# Patient Record
Sex: MALE | Race: WHITE | NOT HISPANIC OR LATINO | Employment: OTHER | URBAN - METROPOLITAN AREA
[De-identification: names, ages, dates, MRNs, and addresses within clinical notes are randomized per-mention and may not be internally consistent; named-entity substitution may affect disease eponyms.]

---

## 2017-06-30 ENCOUNTER — RECORDS - HEALTHEAST (OUTPATIENT)
Dept: LAB | Facility: CLINIC | Age: 67
End: 2017-06-30

## 2017-06-30 LAB
CHOLEST SERPL-MCNC: 173 MG/DL
FASTING STATUS PATIENT QL REPORTED: NORMAL
HDLC SERPL-MCNC: 42 MG/DL
LDLC SERPL CALC-MCNC: 116 MG/DL
TRIGL SERPL-MCNC: 76 MG/DL

## 2017-07-18 ENCOUNTER — RECORDS - HEALTHEAST (OUTPATIENT)
Dept: LAB | Facility: CLINIC | Age: 67
End: 2017-07-18

## 2017-07-18 LAB — HIV 1+2 AB+HIV1 P24 AG SERPL QL IA: NEGATIVE

## 2017-07-19 LAB
HCV AB SERPL QL IA: NEGATIVE
SYPHILIS RPR SCREEN - HISTORICAL: NORMAL

## 2018-08-28 ENCOUNTER — RECORDS - HEALTHEAST (OUTPATIENT)
Dept: ADMINISTRATIVE | Facility: OTHER | Age: 68
End: 2018-08-28

## 2018-10-12 ENCOUNTER — HOSPITAL ENCOUNTER (OUTPATIENT)
Dept: ULTRASOUND IMAGING | Facility: CLINIC | Age: 68
Discharge: HOME | End: 2018-10-12
Attending: PHYSICIAN ASSISTANT

## 2018-10-12 DIAGNOSIS — M79.604 LEG PAIN, RIGHT: ICD-10-CM

## 2018-10-12 DIAGNOSIS — S89.91XA: ICD-10-CM

## 2021-05-28 ENCOUNTER — RECORDS - HEALTHEAST (OUTPATIENT)
Dept: ADMINISTRATIVE | Facility: CLINIC | Age: 71
End: 2021-05-28

## 2021-05-30 ENCOUNTER — RECORDS - HEALTHEAST (OUTPATIENT)
Dept: ADMINISTRATIVE | Facility: CLINIC | Age: 71
End: 2021-05-30

## 2021-06-01 ENCOUNTER — RECORDS - HEALTHEAST (OUTPATIENT)
Dept: ADMINISTRATIVE | Facility: CLINIC | Age: 71
End: 2021-06-01

## 2021-06-16 PROBLEM — R55 SYNCOPE: Status: ACTIVE | Noted: 2017-07-07

## 2021-06-16 PROBLEM — R07.9 CHEST PAIN: Status: ACTIVE | Noted: 2017-07-07

## 2022-02-17 ENCOUNTER — APPOINTMENT (OUTPATIENT)
Dept: CT IMAGING | Facility: CLINIC | Age: 72
DRG: 003 | End: 2022-02-17
Payer: MEDICARE

## 2022-02-17 ENCOUNTER — APPOINTMENT (OUTPATIENT)
Dept: GENERAL RADIOLOGY | Facility: CLINIC | Age: 72
DRG: 003 | End: 2022-02-17
Attending: STUDENT IN AN ORGANIZED HEALTH CARE EDUCATION/TRAINING PROGRAM
Payer: MEDICARE

## 2022-02-17 ENCOUNTER — HOSPITAL ENCOUNTER (EMERGENCY)
Facility: CLINIC | Age: 72
Discharge: ANOTHER HOSPITAL | End: 2022-02-17
Attending: EMERGENCY MEDICINE | Admitting: EMERGENCY MEDICINE
Payer: MEDICARE

## 2022-02-17 ENCOUNTER — ANESTHESIA (OUTPATIENT)
Dept: EMERGENCY MEDICINE | Facility: CLINIC | Age: 72
End: 2022-02-17
Payer: MEDICARE

## 2022-02-17 ENCOUNTER — APPOINTMENT (OUTPATIENT)
Dept: GENERAL RADIOLOGY | Facility: CLINIC | Age: 72
DRG: 003 | End: 2022-02-17
Payer: MEDICARE

## 2022-02-17 ENCOUNTER — HOSPITAL ENCOUNTER (INPATIENT)
Facility: CLINIC | Age: 72
LOS: 22 days | Discharge: HOME HEALTH | DRG: 003 | End: 2022-03-11
Admitting: INTERNAL MEDICINE
Payer: MEDICARE

## 2022-02-17 ENCOUNTER — ANESTHESIA EVENT (OUTPATIENT)
Dept: EMERGENCY MEDICINE | Facility: CLINIC | Age: 72
End: 2022-02-17
Payer: MEDICARE

## 2022-02-17 ENCOUNTER — TELEPHONE (OUTPATIENT)
Dept: NURSING | Facility: CLINIC | Age: 72
End: 2022-02-17

## 2022-02-17 VITALS
WEIGHT: 220 LBS | OXYGEN SATURATION: 96 % | SYSTOLIC BLOOD PRESSURE: 112 MMHG | BODY MASS INDEX: 29.84 KG/M2 | DIASTOLIC BLOOD PRESSURE: 55 MMHG | HEART RATE: 73 BPM | TEMPERATURE: 97.6 F

## 2022-02-17 DIAGNOSIS — G47.00 INSOMNIA, UNSPECIFIED TYPE: ICD-10-CM

## 2022-02-17 DIAGNOSIS — K13.70 ORAL LESION: ICD-10-CM

## 2022-02-17 DIAGNOSIS — I46.9 CARDIAC ARREST (H): ICD-10-CM

## 2022-02-17 DIAGNOSIS — K59.00 CONSTIPATION, UNSPECIFIED CONSTIPATION TYPE: ICD-10-CM

## 2022-02-17 DIAGNOSIS — E78.5 HYPERLIPIDEMIA, UNSPECIFIED HYPERLIPIDEMIA TYPE: ICD-10-CM

## 2022-02-17 DIAGNOSIS — I48.20 CHRONIC ATRIAL FIBRILLATION (H): ICD-10-CM

## 2022-02-17 DIAGNOSIS — I21.3 ST ELEVATION MYOCARDIAL INFARCTION (STEMI), UNSPECIFIED ARTERY (H): ICD-10-CM

## 2022-02-17 DIAGNOSIS — N17.0 ACUTE KIDNEY FAILURE WITH TUBULAR NECROSIS (H): Primary | ICD-10-CM

## 2022-02-17 DIAGNOSIS — I49.01 VENTRICULAR FIBRILLATION (H): ICD-10-CM

## 2022-02-17 LAB
ABO/RH(D): NORMAL
ACT BLD: 207 SECONDS (ref 74–150)
ACT BLD: 250 SECONDS (ref 74–150)
ACT BLD: 275 SECONDS (ref 74–150)
ALBUMIN SERPL-MCNC: 2.7 G/DL (ref 3.4–5)
ALBUMIN SERPL-MCNC: 2.9 G/DL (ref 3.4–5)
ALP SERPL-CCNC: 31 U/L (ref 40–150)
ALP SERPL-CCNC: 37 U/L (ref 40–150)
ALT SERPL W P-5'-P-CCNC: 331 U/L (ref 0–70)
ALT SERPL W P-5'-P-CCNC: 344 U/L (ref 0–70)
ANION GAP SERPL CALCULATED.3IONS-SCNC: 13 MMOL/L (ref 3–14)
ANION GAP SERPL CALCULATED.3IONS-SCNC: 7 MMOL/L (ref 3–14)
ANTIBODY SCREEN: NEGATIVE
APTT PPP: 174 SECONDS (ref 22–38)
APTT PPP: 24 SECONDS (ref 22–38)
APTT PPP: >240 SECONDS (ref 22–38)
AST SERPL W P-5'-P-CCNC: 626 U/L (ref 0–45)
AST SERPL W P-5'-P-CCNC: 704 U/L (ref 0–45)
ATRIAL RATE - MUSE: 72 BPM
BASE EXCESS BLDA CALC-SCNC: -1.9 MMOL/L (ref -9–1.8)
BASE EXCESS BLDA CALC-SCNC: -11.4 MMOL/L (ref -9.6–2)
BASE EXCESS BLDA CALC-SCNC: -12.5 MMOL/L (ref -9.6–2)
BASE EXCESS BLDA CALC-SCNC: -2.3 MMOL/L (ref -9–1.8)
BASE EXCESS BLDA CALC-SCNC: -3.2 MMOL/L (ref -9–1.8)
BASE EXCESS BLDA CALC-SCNC: -3.4 MMOL/L (ref -9–1.8)
BASE EXCESS BLDA CALC-SCNC: -5.9 MMOL/L (ref -9.6–2)
BASOPHILS # BLD AUTO: 0 10E3/UL (ref 0–0.2)
BASOPHILS NFR BLD AUTO: 0 %
BILIRUB SERPL-MCNC: 0.6 MG/DL (ref 0.2–1.3)
BILIRUB SERPL-MCNC: 0.6 MG/DL (ref 0.2–1.3)
BUN SERPL-MCNC: 19 MG/DL (ref 7–30)
BUN SERPL-MCNC: 20 MG/DL (ref 7–30)
CA-I BLD-MCNC: 4.4 MG/DL (ref 4.4–5.2)
CA-I BLD-MCNC: 4.7 MG/DL (ref 4.4–5.2)
CA-I BLD-MCNC: 4.8 MG/DL (ref 4.4–5.2)
CA-I BLD-MCNC: 5.3 MG/DL (ref 4.4–5.2)
CA-I BLD-MCNC: 5.3 MG/DL (ref 4.4–5.2)
CALCIUM SERPL-MCNC: 8 MG/DL (ref 8.5–10.1)
CALCIUM SERPL-MCNC: 8.3 MG/DL (ref 8.5–10.1)
CALCIUM SERPL-MCNC: 8.3 MG/DL (ref 8.5–10.1)
CF REDUC 30M P MA P HEP LENFR BLD TEG: 0.5 % (ref 0–8)
CF REDUC 60M P MA P HEPASE LENFR BLD TEG: 3.4 % (ref 0–15)
CFT P HPASE BLD TEG: 2.2 MINUTE (ref 1–3)
CHLORIDE BLD-SCNC: 109 MMOL/L (ref 94–109)
CHLORIDE BLD-SCNC: 111 MMOL/L (ref 94–109)
CI (COAGULATION INDEX)(Z): -3.4 (ref -3–3)
CLOT ANGLE P HPASE BLD TEG: 57.4 DEGREES (ref 53–72)
CLOT INIT P HPASE BLD TEG: 9.6 MINUTE (ref 5–10)
CLOT STRENGTH P HPASE BLD TEG: 7.1 KD/SC (ref 4.5–11)
CO2 SERPL-SCNC: 18 MMOL/L (ref 20–32)
CO2 SERPL-SCNC: 22 MMOL/L (ref 20–32)
CORTIS SERPL-MCNC: 21.2 UG/DL (ref 4–22)
CREAT SERPL-MCNC: 1.41 MG/DL (ref 0.66–1.25)
CREAT SERPL-MCNC: 1.66 MG/DL (ref 0.66–1.25)
CRP SERPL-MCNC: 3.3 MG/L (ref 0–8)
D DIMER PPP FEU-MCNC: 13.3 UG/ML FEU (ref 0–0.5)
DIASTOLIC BLOOD PRESSURE - MUSE: 58 MMHG
EOSINOPHIL # BLD AUTO: 0 10E3/UL (ref 0–0.7)
EOSINOPHIL # BLD AUTO: 0 10E3/UL (ref 0–0.7)
EOSINOPHIL # BLD AUTO: 0.1 10E3/UL (ref 0–0.7)
EOSINOPHIL NFR BLD AUTO: 0 %
EOSINOPHIL NFR BLD AUTO: 0 %
EOSINOPHIL NFR BLD AUTO: 1 %
ERYTHROCYTE [DISTWIDTH] IN BLOOD BY AUTOMATED COUNT: 12.4 % (ref 10–15)
ERYTHROCYTE [DISTWIDTH] IN BLOOD BY AUTOMATED COUNT: 12.5 % (ref 10–15)
ERYTHROCYTE [DISTWIDTH] IN BLOOD BY AUTOMATED COUNT: 12.7 % (ref 10–15)
ERYTHROCYTE [SEDIMENTATION RATE] IN BLOOD BY WESTERGREN METHOD: 6 MM/HR (ref 0–20)
GFR SERPL CREATININE-BSD FRML MDRD: 43 ML/MIN/1.73M2
GFR SERPL CREATININE-BSD FRML MDRD: 53 ML/MIN/1.73M2
GLUCOSE BLD-MCNC: 128 MG/DL (ref 70–99)
GLUCOSE BLD-MCNC: 135 MG/DL (ref 70–99)
GLUCOSE BLD-MCNC: 177 MG/DL (ref 70–99)
GLUCOSE BLD-MCNC: 181 MG/DL (ref 70–99)
GLUCOSE BLD-MCNC: 218 MG/DL (ref 70–99)
GLUCOSE BLD-MCNC: 259 MG/DL (ref 70–99)
GLUCOSE BLD-MCNC: 268 MG/DL (ref 70–99)
GLUCOSE BLDC GLUCOMTR-MCNC: 122 MG/DL (ref 70–99)
GLUCOSE BLDC GLUCOMTR-MCNC: 128 MG/DL (ref 70–99)
GLUCOSE BLDC GLUCOMTR-MCNC: 131 MG/DL (ref 70–99)
GLUCOSE BLDC GLUCOMTR-MCNC: 154 MG/DL (ref 70–99)
GLUCOSE BLDC GLUCOMTR-MCNC: 183 MG/DL (ref 70–99)
HCO3 BLD-SCNC: 18 MMOL/L (ref 21–28)
HCO3 BLD-SCNC: 23 MMOL/L (ref 21–28)
HCO3 BLD-SCNC: 25 MMOL/L (ref 21–28)
HCO3 BLD-SCNC: 26 MMOL/L (ref 21–28)
HCO3 BLDA-SCNC: 18 MMOL/L (ref 21–28)
HCO3 BLDA-SCNC: 20 MMOL/L (ref 21–28)
HCO3 BLDA-SCNC: 22 MMOL/L (ref 21–28)
HCT VFR BLD AUTO: 34.7 % (ref 40–53)
HCT VFR BLD AUTO: 35.8 % (ref 40–53)
HCT VFR BLD AUTO: 43.2 % (ref 40–53)
HGB BLD-MCNC: 11.3 G/DL (ref 13.3–17.7)
HGB BLD-MCNC: 11.8 G/DL (ref 13.3–17.7)
HGB BLD-MCNC: 11.9 G/DL (ref 13.3–17.7)
HGB BLD-MCNC: 13.7 G/DL (ref 13.3–17.7)
HGB BLD-MCNC: 13.9 G/DL (ref 13.3–17.7)
HGB BLD-MCNC: 14.4 G/DL (ref 13.3–17.7)
HGB FREE PLAS-MCNC: 70 MG/DL
HOLD SPECIMEN: NORMAL
IMM GRANULOCYTES # BLD: 0 10E3/UL
IMM GRANULOCYTES # BLD: 0.1 10E3/UL
IMM GRANULOCYTES # BLD: 0.1 10E3/UL
IMM GRANULOCYTES NFR BLD: 0 %
IMM GRANULOCYTES NFR BLD: 1 %
IMM GRANULOCYTES NFR BLD: 1 %
INR PPP: 1.04 (ref 0.85–1.15)
INR PPP: 1.38 (ref 0.85–1.15)
INR PPP: 1.62 (ref 0.85–1.15)
INTERPRETATION ECG - MUSE: NORMAL
LACTATE BLD-SCNC: 4.9 MMOL/L
LACTATE BLD-SCNC: 7.6 MMOL/L
LACTATE BLD-SCNC: 8.1 MMOL/L
LACTATE SERPL-SCNC: 2.1 MMOL/L (ref 0.7–2)
LACTATE SERPL-SCNC: 5.1 MMOL/L (ref 0.7–2)
LDH SERPL L TO P-CCNC: 806 U/L (ref 85–227)
LYMPHOCYTES # BLD AUTO: 0.6 10E3/UL (ref 0.8–5.3)
LYMPHOCYTES # BLD AUTO: 0.6 10E3/UL (ref 0.8–5.3)
LYMPHOCYTES # BLD AUTO: 3.1 10E3/UL (ref 0.8–5.3)
LYMPHOCYTES NFR BLD AUTO: 27 %
LYMPHOCYTES NFR BLD AUTO: 4 %
LYMPHOCYTES NFR BLD AUTO: 5 %
MCF P HPASE BLD TEG: 58.6 MM (ref 50–70)
MCH RBC QN AUTO: 31.6 PG (ref 26.5–33)
MCH RBC QN AUTO: 31.9 PG (ref 26.5–33)
MCH RBC QN AUTO: 32.1 PG (ref 26.5–33)
MCHC RBC AUTO-ENTMCNC: 32.6 G/DL (ref 31.5–36.5)
MCHC RBC AUTO-ENTMCNC: 33.2 G/DL (ref 31.5–36.5)
MCHC RBC AUTO-ENTMCNC: 33.3 G/DL (ref 31.5–36.5)
MCV RBC AUTO: 95 FL (ref 78–100)
MCV RBC AUTO: 96 FL (ref 78–100)
MCV RBC AUTO: 99 FL (ref 78–100)
MONOCYTES # BLD AUTO: 0.5 10E3/UL (ref 0–1.3)
MONOCYTES # BLD AUTO: 0.6 10E3/UL (ref 0–1.3)
MONOCYTES # BLD AUTO: 0.9 10E3/UL (ref 0–1.3)
MONOCYTES NFR BLD AUTO: 4 %
MONOCYTES NFR BLD AUTO: 5 %
MONOCYTES NFR BLD AUTO: 6 %
NEUTROPHILS # BLD AUTO: 11.2 10E3/UL (ref 1.6–8.3)
NEUTROPHILS # BLD AUTO: 12.4 10E3/UL (ref 1.6–8.3)
NEUTROPHILS # BLD AUTO: 7.7 10E3/UL (ref 1.6–8.3)
NEUTROPHILS NFR BLD AUTO: 67 %
NEUTROPHILS NFR BLD AUTO: 89 %
NEUTROPHILS NFR BLD AUTO: 90 %
NRBC # BLD AUTO: 0 10E3/UL
NRBC BLD AUTO-RTO: 0 /100
O2/TOTAL GAS SETTING VFR VENT: 100 %
O2/TOTAL GAS SETTING VFR VENT: 60 %
OXYHGB MFR BLD: 90 % (ref 92–100)
OXYHGB MFR BLD: 91 % (ref 92–100)
OXYHGB MFR BLD: 93 % (ref 92–100)
OXYHGB MFR BLDA: 54 % (ref 92–100)
OXYHGB MFR BLDA: 98 % (ref 92–100)
OXYHGB MFR BLDA: 98 % (ref 92–100)
P AXIS - MUSE: NORMAL DEGREES
PCO2 BLD: 22 MM HG (ref 35–45)
PCO2 BLD: 46 MM HG (ref 35–45)
PCO2 BLD: 49 MM HG (ref 35–45)
PCO2 BLD: 55 MM HG (ref 35–45)
PCO2 BLDA: 51 MM HG (ref 35–45)
PCO2 BLDA: 59 MM HG (ref 35–45)
PCO2 BLDA: 70 MM HG (ref 35–45)
PH BLD: 7.27 [PH] (ref 7.35–7.45)
PH BLD: 7.3 [PH] (ref 7.35–7.45)
PH BLD: 7.31 [PH] (ref 7.35–7.45)
PH BLD: 7.52 [PH] (ref 7.35–7.45)
PH BLDA: 7.06 [PH] (ref 7.35–7.45)
PH BLDA: 7.09 [PH] (ref 7.35–7.45)
PH BLDA: 7.24 [PH] (ref 7.35–7.45)
PLATELET # BLD AUTO: 151 10E3/UL (ref 150–450)
PLATELET # BLD AUTO: 172 10E3/UL (ref 150–450)
PLATELET # BLD AUTO: 203 10E3/UL (ref 150–450)
PO2 BLD: 256 MM HG (ref 80–105)
PO2 BLD: 66 MM HG (ref 80–105)
PO2 BLD: 68 MM HG (ref 80–105)
PO2 BLD: 80 MM HG (ref 80–105)
PO2 BLDA: 192 MM HG (ref 80–105)
PO2 BLDA: 351 MM HG (ref 80–105)
PO2 BLDA: 41 MM HG (ref 80–105)
POTASSIUM BLD-SCNC: 3.6 MMOL/L (ref 3.4–5.3)
POTASSIUM BLD-SCNC: 3.7 MMOL/L (ref 3.5–5)
POTASSIUM BLD-SCNC: 4.1 MMOL/L (ref 3.5–5)
POTASSIUM BLD-SCNC: 4.2 MMOL/L (ref 3.5–5)
POTASSIUM BLD-SCNC: 4.3 MMOL/L (ref 3.4–5.3)
PR INTERVAL - MUSE: NORMAL MS
PROCALCITONIN SERPL-MCNC: 0.2 NG/ML
PROT SERPL-MCNC: 5.3 G/DL (ref 6.8–8.8)
PROT SERPL-MCNC: 5.5 G/DL (ref 6.8–8.8)
QRS DURATION - MUSE: 100 MS
QT - MUSE: 358 MS
QTC - MUSE: 399 MS
R AXIS - MUSE: 88 DEGREES
RBC # BLD AUTO: 3.52 10E6/UL (ref 4.4–5.9)
RBC # BLD AUTO: 3.73 10E6/UL (ref 4.4–5.9)
RBC # BLD AUTO: 4.56 10E6/UL (ref 4.4–5.9)
SARS-COV-2 RNA RESP QL NAA+PROBE: POSITIVE
SODIUM BLD-SCNC: 140 MMOL/L (ref 133–144)
SODIUM BLD-SCNC: 141 MMOL/L (ref 133–144)
SODIUM BLD-SCNC: 142 MMOL/L (ref 133–144)
SODIUM SERPL-SCNC: 140 MMOL/L (ref 133–144)
SODIUM SERPL-SCNC: 140 MMOL/L (ref 133–144)
SPECIMEN EXPIRATION DATE: NORMAL
SYSTOLIC BLOOD PRESSURE - MUSE: 102 MMHG
T AXIS - MUSE: 44 DEGREES
TROPONIN I SERPL HS-MCNC: ABNORMAL NG/L
TROPONIN I SERPL HS-MCNC: ABNORMAL NG/L
TROPONIN I SERPL-MCNC: 0.02 NG/ML (ref 0–0.29)
UFH PPP CHRO-ACNC: 0.82 IU/ML
UFH PPP CHRO-ACNC: >1.1 IU/ML
VENTRICULAR RATE- MUSE: 75 BPM
WBC # BLD AUTO: 11.6 10E3/UL (ref 4–11)
WBC # BLD AUTO: 12.4 10E3/UL (ref 4–11)
WBC # BLD AUTO: 14 10E3/UL (ref 4–11)

## 2022-02-17 PROCEDURE — 027035Z DILATION OF CORONARY ARTERY, ONE ARTERY WITH TWO DRUG-ELUTING INTRALUMINAL DEVICES, PERCUTANEOUS APPROACH: ICD-10-PCS | Performed by: INTERNAL MEDICINE

## 2022-02-17 PROCEDURE — C9803 HOPD COVID-19 SPEC COLLECT: HCPCS

## 2022-02-17 PROCEDURE — 83051 HEMOGLOBIN PLASMA: CPT | Performed by: INTERNAL MEDICINE

## 2022-02-17 PROCEDURE — 87635 SARS-COV-2 COVID-19 AMP PRB: CPT | Performed by: EMERGENCY MEDICINE

## 2022-02-17 PROCEDURE — 33947 ECMO/ECLS INITIATION ARTERY: CPT

## 2022-02-17 PROCEDURE — 71250 CT THORAX DX C-: CPT | Mod: 26 | Performed by: RADIOLOGY

## 2022-02-17 PROCEDURE — 272N000002 HC OR SUPPLY OTHER OPNP: Performed by: INTERNAL MEDICINE

## 2022-02-17 PROCEDURE — 84484 ASSAY OF TROPONIN QUANT: CPT | Performed by: EMERGENCY MEDICINE

## 2022-02-17 PROCEDURE — 93454 CORONARY ARTERY ANGIO S&I: CPT | Performed by: INTERNAL MEDICINE

## 2022-02-17 PROCEDURE — 85025 COMPLETE CBC W/AUTO DIFF WBC: CPT | Performed by: INTERNAL MEDICINE

## 2022-02-17 PROCEDURE — 82810 BLOOD GASES O2 SAT ONLY: CPT

## 2022-02-17 PROCEDURE — 33952 ECMO/ECLS INSJ PRPH CANNULA: CPT | Performed by: INTERNAL MEDICINE

## 2022-02-17 PROCEDURE — 82805 BLOOD GASES W/O2 SATURATION: CPT | Performed by: INTERNAL MEDICINE

## 2022-02-17 PROCEDURE — 82330 ASSAY OF CALCIUM: CPT | Performed by: INTERNAL MEDICINE

## 2022-02-17 PROCEDURE — 272N000057 HC CATH BALLOON IABP

## 2022-02-17 PROCEDURE — 250N000011 HC RX IP 250 OP 636: Performed by: EMERGENCY MEDICINE

## 2022-02-17 PROCEDURE — C9113 INJ PANTOPRAZOLE SODIUM, VIA: HCPCS | Performed by: INTERNAL MEDICINE

## 2022-02-17 PROCEDURE — 5A1522G EXTRACORPOREAL OXYGENATION, MEMBRANE, PERIPHERAL VENO-ARTERIAL: ICD-10-PCS | Performed by: INTERNAL MEDICINE

## 2022-02-17 PROCEDURE — 84484 ASSAY OF TROPONIN QUANT: CPT | Performed by: INTERNAL MEDICINE

## 2022-02-17 PROCEDURE — 99291 CRITICAL CARE FIRST HOUR: CPT | Mod: 25 | Performed by: INTERNAL MEDICINE

## 2022-02-17 PROCEDURE — 272N000555 HC SENSOR NIRS OXIMETER, ADULT

## 2022-02-17 PROCEDURE — 86140 C-REACTIVE PROTEIN: CPT | Performed by: INTERNAL MEDICINE

## 2022-02-17 PROCEDURE — 86316 IMMUNOASSAY TUMOR OTHER: CPT | Performed by: INTERNAL MEDICINE

## 2022-02-17 PROCEDURE — 82310 ASSAY OF CALCIUM: CPT | Performed by: INTERNAL MEDICINE

## 2022-02-17 PROCEDURE — 82533 TOTAL CORTISOL: CPT | Performed by: INTERNAL MEDICINE

## 2022-02-17 PROCEDURE — 85520 HEPARIN ASSAY: CPT | Performed by: INTERNAL MEDICINE

## 2022-02-17 PROCEDURE — 272N000237 HC CARDIOHELP CIRCUIT

## 2022-02-17 PROCEDURE — 410N000003 HC PER-PERFUSION 1ST 30 MIN: Performed by: INTERNAL MEDICINE

## 2022-02-17 PROCEDURE — 370N000003 HC ANESTHESIA WARD SERVICE

## 2022-02-17 PROCEDURE — 999N000077 HC STATISTIC INSERT IABP

## 2022-02-17 PROCEDURE — 85610 PROTHROMBIN TIME: CPT | Performed by: INTERNAL MEDICINE

## 2022-02-17 PROCEDURE — 80354 DRUG SCREENING FENTANYL: CPT | Performed by: INTERNAL MEDICINE

## 2022-02-17 PROCEDURE — 93005 ELECTROCARDIOGRAM TRACING: CPT | Mod: 59 | Performed by: EMERGENCY MEDICINE

## 2022-02-17 PROCEDURE — C1874 STENT, COATED/COV W/DEL SYS: HCPCS | Performed by: INTERNAL MEDICINE

## 2022-02-17 PROCEDURE — 99153 MOD SED SAME PHYS/QHP EA: CPT | Performed by: INTERNAL MEDICINE

## 2022-02-17 PROCEDURE — 999N000015 HC STATISTIC ARTERIAL MONITORING DAILY

## 2022-02-17 PROCEDURE — C1751 CATH, INF, PER/CENT/MIDLINE: HCPCS | Performed by: INTERNAL MEDICINE

## 2022-02-17 PROCEDURE — 258N000003 HC RX IP 258 OP 636

## 2022-02-17 PROCEDURE — 85347 COAGULATION TIME ACTIVATED: CPT

## 2022-02-17 PROCEDURE — 71250 CT THORAX DX C-: CPT

## 2022-02-17 PROCEDURE — 999N000185 HC STATISTIC TRANSPORT TIME EA 15 MIN

## 2022-02-17 PROCEDURE — G1004 CDSM NDSC: HCPCS | Performed by: RADIOLOGY

## 2022-02-17 PROCEDURE — 272N000001 HC OR GENERAL SUPPLY STERILE: Performed by: INTERNAL MEDICINE

## 2022-02-17 PROCEDURE — 250N000011 HC RX IP 250 OP 636: Performed by: INTERNAL MEDICINE

## 2022-02-17 PROCEDURE — 83615 LACTATE (LD) (LDH) ENZYME: CPT | Performed by: INTERNAL MEDICINE

## 2022-02-17 PROCEDURE — 999N000065 XR CHEST PORT 1 VIEW

## 2022-02-17 PROCEDURE — 94002 VENT MGMT INPAT INIT DAY: CPT

## 2022-02-17 PROCEDURE — 92950 HEART/LUNG RESUSCITATION CPR: CPT

## 2022-02-17 PROCEDURE — 85396 CLOTTING ASSAY WHOLE BLOOD: CPT | Performed by: INTERNAL MEDICINE

## 2022-02-17 PROCEDURE — 85730 THROMBOPLASTIN TIME PARTIAL: CPT | Performed by: INTERNAL MEDICINE

## 2022-02-17 PROCEDURE — 85730 THROMBOPLASTIN TIME PARTIAL: CPT | Performed by: EMERGENCY MEDICINE

## 2022-02-17 PROCEDURE — 80307 DRUG TEST PRSMV CHEM ANLYZR: CPT | Performed by: INTERNAL MEDICINE

## 2022-02-17 PROCEDURE — 74018 RADEX ABDOMEN 1 VIEW: CPT | Mod: 26 | Performed by: STUDENT IN AN ORGANIZED HEALTH CARE EDUCATION/TRAINING PROGRAM

## 2022-02-17 PROCEDURE — 93308 TTE F-UP OR LMTD: CPT

## 2022-02-17 PROCEDURE — 99152 MOD SED SAME PHYS/QHP 5/>YRS: CPT | Performed by: INTERNAL MEDICINE

## 2022-02-17 PROCEDURE — 999N000026 HC STATISTIC CARDIOPULM RESUSCITATION

## 2022-02-17 PROCEDURE — 96375 TX/PRO/DX INJ NEW DRUG ADDON: CPT

## 2022-02-17 PROCEDURE — 36415 COLL VENOUS BLD VENIPUNCTURE: CPT | Performed by: EMERGENCY MEDICINE

## 2022-02-17 PROCEDURE — 70450 CT HEAD/BRAIN W/O DYE: CPT | Mod: 26 | Performed by: RADIOLOGY

## 2022-02-17 PROCEDURE — G1004 CDSM NDSC: HCPCS | Mod: GC | Performed by: RADIOLOGY

## 2022-02-17 PROCEDURE — C1887 CATHETER, GUIDING: HCPCS | Performed by: INTERNAL MEDICINE

## 2022-02-17 PROCEDURE — B2111ZZ FLUOROSCOPY OF MULTIPLE CORONARY ARTERIES USING LOW OSMOLAR CONTRAST: ICD-10-PCS | Performed by: INTERNAL MEDICINE

## 2022-02-17 PROCEDURE — 84132 ASSAY OF SERUM POTASSIUM: CPT

## 2022-02-17 PROCEDURE — 999N000157 HC STATISTIC RCP TIME EA 10 MIN

## 2022-02-17 PROCEDURE — 85610 PROTHROMBIN TIME: CPT | Performed by: EMERGENCY MEDICINE

## 2022-02-17 PROCEDURE — 5A02210 ASSISTANCE WITH CARDIAC OUTPUT USING BALLOON PUMP, CONTINUOUS: ICD-10-PCS | Performed by: INTERNAL MEDICINE

## 2022-02-17 PROCEDURE — 83735 ASSAY OF MAGNESIUM: CPT | Performed by: INTERNAL MEDICINE

## 2022-02-17 PROCEDURE — 250N000013 HC RX MED GY IP 250 OP 250 PS 637: Performed by: INTERNAL MEDICINE

## 2022-02-17 PROCEDURE — 99285 EMERGENCY DEPT VISIT HI MDM: CPT | Mod: 25

## 2022-02-17 PROCEDURE — 258N000003 HC RX IP 258 OP 636: Performed by: EMERGENCY MEDICINE

## 2022-02-17 PROCEDURE — 85652 RBC SED RATE AUTOMATED: CPT | Performed by: INTERNAL MEDICINE

## 2022-02-17 PROCEDURE — 86923 COMPATIBILITY TEST ELECTRIC: CPT | Performed by: INTERNAL MEDICINE

## 2022-02-17 PROCEDURE — C9606 PERC D-E COR REVASC W AMI S: HCPCS | Performed by: INTERNAL MEDICINE

## 2022-02-17 PROCEDURE — 999N000075 HC STATISTIC IABP MONITORING

## 2022-02-17 PROCEDURE — 83605 ASSAY OF LACTIC ACID: CPT | Performed by: INTERNAL MEDICINE

## 2022-02-17 PROCEDURE — 84155 ASSAY OF PROTEIN SERUM: CPT | Performed by: INTERNAL MEDICINE

## 2022-02-17 PROCEDURE — 82803 BLOOD GASES ANY COMBINATION: CPT | Performed by: INTERNAL MEDICINE

## 2022-02-17 PROCEDURE — 258N000003 HC RX IP 258 OP 636: Performed by: INTERNAL MEDICINE

## 2022-02-17 PROCEDURE — C1725 CATH, TRANSLUMIN NON-LASER: HCPCS | Performed by: INTERNAL MEDICINE

## 2022-02-17 PROCEDURE — 999N000065 XR ABDOMEN PORT 1 VIEWS

## 2022-02-17 PROCEDURE — 82947 ASSAY GLUCOSE BLOOD QUANT: CPT | Performed by: INTERNAL MEDICINE

## 2022-02-17 PROCEDURE — 85004 AUTOMATED DIFF WBC COUNT: CPT | Performed by: EMERGENCY MEDICINE

## 2022-02-17 PROCEDURE — 250N000011 HC RX IP 250 OP 636: Performed by: STUDENT IN AN ORGANIZED HEALTH CARE EDUCATION/TRAINING PROGRAM

## 2022-02-17 PROCEDURE — 85379 FIBRIN DEGRADATION QUANT: CPT | Performed by: INTERNAL MEDICINE

## 2022-02-17 PROCEDURE — 84132 ASSAY OF SERUM POTASSIUM: CPT | Performed by: INTERNAL MEDICINE

## 2022-02-17 PROCEDURE — C1769 GUIDE WIRE: HCPCS | Performed by: INTERNAL MEDICINE

## 2022-02-17 PROCEDURE — 3E043XZ INTRODUCTION OF VASOPRESSOR INTO CENTRAL VEIN, PERCUTANEOUS APPROACH: ICD-10-PCS | Performed by: INTERNAL MEDICINE

## 2022-02-17 PROCEDURE — 33967 INSERT I-AORT PERCUT DEVICE: CPT | Performed by: INTERNAL MEDICINE

## 2022-02-17 PROCEDURE — 74176 CT ABD & PELVIS W/O CONTRAST: CPT | Mod: 26 | Performed by: RADIOLOGY

## 2022-02-17 PROCEDURE — 85300 ANTITHROMBIN III ACTIVITY: CPT | Performed by: INTERNAL MEDICINE

## 2022-02-17 PROCEDURE — 36556 INSERT NON-TUNNEL CV CATH: CPT | Performed by: INTERNAL MEDICINE

## 2022-02-17 PROCEDURE — 82330 ASSAY OF CALCIUM: CPT

## 2022-02-17 PROCEDURE — P9041 ALBUMIN (HUMAN),5%, 50ML: HCPCS | Performed by: STUDENT IN AN ORGANIZED HEALTH CARE EDUCATION/TRAINING PROGRAM

## 2022-02-17 PROCEDURE — 250N000009 HC RX 250: Performed by: INTERNAL MEDICINE

## 2022-02-17 PROCEDURE — 96365 THER/PROPH/DIAG IV INF INIT: CPT | Mod: 59

## 2022-02-17 PROCEDURE — 93005 ELECTROCARDIOGRAM TRACING: CPT

## 2022-02-17 PROCEDURE — 71045 X-RAY EXAM CHEST 1 VIEW: CPT | Mod: 26 | Performed by: RADIOLOGY

## 2022-02-17 PROCEDURE — 80347 BENZODIAZEPINES 13 OR MORE: CPT | Performed by: INTERNAL MEDICINE

## 2022-02-17 PROCEDURE — 93010 ELECTROCARDIOGRAM REPORT: CPT | Mod: 59 | Performed by: INTERNAL MEDICINE

## 2022-02-17 PROCEDURE — C1894 INTRO/SHEATH, NON-LASER: HCPCS | Performed by: INTERNAL MEDICINE

## 2022-02-17 PROCEDURE — 200N000002 HC R&B ICU UMMC

## 2022-02-17 PROCEDURE — 6A4Z0ZZ HYPOTHERMIA, SINGLE: ICD-10-PCS | Performed by: INTERNAL MEDICINE

## 2022-02-17 PROCEDURE — 93005 ELECTROCARDIOGRAM TRACING: CPT | Performed by: EMERGENCY MEDICINE

## 2022-02-17 PROCEDURE — 84145 PROCALCITONIN (PCT): CPT | Performed by: INTERNAL MEDICINE

## 2022-02-17 PROCEDURE — 86850 RBC ANTIBODY SCREEN: CPT | Performed by: INTERNAL MEDICINE

## 2022-02-17 PROCEDURE — 70450 CT HEAD/BRAIN W/O DYE: CPT

## 2022-02-17 PROCEDURE — 250N000011 HC RX IP 250 OP 636

## 2022-02-17 DEVICE — STENT CORONARY DES SYNERGY XD MR US 4.00X8MM H7493941808400: Type: IMPLANTABLE DEVICE | Status: FUNCTIONAL

## 2022-02-17 DEVICE — STENT CORONARY DES SYNERGY XD MR US 3.50X24MM H7493941824350: Type: IMPLANTABLE DEVICE | Status: FUNCTIONAL

## 2022-02-17 RX ORDER — MIDAZOLAM HCL IN 0.9 % NACL/PF 1 MG/ML
1-8 PLASTIC BAG, INJECTION (ML) INTRAVENOUS CONTINUOUS
Status: DISCONTINUED | OUTPATIENT
Start: 2022-02-17 | End: 2022-02-23

## 2022-02-17 RX ORDER — NOREPINEPHRINE BITARTRATE 0.06 MG/ML
.01-.6 INJECTION, SOLUTION INTRAVENOUS CONTINUOUS PRN
Status: DISCONTINUED | OUTPATIENT
Start: 2022-02-17 | End: 2022-02-21

## 2022-02-17 RX ORDER — ALBUMIN, HUMAN INJ 5% 5 %
12.5 SOLUTION INTRAVENOUS EVERY 5 MIN PRN
Status: CANCELLED | OUTPATIENT
Start: 2022-02-17

## 2022-02-17 RX ORDER — HEPARIN SODIUM 200 [USP'U]/100ML
3 INJECTION, SOLUTION INTRAVENOUS CONTINUOUS
Status: DISCONTINUED | OUTPATIENT
Start: 2022-02-17 | End: 2022-02-23

## 2022-02-17 RX ORDER — LIDOCAINE 40 MG/G
CREAM TOPICAL
Status: DISCONTINUED | OUTPATIENT
Start: 2022-02-17 | End: 2022-03-11 | Stop reason: HOSPADM

## 2022-02-17 RX ORDER — NITROGLYCERIN 5 MG/ML
VIAL (ML) INTRAVENOUS
Status: DISCONTINUED | OUTPATIENT
Start: 2022-02-17 | End: 2022-02-17 | Stop reason: HOSPADM

## 2022-02-17 RX ORDER — HEPARIN SODIUM 10000 [USP'U]/100ML
10-50 INJECTION, SOLUTION INTRAVENOUS CONTINUOUS
Status: DISCONTINUED | OUTPATIENT
Start: 2022-02-17 | End: 2022-02-21

## 2022-02-17 RX ORDER — ARGATROBAN 1 MG/ML
150 INJECTION, SOLUTION INTRAVENOUS
Status: CANCELLED | OUTPATIENT
Start: 2022-02-17

## 2022-02-17 RX ORDER — ASPIRIN 81 MG/1
324 TABLET, CHEWABLE ORAL ONCE
Status: DISCONTINUED | OUTPATIENT
Start: 2022-02-17 | End: 2022-02-17 | Stop reason: HOSPADM

## 2022-02-17 RX ORDER — MIDAZOLAM HCL IN 0.9 % NACL/PF 1 MG/ML
1-8 PLASTIC BAG, INJECTION (ML) INTRAVENOUS CONTINUOUS
Status: CANCELLED | OUTPATIENT
Start: 2022-02-17

## 2022-02-17 RX ORDER — POTASSIUM CHLORIDE 29.8 MG/ML
20 INJECTION INTRAVENOUS
Status: DISCONTINUED | OUTPATIENT
Start: 2022-02-17 | End: 2022-03-11 | Stop reason: HOSPADM

## 2022-02-17 RX ORDER — HEPARIN SODIUM 10000 [USP'U]/100ML
10-50 INJECTION, SOLUTION INTRAVENOUS CONTINUOUS
Status: CANCELLED | OUTPATIENT
Start: 2022-02-17

## 2022-02-17 RX ORDER — NALOXONE HYDROCHLORIDE 0.4 MG/ML
0.2 INJECTION, SOLUTION INTRAMUSCULAR; INTRAVENOUS; SUBCUTANEOUS
Status: DISCONTINUED | OUTPATIENT
Start: 2022-02-17 | End: 2022-03-11 | Stop reason: HOSPADM

## 2022-02-17 RX ORDER — HEPARIN SODIUM (PORCINE) LOCK FLUSH IV SOLN 100 UNIT/ML 100 UNIT/ML
5-10 SOLUTION INTRAVENOUS EVERY 30 MIN PRN
Status: CANCELLED | OUTPATIENT
Start: 2022-02-17

## 2022-02-17 RX ORDER — MIDAZOLAM HCL IN 0.9 % NACL/PF 1 MG/ML
1-8 PLASTIC BAG, INJECTION (ML) INTRAVENOUS CONTINUOUS
Status: DISCONTINUED | OUTPATIENT
Start: 2022-02-17 | End: 2022-02-17

## 2022-02-17 RX ORDER — MAGNESIUM SULFATE HEPTAHYDRATE 40 MG/ML
2 INJECTION, SOLUTION INTRAVENOUS DAILY PRN
Status: DISCONTINUED | OUTPATIENT
Start: 2022-02-17 | End: 2022-03-11 | Stop reason: HOSPADM

## 2022-02-17 RX ORDER — HEPARIN SODIUM 1000 [USP'U]/ML
INJECTION, SOLUTION INTRAVENOUS; SUBCUTANEOUS
Status: DISCONTINUED | OUTPATIENT
Start: 2022-02-17 | End: 2022-02-17 | Stop reason: HOSPADM

## 2022-02-17 RX ORDER — MORPHINE SULFATE 4 MG/ML
4 INJECTION, SOLUTION INTRAMUSCULAR; INTRAVENOUS
Status: DISCONTINUED | OUTPATIENT
Start: 2022-02-17 | End: 2022-02-17 | Stop reason: HOSPADM

## 2022-02-17 RX ORDER — ASPIRIN 325 MG
TABLET ORAL
Status: DISCONTINUED | OUTPATIENT
Start: 2022-02-17 | End: 2022-02-17 | Stop reason: HOSPADM

## 2022-02-17 RX ORDER — NALOXONE HYDROCHLORIDE 0.4 MG/ML
0.4 INJECTION, SOLUTION INTRAMUSCULAR; INTRAVENOUS; SUBCUTANEOUS
Status: DISCONTINUED | OUTPATIENT
Start: 2022-02-17 | End: 2022-03-11 | Stop reason: HOSPADM

## 2022-02-17 RX ORDER — LIDOCAINE HYDROCHLORIDE ANHYDROUS AND DEXTROSE MONOHYDRATE .8; 5 G/100ML; G/100ML
1-4 INJECTION, SOLUTION INTRAVENOUS CONTINUOUS
Status: DISCONTINUED | OUTPATIENT
Start: 2022-02-17 | End: 2022-02-17 | Stop reason: HOSPADM

## 2022-02-17 RX ORDER — FENTANYL CITRATE 50 UG/ML
INJECTION, SOLUTION INTRAMUSCULAR; INTRAVENOUS
Status: DISCONTINUED | OUTPATIENT
Start: 2022-02-17 | End: 2022-02-17 | Stop reason: HOSPADM

## 2022-02-17 RX ORDER — PHENYLEPHRINE HCL IN 0.9% NACL 50MG/250ML
.5-6 PLASTIC BAG, INJECTION (ML) INTRAVENOUS CONTINUOUS
Status: DISCONTINUED | OUTPATIENT
Start: 2022-02-17 | End: 2022-02-21

## 2022-02-17 RX ORDER — MAGNESIUM SULFATE HEPTAHYDRATE 40 MG/ML
4 INJECTION, SOLUTION INTRAVENOUS EVERY 4 HOURS PRN
Status: DISCONTINUED | OUTPATIENT
Start: 2022-02-17 | End: 2022-03-11 | Stop reason: HOSPADM

## 2022-02-17 RX ORDER — NITROGLYCERIN 0.4 MG/1
0.4 TABLET SUBLINGUAL EVERY 5 MIN PRN
Status: DISCONTINUED | OUTPATIENT
Start: 2022-02-17 | End: 2022-02-17 | Stop reason: HOSPADM

## 2022-02-17 RX ORDER — ALBUMIN, HUMAN INJ 5% 5 %
25 SOLUTION INTRAVENOUS ONCE
Status: COMPLETED | OUTPATIENT
Start: 2022-02-18 | End: 2022-02-17

## 2022-02-17 RX ORDER — ASPIRIN 81 MG/1
81 TABLET, CHEWABLE ORAL DAILY
Status: DISCONTINUED | OUTPATIENT
Start: 2022-02-18 | End: 2022-03-11 | Stop reason: HOSPADM

## 2022-02-17 RX ORDER — ALBUMIN, HUMAN INJ 5% 5 %
12.5 SOLUTION INTRAVENOUS EVERY 5 MIN PRN
Status: DISCONTINUED | OUTPATIENT
Start: 2022-02-17 | End: 2022-02-17 | Stop reason: HOSPADM

## 2022-02-17 RX ORDER — HEPARIN SODIUM 10000 [USP'U]/100ML
100-1000 INJECTION, SOLUTION INTRAVENOUS CONTINUOUS PRN
Status: CANCELLED | OUTPATIENT
Start: 2022-02-17

## 2022-02-17 RX ORDER — HEPARIN SODIUM (PORCINE) LOCK FLUSH IV SOLN 100 UNIT/ML 100 UNIT/ML
5-10 SOLUTION INTRAVENOUS EVERY 30 MIN PRN
Status: DISCONTINUED | OUTPATIENT
Start: 2022-02-17 | End: 2022-02-17

## 2022-02-17 RX ORDER — ALBUMIN, HUMAN INJ 5% 5 %
SOLUTION INTRAVENOUS
Status: COMPLETED
Start: 2022-02-17 | End: 2022-02-17

## 2022-02-17 RX ORDER — HEPARIN SODIUM 10000 [USP'U]/100ML
100-1000 INJECTION, SOLUTION INTRAVENOUS CONTINUOUS PRN
Status: DISCONTINUED | OUTPATIENT
Start: 2022-02-17 | End: 2022-02-17 | Stop reason: HOSPADM

## 2022-02-17 RX ORDER — PROPOFOL 10 MG/ML
INJECTION, EMULSION INTRAVENOUS
Status: COMPLETED
Start: 2022-02-17 | End: 2022-02-17

## 2022-02-17 RX ORDER — LIDOCAINE HYDROCHLORIDE ANHYDROUS AND DEXTROSE MONOHYDRATE .8; 5 G/100ML; G/100ML
1-4 INJECTION, SOLUTION INTRAVENOUS CONTINUOUS
Status: CANCELLED | OUTPATIENT
Start: 2022-02-17

## 2022-02-17 RX ORDER — NOREPINEPHRINE BITARTRATE 0.06 MG/ML
.01-.6 INJECTION, SOLUTION INTRAVENOUS CONTINUOUS PRN
Status: CANCELLED | OUTPATIENT
Start: 2022-02-17

## 2022-02-17 RX ORDER — PIPERACILLIN SODIUM, TAZOBACTAM SODIUM 3; .375 G/15ML; G/15ML
3.38 INJECTION, POWDER, LYOPHILIZED, FOR SOLUTION INTRAVENOUS EVERY 6 HOURS
Status: DISCONTINUED | OUTPATIENT
Start: 2022-02-17 | End: 2022-02-21

## 2022-02-17 RX ORDER — ARGATROBAN 1 MG/ML
350 INJECTION, SOLUTION INTRAVENOUS
Status: DISCONTINUED | OUTPATIENT
Start: 2022-02-17 | End: 2022-02-17 | Stop reason: HOSPADM

## 2022-02-17 RX ORDER — PROPOFOL 10 MG/ML
5-75 INJECTION, EMULSION INTRAVENOUS CONTINUOUS
Status: DISCONTINUED | OUTPATIENT
Start: 2022-02-17 | End: 2022-02-24

## 2022-02-17 RX ORDER — ALBUMIN (HUMAN) 12.5 G/50ML
SOLUTION INTRAVENOUS
Status: COMPLETED
Start: 2022-02-17 | End: 2022-02-17

## 2022-02-17 RX ORDER — PHENYLEPHRINE HCL IN 0.9% NACL 50MG/250ML
.5-6 PLASTIC BAG, INJECTION (ML) INTRAVENOUS CONTINUOUS
Status: CANCELLED | OUTPATIENT
Start: 2022-02-17

## 2022-02-17 RX ORDER — ARGATROBAN 1 MG/ML
350 INJECTION, SOLUTION INTRAVENOUS
Status: CANCELLED | OUTPATIENT
Start: 2022-02-17

## 2022-02-17 RX ORDER — MIDAZOLAM HCL IN 0.9 % NACL/PF 1 MG/ML
PLASTIC BAG, INJECTION (ML) INTRAVENOUS CONTINUOUS PRN
Status: COMPLETED | OUTPATIENT
Start: 2022-02-17 | End: 2022-02-17

## 2022-02-17 RX ORDER — ARGATROBAN 1 MG/ML
150 INJECTION, SOLUTION INTRAVENOUS
Status: DISCONTINUED | OUTPATIENT
Start: 2022-02-17 | End: 2022-02-17 | Stop reason: HOSPADM

## 2022-02-17 RX ADMIN — VANCOMYCIN HYDROCHLORIDE 2000 MG: 1 INJECTION, POWDER, LYOPHILIZED, FOR SOLUTION INTRAVENOUS at 21:46

## 2022-02-17 RX ADMIN — ALBUMIN HUMAN 25 G: 0.05 INJECTION, SOLUTION INTRAVENOUS at 23:44

## 2022-02-17 RX ADMIN — PROPOFOL 45 MCG/KG/MIN: 10 INJECTION, EMULSION INTRAVENOUS at 19:29

## 2022-02-17 RX ADMIN — PIPERACILLIN AND TAZOBACTAM 3.38 G: 3; .375 INJECTION, POWDER, LYOPHILIZED, FOR SOLUTION INTRAVENOUS at 20:36

## 2022-02-17 RX ADMIN — HYDROCORTISONE SODIUM SUCCINATE 50 MG: 100 INJECTION, POWDER, FOR SOLUTION INTRAMUSCULAR; INTRAVENOUS at 21:48

## 2022-02-17 RX ADMIN — HEPARIN SODIUM 7000 UNITS: 1000 INJECTION INTRAVENOUS; SUBCUTANEOUS at 14:26

## 2022-02-17 RX ADMIN — FENTANYL CITRATE 100 MCG/HR: 50 INJECTION INTRAVENOUS at 20:11

## 2022-02-17 RX ADMIN — SODIUM CHLORIDE 1000 ML: 9 INJECTION, SOLUTION INTRAVENOUS at 14:25

## 2022-02-17 RX ADMIN — PANTOPRAZOLE SODIUM 40 MG: 40 INJECTION, POWDER, FOR SOLUTION INTRAVENOUS at 21:48

## 2022-02-17 RX ADMIN — HEPARIN SODIUM 3 ML/HR: 200 INJECTION, SOLUTION INTRAVENOUS at 20:12

## 2022-02-17 RX ADMIN — HEPARIN SODIUM 500 UNITS/HR: 1000 INJECTION, SOLUTION INTRAVENOUS; SUBCUTANEOUS at 21:05

## 2022-02-17 RX ADMIN — MIDAZOLAM HYDROCHLORIDE 8 MG/HR: 1 INJECTION, SOLUTION INTRAVENOUS at 21:49

## 2022-02-17 RX ADMIN — MIDAZOLAM HYDROCHLORIDE 4 MG: 1 INJECTION, SOLUTION INTRAMUSCULAR; INTRAVENOUS at 14:53

## 2022-02-17 RX ADMIN — EPINEPHRINE 0.03 MCG/KG/MIN: 1 INJECTION INTRAMUSCULAR; INTRAVENOUS; SUBCUTANEOUS at 14:15

## 2022-02-17 RX ADMIN — PROPOFOL 45 MCG/KG/MIN: 10 INJECTION, EMULSION INTRAVENOUS at 21:18

## 2022-02-17 RX ADMIN — SODIUM CHLORIDE 1000 ML: 9 INJECTION, SOLUTION INTRAVENOUS at 14:00

## 2022-02-17 RX ADMIN — TICAGRELOR 180 MG: 90 TABLET ORAL at 23:52

## 2022-02-17 ASSESSMENT — ENCOUNTER SYMPTOMS
SHORTNESS OF BREATH: 1
TREMORS: 1
NUMBNESS: 1

## 2022-02-17 ASSESSMENT — ACTIVITIES OF DAILY LIVING (ADL)
ADLS_ACUITY_SCORE: 12

## 2022-02-17 NOTE — LETTER
[facility] [address] [address]  [phone]    FACSIMILE TRANSMITTAL SHEET    TO: [facility]   COMPANY: [facility]   FAX NUMBER:   PHONE NUMBER:      FROM: ALANIS Frias RNCC at [facility]   PHONE: [phone]  DATE: 03/07/22  NUMBER OF PAGES:     _____URGENT ___X__REVIEW ONLY _____PLEASE COMMENT_X___PLEASE REPLY    NOTES/COMMENTS:   Home care Referral for PT/OT/RN services, will possibly discharge early next week   Thanks!                                      IF YOU DID NOT RECEIVE THE CORRECT NUMBER OF PAGES OR THE FAX DID NOT COME THROUGH CLEARLY, PLEASE CALL THE SENDER     CONFIDENTIALITY STATEMENT: Confidential information that may accompany this transmission contains protected health information under state and federal law and is legally privileged. This information is intended only for the use of the individual or entity named above and may be used only for carrying out treatment, payment or other healthcare operations. The recipient or person responsible for delivering this information is prohibited by law from disclosing this information without proper authorization to any other party, unless required to do so by law or regulation. If you are not the intended recipient, you are hereby notified that any review, dissemination, distribution, or copying of this message is strictly prohibited. If you have received this communication in error, please destroy the materials and contact us immediately by calling the number listed above. No response indicates that the information was received by the appropriate authorized party

## 2022-02-17 NOTE — LETTER
[facility] [address] [place] [id number]  [phone]    FACSIMILE TRANSMITTAL SHEET    TO: [email or web address]   COMPANY: Home Care   FAX NUMBER:   PHONE NUMBER: [phone]     FROM: YOBANY FriasCC [facility]   PHONE: [phone]  DATE: 03/07/22  NUMBER OF PAGES:     _____URGENT ___X__REVIEW ONLY _____PLEASE COMMENT___X_PLEASE REPLY    NOTES/COMMENTS:   Referrals for Home Care for PT/OT/RN services   Thanks!  Myranda                                      IF YOU DID NOT RECEIVE THE CORRECT NUMBER OF PAGES OR THE FAX DID NOT COME THROUGH CLEARLY, PLEASE CALL THE SENDER     CONFIDENTIALITY STATEMENT: Confidential information that may accompany this transmission contains protected health information under state and federal law and is legally privileged. This information is intended only for the use of the individual or entity named above and may be used only for carrying out treatment, payment or other healthcare operations. The recipient or person responsible for delivering this information is prohibited by law from disclosing this information without proper authorization to any other party, unless required to do so by law or regulation. If you are not the intended recipient, you are hereby notified that any review, dissemination, distribution, or copying of this message is strictly prohibited. If you have received this communication in error, please destroy the materials and contact us immediately by calling the number listed above. No response indicates that the information was received by the appropriate authorized party

## 2022-02-17 NOTE — Clinical Note
The first balloon was inserted into the left anterior descending and middle left anterior descending.Max pressure = 14 juan. Total duration = 8 seconds.     Max pressure = 14 juan. Total duration = 8 seconds.    Balloon reinflated a second time: Max pressure = 14 juan. Total duration = 8 seconds.  Balloon reinflated a third time: Max pressure = 16 juan. Total duration = 7 seconds.

## 2022-02-17 NOTE — PROGRESS NOTES
Responded to Code Blue call. CPR in progress on arrival. ETT placed by anesthesia with out incident. 8.0 ETT secured at 24 @ teeth verified by ETCO2 and breath sounds.     Mariya Ureña, RT, RT  2/17/2022 3:18 PM

## 2022-02-17 NOTE — ED PROVIDER NOTES
EMERGENCY DEPARTMENT ENCOUNTER      NAME: Chano Johnson  AGE: 71 year old male  YOB: 1950  MRN: [id number]  EVALUATION DATE & TIME: 2/17/2022  1:33 PM    PCP: Laith Limon    ED PROVIDER: Josephine Gann    Chief Complaint   Patient presents with     Chest Pain     FINAL IMPRESSION:  1. ST elevation myocardial infarction (STEMI), unspecified artery (H)    2. Ventricular fibrillation (H)    3. Cardiac arrest (H)        ED COURSE & MEDICAL DECISION MAKING:    Pertinent Labs & Imaging studies reviewed. (See chart for details)  71 year old male presents to the Emergency Department for evaluation of sudden onset chest pain symptoms.  Starting approximately 30 minutes prior.  Arrives to the emergency department in acute distress due to pain noted to be diaphoretic.  ECG performed in triage noted to have ST elevations consistent with an anterior lateral myocardial infarction.  Initiated a code ST elevation MI protocol.  Patient was then brought back to room 14.  In transport to room from triage and prior to the initiation of any stabilizing treatment or STEMI standard medications the patient went into cardiac arrest.  Was noted to be ventricular fibrillation on the monitor.  Initiated CPR.  Patient received multiple electrical shocks secondary to refractory ventricular fibrillation.  Subsequently received multiple dosages of epinephrine per ACLS protocol calcium, 300 mg amiodarone bolus, intravenous fluids.  Initiated Fransisco device to maintain CPR.  Patient was noted to be perfusing with CPR moving his extremities and agonal respirations however as soon as the CPR was stopped was noted to become unresponsive and noted to be in ventricular fibrillation on the monitor.  Attempts made at dual sequential cardioversion.  Continued CPR.  Anesthesia secured airway.  Began on ventilation.  Airway confirmed with end-tidal CO2 and bilateral auscultation and misting in the tube.  At one point the rhythm  deteriorated to pea and asystole.  Ultrasound probe on the chest at this point demonstrating faint fibrillation.  Continued with CPR.  Continued with multiple rounds of ACLS.  Started on EPI drip. Ultimately we did have return of spontaneous circulation with a now palpable pulse in both carotid and femoral region.  Significant ectopy noted on the monitor.  Initial plan of care was transfer to [facility] but given the patient's cardiac arrest Fransisco calls were made to the ECMO team and the [facility] and decision has been made to transfer the patient to go directly to the catheterization lab at [facility]. EMS who arrived per the typical STEMI protocol while patient in cardiac arrest report that they do not have the equipment/training to transfer the patient and needed a critical care ambulance team which is en route to the hospital we are currently monitoring patient closely while awaiting.  Was initiated on an epinephrine drip and we are titrating.    2:56 PM  EMS present in packaging the patient.  He has demonstrated some stability since return of spontaneous circulation with continued pulse and blood pressure.  Receiving small dose of sedation for transport.       1:32 PM I met with the patient to gather history and to perform my initial exam. I discussed the plan for care while in the Emergency Department. PPE (gloves, glasses, surgical mask) was worn during patient encounters. I informed wife, that patient will have to transfer as his EKG indicates STEMI.   1:38 PM Patient coded and code blue was called. Chest compressions started.   1:39 PM First shock given. CPR started after.   1:40 PM Second shock administered. CPR restarted.   1:41 PM Third shock administered.  1:41 PM Fourth shock administered. FRANSISCO placed.  1:42 PM 1 mg of Epi given.  1:43 PM Fifth shock administered. FRANSISCO restarted.  1:44 PM Respiratory was able to intubate patient.  1:46 PM 1 mg of Epi given.  1:47  mg Amidarone  given.  1:48 PM Sixth shock administered. DOMINGO restarted.  1:49  mg Amidarone given.   1:50 PM 2 mg Versed given.  1:51 PM Pulse checked, no pulse, CPR restarted.  1:53 PM Seventh shock administered. DOMINGO restarted.  1:54 PM 1 mg Epi given.   1:57 PM Eighth shock administered.   1:58 PM 1 mg Epi given.  2:00 PM Pulse checked, no pulse, CPR restarted.  2:00 PM Dr. Washington [facility]   2:01 PM Pulse regained.  2:02 PM Pulse lost, DOMINGO started.   2:03 PM 1 mg of Epi given.   2:04 PM Pulse checked, no pulse, DOMINGO restarted.   2:05 PM Pulse regained  2:08 PM Dr. Washington talked with Dr. De La Cruz at [facility] and they want patient on a rig and over to the  of  as quickly as possible for catheterization.  2:09 PM Patient placed on a ventilator.   2:12 PM Dr. Washington updated patient's wife, who signed consent for transfer.   2:12 PM 20 mc of Epi given.  2:14 PM 20 mc of Epi given.  2:17 PM Epi drip started.   2:20 PM I updated the wife.   2:24 PM I re-evaluated the patient.  2:36 PM I re-evaluated the patient   2:49 PM Transport is here for patient, I gave report and patient was given Versed before transfer.       At the conclusion of the encounter I discussed the results of all of the tests and the disposition. The questions were answered. The patient or family acknowledged understanding and was agreeable with the care plan.     MEDICATIONS GIVEN IN THE EMERGENCY:  Medications   aspirin (ASA) chewable tablet 324 mg (324 mg Oral Not Given 2/17/22 1426)   aspirin (ASA) chewable tablet 324 mg (324 mg Oral Not Given 2/17/22 1425)   ticagrelor (BRILINTA) tablet 180 mg (180 mg Oral Not Given 2/17/22 1426)   heparin (porcine) injection 1000 units/mL (rounds in 500 unit increments) (7,000 Units Intravenous Not Given 2/17/22 1426)   nitroGLYcerin (NITROSTAT) sublingual tablet 0.4 mg (has no administration in time range)   morphine (PF) injection 4 mg (has no administration in time range)   EPINEPHrine  "(ADRENALINE) 5 mg in  mL infusion (PERIPHERAL) (0.01 mcg/kg/min × 99.8 kg Intravenous Rate/Dose Change 2/17/22 1421)   0.9% sodium chloride BOLUS (1,000 mLs Intravenous New Bag 2/17/22 1425)   0.9% sodium chloride BOLUS (1,000 mLs Intravenous New Bag 2/17/22 1400)       NEW PRESCRIPTIONS STARTED AT TODAY'S ER VISIT  New Prescriptions    No medications on file          =================================================================    HPI    Patient information was obtained from: Patient and Wife    Use of : N/A        Chano Johnson is a 71 year old male with a pertinent history of A-fib and GERD who presents to this ED by private vehicle with wife for evaluation of chest pain.     Patient had sudden onset of mid chest pain with right arm numbness that started at 1300 (~30 minutes ago). He states the pain is currently improving, but it feels like \"heart burn bad\". He has associated shortness of breath and shakiness. Patient is a non-smoker. Patient denies additional medical concerns or complaints at this time.  The wife reports that the patient has a history of atrial fibrillation and status post watchman device.  She reports he is otherwise healthy and has never had a heart attack.  No other information available at this time.        REVIEW OF SYSTEMS   Review of Systems   Respiratory: Positive for shortness of breath.    Cardiovascular: Positive for chest pain.   Neurological: Positive for tremors and numbness (right arm).   All other systems reviewed and are negative.      PAST MEDICAL HISTORY:  No past medical history on file.    PAST SURGICAL HISTORY:  Past Surgical History:   Procedure Laterality Date     ATRIAL ABLATION SURGERY             CURRENT MEDICATIONS:    ascorbic acid, vitamin C, (VITAMIN C) 500 MG tablet  dabigatran etexilate (PRADAXA) 150 mg capsu  multivitamin with minerals (THERA-M) 9 mg iron-400 mcg Tab tablet  omeprazole (PRILOSEC) 20 MG capsule  sildenafil (VIAGRA) 100 MG " tablet        ALLERGIES:  Allergies   Allergen Reactions     Chlorpheniramine-Phenylpropan [A.R.M.] Other (See Comments)     Watery eyes, sneezing       FAMILY HISTORY:  No family history on file.    SOCIAL HISTORY:   Social History     Socioeconomic History     Marital status:      Spouse name: Not on file     Number of children: Not on file     Years of education: Not on file     Highest education level: Not on file   Occupational History     Not on file   Tobacco Use     Smoking status: Never Smoker     Smokeless tobacco: Not on file   Substance and Sexual Activity     Alcohol use: Yes     Alcohol/week: 2.0 standard drinks     Drug use: No     Sexual activity: Not on file   Other Topics Concern     Not on file   Social History Narrative     Not on file     Social Determinants of Health     Financial Resource Strain: Not on file   Food Insecurity: Not on file   Transportation Needs: Not on file   Physical Activity: Not on file   Stress: Not on file   Social Connections: Not on file   Intimate Partner Violence: Not on file   Housing Stability: Not on file       VITALS:  /55   Pulse 80   Temp 97.6  F (36.4  C)   Resp (!) 0   Wt 99.8 kg (220 lb)   SpO2 95%   BMI 29.84 kg/m      PHYSICAL EXAM    PHYSICAL EXAM    Constitutional: Critically ill-appearing adult male diaphoretic in acute distress due to pain.  HENT: Normocephalic, Atraumatic, Bilateral external ears normal, Oropharynx normal, mucous membranes moist, Nose normal. Neck-  Normal range of motion, No tenderness, Supple, No stridor.   Eyes: PERRL, EOMI, Conjunctiva normal, No discharge.   Respiratory: Tachypneic, not in respiratory distress, breath sounds otherwise normal to auscultation.  Cardiovascular: Normal heart rate, Regular rhythm, No murmurs Chest wall nontender.    GI:  Soft, No tenderness, No masses, No flank tenderness. No rebound or guarding.  : No cva tenderness    Musculoskeletal: 2+ DP pulses. No edema. No cyanosis. Good  range of motion in all major joints. No tenderness to palpation. No tenderness of the CTLS spine.   Integument: Diaphoretic  Neurologic: Alert & oriented x 3, Normal motor function, Normal sensory function, No focal deficits noted.   Psychiatric: Affect normal, Judgment normal, Mood normal. Cooperative.     LAB:  All pertinent labs reviewed and interpreted.  Results for orders placed or performed during the hospital encounter of 02/17/22   Result Value Ref Range    INR 1.04 0.85 - 1.15   Partial thromboplastin time   Result Value Ref Range    aPTT 24 22 - 38 Seconds   Result Value Ref Range    Troponin I 0.02 0.00 - 0.29 ng/mL   CBC with platelets and differential   Result Value Ref Range    WBC Count 11.6 (H) 4.0 - 11.0 10e3/uL    RBC Count 4.56 4.40 - 5.90 10e6/uL    Hemoglobin 14.4 13.3 - 17.7 g/dL    Hematocrit 43.2 40.0 - 53.0 %    MCV 95 78 - 100 fL    MCH 31.6 26.5 - 33.0 pg    MCHC 33.3 31.5 - 36.5 g/dL    RDW 12.5 10.0 - 15.0 %    Platelet Count 203 150 - 450 10e3/uL    % Neutrophils 67 %    % Lymphocytes 27 %    % Monocytes 5 %    % Eosinophils 1 %    % Basophils 0 %    % Immature Granulocytes 0 %    NRBCs per 100 WBC 0 <1 /100    Absolute Neutrophils 7.7 1.6 - 8.3 10e3/uL    Absolute Lymphocytes 3.1 0.8 - 5.3 10e3/uL    Absolute Monocytes 0.6 0.0 - 1.3 10e3/uL    Absolute Eosinophils 0.1 0.0 - 0.7 10e3/uL    Absolute Basophils 0.0 0.0 - 0.2 10e3/uL    Absolute Immature Granulocytes 0.0 <=0.4 10e3/uL    Absolute NRBCs 0.0 10e3/uL       RADIOLOGY:  Reviewed all pertinent imaging. Please see official radiology report.  XR Chest Port 1 View    (Results Pending)       EKG:    Performed at: 1332  Sinus tachycardia  Heart rate 110  ST elevations in anterior distribution consistent with an acute ST elevation MI  In comparison to ECG from July 2017 acute ST elevation MI is present  Premature ventricular complexes  Clinical impression: Acute ST elevation MI    Repeat ECG with return of spontaneous  circulation  Undetermined rhythm  Ventricular ectopy  Rest elevation MI    I have independently reviewed and interpreted the EKG(s) documented above.    PROCEDURES:     Critical Care     Performed by: Dr. Montejo  Total critical care time: 75 minutes  Critical care was necessary to treat or prevent imminent or life-threatening deterioration of the following conditions:  Acute ST elevation MI deteriorating to ventricular fibrillation deteriorating to refractory ventricular fibrillation with cardiac arrest  Critical care was time spent personally by me on the following activities: development of treatment plan with patient or surrogate, discussions with consultants, examination of patient, evaluation of patient's response to treatment, obtaining history from patient or surrogate, ordering and performing treatments and interventions, ordering and review of laboratory studies, ordering and review of radiographic studies, re-evaluation of patient's condition and monitoring for potential decompensation.  Critical care time was exclusive of separately billable procedures and treating other patients.    I, Josephine Sanchez , am serving as a scribe to document services personally performed by Devin Montejo MD based on my observation and the provider's statements to me. I, Devin Pollard MD, attest that Josephine Sanchez is acting in a scribe capacity, has observed my performance of the services and has documented them in accordance with my direction.    Devin Montejo MD  Emergency Medicine  [facility] EMERGENCY ROOM  [address] [id number] [phone]       Devin Montejo MD  02/17/22 1341

## 2022-02-17 NOTE — Clinical Note
Marisa Hall from [facility] is requesting a copy of the final pathology report to be faxed to [phone] The first balloon was inserted into the left anterior descending and middle left anterior descending.Max pressure = 12 juan. Total duration = 8 seconds.     Max pressure = 12 juan. Total duration = 8 seconds.   2.5 x12 emerge.  Balloon reinflated a second time: Max pressure = 12 juan. Total duration = 8 seconds.

## 2022-02-17 NOTE — Clinical Note
IABP inserted in the right femoral artery. IABP inserted with 50 cc balloon volume Lot number is: [id number]

## 2022-02-17 NOTE — Clinical Note
Stent deployed in the proximal left anterior descending. Max pressure = 14 juan. Total duration = 8 seconds. Balloon reinflated a second time: Max pressure = 16 juan. Total duration = 8 seconds. Balloon reinflated a third time:

## 2022-02-17 NOTE — TELEPHONE ENCOUNTER
Patient classified as COVID treatment eligible by Epic high risk algorithm:  No    Coronavirus (COVID-19) Notification    Reason for call  Notify of POSITIVE COVID-19 lab result, assess symptoms,  review [facility] recommendations    Lab Result   Lab test for 2019-nCoV rRt-PCR or SARS-COV-2 PCR  Oropharyngeal AND/OR nasopharyngeal swabs were POSITIVE for 2019-nCoV RNA [OR] SARS-COV-2 RNA (COVID-19) RNA     We have been unable to reach patient by phone at this time to notify of their Positive COVID-19 result.    Left voicemail message requesting a call back to [phone] [facility] for results.        A Positive COVID-19 letter will be sent via [employer] or the mail. (Exception, no letters sent to Presurgerical/Preprocedure Patients)    Kayla Olmos

## 2022-02-17 NOTE — ANESTHESIA PROCEDURE NOTES
Airway       Patient location during procedure: ED       Procedure Start/Stop Times: 2/17/2022 1:45 PM  Staff -        CRNA: Ana Cordova APRN CRNA       Performed By: CRNA  Consent for Airway        Urgency: emergent       Consent: No emergent situation.  Indications and Patient Condition       Indications for airway management: cardiovascular arrest       Mallampati: Not Assessed       Mask difficulty assessment: 1 - vent by mask    Final Airway Details       Final airway type: endotracheal airway       Successful airway: ETT - single  Endotracheal Airway Details        ETT size (mm): 8.0       Cuffed: yes       Successful intubation technique: video laryngoscopy       VL Blade Size: MAC 4       Grade View of Cords: 1       Adjucts: stylet       Position: Center       Measured from: gums/teeth       Secured at (cm): 24       Bite block used: Oral Airway    Post intubation assessment        ETT secured, Vent settings by primary/ICU team, Primary/ICU team to review CXR, Sedation to be ordered by primary/ICU team and No apparent complications       Placement verified by: capnometry, equal breath sounds and chest rise        Number of attempts at approach: 1       Number of other approaches attempted: 0       Secured with: commercial tube ham       Ease of procedure: easy       Dentition: Intact    Additional Comments       Code Blue response, CPR in progress

## 2022-02-17 NOTE — ED NOTES
Called spouse Meeta to let her know ETD approx 10 minutes, may be to  [facility] ED in approx 30 min. She is already at [facility] waiting, stated that she was appreciative of the call.

## 2022-02-17 NOTE — PROGRESS NOTES
SPIRITUAL HEALTH SERVICES Progress Note  [facility] ED    Initial visit with pt's spouse, Meeta, to provide emotional/spiritual support. Meeta recollected the events of the day and shared about their family, which includes two sons and six grandchildren. 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    Supported recitation of prayer (Li Browne).  She notes that they are Church and they occasionally attend [employer] in [place]. Meeta contacted one of her sons who plans to meet her at the [facility]. Visit concluded per her departure for [facility].    Aurelio Perry M.Div.  Staff

## 2022-02-17 NOTE — ED NOTES
Patient into triage ECG performed and STEMI called.  Patient back to room, code blue in room 14, CPR started, V fib rhythm, MD into room, code continued on paper.

## 2022-02-17 NOTE — Clinical Note
Stent deployed in the middle left anterior descending. Max pressure = 12 juan. Total duration = 9 seconds.

## 2022-02-17 NOTE — Clinical Note
The first balloon was inserted into the left anterior descending and proximal left anterior descending.Max pressure = 16 juan. Total duration = 9 seconds.     Max pressure = 18 juan. Total duration = 9 seconds.    Balloon reinflated a second time: Max pressure = 18 juan. Total duration = 9 seconds.

## 2022-02-17 NOTE — LETTER
Attention:    Discharge orders attached for ISRAEL Johnson. He is discharging from this hospital today, 3/11.    Angy Beckett, RNCC, BSN    [facility]    [address]  [address] [place] [address]    [email or web address]    Office: [phone] Pager: [phone]  To contact the weekend RNCC, page [phone].

## 2022-02-17 NOTE — ED TRIAGE NOTES
Patient is here with constant mid chest pain with right arm numbnmess that started 1300. He does have a fib with a watchman device. He did have a cortisone shot yesterday left.  He was complaining shortness of breath as well.

## 2022-02-18 ENCOUNTER — APPOINTMENT (OUTPATIENT)
Dept: CARDIOLOGY | Facility: CLINIC | Age: 72
DRG: 003 | End: 2022-02-18
Payer: MEDICARE

## 2022-02-18 ENCOUNTER — APPOINTMENT (OUTPATIENT)
Dept: NEUROLOGY | Facility: CLINIC | Age: 72
DRG: 003 | End: 2022-02-18
Payer: MEDICARE

## 2022-02-18 ENCOUNTER — APPOINTMENT (OUTPATIENT)
Dept: ULTRASOUND IMAGING | Facility: CLINIC | Age: 72
DRG: 003 | End: 2022-02-18
Payer: MEDICARE

## 2022-02-18 ENCOUNTER — APPOINTMENT (OUTPATIENT)
Dept: GENERAL RADIOLOGY | Facility: CLINIC | Age: 72
DRG: 003 | End: 2022-02-18
Payer: MEDICARE

## 2022-02-18 LAB
ACT BLD: 135 SECONDS (ref 74–150)
ACT BLD: 143 SECONDS (ref 74–150)
ACT BLD: 143 SECONDS (ref 74–150)
ACT BLD: 147 SECONDS (ref 74–150)
ACT BLD: 160 SECONDS (ref 74–150)
ACT BLD: 165 SECONDS (ref 74–150)
ACT BLD: 169 SECONDS (ref 74–150)
ACT BLD: 173 SECONDS (ref 74–150)
ACT BLD: 177 SECONDS (ref 74–150)
ACT BLD: 186 SECONDS (ref 74–150)
ACT BLD: 203 SECONDS (ref 74–150)
ACT BLD: 233 SECONDS (ref 74–150)
ALBUMIN SERPL-MCNC: 2.8 G/DL (ref 3.4–5)
ALBUMIN SERPL-MCNC: 2.9 G/DL (ref 3.4–5)
ALBUMIN SERPL-MCNC: 2.9 G/DL (ref 3.4–5)
ALBUMIN SERPL-MCNC: 3.1 G/DL (ref 3.4–5)
ALP SERPL-CCNC: 14 U/L (ref 40–150)
ALP SERPL-CCNC: 15 U/L (ref 40–150)
ALP SERPL-CCNC: 15 U/L (ref 40–150)
ALP SERPL-CCNC: 20 U/L (ref 40–150)
ALT SERPL W P-5'-P-CCNC: 128 U/L (ref 0–70)
ALT SERPL W P-5'-P-CCNC: 138 U/L (ref 0–70)
ALT SERPL W P-5'-P-CCNC: 153 U/L (ref 0–70)
ALT SERPL W P-5'-P-CCNC: 240 U/L (ref 0–70)
ANION GAP SERPL CALCULATED.3IONS-SCNC: 4 MMOL/L (ref 3–14)
ANION GAP SERPL CALCULATED.3IONS-SCNC: 7 MMOL/L (ref 3–14)
ANION GAP SERPL CALCULATED.3IONS-SCNC: 8 MMOL/L (ref 3–14)
ANION GAP SERPL CALCULATED.3IONS-SCNC: 8 MMOL/L (ref 3–14)
APTT PPP: 153 SECONDS (ref 22–38)
APTT PPP: 196 SECONDS (ref 22–38)
APTT PPP: 47 SECONDS (ref 22–38)
APTT PPP: 98 SECONDS (ref 22–38)
AST SERPL W P-5'-P-CCNC: 224 U/L (ref 0–45)
AST SERPL W P-5'-P-CCNC: 250 U/L (ref 0–45)
AST SERPL W P-5'-P-CCNC: 290 U/L (ref 0–45)
AST SERPL W P-5'-P-CCNC: 510 U/L (ref 0–45)
AT III ACT/NOR PPP CHRO: 58 % (ref 85–135)
AT III ACT/NOR PPP CHRO: 72 % (ref 85–135)
BASE EXCESS BLDA CALC-SCNC: -0.6 MMOL/L (ref -9–1.8)
BASE EXCESS BLDA CALC-SCNC: -1 MMOL/L (ref -9–1.8)
BASE EXCESS BLDA CALC-SCNC: -1.3 MMOL/L (ref -9–1.8)
BASE EXCESS BLDA CALC-SCNC: -1.4 MMOL/L (ref -9–1.8)
BASE EXCESS BLDA CALC-SCNC: -1.4 MMOL/L (ref -9–1.8)
BASE EXCESS BLDA CALC-SCNC: -1.5 MMOL/L (ref -9–1.8)
BASE EXCESS BLDA CALC-SCNC: -1.6 MMOL/L (ref -9–1.8)
BASE EXCESS BLDA CALC-SCNC: -1.7 MMOL/L (ref -9–1.8)
BASE EXCESS BLDA CALC-SCNC: -1.7 MMOL/L (ref -9–1.8)
BASE EXCESS BLDA CALC-SCNC: -2.1 MMOL/L (ref -9–1.8)
BASE EXCESS BLDA CALC-SCNC: -2.1 MMOL/L (ref -9–1.8)
BASE EXCESS BLDA CALC-SCNC: -2.9 MMOL/L (ref -9–1.8)
BASE EXCESS BLDV CALC-SCNC: -0.5 MMOL/L (ref -7.7–1.9)
BASE EXCESS BLDV CALC-SCNC: -1.1 MMOL/L (ref -7.7–1.9)
BILIRUB SERPL-MCNC: 0.6 MG/DL (ref 0.2–1.3)
BILIRUB SERPL-MCNC: 0.7 MG/DL (ref 0.2–1.3)
BILIRUB SERPL-MCNC: 0.9 MG/DL (ref 0.2–1.3)
BILIRUB SERPL-MCNC: 0.9 MG/DL (ref 0.2–1.3)
BLD PROD TYP BPU: NORMAL
BLD PROD TYP BPU: NORMAL
BLOOD COMPONENT TYPE: NORMAL
BLOOD COMPONENT TYPE: NORMAL
BUN SERPL-MCNC: 23 MG/DL (ref 7–30)
BUN SERPL-MCNC: 24 MG/DL (ref 7–30)
BUN SERPL-MCNC: 27 MG/DL (ref 7–30)
BUN SERPL-MCNC: 31 MG/DL (ref 7–30)
CA-I BLD-MCNC: 4.2 MG/DL (ref 4.4–5.2)
CA-I BLD-MCNC: 4.5 MG/DL (ref 4.4–5.2)
CA-I BLD-MCNC: 4.5 MG/DL (ref 4.4–5.2)
CA-I BLD-MCNC: 4.6 MG/DL (ref 4.4–5.2)
CALCIUM SERPL-MCNC: 7.2 MG/DL (ref 8.5–10.1)
CALCIUM SERPL-MCNC: 7.8 MG/DL (ref 8.5–10.1)
CALCIUM SERPL-MCNC: 8 MG/DL (ref 8.5–10.1)
CALCIUM SERPL-MCNC: 8.1 MG/DL (ref 8.5–10.1)
CF REDUC 30M P MA P HEP LENFR BLD TEG: 0.5 % (ref 0–8)
CF REDUC 60M P MA P HEPASE LENFR BLD TEG: 3.3 % (ref 0–15)
CFT P HPASE BLD TEG: 1.9 MINUTE (ref 1–3)
CHLORIDE BLD-SCNC: 110 MMOL/L (ref 94–109)
CHLORIDE BLD-SCNC: 111 MMOL/L (ref 94–109)
CHLORIDE BLD-SCNC: 112 MMOL/L (ref 94–109)
CHLORIDE BLD-SCNC: 112 MMOL/L (ref 94–109)
CI (COAGULATION INDEX)(Z): -1.5 (ref -3–3)
CLOT ANGLE P HPASE BLD TEG: 63.8 DEGREES (ref 53–72)
CLOT INIT P HPASE BLD TEG: 7.2 MINUTE (ref 5–10)
CLOT STRENGTH P HPASE BLD TEG: 6.4 KD/SC (ref 4.5–11)
CO2 SERPL-SCNC: 21 MMOL/L (ref 20–32)
CO2 SERPL-SCNC: 22 MMOL/L (ref 20–32)
CO2 SERPL-SCNC: 23 MMOL/L (ref 20–32)
CO2 SERPL-SCNC: 24 MMOL/L (ref 20–32)
CODING SYSTEM: NORMAL
CODING SYSTEM: NORMAL
CREAT SERPL-MCNC: 1.86 MG/DL (ref 0.66–1.25)
CREAT SERPL-MCNC: 2.03 MG/DL (ref 0.66–1.25)
CREAT SERPL-MCNC: 2.16 MG/DL (ref 0.66–1.25)
CREAT SERPL-MCNC: 2.34 MG/DL (ref 0.66–1.25)
CROSSMATCH: NORMAL
CROSSMATCH: NORMAL
CRP SERPL-MCNC: 17 MG/L (ref 0–8)
D DIMER PPP FEU-MCNC: 1.4 UG/ML FEU (ref 0–0.5)
D DIMER PPP FEU-MCNC: 4.07 UG/ML FEU (ref 0–0.5)
ERYTHROCYTE [DISTWIDTH] IN BLOOD BY AUTOMATED COUNT: 12.8 % (ref 10–15)
ERYTHROCYTE [DISTWIDTH] IN BLOOD BY AUTOMATED COUNT: 12.9 % (ref 10–15)
ERYTHROCYTE [DISTWIDTH] IN BLOOD BY AUTOMATED COUNT: 15.3 % (ref 10–15)
ERYTHROCYTE [DISTWIDTH] IN BLOOD BY AUTOMATED COUNT: 15.9 % (ref 10–15)
ERYTHROCYTE [DISTWIDTH] IN BLOOD BY AUTOMATED COUNT: 16.3 % (ref 10–15)
ERYTHROCYTE [SEDIMENTATION RATE] IN BLOOD BY WESTERGREN METHOD: 15 MM/HR (ref 0–20)
FIBRINOGEN PPP-MCNC: 166 MG/DL (ref 170–490)
FIBRINOGEN PPP-MCNC: 169 MG/DL (ref 170–490)
GFR SERPL CREATININE-BSD FRML MDRD: 29 ML/MIN/1.73M2
GFR SERPL CREATININE-BSD FRML MDRD: 32 ML/MIN/1.73M2
GFR SERPL CREATININE-BSD FRML MDRD: 34 ML/MIN/1.73M2
GFR SERPL CREATININE-BSD FRML MDRD: 38 ML/MIN/1.73M2
GLUCOSE BLD-MCNC: 133 MG/DL (ref 70–99)
GLUCOSE BLD-MCNC: 134 MG/DL (ref 70–99)
GLUCOSE BLD-MCNC: 149 MG/DL (ref 70–99)
GLUCOSE BLD-MCNC: 150 MG/DL (ref 70–99)
GLUCOSE BLD-MCNC: 151 MG/DL (ref 70–99)
GLUCOSE BLD-MCNC: 153 MG/DL (ref 70–99)
GLUCOSE BLD-MCNC: 155 MG/DL (ref 70–99)
GLUCOSE BLDC GLUCOMTR-MCNC: 126 MG/DL (ref 70–99)
GLUCOSE BLDC GLUCOMTR-MCNC: 132 MG/DL (ref 70–99)
GLUCOSE BLDC GLUCOMTR-MCNC: 134 MG/DL (ref 70–99)
GLUCOSE BLDC GLUCOMTR-MCNC: 135 MG/DL (ref 70–99)
GLUCOSE BLDC GLUCOMTR-MCNC: 141 MG/DL (ref 70–99)
GLUCOSE BLDC GLUCOMTR-MCNC: 142 MG/DL (ref 70–99)
GLUCOSE BLDC GLUCOMTR-MCNC: 142 MG/DL (ref 70–99)
GLUCOSE BLDC GLUCOMTR-MCNC: 143 MG/DL (ref 70–99)
GLUCOSE BLDC GLUCOMTR-MCNC: 147 MG/DL (ref 70–99)
GLUCOSE BLDC GLUCOMTR-MCNC: 148 MG/DL (ref 70–99)
GLUCOSE BLDC GLUCOMTR-MCNC: 155 MG/DL (ref 70–99)
HCO3 BLD-SCNC: 22 MMOL/L (ref 21–28)
HCO3 BLD-SCNC: 23 MMOL/L (ref 21–28)
HCO3 BLD-SCNC: 24 MMOL/L (ref 21–28)
HCO3 BLD-SCNC: 25 MMOL/L (ref 21–28)
HCO3 BLDA-SCNC: 23 MMOL/L (ref 21–28)
HCO3 BLDA-SCNC: 25 MMOL/L (ref 21–28)
HCO3 BLDV-SCNC: 25 MMOL/L (ref 21–28)
HCO3 BLDV-SCNC: 26 MMOL/L (ref 21–28)
HCT VFR BLD AUTO: 18.4 % (ref 40–53)
HCT VFR BLD AUTO: 21.4 % (ref 40–53)
HCT VFR BLD AUTO: 21.6 % (ref 40–53)
HCT VFR BLD AUTO: 24.5 % (ref 40–53)
HCT VFR BLD AUTO: 26.9 % (ref 40–53)
HGB BLD-MCNC: 6 G/DL (ref 13.3–17.7)
HGB BLD-MCNC: 7.1 G/DL (ref 13.3–17.7)
HGB BLD-MCNC: 7.4 G/DL (ref 13.3–17.7)
HGB BLD-MCNC: 8 G/DL (ref 13.3–17.7)
HGB BLD-MCNC: 8.7 G/DL (ref 13.3–17.7)
HGB FREE PLAS-MCNC: 90 MG/DL
INR PPP: 1.33 (ref 0.85–1.15)
INR PPP: 1.39 (ref 0.85–1.15)
INR PPP: 1.42 (ref 0.85–1.15)
INR PPP: 1.48 (ref 0.85–1.15)
INTERPRETATION TEGPIA: NORMAL
ISSUE DATE AND TIME: NORMAL
ISSUE DATE AND TIME: NORMAL
LACTATE SERPL-SCNC: 1.2 MMOL/L (ref 0.7–2)
LACTATE SERPL-SCNC: 1.8 MMOL/L (ref 0.7–2)
LACTATE SERPL-SCNC: 2.2 MMOL/L (ref 0.7–2)
LACTATE SERPL-SCNC: 2.8 MMOL/L (ref 0.7–2)
LDH SERPL L TO P-CCNC: 767 U/L (ref 85–227)
LVEF ECHO: NORMAL
MAGNESIUM SERPL-MCNC: 1.9 MG/DL (ref 1.8–2.6)
MAGNESIUM SERPL-MCNC: 2 MG/DL (ref 1.8–2.6)
MAGNESIUM SERPL-MCNC: 2.1 MG/DL (ref 1.8–2.6)
MCF P HPASE BLD TEG: 56.2 MM (ref 50–70)
MCH RBC QN AUTO: 30.3 PG (ref 26.5–33)
MCH RBC QN AUTO: 30.5 PG (ref 26.5–33)
MCH RBC QN AUTO: 30.5 PG (ref 26.5–33)
MCH RBC QN AUTO: 31.9 PG (ref 26.5–33)
MCH RBC QN AUTO: 32.1 PG (ref 26.5–33)
MCHC RBC AUTO-ENTMCNC: 32.3 G/DL (ref 31.5–36.5)
MCHC RBC AUTO-ENTMCNC: 32.6 G/DL (ref 31.5–36.5)
MCHC RBC AUTO-ENTMCNC: 32.7 G/DL (ref 31.5–36.5)
MCHC RBC AUTO-ENTMCNC: 33.2 G/DL (ref 31.5–36.5)
MCHC RBC AUTO-ENTMCNC: 34.3 G/DL (ref 31.5–36.5)
MCV RBC AUTO: 89 FL (ref 78–100)
MCV RBC AUTO: 92 FL (ref 78–100)
MCV RBC AUTO: 93 FL (ref 78–100)
MCV RBC AUTO: 98 FL (ref 78–100)
MCV RBC AUTO: 99 FL (ref 78–100)
O2/TOTAL GAS SETTING VFR VENT: 60 %
O2/TOTAL GAS SETTING VFR VENT: 70 %
OXYHGB MFR BLD: 97 % (ref 92–100)
OXYHGB MFR BLD: 98 % (ref 92–100)
OXYHGB MFR BLD: 99 % (ref 92–100)
OXYHGB MFR BLDA: 98 % (ref 75–100)
OXYHGB MFR BLDA: 98 % (ref 75–100)
OXYHGB MFR BLDV: 64 %
OXYHGB MFR BLDV: 84 %
PA AA BLD-ACNC: 99 %
PA ADP BLD-ACNC: 95 %
PCO2 BLD: 29 MM HG (ref 35–45)
PCO2 BLD: 31 MM HG (ref 35–45)
PCO2 BLD: 32 MM HG (ref 35–45)
PCO2 BLD: 37 MM HG (ref 35–45)
PCO2 BLD: 39 MM HG (ref 35–45)
PCO2 BLD: 39 MM HG (ref 35–45)
PCO2 BLD: 41 MM HG (ref 35–45)
PCO2 BLD: 42 MM HG (ref 35–45)
PCO2 BLD: 42 MM HG (ref 35–45)
PCO2 BLD: 49 MM HG (ref 35–45)
PCO2 BLD: 50 MM HG (ref 35–45)
PCO2 BLD: 53 MM HG (ref 35–45)
PCO2 BLDA: 35 MM HG (ref 35–45)
PCO2 BLDA: 50 MM HG (ref 35–45)
PCO2 BLDV: 41 MM HG (ref 40–50)
PCO2 BLDV: 57 MM HG (ref 40–50)
PH BLD: 7.27 [PH] (ref 7.35–7.45)
PH BLD: 7.3 [PH] (ref 7.35–7.45)
PH BLD: 7.31 [PH] (ref 7.35–7.45)
PH BLD: 7.36 [PH] (ref 7.35–7.45)
PH BLD: 7.37 [PH] (ref 7.35–7.45)
PH BLD: 7.38 [PH] (ref 7.35–7.45)
PH BLD: 7.39 [PH] (ref 7.35–7.45)
PH BLD: 7.46 [PH] (ref 7.35–7.45)
PH BLD: 7.46 [PH] (ref 7.35–7.45)
PH BLD: 7.48 [PH] (ref 7.35–7.45)
PH BLDA: 7.3 [PH] (ref 7.35–7.45)
PH BLDA: 7.43 [PH] (ref 7.35–7.45)
PH BLDV: 7.27 [PH] (ref 7.32–7.43)
PH BLDV: 7.39 [PH] (ref 7.32–7.43)
PHOSPHATE SERPL-MCNC: 3.4 MG/DL (ref 2.5–4.5)
PLATELET # BLD AUTO: 112 10E3/UL (ref 150–450)
PLATELET # BLD AUTO: 155 10E3/UL (ref 150–450)
PLATELET # BLD AUTO: 74 10E3/UL (ref 150–450)
PLATELET # BLD AUTO: 81 10E3/UL (ref 150–450)
PLATELET # BLD AUTO: 94 10E3/UL (ref 150–450)
PO2 BLD: 103 MM HG (ref 80–105)
PO2 BLD: 119 MM HG (ref 80–105)
PO2 BLD: 131 MM HG (ref 80–105)
PO2 BLD: 152 MM HG (ref 80–105)
PO2 BLD: 162 MM HG (ref 80–105)
PO2 BLD: 164 MM HG (ref 80–105)
PO2 BLD: 166 MM HG (ref 80–105)
PO2 BLD: 166 MM HG (ref 80–105)
PO2 BLD: 190 MM HG (ref 80–105)
PO2 BLD: 193 MM HG (ref 80–105)
PO2 BLD: 204 MM HG (ref 80–105)
PO2 BLD: 224 MM HG (ref 80–105)
PO2 BLDA: 145 MM HG (ref 80–105)
PO2 BLDA: 279 MM HG (ref 80–105)
PO2 BLDV: 32 MM HG (ref 25–47)
PO2 BLDV: 37 MM HG (ref 25–47)
POTASSIUM BLD-SCNC: 4.7 MMOL/L (ref 3.4–5.3)
POTASSIUM BLD-SCNC: 4.8 MMOL/L (ref 3.4–5.3)
POTASSIUM BLD-SCNC: 5.3 MMOL/L (ref 3.4–5.3)
POTASSIUM BLD-SCNC: 5.4 MMOL/L (ref 3.4–5.3)
PROT SERPL-MCNC: 4.5 G/DL (ref 6.8–8.8)
PROT SERPL-MCNC: 4.5 G/DL (ref 6.8–8.8)
PROT SERPL-MCNC: 4.6 G/DL (ref 6.8–8.8)
PROT SERPL-MCNC: 4.9 G/DL (ref 6.8–8.8)
RBC # BLD AUTO: 1.88 10E6/UL (ref 4.4–5.9)
RBC # BLD AUTO: 2.33 10E6/UL (ref 4.4–5.9)
RBC # BLD AUTO: 2.43 10E6/UL (ref 4.4–5.9)
RBC # BLD AUTO: 2.64 10E6/UL (ref 4.4–5.9)
RBC # BLD AUTO: 2.71 10E6/UL (ref 4.4–5.9)
SODIUM SERPL-SCNC: 140 MMOL/L (ref 133–144)
SODIUM SERPL-SCNC: 142 MMOL/L (ref 133–144)
TROPONIN I SERPL HS-MCNC: ABNORMAL NG/L
UFH PPP CHRO-ACNC: 0.21 IU/ML
UFH PPP CHRO-ACNC: 0.42 IU/ML
UFH PPP CHRO-ACNC: 0.52 IU/ML
UFH PPP CHRO-ACNC: <0.1 IU/ML
UNIT ABO/RH: NORMAL
UNIT ABO/RH: NORMAL
UNIT NUMBER: NORMAL
UNIT NUMBER: NORMAL
UNIT STATUS: NORMAL
UNIT STATUS: NORMAL
UNIT TYPE ISBT: 5100
UNIT TYPE ISBT: 5100
WBC # BLD AUTO: 12.5 10E3/UL (ref 4–11)
WBC # BLD AUTO: 7.3 10E3/UL (ref 4–11)
WBC # BLD AUTO: 7.4 10E3/UL (ref 4–11)
WBC # BLD AUTO: 9.5 10E3/UL (ref 4–11)
WBC # BLD AUTO: 9.8 10E3/UL (ref 4–11)

## 2022-02-18 PROCEDURE — 95714 VEEG EA 12-26 HR UNMNTR: CPT

## 2022-02-18 PROCEDURE — 85384 FIBRINOGEN ACTIVITY: CPT | Performed by: INTERNAL MEDICINE

## 2022-02-18 PROCEDURE — 82947 ASSAY GLUCOSE BLOOD QUANT: CPT | Performed by: INTERNAL MEDICINE

## 2022-02-18 PROCEDURE — 82803 BLOOD GASES ANY COMBINATION: CPT | Performed by: INTERNAL MEDICINE

## 2022-02-18 PROCEDURE — 85379 FIBRIN DEGRADATION QUANT: CPT | Performed by: INTERNAL MEDICINE

## 2022-02-18 PROCEDURE — 82330 ASSAY OF CALCIUM: CPT | Performed by: INTERNAL MEDICINE

## 2022-02-18 PROCEDURE — 82803 BLOOD GASES ANY COMBINATION: CPT

## 2022-02-18 PROCEDURE — 250N000013 HC RX MED GY IP 250 OP 250 PS 637: Performed by: INTERNAL MEDICINE

## 2022-02-18 PROCEDURE — 84100 ASSAY OF PHOSPHORUS: CPT | Performed by: INTERNAL MEDICINE

## 2022-02-18 PROCEDURE — 71045 X-RAY EXAM CHEST 1 VIEW: CPT | Mod: 26 | Performed by: RADIOLOGY

## 2022-02-18 PROCEDURE — 85520 HEPARIN ASSAY: CPT | Performed by: INTERNAL MEDICINE

## 2022-02-18 PROCEDURE — 85027 COMPLETE CBC AUTOMATED: CPT | Performed by: INTERNAL MEDICINE

## 2022-02-18 PROCEDURE — 93880 EXTRACRANIAL BILAT STUDY: CPT

## 2022-02-18 PROCEDURE — 93308 TTE F-UP OR LMTD: CPT

## 2022-02-18 PROCEDURE — 999N000155 HC STATISTIC RAPCV CVP MONITORING

## 2022-02-18 PROCEDURE — 85730 THROMBOPLASTIN TIME PARTIAL: CPT | Performed by: INTERNAL MEDICINE

## 2022-02-18 PROCEDURE — 250N000011 HC RX IP 250 OP 636

## 2022-02-18 PROCEDURE — 84484 ASSAY OF TROPONIN QUANT: CPT | Performed by: INTERNAL MEDICINE

## 2022-02-18 PROCEDURE — 93308 TTE F-UP OR LMTD: CPT | Mod: 26 | Performed by: INTERNAL MEDICINE

## 2022-02-18 PROCEDURE — 83051 HEMOGLOBIN PLASMA: CPT | Performed by: INTERNAL MEDICINE

## 2022-02-18 PROCEDURE — 93325 DOPPLER ECHO COLOR FLOW MAPG: CPT

## 2022-02-18 PROCEDURE — 84155 ASSAY OF PROTEIN SERUM: CPT | Performed by: INTERNAL MEDICINE

## 2022-02-18 PROCEDURE — 86140 C-REACTIVE PROTEIN: CPT | Performed by: INTERNAL MEDICINE

## 2022-02-18 PROCEDURE — 83615 LACTATE (LD) (LDH) ENZYME: CPT | Performed by: INTERNAL MEDICINE

## 2022-02-18 PROCEDURE — 258N000003 HC RX IP 258 OP 636

## 2022-02-18 PROCEDURE — 85652 RBC SED RATE AUTOMATED: CPT | Performed by: INTERNAL MEDICINE

## 2022-02-18 PROCEDURE — 85027 COMPLETE CBC AUTOMATED: CPT | Performed by: PHYSICIAN ASSISTANT

## 2022-02-18 PROCEDURE — 33949 ECMO/ECLS DAILY MGMT ARTERY: CPT

## 2022-02-18 PROCEDURE — 200N000002 HC R&B ICU UMMC

## 2022-02-18 PROCEDURE — 99291 CRITICAL CARE FIRST HOUR: CPT | Mod: 25 | Performed by: INTERNAL MEDICINE

## 2022-02-18 PROCEDURE — 86316 IMMUNOASSAY TUMOR OTHER: CPT | Performed by: INTERNAL MEDICINE

## 2022-02-18 PROCEDURE — G0463 HOSPITAL OUTPT CLINIC VISIT: HCPCS

## 2022-02-18 PROCEDURE — 258N000003 HC RX IP 258 OP 636: Performed by: INTERNAL MEDICINE

## 2022-02-18 PROCEDURE — 250N000011 HC RX IP 250 OP 636: Performed by: STUDENT IN AN ORGANIZED HEALTH CARE EDUCATION/TRAINING PROGRAM

## 2022-02-18 PROCEDURE — 93010 ELECTROCARDIOGRAM REPORT: CPT | Mod: 59 | Performed by: INTERNAL MEDICINE

## 2022-02-18 PROCEDURE — 93005 ELECTROCARDIOGRAM TRACING: CPT

## 2022-02-18 PROCEDURE — 85396 CLOTTING ASSAY WHOLE BLOOD: CPT | Performed by: INTERNAL MEDICINE

## 2022-02-18 PROCEDURE — 93925 LOWER EXTREMITY STUDY: CPT | Mod: 26 | Performed by: RADIOLOGY

## 2022-02-18 PROCEDURE — 93925 LOWER EXTREMITY STUDY: CPT

## 2022-02-18 PROCEDURE — 95720 EEG PHY/QHP EA INCR W/VEEG: CPT | Performed by: PSYCHIATRY & NEUROLOGY

## 2022-02-18 PROCEDURE — 83735 ASSAY OF MAGNESIUM: CPT | Performed by: INTERNAL MEDICINE

## 2022-02-18 PROCEDURE — 85610 PROTHROMBIN TIME: CPT | Performed by: INTERNAL MEDICINE

## 2022-02-18 PROCEDURE — 82805 BLOOD GASES W/O2 SATURATION: CPT | Performed by: INTERNAL MEDICINE

## 2022-02-18 PROCEDURE — 71045 X-RAY EXAM CHEST 1 VIEW: CPT

## 2022-02-18 PROCEDURE — 999N000157 HC STATISTIC RCP TIME EA 10 MIN

## 2022-02-18 PROCEDURE — 99221 1ST HOSP IP/OBS SF/LOW 40: CPT | Mod: GC | Performed by: INTERNAL MEDICINE

## 2022-02-18 PROCEDURE — 99223 1ST HOSP IP/OBS HIGH 75: CPT | Mod: 25 | Performed by: PSYCHIATRY & NEUROLOGY

## 2022-02-18 PROCEDURE — C9113 INJ PANTOPRAZOLE SODIUM, VIA: HCPCS | Performed by: INTERNAL MEDICINE

## 2022-02-18 PROCEDURE — 85347 COAGULATION TIME ACTIVATED: CPT

## 2022-02-18 PROCEDURE — 250N000009 HC RX 250: Performed by: INTERNAL MEDICINE

## 2022-02-18 PROCEDURE — 999N000015 HC STATISTIC ARTERIAL MONITORING DAILY

## 2022-02-18 PROCEDURE — P9041 ALBUMIN (HUMAN),5%, 50ML: HCPCS | Performed by: STUDENT IN AN ORGANIZED HEALTH CARE EDUCATION/TRAINING PROGRAM

## 2022-02-18 PROCEDURE — 85300 ANTITHROMBIN III ACTIVITY: CPT | Performed by: INTERNAL MEDICINE

## 2022-02-18 PROCEDURE — 80053 COMPREHEN METABOLIC PANEL: CPT | Performed by: INTERNAL MEDICINE

## 2022-02-18 PROCEDURE — 83605 ASSAY OF LACTIC ACID: CPT | Performed by: INTERNAL MEDICINE

## 2022-02-18 PROCEDURE — 999N000075 HC STATISTIC IABP MONITORING

## 2022-02-18 PROCEDURE — 93880 EXTRACRANIAL BILAT STUDY: CPT | Mod: 26 | Performed by: RADIOLOGY

## 2022-02-18 PROCEDURE — 94003 VENT MGMT INPAT SUBQ DAY: CPT

## 2022-02-18 PROCEDURE — P9041 ALBUMIN (HUMAN),5%, 50ML: HCPCS

## 2022-02-18 PROCEDURE — P9016 RBC LEUKOCYTES REDUCED: HCPCS | Performed by: INTERNAL MEDICINE

## 2022-02-18 PROCEDURE — 85730 THROMBOPLASTIN TIME PARTIAL: CPT

## 2022-02-18 PROCEDURE — 250N000011 HC RX IP 250 OP 636: Performed by: INTERNAL MEDICINE

## 2022-02-18 PROCEDURE — 85384 FIBRINOGEN ACTIVITY: CPT

## 2022-02-18 PROCEDURE — 93325 DOPPLER ECHO COLOR FLOW MAPG: CPT | Mod: 26 | Performed by: INTERNAL MEDICINE

## 2022-02-18 PROCEDURE — 84145 PROCALCITONIN (PCT): CPT | Performed by: INTERNAL MEDICINE

## 2022-02-18 RX ORDER — ALBUMIN, HUMAN INJ 5% 5 %
25 SOLUTION INTRAVENOUS ONCE
Status: COMPLETED | OUTPATIENT
Start: 2022-02-18 | End: 2022-02-18

## 2022-02-18 RX ORDER — ALBUMIN, HUMAN INJ 5% 5 %
12.5 SOLUTION INTRAVENOUS ONCE
Status: DISCONTINUED | OUTPATIENT
Start: 2022-02-18 | End: 2022-02-18

## 2022-02-18 RX ORDER — ALBUMIN, HUMAN INJ 5% 5 %
12.5 SOLUTION INTRAVENOUS ONCE
Status: COMPLETED | OUTPATIENT
Start: 2022-02-18 | End: 2022-02-18

## 2022-02-18 RX ORDER — ALBUMIN, HUMAN INJ 5% 5 %
SOLUTION INTRAVENOUS
Status: COMPLETED
Start: 2022-02-18 | End: 2022-02-18

## 2022-02-18 RX ADMIN — PROPOFOL 45 MCG/KG/MIN: 10 INJECTION, EMULSION INTRAVENOUS at 00:51

## 2022-02-18 RX ADMIN — MAGNESIUM SULFATE IN WATER 2 G: 40 INJECTION, SOLUTION INTRAVENOUS at 16:45

## 2022-02-18 RX ADMIN — TICAGRELOR 90 MG: 90 TABLET ORAL at 19:44

## 2022-02-18 RX ADMIN — HEPARIN SODIUM 1600 UNITS/HR: 1000 INJECTION, SOLUTION INTRAVENOUS; SUBCUTANEOUS at 21:13

## 2022-02-18 RX ADMIN — PIPERACILLIN AND TAZOBACTAM 3.38 G: 3; .375 INJECTION, POWDER, LYOPHILIZED, FOR SOLUTION INTRAVENOUS at 01:32

## 2022-02-18 RX ADMIN — PIPERACILLIN AND TAZOBACTAM 3.38 G: 3; .375 INJECTION, POWDER, LYOPHILIZED, FOR SOLUTION INTRAVENOUS at 18:21

## 2022-02-18 RX ADMIN — Medication: at 02:01

## 2022-02-18 RX ADMIN — VASOPRESSIN 1.5 UNITS/HR: 20 INJECTION PARENTERAL at 15:17

## 2022-02-18 RX ADMIN — PANTOPRAZOLE SODIUM 40 MG: 40 INJECTION, POWDER, FOR SOLUTION INTRAVENOUS at 08:19

## 2022-02-18 RX ADMIN — PIPERACILLIN AND TAZOBACTAM 3.38 G: 3; .375 INJECTION, POWDER, LYOPHILIZED, FOR SOLUTION INTRAVENOUS at 06:15

## 2022-02-18 RX ADMIN — PROPOFOL 45 MCG/KG/MIN: 10 INJECTION, EMULSION INTRAVENOUS at 08:28

## 2022-02-18 RX ADMIN — PROPOFOL 20 MCG/KG/MIN: 10 INJECTION, EMULSION INTRAVENOUS at 20:05

## 2022-02-18 RX ADMIN — PIPERACILLIN AND TAZOBACTAM 3.38 G: 3; .375 INJECTION, POWDER, LYOPHILIZED, FOR SOLUTION INTRAVENOUS at 12:04

## 2022-02-18 RX ADMIN — PROPOFOL 25 MCG/KG/MIN: 10 INJECTION, EMULSION INTRAVENOUS at 12:52

## 2022-02-18 RX ADMIN — MIDAZOLAM HYDROCHLORIDE 8 MG/HR: 1 INJECTION, SOLUTION INTRAVENOUS at 10:35

## 2022-02-18 RX ADMIN — Medication: at 18:22

## 2022-02-18 RX ADMIN — TICAGRELOR 90 MG: 90 TABLET ORAL at 06:15

## 2022-02-18 RX ADMIN — MIDAZOLAM HYDROCHLORIDE 8 MG/HR: 1 INJECTION, SOLUTION INTRAVENOUS at 23:16

## 2022-02-18 RX ADMIN — Medication: at 10:31

## 2022-02-18 RX ADMIN — ASPIRIN 81 MG CHEWABLE TABLET 81 MG: 81 TABLET CHEWABLE at 08:19

## 2022-02-18 RX ADMIN — CALCIUM CHLORIDE 1 G: 100 INJECTION, SOLUTION INTRAVENOUS at 14:56

## 2022-02-18 RX ADMIN — FENTANYL CITRATE 150 MCG/HR: 50 INJECTION INTRAVENOUS at 07:42

## 2022-02-18 RX ADMIN — HYDROCORTISONE SODIUM SUCCINATE 50 MG: 100 INJECTION, POWDER, FOR SOLUTION INTRAMUSCULAR; INTRAVENOUS at 10:30

## 2022-02-18 RX ADMIN — VASOPRESSIN 1 UNITS/HR: 20 INJECTION PARENTERAL at 05:06

## 2022-02-18 RX ADMIN — ALBUMIN HUMAN 12.5 G: 0.05 INJECTION, SOLUTION INTRAVENOUS at 02:18

## 2022-02-18 RX ADMIN — Medication 390 UNITS: at 13:45

## 2022-02-18 RX ADMIN — ALBUMIN HUMAN 12.5 G: 0.05 INJECTION, SOLUTION INTRAVENOUS at 10:35

## 2022-02-18 RX ADMIN — VANCOMYCIN HYDROCHLORIDE 1750 MG: 1 INJECTION, POWDER, LYOPHILIZED, FOR SOLUTION INTRAVENOUS at 21:00

## 2022-02-18 RX ADMIN — HYDROCORTISONE SODIUM SUCCINATE 50 MG: 100 INJECTION, POWDER, FOR SOLUTION INTRAMUSCULAR; INTRAVENOUS at 18:21

## 2022-02-18 RX ADMIN — ALBUMIN HUMAN 25 G: 0.05 INJECTION, SOLUTION INTRAVENOUS at 06:22

## 2022-02-18 RX ADMIN — HYDROCORTISONE SODIUM SUCCINATE 50 MG: 100 INJECTION, POWDER, FOR SOLUTION INTRAMUSCULAR; INTRAVENOUS at 02:01

## 2022-02-18 RX ADMIN — ALBUMIN HUMAN 12.5 G: 0.05 INJECTION, SOLUTION INTRAVENOUS at 05:28

## 2022-02-18 RX ADMIN — FENTANYL CITRATE 150 MCG/HR: 50 INJECTION INTRAVENOUS at 21:13

## 2022-02-18 ASSESSMENT — ACTIVITIES OF DAILY LIVING (ADL)
ADLS_ACUITY_SCORE: 20
ADLS_ACUITY_SCORE: 24
ADLS_ACUITY_SCORE: 20
ADLS_ACUITY_SCORE: 22
ADLS_ACUITY_SCORE: 20
ADLS_ACUITY_SCORE: 20
ADLS_ACUITY_SCORE: 24
ADLS_ACUITY_SCORE: 20
ADLS_ACUITY_SCORE: 22
ADLS_ACUITY_SCORE: 20
ADLS_ACUITY_SCORE: 22
ADLS_ACUITY_SCORE: 22
ADLS_ACUITY_SCORE: 24
ADLS_ACUITY_SCORE: 22
ADLS_ACUITY_SCORE: 24

## 2022-02-18 NOTE — CONSULTS
Procedure note:    Urology called regarding difficult hopper catheter placement. 3x nursing unable to pass catheter past urethral meatus. No urologic history. Currently in CVICU on ECMO.    Procedure: Difficult catheter placement    Patient prepped and draped in sterile fashion. On exam, hypospadias with the urethral meatus visualized 1cm proximal to the tip of the penis. 16 Fr hopper catheter placed w/o resistance into bladder with return of initially some light pink urine then with clear yellow urine. Balloon inflated with 13cc of sterile water.    Plan:  -catheter cares and plan per primary team, ok to remove at their discretion   -irrigate prn with 60cc hopper tip syringe if hopper is not draining well, can use up to 120cc of saline  -page urology if questions/concerns  -no f/u w/ urology needed unless patient complains of difficulties voiding or issues of infertility  -in the future, no need for urology to place catheter, please recognize that true urethral meatus is 1cm proximal along the ventral shaft to the tip of the penis  -please perform TOV after removing catheter, ok with PVRs <250  -urology will s/o, please page/call with questions    Dontae Alonzo  PGY-2  [phone]

## 2022-02-18 NOTE — PHARMACY-VANCOMYCIN DOSING SERVICE
"Pharmacy Vancomycin Initial Note  Date of Service 2022  Patient's  1950  71 year old, male    Indication: Aspiration Pneumonia    Current estimated CrCl = Estimated Creatinine Clearance: 57.1 mL/min (A) (based on SCr of 1.41 mg/dL (H)).    Creatinine for last 3 days  2022:  6:01 PM Creatinine 1.41 mg/dL    Recent Vancomycin Level(s) for last 3 days  No results found for requested labs within last 72 hours.      Vancomycin IV Administrations (past 72 hours)      No vancomycin orders with administrations in past 72 hours.                Nephrotoxins and other renal medications (From now, onward)    Start     Dose/Rate Route Frequency Ordered Stop    22  vancomycin (VANCOCIN) 2,000 mg in sodium chloride 0.9 % 250 mL intermittent infusion         2,000 mg (central catheter)  over 90 Minutes Intravenous ONCE 22 183  piperacillin-tazobactam (ZOSYN) 3.375 g vial to attach to  mL bag        Note to Pharmacy: For [facility], [facility] and [facility]: For Zosyn-naive patients, use the \"Zosyn initial dose + extended infusion\" order panel.    3.375 g  over 30 Minutes Intravenous EVERY 6 HOURS 22 1817 22 1829    22 1700  vasopressin 0.2 units/mL in NS (PITRESSIN) standard conc infusion         0.5-4 Units/hr  2.5-20 mL/hr  Intravenous CONTINUOUS 22 1650      22 1650  norepinephrine (LEVOPHED) 16 mg in  mL infusion MAX CONC CENTRAL LINE         0.01-0.6 mcg/kg/min × 99.8 kg  0.9-56.1 mL/hr  Intravenous CONTINUOUS PRN 22 1650            Contrast Orders - past 72 hours (72h ago, onward)            None          InsightRX Prediction of Planned Initial Vancomycin Regimen  N/A -- will enter in insights if continues > 72 hours        Plan:  1. Start vancomycin  2000mg IV x1, then 1750 mg IV q24h.   2. Vancomycin monitoring method: AUC  3. Vancomycin therapeutic monitoring goal: 400-600 mg*h/L  4. Pharmacy will check vancomycin levels as " appropriate in 1-3 Days.    5. Serum creatinine levels will be ordered daily for the first week of therapy and at least twice weekly for subsequent weeks.      Tiera Mendez [facility]

## 2022-02-18 NOTE — PROGRESS NOTES
Met patient in HCL, already cannulated, intubated and on IABP. Transported to [facility] CT and to CVICU. Settled in room and sending labs currently. Will add cannula size and cannulation note when available.      Jenna Ellis RRT/ECMO specialist

## 2022-02-18 NOTE — PLAN OF CARE
Major Shift Events:  Bleeding from suture on arterial ECMO cannula, redressed with quick clot and ioban. No bleeding noted since. 2 units RBCs given for hgb of 6 with recheck of 8. VEEG started.     Labs/Protocols: Elevated K, Calcium and Magnesium replaced per protocol  Neuro: Sedated, Induced hypothermia at 35 degrees with Thermoguard, pupils unequal but reactive (MD aware).  Cardiac: IABP 1:1 100%; ECMO 70%, Sweep 2, flow 3.7-4; Sinus Main 40s, ST elevation and peaked T waves (MD aware)  Respiratory: Coarse crackles in JOVANY. All other lobes clear but diminished. CMV at FiO2 60%, , RR 14, PEEP 7.  GI/: OG secured to ETT and confirmed by X-Ray. No BM. Urine Output of roughly 30mL/hr, pink/yellow. Bleeding from urethra during turns.   Skin: Bruising and injury on tongue. Purple skin to coccyx, mepilex in place, WOC following.  LDAs: 2 L PIVs, 1 R PIV, Art line in radial artery, R femoral CVC.  GTTs: Fentanyl, propofol, norepi, vaso, versed, heparin via circuit.  Diet: NPO except meds.   Activity: Bedrest.  Family/Social Support: Wife at bedside.   Teaching: Family educated about plan of care and approximate length of stay.     Plan: Rewarm at 2330 and continue to monitor to adjust plan of care accordingly.   For vital signs and complete assessments, please see documentation flowsheets.

## 2022-02-18 NOTE — PROGRESS NOTES
"CLINICAL NUTRITION SERVICES - ASSESSMENT NOTE     Nutrition Prescription    RECOMMENDATIONS FOR MDs/PROVIDERS TO ORDER:  Initiate nutrition support in the next 24-48 hours.    Malnutrition Status:    Unable to determine     Recommendations already ordered by Registered Dietitian (RD):  None at this time    Future/Additional Recommendations:  --If EN becomes POC, recommend:   --Vital High Protein @ goal of 75ml/hr (1800ml/day) will provide: 1800 kcals, 157 g PRO, 1504 ml free H20, 199 g CHO, and 0 g fiber daily.  + 3 pkts Prosource will provide: 1920 kcals (22 kcal/kg) and 190 g PRO (2.2 g/kg)  --Start TF @ 15 ml/hr and advance by 10 ml q 6-8 hrs until goal rate.  --Do not start or advance TF rate unless K+ >3.0, Mg++ > 1.5,  and Phos > 1.9.  --Minimum 30 ml q 4 hrs water flushes for tube patency.  --If gastric enteral access: HOB > 30 degrees  --MVI/minerals supplement if not already ordered (Certavite)     REASON FOR ASSESSMENT  Chano Johnson is a/an 71 year old male assessed by the dietitian for NPO/Clear Liquids    NUTRITION HISTORY  Unable to obtain -- pt intubated/sedated. Attempted to visit pt/family multiple times, but they were busy with other provides.    Chart review:  Admitted on 2/17/2022 s/p VT/VF arrest x8 shocks who was found to have LAD occlusion requiring 2x stents. Family reports that patient is otherwise healthy.    CURRENT NUTRITION ORDERS  Diet: NPO  Intake/Tolerance: NPO since admit    LABS  Labs reviewed  K: 5.4(H)  BUN: 23(WNL), Cr: 1.86(H)    MEDICATIONS  Medications reviewed  Protonix, Norepinephrine, Vasopressin  Propofol @ 18 mL/hr providing 475 kcals/day    ANTHROPOMETRICS  Height: 182.9 cm (6' 0\"[historical, chart review[)  Most Recent Weight: 100 kg (220 lb 7.4 oz) -- admit wt  IBW: 81 kg  BMI: 29.90 kg/m^2 Overweight BMI 25-29.9  Weight History:   Wt Readings from Last 10 Encounters:   02/18/22 100 kg (220 lb 7.4 oz)   02/17/22 99.8 kg (220 lb)   Care Everywhere:  07/22/21: 98.4 kg " (217 lb)  04/29/21: 98.5 kg (217 lb 4 oz)  12/15/20: 97.1 kg (214 lb)    Dosing Weight: 86 kg (adjusted BW using IBW of 81 kg and admit wt of 100 kg)    ASSESSED NUTRITION NEEDS  Estimated Energy Needs: 6436-5414 kcals/day (18 - 22 kcals/kg)  Justification: Vented and ECMO  Estimated Protein Needs: 172-215 grams protein/day (2 - 2.5 grams of pro/kg)  Justification: Hypercatabolism with acute illness and ECMO  Estimated Fluid Needs: 1 mL/kcal  Justification: Maintenance and Per provider pending fluid status    PHYSICAL FINDINGS  See malnutrition section below.    MALNUTRITION  % Intake: Unable to assess -- most likely eating well PTA given otherwise healthy  % Weight Loss: None noted  Subcutaneous Fat Loss: Unable to assess -- most likely no fat loss given healthy PTA  Muscle Loss: Unable to assess -- most likely no muscle loss given healthy PTA  Fluid Accumulation/Edema: None noted  Malnutrition Diagnosis: Unable to determine due to inability to complete NFPE    NUTRITION DIAGNOSIS  Inadequate energy intake (calories/protein) related to inability to take oral PO due to medical status and vented as evidenced by NPO and currently meeting 0% of energy and protein needs.      INTERVENTIONS  Implementation  Nutrition Education: Not appropriate at this time due to patient condition   Enteral Nutrition - recommendations above     Goals  Diet adv v nutrition support within 2-3 days.     Monitoring/Evaluation  Progress toward goals will be monitored and evaluated per protocol.    Rachel Hickman  Dietetic Intern    Sonja Morales, RD, MS, LD  SICU: [id number]
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
## 2022-02-18 NOTE — CONSULTS
[facility]  Palliative Care Consultation Note    Patient: Chano Johnson  Date of Admission:  2/17/2022    Requesting Clinician / Team: CVICU  Reason for consult: Goals of care  Decisional support  Patient and family support    Recommendations:    Recommend continued critical care. His current FULL CODE status is correct. Husam's Health Care Directive states that he would want CPR and other life-sustaining treatment and technology in situations where doctors feel it might help him, and not in cases where it wouldn't. His son and wife are able to tell me that Husam would not want prolonged dependence on life-sustaining technology such as a ventilator. They state he is very independent and that he likely wouldn't want a life of significant prolonged debility.    We discussed the uncertainty of prognosis with his family this early in his course, and explained that it will likely be several days before we have a sense of what may happen going forward. I explained that we would update them if we had new information or a change in his clinical condition.    Wife Meeta had the patient's Health Care Directive with her today- we made a copy of this and requested it be scanned into Epic.    Our team will continue to follow and provide support for family.    Family requested  services and Sacrament of the Sick; I have entered a Spiritual Health consult for this.    These recommendations have been discussed with palliative care attending.      Thank you for the opportunity to participate in the care of this patient and family. Our team: will continue to follow.     During regular M-F work hours -- if you are not sure who specifically to contact -- please contact us by sending a text page to our team consult pager at [phone].    After regular work hours and on weekends/holidays, you can call our answering service at [phone]. Also, who's on call for us is available in  "[employer].     Larry Morrison DO  Palliative Medicine Fellow    Assessments:  Chano Johnson is a 71 year old male with past medical history significant for atrial fibrillation, well-controlled after several ablations with insertion of a \"Watchman\" device, who presented to [facility] yesterday with chest pain and suffered a cardiac arrest in the ED. He was resucitated, transferred to our cath lab with stent x2. He had significant hemodynamic instability and was started on ECMO. He is currently, intubated, sedated, and under therapeutic hypothermia; it is too soon in his course for accurate prognostication.    Today, the patient was seen for:  STEMI s/p stent x 2  Cardiogenic shock requiring ECMO  Goals of Care  Family Support    Prognosis, Goals, & Planning:      Functional Status just prior to hospitalization: 0 (Fully active, able to carry on all activities without restriction)      Prognosis, Goals, and/or Advance Care Planning were addressed today: Yes   Husam was unable to participate. PCSW and I had a long conversation with his wife Meeta and son Hiren, explaining that prognosis is uncertain but should become more clear over the next few days.      Patient's decision making preferences: unable to assess          Patient has decision-making capacity today for complex decisions: No            I have concerns about the patient/family's health literacy today: No           Patient has a completed Health Care Directive: Reportedly yes, but not available to us currently.    Legally designated health care agent: Wife Meeta      Code status: Full Code    Coping, Meaning, & Spirituality:   Mood, coping, and/or meaning in the context of serious illness were addressed today: Yes   palliative care  and I discussed events last 24 hours and acknowledged the family's shock and uncertainty at this time.  Despite the difficult situation they appear to be coping well.  We offered emotional " support.    Social:     Living situation: Lives in own home with wife    [place] family / caregivers: Wife Angelia, sons Jona and Monster.  Six grandchildren ranging in age from 9 years old to 25 years old.  Patient and his wife are very involved in their grandchildren's lives.    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    History of Present Illness:  History gathered today from: family/loved ones, medical chart    Chano Johnson is a 71-year-old man with history of atrial fibrillation who presented to [facility] emergency department on 2/17/2022 with chest pain that started 30 minutes prior to his arrival there.  He was noted to be diaphoretic and EKG performed in triage showed ST elevations in anterior lateral pattern.  He was at cardiac arrest as he was being transported back to her room for treatment, and was noted to be in ventricular fibrillation; he initially lost pulses at 1338, had ROSC at 1405 after multiple shocks including dual sequential defibrillation and doses of ACLS medications.  He was transferred to [facility] for cardiac catheterization, and while in the Cath Lab was noted to be hemodynamically unstable and was started on ECMO.  He has been sedated, intubated, chemically paralyzed, and under therapeutic hypothermia since that time.    Husam is unable to participate in evaluation today; I spoke at length with his wife and son who confirmed the history noted above.  His wife tells me that he had been feeling well prior to the onset of his chest pain.    Key Palliative Symptom Data:  Pain location: Unable to assess  We are not helping to manage these symptoms currently in this patient.    Patient is on opioids: bowels not assessed today.    ROS:  Unable to complete ROS due to clinical condition.     Past Medical History:  No past medical history on file.     Past Surgical History:  Past Surgical History:   Procedure Laterality Date     ATRIAL ABLATION  SURGERY           Family History:  No family history on file.      Allergies:  Allergies   Allergen Reactions     Chlorpheniramine-Phenylpropan [A.R.M.] Other (See Comments)     Watery eyes, sneezing        Medications:  I have reviewed this patient's medication profile and medications from this hospitalization.   Noted meds are:  Current Facility-Administered Medications   Medication     artificial tears ophthalmic ointment     aspirin (ASA) chewable tablet 81 mg     calcium chloride in  mL intermittent infusion 1 g     cisatracurium (NIMBEX) 200 mg in D5W 100 mL infusion     EPINEPHrine (ADRENALIN) 5 mg in sodium chloride 0.9 % 250 mL infusion CENTRAL     fentaNYL (SUBLIMAZE) 50 mcg/mL bolus from infusion pump 50 mcg     fentaNYL (SUBLIMAZE) infusion     heparin 2 unit/mL in 0.9% NaCl (for REPERFUSION CATHETER)     heparin ANTICOAGULANT bolus dose from infusion pump 388-776 Units     heparin infusion 25,000 units in D5W 250 mL ANTICOAGULANT     hydrocortisone sodium succinate PF (solu-CORTEF) injection 50 mg     lidocaine (LMX4) cream     lidocaine 1 % 0.1-1 mL     magnesium sulfate 2 g in water intermittent infusion     magnesium sulfate 4 g in 100 mL sterile water (premade)     midazolam (VERSED) drip - ADULT 100 mg/100 mL in NS (pre-mix)     midazolam (VERSED) injection 1-3 mg     naloxone (NARCAN) injection 0.2 mg    Or     naloxone (NARCAN) injection 0.4 mg    Or     naloxone (NARCAN) injection 0.2 mg    Or     naloxone (NARCAN) injection 0.4 mg     nitroPRUsside (NIPRIDE) 50 mg, sodium thiosulfate 500 mg in D5W 125 mL IV infusion (conc: 0.4 mg/mL)     norepinephrine (LEVOPHED) 16 mg in  mL infusion MAX CONC CENTRAL LINE     pantoprazole (PROTONIX) IV push injection 40 mg     phenylephrine (MONIQUE-SYNEPHRINE) 50 mg in NaCl 0.9 % 250 mL infusion     piperacillin-tazobactam (ZOSYN) 3.375 g vial to attach to  mL bag     potassium chloride 20 mEq in 50 mL intermittent infusion     propofol  (DIPRIVAN) infusion     sodium chloride (PF) 0.9% PF flush 3 mL     sodium chloride (PF) 0.9% PF flush 3 mL     sodium phosphate 10 mmol in D5W 250 mL intermittent infusion     sodium phosphate 15 mmol in D5W intermittent infusion     sodium phosphate 20 mmol in D5W 250 mL intermittent infusion     sodium phosphate 25 mmol in D5W 250 mL intermittent infusion     ticagrelor (BRILINTA) tablet 90 mg     vancomycin (VANCOCIN) 1,750 mg in sodium chloride 0.9 % 250 mL intermittent infusion     vasopressin 0.2 units/mL in NS (PITRESSIN) standard conc infusion       Physical Exam:  Vital Signs: Temp: (!) 95  F (35  C) Temp src: Esophageal   Pulse: (!) 45   Resp: 14 SpO2: 100 % O2 Device: Mechanical Ventilator    Weight: 220 lbs 7.36 oz  Constitutional: Sedated, intubated, unresponsive  Head: Normocephalic, atraumatic.  ENT: ETT/NJ in place  Cardiovascular: Regular bradycardia with rate in 40s, no S3/S4/murmur  Respiratory: Mechanical breath sounds throughout. No evidence of distress.  Gastrointestinal: Abdomen soft, nondistended, nontender. Bowel sounds hypoactive.  : Tapia in place, bag empty at the time of my exam  Musculoskeletal: Normal bulk/tone  Skin: warm, dry, pale  Neurologic: Sedated, intubated, chemically paralyzed.  Unresponsive to loud voice or light touch.  Psychiatric: unable to assess due to neuro status    Data reviewed:  Reviewed recent labs and pertinent imaging  CBC, metabolic panel, troponin reviewed.  Patient has an SAHIL with current EGFR of 34; would recommend continuing the fentanyl drip and being cautious with transition to any other opioids.  No other findings that would change palliative care recommendations.  Overall picture is of critical illness post STEMI and cardiac arrest with SAHIL, modest elevation of liver enzymes, very elevated high-sensitivity troponin.

## 2022-02-18 NOTE — CONSULTS
[facility]  Neurocritical Care Consultation    Patient Name:  Chano Johnson  MRN:  [id number]    :  1950  Date of Service:  2022  Primary care provider:  Laith Limon      Neurocritical care service was asked to see Chano Johnson by Dr. Johnson to evaluate for post-cardiac arrest.     History of Present Illness   Chano Johnson is a 71 year old male with a PMHx Afib s/p AF PVI ablation and Watchman (10/21/2021), Aflutter s/p ablation, HFpEF, hx tachycardia-mediated cardiomyopathy, HTN, HLD, ARSENIO, gout, GERD, EtOH use, and COVID infection (2021) who presents as transfer from [facility] for possible cardiac catheterization +/- ECMO after a Vfib arrest    Pt initially presented to the [facility] ED on 2022 after acute onset of CP, SOB, diaphoresis, and RUE numbness ~13:00, ~30min prior to arrival in the ED. In triage, EKG noted to have ST elevation suggestive of an anterior lateral STEMI. Pt was immediately transferred back to the ED, but en route went into Vfib arrest. CPR was initiated immediately with pt ultimately receiving 8 shocks, 5 rounds of 1mg Epi, 150mg Amio x2,  once, Brilinta 180 once, NaBicarb 100mEq, and IVF. During this time, DOMINGO was briefly initiated, but stopped when pt began moving w/ agonal breathing. However, as soon as DOMINGO was stopped, pt again went unresponsive and so CPR was reinitiated and intubated. Rhythm deteriorated to PEA/asystole, though US noted faint fibrillations. Ultimately achieved ROSC after 27 min and pt was placed on an Epinephrine gtt. Significant ectopy remained on telemetry. Pt was then transferred to [facility] for possible cardiac catheterization vs ECMO. Of note, pt noted to be COVID+ this admission    On arrival to [facility], pt remained on Epinephrine gtt and intubated. Proceeded to ECMO cannulation with IABP ~16:00 and was thereafter admitted to Cardiovascular ICU. 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 CTH also obtained an appears w/o evidence for hemorrhage or notable loss of grey-white differentiation    Meds receiving that are CNS acting per MAR:  - Midazolam 8mg/hr  - Fentanyl 500mcg since 15:40  - Midazolam 12mg since 14:53    ROS    Unable to perform due to altered mental status.    PMH  No past medical history on file.  Past Surgical History:   Procedure Laterality Date     ATRIAL ABLATION SURGERY         Medications     Current Facility-Administered Medications:      artificial tears ophthalmic ointment, , Both Eyes, Q8H, Chaudhry, Mohsan, MD     [START ON 2/18/2022] aspirin (ASA) chewable tablet 81 mg, 81 mg, Per Feeding Tube, Daily, Chaudhry, Mohsan, MD     calcium chloride in  mL intermittent infusion 1 g, 1 g, Intravenous, Q6H PRN, Chaudhry, Mohsan, MD     cisatracurium (NIMBEX) 200 mg in D5W 100 mL infusion, 1-10 mcg/kg/min, Intravenous, Continuous, Chaudhry, Mohsan, MD     EPINEPHrine (ADRENALIN) 5 mg in sodium chloride 0.9 % 250 mL infusion CENTRAL, 0.03-0.3 mcg/kg/min, Intravenous, Continuous, Chaudhry, Mohsan, MD     fentaNYL (SUBLIMAZE) 50 mcg/mL bolus from infusion pump 50 mcg, 50 mcg, Intravenous, Q30 Min PRN, Chaudhry, Mohsan, MD     fentaNYL (SUBLIMAZE) 50 mcg/mL bolus from infusion pump 50 mcg, 50 mcg, Intravenous, Q30 Min PRN, Chaudhry, Mohsan, MD     fentaNYL (SUBLIMAZE) infusion,  mcg/hr, Intravenous, Continuous, Chaudhry, Mohsan, MD     fentaNYL (SUBLIMAZE) infusion,  mcg/hr, Intravenous, Continuous, Chaudhry, Mohsan, MD, Held at 02/17/22 1842     heparin 2 unit/mL in 0.9% NaCl (for REPERFUSION CATHETER), 3 mL/hr, Intravenous, Continuous, Chaudhry, Mohsan, MD     heparin ANTICOAGULANT bolus dose from infusion pump 388-776 Units, 5-10 Units/kg (Ideal), Other, Q30 Min PRN, Chaudhry, Mohsan, MD     heparin infusion 25,000 units in D5W 250 mL ANTICOAGULANT, 10-50 Units/kg/hr (Ideal), Other, Continuous, Chaudhry, Mohsan,  MD     hydrocortisone sodium succinate PF (solu-CORTEF) injection 50 mg, 50 mg, Intravenous, Q8H, Chaudhry, Mohsan, MD     lidocaine (LMX4) cream, , Topical, Q1H PRN, Chaudhry, Mohsan, MD     lidocaine 1 % 0.1-1 mL, 0.1-1 mL, Other, Q1H PRN, Chaudhry, Mohsan, MD     magnesium sulfate 2 g in water intermittent infusion, 2 g, Intravenous, Daily PRN, Chaudhry, Mohsan, MD     magnesium sulfate 4 g in 100 mL sterile water (premade), 4 g, Intravenous, Q4H PRN, Chaudhry, Mohsan, MD     midazolam (VERSED) drip - ADULT 100 mg/100 mL in NS (pre-mix), 1-8 mg/hr, Intravenous, Continuous, Chaudhry, Mohsan, MD, Last Rate: 8 mL/hr at 02/17/22 1838, 8 mg/hr at 02/17/22 1838     midazolam (VERSED) drip - ADULT 100 mg/100 mL in NS (pre-mix), 1-8 mg/hr, Intravenous, Continuous, Chaudhry, Mohsan, MD     midazolam (VERSED) injection 1-3 mg, 1-3 mg, Intravenous, Q1H PRN, Chaudhry, Mohsan, MD     midazolam (VERSED) injection 1-3 mg, 1-3 mg, Intravenous, Q1H PRN, Chaudhry, Mohsan, MD     naloxone (NARCAN) injection 0.2 mg, 0.2 mg, Intravenous, Q2 Min PRN **OR** naloxone (NARCAN) injection 0.4 mg, 0.4 mg, Intravenous, Q2 Min PRN **OR** naloxone (NARCAN) injection 0.2 mg, 0.2 mg, Intramuscular, Q2 Min PRN **OR** naloxone (NARCAN) injection 0.4 mg, 0.4 mg, Intramuscular, Q2 Min PRN, Invasive, Cardiologist, MD     nitroPRUsside (NIPRIDE) 50 mg, sodium thiosulfate 500 mg in D5W 125 mL IV infusion (conc: 0.4 mg/mL), 0.25-5 mcg/kg/min, Intravenous, Continuous, Chaudhry, Mohsan, MD, Held at 02/17/22 1840     norepinephrine (LEVOPHED) 16 mg in  mL infusion MAX CONC CENTRAL LINE, 0.01-0.6 mcg/kg/min, Intravenous, Continuous PRN, Chaudhry, Mohsan, MD, Last Rate: 4.7 mL/hr at 02/17/22 1715, 0.05 mcg/kg/min at 02/17/22 1715     pantoprazole (PROTONIX) IV push injection 40 mg, 40 mg, Intravenous, Daily, Chaudhry, Mohsan, MD     phenylephrine (MONIQUE-SYNEPHRINE) 50 mg in NaCl 0.9 % 250 mL infusion, 0.5-6 mcg/kg/min, Intravenous, Continuous, Bhupendra  Mohsan, MD, Held at 02/17/22 1840     piperacillin-tazobactam (ZOSYN) 3.375 g vial to attach to  mL bag, 3.375 g, Intravenous, Q6H, Chaudhry, Mohsan, MD     potassium chloride 20 mEq in 50 mL intermittent infusion, 20 mEq, Intravenous, Q1H PRN, Chaudhry, Mohsan, MD     propofol (DIPRIVAN) 1000 MG/100ML infusion, , , ,      propofol (DIPRIVAN) infusion, 5-75 mcg/kg/min (Dosing Weight), Intravenous, Continuous, Chaudhry, Mohsan, MD     sodium bicarbonate 8.4 % injection, , , ,      sodium chloride (PF) 0.9% PF flush 3 mL, 3 mL, Intracatheter, Q8H PRN, Chaudhry, Mohsan, MD     sodium chloride (PF) 0.9% PF flush 3 mL, 3 mL, Intracatheter, q1 min prn, Chaudhry, Mohsan, MD     sodium phosphate 10 mmol in D5W 250 mL intermittent infusion, 10 mmol, Intravenous, Daily PRN, Chaudhry, Mohsan, MD     sodium phosphate 15 mmol in D5W intermittent infusion, 15 mmol, Intravenous, Daily PRN, Chaudhry, Mohsan, MD     sodium phosphate 20 mmol in D5W 250 mL intermittent infusion, 20 mmol, Intravenous, Q6H PRN, Chaudhry, Mohsan, MD     sodium phosphate 25 mmol in D5W 250 mL intermittent infusion, 25 mmol, Intravenous, Q8H PRN, Chaudhry, Mohsan, MD     [START ON 2/18/2022] ticagrelor (BRILINTA) tablet 90 mg, 90 mg, Oral, BID, Chaudhry, Mohsan, MD     vasopressin 0.2 units/mL in NS (PITRESSIN) standard conc infusion, 0.5-4 Units/hr, Intravenous, Continuous, Chaudhry, Mohsan, MD, Held at 02/17/22 1841    Allergies  Allergies   Allergen Reactions     Chlorpheniramine-Phenylpropan [A.R.M.] Other (See Comments)     Watery eyes, sneezing       Social History  Unable to perform due to altered mental status  Per chart review,   Social History     Tobacco Use     Smoking status: Never Smoker     Smokeless tobacco: Not on file   Substance Use Topics     Alcohol use: Yes     Alcohol/week: 2.0 standard drinks       Family History    Unable to perform due to altered mental status.     Physical Exam   Vitals: Pulse 67   Resp 20   Ht 1.829 m  (6')   SpO2 100%   BMI 29.84 kg/m    General: Sedated, no acute distress  HEENT: Normocephalic, atraumatic. Sclera anicteric.  Resp: Intubated, on mechanical ventilation. Bilaterally clear to auscultation. No wheezes or crackles.   CVS: tele  Abdomen: Soft, non-distended, non-tender. Bowel sounds normoactive.  Skin: Cool, dry. No jaundice.      Neuro: performed while the patient was on sedating medications  Mental status: Does not open eyes to verbal or noxious stimuli. Does not follow commands.   Cranial nerves: pupils reactive and equal  Motor/sensory: Does not withdraw or grimace to noxious stimuli in all four extremities.  Reflexes: Hyporeflexic.   Coordination: Unable to assess due to mental status.  Gait: Unable to assess due to mental status.    Investigations   Head CT without contrast     Impression & Recommendations   Chano Johnson is a 71 year old male with a PMHx Afib s/p AF PVI ablation and Watchman (10/21/2021), Aflutter s/p ablation, HFpEF, hx tachycardia-mediated cardiomyopathy, HTN, HLD, ARSENIO, gout, GERD, EtOH use, and COVID infection (12/28/2021) who presents as transfer from [facility] for possible cardiac catheterization +/- ECMO after a Vfib arrest. The NCC service was consulted to evaluate for prognostication. His neurologic examination is limited as the patient remains cooled at this time and is receiving sedation. Head imaging included CTH and demonstrated no hemorrhage or loss of grey-white differentiation. At this time, it is too early to prognosticate.    - vEEG  - Avoid hypotonic solutions as they worsen cerebral edema  - Avoid nitroprusside/nitroglycerin as able - can increase ICP's   - Advise slow correction of any high sodiums if needed  - When safe to do so, limitation of CNS acting medications will permit a more accurate neurological assessment  - The neurocritical care service will continue to follow neurologic examination once rewarmed and weaned of sedation    Thank you for  allowing the neurocritical care service to participate in the care of Chano Johnson.  Please do not hesitate to call with questions/concerns.      Patient was seen and discussed with the NCC attending, Dr. Guadarrama.    Richard Sarah DNP   Neuro Crit

## 2022-02-18 NOTE — PROGRESS NOTES
Cardiac ICU Progress Note         Assessment and Plan:   71 year old male who was admitted on 2/17/2022 s/p VT/VF arrest x8 shocks who was found to have LAD occlusion requiring 2x stents. He presented to local ED with chest pain that started at 1300 (within 1 hr of onset), he had a VT VF arrest in the ED after he was found to have an anterior STEMI on ECG.  Chest compressions and ACLS was started immediately.  Patient underwent 8 shocks for VT VF with ROSC afterwards.  He received 4 doses of epinephrine, 450 mg of amiodarone.  He was transferred to the [facility] for percutaneous intervention for his STEMI. Unfortunately his transfer was delayed because he was a very low dose of Epi (0.01) and the local hospital only had a BLS ambulance available. He arrived at [facility] and was found to have occlusion of the LAD. During the angiogram he was found to have significant hypotension and ectopy, which prompted placement on VA ECMO. PCI with 2x LETY was done with ECMO support.    Neurology: #possible neurological injury post cardiac arrest  Intubated, sedated, paralyzed. Cooled to 34 degrees.  --continue versed, fentanyl, propofol and cis as needed to maintain paralysis - RASS goal -4 to -5   - CTH w/o acute intracranial pathology   Cardiovascular / Hemodynamics: Refractory VF arrest requiring 8 shocks.  x2 LETY stents to LAD.  Peripheral V-A ECMO inserted for refractory cardiogenic shock. LA   TTE: pending, no prior hx of heart failure  EKG: FLORY V2-V4 consistent with anterior STEMI  --wean pressors/inotropes as able  --echo tomorrow with ECMO turndown  --continue ASA 81mg and ticagrelor 90mg BID  --hold temp at 34  --ACT goal 180-200  --hold lipitor for now given likely hepatic injury during arrest  --hold ACE/ARB for now given likely reduced renal fxn after arrest  --holding beta blocker given shock     - CT CAP: No acute abnormalities noted     Pulmonary: #AHRF    ETT in appropriate position.  Now  weaning vent requirements.  CXR: Lines in stable position.  --wean vent as able  --daily CXR  --Q2h ABGs for now  --consider scheduled duonebs if signs of lung dz, currently PRN     GI and Nutrition: No known medical hx.   --monitor BID LFTs  --NPO while cooled - nutrition consult pending feeding tube placement once he is warmed   --bowel regimen - on hold for now due to hypothermia  --GI Prophylaxis: PPI    Renal, Fluid and Electrolytes: Monitoring UOP, will start CRRT through circuit  --maintain K>3 and Mg>2    Infectious Disease: No signs of infection. Leukocytosis c/w arrest. Blood cultures collected.   --vancomycin/zosyn x5 days for ECMO  --daily blood cultures  --monitor for signs of infection given cooling, lines, and leukocytosis   Hematology and Oncology: #Acute blood loss anemia    Receiving heparin for ECMO and ASA/ticagrelor for LETY.   --cryo PRN fibrinogen < 200; FFP for INR >2  --Transfuse for Hgb<10  --heparin gtt for ECMO with ACT goal 180-200  --US LE w/ arterial duplex per ECMO protocol   --DVT PPX: Heparin as above   Endocrinology:   No known medical history. BG elevated.  --insulin gtt  --stress dose steroids due to persistent hypotension - hydrocort 50mg IV Q8h  --f/u HgbA1c    Lines: L femoral arterial and venous ECMO cannulae February 17, 2022  R radial arterial line February 17, 2022  ETT February 17, 2022  Tapia catheter February 17, 2022  OG tube February 17, 2022  Restraint: needed  Current lines are required for patient management       Family update by me today: Yes     Code Status: Full code at this time. Reviewed patients medical directives. He indicated he would like to be full code unless there was medical futility in additional aggressive interventions.    The Pt was discussed and evaluated with Dr. Henriquez, attending physician, who agrees with the assessment and plan above.    Mohsan Chaudhry MD  Cardiology Fellow         Subjective:   Care team notes last 24 hours reviewed. No  acute events overnight. [facility]         Objective:   Physical Exam   Temp: (!) 95  F (35  C) Temp src: Esophageal   Pulse: (!) 47   Resp: 14 SpO2: 98 % O2 Device: Mechanical Ventilator    Vitals:    02/18/22 0400   Weight: 100 kg (220 lb 7.4 oz)     Vital Signs with Ranges  Temp:  [93.2  F (34  C)-96.8  F (36  C)] 95  F (35  C)  Pulse:  [0-113] 47  Resp:  [0-102] 14  BP: ()/(49-62) 112/55  MAP:  [58 mmHg-88 mmHg] 75 mmHg  Arterial Line BP: ()/(35-53) 116/41  FiO2 (%):  [60 %-100 %] 60 %  SpO2:  [82 %-100 %] 98 %  I/O last 3 completed shifts:  In: 2211.62 [I.V.:1281.62; NG/GT:130]  Out: 830 [Urine:830]     , Pulse (!) 47, temperature (!) 95  F (35  C), temperature source Esophageal, resp. rate 14, height 1.829 m (6'), weight 100 kg (220 lb 7.4 oz), SpO2 98 %.  220 lbs 7.36 oz  GENERAL: intubated and sedated  HEENT: ET tube in place  CV: S1/S2 heard without murmur   RESPIRATORY: coarse breath sounds, no wheezes or crackles noted  GI: Soft and non distended with normoactive bowel sounds present in all quadrants. No tenderness, rebound, guarding. No palpable organomegaly.   EXTREMITIES: No peripheral edema. pedal pulses intact with doppler.  NEUROLOGIC: not oriented to place or time, does not follow commands  MUSCULOSKELETAL: No joint swelling or tenderness.   SKIN: No jaundice. No acute rashes or lesions.     Lines:   Peripheral IV 02/17/22 Left;Right (Active)   Site Assessment [facility] 02/18/22 1200   Line Status Saline locked 02/18/22 1200   Dressing Intervention New dressing  02/18/22 1200   Phlebitis Scale 0-->no symptoms 02/18/22 1200   Infiltration Scale 0 02/18/22 1200   Number of days: 1       Peripheral IV 02/17/22 Left Hand (Active)   Site Assessment [facility] 02/18/22 1200   Line Status Saline locked 02/18/22 1200   Dressing Intervention New dressing  02/18/22 1200   Phlebitis Scale 0-->no symptoms 02/18/22 1200   Infiltration Scale 0 02/18/22 1200   Infiltration Site Treatment Method  None 02/17/22 1441    Number of days: 1       Peripheral IV 02/17/22 Anterior;Right Hand (Active)   Site Assessment [facility] 02/18/22 1200   Line Status Infusing;Checked every 1-2 hour 02/18/22 1200   Dressing Intervention New dressing  02/18/22 1200   Phlebitis Scale 0-->no symptoms 02/18/22 1200   Infiltration Scale 0 02/18/22 1200   Number of days: 1       Arterial Line 02/17/22 Radial (Active)   Site Assessment [facility] 02/18/22 1200   Line Status Pulsatile blood flow 02/18/22 1200   Arterine Line Cap Change Due 02/22/22 02/18/22 1200   Art Line Waveform Appropriate 02/18/22 1200   Art Line Interventions Leveled;Flushed per protocol 02/18/22 1200   Color/Movement/Sensation Capillary refill less than 3 sec 02/18/22 1200   Line Necessity Yes, meets criteria 02/18/22 1200   Dressing Type Transparent 02/18/22 1200   Dressing Status Clean, dry, intact 02/18/22 1200   Dressing Change Due 02/20/22 02/18/22 1200   Number of days: 1       Arterial Sheath  02/17/22 17 Fr Femoral (Active)   Specific Qualities Sutured 02/18/22 1200   Site Assessment Bleeding 02/18/22 1200   Dressing Type Other (Comment) 02/18/22 1200   Dressing Intervention Dressing changed/new dressing 02/18/22 0930   Arterial Sheath Comment ECMO 02/18/22 1200   Number of days: 1       Arterial Sheath  02/17/22 6 Fr Femoral (Active)   Specific Qualities Sutured 02/18/22 1200   Site Assessment Bleeding 02/18/22 1200   Dressing Type Other (Comment) 02/18/22 0930   Dressing Intervention Dressing changed/new dressing 02/18/22 0930   Arterial Sheath Comment Reperfusion 02/18/22 1200   Number of days: 1       Arterial Sheath  02/17/22 9 Fr Femoral (Active)   Specific Qualities Sutured 02/18/22 1200   Site Assessment WDL 02/18/22 1200   Dressing Type Sutured;Transparent 02/18/22 1200   Arterial Sheath Comment IABP 02/18/22 1200   Number of days: 1       Venous Sheath 02/17/22 Other (comment) Left (Active)   Specific Qualities Sutured 02/18/22 1200   Site Assessment WDL 02/18/22 1200    Dressing Type Other (Comment) 02/18/22 1200   Dressing Intervention Dressing changed/new dressing 02/18/22 0930   Venous Sheath Comment ECMO 02/18/22 1200   Number of days: 1       Intravascular Temperature Management Device - ICY Catheter Cath Lab Icy Right (Active)   Site Assessment [facility] 02/18/22 1200   Dressing Type Tegaderm 02/18/22 1200   Hypothermia In/Out Lumens To Cooling System 02/18/22 1200   Status:  Proximal Lumen Infusing 02/18/22 1200   Status:  Medial Lumen Infusing 02/18/22 1200   Status:  Distal Lumen Locked 02/18/22 1200   Reason for Central Line Induced Hypothermia 02/18/22 1200   Tubing Change No 02/18/22 1200   Cap Change No 02/18/22 1200   Number of days: 1        Medications     cisatracurium Stopped (02/17/22 1931)     EPINEPHrine Stopped (02/17/22 1929)     fentaNYL 150 mcg/hr (02/18/22 1300)     heparin (PRESSURE BAG) 2 unit/mL in 0.9% NaCl 6 Units/hr (02/17/22 2100)     HEParin 1,200 Units/hr (02/18/22 1300)     midazolam 8 mg/hr (02/18/22 1300)     nitroPRUsside Stopped (02/17/22 1840)     norepinephrine 0.06 mcg/kg/min (02/18/22 1309)     phenylephrine Stopped (02/17/22 1840)     propofol (DIPRIVAN) infusion 25 mcg/kg/min (02/18/22 1300)     vasopressin 2 Units/hr (02/18/22 1300)       artificial tears   Both Eyes Q8H     aspirin  81 mg Per Feeding Tube Daily     hydrocortisone sodium succinate PF  50 mg Intravenous Q8H     pantoprazole (PROTONIX) IV  40 mg Intravenous Daily     piperacillin-tazobactam  3.375 g Intravenous Q6H     ticagrelor  90 mg Oral BID     vancomycin  1,750 mg (central catheter) Intravenous Q24H       Data   Recent Labs   Lab 02/18/22  1213 02/18/22  1022 02/18/22  1021 02/18/22  1019 02/18/22  0823 02/18/22  0359 02/18/22  0350 02/17/22  2201 02/17/22  2152   WBC  --   --   --  7.4 9.8  --  12.5*   < >  --    HGB  --   --   --  8.0* 6.0*  --  8.7*   < >  --    MCV  --   --   --  93 98  --  99   < >  --    PLT  --   --   --  94* 112*  --  155   < >  --    INR  --    --  1.48*  --   --   --  1.39*  --  1.38*   NA  --   --   --  140  --   --  140  --  140   POTASSIUM  --   --   --  5.3  --   --  5.4*  --  4.3   CHLORIDE  --   --   --  110*  --   --  112*  --  111*   CO2  --   --   --  23  --   --  24  --  22   BUN  --   --   --  24  --   --  23  --  20   CR  --   --   --  2.03*  --   --  1.86*  --  1.66*   ANIONGAP  --   --   --  7  --   --  4  --  7   PEDRITO  --   --   --  7.2*  --   --  7.8*  --  8.0*   * 141* 153* 151*  --    < > 149*   < > 135*   ALBUMIN  --   --   --  3.1*  --   --  2.9*  --  2.7*   PROTTOTAL  --   --   --  4.6*  --   --  4.9*  --  5.3*   BILITOTAL  --   --   --  0.7  --   --  0.6  --  0.6   ALKPHOS  --   --   --  14*  --   --  20*  --  31*   ALT  --   --   --  153*  --   --  240*  --  331*   AST  --   --   --  290*  --   --  510*  --  704*    < > = values in this interval not displayed.       Recent Results (from the past 24 hour(s))   Cardiac Catheterization    Narrative      VF cardiac arrest with ongoing cardiogenic shock    Two vessel coronary artery disease with occlusion of the midLAD and   severe stenosis of the large ramus    Successful PCI of the mid LAD with a LETY    Successful cannulation for peripheral VA ECMO with a 17 Fr arterial and   25 Fr venous cannula    Successful insertion of a distal perfusion cannula    Successful insertion of a 50cc IABP    Successful insertion of a thermogard cooling catheter    Successful insertion of a R radial arterial line      CT Chest Abdomen Pelvis w/o Contrast    Narrative    Chest abdomen pelvis CT without contrast    INDICATION: Chest or back pain, aortic dissection suspected. Post  ventricular tachycardia/ventricular fibrillation arrest.    Contrast: None    COMPARISON: None available    FINDINGS:    CHEST: Evaluation of the lungs shows small pleural effusions  bilaterally with areas of bronchiectasis in the lower lobes and  atelectasis/bronchial wall thickening along the airways. No  dominant  pulmonary nodule. Tracheal debris versus polyp in the lower left  thoracic trachea.  Degenerative disc disease in the cervicothoracic spine. ACDF hardware  at C6-7. Endotracheal tube tip approximately 2.6 cm above the judy.  Enteric tube progresses into the distal stomach. No enlarged axillary,  mediastinal or hilar lymph nodes.  The main pulmonary artery measures 2.6 cm in caliber, normal.  Estimated caliber of the thoracic aorta shows aneurysmal dilation of  the midascending portion of 4.7 cm. There is calcification at the  aortic arch. Calcification of coronary arteries. (LAD coronary artery  stent. Watchman device for left atrial appendage occlusion. No  pericardial effusion. There is mediastinal soft tissue density  superficial to the anterior pericardial stripe in the retrosternal and  left paramedian spaces. This could be contusion secondary to CPR.  Bones in the chest show no definitive sternal fracture, rib fracture  or thoracic vertebral fracture.    ABDOMEN/PELVIS: Lumbar degenerative disc and facet disease. Bilateral  sacroiliac degenerative joint disease.  Abdomen appears grossly normal. Intra-aortic balloon pump superior  radiodense marker is in the proximal descending thoracic aorta.  Circumaortic left renal vein anatomical variant. Right adrenal gland  contours are normal. Left adrenal gland contours are normal.  Gallbladder appears grossly normal. Vague area of low density in the  left hepatic lobe (series 10 image 253), incompletely evaluated  without contrast. This may represent small cyst or benign biliary  hamartoma. Spleen accessory splenule appeared grossly normal. Probable  contrast in collecting systems from reason cardiac catheterization  procedure.  Left-sided femoral catheter terminates at the common iliac artery on  the left. Left sided femoral venous catheter appears as a likely ECMO  device and its tip is near the SVC/right atrial junction. Right  femoral device likely is  an intra-aortic balloon pump apparatus.  Possible inferior radiopaque marker in the infrarenal abdominal aorta.  Right femoral venous catheter terminates in the intrahepatic IVC.  Prostate is enlarged. This exerts mass effect on the base of urinary  bladder. Urinary bladder itself appears grossly normal. Right inguinal  hernia with soft tissue and fluid in vessels at the spermatic cord.  Abdominal and pelvic musculature appears grossly symmetric.  Noncontrast appearance of the pancreas appears grossly unremarkable.  Normal abdominal aorta caliber.      Impression    IMPRESSION: Noncontrast examination precludes ideal evaluation for  aortic dissection. There is aneurysmal dilation of the mid ascending  thoracic aorta up to approximately 4.7 cm. LAD coronary artery stent.  Other coronary artery atherosclerosis. Trace pleural effusions and  associated bibasilar atelectasis. Mild bronchiectasis in the lower  lungs. Tracheal debris versus polyp posteriorly in the lower left  thoracic trachea. ECMO. Intra-aortic balloon pump. Left atrial  appendage occlusion device. Retrosternal layering soft tissues not  immediately adjacent to the vasculature, this may represent blood  products from recent CPR performance. Coronary artery atherosclerosis.  No definitive bone fractures. Degenerative changes. Incidental cyst or  biliary hamartoma in the left hepatic lobe versus slightly prominent  unopacified hepatic vessel of uncertain, if any, significance.  Contrast in bilateral renal collecting systems and urinary bladder,  presumably from cardiac catheterization previously performed numerous  support catheters and devices as described from bilateral femoral  arterial and venous approaches. Enlarged prostate. Right inguinal  hernia.    SALOMON PEARCE MD         SYSTEM ID:  [id number]   CT Head w/o Contrast    Narrative    CT HEAD W/O CONTRAST 2/17/2022 6:00 PM    History: Mental status change, unknown cause; post VT/VF arrest      Comparison: None available    Technique: Using multidetector thin collimation helical acquisition  technique, axial, coronal and sagittal CT images from the skull base  to the vertex were obtained without intravenous contrast.   (topogram) image(s) also obtained and reviewed.    Findings: There is no intracranial hemorrhage, mass effect, or midline  shift. Gray/white matter differentiation in both cerebral hemispheres  is preserved. Ventricles are proportionate to the cerebral sulci. The  basal cisterns are clear.    The bony calvaria and the bones of the skull base are normal.  Scattered mild mucosal thickening of the ethmoid air cells and right  sphenoid locule. The mastoid air cells are clear.       Impression    Impression:  No acute intracranial pathology.     I have personally reviewed the examination and initial interpretation  and I agree with the findings.    ALEXANDR ORDONEZ MD         SYSTEM ID:  [id number]   XR Chest Port 1 View    Narrative    EXAM: XR CHEST 1 VW 2/17/2022      HISTORY: Check endotracheal tube placement and ECLS cannula placement    COMPARISON: CT from previous day.     TECHNIQUE: Frontal view of the chest.    FINDINGS: The endotracheal tube tip is roughly 3.3 cm above the  judy. Intra-aortic balloon pump superior marker is roughly 1.7 cm  inferior to the judy at the level of T5. Inferior-approach ECMO  cannula tip is at the level of T7-8. Partially visualized feeding  tube. Pacer pads over the chest. Epicardial pacing wires. Hazy  opacification over the judy. Cardiomediastinal silhouette is  similar. This mild interstitial opacities throughout the lungs. The  pulmonary vasculature is mildly indistinct. No large pleural effusion  or definite pneumothorax. No acute abnormality of bones.      Impression    IMPRESSION:   1. Endotracheal tube is roughly 3.3 cm above the judy.  2. Intra-aortic balloon pump superior marker projects 1.7 cm below the  judy.  3. Inferior approach  cannula tip is at the level of T7-8.  4. Probable pulmonary interstitial edema.    I have personally reviewed the examination and initial interpretation  and I agree with the findings.    SALOMON PEARCE MD         SYSTEM ID:  [id number]   XR Abdomen Port 1 View    Narrative    EXAMINATION:  XR ABDOMEN PORT 1 VIEWS 2/17/2022 10:21 PM     COMPARISON: CT earlier same day.    HISTORY: OG tube placement.    FINDINGS: Frontal view of the abdomen. Enteric tube tip and sidehole  project over the stomach. Partially visualized left femoral approach,  cannula coursing beyond the field-of-view. No abnormally dilated loops  of bowel.      Impression    IMPRESSION: Enteric tube tip and sidehole project over the stomach. No  obstructive bowel gas pattern.    I have personally reviewed the examination and initial interpretation  and I agree with the findings.    OPHELIA COX MD         SYSTEM ID:  [id number]   XR Chest Port 1 View    Narrative    Chest one view portable    HISTORY: Evaluate position of lines drains    COMPARISON STUDY: 2/17/2022    findings: Endotracheal tube in the mid trachea. Enteric tube  collimated off film abdomen. Temperature probe in the esophagus.  Intervertebral pump marker no longer seen. IVC ECMO tip in the RA  again noted. Cervical plate and screw fusion. Left pleural effusion.  Interstitial opacities bilaterally. Cardiac silhouette is nonenlarged.  Yahaira device.      Impression    IMPRESSION: Left pleural effusion basilar atelectasis.    ROB HURD MD         SYSTEM ID:  [id number]   US Carotid Bilateral    Narrative    Exam: Bilateral carotid duplex Doppler ultrasound dated 2/18/2022  10:19 AM    Clinical history: Cardiac Arrest on ECMO    Comparison Study: None available    Ordering provider: VITALY DURAN    Technique: Grayscale (B-mode) and duplex and spectral Doppler  ultrasound of the extracranial internal carotid, external carotid,  vertebral artery origins, right  brachiocephalic/subclavian and left  subclavian arteries. Velocity measurements obtained with angle  correction at or less than 60 degrees.    Findings:    Right side:     Pulse Doppler/spectral waveforms altered to ECMO.    Plaque Morphology: Small amount of hyperechoic and shadowing plaque at  the bifurcation and proximal ICA.        Proximal CCA: 85/23 cm/sec     Mid CCA: 103/0 cm/sec     Distal CCA: 86/20 cm/sec          External CA: 58/9 cm/sec       Proximal ICA: 68/13 cm/sec     Mid ICA: 100/24 cm/sec     Distal ICA: 125/29 cm/sec       Vertebral artery: 70/22 cm/sec    ICA/CCA ratio: 1.46    Left side:     Plaque Morphology: Small amount of hyperechoic plaque along the  posterior wall at the carotid bulb/bifurcation without involvement of  the proximal ICA.       Proximal CCA: 92/0 cm/sec     Mid CCA: 85/8 cm/sec     Distal CCA: 91/15 cm/sec          External CA: 88/22 cm/sec       Proximal ICA: 67/12 cm/sec     Mid ICA: 94/21 cm/sec     Distal ICA: 94/22 cm/sec       Vertebral artery: 65/22 cm/sec        ICA/CCA ratio: 1.03      Impression    Impression:    1. Right side:        Degree of stenosis of the internal carotid artery: Less than 50 %.    2. Left side:         Degree of stenosis of the internal carotid artery: Normal.    [employer] Commission Updated Recommendations for  Carotid Stenosis Interpretation Criteria - October 2021.  [email or web address]://[email or web address]-content/uploads/[email or web address]/[email or web address]-Vascular-Test  [email or web address]-for-Carotid-Stenosis-Interpre  ation-Criteria.[email or web address]         Normal            ICA PSV: < 180 cm/s            Plaque Estimate: None            ICA/CCA PSV Ratio: < 2.0            ICA EDV: < 40 cm/s         < 50%            ICA PSV: < 180 cm/s            Plaque Estimate: < 50%            ICA/CCA PSV Ratio: < 2.0            ICA EDV: < 40 cm/s         50 - 69%            ICA PSV: < 180 - 230 cm/s            Plaque Estimate: > 50%            ICA/CCA PSV  Ratio: 2.0 - 4.0            ICA EDV: 40 - 100 cm/s         > 70% but less than near occlusion            ICA PSV: > 230 cm/s            Plaque Estimate: > 50%            ICA/CCA PSV Ratio: > 4.0            ICA EDV: > 100 cm/s         Near occlusion            ICA PSV: > 230 cm/s            Plaque Estimate: Visible            ICA/CCA PSV Ratio: Variable            ICA EDV: Variable         Total occlusion            ICA PSV: Undetectable            Plaque Estimate: Visible, no detectable lumen            ICA/CCA PSV Ratio: N/A            ICA EDV: N/A    CONNOR HAND         SYSTEM ID:  [id number]   US Lower Extremity Arterial Duplex Bilateral    Narrative    Exam: Duplex ultrasound of bilateral lower extremity arteries dated  2/18/2022 10:33 AM     Clinical information: Baseline to assess blood flow to Lower  Extremities (VA ECMO)     Comparison: None    Technique: Grayscale (B-mode), color Doppler, and duplex spectral  Doppler ultrasound of the lower extremity arteries. Velocity  measurements obtained with angle correction of 60 degrees or less.    Ordering provider: VITALY DURAN    Findings:     Right lower extremity:     The more central vessels are obscured by bandage.  Proximal SFA: Velocity: 84 cm/sec. Waveforms: Triphasic. Delayed  upstroke.  Mid SFA:Velocity:  91 cm/sec. Waveforms: Triphasic. Delayed upstroke.  Distal SFA: Velocity: 52 cm/sec. Waveforms: Triphasic. Delayed  upstroke.    Popliteal artery, proximal: Velocity: 30 cm/sec. Waveforms: Triphasic.  Delayed upstroke.    PTA ankle: Velocity: 50 cm/sec. Waveforms: Triphasic. Delayed  upstroke.  GALO ankle: Velocity: 23 cm/sec. Waveforms: Triphasic. Delayed upstroke    Left lower extremity:    More central vessels are obscured.  Distal SFA: Velocity: 16 cm/sec. Waveforms: Monophasic    Popliteal artery, proximal: Velocity: 6 cm/sec. Waveforms: Monophasic    PTA ankle: Velocity: 8 cm/sec. Waveforms: Monophasic  GALO ankle: Velocity: 11 cm/sec.  Waveforms: Monophasic      Impression    Impression:     1. Right leg: Central vessels are obscured. Visualized vessels are  patent. Nonspecific waveform pattern in the setting of ECMO..    2. Left leg: Central vessels are obscured. Visualized vessels are  patent. Severely monophasic waveforms throughout all vessels secondary  to ECMO.      Guidelines:    [facility] duplex criteria for lower limb arterial  occlusive disease    Percent stenosis:     Normal (1-19%): Peak systolic velocity (cm/s): <150, End-diastolic  velocity (cm/s): <40, Velocity ratio (Vr): <1.5, Distal arterial  waveform: Triphasic    20-49%: Peak systolic velocity (cm/s): 150-200, End-diastolic velocity  (cm/s): <40, Velocity ratio (Vr): 1.5-2.0, Distal arterial waveform:  Triphasic    50-75%: Peak systolic velocity (cm/s): 200-300, End-diastolic velocity  (cm/s): <90, Velocity ratio (Vr): 2.0-3.9, Distal arterial waveform:  Poststenotic turbulence distal to stenosis, monophasic distal waveform    >75%: Peak systolic velocity (cm/s): >300, End-diastolic velocity  (cm/s): <90, Velocity ratio (Vr): >4.0, Distal arterial waveform:  Dampened distal waveform and low PSV/EDV* in the stenosis    Occlusion: Absent flow by color Doppler/pulsed Doppler spectral  analysis; length of occlusion estimated from distance between exit and  reentry collateral arteries    *PSV = peak systolic velocity, EDV = end-diastolic velocity  http://link.parr.com/chapter/10.1007/[email or web address]_23/fulltext  html    I have personally reviewed the examination and initial interpretation  and I agree with the findings.    CONNOR HAND         SYSTEM ID:  [id number]   Echo Limited   Result Value    LVEF  10-15%    Narrative    [id number]  [id number]  [id number]  [id number]^TAQUERIA^MOHSAN     [facility],[place]  Echocardiography Laboratory  [address] [place] [id number]     Name: NIURKA BEAR  MRN: [id number]  :  1950  Study Date: 02/18/2022 10:29 AM  Age: 71 yrs  Gender: Male  Patient Location: [facility]  Reason For Study: Heart Failure, ECMO  Ordering Physician: CHAUDHRY, MOHSAN  Referring Physician: VITALY DURAN  Performed By: Mike Dominguez [facility]     BSA: 2.2 m2  Height: 72 in  Weight: 220 lb  HR: 42  BP: 112/55 mmHg  ______________________________________________________________________________  Procedure  Limited Portable Echo Adult. Technically difficult study.Extremely poor  acoustic windows.  ______________________________________________________________________________  Interpretation Summary  Technically difficult study.Extremely poor acoustic windows.  On VA ECMO at 4.1LPM.  Left ventricular size is normal.  The visual ejection fraction is 10-15%.  Global right ventricular function is moderately to severely reduced.  No pericardial effusion is present.  ______________________________________________________________________________  Left Ventricle  On VA ECMO at 4.1LPM. Left ventricular size is normal. The visual ejection  fraction is 10-15%.     Right Ventricle  Global right ventricular function is moderately to severely reduced.     Vessels  The inferior vena cava is normal.     Pericardium  No pericardial effusion is present.     ______________________________________________________________________________  Report approved by: Josse Harris 02/18/2022 11:05 AM     ______________________________________________________________________________        Critical Care Services Progress Note:     Chano Johnson remains critically ill with acute coronary syndrome, cardiac arrhythmia, hypotension, severe respiratory distress and shock     I personally examined and evaluated the patient today.   The patient s prognosis today is grave  I have evaluated all laboratory values and imaging studies from the past 24 hours.  Key findings and decisions made today included     --wean pressors/inotropes as  able  --echo tomorrow with ECMO turndown  --continue ASA 81mg and ticagrelor 90mg BID  --hold temp at 34  --ACT goal 180-200      I personally managed the ventilator, sedation, pain control and analgesia, metabolic abnormalities, antibiotic therapy, nutritional status and vasoactive medications.   Consults ongoing and ordered are none  Procedures that will happen today are: none  All treatments were placed at my direction.  I formulated today s plan with the house staff team or resident(s) and agree with the findings and plan in the associated note.       The above plans and care have been discussed with none and all questions and concerns were addressed.     I spent a total of 60 minutes (excluding procedure time) personally providing and directing critical care services at the bedside and on the critical care unit for Chano Johnson.        Malik Henriquez MD        ECMO Attending Progress Note  02/18/2022    Chano Johnson is a 72 year old male who was cannulated for ECMO due to STEMI and out of hospital cardiac arrest     Cannulation Site:  17 Fr in the L femoral artery  25 Fr in the L femoral vein    RPM's 3600 with flow range 3.78-4.18 L/min. Sweep gas is at 2 LPM and FiO2 70%. Cannulas are secure with no bleeding from site.    ECMO Issues including assessments and plan on DOS 02/18/2022:  Neuro: Sedated for mechanical ventilation and ECMO.    CV: Cardiogenic shock.   Pulm: Keep vent settings at rest settings as above.  FEN/Renal: Electrolytes stable w/ replacement protocols in place  Heme: ACT goal: 180-200. Minimal oozing around the ECMO cannulas.  ID: Receiving empiric antibiotics  Cannulae: Position is acceptable on exam and the available imaging.  Distal perfusion cannula is in place and patent.  Extremities are well-perfused.     I have personally reviewed the ECMO flows, oxygenation and CO2 clearance, anticoagulation, and cannula position.  I have also personally assessed the patient's systemic  response with hemodynamics, oxygenation, ventilation, and bleeding.       The patient requires continued ECMO support and management in the ICU.        Malik Henriquez MD

## 2022-02-18 NOTE — PROGRESS NOTES
ECMO Shift Summary: 19:00-07:00      ECMO Equipment:  Console serial number: [id number]  Circuit Lot number: [id number]  Oxygenator Lot number: [id number]      Patient remains on VA ECMO, all equipment is functioning and alarms are appropriately set. RPM's 3600 with flow range 4 L/min. Sweep gas is at 3 LPM and FiO2 70%. Circuit remains free of air, clot and fibrin. Cannulas are secure with no bleeding from site. Extremities are cool to touch. Suctioned ETT for scant secretions.    Significant Shift Events: Pt had recurrent chugging which resolved for short periods with fluid bolus. Pt has consistent bleeding from the cannula site.    Vent settings:  Vent Mode: CMV/AC  (Continuous Mandatory Ventilation/ Assist Control)  FiO2 (%): 60 %  Resp Rate (Set): 14 breaths/min  Tidal Volume (Set, mL): 470 mL  PEEP (cm H2O): 7 cmH2O  Inspiratory Pressure (cm H2O) (Drager Jennifer): 25  Resp: 14  .    Heparin is running at 1500 u/hr, ACT range 160-169.    Urine output is as charted, blood loss was a moderate amount of oozing from the cannula site. Product given included 2500 m lof LR 1500 ml of 5% ALBUMIN and 50 ml of 25% albumin.        Intake/Output Summary (Last 24 hours) at 2/18/2022 0615  Last data filed at 2/18/2022 0600  Gross per 24 hour   Intake 1814.02 ml   Output 830 ml   Net 984.02 ml       ECHO:  No results found for this or any previous visit.  No results found for this or any previous visit.      CXR:  Recent Results (from the past 24 hour(s))   Cardiac Catheterization    Narrative      VF cardiac arrest with ongoing cardiogenic shock    Two vessel coronary artery disease with occlusion of the midLAD and   severe stenosis of the large ramus    Successful PCI of the mid LAD with a LETY    Successful cannulation for peripheral VA ECMO with a 17 Fr arterial and   25 Fr venous cannula    Successful insertion of a distal perfusion cannula    Successful insertion of a 50cc IABP    Successful insertion of a thermogard  cooling catheter    Successful insertion of a R radial arterial line      CT Chest Abdomen Pelvis w/o Contrast    Narrative    Chest abdomen pelvis CT without contrast    INDICATION: Chest or back pain, aortic dissection suspected. Post  ventricular tachycardia/ventricular fibrillation arrest.    Contrast: None    COMPARISON: None available    FINDINGS:    CHEST: Evaluation of the lungs shows small pleural effusions  bilaterally with areas of bronchiectasis in the lower lobes and  atelectasis/bronchial wall thickening along the airways. No dominant  pulmonary nodule. Tracheal debris versus polyp in the lower left  thoracic trachea.  Degenerative disc disease in the cervicothoracic spine. ACDF hardware  at C6-7. Endotracheal tube tip approximately 2.6 cm above the judy.  Enteric tube progresses into the distal stomach. No enlarged axillary,  mediastinal or hilar lymph nodes.  The main pulmonary artery measures 2.6 cm in caliber, normal.  Estimated caliber of the thoracic aorta shows aneurysmal dilation of  the midascending portion of 4.7 cm. There is calcification at the  aortic arch. Calcification of coronary arteries. (LAD coronary artery  stent. Watchman device for left atrial appendage occlusion. No  pericardial effusion. There is mediastinal soft tissue density  superficial to the anterior pericardial stripe in the retrosternal and  left paramedian spaces. This could be contusion secondary to CPR.  Bones in the chest show no definitive sternal fracture, rib fracture  or thoracic vertebral fracture.    ABDOMEN/PELVIS: Lumbar degenerative disc and facet disease. Bilateral  sacroiliac degenerative joint disease.  Abdomen appears grossly normal. Intra-aortic balloon pump superior  radiodense marker is in the proximal descending thoracic aorta.  Circumaortic left renal vein anatomical variant. Right adrenal gland  contours are normal. Left adrenal gland contours are normal.  Gallbladder appears grossly normal.  Vague area of low density in the  left hepatic lobe (series 10 image 253), incompletely evaluated  without contrast. This may represent small cyst or benign biliary  hamartoma. Spleen accessory splenule appeared grossly normal. Probable  contrast in collecting systems from reason cardiac catheterization  procedure.  Left-sided femoral catheter terminates at the common iliac artery on  the left. Left sided femoral venous catheter appears as a likely ECMO  device and its tip is near the SVC/right atrial junction. Right  femoral device likely is an intra-aortic balloon pump apparatus.  Possible inferior radiopaque marker in the infrarenal abdominal aorta.  Right femoral venous catheter terminates in the intrahepatic IVC.  Prostate is enlarged. This exerts mass effect on the base of urinary  bladder. Urinary bladder itself appears grossly normal. Right inguinal  hernia with soft tissue and fluid in vessels at the spermatic cord.  Abdominal and pelvic musculature appears grossly symmetric.  Noncontrast appearance of the pancreas appears grossly unremarkable.  Normal abdominal aorta caliber.      Impression    IMPRESSION: Noncontrast examination precludes ideal evaluation for  aortic dissection. There is aneurysmal dilation of the mid ascending  thoracic aorta up to approximately 4.7 cm. LAD coronary artery stent.  Other coronary artery atherosclerosis. Trace pleural effusions and  associated bibasilar atelectasis. Mild bronchiectasis in the lower  lungs. Tracheal debris versus polyp posteriorly in the lower left  thoracic trachea. ECMO. Intra-aortic balloon pump. Left atrial  appendage occlusion device. Retrosternal layering soft tissues not  immediately adjacent to the vasculature, this may represent blood  products from recent CPR performance. Coronary artery atherosclerosis.  No definitive bone fractures. Degenerative changes. Incidental cyst or  biliary hamartoma in the left hepatic lobe versus slightly  prominent  unopacified hepatic vessel of uncertain, if any, significance.  Contrast in bilateral renal collecting systems and urinary bladder,  presumably from cardiac catheterization previously performed numerous  support catheters and devices as described from bilateral femoral  arterial and venous approaches. Enlarged prostate. Right inguinal  hernia.    SALOMON PEARCE MD         SYSTEM ID:  [id number]   CT Head w/o Contrast    Narrative    CT HEAD W/O CONTRAST 2/17/2022 6:00 PM    History: Mental status change, unknown cause; post VT/VF arrest     Comparison: None available    Technique: Using multidetector thin collimation helical acquisition  technique, axial, coronal and sagittal CT images from the skull base  to the vertex were obtained without intravenous contrast.   (topogram) image(s) also obtained and reviewed.    Findings: There is no intracranial hemorrhage, mass effect, or midline  shift. Gray/white matter differentiation in both cerebral hemispheres  is preserved. Ventricles are proportionate to the cerebral sulci. The  basal cisterns are clear.    The bony calvaria and the bones of the skull base are normal.  Scattered mild mucosal thickening of the ethmoid air cells and right  sphenoid locule. The mastoid air cells are clear.       Impression    Impression:  No acute intracranial pathology.     I have personally reviewed the examination and initial interpretation  and I agree with the findings.    ALEXANDR ORDONEZ MD         SYSTEM ID:  [id number]   XR Chest Port 1 View    Narrative    EXAM: XR CHEST 1 VW 2/17/2022      HISTORY: Check endotracheal tube placement and ECLS cannula placement    COMPARISON: CT from previous day.     TECHNIQUE: Frontal view of the chest.    FINDINGS: The endotracheal tube tip is roughly 3.3 cm above the  judy. Intra-aortic balloon pump superior marker is roughly 1.7 cm  inferior to the judy at the level of T5. Inferior-approach ECMO  cannula tip is at the level  of T7-8. Partially visualized feeding  tube. Pacer pads over the chest. Epicardial pacing wires. Hazy  opacification over the judy. Cardiomediastinal silhouette is  similar. This mild interstitial opacities throughout the lungs. The  pulmonary vasculature is mildly indistinct. No large pleural effusion  or definite pneumothorax. No acute abnormality of bones.      Impression    IMPRESSION:   1. Endotracheal tube is roughly 3.3 cm above the judy.  2. Intra-aortic balloon pump superior marker projects 1.7 cm below the  judy.  3. Inferior approach cannula tip is at the level of T7-8.  4. Probable pulmonary interstitial edema.    I have personally reviewed the examination and initial interpretation  and I agree with the findings.    SALOMON PEARCE MD         SYSTEM ID:  [id number]   XR Abdomen Port 1 View    Impression    RESIDENT PRELIMINARY INTERPRETATION  IMPRESSION: Enteric tube tip and sidehole project over the stomach.  Nonobstructive bowel gas pattern.       Labs:  Recent Labs   Lab 02/18/22  0352 02/18/22  0350 02/18/22  0214 02/17/22  2353 02/17/22  2150   PH 7.31*  --  7.30* 7.27* 7.27*   PCO2 49*  --  50* 53* 55*   PO2 162*  --  152* 119* 80   HCO3 25  --  25 24 26   O2PER 60  70 60 60 60 60       Lab Results   Component Value Date    HGB 8.7 (L) 02/18/2022    PHGB 90 (H) 02/18/2022     02/18/2022    FIBR 166 (L) 02/18/2022    INR 1.39 (H) 02/18/2022     (HH) 02/18/2022    DD 4.07 (H) 02/18/2022         Plan is to remain on ECMO support at this time.      Josep Gant, RT  ECMO Specialist  2/18/2022 6:15 AM

## 2022-02-18 NOTE — PROGRESS NOTES
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   Status:  Initial assessment    Positioning Tolerance: Good  Date of ECMO cannulation:   Presence of Ischemia: No  Location of ischemia: none    Pressure Injury Prevention Interventions In Place:  Optifoam Dressing under ECMO Cannula, Z flow Positioner under head, TAPs Wedge Positioners in use, Heel off-loading boots, Pillows under calves for heel suspension and Mepilex Sacral Dressing   Current support surface: Standard  Low air loss mattress       Pressure Injury Prevention Interventions Added:  None    Pressure injury interventions:  Wound location:   cleft  Cleanse with MicroKlenz moistened gauze   Pat dry.   Apply no sting barrier film to the anuja wound skin and allow to dry   Cover with  Sacral  Mepilex dressing, carefully molding into place  Paint the edges of the mepilex dressing with no-sting barrier film  Change every third day and as needed        Plan of Care for Positioning and Pressure Injury Prevention  Reposition patient every 1-2 hours using TAP Wedges  Position head on Z flow positioner, mold indentation at areas of pressure points.  Pad ECMO IJ cannula with Optifoam (#[id number]) along face and scalp between skin and cannula and under Coban head wraps    Pad ECMO groin and chest cannula under rigid connectors with Optifoam or Soft cloth  Heel off-loading Boots at all times  Sacral Mepilex for Prevention, change every 5 days and prn  Low Air loss mattress    Patient History:   According to medical record: 71 year old male who was admitted on 2022 s/p VT/VF arrest x8 shocks who was found to have LAD occlusion requiring 2x stents. VT VF arrest in the ED after he was found to have an anterior STEMI on ECG.  Chest compressions and  ACLS was started immediately.  transferred to the [facility] for percutaneous intervention for his STEMI.  arrived at [facility] and was found to have occlusion of the LAD. During the angiogram he was found to have significant hypotension and ectopy, which prompted placement on VA ECMO.     Current Diet / Nutrition:     Orders Placed This Encounter      NPO for Medical/Clinical Reasons Except for: No Exceptions      Output:    I/O last 3 completed shifts:  In: 2211.62 [I.V.:1281.62; NG/GT:130]  Out: 830 [Urine:830]  Containment: of urine/stool: Urinary Catheter    Risk Assessment:   Sensory Perception: 1-->completely limited  Moisture: 3-->occasionally moist  Activity: 1-->bedfast  Mobility: 1-->completely immobile  Nutrition: 2-->probably inadequate  Friction and Shear: 2-->potential problem  Luis E Score: 10    Labs:    Recent Labs   Lab 22  1021 22  1019 22  0823 22  0350   ALBUMIN  --  3.1*  --  2.9*   HGB  --  8.0*   < > 8.7*   INR 1.48*  --   --  1.39*   WBC  --  7.4   < > 12.5*   CRP  --   --   --  17.0*    < > = values in this interval not displayed.       Focused Assessment:    Arterial Cannula: 17 Fr. In the Left Femoral Artery  Venous Cannula: 25 Fr. In the Left Femoral Vein  Pressure Injury Present::Yes   Bilateral fleshy buttocks     Date of photo:    History:  Present on admission   Wound is on fleshy buttock  cleft walls, not over bone.  Appear to be from skin to skin pressure.    Measurements:  3 cm x 0.4 cm x 0 cm   Wound base:  100% intact nonblanchable deep purple erythema     Discussed plan of care with Nurse  WOC Nurse follow-up plan:weekly

## 2022-02-18 NOTE — PROGRESS NOTES
.Patient Name: Chano Johnson   MRN: [id number]   Admit Date: 2/17/2022    Current Infection Status: COVID-19   Current Isolation Status: No active isolations     Review of COVID-19 status and required isolation precautions    Refer to Special Precautions Duration and Period of Transmissibility Guideline, in Policy Tech.        Involved parties: Mita Boyce, Infection Prevention and Other RN, Myranda. .    Criteria used to make decision: Lab Dates: 12/28/21 initial COVID+. IP validated No new COVID symptom onset nor concern for new COVID infection for 2/17/22 result; this test was for ECMO procedure screen. Test within 90days of positive test.  .    The involved parties have reviewed this patient's chart per the Special Precautions Duration and Period of Transmissibility guideline and have taken the following action:     DECISION - OPTION 1: Patient meets all the criteria for Special Precautions isolation discontinuation and this writer will resolve the COVID-19 infection flag and isolation status, due to: Immunocompetent Symptomatic: Mild or Moderate: 10 days since symptoms began AND greater than or equal to 24hrs since last fever (without fever-reducing meds) AND COVID-19 symptoms have substantially improved. .       Mita Boyce, Infection Prevention

## 2022-02-18 NOTE — PROGRESS NOTES
[facility]  Palliative Care Social Work Note:    Patient Info:  Chano Johnson is a 71 year old male with VT/VF arrest x8 shocks who was found to have LAD occlusion requiring 2x stents. He presented to local ED with chest pain that started at 1300 (within 1 hr of onset), he had a VT VF arrest in the ED after he was found to have an anterior STEMI on ECG.  Chest compressions and ACLS was started immediately.  Patient underwent 8 shocks for VT VF with ROSC afterwards.  He received 4 doses of epinephrine, 450 mg of amiodarone.  He was transferred to the [facility] for percutaneous intervention for his STEMI. He arrived at [facility] and was found to have occlusion of the LAD. During the angiogram he was found to have significant hypotension and ectopy, which prompted placement on VA ECMO. PCI with 2x LETY was done with ECMO support.     Brief summary of visit: Initial ECMO palliative care consult/visit completed with Fellow Dr. Larry Morrison, pt's wife Meeta and son Jona. Introduced role of palliative care team, answered questions, obtained copy of health care directive and discussed role of palliative care team moving forward.      Date of Admission: 2/17/2022    Reason for consult: Patient and family support    Sources of information: Family member wife Meeta and son Jona    Recommendations & Plan:  PCSW will continue to visit and assess ongoing emotional support needs for wife Meeta; discussed during visit today were family concerns/frustrations with current visitor policy leaving Meeta as sole visitor with limited, in person family support. Palliative care team will continue to check in with Meeta to provide on site emotional support and participate in care conferences as scheduled.    These recommendations have been discussed with unit staff, family and palliative care team.    Symptoms & Concerns Addressed Today:  Emotional - continue to assess emotional support needs  for spouse Meeta and family. Meeta and son Jona identified as health care agents and the two family members that would participate in care conferences.  Uatsdin - family requesting spiritual care support; pt is Jewish    Strengths Identified:    Fany, family    Relationships & Support:  Aspects of relationships and support assessed today:    Identified family members: Meeta and rayshawn Tenorio    Professional supports: NA    Family coping: Family appears to demonstrate a hopeful outlook, leaning into each other for support; verbalize a good understanding of pt's health care directive.    Bereavement Risk concerns: Low risk    Coping, Mental Health & Adjustment to Illness:   Anxiety    Goals, Decision Making & Advance Care Planning:   Prognosis, Goals, and/or Advance Care Planning were assessed today: Yes  If yes, brief summary of discussion: Copy of health care directive obtained and scanned to [employer]  Preferred language: English  Patient's decision making preferences: with input from medical clinicians and loved ones  I have concerns about the patient/family's health literacy today: No  Patient has a completed Health Care Directive: Yes, and on file.  Code status per chart review: Full Code    Clinical Social Work Interventions:   Introduction of Palliative clinical social work interventions  Advanced care planning  Facilitation of processing of thoughts/feelings  Family communication facilitated      FRANCO Johnson  Palliative Care   Pager [phone]    [facility] Inpatient Team Consult pager [phone] (M-F 8-4:30)  After-hours Answering Service [phone]

## 2022-02-18 NOTE — PROGRESS NOTES
Pt was a VT/VF activation that came straight through Cath Lab already intubated. Pt was put on ECMO and IABP. Transferred to CT and then to  without any issues.    Pt is 1:1 100% on IABP and on the following vent settings:  Vent Mode: PCV Plus assist  (Pressure Control Ventilation/ Assist Control)  FiO2 (%): 100 %  Resp Rate (Set): 14 breaths/min  Tidal Volume (Set, mL): 450 mL  PEEP (cm H2O): 8 cmH2O  Inspiratory Pressure (cm H2O) (Drager Jennifer): 25  Resp: (!) 0    RT will continue to follow and monitor.    Raj Antony, RRT

## 2022-02-18 NOTE — PLAN OF CARE
Major Shift Events: Pt remains sedated on prop, versed and fentanyl gtts with RASS -5. Pupils equal/reactive, 2+. Cooled to 34 degrees at 2300 however pt became bradycardic with HR 29-30s so was rewarmed to 35 per Cards 2 with rebound in HR to 40s. Intermittent runs of VT but self terminated so no shocks given overnight; pads remain on patient. Norepi gtt titrated and vaso added to maintain MAP >65. ECMO sweep at 3, FiO2 70%. Multiple episodes of chugging overnight requiring 2500 ml LR via ECMO circuit and 1500ml 5% albumin. Pt consistently oozing from cannulation sites; will need blood consent. Vent settings changed to VC, 60%/470/14/7. Pt has jose alejandro secretions orally and through ETT. UOP roughly 30ml/hr, bright red, cards 2 aware. Skin intact, preventative mepilex applied to sacrum.  Plan: Continue to support patient, rewarm 24hr after cooling.  For vital signs and complete assessments, please see documentation flowsheets.

## 2022-02-18 NOTE — SIGNIFICANT EVENT
SPIRITUAL HEALTH SERVICES Significant Event  Restorationist Sacrament of ANOINTING  [facility] ([place]) 4e    Pt anointed by Father Richard Mcdonald   Pager [phone]

## 2022-02-19 ENCOUNTER — APPOINTMENT (OUTPATIENT)
Dept: NEUROLOGY | Facility: CLINIC | Age: 72
DRG: 003 | End: 2022-02-19
Payer: MEDICARE

## 2022-02-19 ENCOUNTER — APPOINTMENT (OUTPATIENT)
Dept: GENERAL RADIOLOGY | Facility: CLINIC | Age: 72
DRG: 003 | End: 2022-02-19
Payer: MEDICARE

## 2022-02-19 LAB
ACT BLD: 177 SECONDS (ref 74–150)
ACT BLD: 182 SECONDS (ref 74–150)
ACT BLD: 186 SECONDS (ref 74–150)
ACT BLD: 190 SECONDS (ref 74–150)
ACT BLD: 194 SECONDS (ref 74–150)
ALBUMIN SERPL-MCNC: 2.8 G/DL (ref 3.4–5)
ALBUMIN SERPL-MCNC: 2.8 G/DL (ref 3.4–5)
ALBUMIN SERPL-MCNC: 2.9 G/DL (ref 3.4–5)
ALBUMIN SERPL-MCNC: 3 G/DL (ref 3.4–5)
ALP SERPL-CCNC: 15 U/L (ref 40–150)
ALP SERPL-CCNC: 15 U/L (ref 40–150)
ALP SERPL-CCNC: 17 U/L (ref 40–150)
ALP SERPL-CCNC: 17 U/L (ref 40–150)
ALT SERPL W P-5'-P-CCNC: 108 U/L (ref 0–70)
ALT SERPL W P-5'-P-CCNC: 111 U/L (ref 0–70)
ALT SERPL W P-5'-P-CCNC: 121 U/L (ref 0–70)
ALT SERPL W P-5'-P-CCNC: 99 U/L (ref 0–70)
ANION GAP SERPL CALCULATED.3IONS-SCNC: 5 MMOL/L (ref 3–14)
ANION GAP SERPL CALCULATED.3IONS-SCNC: 5 MMOL/L (ref 3–14)
ANION GAP SERPL CALCULATED.3IONS-SCNC: 7 MMOL/L (ref 3–14)
ANION GAP SERPL CALCULATED.3IONS-SCNC: 7 MMOL/L (ref 3–14)
APTT PPP: 140 SECONDS (ref 22–38)
APTT PPP: 169 SECONDS (ref 22–38)
APTT PPP: 177 SECONDS (ref 22–38)
APTT PPP: 189 SECONDS (ref 22–38)
APTT PPP: 204 SECONDS (ref 22–38)
AST SERPL W P-5'-P-CCNC: 140 U/L (ref 0–45)
AST SERPL W P-5'-P-CCNC: 157 U/L (ref 0–45)
AST SERPL W P-5'-P-CCNC: 173 U/L (ref 0–45)
AST SERPL W P-5'-P-CCNC: 195 U/L (ref 0–45)
AT III ACT/NOR PPP CHRO: 53 % (ref 85–135)
ATRIAL RATE - MUSE: 0 BPM
BASE EXCESS BLDA CALC-SCNC: -0.3 MMOL/L (ref -9–1.8)
BASE EXCESS BLDA CALC-SCNC: -0.4 MMOL/L (ref -9–1.8)
BASE EXCESS BLDA CALC-SCNC: -0.5 MMOL/L (ref -9–1.8)
BASE EXCESS BLDA CALC-SCNC: -0.6 MMOL/L (ref -9–1.8)
BASE EXCESS BLDA CALC-SCNC: -0.7 MMOL/L (ref -9–1.8)
BASE EXCESS BLDA CALC-SCNC: -1.1 MMOL/L (ref -9–1.8)
BASE EXCESS BLDA CALC-SCNC: -1.1 MMOL/L (ref -9–1.8)
BASE EXCESS BLDA CALC-SCNC: -1.2 MMOL/L (ref -9–1.8)
BASE EXCESS BLDA CALC-SCNC: -1.2 MMOL/L (ref -9–1.8)
BASE EXCESS BLDA CALC-SCNC: -1.3 MMOL/L (ref -9–1.8)
BASE EXCESS BLDA CALC-SCNC: -1.4 MMOL/L (ref -9–1.8)
BASE EXCESS BLDA CALC-SCNC: -1.4 MMOL/L (ref -9–1.8)
BASE EXCESS BLDA CALC-SCNC: -1.5 MMOL/L (ref -9–1.8)
BASE EXCESS BLDA CALC-SCNC: -2 MMOL/L (ref -9–1.8)
BASE EXCESS BLDV CALC-SCNC: -0.8 MMOL/L (ref -7.7–1.9)
BASE EXCESS BLDV CALC-SCNC: -1.2 MMOL/L (ref -7.7–1.9)
BASE EXCESS BLDV CALC-SCNC: -1.8 MMOL/L (ref -7.7–1.9)
BILIRUB SERPL-MCNC: 0.5 MG/DL (ref 0.2–1.3)
BILIRUB SERPL-MCNC: 0.5 MG/DL (ref 0.2–1.3)
BILIRUB SERPL-MCNC: 0.6 MG/DL (ref 0.2–1.3)
BILIRUB SERPL-MCNC: 0.8 MG/DL (ref 0.2–1.3)
BLD PROD TYP BPU: NORMAL
BLD PROD TYP BPU: NORMAL
BLOOD COMPONENT TYPE: NORMAL
BLOOD COMPONENT TYPE: NORMAL
BUN SERPL-MCNC: 32 MG/DL (ref 7–30)
BUN SERPL-MCNC: 37 MG/DL (ref 7–30)
BUN SERPL-MCNC: 39 MG/DL (ref 7–30)
BUN SERPL-MCNC: 42 MG/DL (ref 7–30)
CA-I BLD-MCNC: 4.5 MG/DL (ref 4.4–5.2)
CA-I BLD-MCNC: 4.6 MG/DL (ref 4.4–5.2)
CALCIUM SERPL-MCNC: 7.6 MG/DL (ref 8.5–10.1)
CALCIUM SERPL-MCNC: 7.7 MG/DL (ref 8.5–10.1)
CALCIUM SERPL-MCNC: 7.9 MG/DL (ref 8.5–10.1)
CALCIUM SERPL-MCNC: 7.9 MG/DL (ref 8.5–10.1)
CF REDUC 30M P MA P HEP LENFR BLD TEG: 0.7 % (ref 0–8)
CF REDUC 60M P MA P HEPASE LENFR BLD TEG: 3.6 % (ref 0–15)
CFT P HPASE BLD TEG: 2.4 MINUTE (ref 1–3)
CHLORIDE BLD-SCNC: 111 MMOL/L (ref 94–109)
CHLORIDE BLD-SCNC: 112 MMOL/L (ref 94–109)
CI (COAGULATION INDEX)(Z): -4.2 (ref -3–3)
CLOT ANGLE P HPASE BLD TEG: 58 DEGREES (ref 53–72)
CLOT INIT P HPASE BLD TEG: 9.9 MINUTE (ref 5–10)
CLOT STRENGTH P HPASE BLD TEG: 5.7 KD/SC (ref 4.5–11)
CO2 SERPL-SCNC: 22 MMOL/L (ref 20–32)
CO2 SERPL-SCNC: 23 MMOL/L (ref 20–32)
CO2 SERPL-SCNC: 24 MMOL/L (ref 20–32)
CO2 SERPL-SCNC: 24 MMOL/L (ref 20–32)
CODING SYSTEM: NORMAL
CODING SYSTEM: NORMAL
CREAT SERPL-MCNC: 2.39 MG/DL (ref 0.66–1.25)
CREAT SERPL-MCNC: 2.56 MG/DL (ref 0.66–1.25)
CREAT SERPL-MCNC: 2.78 MG/DL (ref 0.66–1.25)
CREAT SERPL-MCNC: 2.91 MG/DL (ref 0.66–1.25)
CROSSMATCH: NORMAL
CROSSMATCH: NORMAL
CRP SERPL-MCNC: 110 MG/L (ref 0–8)
D DIMER PPP FEU-MCNC: 0.52 UG/ML FEU (ref 0–0.5)
D DIMER PPP FEU-MCNC: 0.62 UG/ML FEU (ref 0–0.5)
DIASTOLIC BLOOD PRESSURE - MUSE: NORMAL MMHG
ERYTHROCYTE [DISTWIDTH] IN BLOOD BY AUTOMATED COUNT: 16.1 % (ref 10–15)
ERYTHROCYTE [DISTWIDTH] IN BLOOD BY AUTOMATED COUNT: 16.4 % (ref 10–15)
ERYTHROCYTE [DISTWIDTH] IN BLOOD BY AUTOMATED COUNT: 16.5 % (ref 10–15)
ERYTHROCYTE [DISTWIDTH] IN BLOOD BY AUTOMATED COUNT: 16.5 % (ref 10–15)
ERYTHROCYTE [SEDIMENTATION RATE] IN BLOOD BY WESTERGREN METHOD: 37 MM/HR (ref 0–20)
FIBRINOGEN PPP-MCNC: 208 MG/DL (ref 170–490)
FIBRINOGEN PPP-MCNC: 217 MG/DL (ref 170–490)
FIBRINOGEN PPP-MCNC: 283 MG/DL (ref 170–490)
GFR SERPL CREATININE-BSD FRML MDRD: 22 ML/MIN/1.73M2
GFR SERPL CREATININE-BSD FRML MDRD: 23 ML/MIN/1.73M2
GFR SERPL CREATININE-BSD FRML MDRD: 26 ML/MIN/1.73M2
GFR SERPL CREATININE-BSD FRML MDRD: 28 ML/MIN/1.73M2
GLUCOSE BLD-MCNC: 133 MG/DL (ref 70–99)
GLUCOSE BLD-MCNC: 135 MG/DL (ref 70–99)
GLUCOSE BLD-MCNC: 136 MG/DL (ref 70–99)
GLUCOSE BLD-MCNC: 139 MG/DL (ref 70–99)
GLUCOSE BLD-MCNC: 142 MG/DL (ref 70–99)
GLUCOSE BLD-MCNC: 144 MG/DL (ref 70–99)
GLUCOSE BLDC GLUCOMTR-MCNC: 127 MG/DL (ref 70–99)
GLUCOSE BLDC GLUCOMTR-MCNC: 129 MG/DL (ref 70–99)
GLUCOSE BLDC GLUCOMTR-MCNC: 131 MG/DL (ref 70–99)
GLUCOSE BLDC GLUCOMTR-MCNC: 131 MG/DL (ref 70–99)
GLUCOSE BLDC GLUCOMTR-MCNC: 132 MG/DL (ref 70–99)
GLUCOSE BLDC GLUCOMTR-MCNC: 132 MG/DL (ref 70–99)
GLUCOSE BLDC GLUCOMTR-MCNC: 136 MG/DL (ref 70–99)
GLUCOSE BLDC GLUCOMTR-MCNC: 137 MG/DL (ref 70–99)
GLUCOSE BLDC GLUCOMTR-MCNC: 140 MG/DL (ref 70–99)
GLUCOSE BLDC GLUCOMTR-MCNC: 148 MG/DL (ref 70–99)
GLUCOSE BLDC GLUCOMTR-MCNC: 148 MG/DL (ref 70–99)
HCO3 BLD-SCNC: 22 MMOL/L (ref 21–28)
HCO3 BLD-SCNC: 23 MMOL/L (ref 21–28)
HCO3 BLD-SCNC: 24 MMOL/L (ref 21–28)
HCO3 BLDA-SCNC: 24 MMOL/L (ref 21–28)
HCO3 BLDA-SCNC: 25 MMOL/L (ref 21–28)
HCO3 BLDV-SCNC: 25 MMOL/L (ref 21–28)
HCO3 BLDV-SCNC: 25 MMOL/L (ref 21–28)
HCO3 BLDV-SCNC: 26 MMOL/L (ref 21–28)
HCT VFR BLD AUTO: 21.1 % (ref 40–53)
HCT VFR BLD AUTO: 21.6 % (ref 40–53)
HCT VFR BLD AUTO: 21.9 % (ref 40–53)
HCT VFR BLD AUTO: 22.9 % (ref 40–53)
HGB BLD-MCNC: 7 G/DL (ref 13.3–17.7)
HGB BLD-MCNC: 7.2 G/DL (ref 13.3–17.7)
HGB BLD-MCNC: 7.4 G/DL (ref 13.3–17.7)
HGB BLD-MCNC: 7.7 G/DL (ref 13.3–17.7)
HGB FREE PLAS-MCNC: 40 MG/DL
INR PPP: 1.15 (ref 0.85–1.15)
INR PPP: 1.24 (ref 0.85–1.15)
INR PPP: 1.25 (ref 0.85–1.15)
INR PPP: 1.27 (ref 0.85–1.15)
INTERPRETATION ECG - MUSE: NORMAL
ISSUE DATE AND TIME: NORMAL
ISSUE DATE AND TIME: NORMAL
LACTATE SERPL-SCNC: 1.7 MMOL/L (ref 0.7–2)
LACTATE SERPL-SCNC: 1.7 MMOL/L (ref 0.7–2)
LACTATE SERPL-SCNC: 2.1 MMOL/L (ref 0.7–2)
LACTATE SERPL-SCNC: 2.2 MMOL/L (ref 0.7–2)
LDH SERPL L TO P-CCNC: 481 U/L (ref 85–227)
MAGNESIUM SERPL-MCNC: 2.3 MG/DL (ref 1.8–2.6)
MAGNESIUM SERPL-MCNC: 2.3 MG/DL (ref 1.8–2.6)
MAGNESIUM SERPL-MCNC: 2.4 MG/DL (ref 1.8–2.6)
MAGNESIUM SERPL-MCNC: 2.4 MG/DL (ref 1.8–2.6)
MAGNESIUM SERPL-MCNC: 2.7 MG/DL (ref 1.8–2.6)
MCF P HPASE BLD TEG: 53.3 MM (ref 50–70)
MCH RBC QN AUTO: 30.3 PG (ref 26.5–33)
MCH RBC QN AUTO: 30.3 PG (ref 26.5–33)
MCH RBC QN AUTO: 30.6 PG (ref 26.5–33)
MCH RBC QN AUTO: 30.8 PG (ref 26.5–33)
MCHC RBC AUTO-ENTMCNC: 33.2 G/DL (ref 31.5–36.5)
MCHC RBC AUTO-ENTMCNC: 33.3 G/DL (ref 31.5–36.5)
MCHC RBC AUTO-ENTMCNC: 33.6 G/DL (ref 31.5–36.5)
MCHC RBC AUTO-ENTMCNC: 33.8 G/DL (ref 31.5–36.5)
MCV RBC AUTO: 91 FL (ref 78–100)
O2/TOTAL GAS SETTING VFR VENT: 60 %
OXYHGB MFR BLD: 96 % (ref 92–100)
OXYHGB MFR BLD: 97 % (ref 92–100)
OXYHGB MFR BLD: 98 % (ref 92–100)
OXYHGB MFR BLD: 98 % (ref 92–100)
OXYHGB MFR BLDA: 98 % (ref 75–100)
OXYHGB MFR BLDA: 99 % (ref 75–100)
OXYHGB MFR BLDV: 72 %
OXYHGB MFR BLDV: 73 %
OXYHGB MFR BLDV: 79 % (ref 70–75)
P AXIS - MUSE: NORMAL DEGREES
PCO2 BLD: 32 MM HG (ref 35–45)
PCO2 BLD: 33 MM HG (ref 35–45)
PCO2 BLD: 33 MM HG (ref 35–45)
PCO2 BLD: 34 MM HG (ref 35–45)
PCO2 BLD: 37 MM HG (ref 35–45)
PCO2 BLD: 38 MM HG (ref 35–45)
PCO2 BLD: 38 MM HG (ref 35–45)
PCO2 BLD: 39 MM HG (ref 35–45)
PCO2 BLDA: 46 MM HG (ref 35–45)
PCO2 BLDA: 53 MM HG (ref 35–45)
PCO2 BLDV: 50 MM HG (ref 40–50)
PCO2 BLDV: 53 MM HG (ref 40–50)
PCO2 BLDV: 56 MM HG (ref 40–50)
PH BLD: 7.4 [PH] (ref 7.35–7.45)
PH BLD: 7.41 [PH] (ref 7.35–7.45)
PH BLD: 7.43 [PH] (ref 7.35–7.45)
PH BLD: 7.44 [PH] (ref 7.35–7.45)
PH BLD: 7.44 [PH] (ref 7.35–7.45)
PH BLD: 7.45 [PH] (ref 7.35–7.45)
PH BLDA: 7.29 [PH] (ref 7.35–7.45)
PH BLDA: 7.32 [PH] (ref 7.35–7.45)
PH BLDV: 7.28 [PH] (ref 7.32–7.43)
PH BLDV: 7.28 [PH] (ref 7.32–7.43)
PH BLDV: 7.31 [PH] (ref 7.32–7.43)
PHOSPHATE SERPL-MCNC: 5 MG/DL (ref 2.5–4.5)
PLATELET # BLD AUTO: 65 10E3/UL (ref 150–450)
PLATELET # BLD AUTO: 68 10E3/UL (ref 150–450)
PLATELET # BLD AUTO: 75 10E3/UL (ref 150–450)
PLATELET # BLD AUTO: 82 10E3/UL (ref 150–450)
PO2 BLD: 102 MM HG (ref 80–105)
PO2 BLD: 107 MM HG (ref 80–105)
PO2 BLD: 109 MM HG (ref 80–105)
PO2 BLD: 111 MM HG (ref 80–105)
PO2 BLD: 114 MM HG (ref 80–105)
PO2 BLD: 139 MM HG (ref 80–105)
PO2 BLD: 85 MM HG (ref 80–105)
PO2 BLD: 87 MM HG (ref 80–105)
PO2 BLD: 90 MM HG (ref 80–105)
PO2 BLD: 91 MM HG (ref 80–105)
PO2 BLD: 94 MM HG (ref 80–105)
PO2 BLD: 99 MM HG (ref 80–105)
PO2 BLDA: 205 MM HG (ref 80–105)
PO2 BLDA: 217 MM HG (ref 80–105)
PO2 BLDV: 42 MM HG (ref 25–47)
PO2 BLDV: 43 MM HG (ref 25–47)
PO2 BLDV: 48 MM HG (ref 25–47)
POTASSIUM BLD-SCNC: 4.6 MMOL/L (ref 3.4–5.3)
POTASSIUM BLD-SCNC: 4.7 MMOL/L (ref 3.4–5.3)
PR INTERVAL - MUSE: NORMAL MS
PROCALCITONIN SERPL-MCNC: 2.91 NG/ML
PROT SERPL-MCNC: 4.5 G/DL (ref 6.8–8.8)
PROT SERPL-MCNC: 4.6 G/DL (ref 6.8–8.8)
PROT SERPL-MCNC: 4.8 G/DL (ref 6.8–8.8)
PROT SERPL-MCNC: 4.8 G/DL (ref 6.8–8.8)
QRS DURATION - MUSE: 62 MS
QT - MUSE: 550 MS
QTC - MUSE: 437 MS
R AXIS - MUSE: 68 DEGREES
RBC # BLD AUTO: 2.31 10E6/UL (ref 4.4–5.9)
RBC # BLD AUTO: 2.38 10E6/UL (ref 4.4–5.9)
RBC # BLD AUTO: 2.4 10E6/UL (ref 4.4–5.9)
RBC # BLD AUTO: 2.52 10E6/UL (ref 4.4–5.9)
SODIUM SERPL-SCNC: 140 MMOL/L (ref 133–144)
SODIUM SERPL-SCNC: 141 MMOL/L (ref 133–144)
SODIUM SERPL-SCNC: 141 MMOL/L (ref 133–144)
SODIUM SERPL-SCNC: 142 MMOL/L (ref 133–144)
SYSTOLIC BLOOD PRESSURE - MUSE: NORMAL MMHG
T AXIS - MUSE: 60 DEGREES
TROPONIN I SERPL HS-MCNC: ABNORMAL NG/L
UFH PPP CHRO-ACNC: 0.54 IU/ML
UFH PPP CHRO-ACNC: 0.56 IU/ML
UFH PPP CHRO-ACNC: 0.58 IU/ML
UFH PPP CHRO-ACNC: 0.7 IU/ML
UNIT ABO/RH: NORMAL
UNIT ABO/RH: NORMAL
UNIT NUMBER: NORMAL
UNIT NUMBER: NORMAL
UNIT STATUS: NORMAL
UNIT STATUS: NORMAL
UNIT TYPE ISBT: 5100
UNIT TYPE ISBT: 9500
VANCOMYCIN SERPL-MCNC: 17.2 MG/L
VENTRICULAR RATE- MUSE: 38 BPM
WBC # BLD AUTO: 10 10E3/UL (ref 4–11)
WBC # BLD AUTO: 10 10E3/UL (ref 4–11)
WBC # BLD AUTO: 10.4 10E3/UL (ref 4–11)
WBC # BLD AUTO: 10.7 10E3/UL (ref 4–11)

## 2022-02-19 PROCEDURE — 94003 VENT MGMT INPAT SUBQ DAY: CPT

## 2022-02-19 PROCEDURE — 99291 CRITICAL CARE FIRST HOUR: CPT | Mod: 25 | Performed by: INTERNAL MEDICINE

## 2022-02-19 PROCEDURE — 85396 CLOTTING ASSAY WHOLE BLOOD: CPT

## 2022-02-19 PROCEDURE — 84100 ASSAY OF PHOSPHORUS: CPT | Performed by: INTERNAL MEDICINE

## 2022-02-19 PROCEDURE — 82947 ASSAY GLUCOSE BLOOD QUANT: CPT | Performed by: INTERNAL MEDICINE

## 2022-02-19 PROCEDURE — 82805 BLOOD GASES W/O2 SATURATION: CPT | Performed by: INTERNAL MEDICINE

## 2022-02-19 PROCEDURE — 250N000009 HC RX 250: Performed by: INTERNAL MEDICINE

## 2022-02-19 PROCEDURE — 250N000013 HC RX MED GY IP 250 OP 250 PS 637: Performed by: INTERNAL MEDICINE

## 2022-02-19 PROCEDURE — 85027 COMPLETE CBC AUTOMATED: CPT | Performed by: INTERNAL MEDICINE

## 2022-02-19 PROCEDURE — 84484 ASSAY OF TROPONIN QUANT: CPT | Performed by: INTERNAL MEDICINE

## 2022-02-19 PROCEDURE — 85347 COAGULATION TIME ACTIVATED: CPT

## 2022-02-19 PROCEDURE — 85384 FIBRINOGEN ACTIVITY: CPT | Performed by: INTERNAL MEDICINE

## 2022-02-19 PROCEDURE — 85610 PROTHROMBIN TIME: CPT | Performed by: INTERNAL MEDICINE

## 2022-02-19 PROCEDURE — 999N000075 HC STATISTIC IABP MONITORING

## 2022-02-19 PROCEDURE — 82803 BLOOD GASES ANY COMBINATION: CPT | Performed by: INTERNAL MEDICINE

## 2022-02-19 PROCEDURE — P9016 RBC LEUKOCYTES REDUCED: HCPCS | Performed by: INTERNAL MEDICINE

## 2022-02-19 PROCEDURE — 95714 VEEG EA 12-26 HR UNMNTR: CPT

## 2022-02-19 PROCEDURE — 71045 X-RAY EXAM CHEST 1 VIEW: CPT | Mod: 26 | Performed by: STUDENT IN AN ORGANIZED HEALTH CARE EDUCATION/TRAINING PROGRAM

## 2022-02-19 PROCEDURE — 258N000003 HC RX IP 258 OP 636

## 2022-02-19 PROCEDURE — 86140 C-REACTIVE PROTEIN: CPT | Performed by: INTERNAL MEDICINE

## 2022-02-19 PROCEDURE — 82330 ASSAY OF CALCIUM: CPT | Performed by: INTERNAL MEDICINE

## 2022-02-19 PROCEDURE — 83735 ASSAY OF MAGNESIUM: CPT | Performed by: INTERNAL MEDICINE

## 2022-02-19 PROCEDURE — 999N000155 HC STATISTIC RAPCV CVP MONITORING

## 2022-02-19 PROCEDURE — 83605 ASSAY OF LACTIC ACID: CPT | Performed by: INTERNAL MEDICINE

## 2022-02-19 PROCEDURE — 36415 COLL VENOUS BLD VENIPUNCTURE: CPT

## 2022-02-19 PROCEDURE — 71045 X-RAY EXAM CHEST 1 VIEW: CPT

## 2022-02-19 PROCEDURE — 85520 HEPARIN ASSAY: CPT | Performed by: INTERNAL MEDICINE

## 2022-02-19 PROCEDURE — 85652 RBC SED RATE AUTOMATED: CPT | Performed by: INTERNAL MEDICINE

## 2022-02-19 PROCEDURE — 80053 COMPREHEN METABOLIC PANEL: CPT | Performed by: INTERNAL MEDICINE

## 2022-02-19 PROCEDURE — 250N000011 HC RX IP 250 OP 636

## 2022-02-19 PROCEDURE — 93010 ELECTROCARDIOGRAM REPORT: CPT | Mod: 59 | Performed by: INTERNAL MEDICINE

## 2022-02-19 PROCEDURE — 258N000003 HC RX IP 258 OP 636: Performed by: INTERNAL MEDICINE

## 2022-02-19 PROCEDURE — 85379 FIBRIN DEGRADATION QUANT: CPT | Performed by: INTERNAL MEDICINE

## 2022-02-19 PROCEDURE — 85730 THROMBOPLASTIN TIME PARTIAL: CPT | Performed by: INTERNAL MEDICINE

## 2022-02-19 PROCEDURE — 84155 ASSAY OF PROTEIN SERUM: CPT | Performed by: INTERNAL MEDICINE

## 2022-02-19 PROCEDURE — 33949 ECMO/ECLS DAILY MGMT ARTERY: CPT

## 2022-02-19 PROCEDURE — 250N000011 HC RX IP 250 OP 636: Performed by: INTERNAL MEDICINE

## 2022-02-19 PROCEDURE — 999N000015 HC STATISTIC ARTERIAL MONITORING DAILY

## 2022-02-19 PROCEDURE — 93005 ELECTROCARDIOGRAM TRACING: CPT

## 2022-02-19 PROCEDURE — 83615 LACTATE (LD) (LDH) ENZYME: CPT | Performed by: INTERNAL MEDICINE

## 2022-02-19 PROCEDURE — 85300 ANTITHROMBIN III ACTIVITY: CPT | Performed by: INTERNAL MEDICINE

## 2022-02-19 PROCEDURE — C9113 INJ PANTOPRAZOLE SODIUM, VIA: HCPCS | Performed by: INTERNAL MEDICINE

## 2022-02-19 PROCEDURE — 999N000157 HC STATISTIC RCP TIME EA 10 MIN

## 2022-02-19 PROCEDURE — 87040 BLOOD CULTURE FOR BACTERIA: CPT

## 2022-02-19 PROCEDURE — 80202 ASSAY OF VANCOMYCIN: CPT

## 2022-02-19 PROCEDURE — 200N000002 HC R&B ICU UMMC

## 2022-02-19 PROCEDURE — 87040 BLOOD CULTURE FOR BACTERIA: CPT | Performed by: INTERNAL MEDICINE

## 2022-02-19 PROCEDURE — 83051 HEMOGLOBIN PLASMA: CPT | Performed by: INTERNAL MEDICINE

## 2022-02-19 PROCEDURE — 95720 EEG PHY/QHP EA INCR W/VEEG: CPT | Performed by: PSYCHIATRY & NEUROLOGY

## 2022-02-19 RX ADMIN — PROPOFOL 20 MCG/KG/MIN: 10 INJECTION, EMULSION INTRAVENOUS at 10:38

## 2022-02-19 RX ADMIN — TICAGRELOR 90 MG: 90 TABLET ORAL at 07:58

## 2022-02-19 RX ADMIN — ASPIRIN 81 MG CHEWABLE TABLET 81 MG: 81 TABLET CHEWABLE at 07:58

## 2022-02-19 RX ADMIN — Medication: at 18:01

## 2022-02-19 RX ADMIN — FENTANYL CITRATE 200 MCG/HR: 50 INJECTION INTRAVENOUS at 10:53

## 2022-02-19 RX ADMIN — VANCOMYCIN HYDROCHLORIDE 1750 MG: 1 INJECTION, POWDER, LYOPHILIZED, FOR SOLUTION INTRAVENOUS at 21:26

## 2022-02-19 RX ADMIN — PIPERACILLIN AND TAZOBACTAM 3.38 G: 3; .375 INJECTION, POWDER, LYOPHILIZED, FOR SOLUTION INTRAVENOUS at 18:01

## 2022-02-19 RX ADMIN — Medication 0.04 MCG/KG/MIN: at 03:01

## 2022-02-19 RX ADMIN — HEPARIN SODIUM 1500 UNITS/HR: 1000 INJECTION, SOLUTION INTRAVENOUS; SUBCUTANEOUS at 14:12

## 2022-02-19 RX ADMIN — PIPERACILLIN AND TAZOBACTAM 3.38 G: 3; .375 INJECTION, POWDER, LYOPHILIZED, FOR SOLUTION INTRAVENOUS at 12:09

## 2022-02-19 RX ADMIN — PIPERACILLIN AND TAZOBACTAM 3.38 G: 3; .375 INJECTION, POWDER, LYOPHILIZED, FOR SOLUTION INTRAVENOUS at 06:15

## 2022-02-19 RX ADMIN — HYDROCORTISONE SODIUM SUCCINATE 50 MG: 100 INJECTION, POWDER, FOR SOLUTION INTRAMUSCULAR; INTRAVENOUS at 10:09

## 2022-02-19 RX ADMIN — MIDAZOLAM HYDROCHLORIDE 7 MG/HR: 1 INJECTION, SOLUTION INTRAVENOUS at 23:16

## 2022-02-19 RX ADMIN — PROPOFOL 20 MCG/KG/MIN: 10 INJECTION, EMULSION INTRAVENOUS at 03:56

## 2022-02-19 RX ADMIN — MIDAZOLAM HYDROCHLORIDE 8 MG/HR: 1 INJECTION, SOLUTION INTRAVENOUS at 11:27

## 2022-02-19 RX ADMIN — TICAGRELOR 90 MG: 90 TABLET ORAL at 19:43

## 2022-02-19 RX ADMIN — HYDROCORTISONE SODIUM SUCCINATE 50 MG: 100 INJECTION, POWDER, FOR SOLUTION INTRAMUSCULAR; INTRAVENOUS at 02:10

## 2022-02-19 RX ADMIN — Medication: at 02:13

## 2022-02-19 RX ADMIN — PIPERACILLIN AND TAZOBACTAM 3.38 G: 3; .375 INJECTION, POWDER, LYOPHILIZED, FOR SOLUTION INTRAVENOUS at 00:18

## 2022-02-19 RX ADMIN — VASOPRESSIN 1.5 UNITS/HR: 20 INJECTION PARENTERAL at 04:17

## 2022-02-19 RX ADMIN — PANTOPRAZOLE SODIUM 40 MG: 40 INJECTION, POWDER, FOR SOLUTION INTRAVENOUS at 07:58

## 2022-02-19 RX ADMIN — HYDROCORTISONE SODIUM SUCCINATE 50 MG: 100 INJECTION, POWDER, FOR SOLUTION INTRAMUSCULAR; INTRAVENOUS at 18:01

## 2022-02-19 RX ADMIN — Medication: at 10:09

## 2022-02-19 ASSESSMENT — ACTIVITIES OF DAILY LIVING (ADL)
ADLS_ACUITY_SCORE: 20
ADLS_ACUITY_SCORE: 22
ADLS_ACUITY_SCORE: 20
ADLS_ACUITY_SCORE: 22
ADLS_ACUITY_SCORE: 20

## 2022-02-19 NOTE — PLAN OF CARE
Major Shift Events: Stopped propofol per MD. Performed dressing changes on R femoral line and R radial line. Completed chlorhexidine bath and linen change. Vaso titrated off.     Labs/Protocols: No electrolyte or product replacement this shift. Creat trending up. Lactic trending down.  Neuro: Sedated, Afebrile (37 degrees), pupils unequal, -4 sedation. Intermittently followed commands.  Cardiac: IABP 1:1, 100%, R femoral artery; ECMO Sweep 1, 60%, Flow 4, Speed 3600; SR with occasional PVC and PAC, doppler pulses, cap refill WNL.   Respiratory: ETT at 27 with bite block. CMV FiO2 60%, , RR 12, PEEP 7.  GI/: No BM. OG to LIS. Approximately 30mL/hr of UOP.   Skin: Tongue injury PTA. Purple area on coccyx PTA.  LDAs: L femoral ECMO cannula, R femoral IABP and thermagaurd/CVC, R radial A line, 2 left PIV, 1 R PIV.   GTTs: Levo, Fentanyl, Versed, and Heparin.  Diet: NPO except meds.   Activity: Bedrest.   Family/Social: Wife (Meeta) at bedside.   Teaching: Wife was educated and updated on the plan of care.     Plan: Turn down tomorrow morning and potentially decann next week.   For vital signs and complete assessments, please see documentation flowsheets.

## 2022-02-19 NOTE — PROGRESS NOTES
ECMO Shift Summary: [id number]    ECMO Equipment:  Console serial number: [id number]  Circuit Lot number: [id number]  Oxygenator Lot number: [id number]    Patient remains on VA ECMO, all equipment is functioning and alarms are appropriately set. RPM's 3600 with flow range 3.8-4.0 L/min. Sweep gas is at 1.5 LPM and FiO2 60%. Circuit remains free of air and clot; only small amount of fibrin noted at connectors. Cannulas are secure with no bleeding from site. Extremities are slightly cool to touch with slow capillary refill. Has maintained appropriate NIRS levels and pulses can heard via doppler in all extremities. Suctioned ETT for scant secretions.    Significant Shift Events:   Patient re-warmed slowly overnight. With this, HR improved from 40s to approximately 80 bpm and patient gained pulsatility back. Hemoglobin dropped slightly, prompting transfusion of 1 unit RBCs.    Vent settings:  Vent Mode: CMV/AC  (Continuous Mandatory Ventilation/ Assist Control)  FiO2 (%): 60 %  Resp Rate (Set): 14 breaths/min  Tidal Volume (Set, mL): 470 mL  PEEP (cm H2O): 7 cmH2O  Resp: 14    Anticoagulation and Volume Status:  Heparin is running at 1700 unit(s)/hr, ACT range 173-186.    Urine output is low (~20-40 mL/hr), blood loss was very minimal. Product given included 1 unit RBCs.     Intake/Output Summary (Last 24 hours) at 2/19/2022 0615  Last data filed at 2/19/2022 0600  Gross per 24 hour   Intake 3759.05 ml   Output 1807 ml   Net 1952.05 ml       ECHO:  No results found for this or any previous visit.  No results found for this or any previous visit.      CXR:  Recent Results (from the past 24 hour(s))   XR Chest Port 1 View    Narrative    Chest one view portable    HISTORY: Evaluate position of lines drains    COMPARISON STUDY: 2/17/2022    findings: Endotracheal tube in the mid trachea. Enteric tube  collimated off film abdomen. Temperature probe in the esophagus.  Intervertebral pump marker no longer seen. IVC ECMO tip in  the RA  again noted. Cervical plate and screw fusion. Left pleural effusion.  Interstitial opacities bilaterally. Cardiac silhouette is nonenlarged.  Yahaira device.      Impression    IMPRESSION: Left pleural effusion basilar atelectasis.    ROB HURD MD         SYSTEM ID:  [id number]   US Carotid Bilateral    Narrative    Exam: Bilateral carotid duplex Doppler ultrasound dated 2/18/2022  10:19 AM    Clinical history: Cardiac Arrest on ECMO    Comparison Study: None available    Ordering provider: VITALY DURAN    Technique: Grayscale (B-mode) and duplex and spectral Doppler  ultrasound of the extracranial internal carotid, external carotid,  vertebral artery origins, right brachiocephalic/subclavian and left  subclavian arteries. Velocity measurements obtained with angle  correction at or less than 60 degrees.    Findings:    Right side:     Pulse Doppler/spectral waveforms altered to ECMO.    Plaque Morphology: Small amount of hyperechoic and shadowing plaque at  the bifurcation and proximal ICA.        Proximal CCA: 85/23 cm/sec     Mid CCA: 103/0 cm/sec     Distal CCA: 86/20 cm/sec          External CA: 58/9 cm/sec       Proximal ICA: 68/13 cm/sec     Mid ICA: 100/24 cm/sec     Distal ICA: 125/29 cm/sec       Vertebral artery: 70/22 cm/sec    ICA/CCA ratio: 1.46    Left side:     Plaque Morphology: Small amount of hyperechoic plaque along the  posterior wall at the carotid bulb/bifurcation without involvement of  the proximal ICA.       Proximal CCA: 92/0 cm/sec     Mid CCA: 85/8 cm/sec     Distal CCA: 91/15 cm/sec          External CA: 88/22 cm/sec       Proximal ICA: 67/12 cm/sec     Mid ICA: 94/21 cm/sec     Distal ICA: 94/22 cm/sec       Vertebral artery: 65/22 cm/sec        ICA/CCA ratio: 1.03      Impression    Impression:    1. Right side:        Degree of stenosis of the internal carotid artery: Less than 50 %.    2. Left side:         Degree of stenosis of the internal carotid artery:  Normal.    [facility] Accreditation Commission Updated Recommendations for  Carotid Stenosis Interpretation Criteria - October 2021.  [email or web address]://[email or web address]/wp-content/[email or web address]/[email or web address]-Vascular-Test  [email or web address]-Gdrchxwniytddlc-for-Carotid-Stenosis-Interpre  ation-Criteria.pdf         Normal            ICA PSV: < 180 cm/s            Plaque Estimate: None            ICA/CCA PSV Ratio: < 2.0            ICA EDV: < 40 cm/s         < 50%            ICA PSV: < 180 cm/s            Plaque Estimate: < 50%            ICA/CCA PSV Ratio: < 2.0            ICA EDV: < 40 cm/s         50 - 69%            ICA PSV: < 180 - 230 cm/s            Plaque Estimate: > 50%            ICA/CCA PSV Ratio: 2.0 - 4.0            ICA EDV: 40 - 100 cm/s         > 70% but less than near occlusion            ICA PSV: > 230 cm/s            Plaque Estimate: > 50%            ICA/CCA PSV Ratio: > 4.0            ICA EDV: > 100 cm/s         Near occlusion            ICA PSV: > 230 cm/s            Plaque Estimate: Visible            ICA/CCA PSV Ratio: Variable            ICA EDV: Variable         Total occlusion            ICA PSV: Undetectable            Plaque Estimate: Visible, no detectable lumen            ICA/CCA PSV Ratio: N/A            ICA EDV: N/A    CONNOR HAND         SYSTEM ID:  [id number]   US Lower Extremity Arterial Duplex Bilateral    Narrative    Exam: Duplex ultrasound of bilateral lower extremity arteries dated  2/18/2022 10:33 AM     Clinical information: Baseline to assess blood flow to Lower  Extremities (VA ECMO)     Comparison: None    Technique: Grayscale (B-mode), color Doppler, and duplex spectral  Doppler ultrasound of the lower extremity arteries. Velocity  measurements obtained with angle correction of 60 degrees or less.    Ordering provider: VITALY DURAN    Findings:     Right lower extremity:     The more central vessels are obscured by bandage.  Proximal SFA: Velocity: 84 cm/sec. Waveforms:  Triphasic. Delayed  upstroke.  Mid SFA:Velocity:  91 cm/sec. Waveforms: Triphasic. Delayed upstroke.  Distal SFA: Velocity: 52 cm/sec. Waveforms: Triphasic. Delayed  upstroke.    Popliteal artery, proximal: Velocity: 30 cm/sec. Waveforms: Triphasic.  Delayed upstroke.    PTA ankle: Velocity: 50 cm/sec. Waveforms: Triphasic. Delayed  upstroke.  GALO ankle: Velocity: 23 cm/sec. Waveforms: Triphasic. Delayed upstroke    Left lower extremity:    More central vessels are obscured.  Distal SFA: Velocity: 16 cm/sec. Waveforms: Monophasic    Popliteal artery, proximal: Velocity: 6 cm/sec. Waveforms: Monophasic    PTA ankle: Velocity: 8 cm/sec. Waveforms: Monophasic  GALO ankle: Velocity: 11 cm/sec. Waveforms: Monophasic      Impression    Impression:     1. Right leg: Central vessels are obscured. Visualized vessels are  patent. Nonspecific waveform pattern in the setting of ECMO..    2. Left leg: Central vessels are obscured. Visualized vessels are  patent. Severely monophasic waveforms throughout all vessels secondary  to ECMO.      Guidelines:    [facility] duplex criteria for lower limb arterial  occlusive disease    Percent stenosis:     Normal (1-19%): Peak systolic velocity (cm/s): <150, End-diastolic  velocity (cm/s): <40, Velocity ratio (Vr): <1.5, Distal arterial  waveform: Triphasic    20-49%: Peak systolic velocity (cm/s): 150-200, End-diastolic velocity  (cm/s): <40, Velocity ratio (Vr): 1.5-2.0, Distal arterial waveform:  Triphasic    50-75%: Peak systolic velocity (cm/s): 200-300, End-diastolic velocity  (cm/s): <90, Velocity ratio (Vr): 2.0-3.9, Distal arterial waveform:  Poststenotic turbulence distal to stenosis, monophasic distal waveform    >75%: Peak systolic velocity (cm/s): >300, End-diastolic velocity  (cm/s): <90, Velocity ratio (Vr): >4.0, Distal arterial waveform:  Dampened distal waveform and low PSV/EDV* in the stenosis    Occlusion: Absent flow by color Doppler/pulsed Doppler  spectral  analysis; length of occlusion estimated from distance between exit and  reentry collateral arteries    *PSV = peak systolic velocity, EDV = end-diastolic velocity  http://link.parr.com/chapter/10.1007/[email or web address]_23/fulltext  html    I have personally reviewed the examination and initial interpretation  and I agree with the findings.    CONNOR HAND         SYSTEM ID:  [id number]   Echo Limited   Result Value    LVEF  10-15%    Narrative    [id number]  [id number]  [id number]  [id number]^TAQUERIA^MOHSAN     [facility],[place]  Echocardiography Laboratory  [address] [place] [address]     Name: NIURKA BEAR  MRN: [id number]  : 1950  Study Date: 2022 10:29 AM  Age: 71 yrs  Gender: Male  Patient Location: [facility]  Reason For Study: Heart Failure, ECMO  Ordering Physician: CHAUDHRY, MOHSAN  Referring Physician: VITALY DURAN  Performed By: Mike Dominguez RDCS     BSA: 2.2 m2  Height: 72 in  Weight: 220 lb  HR: 42  BP: 112/55 mmHg  ______________________________________________________________________________  Procedure  Limited Portable Echo Adult. Technically difficult study.Extremely poor  acoustic windows.  ______________________________________________________________________________  Interpretation Summary  Technically difficult study.Extremely poor acoustic windows.  On VA ECMO at 4.1LPM.  Left ventricular size is normal.  The visual ejection fraction is 10-15%.  Global right ventricular function is moderately to severely reduced.  No pericardial effusion is present.  ______________________________________________________________________________  Left Ventricle  On VA ECMO at 4.1LPM. Left ventricular size is normal. The visual ejection  fraction is 10-15%.     Right Ventricle  Global right ventricular function is moderately to severely reduced.     Vessels  The inferior vena cava is normal.     Pericardium  No pericardial effusion is  present.     ______________________________________________________________________________  Report approved by: Josse Harris 02/18/2022 11:05 AM     ______________________________________________________________________________      XR Chest Port 1 View    Impression    RESIDENT PRELIMINARY INTERPRETATION  IMPRESSION:   1. Endotracheal tube tip projects 5.5 cm above the judy.  2. Intra-aortic balloon pump marker projects at the level of the  judy.  3. Inferior approach venous ECMO cannula tip projects over the high  right atrium.  4. Inferior approach central venous catheter tip projects over the  hepatic IVC.  5. Small left pleural effusion with adjacent atelectasis.       Labs:  Recent Labs   Lab 02/19/22  0552 02/19/22  0345 02/19/22  0343 02/19/22  0158 02/18/22  2354   PH 7.44 7.43  --  7.45 7.45   PCO2 33* 34*  --  33* 32*   PO2 107* 99  --  109* 139*   HCO3 23 23  --  23 22   O2PER 60 60  60 60  60 60 60       Lab Results   Component Value Date    HGB 7.0 (L) 02/19/2022    PHGB 40 (H) 02/19/2022    PLT 82 (L) 02/19/2022    FIBR 217 02/19/2022    INR 1.27 (H) 02/19/2022     (HH) 02/19/2022    DD 0.62 (H) 02/19/2022    ANTCH 58 (L) 02/18/2022       Plan:  Plan is to continue full ECMO support with anticipation of cardiac recovery.      Minda Juares, RT  ECMO Specialist  2/19/2022  6:15 AM

## 2022-02-19 NOTE — PROGRESS NOTES
Cardiac ICU Progress Note         Assessment and Plan:   71 year old male who was admitted on 2/17/2022 s/p VT/VF arrest x8 shocks who was found to have LAD occlusion requiring 2x stents. He presented to local ED with chest pain that started at 1300 (within 1 hr of onset), he had a VT VF arrest in the ED after he was found to have an anterior STEMI on ECG.  Chest compressions and ACLS was started immediately.  Patient underwent 8 shocks for VT VF with ROSC afterwards.  He received 4 doses of epinephrine, 450 mg of amiodarone.  He was transferred to the [facility] for percutaneous intervention for his STEMI. Unfortunately his transfer was delayed because he was a very low dose of Epi (0.01) and the local hospital only had a BLS ambulance available. He arrived at [facility] and was found to have occlusion of the LAD. During the angiogram he was found to have significant hypotension and ectopy, which prompted placement on VA ECMO. PCI with 2x LETY was done with ECMO support. TTM post arrest at 35C, rewarmed on 2/19/22.    Neurology: #possible neurological injury post cardiac arrest  Intubated, sedated, paralyzed. Cooled to 34 degrees.  --continue versed, fentanyl, propofol. Attempting to wean off propfol.   - RASS goal -4 to -5   - CTH w/o acute intracranial pathology.  - EEG w/o significant abnormalities noted  - Pt has nonpurposeful movement of all 4 extremities   Cardiovascular / Hemodynamics: Refractory VF arrest requiring 8 shocks.  x2 LETY stents to LAD.  Peripheral V-A ECMO inserted for refractory cardiogenic shock. LA   TTE: LVEF 10-15%, RV moderately to severely reduced function. No prior hx of heart failure. No significant valvular disease.  EKG: FLORY V2-V4 consistent with anterior STEMI  --wean pressors/inotropes as able  --ECMO turndown pending 2/20/2022, if does well could consider decann early next week  --continue ASA 81mg and ticagrelor 90mg BID  --ACT goal 180-200  --hold lipitor  for now given likely hepatic injury during arrest  --hold ACE/ARB for now given likely reduced renal fxn after arrest  --holding beta blocker given shock     - CT CAP: No acute abnormalities noted    2/19/22: Pulsatility significant improved after rewarming, today 30mmHg on IABP pause.     Pulmonary: #AHRF  ETT in appropriate position.  Now weaning vent requirements.  CXR: Lines in stable position.  --wean vent as able  --daily CXR  --Q2h ABGs for now  --consider scheduled duonebs if signs of lung dz, currently PRN     GI and Nutrition: No known medical hx.   --monitor BID LFTs  --NPO while cooled - nutrition consult pending feeding tube placement once he is warmed   --bowel regimen - on hold for now due to hypothermia  --GI Prophylaxis: PPI    Renal, Fluid and Electrolytes: #SAHIL, likely prerenal from hypoperfusion. Monitoring for CRRT needs.  Monitoring UOP, will start CRRT through circuit if needed.  --maintain K>4 and Mg>2    Infectious Disease: No signs of infection. Leukocytosis c/w arrest. Blood cultures collected.   --vancomycin/zosyn x5 days for ECMO  --daily blood cultures  --monitor for signs of infection given cooling, lines, and leukocytosis   Hematology and Oncology: #Acute blood loss anemia    Receiving heparin for ECMO and ASA/ticagrelor for LETY.   --cryo PRN fibrinogen < 200; FFP for INR >2  --Transfuse for Hgb<10  --heparin gtt for ECMO with ACT goal 180-200  --US LE w/ arterial duplex per ECMO protocol   --DVT PPX: Heparin as above   Endocrinology:   No known medical history. BG elevated.  --insulin gtt  --stress dose steroids due to persistent hypotension - hydrocort 50mg IV Q8h  --f/u HgbA1c    Lines: L femoral arterial and venous ECMO cannulae February 17, 2022  R radial arterial line February 17, 2022  ETT February 17, 2022  Tapia catheter February 17, 2022  OG tube February 17, 2022  Restraint: needed  Current lines are required for patient management       Family update by me today: Yes      Code Status: Full code at this time. Reviewed patients medical directives. He indicated he would like to be full code unless there was medical futility in additional aggressive interventions.    The Pt was discussed and evaluated with Dr. Henriquez, attending physician, who agrees with the assessment and plan above.    Mohsan Chaudhry MD  Cardiology Fellow         Subjective:   Care team notes last 24 hours reviewed. No acute events overnight. ROS UTO.         Objective:   Physical Exam   Temp: 98.6  F (37  C) Temp src: Rectal   Pulse: 89   Resp: 12 SpO2: 100 % O2 Device: Mechanical Ventilator    Vitals:    02/18/22 0400 02/19/22 0400 02/19/22 0800   Weight: 100 kg (220 lb 7.4 oz) 110.1 kg (242 lb 11.6 oz) 102.8 kg (226 lb 10.1 oz)     Vital Signs with Ranges  Temp:  [94.5  F (34.7  C)-98.6  F (37  C)] 98.6  F (37  C)  Pulse:  [45-91] 89  Resp:  [12-14] 12  MAP:  [58 mmHg-81 mmHg] 78 mmHg  Arterial Line BP: ()/(30-52) 115/52  FiO2 (%):  [60 %] 60 %  SpO2:  [97 %-100 %] 100 %  I/O last 3 completed shifts:  In: 3859.05 [I.V.:2519.05; NG/GT:40]  Out: 1807 [Urine:707; Emesis/NG output:500; Blood:600]     , Pulse 89, temperature 98.6  F (37  C), resp. rate 12, height 1.829 m (6'), weight 102.8 kg (226 lb 10.1 oz), SpO2 100 %.  226 lbs 10.13 oz  GENERAL: intubated and sedated  HEENT: ET tube in place  CV: S1/S2 heard without murmur   RESPIRATORY: coarse breath sounds, no wheezes or crackles noted  GI: Soft and non distended with normoactive bowel sounds present in all quadrants. No tenderness, rebound, guarding. No palpable organomegaly.   EXTREMITIES: No peripheral edema. pedal pulses intact with doppler.  NEUROLOGIC: not oriented to place or time, does not follow commands  MUSCULOSKELETAL: No joint swelling or tenderness.   SKIN: No jaundice. No acute rashes or lesions.     Lines:   Peripheral IV 02/17/22 Left;Right (Active)   Site Assessment WDL 02/19/22 1200   Line Status Saline locked 02/19/22 1200    Dressing Intervention New dressing  02/18/22 1200   Phlebitis Scale 0-->no symptoms 02/19/22 1200   Infiltration Scale 0 02/19/22 1200   Number of days: 2       Peripheral IV 02/17/22 Left Hand (Active)   Site Assessment WD 02/19/22 1200   Line Status Saline locked 02/19/22 1200   Dressing Intervention New dressing  02/18/22 1600   Phlebitis Scale 0-->no symptoms 02/19/22 1200   Infiltration Scale 0 02/19/22 1200   Infiltration Site Treatment Method  None 02/17/22 1442   Number of days: 2       Peripheral IV 02/17/22 Anterior;Right Hand (Active)   Site Assessment [facility] 02/19/22 1200   Line Status Saline locked 02/19/22 1200   Dressing Intervention New dressing  02/18/22 1200   Phlebitis Scale 0-->no symptoms 02/19/22 1200   Infiltration Scale 0 02/19/22 1200   Number of days: 2       Arterial Line 02/17/22 Radial (Active)   Site Assessment [facility] 02/19/22 1200   Line Status Pulsatile blood flow 02/19/22 1200   Arterine Line Cap Change Due 02/22/22 02/19/22 1200   Art Line Waveform Appropriate;Square wave test performed 02/19/22 1200   Art Line Interventions Leveled;Connections checked and tightened;Flushed per protocol 02/19/22 1200   Color/Movement/Sensation Capillary refill less than 3 sec 02/19/22 1200   Line Necessity Yes, meets criteria 02/19/22 1200   Dressing Type Transparent 02/19/22 1200   Dressing Status Clean, dry, intact 02/19/22 1200   Dressing Change Due 02/20/22 02/19/22 1200   Number of days: 2       Arterial Sheath  02/17/22 17 Fr Femoral (Active)   Specific Qualities Sutured 02/19/22 1200   Site Assessment [facility] 02/19/22 1200   Dressing Type Other (Comment);Transparent 02/19/22 1200   Dressing Intervention Dressing changed/new dressing 02/18/22 0930   Arterial Sheath Comment ECMO 02/19/22 1200   Number of days: 2       Arterial Sheath  02/17/22 6 Fr Femoral (Active)   Specific Qualities Sutured 02/19/22 1200   Site Assessment WD 02/19/22 1200   Dressing Type Other (Comment);Transparent 02/19/22 1200    Dressing Intervention Dressing changed/new dressing 02/18/22 0930   Arterial Sheath Comment Reperfusion 02/19/22 1200   Number of days: 2       Arterial Sheath  02/17/22 9 Fr Femoral (Active)   Specific Qualities Sutured 02/19/22 1200   Site Assessment WDL 02/19/22 1200   Dressing Type Sutured;Transparent 02/19/22 1200   Arterial Sheath Comment IABP 02/19/22 1200   Number of days: 2       Venous Sheath 02/17/22 Other (comment) Left (Active)   Specific Qualities Sutured 02/19/22 1200   Site Assessment Bleeding 02/19/22 1200   Dressing Type Other (Comment);Transparent 02/19/22 1200   Dressing Intervention Dressing changed/new dressing 02/18/22 0930   Venous Sheath Comment ECMO 02/19/22 1200   Number of days: 2       Intravascular Temperature Management Device - ICY Catheter Cath Lab Icy Right (Active)   Site Assessment WDL 02/19/22 1200   Dressing Type Tegaderm 02/19/22 1200   Hypothermia In/Out Lumens To Warming System 02/19/22 1200   Status:  Proximal Lumen Infusing 02/19/22 1200   Status:  Medial Lumen Infusing 02/19/22 1200   Status:  Distal Lumen Infusing 02/19/22 1200   Reason for Central Line Pressors 02/19/22 1200   Tubing Change No 02/19/22 1200   Cap Change No 02/19/22 1200   Number of days: 2        Medications     cisatracurium Stopped (02/17/22 1931)     EPINEPHrine Stopped (02/17/22 1929)     fentaNYL 200 mcg/hr (02/19/22 1300)     heparin (PRESSURE BAG) 2 unit/mL in 0.9% NaCl 6 Units/hr (02/19/22 1300)     HEParin 1,500 Units/hr (02/19/22 1300)     midazolam 8 mg/hr (02/19/22 1300)     nitroPRUsside Stopped (02/17/22 1840)     norepinephrine 0.03 mcg/kg/min (02/19/22 1300)     phenylephrine Stopped (02/17/22 1840)     propofol (DIPRIVAN) infusion Stopped (02/19/22 1206)     vasopressin 0.5 Units/hr (02/19/22 1300)       artificial tears   Both Eyes Q8H     aspirin  81 mg Per Feeding Tube Daily     hydrocortisone sodium succinate PF  50 mg Intravenous Q8H     pantoprazole (PROTONIX) IV  40 mg  Intravenous Daily     piperacillin-tazobactam  3.375 g Intravenous Q6H     ticagrelor  90 mg Oral BID     vancomycin  1,750 mg (central catheter) Intravenous Q24H       Data   Recent Labs   Lab 02/19/22  1204 02/19/22  1002 02/19/22  1001 02/19/22  0345 02/19/22  0342 02/18/22 2158 02/18/22 2154   WBC  --   --  10.0  --  10.0  --  9.5   HGB  --   --  7.4*  --  7.0*  --  7.1*   MCV  --   --  91  --  91  --  92   PLT  --   --  75*  --  82*  --  81*   INR  --   --  1.25*  --  1.27*  --  1.33*   NA  --   --  142  --  140  --  142   POTASSIUM  --   --  4.6  --  4.7  --  4.8   CHLORIDE  --   --  112*  --  111*  --  112*   CO2  --   --  23  --  22  --  22   BUN  --   --  37*  --  32*  --  31*   CR  --   --  2.56*  --  2.39*  --  2.34*   ANIONGAP  --   --  7  --  7  --  8   PEDRITO  --   --  7.9*  --  7.9*  --  8.0*   * 136* 144*  133*   < > 144*   < > 155*   ALBUMIN  --   --  2.8*  --  2.9*  --  2.9*   PROTTOTAL  --   --  4.6*  --  4.5*  --  4.5*   BILITOTAL  --   --  0.8  --  0.5  --  0.9   ALKPHOS  --   --  15*  --  15*  --  15*   ALT  --   --  111*  --  121*  --  128*   AST  --   --  173*  --  195*  --  224*    < > = values in this interval not displayed.       Recent Results (from the past 24 hour(s))   XR Chest Port 1 View    Narrative    XR CHEST PORT 1 VIEW on 2/19/2022 3:26 AM.    INDICATION: Check endotracheal tube placement and ECLS cannula  placement.     COMPARISON: Radiograph dated 2/18/2022    FINDINGS:   Portable AP supine radiograph of the chest. Endotracheal tube tip  projects 5.5 cm above the judy. Inferior approach ECMO cannula tip  projects over the high right atrium, unchanged. Temperature probe tip  projects over the lower thoracic esophagus. Partially interaortic  balloon pump marker projects at the level of the judy. Visualized  inferior approach central venous catheter tip projecting over the  hepatic IVC, unchanged. Partially visualized gastric tube coursing  into the left upper quadrant  and out of the field of view. Watchman  device. Cervical fusion hardware.    Cardiac silhouette is enlarged and stable. Pulmonary vasculature is  indistinct. No pneumothorax. Small left pleural effusion. Left basilar  atelectasis.      Impression    IMPRESSION:   1. Endotracheal tube tip projects 5.5 cm above the judy.  2. Intra-aortic balloon pump marker projects about 3.5 cm below the  superior margin of the descending thoracic aorta.  3. Inferior approach venous ECMO cannula tip projects over the high  right atrium.  4. Inferior approach central venous catheter tip projects over the  hepatic IVC.  5. Small left pleural effusion with adjacent atelectasis.    I have personally reviewed the examination and initial interpretation  and I agree with the findings.    OPHELIA COX MD         SYSTEM ID:  [id number]       Critical Care Services Progress Note:     Chano Johnson remains critically ill with acute coronary syndrome, severe anemia, cardiac arrhythmia and shock     I personally examined and evaluated the patient today.   The patient s prognosis today is grave  I have evaluated all laboratory values and imaging studies from the past 24 hours.  Key findings and decisions made today included   - wean pressors/inotropes as able  --ECMO turndown pending 2/20/2022  --continue ASA 81mg and ticagrelor 90mg BID  --ACT goal 180-200  - CT CAP: No acute abnormalities noted    I personally managed the ventilator, sedation, pain control and analgesia, metabolic abnormalities, antibiotic therapy, nutritional status and vasoactive medications.   Consults ongoing and ordered are none  Procedures that will happen today are: none  All treatments were placed at my direction.  I formulated today s plan with the house staff team or resident(s) and agree with the findings and plan in the associated note.       The above plans and care have been discussed with family and all questions and concerns were addressed.     I spent a  total of 45 minutes (excluding procedure time) personally providing and directing critical care services at the bedside and on the critical care unit for Chano Johnson.        Malik Henriquez MD          ECMO Attending Progress Note  02/19/2022    Chano Johnson is a 72 year old male who was cannulated for ECMO due to LAD STEMI and Cardiac Arrest    Cannulation Site:  17 Fr in the L femoral artery  25 Fr in the L femoral vein    VA ECMO:    RPM's 3600 with flow range 3.89-4.11 L/min. Sweep gas is at 1 LPM and FiO2 60%. Circuit remains free of air and clot.  Fibrin stable at post connectors. Cannulas are secure with minimal bleeding from venous cannula site.    Vent settings:  Vent Mode: CMV/AC  (Continuous Mandatory Ventilation/ Assist Control)  FiO2 (%): 60 %  Resp Rate (Set): 12 breaths/min  Tidal Volume (Set, mL): 400 mL  PEEP (cm H2O): 7 cmH2O  Resp: 12  .     Heparin is running at 1400 u/hr, ACT range 182-190.      Cannulae: Position is acceptable on exam and the available imaging.  Distal perfusion cannula is in place and patent.  Extremities are well-perfused.     I have personally reviewed the ECMO flows, oxygenation and CO2 clearance, anticoagulation, and cannula position.  I have also personally assessed the patient's systemic response with hemodynamics, oxygenation, ventilation, and bleeding.       The patient requires continued ECMO support and management in the ICU.       Malik eHnriquez MD

## 2022-02-19 NOTE — PROGRESS NOTES
ECMO Shift Summary: 0613-3904      ECMO Equipment:  Console serial number: [id number]  Circuit Lot number: [id number]  Oxygenator Lot number: [id number]      Patient remains on VA ECMO, all equipment is functioning and alarms are appropriately set. RPM's 3600 with flow range 3.78-4.18 L/min. Sweep gas is at 2 LPM and FiO2 70%. Circuit remains free of air and clot.  Fibrin on post arterial connector. Cannulas are secure with no bleeding from site. Extremities are cool to touch.     Significant Shift Events: Heparin was turned off to assess bleeding at cannula site at 0723.  Dressing was taken down and found bleeding source was from arterial cannula suture.  Cannula was cleaned and dressed with Quick Clot, cannula were re-padded with mepilex dressings. Dressing has remained dry t/o shift. Heparin was re-started and titrated slowly to assess for bleeding.    Vent settings:  Vent Mode: CMV/AC  (Continuous Mandatory Ventilation/ Assist Control)  FiO2 (%): 60 %  Resp Rate (Set): 14 breaths/min  Tidal Volume (Set, mL): 470 mL  PEEP (cm H2O): 7 cmH2O  Inspiratory Pressure (cm H2O) (Drager Jennifer): 25  Resp: 14  .    Heparin is running at 1500 u/hr, ACT range 135-165.    Urine output is 25 ml/hr, blood loss was estimated 600 ml from groin. Product given included 2 units of PRBCS and 1 liter of Albumin.       Intake/Output Summary (Last 24 hours) at 2/18/2022 1814  Last data filed at 2/18/2022 1800  Gross per 24 hour   Intake 4350.27 ml   Output 1617 ml   Net 2733.27 ml       ECHO:  No results found for this or any previous visit.  No results found for this or any previous visit.      CXR:  Recent Results (from the past 24 hour(s))   XR Chest Port 1 View    Narrative    EXAM: XR CHEST 1 VW 2/17/2022      HISTORY: Check endotracheal tube placement and ECLS cannula placement    COMPARISON: CT from previous day.     TECHNIQUE: Frontal view of the chest.    FINDINGS: The endotracheal tube tip is roughly 3.3 cm above the  judy.  Intra-aortic balloon pump superior marker is roughly 1.7 cm  inferior to the judy at the level of T5. Inferior-approach ECMO  cannula tip is at the level of T7-8. Partially visualized feeding  tube. Pacer pads over the chest. Epicardial pacing wires. Hazy  opacification over the judy. Cardiomediastinal silhouette is  similar. This mild interstitial opacities throughout the lungs. The  pulmonary vasculature is mildly indistinct. No large pleural effusion  or definite pneumothorax. No acute abnormality of bones.      Impression    IMPRESSION:   1. Endotracheal tube is roughly 3.3 cm above the judy.  2. Intra-aortic balloon pump superior marker projects 1.7 cm below the  judy.  3. Inferior approach cannula tip is at the level of T7-8.  4. Probable pulmonary interstitial edema.    I have personally reviewed the examination and initial interpretation  and I agree with the findings.    SALOMON PEARCE MD         SYSTEM ID:  [id number]   XR Abdomen Port 1 View    Narrative    EXAMINATION:  XR ABDOMEN PORT 1 VIEWS 2/17/2022 10:21 PM     COMPARISON: CT earlier same day.    HISTORY: OG tube placement.    FINDINGS: Frontal view of the abdomen. Enteric tube tip and sidehole  project over the stomach. Partially visualized left femoral approach,  cannula coursing beyond the field-of-view. No abnormally dilated loops  of bowel.      Impression    IMPRESSION: Enteric tube tip and sidehole project over the stomach. No  obstructive bowel gas pattern.    I have personally reviewed the examination and initial interpretation  and I agree with the findings.    OPHELIA COX MD         SYSTEM ID:  [id number]   XR Chest Port 1 View    Narrative    Chest one view portable    HISTORY: Evaluate position of lines drains    COMPARISON STUDY: 2/17/2022    findings: Endotracheal tube in the mid trachea. Enteric tube  collimated off film abdomen. Temperature probe in the esophagus.  Intervertebral pump marker no longer seen. IVC ECMO  tip in the RA  again noted. Cervical plate and screw fusion. Left pleural effusion.  Interstitial opacities bilaterally. Cardiac silhouette is nonenlarged.  Yahaira device.      Impression    IMPRESSION: Left pleural effusion basilar atelectasis.    ROB HURD MD         SYSTEM ID:  [id number]   US Carotid Bilateral    Narrative    Exam: Bilateral carotid duplex Doppler ultrasound dated 2/18/2022  10:19 AM    Clinical history: Cardiac Arrest on ECMO    Comparison Study: None available    Ordering provider: VITALY DURAN    Technique: Grayscale (B-mode) and duplex and spectral Doppler  ultrasound of the extracranial internal carotid, external carotid,  vertebral artery origins, right brachiocephalic/subclavian and left  subclavian arteries. Velocity measurements obtained with angle  correction at or less than 60 degrees.    Findings:    Right side:     Pulse Doppler/spectral waveforms altered to ECMO.    Plaque Morphology: Small amount of hyperechoic and shadowing plaque at  the bifurcation and proximal ICA.        Proximal CCA: 85/23 cm/sec     Mid CCA: 103/0 cm/sec     Distal CCA: 86/20 cm/sec          External CA: 58/9 cm/sec       Proximal ICA: 68/13 cm/sec     Mid ICA: 100/24 cm/sec     Distal ICA: 125/29 cm/sec       Vertebral artery: 70/22 cm/sec    ICA/CCA ratio: 1.46    Left side:     Plaque Morphology: Small amount of hyperechoic plaque along the  posterior wall at the carotid bulb/bifurcation without involvement of  the proximal ICA.       Proximal CCA: 92/0 cm/sec     Mid CCA: 85/8 cm/sec     Distal CCA: 91/15 cm/sec          External CA: 88/22 cm/sec       Proximal ICA: 67/12 cm/sec     Mid ICA: 94/21 cm/sec     Distal ICA: 94/22 cm/sec       Vertebral artery: 65/22 cm/sec        ICA/CCA ratio: 1.03      Impression    Impression:    1. Right side:        Degree of stenosis of the internal carotid artery: Less than 50 %.    2. Left side:         Degree of stenosis of the internal carotid artery:  Normal.    [facility] Accreditation Commission Updated Recommendations for  Carotid Stenosis Interpretation Criteria - October 2021.  https://[email or web address].org/wp-content/uploads/[email or web address]/10/IAC-Vascular-Test  xb-Yjorfvwxamwsd_Vsuuoas-Slyeishqnvtumbb-for-Carotid-Stenosis-Interpre  ation-Criteria.pdf         Normal            ICA PSV: < 180 cm/s            Plaque Estimate: None            ICA/CCA PSV Ratio: < 2.0            ICA EDV: < 40 cm/s         < 50%            ICA PSV: < 180 cm/s            Plaque Estimate: < 50%            ICA/CCA PSV Ratio: < 2.0            ICA EDV: < 40 cm/s         50 - 69%            ICA PSV: < 180 - 230 cm/s            Plaque Estimate: > 50%            ICA/CCA PSV Ratio: 2.0 - 4.0            ICA EDV: 40 - 100 cm/s         > 70% but less than near occlusion            ICA PSV: > 230 cm/s            Plaque Estimate: > 50%            ICA/CCA PSV Ratio: > 4.0            ICA EDV: > 100 cm/s         Near occlusion            ICA PSV: > 230 cm/s            Plaque Estimate: Visible            ICA/CCA PSV Ratio: Variable            ICA EDV: Variable         Total occlusion            ICA PSV: Undetectable            Plaque Estimate: Visible, no detectable lumen            ICA/CCA PSV Ratio: N/A            ICA EDV: N/A    CONNOR HAND         SYSTEM ID:  [id number]   US Lower Extremity Arterial Duplex Bilateral    Narrative    Exam: Duplex ultrasound of bilateral lower extremity arteries dated  2/18/2022 10:33 AM     Clinical information: Baseline to assess blood flow to Lower  Extremities (VA ECMO)     Comparison: None    Technique: Grayscale (B-mode), color Doppler, and duplex spectral  Doppler ultrasound of the lower extremity arteries. Velocity  measurements obtained with angle correction of 60 degrees or less.    Ordering provider: VITALY DURAN    Findings:     Right lower extremity:     The more central vessels are obscured by bandage.  Proximal SFA: Velocity: 84 cm/sec. Waveforms:  Triphasic. Delayed  upstroke.  Mid SFA:Velocity:  91 cm/sec. Waveforms: Triphasic. Delayed upstroke.  Distal SFA: Velocity: 52 cm/sec. Waveforms: Triphasic. Delayed  upstroke.    Popliteal artery, proximal: Velocity: 30 cm/sec. Waveforms: Triphasic.  Delayed upstroke.    PTA ankle: Velocity: 50 cm/sec. Waveforms: Triphasic. Delayed  upstroke.  GALO ankle: Velocity: 23 cm/sec. Waveforms: Triphasic. Delayed upstroke    Left lower extremity:    More central vessels are obscured.  Distal SFA: Velocity: 16 cm/sec. Waveforms: Monophasic    Popliteal artery, proximal: Velocity: 6 cm/sec. Waveforms: Monophasic    PTA ankle: Velocity: 8 cm/sec. Waveforms: Monophasic  GALO ankle: Velocity: 11 cm/sec. Waveforms: Monophasic      Impression    Impression:     1. Right leg: Central vessels are obscured. Visualized vessels are  patent. Nonspecific waveform pattern in the setting of ECMO..    2. Left leg: Central vessels are obscured. Visualized vessels are  patent. Severely monophasic waveforms throughout all vessels secondary  to ECMO.      Guidelines:    [facility] duplex criteria for lower limb arterial  occlusive disease    Percent stenosis:     Normal (1-19%): Peak systolic velocity (cm/s): <150, End-diastolic  velocity (cm/s): <40, Velocity ratio (Vr): <1.5, Distal arterial  waveform: Triphasic    20-49%: Peak systolic velocity (cm/s): 150-200, End-diastolic velocity  (cm/s): <40, Velocity ratio (Vr): 1.5-2.0, Distal arterial waveform:  Triphasic    50-75%: Peak systolic velocity (cm/s): 200-300, End-diastolic velocity  (cm/s): <90, Velocity ratio (Vr): 2.0-3.9, Distal arterial waveform:  Poststenotic turbulence distal to stenosis, monophasic distal waveform    >75%: Peak systolic velocity (cm/s): >300, End-diastolic velocity  (cm/s): <90, Velocity ratio (Vr): >4.0, Distal arterial waveform:  Dampened distal waveform and low PSV/EDV* in the stenosis    Occlusion: Absent flow by color Doppler/pulsed Doppler  spectral  analysis; length of occlusion estimated from distance between exit and  reentry collateral arteries    *PSV = peak systolic velocity, EDV = end-diastolic velocity  http://link.parr.com/chapter/10.1007/[email or web address]_23/fulltext  html    I have personally reviewed the examination and initial interpretation  and I agree with the findings.    CONNOR HAND         SYSTEM ID:  [id number]   Echo Limited   Result Value    LVEF  10-15%    Narrative    [id number]  [id number]  [id number]  [id number]^TAQUERIA^MOHSAN     [facility],[place]  Echocardiography Laboratory  [address] [place] [address]     Name: NIURKA BEAR  MRN: [id number]  : 1950  Study Date: 2022 10:29 AM  Age: 71 yrs  Gender: Male  Patient [facility]: [facility]  Reason For Study: Heart Failure, ECMO  Ordering Physician: CHAUDHRY, MOHSAN  Referring Physician: VITALY DURAN  Performed By: Mike Dominguez RDCS     BSA: 2.2 m2  Height: 72 in  Weight: 220 lb  HR: 42  BP: 112/55 mmHg  ______________________________________________________________________________  Procedure  Limited Portable Echo Adult. Technically difficult study.Extremely poor  acoustic windows.  ______________________________________________________________________________  Interpretation Summary  Technically difficult study.Extremely poor acoustic windows.  On VA ECMO at 4.1LPM.  Left ventricular size is normal.  The visual ejection fraction is 10-15%.  Global right ventricular function is moderately to severely reduced.  No pericardial effusion is present.  ______________________________________________________________________________  Left Ventricle  On VA ECMO at 4.1LPM. Left ventricular size is normal. The visual ejection  fraction is 10-15%.     Right Ventricle  Global right ventricular function is moderately to severely reduced.     Vessels  The inferior vena cava is normal.     Pericardium  No pericardial effusion is  present.     ______________________________________________________________________________  Report approved by: Josse Harris 02/18/2022 11:05 AM     ______________________________________________________________________________          Labs:  Recent Labs   Lab 02/18/22  1545 02/18/22  1403 02/18/22  1201 02/18/22  1021   PH 7.48* 7.39 7.39 7.37   PCO2 29* 39 39 41   PO2 166* 103 131* 190*   HCO3 22 23 23 24   O2PER 70  60  70 60 60 60       Lab Results   Component Value Date    HGB 7.4 (L) 02/18/2022    PHGB 90 (H) 02/18/2022    PLT 74 (L) 02/18/2022    FIBR 169 (L) 02/18/2022    INR 1.42 (H) 02/18/2022    PTT 98 (H) 02/18/2022    DD 1.40 (H) 02/18/2022    ANTCH 58 (L) 02/18/2022         Plan is to start re-warming patient beginning at 2300, maintain on full VA ECMO flow.      Jaylin Salcedo, RRT  ECMO Specialist  2/18/2022 6:14 PM

## 2022-02-19 NOTE — PLAN OF CARE
Major Shift Events:  Rewarmed to 37 degrees overnight. 1u PRBCs given for low hgb.     Neuro: sedated. Grimaces to pain, increased fentanyl overnight. No change in pupils.  Cardiac: IABP 1:1, ECMO 60%, Sweet 1.5, flow 3.7-4. Rewarmed this morning to 37, HR increased to 80s. ST elevation and peaked t waves remain. I U PRBCs given for HGB less than 7.  Resp: Diminished lung sounds. No vent changes.  GI/: OGT to LIS. UOP 15-45 mL/hr overnight. Potassium stable at 4.7 overnight.   Skin: no changes. Tongue continues to look swollen and bruised.  Gtts: Fentanyl, propofol, versed, levophed, vaso, heparin.   Plan: potential turndown today.  For vital signs and complete assessments, please see documentation flowsheets.

## 2022-02-19 NOTE — PROGRESS NOTES
ECMO Shift Summary: 4820-0451      ECMO Equipment:  Console serial number: [id number]  Circuit Lot number: [id number]  Oxygenator Lot number: [id number]  Patient remains on VA ECMO, all equipment is functioning and alarms are appropriately set. RPM's 3600 with flow range 3.89-4.11 L/min. Sweep gas is at 1 LPM and FiO2 60%. Circuit remains free of air and clot.  Fibrin stable at post connectors. Cannulas are secure with minimal bleeding from venous cannula site however lessoned after noon. Extremities are cool to touch, well perfused. Suctioned ETT for tan secretions.    Significant Shift Events: None    Vent settings:  Vent Mode: CMV/AC  (Continuous Mandatory Ventilation/ Assist Control)  FiO2 (%): 60 %  Resp Rate (Set): 12 breaths/min  Tidal Volume (Set, mL): 400 mL  PEEP (cm H2O): 7 cmH2O  Resp: 12  .    Heparin is running at 1400 u/hr, ACT range 182-190.    Urine output is approximately 35 ml/hr, blood loss was minimal. 1 unit of PRBCs has been ordered to be given for low Hgb on circuit.     Intake/Output Summary (Last 24 hours) at 2/19/2022 1759  Last data filed at 2/19/2022 1700  Gross per 24 hour   Intake 2578.61 ml   Output 1322 ml   Net 1256.61 ml       ECHO:  No results found for this or any previous visit.  No results found for this or any previous visit.      CXR:  Recent Results (from the past 24 hour(s))   XR Chest Port 1 View    Narrative    XR CHEST PORT 1 VIEW on 2/19/2022 3:26 AM.    INDICATION: Check endotracheal tube placement and ECLS cannula  placement.     COMPARISON: Radiograph dated 2/18/2022    FINDINGS:   Portable AP supine radiograph of the chest. Endotracheal tube tip  projects 5.5 cm above the judy. Inferior approach ECMO cannula tip  projects over the high right atrium, unchanged. Temperature probe tip  projects over the lower thoracic esophagus. Partially interaortic  balloon pump marker projects at the level of the judy. Visualized  inferior approach central venous catheter tip  projecting over the  hepatic IVC, unchanged. Partially visualized gastric tube coursing  into the left upper quadrant and out of the field of view. Watchman  device. Cervical fusion hardware.    Cardiac silhouette is enlarged and stable. Pulmonary vasculature is  indistinct. No pneumothorax. Small left pleural effusion. Left basilar  atelectasis.      Impression    IMPRESSION:   1. Endotracheal tube tip projects 5.5 cm above the judy.  2. Intra-aortic balloon pump marker projects about 3.5 cm below the  superior margin of the descending thoracic aorta.  3. Inferior approach venous ECMO cannula tip projects over the high  right atrium.  4. Inferior approach central venous catheter tip projects over the  hepatic IVC.  5. Small left pleural effusion with adjacent atelectasis.    I have personally reviewed the examination and initial interpretation  and I agree with the findings.    OPHELIA COX MD         SYSTEM ID:  [id number]       Labs:  Recent Labs   Lab 02/19/22  1549 02/19/22  1548 02/19/22  1422 02/19/22  1158 02/19/22  1001   PH  --  7.40 7.40 7.41 7.45   PCO2  --  39 39 37 34*   PO2  --  94 114* 102 111*   HCO3  --  24 24 23 24   O2PER 60 60  60 60 60 60       Lab Results   Component Value Date    HGB 7.2 (L) 02/19/2022    PHGB 40 (H) 02/19/2022    PLT 68 (L) 02/19/2022    FIBR 283 02/19/2022    INR 1.24 (H) 02/19/2022     (HH) 02/19/2022    DD 0.52 (H) 02/19/2022    ANTCH 53 (L) 02/19/2022         Plan is for ECMO turndown tomorrow morning to assess cardiac function.      Jaylin Salcedo, RRT  ECMO Specialist  2/19/2022 5:59 PM

## 2022-02-20 ENCOUNTER — APPOINTMENT (OUTPATIENT)
Dept: INTERVENTIONAL RADIOLOGY/VASCULAR | Facility: CLINIC | Age: 72
DRG: 003 | End: 2022-02-20
Payer: MEDICARE

## 2022-02-20 ENCOUNTER — APPOINTMENT (OUTPATIENT)
Dept: CT IMAGING | Facility: CLINIC | Age: 72
DRG: 003 | End: 2022-02-20
Payer: MEDICARE

## 2022-02-20 ENCOUNTER — APPOINTMENT (OUTPATIENT)
Dept: GENERAL RADIOLOGY | Facility: CLINIC | Age: 72
DRG: 003 | End: 2022-02-20
Payer: MEDICARE

## 2022-02-20 ENCOUNTER — APPOINTMENT (OUTPATIENT)
Dept: CARDIOLOGY | Facility: CLINIC | Age: 72
DRG: 003 | End: 2022-02-20
Payer: MEDICARE

## 2022-02-20 ENCOUNTER — APPOINTMENT (OUTPATIENT)
Dept: NEUROLOGY | Facility: CLINIC | Age: 72
DRG: 003 | End: 2022-02-20
Payer: MEDICARE

## 2022-02-20 LAB
ABO/RH(D): NORMAL
ABO/RH(D): NORMAL
ACT BLD: 135 SECONDS (ref 74–150)
ACT BLD: 165 SECONDS (ref 74–150)
ACT BLD: 169 SECONDS (ref 74–150)
ACT BLD: 169 SECONDS (ref 74–150)
ACT BLD: 173 SECONDS (ref 74–150)
ACT BLD: 177 SECONDS (ref 74–150)
ACT BLD: 178 SECONDS (ref 74–150)
ACT BLD: 190 SECONDS (ref 74–150)
ACT BLD: 190 SECONDS (ref 74–150)
ACT BLD: 194 SECONDS (ref 74–150)
ALBUMIN SERPL-MCNC: 2.4 G/DL (ref 3.4–5)
ALBUMIN SERPL-MCNC: 2.4 G/DL (ref 3.4–5)
ALBUMIN SERPL-MCNC: 2.6 G/DL (ref 3.4–5)
ALBUMIN SERPL-MCNC: 2.7 G/DL (ref 3.4–5)
ALP SERPL-CCNC: 16 U/L (ref 40–150)
ALP SERPL-CCNC: 16 U/L (ref 40–150)
ALP SERPL-CCNC: 17 U/L (ref 40–150)
ALP SERPL-CCNC: 20 U/L (ref 40–150)
ALT SERPL W P-5'-P-CCNC: 59 U/L (ref 0–70)
ALT SERPL W P-5'-P-CCNC: 70 U/L (ref 0–70)
ALT SERPL W P-5'-P-CCNC: 81 U/L (ref 0–70)
ALT SERPL W P-5'-P-CCNC: 92 U/L (ref 0–70)
ANION GAP SERPL CALCULATED.3IONS-SCNC: 4 MMOL/L (ref 3–14)
ANION GAP SERPL CALCULATED.3IONS-SCNC: 5 MMOL/L (ref 3–14)
ANION GAP SERPL CALCULATED.3IONS-SCNC: 6 MMOL/L (ref 3–14)
ANION GAP SERPL CALCULATED.3IONS-SCNC: 9 MMOL/L (ref 3–14)
ANTIBODY SCREEN: NEGATIVE
APTT PPP: 123 SECONDS (ref 22–38)
APTT PPP: 30 SECONDS (ref 22–38)
APTT PPP: 63 SECONDS (ref 22–38)
APTT PPP: 93 SECONDS (ref 22–38)
AST SERPL W P-5'-P-CCNC: 107 U/L (ref 0–45)
AST SERPL W P-5'-P-CCNC: 127 U/L (ref 0–45)
AST SERPL W P-5'-P-CCNC: 80 U/L (ref 0–45)
AST SERPL W P-5'-P-CCNC: 92 U/L (ref 0–45)
AT III ACT/NOR PPP CHRO: 65 % (ref 85–135)
BASE EXCESS BLDA CALC-SCNC: -0.4 MMOL/L (ref -9–1.8)
BASE EXCESS BLDA CALC-SCNC: -0.5 MMOL/L (ref -9–1.8)
BASE EXCESS BLDA CALC-SCNC: -0.7 MMOL/L (ref -9–1.8)
BASE EXCESS BLDA CALC-SCNC: -0.8 MMOL/L (ref -9–1.8)
BASE EXCESS BLDA CALC-SCNC: -0.9 MMOL/L (ref -9–1.8)
BASE EXCESS BLDA CALC-SCNC: -1 MMOL/L (ref -9–1.8)
BASE EXCESS BLDA CALC-SCNC: -1.1 MMOL/L (ref -9–1.8)
BASE EXCESS BLDA CALC-SCNC: -1.1 MMOL/L (ref -9–1.8)
BASE EXCESS BLDA CALC-SCNC: -1.2 MMOL/L (ref -9–1.8)
BASE EXCESS BLDA CALC-SCNC: -1.5 MMOL/L (ref -9–1.8)
BASE EXCESS BLDA CALC-SCNC: -1.8 MMOL/L (ref -9–1.8)
BASE EXCESS BLDV CALC-SCNC: -0.6 MMOL/L (ref -7.7–1.9)
BASE EXCESS BLDV CALC-SCNC: -1 MMOL/L (ref -7.7–1.9)
BILIRUB SERPL-MCNC: 0.4 MG/DL (ref 0.2–1.3)
BILIRUB SERPL-MCNC: 0.5 MG/DL (ref 0.2–1.3)
BLD PROD TYP BPU: NORMAL
BLOOD COMPONENT TYPE: NORMAL
BUN SERPL-MCNC: 45 MG/DL (ref 7–30)
BUN SERPL-MCNC: 51 MG/DL (ref 7–30)
BUN SERPL-MCNC: 53 MG/DL (ref 7–30)
BUN SERPL-MCNC: 63 MG/DL (ref 7–30)
CA-I BLD-MCNC: 4.3 MG/DL (ref 4.4–5.2)
CA-I BLD-MCNC: 4.4 MG/DL (ref 4.4–5.2)
CA-I BLD-MCNC: 4.5 MG/DL (ref 4.4–5.2)
CA-I BLD-MCNC: 4.6 MG/DL (ref 4.4–5.2)
CALCIUM SERPL-MCNC: 7.6 MG/DL (ref 8.5–10.1)
CALCIUM SERPL-MCNC: 7.8 MG/DL (ref 8.5–10.1)
CHLORIDE BLD-SCNC: 112 MMOL/L (ref 94–109)
CO2 SERPL-SCNC: 23 MMOL/L (ref 20–32)
CO2 SERPL-SCNC: 24 MMOL/L (ref 20–32)
CO2 SERPL-SCNC: 24 MMOL/L (ref 20–32)
CO2 SERPL-SCNC: 25 MMOL/L (ref 20–32)
CODING SYSTEM: NORMAL
CREAT SERPL-MCNC: 2.99 MG/DL (ref 0.66–1.25)
CREAT SERPL-MCNC: 3.18 MG/DL (ref 0.66–1.25)
CREAT SERPL-MCNC: 3.34 MG/DL (ref 0.66–1.25)
CREAT SERPL-MCNC: 3.54 MG/DL (ref 0.66–1.25)
CROSSMATCH: NORMAL
CRP SERPL-MCNC: 120 MG/L (ref 0–8)
D DIMER PPP FEU-MCNC: 0.48 UG/ML FEU (ref 0–0.5)
D DIMER PPP FEU-MCNC: 0.53 UG/ML FEU (ref 0–0.5)
ERYTHROCYTE [DISTWIDTH] IN BLOOD BY AUTOMATED COUNT: 15.6 % (ref 10–15)
ERYTHROCYTE [DISTWIDTH] IN BLOOD BY AUTOMATED COUNT: 16.6 % (ref 10–15)
ERYTHROCYTE [DISTWIDTH] IN BLOOD BY AUTOMATED COUNT: 16.6 % (ref 10–15)
ERYTHROCYTE [DISTWIDTH] IN BLOOD BY AUTOMATED COUNT: 16.9 % (ref 10–15)
ERYTHROCYTE [SEDIMENTATION RATE] IN BLOOD BY WESTERGREN METHOD: 36 MM/HR (ref 0–20)
FIBRINOGEN PPP-MCNC: 351 MG/DL (ref 170–490)
FIBRINOGEN PPP-MCNC: 356 MG/DL (ref 170–490)
GFR SERPL CREATININE-BSD FRML MDRD: 17 ML/MIN/1.73M2
GFR SERPL CREATININE-BSD FRML MDRD: 19 ML/MIN/1.73M2
GFR SERPL CREATININE-BSD FRML MDRD: 20 ML/MIN/1.73M2
GFR SERPL CREATININE-BSD FRML MDRD: 21 ML/MIN/1.73M2
GLUCOSE BLD-MCNC: 139 MG/DL (ref 70–99)
GLUCOSE BLD-MCNC: 142 MG/DL (ref 70–99)
GLUCOSE BLD-MCNC: 143 MG/DL (ref 70–99)
GLUCOSE BLD-MCNC: 144 MG/DL (ref 70–99)
GLUCOSE BLD-MCNC: 147 MG/DL (ref 70–99)
GLUCOSE BLD-MCNC: 147 MG/DL (ref 70–99)
GLUCOSE BLD-MCNC: 148 MG/DL (ref 70–99)
GLUCOSE BLD-MCNC: 148 MG/DL (ref 70–99)
GLUCOSE BLDC GLUCOMTR-MCNC: 128 MG/DL (ref 70–99)
GLUCOSE BLDC GLUCOMTR-MCNC: 129 MG/DL (ref 70–99)
GLUCOSE BLDC GLUCOMTR-MCNC: 137 MG/DL (ref 70–99)
GLUCOSE BLDC GLUCOMTR-MCNC: 137 MG/DL (ref 70–99)
GLUCOSE BLDC GLUCOMTR-MCNC: 139 MG/DL (ref 70–99)
GLUCOSE BLDC GLUCOMTR-MCNC: 141 MG/DL (ref 70–99)
GLUCOSE BLDC GLUCOMTR-MCNC: 142 MG/DL (ref 70–99)
GLUCOSE BLDC GLUCOMTR-MCNC: 144 MG/DL (ref 70–99)
GLUCOSE BLDC GLUCOMTR-MCNC: 151 MG/DL (ref 70–99)
HCO3 BLD-SCNC: 23 MMOL/L (ref 21–28)
HCO3 BLD-SCNC: 24 MMOL/L (ref 21–28)
HCO3 BLDA-SCNC: 25 MMOL/L (ref 21–28)
HCO3 BLDA-SCNC: 25 MMOL/L (ref 21–28)
HCO3 BLDV-SCNC: 26 MMOL/L (ref 21–28)
HCO3 BLDV-SCNC: 26 MMOL/L (ref 21–28)
HCT VFR BLD AUTO: 21.7 % (ref 40–53)
HCT VFR BLD AUTO: 21.8 % (ref 40–53)
HCT VFR BLD AUTO: 22.1 % (ref 40–53)
HCT VFR BLD AUTO: 24.1 % (ref 40–53)
HGB BLD-MCNC: 7.1 G/DL (ref 13.3–17.7)
HGB BLD-MCNC: 7.3 G/DL (ref 13.3–17.7)
HGB BLD-MCNC: 7.3 G/DL (ref 13.3–17.7)
HGB BLD-MCNC: 8 G/DL (ref 13.3–17.7)
HGB FREE PLAS-MCNC: <30 MG/DL
INR PPP: 1.11 (ref 0.85–1.15)
INR PPP: 1.12 (ref 0.85–1.15)
INR PPP: 1.14 (ref 0.85–1.15)
INR PPP: 1.16 (ref 0.85–1.15)
ISSUE DATE AND TIME: NORMAL
LACTATE SERPL-SCNC: 1.3 MMOL/L (ref 0.7–2)
LACTATE SERPL-SCNC: 1.5 MMOL/L (ref 0.7–2)
LACTATE SERPL-SCNC: 1.6 MMOL/L (ref 0.7–2)
LACTATE SERPL-SCNC: 2 MMOL/L (ref 0.7–2)
LDH SERPL L TO P-CCNC: 456 U/L (ref 85–227)
MAGNESIUM SERPL-MCNC: 2.4 MG/DL (ref 1.8–2.6)
MAGNESIUM SERPL-MCNC: 2.5 MG/DL (ref 1.8–2.6)
MAGNESIUM SERPL-MCNC: 2.5 MG/DL (ref 1.8–2.6)
MCH RBC QN AUTO: 29.9 PG (ref 26.5–33)
MCH RBC QN AUTO: 30.3 PG (ref 26.5–33)
MCH RBC QN AUTO: 30.3 PG (ref 26.5–33)
MCH RBC QN AUTO: 31.5 PG (ref 26.5–33)
MCHC RBC AUTO-ENTMCNC: 32.7 G/DL (ref 31.5–36.5)
MCHC RBC AUTO-ENTMCNC: 33 G/DL (ref 31.5–36.5)
MCHC RBC AUTO-ENTMCNC: 33.2 G/DL (ref 31.5–36.5)
MCHC RBC AUTO-ENTMCNC: 33.5 G/DL (ref 31.5–36.5)
MCV RBC AUTO: 91 FL (ref 78–100)
MCV RBC AUTO: 91 FL (ref 78–100)
MCV RBC AUTO: 93 FL (ref 78–100)
MCV RBC AUTO: 94 FL (ref 78–100)
O2/TOTAL GAS SETTING VFR VENT: 60 %
OXYHGB MFR BLD: 93 % (ref 92–100)
OXYHGB MFR BLD: 94 % (ref 92–100)
OXYHGB MFR BLD: 96 % (ref 92–100)
OXYHGB MFR BLD: 97 % (ref 92–100)
OXYHGB MFR BLD: 98 % (ref 92–100)
OXYHGB MFR BLDA: 98 % (ref 75–100)
OXYHGB MFR BLDA: 98 % (ref 75–100)
OXYHGB MFR BLDV: 72 %
OXYHGB MFR BLDV: 74 %
PCO2 BLD: 34 MM HG (ref 35–45)
PCO2 BLD: 35 MM HG (ref 35–45)
PCO2 BLD: 35 MM HG (ref 35–45)
PCO2 BLD: 36 MM HG (ref 35–45)
PCO2 BLD: 36 MM HG (ref 35–45)
PCO2 BLD: 37 MM HG (ref 35–45)
PCO2 BLD: 37 MM HG (ref 35–45)
PCO2 BLD: 38 MM HG (ref 35–45)
PCO2 BLDA: 52 MM HG (ref 35–45)
PCO2 BLDA: 53 MM HG (ref 35–45)
PCO2 BLDV: 55 MM HG (ref 40–50)
PCO2 BLDV: 56 MM HG (ref 40–50)
PH BLD: 7.4 [PH] (ref 7.35–7.45)
PH BLD: 7.4 [PH] (ref 7.35–7.45)
PH BLD: 7.41 [PH] (ref 7.35–7.45)
PH BLD: 7.42 [PH] (ref 7.35–7.45)
PH BLD: 7.44 [PH] (ref 7.35–7.45)
PH BLDA: 7.29 [PH] (ref 7.35–7.45)
PH BLDA: 7.29 [PH] (ref 7.35–7.45)
PH BLDV: 7.28 [PH] (ref 7.32–7.43)
PH BLDV: 7.28 [PH] (ref 7.32–7.43)
PHOSPHATE SERPL-MCNC: 4.7 MG/DL (ref 2.5–4.5)
PLATELET # BLD AUTO: 57 10E3/UL (ref 150–450)
PLATELET # BLD AUTO: 65 10E3/UL (ref 150–450)
PLATELET # BLD AUTO: 66 10E3/UL (ref 150–450)
PLATELET # BLD AUTO: 69 10E3/UL (ref 150–450)
PO2 BLD: 112 MM HG (ref 80–105)
PO2 BLD: 139 MM HG (ref 80–105)
PO2 BLD: 66 MM HG (ref 80–105)
PO2 BLD: 74 MM HG (ref 80–105)
PO2 BLD: 76 MM HG (ref 80–105)
PO2 BLD: 83 MM HG (ref 80–105)
PO2 BLD: 84 MM HG (ref 80–105)
PO2 BLD: 85 MM HG (ref 80–105)
PO2 BLD: 87 MM HG (ref 80–105)
PO2 BLD: 92 MM HG (ref 80–105)
PO2 BLD: 92 MM HG (ref 80–105)
PO2 BLD: 96 MM HG (ref 80–105)
PO2 BLDA: 180 MM HG (ref 80–105)
PO2 BLDA: 207 MM HG (ref 80–105)
PO2 BLDV: 42 MM HG (ref 25–47)
PO2 BLDV: 45 MM HG (ref 25–47)
POTASSIUM BLD-SCNC: 4.6 MMOL/L (ref 3.4–5.3)
POTASSIUM BLD-SCNC: 4.8 MMOL/L (ref 3.4–5.3)
POTASSIUM BLD-SCNC: 5.1 MMOL/L (ref 3.4–5.3)
POTASSIUM BLD-SCNC: 5.1 MMOL/L (ref 3.4–5.3)
PROT SERPL-MCNC: 4.6 G/DL (ref 6.8–8.8)
PROT SERPL-MCNC: 4.7 G/DL (ref 6.8–8.8)
PROT SERPL-MCNC: 4.8 G/DL (ref 6.8–8.8)
PROT SERPL-MCNC: 4.8 G/DL (ref 6.8–8.8)
RADIOLOGIST FLAGS: ABNORMAL
RBC # BLD AUTO: 2.32 10E6/UL (ref 4.4–5.9)
RBC # BLD AUTO: 2.34 10E6/UL (ref 4.4–5.9)
RBC # BLD AUTO: 2.44 10E6/UL (ref 4.4–5.9)
RBC # BLD AUTO: 2.64 10E6/UL (ref 4.4–5.9)
SODIUM SERPL-SCNC: 141 MMOL/L (ref 133–144)
SODIUM SERPL-SCNC: 141 MMOL/L (ref 133–144)
SODIUM SERPL-SCNC: 142 MMOL/L (ref 133–144)
SODIUM SERPL-SCNC: 144 MMOL/L (ref 133–144)
SPECIMEN EXPIRATION DATE: NORMAL
SPECIMEN EXPIRATION DATE: NORMAL
TROPONIN I SERPL HS-MCNC: ABNORMAL NG/L
UFH PPP CHRO-ACNC: 0.29 IU/ML
UFH PPP CHRO-ACNC: 0.39 IU/ML
UFH PPP CHRO-ACNC: 0.6 IU/ML
UFH PPP CHRO-ACNC: <0.1 IU/ML
UNIT ABO/RH: NORMAL
UNIT NUMBER: NORMAL
UNIT STATUS: NORMAL
UNIT TYPE ISBT: 5100
UNIT TYPE ISBT: 6200
WBC # BLD AUTO: 10 10E3/UL (ref 4–11)
WBC # BLD AUTO: 10 10E3/UL (ref 4–11)
WBC # BLD AUTO: 10.8 10E3/UL (ref 4–11)
WBC # BLD AUTO: 8.8 10E3/UL (ref 4–11)

## 2022-02-20 PROCEDURE — 85027 COMPLETE CBC AUTOMATED: CPT | Performed by: INTERNAL MEDICINE

## 2022-02-20 PROCEDURE — 250N000011 HC RX IP 250 OP 636: Performed by: RADIOLOGY

## 2022-02-20 PROCEDURE — 74177 CT ABD & PELVIS W/CONTRAST: CPT | Mod: 26 | Performed by: RADIOLOGY

## 2022-02-20 PROCEDURE — 250N000011 HC RX IP 250 OP 636: Performed by: INTERNAL MEDICINE

## 2022-02-20 PROCEDURE — 70450 CT HEAD/BRAIN W/O DYE: CPT | Mod: 26 | Performed by: STUDENT IN AN ORGANIZED HEALTH CARE EDUCATION/TRAINING PROGRAM

## 2022-02-20 PROCEDURE — C1887 CATHETER, GUIDING: HCPCS

## 2022-02-20 PROCEDURE — 999N000065 XR CHEST PORT 1 VIEW

## 2022-02-20 PROCEDURE — C9113 INJ PANTOPRAZOLE SODIUM, VIA: HCPCS | Performed by: INTERNAL MEDICINE

## 2022-02-20 PROCEDURE — 33949 ECMO/ECLS DAILY MGMT ARTERY: CPT

## 2022-02-20 PROCEDURE — 82803 BLOOD GASES ANY COMBINATION: CPT | Performed by: INTERNAL MEDICINE

## 2022-02-20 PROCEDURE — 250N000011 HC RX IP 250 OP 636

## 2022-02-20 PROCEDURE — P9059 PLASMA, FRZ BETWEEN 8-24HOUR: HCPCS | Performed by: INTERNAL MEDICINE

## 2022-02-20 PROCEDURE — 93010 ELECTROCARDIOGRAM REPORT: CPT | Mod: 59 | Performed by: INTERNAL MEDICINE

## 2022-02-20 PROCEDURE — 85730 THROMBOPLASTIN TIME PARTIAL: CPT | Performed by: INTERNAL MEDICINE

## 2022-02-20 PROCEDURE — 75710 ARTERY X-RAYS ARM/LEG: CPT

## 2022-02-20 PROCEDURE — 03JY3ZZ INSPECTION OF UPPER ARTERY, PERCUTANEOUS APPROACH: ICD-10-PCS | Performed by: RADIOLOGY

## 2022-02-20 PROCEDURE — 82330 ASSAY OF CALCIUM: CPT | Performed by: INTERNAL MEDICINE

## 2022-02-20 PROCEDURE — 87077 CULTURE AEROBIC IDENTIFY: CPT | Performed by: INTERNAL MEDICINE

## 2022-02-20 PROCEDURE — 71045 X-RAY EXAM CHEST 1 VIEW: CPT | Mod: 26 | Performed by: STUDENT IN AN ORGANIZED HEALTH CARE EDUCATION/TRAINING PROGRAM

## 2022-02-20 PROCEDURE — 71045 X-RAY EXAM CHEST 1 VIEW: CPT

## 2022-02-20 PROCEDURE — P9016 RBC LEUKOCYTES REDUCED: HCPCS | Performed by: INTERNAL MEDICINE

## 2022-02-20 PROCEDURE — 272N000143 HC KIT CR3

## 2022-02-20 PROCEDURE — 258N000003 HC RX IP 258 OP 636: Performed by: INTERNAL MEDICINE

## 2022-02-20 PROCEDURE — 84484 ASSAY OF TROPONIN QUANT: CPT | Performed by: INTERNAL MEDICINE

## 2022-02-20 PROCEDURE — 85520 HEPARIN ASSAY: CPT | Performed by: INTERNAL MEDICINE

## 2022-02-20 PROCEDURE — 76937 US GUIDE VASCULAR ACCESS: CPT | Mod: 26 | Performed by: RADIOLOGY

## 2022-02-20 PROCEDURE — 999N000155 HC STATISTIC RAPCV CVP MONITORING

## 2022-02-20 PROCEDURE — 85652 RBC SED RATE AUTOMATED: CPT | Performed by: INTERNAL MEDICINE

## 2022-02-20 PROCEDURE — 86901 BLOOD TYPING SEROLOGIC RH(D): CPT

## 2022-02-20 PROCEDURE — 85379 FIBRIN DEGRADATION QUANT: CPT | Performed by: INTERNAL MEDICINE

## 2022-02-20 PROCEDURE — 999N000075 HC STATISTIC IABP MONITORING

## 2022-02-20 PROCEDURE — 999N000063 XR CHEST PORT 1 VIEW

## 2022-02-20 PROCEDURE — 70450 CT HEAD/BRAIN W/O DYE: CPT

## 2022-02-20 PROCEDURE — 250N000009 HC RX 250: Performed by: INTERNAL MEDICINE

## 2022-02-20 PROCEDURE — 85300 ANTITHROMBIN III ACTIVITY: CPT | Performed by: INTERNAL MEDICINE

## 2022-02-20 PROCEDURE — 82805 BLOOD GASES W/O2 SATURATION: CPT | Performed by: INTERNAL MEDICINE

## 2022-02-20 PROCEDURE — 86923 COMPATIBILITY TEST ELECTRIC: CPT | Performed by: INTERNAL MEDICINE

## 2022-02-20 PROCEDURE — 82310 ASSAY OF CALCIUM: CPT | Performed by: INTERNAL MEDICINE

## 2022-02-20 PROCEDURE — 75710 ARTERY X-RAYS ARM/LEG: CPT | Mod: 26 | Performed by: RADIOLOGY

## 2022-02-20 PROCEDURE — 99291 CRITICAL CARE FIRST HOUR: CPT | Mod: 25 | Performed by: INTERNAL MEDICINE

## 2022-02-20 PROCEDURE — 272N000570 HC SHEATH CR7

## 2022-02-20 PROCEDURE — 36216 PLACE CATHETER IN ARTERY: CPT | Mod: GC | Performed by: RADIOLOGY

## 2022-02-20 PROCEDURE — 86140 C-REACTIVE PROTEIN: CPT | Performed by: INTERNAL MEDICINE

## 2022-02-20 PROCEDURE — 71260 CT THORAX DX C+: CPT | Mod: 26 | Performed by: RADIOLOGY

## 2022-02-20 PROCEDURE — 250N000009 HC RX 250

## 2022-02-20 PROCEDURE — P9037 PLATE PHERES LEUKOREDU IRRAD: HCPCS | Performed by: INTERNAL MEDICINE

## 2022-02-20 PROCEDURE — C1769 GUIDE WIRE: HCPCS

## 2022-02-20 PROCEDURE — 95720 EEG PHY/QHP EA INCR W/VEEG: CPT | Mod: GC | Performed by: PSYCHIATRY & NEUROLOGY

## 2022-02-20 PROCEDURE — 84155 ASSAY OF PROTEIN SERUM: CPT | Performed by: INTERNAL MEDICINE

## 2022-02-20 PROCEDURE — 85610 PROTHROMBIN TIME: CPT | Performed by: INTERNAL MEDICINE

## 2022-02-20 PROCEDURE — 93005 ELECTROCARDIOGRAM TRACING: CPT

## 2022-02-20 PROCEDURE — 71045 X-RAY EXAM CHEST 1 VIEW: CPT | Mod: 26 | Performed by: RADIOLOGY

## 2022-02-20 PROCEDURE — 83735 ASSAY OF MAGNESIUM: CPT | Performed by: INTERNAL MEDICINE

## 2022-02-20 PROCEDURE — 93308 TTE F-UP OR LMTD: CPT | Mod: 26 | Performed by: INTERNAL MEDICINE

## 2022-02-20 PROCEDURE — 85384 FIBRINOGEN ACTIVITY: CPT | Performed by: INTERNAL MEDICINE

## 2022-02-20 PROCEDURE — 83615 LACTATE (LD) (LDH) ENZYME: CPT | Performed by: INTERNAL MEDICINE

## 2022-02-20 PROCEDURE — 94003 VENT MGMT INPAT SUBQ DAY: CPT

## 2022-02-20 PROCEDURE — 93325 DOPPLER ECHO COLOR FLOW MAPG: CPT | Mod: 26 | Performed by: INTERNAL MEDICINE

## 2022-02-20 PROCEDURE — 86850 RBC ANTIBODY SCREEN: CPT

## 2022-02-20 PROCEDURE — 74177 CT ABD & PELVIS W/CONTRAST: CPT

## 2022-02-20 PROCEDURE — 82947 ASSAY GLUCOSE BLOOD QUANT: CPT | Performed by: INTERNAL MEDICINE

## 2022-02-20 PROCEDURE — 999N000015 HC STATISTIC ARTERIAL MONITORING DAILY

## 2022-02-20 PROCEDURE — 87205 SMEAR GRAM STAIN: CPT | Performed by: INTERNAL MEDICINE

## 2022-02-20 PROCEDURE — 83605 ASSAY OF LACTIC ACID: CPT | Performed by: INTERNAL MEDICINE

## 2022-02-20 PROCEDURE — 36216 PLACE CATHETER IN ARTERY: CPT

## 2022-02-20 PROCEDURE — 99232 SBSQ HOSP IP/OBS MODERATE 35: CPT | Mod: 25 | Performed by: PSYCHIATRY & NEUROLOGY

## 2022-02-20 PROCEDURE — 75774 ARTERY X-RAY EACH VESSEL: CPT | Mod: 26 | Performed by: RADIOLOGY

## 2022-02-20 PROCEDURE — 999N000185 HC STATISTIC TRANSPORT TIME EA 15 MIN

## 2022-02-20 PROCEDURE — 255N000002 HC RX 255 OP 636: Performed by: RADIOLOGY

## 2022-02-20 PROCEDURE — 87070 CULTURE OTHR SPECIMN AEROBIC: CPT | Performed by: INTERNAL MEDICINE

## 2022-02-20 PROCEDURE — 83051 HEMOGLOBIN PLASMA: CPT | Performed by: INTERNAL MEDICINE

## 2022-02-20 PROCEDURE — 80053 COMPREHEN METABOLIC PANEL: CPT | Performed by: INTERNAL MEDICINE

## 2022-02-20 PROCEDURE — 200N000002 HC R&B ICU UMMC

## 2022-02-20 PROCEDURE — 93308 TTE F-UP OR LMTD: CPT

## 2022-02-20 PROCEDURE — 250N000013 HC RX MED GY IP 250 OP 250 PS 637: Performed by: INTERNAL MEDICINE

## 2022-02-20 PROCEDURE — 258N000003 HC RX IP 258 OP 636: Performed by: RADIOLOGY

## 2022-02-20 PROCEDURE — 84100 ASSAY OF PHOSPHORUS: CPT | Performed by: INTERNAL MEDICINE

## 2022-02-20 PROCEDURE — 999N000157 HC STATISTIC RCP TIME EA 10 MIN

## 2022-02-20 PROCEDURE — 95714 VEEG EA 12-26 HR UNMNTR: CPT

## 2022-02-20 PROCEDURE — 85347 COAGULATION TIME ACTIVATED: CPT

## 2022-02-20 PROCEDURE — 93325 DOPPLER ECHO COLOR FLOW MAPG: CPT

## 2022-02-20 PROCEDURE — 272N000280 HC DEVICE COMPRESSION CR5

## 2022-02-20 PROCEDURE — 0W9B30Z DRAINAGE OF LEFT PLEURAL CAVITY WITH DRAINAGE DEVICE, PERCUTANEOUS APPROACH: ICD-10-PCS | Performed by: THORACIC SURGERY (CARDIOTHORACIC VASCULAR SURGERY)

## 2022-02-20 RX ORDER — HEPARIN SODIUM 200 [USP'U]/100ML
1 INJECTION, SOLUTION INTRAVENOUS CONTINUOUS PRN
Status: DISCONTINUED | OUTPATIENT
Start: 2022-02-20 | End: 2022-02-21

## 2022-02-20 RX ORDER — NALOXONE HYDROCHLORIDE 0.4 MG/ML
0.2 INJECTION, SOLUTION INTRAMUSCULAR; INTRAVENOUS; SUBCUTANEOUS
Status: DISCONTINUED | OUTPATIENT
Start: 2022-02-20 | End: 2022-02-21

## 2022-02-20 RX ORDER — NALOXONE HYDROCHLORIDE 0.4 MG/ML
0.4 INJECTION, SOLUTION INTRAMUSCULAR; INTRAVENOUS; SUBCUTANEOUS
Status: DISCONTINUED | OUTPATIENT
Start: 2022-02-20 | End: 2022-02-21

## 2022-02-20 RX ORDER — LIDOCAINE HYDROCHLORIDE 10 MG/ML
INJECTION, SOLUTION EPIDURAL; INFILTRATION; INTRACAUDAL; PERINEURAL
Status: COMPLETED
Start: 2022-02-20 | End: 2022-02-20

## 2022-02-20 RX ORDER — IODIXANOL 320 MG/ML
100 INJECTION, SOLUTION INTRAVASCULAR ONCE
Status: COMPLETED | OUTPATIENT
Start: 2022-02-20 | End: 2022-02-20

## 2022-02-20 RX ORDER — IOPAMIDOL 755 MG/ML
135 INJECTION, SOLUTION INTRAVASCULAR ONCE
Status: COMPLETED | OUTPATIENT
Start: 2022-02-20 | End: 2022-02-20

## 2022-02-20 RX ORDER — NITROGLYCERIN 5 MG/ML
100-500 VIAL (ML) INTRAVENOUS
Status: COMPLETED | OUTPATIENT
Start: 2022-02-20 | End: 2022-02-20

## 2022-02-20 RX ORDER — FENTANYL CITRATE 50 UG/ML
25-50 INJECTION, SOLUTION INTRAMUSCULAR; INTRAVENOUS EVERY 5 MIN PRN
Status: DISCONTINUED | OUTPATIENT
Start: 2022-02-20 | End: 2022-02-21

## 2022-02-20 RX ORDER — FLUMAZENIL 0.1 MG/ML
0.2 INJECTION, SOLUTION INTRAVENOUS
Status: DISCONTINUED | OUTPATIENT
Start: 2022-02-20 | End: 2022-02-21

## 2022-02-20 RX ORDER — NITROGLYCERIN 5 MG/ML
200 VIAL (ML) INTRAVENOUS
Status: COMPLETED | OUTPATIENT
Start: 2022-02-20 | End: 2022-02-20

## 2022-02-20 RX ADMIN — TICAGRELOR 90 MG: 90 TABLET ORAL at 07:52

## 2022-02-20 RX ADMIN — FENTANYL CITRATE 200 MCG/HR: 50 INJECTION INTRAVENOUS at 15:13

## 2022-02-20 RX ADMIN — HYDROCORTISONE SODIUM SUCCINATE 50 MG: 100 INJECTION, POWDER, FOR SOLUTION INTRAMUSCULAR; INTRAVENOUS at 02:16

## 2022-02-20 RX ADMIN — PANTOPRAZOLE SODIUM 40 MG: 40 INJECTION, POWDER, FOR SOLUTION INTRAVENOUS at 07:52

## 2022-02-20 RX ADMIN — TICAGRELOR 90 MG: 90 TABLET ORAL at 23:31

## 2022-02-20 RX ADMIN — PIPERACILLIN AND TAZOBACTAM 3.38 G: 3; .375 INJECTION, POWDER, LYOPHILIZED, FOR SOLUTION INTRAVENOUS at 06:13

## 2022-02-20 RX ADMIN — Medication: at 02:18

## 2022-02-20 RX ADMIN — Medication: at 10:11

## 2022-02-20 RX ADMIN — ASPIRIN 81 MG CHEWABLE TABLET 81 MG: 81 TABLET CHEWABLE at 07:52

## 2022-02-20 RX ADMIN — FENTANYL CITRATE 200 MCG/HR: 50 INJECTION INTRAVENOUS at 23:39

## 2022-02-20 RX ADMIN — HEPARIN SODIUM 2 BAG: 200 INJECTION, SOLUTION INTRAVENOUS at 21:49

## 2022-02-20 RX ADMIN — FENTANYL CITRATE 150 MCG/HR: 50 INJECTION INTRAVENOUS at 01:18

## 2022-02-20 RX ADMIN — MIDAZOLAM HYDROCHLORIDE 8 MG/HR: 1 INJECTION, SOLUTION INTRAVENOUS at 23:48

## 2022-02-20 RX ADMIN — MIDAZOLAM HYDROCHLORIDE 8 MG/HR: 1 INJECTION, SOLUTION INTRAVENOUS at 13:24

## 2022-02-20 RX ADMIN — HYDROCORTISONE SODIUM SUCCINATE 50 MG: 100 INJECTION, POWDER, FOR SOLUTION INTRAMUSCULAR; INTRAVENOUS at 17:51

## 2022-02-20 RX ADMIN — LIDOCAINE HYDROCHLORIDE 15 ML: 10 INJECTION, SOLUTION EPIDURAL; INFILTRATION; INTRACAUDAL; PERINEURAL at 23:10

## 2022-02-20 RX ADMIN — MIDAZOLAM 2 MG: 1 INJECTION INTRAMUSCULAR; INTRAVENOUS at 19:56

## 2022-02-20 RX ADMIN — VASOPRESSIN 1 UNITS/HR: 20 INJECTION PARENTERAL at 17:14

## 2022-02-20 RX ADMIN — Medication: at 17:50

## 2022-02-20 RX ADMIN — HYDROCORTISONE SODIUM SUCCINATE 50 MG: 100 INJECTION, POWDER, FOR SOLUTION INTRAMUSCULAR; INTRAVENOUS at 10:11

## 2022-02-20 RX ADMIN — CALCIUM CHLORIDE 1 G: 100 INJECTION, SOLUTION INTRAVENOUS at 10:43

## 2022-02-20 RX ADMIN — PIPERACILLIN AND TAZOBACTAM 3.38 G: 3; .375 INJECTION, POWDER, LYOPHILIZED, FOR SOLUTION INTRAVENOUS at 11:51

## 2022-02-20 RX ADMIN — NITROGLYCERIN 200 MCG: 20 INJECTION INTRAVENOUS at 21:58

## 2022-02-20 RX ADMIN — IOPAMIDOL 135 ML: 755 INJECTION, SOLUTION INTRAVENOUS at 12:50

## 2022-02-20 RX ADMIN — IODIXANOL 40 ML: 320 INJECTION, SOLUTION INTRAVASCULAR at 22:27

## 2022-02-20 RX ADMIN — PIPERACILLIN AND TAZOBACTAM 3.38 G: 3; .375 INJECTION, POWDER, LYOPHILIZED, FOR SOLUTION INTRAVENOUS at 00:03

## 2022-02-20 RX ADMIN — Medication 50 MCG: at 19:55

## 2022-02-20 RX ADMIN — PIPERACILLIN AND TAZOBACTAM 3.38 G: 3; .375 INJECTION, POWDER, LYOPHILIZED, FOR SOLUTION INTRAVENOUS at 17:51

## 2022-02-20 RX ADMIN — NITROGLYCERIN 200 MCG: 20 INJECTION INTRAVENOUS at 21:41

## 2022-02-20 ASSESSMENT — ACTIVITIES OF DAILY LIVING (ADL)
ADLS_ACUITY_SCORE: 19
EQUIPMENT_CURRENTLY_USED_AT_HOME: OTHER (SEE COMMENTS)
ADLS_ACUITY_SCORE: 20
ADLS_ACUITY_SCORE: 19
DRESSING/BATHING_DIFFICULTY: OTHER (SEE COMMENTS)
ADLS_ACUITY_SCORE: 19
WEAR_GLASSES_OR_BLIND: OTHER (SEE COMMENTS)
TOILETING_ISSUES: OTHER (SEE COMMENTS)
FALL_HISTORY_WITHIN_LAST_SIX_MONTHS: NO
DOING_ERRANDS_INDEPENDENTLY_DIFFICULTY: OTHER (SEE COMMENTS)
ADLS_ACUITY_SCORE: 19
CONCENTRATING,_REMEMBERING_OR_MAKING_DECISIONS_DIFFICULTY: OTHER (SEE COMMENTS)
ADLS_ACUITY_SCORE: 20
ADLS_ACUITY_SCORE: 19
DIFFICULTY_EATING/SWALLOWING: OTHER (SEE COMMENTS)
ADLS_ACUITY_SCORE: 20
WALKING_OR_CLIMBING_STAIRS_DIFFICULTY: OTHER (SEE COMMENTS)

## 2022-02-20 NOTE — PROGRESS NOTES
[facility]  Cardiac ICU Progress Note  February 20, 2022            Key events - last 24 hours:     -Continues to make roughly 30 cc of urine per hour  -Is not yet dependent on CRRT  -Required 1 unit PRBCs overnight  -ET tube at 5.5 cm, will advance to centimeters and repeat chest x-ray for placement  -During planned turn down, concern for hemothorax, turn down cancelled and patient taken for stat CT which revealed large effusion in r Lung and surrounding the pericardium, Thoracic surgery was consulted and they are currently planning on placing right-sided chest tube.  -Heparin is currently held in anticipation of chest tube placement, with plan to restart heparin drip post chest tube placement.  -CT had obtained today we will discuss with neurology              Assessment & Plan        71 year old male who was admitted on 2/17/2022 s/p VT/VF arrest x8 shocks who was found to have LAD occlusion requiring 2x stents. He presented to local ED with chest pain that started at 1300 (within 1 hr of onset), he had a VT VF arrest in the ED after he was found to have an anterior STEMI on ECG.  Chest compressions and ACLS was started immediately.  Patient underwent 8 shocks for VT VF with ROSC afterwards.  He received 4 doses of epinephrine, 450 mg of amiodarone.  He was transferred to the [facility] for percutaneous intervention for his STEMI. Unfortunately his transfer was delayed because he was a very low dose of Epi (0.01) and the local hospital only had a BLS ambulance available. He arrived at [facility] and was found to have occlusion of the LAD. During the angiogram he was found to have significant hypotension and ectopy, which prompted placement on VA ECMO. PCI with 2x LETY was done with ECMO support. TTM post arrest at 35C, rewarmed on 2/19/22.     Neurology: #possible neurological injury post cardiac arrest  Intubated, sedated, paralyzed. Cooled to 34 degrees.  Now rewarmed  as of 2/19/2022  --continue versed, fentanyl, propofol. Attempting to wean off propfol.   - RASS goal -4 to -5   - CTH w/o acute intracranial pathology.  - EEG w/o significant abnormalities noted  - Pt has nonpurposeful movement of all 4 extremities   Cardiovascular / Hemodynamics: Refractory VF arrest requiring 8 shocks.  x2 LETY stents to LAD.  Peripheral V-A ECMO inserted for refractory cardiogenic shock. LA   TTE: LVEF 10-15%, RV moderately to severely reduced function. No prior hx of heart failure. No significant valvular disease.  EKG: FLORY V2-V4 consistent with anterior STEMI  --wean pressors/inotropes as able  --ECMO turndown pending 2/21/2022, if does well could consider decann early next week  --continue ASA 81mg and ticagrelor 90mg BID as of now status post 2 LETY to LAD  --ACT goal 180-200, holding heparin in the setting of chest tube placement  --hold lipitor for now given likely hepatic injury during arrest  --hold ACE/ARB for now given likely reduced renal fxn after arrest  --holding beta blocker given shock      - CT CAP: Large right-sided diffuse effusion, appreciate thoracic surgery recommendations     2/19/22: Pulsatility significant improved after rewarming, today 30mmHg on IABP pause.      Pulmonary: #AHRF  ETT in appropriate position.  Now weaning vent requirements.  CXR: Lines in stable position.  --wean vent as able  --daily CXR  -Possibly some contribution from large right-sided pleural effusion, appreciate thoracic surgery recommendations, continue to monitor chest tube output very carefully  --Q2h ABGs for now  --consider scheduled duonebs if signs of lung dz, currently PRN     GI and Nutrition: No known medical hx.   --monitor BID LFTs  -Nutrition consult  --bowel regimen   --GI Prophylaxis: PPI    Renal, Fluid and Electrolytes: #SAHIL, likely prerenal from hypoperfusion. Monitoring for CRRT needs.  Monitoring UOP, will start CRRT through circuit if needed.  --maintain K>4 and Mg>2     Infectious Disease: No signs of infection. Leukocytosis c/w arrest. Blood cultures collected.   --vancomycin/zosyn x5 days for ECMO  --daily blood cultures  --monitor for signs of infection given cooling, lines, and leukocytosis   Hematology and Oncology: #Acute blood loss anemia  # Possible hemothorax  Receiving heparin for ECMO and ASA/ticagrelor for LETY.   --cryo PRN fibrinogen < 200; FFP for INR >2  --Transfuse for Hgb<10  --heparin gtt for ECMO with ACT goal 180-200  --US LE w/ arterial duplex per ECMO protocol   --DVT PPX: Heparin as above  -We will temporarily hold heparin pending chest tube placement  -Continue to trend CBCs transfuse to goal hemoglobin of 8   Endocrinology:    No known medical history. BG elevated.  --insulin gtt  --stress dose steroids due to persistent hypotension - hydrocort 50mg IV Q8h  --f/u HgbA1c    Lines: L femoral arterial and venous ECMO cannulae February 17, 2022  R radial arterial line February 17, 2022  ETT February 17, 2022  Tapia catheter February 17, 2022  OG tube February 17, 2022  Restraint: needed  Current lines are required for patient management       Family update by me today: Yes      Code Status: Full code at this time. Reviewed patients medical directives. He indicated he would like to be full code unless there was medical futility in additional aggressive interventions.     The Pt was discussed and evaluated with Dr. Henriquez, attending physician, who agrees with the assessment and plan above.     Cyrus Andrews MD  Cardiology Fellow         Medications:       artificial tears   Both Eyes Q8H     aspirin  81 mg Per Feeding Tube Daily     hydrocortisone sodium succinate PF  50 mg Intravenous Q8H     pantoprazole (PROTONIX) IV  40 mg Intravenous Daily     piperacillin-tazobactam  3.375 g Intravenous Q6H     ticagrelor  90 mg Oral BID     vancomycin  1,000 mg Intravenous Q24H      Temp: 98.4  F (36.9  C) Temp  Min: 98.4  F (36.9  C)  Max: 98.6  F (37  C)  Resp: 12  Resp  Min: 12  Max: 15  SpO2: 99 % SpO2  Min: 94 %  Max: 100 %  Pulse: 97 Pulse  Min: 79  Max: 105    No data recorded    No data recorded.  No data recorded.      Intake/Output Summary (Last 24 hours) at 2/20/2022 0736  Last data filed at 2/20/2022 0600  Gross per 24 hour   Intake 2188.88 ml   Output 1148 ml   Net 1040.88 ml                 Physical Exam:   Exam and Labs by System  Neuro:   Exam: intubated and sedated, pupils equal and reactive  General Appearance:   Comfortable, no pain or respiratory distress       Cardiovascular:        Exam:    Normal neck veins  Normal and symmetrical radial, femoral and carotid pulses  Regular rate and rhythm     Pulm:   Exam:   Symmetrical chest shape and movements with each tidal breath     Renal:       GI:   Exam:    Non-distended, soft, normal bowel sounds     Diet: NPO                Data:         Temp: 98.4  F (36.9  C) Temp src: RectalTemp  Min: 98.4  F (36.9  C)  Max: 98.6  F (37  C)   Recent Labs   Lab 02/20/22  0353 02/19/22 2151 02/19/22  1549 02/19/22  1001 02/19/22  0342   WBC 10.8 10.4 10.7 10.0 10.0   HGB 7.1* 7.7* 7.2* 7.4* 7.0*   HCT 21.7* 22.9* 21.6* 21.9* 21.1*   MCV 93 91 91 91 91   RDW 16.9* 16.5* 16.5* 16.1* 16.4*   PLT 69* 65* 68* 75* 82*     Recent Labs   Lab 02/20/22  0354 02/19/22 2151 02/19/22  1548 02/19/22  1001 02/19/22  0342   INR 1.16* 1.15 1.24* 1.25* 1.27*   * 140* 169* 177* 189*     Liver Function Studies -   Recent Labs   Lab Test 02/20/22 0353   PROTTOTAL 4.8*   ALBUMIN 2.7*   BILITOTAL 0.5   ALKPHOS 16*   *   ALT 92*       Last Arterial Blood Gas:  Recent Labs   Lab 02/20/22  0601 02/20/22  0354 02/20/22  0157 02/19/22  2350   PH 7.42 7.40 7.42 7.42   PCO2 36 38 37 36   PO2 139* 66* 84 92   HCO3 23 24 24 23   O2PER 60 60  60  60 60 60       Venous Blood Gas  Recent Labs   Lab 02/20/22  0601 02/20/22  0354 02/20/22  0157 02/19/22  2350 02/19/22  1749 02/19/22  1549 02/19/22  0345 02/19/22  0343 02/18/22  1755  02/18/22  1545   PHV  --   --   --   --   --   --   --  7.28*  --   --    PCO2V  --   --   --   --   --   --   --  53*  --   --    PO2V  --   --   --   --   --   --   --  48*  --   --    HCO3V  --   --   --   --   --   --   --  25  --   --    MERLIN  --  -0.6  --   --   --  -0.8  --  -1.8  -1.2  --  -0.5   O2PER 60 60  60  60 60 60   < > 60   < > 60  60   < > 70  60  70    < > = values in this interval not displayed.       Recent Labs   Lab Test 06/30/17  1355 05/12/16  0758   CHOL 173 196   HDL 42 43    125   TRIG 76 140     IMPRESSION:  Indeterminant gray-white matter loss in the right occipital lobe could  be artifactual from adjacent streak/photon starvation artifact versus  true infarct.      The pt was discussed and evaluated with Dr. Henriquez, attending physician, who agrees with the assessment and plan above.     Cyrus Andrews MD  Fellow Physician   Division of Cardiovascular Disease   PGY-4     February 20, 2022        Critical Care Services Progress Note:     Chano Johnson remains critically ill with acute coronary syndrome, severe anemia, cardiac arrhythmia and shock     I personally examined and evaluated the patient today.   The patient s prognosis today is grave  I have evaluated all laboratory values and imaging studies from the past 24 hours.  Key findings and decisions made today included   -Continues to make roughly 30 cc of urine per hour  -Is not yet dependent on CRRT  -Required 1 unit PRBCs overnight  -ET tube at 5.5 cm, will advance to centimeters and repeat chest x-ray for placement  -During planned turn down, concern for hemothorax, turn down cancelled and patient taken for stat CT which revealed large effusion in r Lung and surrounding the pericardium, Thoracic surgery was consulted and they are currently planning on placing right-sided chest tube.  -Heparin is currently held in anticipation of chest tube placement, with plan to restart heparin drip post chest tube  placement.  -CT had obtained today we will discuss with neurology    I personally managed the ventilator, sedation, pain control and analgesia, metabolic abnormalities, antibiotic therapy, nutritional status and vasoactive medications.   Consults ongoing and ordered are none  Procedures that will happen today are: none  All treatments were placed at my direction.  I formulated today s plan with the house staff team or resident(s) and agree with the findings and plan in the associated note.       The above plans and care have been discussed with none and all questions and concerns were addressed.     I spent a total of 45 minutes (excluding procedure time) personally providing and directing critical care services at the bedside and on the critical care unit for Chano Johnson.        Malik Henriquez MD      ECMO Attending Progress Note  02/20/2022    Chano Johnson is a 72 year old male who was cannulated for ECMO LAD STEMI and Shock    Cannulation Site:  17 Fr in the L femoral artery  25 Fr in the L femoral vein    VA ECMO:   RPM's 3600 with flow range 4.0-4.2 L/min. Sweep gas is at 1 LPM and FiO2 60%.   Circuit remains free of air and clot; only small amount of fibrin noted at some connectors.   Cannulas are secure with slow, but steady oozing from site   Extremities maintained appropriate NIRS levels and pulses can heard via doppler in all extremities.     Vent settings:  Vent Mode: CMV/AC  (Continuous Mandatory Ventilation/ Assist Control)  FiO2 (%): 60 %  Resp Rate (Set): 12 breaths/min  Tidal Volume (Set, mL): 470 mL  PEEP (cm H2O): 7 cmH2O  Resp: 12     Anticoagulation and Volume Status:  Heparin is running at 1200 unit(s)/hr, ACT range 177-194.    Cannulae: Position is acceptable on exam and the available imaging.  Distal perfusion cannula is in place and patent.  Extremities are well-perfused.     I have personally reviewed the ECMO flows, oxygenation and CO2 clearance, anticoagulation, and cannula  position.  I have also personally assessed the patient's systemic response with hemodynamics, oxygenation, ventilation, and bleeding.       The patient requires continued ECMO support and management in the ICU.       Malik Henriquez MD

## 2022-02-20 NOTE — PLAN OF CARE
Major Shift Events:  2 U PRBCs given overnight for low HGB. Changed ECMO dsg. Levo titrated off.     Continues on versed and fent, will wake up to voice/movement. Does not follow commands. Moves all extremities. Pupils unequal. IABP, ECMO sweep 1, 60%, flow 4, speed 3600. SR-ST w/ freq PVCs and PACs. Doppler pulses. UOP 30-45 mL/hr overnight. K+ stable.     Plan: potential turn down today  For vital signs and complete assessments, please see documentation flowsheets.

## 2022-02-20 NOTE — PLAN OF CARE
Major Shift Events:  Vaso gtt restarted this AM. ECMO flow turned down to 3.5 in preparation for turndown.Turndown study not completed due to concerning findings on baseline limited Echo. Pt taken for STAT Head/Chest/Abdomen/Pelvis CT. Thoracic surgery consulted due to CT findings. Plan for chest tube placement at bedside per surgery. Heparin gtt held at 1500 and ECMO flow turned back up to 4 in preparation for chest tube placement.    Labs/Protocols: Creatinine remains up trending. Lactic stable. Calcium replaced x1 this shift per protocol. 1u RBCs given for Hgb 5.9 on ECMO circuit, recheck 7.3.   Neuro: Sedated, RASS -4/-5. Pupils not equal, L>R, MD aware. Afebrile, thermogard warming system off at ~1230, remains normothermic. vEEG monitoring remains in place.   Cardiac: SR, HR 80s-90s with occasional PACs and PVCs. MAP goal >65. VA ECMO 70%, sweep 1, flow 4, speed 3500.   Respiratory: CMV 60%, 470, 12, 7. Diminished lung sounds throughout. Small amount of thick tan secretions via ETT. Red streaked thin oral secretions.   GI/: OG to LIS, okay for meds, minimal bilious output. Hypoactive bowel sounds. Tapia in place with 30-40cc urine/hour. Bleeding from urethral meatus.   Skin: Bruising and injury to tongue, present PTA. Scattered bruising from venipuncture. Sacral mepilex in place, small area of purple discoloration present to coccyx, present PTA. Oozing from ECMO sites, dressing changed x1 this shift.   LDAs: L Femoral VA ECMO with reperfusion catheter. R Femoral IABP with thermogard/CVC. PIV to R and L.   GTTs: Versed, Fentanyl, Vaso, Heparin-via ECMO circuit-currently held.   Diet: NPO.   Activity: Bedrest. Repositioned Q 2 hours as tolerated for skin integrity.   Teaching: Patient's spouse educated on plan of care and cares throughout shift.     Plan: Chest tube placement at bedside with thoracic surgery tonight, ~4 hours after heparin gtt held. Possible turndown study tomorrow.   For vital signs and  complete assessments, please see documentation flowsheets.     Goal Outcome Evaluation:  Plan of Care Reviewed With: Spouse (Meeta)-present at bedside throughout shift.   Overall Patient Progress: Improving

## 2022-02-20 NOTE — PROGRESS NOTES
ECMO Shift Summary: [id number]    ECMO Equipment:  Console serial number: [id number]  Circuit Lot number: [id number]  Oxygenator Lot number: [id number]    Patient remains on VA ECMO, all equipment is functioning and alarms are appropriately set. RPM's 3600 with flow range 4.0-4.2 L/min. Sweep gas is at 1 LPM and FiO2 60%. Circuit remains free of air and clot; only small amount of fibrin noted at some connectors. Cannulas are secure with slow, but steady oozing from site (dressing was changed overnight and repacked with quickclot gauze). Extremities are slightly cool to touch; has maintained appropriate NIRS levels and pulses can heard via doppler in all extremities. Suctioned ETT for small-moderate tan secretions.     Significant Shift Events:   Due to oozing at ECMO sites, as well as urethra and oropharynx, hemoglobin dropped slowly overnight, prompting transfusion of 1 unit RBCs at 2000 and 1 unit of RBCs at 0600. BUN and Creatinine have continued to rise, despite stable urine output of 30-40 ml/hr. Titrated heparin down from 1400 unit(s)/hr to 1200 unit(s)/hr, which helped to lower PTT from 169 to 123.    Vent settings:  Vent Mode: CMV/AC  (Continuous Mandatory Ventilation/ Assist Control)  FiO2 (%): 60 %  Resp Rate (Set): 12 breaths/min  Tidal Volume (Set, mL): 470 mL  PEEP (cm H2O): 7 cmH2O  Resp: 12    Anticoagulation and Volume Status:  Heparin is running at 1200 unit(s)/hr, ACT range 177-194.    Urine output remains low (~30-40 mL/hr), visible blood loss was moderate. Product given included 2 units RBCs.    Intake/Output Summary (Last 24 hours) at 2/20/2022 0612  Last data filed at 2/20/2022 0600  Gross per 24 hour   Intake 2588.08 ml   Output 1148 ml   Net 1440.08 ml     ECHO:  No results found for this or any previous visit.  No results found for this or any previous visit.      CXR:  Recent Results (from the past 24 hour(s))   XR Chest Port 1 View    Impression    RESIDENT PRELIMINARY  INTERPRETATION  IMPRESSION:   1. Endotracheal tube tip projects 5.5 cm above the judy.  2. Intra-aortic balloon pump marker projects at the level of the  judy, unchanged.  3. Inferior approach venous ECMO cannula tip projects over the high  right atrium.  4. Inferior approach central venous catheter tip projects over the  hepatic IVC.  5. Small left pleural effusion with adjacent atelectasis.       Labs:  Recent Labs   Lab 02/20/22  0601 02/20/22  0354 02/20/22  0157 02/19/22  2350   PH 7.42 7.40 7.42 7.42   PCO2 36 38 37 36   PO2 139* 66* 84 92   HCO3 23 24 24 23   O2PER 60 60  60  60 60 60       Lab Results   Component Value Date    HGB 7.1 (L) 02/20/2022    PHGB <30 02/20/2022    PLT 69 (L) 02/20/2022    FIBR 356 02/20/2022    INR 1.16 (H) 02/20/2022     (HH) 02/20/2022    DD 0.48 02/20/2022    ANTCH 53 (L) 02/19/2022       Plan:  Plan is to continue full ECMO support with anticipation of cardiac recovery. Planned turndown with Echo today.       Minda Juares, RT  ECMO Specialist  2/20/2022  6:30 AM

## 2022-02-20 NOTE — PROGRESS NOTES
ECMO Shift Summary: 12D      ECMO Equipment:  Console serial number: [id number]  Circuit Lot number: [id number]  Oxygenator Lot number: [id number]    Patient remains on VA ECMO, all equipment is functioning and alarms are appropriately set. RPM's 3500 with flow range 4-4.15 L/min. Sweep gas is at 1 LPM and FiO2 70%. Circuit remains free of air, clot and fibrin. Cannulas are secure with no bleeding from site. Extremities are cool to touch.    Significant Shift Events: pt taken to CT this afternoon for STAT head to pelvis scan (after poor turndown at bedside.)  Hgb level dropped from 7.2 to 6.0 while in CT; one RBC given after pt returned to 4E.  It was then determined that the pt needed a L sided chest tube inserted for hemothorax.  Held heparin starting at 1500 and went up to 4 lpm ECMO flow in preparation for chest tube placement.    Vent settings:  Vent Mode: CMV/AC  (Continuous Mandatory Ventilation/ Assist Control)  FiO2 (%): 60 %  Resp Rate (Set): 12 breaths/min  Tidal Volume (Set, mL): 470 mL  PEEP (cm H2O): 7 cmH2O  Inspiratory Pressure (cm H2O) (Drager Jennifer): 25  Resp: 12  .    Heparin is off, ACT range 160s.    Urine output is per RN charting, blood loss was minimal. Product given included 1 RBC.       Intake/Output Summary (Last 24 hours) at 2/20/2022 1724  Last data filed at 2/20/2022 1700  Gross per 24 hour   Intake 2793.14 ml   Output 1021 ml   Net 1772.14 ml       ECHO:  No results found for this or any previous visit.  No results found for this or any previous visit.      CXR:  Recent Results (from the past 24 hour(s))   XR Chest Port 1 View    Narrative    XR CHEST PORT 1 VIEW on 2/20/2022 4:23 AM.    INDICATION: Eval position of lines and drains    COMPARISON: Radiograph dated 2/19/2022    FINDINGS:     Portable AP supine radiograph of the chest. Endotracheal tube tip  projects 5.5 cm above the judy. Inferior approach ECMO cannula tip  projects over the high right atrium, unchanged. Temperature  probe tip  projects over the lower thoracic esophagus. Intra-aortic balloon pump  superior marker projects at the level of the judy. Inferior approach  central venous catheter tip projecting over the hepatic IVC,  unchanged. Partially visualized gastric tube coursing into the left  upper quadrant and out of the field of view. Watchman device. Cervical  fusion hardware.    Cardiac silhouette is enlarged and stable. Pulmonary vasculature is  indistinct. No pneumothorax. Small left pleural effusion. Left basilar  atelectasis.      Impression    IMPRESSION:     1. Endotracheal tube tip projects 5.5 cm above the judy.  2. Intra-aortic balloon pump superior marker projects about 5 cm below  the superior margin of the ascending thoracic aorta. May consider  repositioning.  3. Inferior approach venous ECMO cannula tip projects over the high  right atrium.  4. Inferior approach central venous catheter tip projects over the  hepatic IVC.  5. Small left pleural effusion with adjacent atelectasis.  6. Unchanged prominent cardiomediastinal silhouette.    I have personally reviewed the examination and initial interpretation  and I agree with the findings.    OPHELIA COX MD         SYSTEM ID:  [id number]   XR Chest Port 1 View    Narrative    EXAM: XR CHEST PORT 1 VIEW  2/20/2022 9:07 AM     HISTORY:  Conditional for post ET tube advancement on 2/20/2022       COMPARISON:  Same day chest radiograph 0339. Chest radiograph  2/19/2022, 2/18/2022.    TECHNIQUE: Single frontal radiograph of the chest    FINDINGS:   Portable AP supine view of the chest. The endotracheal tube projects  over the midthoracic trachea. Esophageal temperature probe tip  projects over the lower thoracic esophagus. Enteric tube courses  beyond field of view. Postsurgical changes of spinal instrumentation.  Lower approach venous ECMO catheter tip projects at the high right  atrium, stable in position. Intraaortic balloon pump marker projects  about 5 cm  below superior margin of proximal thoracic aorta.    Trachea is midline. Stable cardiac silhouette. Veil-like pulmonary  opacities, left greater than right, likely representing layering  pleural effusions. The right costophrenic sulcus is collimated from  the field-of-view. No appreciable pneumothorax, although supine  positioning limits detection for this. Bibasilar opacities. No acute  osseous abnormalities.      Impression    IMPRESSION:     1. Endotracheal tube tip projects 4.5cm above the judy. Otherwise  relatively unchanged position of support devices as described above.  2. Veil-like opacities overlying the lung fields bilaterally, left  greater than right, likely represent layering pleural effusions.  3. Bibasilar opacities likely represent atelectasis and/or pulmonary  edema.     I have personally reviewed the examination and initial interpretation  and I agree with the findings.    OPHELIA COX MD         SYSTEM ID:  [id number]   Echo Limited    Narrative    [id number]  [id number]  [id number]  [id number]^TAQUERIA^MOHSAN     [facility]  Echocardiography Laboratory  [address] [place] [id number]     Name: NIURKA BEAR  MRN: [id number]  : 1950  Study Date: 2022 11:51 AM  Age: 71 yrs  Gender: Male  Patient Location: [facility]  Reason For Study: ECMO turndown  Ordering Physician: CHAUDHRY, MOHSAN  Referring Physician: VITALY DURAN  Performed By: Mike Dominguez RDCS     BSA: 2.3 m2  Height: 72 in  Weight: 230 lb  ______________________________________________________________________________  Procedure  Limited Portable Echo Adult.  ______________________________________________________________________________  Interpretation Summary  VA ECMO 3.3 LPM, 1;1 IABP.     Pt taken stat to CT for echogenic mass surrounding LV apex.  Unable to assess fxn.     Subcostal images of RV suggest there is proably some improvement in RV  function.     No pericardial  effusion is present.  ______________________________________________________________________________  Pericardium  No pericardial effusion is present.  ______________________________________________________________________________  Report approved by: Xuan MARTINEZ 02/20/2022 03:12 PM     ______________________________________________________________________________      CT Head w/o Contrast    Narrative    EXAM: CT HEAD W/O CONTRAST  2/20/2022 1:14 PM     HISTORY:  Headache, classic migraine       COMPARISON:  12/17/2022    TECHNIQUE: Helical CT of the head without IV contrast.  Coronal and  sagittal reformatted images were created, archived and reviewed at the  workstation for further assessment.    FINDINGS: Extensive streak artifacts from EEG leads surrounding the  head , limiting evaluation. Questionable gray-white matter  differentiation loss in the right occipital lobe, could be artifactual  from adjacent streak/photon starvation artifact versus true infarct  (series 6 image 24, 25).    No intracranial hemorrhage, mass effect, or midline shift. Gray-white  matter differentiation is preserved in the remainder of the brain. No  ventriculomegaly. Clear basal cisterns.    Atraumatic calvarium, skull base. Clear paranasal sinuses and mastoid  air cells. Intact globes and intraocular structures. Visualized  nasopharyngeal mucosal and upper cervical spinal structures are within  normal limits.      Impression    IMPRESSION:  Indeterminant gray-white matter loss in the right occipital lobe could  be artifactual from adjacent streak/photon starvation artifact versus  true infarct.    I have personally reviewed the examination and initial interpretation  and I agree with the findings.    SHARON WITT MD         SYSTEM ID:  [id number]       Labs:  Recent Labs   Lab 02/20/22  1549 02/20/22  1355 02/20/22  1151 02/20/22  0956   PH 7.41 7.41 7.44 7.44   PCO2 38 38 34* 35   PO2 74* 85 76* 83   HCO3 24 24 23 23   O2PER 60   60  60 60 60 60       Lab Results   Component Value Date    HGB 7.3 (L) 02/20/2022    PHGB <30 02/20/2022    PLT 66 (L) 02/20/2022    FIBR 351 02/20/2022    INR 1.14 02/20/2022    PTT 63 (H) 02/20/2022    DD 0.53 (H) 02/20/2022    ANTCH 65 (L) 02/20/2022         Plan is to remain stable on VA ECMO.      Shahbaz Gonzáles, RT  ECMO Specialist  2/20/2022 5:24 PM

## 2022-02-20 NOTE — PROGRESS NOTES
"[facility]: [place]  NeuroCritical Care Progress Note    Patient Name:  Chano Johnson  MRN:  [id number]    :  1950  Date of Service:  2022  Primary care provider:  Laith Limon      A/P:   Chano Johnson is a 71 year old male with a PMHx Afib s/p AF PVI ablation and Watchman (10/21/2021), Aflutter s/p ablation, HFpEF, hx tachycardia-mediated cardiomyopathy, HTN, HLD, ARSENIO, gout, GERD, EtOH use, and COVID infection (2021) who presents as transfer from [facility] for possible cardiac catheterization +/- ECMO after a Vfib arrest. The NCC service was consulted to evaluate for prognostication.     CTH no acute pathology  No seizures on EEG    When sedation weaned pt reportedly sits up in bed strong in  but was not following commands. Pt was resedated with 200 fent, 8 versed. Encouraging that pt was active off sedation, but still not able t prognosticate at this time.         NEURO RECS  - Continue vEEG while on sedation  - Consider repeat CT if pt is stable to go to exam.  - Avoid hypotonic solutions as they may worsen cerebral edema  - Avoid \"nitroprusside\",\"nitroglycerin\" as they may also worsen cerbral edema.  - Advise slow correction of any high Sodiums if needed  - When safe to do so, limitation of CNS acting medications will permit a more accurate neurological assessment  - We will continue to follow and monitor their EEG and neurologic exam, please call #[phone] with any questions      Physical Examination:   Pulse 96   Temp 98.4  F (36.9  C) (Rectal)   Resp 12   Ht 1.829 m (6')   Wt 101.7 kg (224 lb 3.3 oz)   SpO2 100%   BMI 30.41 kg/m    General: Adult male patient, lying in bed, NAD  HEENT: Ett  Cardiac: tele  Pulm: vent  Abdomen: Soft, bowel sounds present  Extremities: Warm, no edema  Skin: No rash or lesion     Neuro:  Sedated, not opening eyes or following commands. Pupils unequal L>R but reactive. +cough. withdrawals in /.     I have " personally reviewed all labs and imaging for this patient.      Patient discussed with attending neurologist, Dr. Chiara Sarah, DNP  Ascom: *[phone]

## 2022-02-20 NOTE — PHARMACY-VANCOMYCIN DOSING SERVICE
Pharmacy Empiric Dose Change Per Policy  Original Dose Ordered: Vancomycin 1000mg IV q24h (newly reduced regimen)  Dose Changed To: vancomycin intermittent dosing  This dose change was based on the pharmacist's assessment of this patient's changing renal function in the setting of SAHIL    Creatinine Clearance=     Estimated Creatinine Clearance: 25.5 mL/min (A) (based on SCr of 3.18 mg/dL (H)).  Will continue to follow and modify dosage according to levels, organ function and clinical condition

## 2022-02-20 NOTE — CONSULTS
Cardiothoracic Surgery Consultation Note  [facility]    Chano Johnson MRN# [id number]   Age: 71 year old YOB: 1950     Date of Admission:  2/17/2022    Reason for consult: hemothorax       Requesting physician: Dr. Andrews       Level of consult: Consult, follow and place orders           Assessment:   Chano Johnson is a 71 year old male admitted 2/17 s/p VF/VT arrest requiring 8 shocks, now on VA ECMO, s/p LAD stenting, found to have a large L sided hemothorax as well as smaller pericardial fluid and R sided hemothorax. On Radiology read, pt also with active extravasation from L internal mammary artery.  Thoracic surgery consulted to assist with management. Cause is likely chest compressions as pt has no other source of trauma. He is stable on VA ECMO at this time with decreasing pressor requirement. Currently on a heparin drip, PTT 93,  , INR 1.12. Heparin held since 3 pm for anticipated CT placement.          Recommendations:     - IR consult for embolization of L Internal mammary artery  - L sided chest tube placed at beside tonight with 1300 ml old blood return  - will place R sided chest tube at a later time if needed  - daily CXR  - resume heparin at discretion of IR and primary team  - thoracic surgery will follow    Patient discussed with thoracic fellow who discussed with staff     Tila Michael MD  General Surgery PGY2         History of Present Illness:   CC: c/f hemothorax    History obtained from primary team, chart review as well as pt's wife      Pt is a 71 year old male who presented to an outside ED on 2/17, where he had a VT/VF arrest 2/2 MI of the LAD.  He required 8 shocks as well as chest compressions, was transferred and cannulated to VA ECMO. Now s/p 2 stents to LAD. The primary team was attempting an ECMO wean today, noticed on bedside ECHO that there was a pleural effusion, so a CT was obtained which shows bilateral hemothorax, larger on L as well as  "pericardial blood and active extravasation form the L internal mammary artery.     Of note, pt's neurological outcome is still unclear but per pt's wife, any invasive proceedures are in line with goals of care until neurological outcome is known.            Past Medical History:   Gout  Atrial Fibrillation          Past Surgical History:     Past Surgical History:   Procedure Laterality Date     ATRIAL ABLATION SURGERY     Watchman defice          Social History:     Social History     Socioeconomic History     Marital status:      Spouse name: Not on file     Number of children: Not on file     Years of education: Not on file     Highest education level: Not on file   Occupational History     Not on file   Tobacco Use     Smoking status: Never Smoker     Smokeless tobacco: Not on file   Substance and Sexual Activity     Alcohol use: Yes     Alcohol/week: 2.0 standard drinks     Drug use: No     Sexual activity: Not on file   Other Topics Concern     Not on file   Social History Narrative     Not on file     Social Determinants of Health     Financial Resource Strain: Not on file   Food Insecurity: Not on file   Transportation Needs: Not on file   Physical Activity: Not on file   Stress: Not on file   Social Connections: Not on file   Intimate Partner Violence: Not on file   Housing Stability: Not on file             Family History:   No family history on file.          Allergies:      Allergies   Allergen Reactions     Chlorpheniramine-Phenylpropan [A.R.M.] Other (See Comments)     Watery eyes, sneezing     Percocet [Oxycodone-Acetaminophen] Rash     \"felt like skin was crawling off\"-pt's S.O.              Medications:   No current facility-administered medications on file prior to encounter.  ascorbic acid, vitamin C, (VITAMIN C) 500 MG tablet, [ASCORBIC ACID, VITAMIN C, (VITAMIN C) 500 MG TABLET] Take 500 mg by mouth daily.  multivitamin with minerals (THERA-M) 9 mg iron-400 mcg Tab tablet, [MULTIVITAMIN " WITH MINERALS (THERA-M) 9 MG IRON-400 MCG TAB TABLET] Take 1 tablet by mouth daily.  omeprazole (PRILOSEC) 20 MG capsule, [OMEPRAZOLE (PRILOSEC) 20 MG CAPSULE] Take 1 capsule (20 mg total) by mouth 2 (two) times a day before meals.  sildenafil (VIAGRA) 100 MG tablet, [SILDENAFIL (VIAGRA) 100 MG TABLET] Take 100 mg by mouth daily as needed for erectile dysfunction.              Review of Systems:      All other review of systems negative, except for what is mentioned above        Physical Exam:   Pulse 87   Temp 98.2  F (36.8  C)   Resp 12   Ht 1.829 m (6')   Wt 101.7 kg (224 lb 3.3 oz)   SpO2 100%   BMI 30.41 kg/m    General: Intubated, sedated. Does not respond to voice  Resp: non-labored breathing on vent   Cardiac: VA ECMO via L groin   Abdomen: Soft, obese, nondistended. +BS.  No HSM or masses, no rebound or guarding.  Extremities: No LE edema or obvious joint abnormalities  Skin: Warm and dry, no jaundice or rash            Data:       EXAMINATION: CT CHEST/ABDOMEN/PELVIS W CONTRAST, 2/20/2022 1:17 PM     TECHNIQUE:  Helical CT of the chest, abdomen and pelvis with contrast.   Coronal and sagittal reformatted images were created, archived and  reviewed at the workstation for further assessment.     CONTRAST: Isovue 370.     COMPARISON: 2/17/2022.     HISTORY: Status post CPR     FINDINGS:     LINES/TUBES/DEVICES: Tracheostomy tube is within the distal thoracic  trachea. Enteric tube is in the proximal duodenum. Left femoral vein  approach ECMO cannula tip is in the SVC. Left arterial catheter tip is  positioned in the left common iliac artery. Right femoral vein  approach central venous catheter tip is positioned in the retrohepatic  IVC. Right femoral artery approach aortic balloon pump tip is  positioned in the proximal aorta distal to the left subclavian artery.    Tapia catheter within the bladder.     CHEST: Anterior mediastinal fat hyper attenuation and expansion with  an ill-defined hyperdensity  (coronal series 5, image 26). Bilateral  large pleural effusions and lower lobe atelectasis.     The thyroid, esophagus, lymph nodes, and major vascular structures are  within normal limits.     ABDOMEN/PELVIS: The stomach, liver, biliary system, pancreas, spleen,  adrenal glands, large and small bowel, kidneys, ureters, bladder,  reproductive organs, major vascular structures, peritoneum and  retroperitoneum and lymph nodes are within normal limits.      BONES, BODY WALL AND SOFT TISSUES: No suspicious lesions. Multilevel  vertebral body, facet degenerative changes.                                                                      IMPRESSION:    1. Retrosternal ill-defined anterior mediastinal fat hemorrhage from  recent resuscitation attempt with focus of suspected active bleeding  from the left internal mammary artery.  3. No acute findings in the abdomen/pelvis.  4. Support devices as detailed in the body of the report            Tila Michael MD (PGY-2)  General Surgery Resident  Thoracic Surgery

## 2022-02-20 NOTE — PHARMACY-VANCOMYCIN DOSING SERVICE
Pharmacy Vancomycin Note  Date of Service 2022  Patient's  1950   71 year old, male    Indication: Aspiration Pneumonia  Day of Therapy: 3  Current vancomycin regimen:  1750 mg IV q24h  Current vancomycin monitoring method: AUC  Current vancomycin therapeutic monitoring goal: 400-600 mg*h/L    InsightRX Prediction of Current Vancomycin Regimen  Loading dose: N/A  Regimen: 1750 mg IV every 24 hours.  Start time: 22:00 on 2022  Exposure target: AUC24 (range)400-600 mg/L.hr   AUC24,ss: 1049 mg/L.hr  Probability of AUC24 > 400: 100 %  Ctrough,ss: 36.7 mg/L  Probability of Ctrough,ss > 20: 97 %  Probability of nephrotoxicity (Lodise WILFRED ): 62 %      Current estimated CrCl = Estimated Creatinine Clearance: 29.4 mL/min (A) (based on SCr of 2.78 mg/dL (H)).    Creatinine for last 3 days  2022:  6:01 PM Creatinine 1.41 mg/dL;  9:52 PM Creatinine 1.66 mg/dL  2022:  3:50 AM Creatinine 1.86 mg/dL; 10:19 AM Creatinine 2.03 mg/dL;  3:45 PM Creatinine 2.16 mg/dL;  9:54 PM Creatinine 2.34 mg/dL  2022:  3:42 AM Creatinine 2.39 mg/dL; 10:01 AM Creatinine 2.56 mg/dL;  3:49 PM Creatinine 2.78 mg/dL    Recent Vancomycin Levels (past 3 days)  2022:  7:46 PM Vancomycin 17.2 mg/L (22.75h)    Vancomycin IV Administrations (past 72 hours)                   vancomycin (VANCOCIN) 1,750 mg in sodium chloride 0.9 % 250 mL intermittent infusion (mg) 1,750 mg New Bag 22    vancomycin (VANCOCIN) 2,000 mg in sodium chloride 0.9 % 250 mL intermittent infusion (mg) 2,000 mg New Bag 22                Nephrotoxins and other renal medications (From now, onward)    Start     Dose/Rate Route Frequency Ordered Stop    22  vancomycin (VANCOCIN) 1,750 mg in sodium chloride 0.9 % 250 mL intermittent infusion         1,750 mg (central catheter)  over 90 Minutes Intravenous EVERY 24 HOURS 22 4572  piperacillin-tazobactam (ZOSYN) 3.375 g vial to attach to  " mL bag        Note to Pharmacy: For [facility], [facility] and [facility]: For Zosyn-naive patients, use the \"Zosyn initial dose + extended infusion\" order panel.    3.375 g  over 30 Minutes Intravenous EVERY 6 HOURS 02/17/22 1817 02/22/22 1829    02/17/22 1700  vasopressin 0.2 units/mL in NS (PITRESSIN) standard conc infusion         0.5-4 Units/hr  2.5-20 mL/hr  Intravenous CONTINUOUS 02/17/22 1650      02/17/22 1650  norepinephrine (LEVOPHED) 16 mg in  mL infusion MAX CONC CENTRAL LINE         0.01-0.6 mcg/kg/min × 99.8 kg  0.9-56.1 mL/hr  Intravenous CONTINUOUS PRN 02/17/22 1650               Contrast Orders - past 72 hours (72h ago, onward)            None          Interpretation of levels and current regimen:  Vancomycin level is reflective of AUC greater than 600    Has serum creatinine changed greater than 50% in last 72 hours: Yes    Urine output:  good urine output    Renal Function: Worsening    InsightRX Prediction of Planned New Vancomycin Regimen  Loading dose: N/A  Regimen: 1000 mg IV every 24 hours.  Start time: 22:00 on 02/20/2022  Exposure target: AUC24 (range)400-600 mg/L.hr   AUC24,ss: 616 mg/L.hr  Probability of AUC24 > 400: 97 %  Ctrough,ss: 21.6 mg/L  Probability of Ctrough,ss > 20: 60 %  Probability of nephrotoxicity (Lodise WILFRED 2009): 20 %      Plan:  1. Decrease Dose to 1000mg IV q24h  2. Vancomycin monitoring method: AUC -- if SCr continues to worsen will not be reliable  3. Vancomycin therapeutic monitoring goal: 400-600 mg*h/L  4. Pharmacy will check vancomycin levels as appropriate in 1-3 Days.  5. Serum creatinine levels will be ordered daily for the first week of therapy and at least twice weekly for subsequent weeks.    Tiera Mendez, [facility]    "

## 2022-02-21 ENCOUNTER — APPOINTMENT (OUTPATIENT)
Dept: GENERAL RADIOLOGY | Facility: CLINIC | Age: 72
DRG: 003 | End: 2022-02-21
Payer: MEDICARE

## 2022-02-21 ENCOUNTER — APPOINTMENT (OUTPATIENT)
Dept: NEUROLOGY | Facility: CLINIC | Age: 72
DRG: 003 | End: 2022-02-21
Payer: MEDICARE

## 2022-02-21 ENCOUNTER — DOCUMENTATION ONLY (OUTPATIENT)
Dept: OTHER | Facility: CLINIC | Age: 72
End: 2022-02-21

## 2022-02-21 LAB
ACT BLD: 105 SECONDS (ref 74–150)
ACT BLD: 109 SECONDS (ref 74–150)
ACT BLD: 122 SECONDS (ref 74–150)
ACT BLD: 122 SECONDS (ref 74–150)
ACT BLD: 126 SECONDS (ref 74–150)
ACT BLD: 131 SECONDS (ref 74–150)
ACT BLD: 131 SECONDS (ref 74–150)
ACT BLD: 147 SECONDS (ref 74–150)
ALBUMIN SERPL-MCNC: 2.3 G/DL (ref 3.4–5)
ALBUMIN SERPL-MCNC: 2.4 G/DL (ref 3.4–5)
ALBUMIN SERPL-MCNC: 2.5 G/DL (ref 3.4–5)
ALBUMIN SERPL-MCNC: 2.5 G/DL (ref 3.4–5)
ALP SERPL-CCNC: 18 U/L (ref 40–150)
ALP SERPL-CCNC: 19 U/L (ref 40–150)
ALP SERPL-CCNC: 21 U/L (ref 40–150)
ALP SERPL-CCNC: 21 U/L (ref 40–150)
ALT SERPL W P-5'-P-CCNC: 47 U/L (ref 0–70)
ALT SERPL W P-5'-P-CCNC: 48 U/L (ref 0–70)
ALT SERPL W P-5'-P-CCNC: 53 U/L (ref 0–70)
ALT SERPL W P-5'-P-CCNC: 55 U/L (ref 0–70)
ANION GAP SERPL CALCULATED.3IONS-SCNC: 4 MMOL/L (ref 3–14)
ANION GAP SERPL CALCULATED.3IONS-SCNC: 5 MMOL/L (ref 3–14)
ANION GAP SERPL CALCULATED.3IONS-SCNC: 5 MMOL/L (ref 3–14)
ANION GAP SERPL CALCULATED.3IONS-SCNC: 6 MMOL/L (ref 3–14)
APTT PPP: 30 SECONDS (ref 22–38)
APTT PPP: 30 SECONDS (ref 22–38)
APTT PPP: 31 SECONDS (ref 22–38)
APTT PPP: 34 SECONDS (ref 22–38)
AST SERPL W P-5'-P-CCNC: 62 U/L (ref 0–45)
AST SERPL W P-5'-P-CCNC: 64 U/L (ref 0–45)
AST SERPL W P-5'-P-CCNC: 68 U/L (ref 0–45)
AST SERPL W P-5'-P-CCNC: 76 U/L (ref 0–45)
AT III ACT/NOR PPP CHRO: 76 % (ref 85–135)
ATRIAL RATE - MUSE: 110 BPM
ATRIAL RATE - MUSE: 63 BPM
ATRIAL RATE - MUSE: 80 BPM
ATRIAL RATE - MUSE: 89 BPM
ATRIAL RATE - MUSE: 99 BPM
BASE EXCESS BLDA CALC-SCNC: -0.1 MMOL/L (ref -9–1.8)
BASE EXCESS BLDA CALC-SCNC: -0.2 MMOL/L (ref -9–1.8)
BASE EXCESS BLDA CALC-SCNC: -0.4 MMOL/L (ref -9–1.8)
BASE EXCESS BLDA CALC-SCNC: -0.8 MMOL/L (ref -9–1.8)
BASE EXCESS BLDA CALC-SCNC: 0 MMOL/L (ref -9–1.8)
BASE EXCESS BLDA CALC-SCNC: 0.1 MMOL/L (ref -9–1.8)
BASE EXCESS BLDA CALC-SCNC: 0.1 MMOL/L (ref -9–1.8)
BASE EXCESS BLDA CALC-SCNC: 0.3 MMOL/L (ref -9–1.8)
BASE EXCESS BLDA CALC-SCNC: 0.5 MMOL/L (ref -9–1.8)
BASE EXCESS BLDA CALC-SCNC: 0.6 MMOL/L (ref -9–1.8)
BASE EXCESS BLDA CALC-SCNC: 0.7 MMOL/L (ref -9–1.8)
BASE EXCESS BLDA CALC-SCNC: 1 MMOL/L (ref -9–1.8)
BASE EXCESS BLDA CALC-SCNC: 1.4 MMOL/L (ref -9–1.8)
BASE EXCESS BLDA CALC-SCNC: 1.5 MMOL/L (ref -9–1.8)
BASE EXCESS BLDV CALC-SCNC: 0.3 MMOL/L (ref -7.7–1.9)
BASE EXCESS BLDV CALC-SCNC: 0.9 MMOL/L (ref -7.7–1.9)
BILIRUB SERPL-MCNC: 0.4 MG/DL (ref 0.2–1.3)
BILIRUB SERPL-MCNC: 0.4 MG/DL (ref 0.2–1.3)
BILIRUB SERPL-MCNC: 0.5 MG/DL (ref 0.2–1.3)
BILIRUB SERPL-MCNC: 0.5 MG/DL (ref 0.2–1.3)
BLAIMP ISLT/SPM QL: NOT DETECTED
BLAKPC ISLT/SPM QL: NOT DETECTED
BLAOXA-48 ISLT/SPM QL: NOT DETECTED
BLAVIM ISLT/SPM QL: NOT DETECTED
BLD PROD TYP BPU: NORMAL
BLOOD COMPONENT TYPE: NORMAL
BUN SERPL-MCNC: 65 MG/DL (ref 7–30)
BUN SERPL-MCNC: 68 MG/DL (ref 7–30)
BUN SERPL-MCNC: 75 MG/DL (ref 7–30)
BUN SERPL-MCNC: 85 MG/DL (ref 7–30)
CA-I BLD-MCNC: 4.5 MG/DL (ref 4.4–5.2)
CA-I BLD-MCNC: 4.5 MG/DL (ref 4.4–5.2)
CA-I BLD-MCNC: 4.6 MG/DL (ref 4.4–5.2)
CA-I BLD-MCNC: 4.6 MG/DL (ref 4.4–5.2)
CALCIUM SERPL-MCNC: 7.7 MG/DL (ref 8.5–10.1)
CALCIUM SERPL-MCNC: 7.7 MG/DL (ref 8.5–10.1)
CALCIUM SERPL-MCNC: 7.8 MG/DL (ref 8.5–10.1)
CALCIUM SERPL-MCNC: 7.8 MG/DL (ref 8.5–10.1)
CF REDUC 30M P MA P HEP LENFR BLD TEG: 0.4 % (ref 0–8)
CF REDUC 60M P MA P HEPASE LENFR BLD TEG: 2.9 % (ref 0–15)
CFT P HPASE BLD TEG: 1.8 MINUTE (ref 1–3)
CHLORIDE BLD-SCNC: 115 MMOL/L (ref 94–109)
CHLORIDE BLD-SCNC: 116 MMOL/L (ref 94–109)
CI (COAGULATION INDEX)(Z): 0.3 (ref -3–3)
CLOT ANGLE P HPASE BLD TEG: 64.9 DEGREES (ref 53–72)
CLOT INIT P HPASE BLD TEG: 4.9 MINUTE (ref 5–10)
CLOT STRENGTH P HPASE BLD TEG: 6.9 KD/SC (ref 4.5–11)
CO2 SERPL-SCNC: 23 MMOL/L (ref 20–32)
CO2 SERPL-SCNC: 24 MMOL/L (ref 20–32)
CO2 SERPL-SCNC: 25 MMOL/L (ref 20–32)
CO2 SERPL-SCNC: 25 MMOL/L (ref 20–32)
CODING SYSTEM: NORMAL
CREAT SERPL-MCNC: 3.61 MG/DL (ref 0.66–1.25)
CREAT SERPL-MCNC: 3.75 MG/DL (ref 0.66–1.25)
CREAT SERPL-MCNC: 3.76 MG/DL (ref 0.66–1.25)
CREAT SERPL-MCNC: 3.86 MG/DL (ref 0.66–1.25)
CROSSMATCH: NORMAL
CRP SERPL-MCNC: 55 MG/L (ref 0–8)
D DIMER PPP FEU-MCNC: 1.51 UG/ML FEU (ref 0–0.5)
D DIMER PPP FEU-MCNC: 2.81 UG/ML FEU (ref 0–0.5)
DIASTOLIC BLOOD PRESSURE - MUSE: NORMAL MMHG
ERYTHROCYTE [DISTWIDTH] IN BLOOD BY AUTOMATED COUNT: 15.6 % (ref 10–15)
ERYTHROCYTE [DISTWIDTH] IN BLOOD BY AUTOMATED COUNT: 15.7 % (ref 10–15)
ERYTHROCYTE [DISTWIDTH] IN BLOOD BY AUTOMATED COUNT: 15.8 % (ref 10–15)
ERYTHROCYTE [DISTWIDTH] IN BLOOD BY AUTOMATED COUNT: 15.8 % (ref 10–15)
ERYTHROCYTE [DISTWIDTH] IN BLOOD BY AUTOMATED COUNT: 15.9 % (ref 10–15)
ERYTHROCYTE [SEDIMENTATION RATE] IN BLOOD BY WESTERGREN METHOD: 36 MM/HR (ref 0–20)
FIBRINOGEN PPP-MCNC: 287 MG/DL (ref 170–490)
FIBRINOGEN PPP-MCNC: 302 MG/DL (ref 170–490)
GFR SERPL CREATININE-BSD FRML MDRD: 16 ML/MIN/1.73M2
GFR SERPL CREATININE-BSD FRML MDRD: 17 ML/MIN/1.73M2
GLUCOSE BLD-MCNC: 127 MG/DL (ref 70–99)
GLUCOSE BLD-MCNC: 134 MG/DL (ref 70–99)
GLUCOSE BLD-MCNC: 135 MG/DL (ref 70–99)
GLUCOSE BLD-MCNC: 136 MG/DL (ref 70–99)
GLUCOSE BLD-MCNC: 136 MG/DL (ref 70–99)
GLUCOSE BLD-MCNC: 137 MG/DL (ref 70–99)
GLUCOSE BLD-MCNC: 141 MG/DL (ref 70–99)
GLUCOSE BLD-MCNC: 145 MG/DL (ref 70–99)
GLUCOSE BLDC GLUCOMTR-MCNC: 127 MG/DL (ref 70–99)
GLUCOSE BLDC GLUCOMTR-MCNC: 127 MG/DL (ref 70–99)
GLUCOSE BLDC GLUCOMTR-MCNC: 131 MG/DL (ref 70–99)
GLUCOSE BLDC GLUCOMTR-MCNC: 134 MG/DL (ref 70–99)
GLUCOSE BLDC GLUCOMTR-MCNC: 142 MG/DL (ref 70–99)
GLUCOSE BLDC GLUCOMTR-MCNC: 142 MG/DL (ref 70–99)
GLUCOSE BLDC GLUCOMTR-MCNC: 143 MG/DL (ref 70–99)
GLUCOSE BLDC GLUCOMTR-MCNC: 143 MG/DL (ref 70–99)
GLUCOSE BLDC GLUCOMTR-MCNC: 151 MG/DL (ref 70–99)
HCO3 BLD-SCNC: 24 MMOL/L (ref 21–28)
HCO3 BLD-SCNC: 25 MMOL/L (ref 21–28)
HCO3 BLDA-SCNC: 25 MMOL/L (ref 21–28)
HCO3 BLDA-SCNC: 26 MMOL/L (ref 21–28)
HCO3 BLDV-SCNC: 26 MMOL/L (ref 21–28)
HCO3 BLDV-SCNC: 27 MMOL/L (ref 21–28)
HCT VFR BLD AUTO: 21.9 % (ref 40–53)
HCT VFR BLD AUTO: 22 % (ref 40–53)
HCT VFR BLD AUTO: 22.3 % (ref 40–53)
HCT VFR BLD AUTO: 22.3 % (ref 40–53)
HCT VFR BLD AUTO: 23.9 % (ref 40–53)
HGB BLD-MCNC: 7.3 G/DL (ref 13.3–17.7)
HGB BLD-MCNC: 7.5 G/DL (ref 13.3–17.7)
HGB BLD-MCNC: 7.6 G/DL (ref 13.3–17.7)
HGB BLD-MCNC: 7.6 G/DL (ref 13.3–17.7)
HGB BLD-MCNC: 7.9 G/DL (ref 13.3–17.7)
HGB FREE PLAS-MCNC: <30 MG/DL
INR PPP: 1.12 (ref 0.85–1.15)
INR PPP: 1.13 (ref 0.85–1.15)
INR PPP: 1.15 (ref 0.85–1.15)
INR PPP: 1.15 (ref 0.85–1.15)
INTERPRETATION ECG - MUSE: NORMAL
INTERPRETATION TEGPIA: NORMAL
ISSUE DATE AND TIME: NORMAL
LACTATE SERPL-SCNC: 1.3 MMOL/L (ref 0.7–2)
LACTATE SERPL-SCNC: 1.5 MMOL/L (ref 0.7–2)
LACTATE SERPL-SCNC: 1.8 MMOL/L (ref 0.7–2)
LACTATE SERPL-SCNC: 1.8 MMOL/L (ref 0.7–2)
LDH SERPL L TO P-CCNC: 381 U/L (ref 85–227)
MAGNESIUM SERPL-MCNC: 2.5 MG/DL (ref 1.8–2.6)
MAGNESIUM SERPL-MCNC: 2.6 MG/DL (ref 1.8–2.6)
MAGNESIUM SERPL-MCNC: 2.6 MG/DL (ref 1.8–2.6)
MCF P HPASE BLD TEG: 57.8 MM (ref 50–70)
MCH RBC QN AUTO: 30.2 PG (ref 26.5–33)
MCH RBC QN AUTO: 30.3 PG (ref 26.5–33)
MCH RBC QN AUTO: 30.5 PG (ref 26.5–33)
MCH RBC QN AUTO: 31 PG (ref 26.5–33)
MCH RBC QN AUTO: 31.1 PG (ref 26.5–33)
MCHC RBC AUTO-ENTMCNC: 33.1 G/DL (ref 31.5–36.5)
MCHC RBC AUTO-ENTMCNC: 33.2 G/DL (ref 31.5–36.5)
MCHC RBC AUTO-ENTMCNC: 33.6 G/DL (ref 31.5–36.5)
MCHC RBC AUTO-ENTMCNC: 34.1 G/DL (ref 31.5–36.5)
MCHC RBC AUTO-ENTMCNC: 34.7 G/DL (ref 31.5–36.5)
MCV RBC AUTO: 89 FL (ref 78–100)
MCV RBC AUTO: 91 FL (ref 78–100)
MRSA DNA SPEC QL NAA+PROBE: NEGATIVE
NDM TARGET DNA: NOT DETECTED
O2/TOTAL GAS SETTING VFR VENT: 40 %
O2/TOTAL GAS SETTING VFR VENT: 45 %
O2/TOTAL GAS SETTING VFR VENT: 50 %
O2/TOTAL GAS SETTING VFR VENT: 50 %
O2/TOTAL GAS SETTING VFR VENT: 60 %
OXYHGB MFR BLD: 97 % (ref 92–100)
OXYHGB MFR BLD: 98 % (ref 92–100)
OXYHGB MFR BLDA: 98 % (ref 75–100)
OXYHGB MFR BLDA: 99 % (ref 75–100)
OXYHGB MFR BLDV: 80 %
OXYHGB MFR BLDV: 82 %
P AXIS - MUSE: 33 DEGREES
P AXIS - MUSE: 68 DEGREES
P AXIS - MUSE: 78 DEGREES
P AXIS - MUSE: NORMAL DEGREES
P AXIS - MUSE: NORMAL DEGREES
PA AA BLD-ACNC: 95 %
PA ADP BLD-ACNC: 95 %
PCO2 BLD: 33 MM HG (ref 35–45)
PCO2 BLD: 35 MM HG (ref 35–45)
PCO2 BLD: 36 MM HG (ref 35–45)
PCO2 BLD: 37 MM HG (ref 35–45)
PCO2 BLD: 37 MM HG (ref 35–45)
PCO2 BLD: 38 MM HG (ref 35–45)
PCO2 BLD: 38 MM HG (ref 35–45)
PCO2 BLD: 40 MM HG (ref 35–45)
PCO2 BLDA: 42 MM HG (ref 35–45)
PCO2 BLDA: 43 MM HG (ref 35–45)
PCO2 BLDV: 46 MM HG (ref 40–50)
PCO2 BLDV: 48 MM HG (ref 40–50)
PH BLD: 7.39 [PH] (ref 7.35–7.45)
PH BLD: 7.41 [PH] (ref 7.35–7.45)
PH BLD: 7.41 [PH] (ref 7.35–7.45)
PH BLD: 7.42 [PH] (ref 7.35–7.45)
PH BLD: 7.43 [PH] (ref 7.35–7.45)
PH BLD: 7.45 [PH] (ref 7.35–7.45)
PH BLD: 7.45 [PH] (ref 7.35–7.45)
PH BLD: 7.46 [PH] (ref 7.35–7.45)
PH BLD: 7.47 [PH] (ref 7.35–7.45)
PH BLDA: 7.38 [PH] (ref 7.35–7.45)
PH BLDA: 7.38 [PH] (ref 7.35–7.45)
PH BLDV: 7.36 [PH] (ref 7.32–7.43)
PH BLDV: 7.36 [PH] (ref 7.32–7.43)
PHOSPHATE SERPL-MCNC: 4.3 MG/DL (ref 2.5–4.5)
PLATELET # BLD AUTO: 104 10E3/UL (ref 150–450)
PLATELET # BLD AUTO: 105 10E3/UL (ref 150–450)
PLATELET # BLD AUTO: 106 10E3/UL (ref 150–450)
PLATELET # BLD AUTO: 59 10E3/UL (ref 150–450)
PLATELET # BLD AUTO: 61 10E3/UL (ref 150–450)
PO2 BLD: 101 MM HG (ref 80–105)
PO2 BLD: 102 MM HG (ref 80–105)
PO2 BLD: 102 MM HG (ref 80–105)
PO2 BLD: 105 MM HG (ref 80–105)
PO2 BLD: 105 MM HG (ref 80–105)
PO2 BLD: 125 MM HG (ref 80–105)
PO2 BLD: 129 MM HG (ref 80–105)
PO2 BLD: 133 MM HG (ref 80–105)
PO2 BLD: 146 MM HG (ref 80–105)
PO2 BLD: 91 MM HG (ref 80–105)
PO2 BLD: 92 MM HG (ref 80–105)
PO2 BLD: 99 MM HG (ref 80–105)
PO2 BLDA: 233 MM HG (ref 80–105)
PO2 BLDA: 279 MM HG (ref 80–105)
PO2 BLDV: 46 MM HG (ref 25–47)
PO2 BLDV: 48 MM HG (ref 25–47)
POTASSIUM BLD-SCNC: 4.5 MMOL/L (ref 3.4–5.3)
POTASSIUM BLD-SCNC: 4.6 MMOL/L (ref 3.4–5.3)
POTASSIUM BLD-SCNC: 4.6 MMOL/L (ref 3.4–5.3)
POTASSIUM BLD-SCNC: 4.8 MMOL/L (ref 3.4–5.3)
PR INTERVAL - MUSE: 142 MS
PR INTERVAL - MUSE: 142 MS
PR INTERVAL - MUSE: 150 MS
PR INTERVAL - MUSE: 156 MS
PR INTERVAL - MUSE: 174 MS
PROT SERPL-MCNC: 4.6 G/DL (ref 6.8–8.8)
PROT SERPL-MCNC: 4.8 G/DL (ref 6.8–8.8)
QRS DURATION - MUSE: 70 MS
QRS DURATION - MUSE: 76 MS
QRS DURATION - MUSE: 78 MS
QRS DURATION - MUSE: 80 MS
QRS DURATION - MUSE: 90 MS
QT - MUSE: 332 MS
QT - MUSE: 356 MS
QT - MUSE: 366 MS
QT - MUSE: 416 MS
QT - MUSE: 446 MS
QTC - MUSE: 426 MS
QTC - MUSE: 433 MS
QTC - MUSE: 456 MS
QTC - MUSE: 479 MS
QTC - MUSE: 495 MS
R AXIS - MUSE: 59 DEGREES
R AXIS - MUSE: 66 DEGREES
R AXIS - MUSE: 75 DEGREES
R AXIS - MUSE: 85 DEGREES
R AXIS - MUSE: 87 DEGREES
RBC # BLD AUTO: 2.41 10E6/UL (ref 4.4–5.9)
RBC # BLD AUTO: 2.44 10E6/UL (ref 4.4–5.9)
RBC # BLD AUTO: 2.45 10E6/UL (ref 4.4–5.9)
RBC # BLD AUTO: 2.46 10E6/UL (ref 4.4–5.9)
RBC # BLD AUTO: 2.62 10E6/UL (ref 4.4–5.9)
SA TARGET DNA: NEGATIVE
SODIUM SERPL-SCNC: 143 MMOL/L (ref 133–144)
SODIUM SERPL-SCNC: 145 MMOL/L (ref 133–144)
SYSTOLIC BLOOD PRESSURE - MUSE: NORMAL MMHG
T AXIS - MUSE: 35 DEGREES
T AXIS - MUSE: 58 DEGREES
T AXIS - MUSE: 68 DEGREES
T AXIS - MUSE: 74 DEGREES
T AXIS - MUSE: 84 DEGREES
TROPONIN I SERPL HS-MCNC: ABNORMAL NG/L
UFH PPP CHRO-ACNC: <0.1 IU/ML
UNIT ABO/RH: NORMAL
UNIT NUMBER: NORMAL
UNIT STATUS: NORMAL
UNIT TYPE ISBT: 5100
UNIT TYPE ISBT: 9500
VANCOMYCIN SERPL-MCNC: 20.2 MG/L
VENTRICULAR RATE- MUSE: 110 BPM
VENTRICULAR RATE- MUSE: 63 BPM
VENTRICULAR RATE- MUSE: 80 BPM
VENTRICULAR RATE- MUSE: 89 BPM
VENTRICULAR RATE- MUSE: 99 BPM
WBC # BLD AUTO: 8.9 10E3/UL (ref 4–11)
WBC # BLD AUTO: 8.9 10E3/UL (ref 4–11)
WBC # BLD AUTO: 9.1 10E3/UL (ref 4–11)
WBC # BLD AUTO: 9.3 10E3/UL (ref 4–11)
WBC # BLD AUTO: 9.4 10E3/UL (ref 4–11)

## 2022-02-21 PROCEDURE — C9113 INJ PANTOPRAZOLE SODIUM, VIA: HCPCS | Performed by: INTERNAL MEDICINE

## 2022-02-21 PROCEDURE — 85610 PROTHROMBIN TIME: CPT | Performed by: INTERNAL MEDICINE

## 2022-02-21 PROCEDURE — 258N000003 HC RX IP 258 OP 636: Performed by: INTERNAL MEDICINE

## 2022-02-21 PROCEDURE — 93010 ELECTROCARDIOGRAM REPORT: CPT | Mod: 59 | Performed by: INTERNAL MEDICINE

## 2022-02-21 PROCEDURE — 200N000002 HC R&B ICU UMMC

## 2022-02-21 PROCEDURE — 999N000155 HC STATISTIC RAPCV CVP MONITORING

## 2022-02-21 PROCEDURE — 87798 DETECT AGENT NOS DNA AMP: CPT | Performed by: INTERNAL MEDICINE

## 2022-02-21 PROCEDURE — 250N000013 HC RX MED GY IP 250 OP 250 PS 637: Performed by: INTERNAL MEDICINE

## 2022-02-21 PROCEDURE — 83615 LACTATE (LD) (LDH) ENZYME: CPT | Performed by: INTERNAL MEDICINE

## 2022-02-21 PROCEDURE — 250N000011 HC RX IP 250 OP 636: Performed by: INTERNAL MEDICINE

## 2022-02-21 PROCEDURE — 999N000157 HC STATISTIC RCP TIME EA 10 MIN

## 2022-02-21 PROCEDURE — 99231 SBSQ HOSP IP/OBS SF/LOW 25: CPT | Performed by: PHYSICIAN ASSISTANT

## 2022-02-21 PROCEDURE — 82330 ASSAY OF CALCIUM: CPT | Performed by: INTERNAL MEDICINE

## 2022-02-21 PROCEDURE — 999N000075 HC STATISTIC IABP MONITORING

## 2022-02-21 PROCEDURE — 82947 ASSAY GLUCOSE BLOOD QUANT: CPT | Performed by: INTERNAL MEDICINE

## 2022-02-21 PROCEDURE — 85520 HEPARIN ASSAY: CPT | Performed by: INTERNAL MEDICINE

## 2022-02-21 PROCEDURE — 83735 ASSAY OF MAGNESIUM: CPT | Performed by: INTERNAL MEDICINE

## 2022-02-21 PROCEDURE — 84155 ASSAY OF PROTEIN SERUM: CPT | Performed by: INTERNAL MEDICINE

## 2022-02-21 PROCEDURE — 85730 THROMBOPLASTIN TIME PARTIAL: CPT | Performed by: INTERNAL MEDICINE

## 2022-02-21 PROCEDURE — 85396 CLOTTING ASSAY WHOLE BLOOD: CPT | Performed by: PHYSICIAN ASSISTANT

## 2022-02-21 PROCEDURE — 82803 BLOOD GASES ANY COMBINATION: CPT | Performed by: INTERNAL MEDICINE

## 2022-02-21 PROCEDURE — 84484 ASSAY OF TROPONIN QUANT: CPT | Performed by: INTERNAL MEDICINE

## 2022-02-21 PROCEDURE — 86140 C-REACTIVE PROTEIN: CPT | Performed by: INTERNAL MEDICINE

## 2022-02-21 PROCEDURE — 85384 FIBRINOGEN ACTIVITY: CPT | Performed by: INTERNAL MEDICINE

## 2022-02-21 PROCEDURE — 87641 MR-STAPH DNA AMP PROBE: CPT

## 2022-02-21 PROCEDURE — 272N000143 HC KIT CR3

## 2022-02-21 PROCEDURE — 95714 VEEG EA 12-26 HR UNMNTR: CPT

## 2022-02-21 PROCEDURE — 99232 SBSQ HOSP IP/OBS MODERATE 35: CPT | Mod: 25 | Performed by: PSYCHIATRY & NEUROLOGY

## 2022-02-21 PROCEDURE — 85014 HEMATOCRIT: CPT | Performed by: INTERNAL MEDICINE

## 2022-02-21 PROCEDURE — 250N000011 HC RX IP 250 OP 636

## 2022-02-21 PROCEDURE — 99232 SBSQ HOSP IP/OBS MODERATE 35: CPT | Mod: GC | Performed by: INTERNAL MEDICINE

## 2022-02-21 PROCEDURE — 84100 ASSAY OF PHOSPHORUS: CPT | Performed by: INTERNAL MEDICINE

## 2022-02-21 PROCEDURE — 85027 COMPLETE CBC AUTOMATED: CPT | Performed by: INTERNAL MEDICINE

## 2022-02-21 PROCEDURE — 85347 COAGULATION TIME ACTIVATED: CPT

## 2022-02-21 PROCEDURE — 71045 X-RAY EXAM CHEST 1 VIEW: CPT | Mod: 26 | Performed by: RADIOLOGY

## 2022-02-21 PROCEDURE — 33949 ECMO/ECLS DAILY MGMT ARTERY: CPT

## 2022-02-21 PROCEDURE — 999N000015 HC STATISTIC ARTERIAL MONITORING DAILY

## 2022-02-21 PROCEDURE — P9037 PLATE PHERES LEUKOREDU IRRAD: HCPCS | Performed by: INTERNAL MEDICINE

## 2022-02-21 PROCEDURE — 82805 BLOOD GASES W/O2 SATURATION: CPT | Performed by: INTERNAL MEDICINE

## 2022-02-21 PROCEDURE — 71045 X-RAY EXAM CHEST 1 VIEW: CPT

## 2022-02-21 PROCEDURE — 250N000013 HC RX MED GY IP 250 OP 250 PS 637: Performed by: PHYSICIAN ASSISTANT

## 2022-02-21 PROCEDURE — 82040 ASSAY OF SERUM ALBUMIN: CPT | Performed by: INTERNAL MEDICINE

## 2022-02-21 PROCEDURE — 85379 FIBRIN DEGRADATION QUANT: CPT | Performed by: INTERNAL MEDICINE

## 2022-02-21 PROCEDURE — 80053 COMPREHEN METABOLIC PANEL: CPT | Performed by: INTERNAL MEDICINE

## 2022-02-21 PROCEDURE — 33949 ECMO/ECLS DAILY MGMT ARTERY: CPT | Performed by: STUDENT IN AN ORGANIZED HEALTH CARE EDUCATION/TRAINING PROGRAM

## 2022-02-21 PROCEDURE — 80202 ASSAY OF VANCOMYCIN: CPT

## 2022-02-21 PROCEDURE — 99291 CRITICAL CARE FIRST HOUR: CPT | Mod: 25 | Performed by: STUDENT IN AN ORGANIZED HEALTH CARE EDUCATION/TRAINING PROGRAM

## 2022-02-21 PROCEDURE — 95720 EEG PHY/QHP EA INCR W/VEEG: CPT | Performed by: PSYCHIATRY & NEUROLOGY

## 2022-02-21 PROCEDURE — 83605 ASSAY OF LACTIC ACID: CPT | Performed by: INTERNAL MEDICINE

## 2022-02-21 PROCEDURE — 94003 VENT MGMT INPAT SUBQ DAY: CPT

## 2022-02-21 PROCEDURE — 85652 RBC SED RATE AUTOMATED: CPT | Performed by: INTERNAL MEDICINE

## 2022-02-21 PROCEDURE — 85300 ANTITHROMBIN III ACTIVITY: CPT | Performed by: INTERNAL MEDICINE

## 2022-02-21 PROCEDURE — 83051 HEMOGLOBIN PLASMA: CPT | Performed by: INTERNAL MEDICINE

## 2022-02-21 PROCEDURE — C1887 CATHETER, GUIDING: HCPCS

## 2022-02-21 PROCEDURE — 93005 ELECTROCARDIOGRAM TRACING: CPT

## 2022-02-21 PROCEDURE — P9016 RBC LEUKOCYTES REDUCED: HCPCS | Performed by: INTERNAL MEDICINE

## 2022-02-21 RX ORDER — GUAIFENESIN 600 MG/1
15 TABLET, EXTENDED RELEASE ORAL DAILY
Status: DISCONTINUED | OUTPATIENT
Start: 2022-02-21 | End: 2022-02-25

## 2022-02-21 RX ORDER — AMINO AC/PROTEIN HYDR/WHEY PRO 10G-100/30
1 LIQUID (ML) ORAL 3 TIMES DAILY
Status: DISCONTINUED | OUTPATIENT
Start: 2022-02-21 | End: 2022-02-24

## 2022-02-21 RX ORDER — ALFUZOSIN HYDROCHLORIDE 10 MG/1
10 TABLET, EXTENDED RELEASE ORAL DAILY
Status: ON HOLD | COMMUNITY
End: 2022-03-11

## 2022-02-21 RX ORDER — NOREPINEPHRINE BITARTRATE 0.06 MG/ML
.01-.6 INJECTION, SOLUTION INTRAVENOUS CONTINUOUS
Status: DISCONTINUED | OUTPATIENT
Start: 2022-02-21 | End: 2022-02-25

## 2022-02-21 RX ORDER — ASPIRIN 81 MG/1
81 TABLET ORAL DAILY
COMMUNITY
End: 2022-05-09

## 2022-02-21 RX ORDER — ALLOPURINOL 100 MG/1
100 TABLET ORAL 2 TIMES DAILY
COMMUNITY
End: 2023-03-22

## 2022-02-21 RX ORDER — CHOLECALCIFEROL (VITAMIN D3) 50 MCG
1 TABLET ORAL DAILY
COMMUNITY
End: 2022-04-06

## 2022-02-21 RX ORDER — POLYETHYLENE GLYCOL 3350 17 G/17G
17 POWDER, FOR SOLUTION ORAL 2 TIMES DAILY
Status: DISCONTINUED | OUTPATIENT
Start: 2022-02-21 | End: 2022-02-27

## 2022-02-21 RX ORDER — ZINC GLUCONATE 50 MG
50 TABLET ORAL DAILY
COMMUNITY
End: 2022-04-06

## 2022-02-21 RX ORDER — AMOXICILLIN 250 MG
1 CAPSULE ORAL 2 TIMES DAILY
Status: DISCONTINUED | OUTPATIENT
Start: 2022-02-21 | End: 2022-02-27

## 2022-02-21 RX ORDER — DEXTROSE MONOHYDRATE 100 MG/ML
INJECTION, SOLUTION INTRAVENOUS CONTINUOUS PRN
Status: DISCONTINUED | OUTPATIENT
Start: 2022-02-21 | End: 2022-03-11 | Stop reason: HOSPADM

## 2022-02-21 RX ORDER — HEPARIN SODIUM 10000 [USP'U]/100ML
500 INJECTION, SOLUTION INTRAVENOUS CONTINUOUS
Status: DISCONTINUED | OUTPATIENT
Start: 2022-02-21 | End: 2022-02-22

## 2022-02-21 RX ADMIN — HEPARIN SODIUM 500 UNITS/HR: 1000 INJECTION INTRAVENOUS; SUBCUTANEOUS at 18:16

## 2022-02-21 RX ADMIN — Medication 50 MCG: at 01:40

## 2022-02-21 RX ADMIN — Medication 1 PACKET: at 11:30

## 2022-02-21 RX ADMIN — FENTANYL CITRATE 200 MCG/HR: 50 INJECTION INTRAVENOUS at 19:19

## 2022-02-21 RX ADMIN — PANTOPRAZOLE SODIUM 40 MG: 40 INJECTION, POWDER, FOR SOLUTION INTRAVENOUS at 08:12

## 2022-02-21 RX ADMIN — FENTANYL CITRATE 200 MCG/HR: 50 INJECTION INTRAVENOUS at 10:03

## 2022-02-21 RX ADMIN — ASPIRIN 81 MG CHEWABLE TABLET 81 MG: 81 TABLET CHEWABLE at 08:12

## 2022-02-21 RX ADMIN — PIPERACILLIN AND TAZOBACTAM 3.38 G: 3; .375 INJECTION, POWDER, LYOPHILIZED, FOR SOLUTION INTRAVENOUS at 00:30

## 2022-02-21 RX ADMIN — POLYETHYLENE GLYCOL 3350 17 G: 17 POWDER, FOR SOLUTION ORAL at 20:16

## 2022-02-21 RX ADMIN — TICAGRELOR 90 MG: 90 TABLET ORAL at 08:12

## 2022-02-21 RX ADMIN — MIDAZOLAM HYDROCHLORIDE 2 MG/HR: 1 INJECTION, SOLUTION INTRAVENOUS at 14:46

## 2022-02-21 RX ADMIN — HYDROCORTISONE SODIUM SUCCINATE 50 MG: 100 INJECTION, POWDER, FOR SOLUTION INTRAMUSCULAR; INTRAVENOUS at 02:00

## 2022-02-21 RX ADMIN — PIPERACILLIN AND TAZOBACTAM 3.38 G: 3; .375 INJECTION, POWDER, LYOPHILIZED, FOR SOLUTION INTRAVENOUS at 07:14

## 2022-02-21 RX ADMIN — TICAGRELOR 90 MG: 90 TABLET ORAL at 20:17

## 2022-02-21 RX ADMIN — SENNOSIDES AND DOCUSATE SODIUM 1 TABLET: 8.6; 5 TABLET ORAL at 20:15

## 2022-02-21 RX ADMIN — Medication: at 01:57

## 2022-02-21 RX ADMIN — Medication 1 PACKET: at 14:07

## 2022-02-21 RX ADMIN — Medication 15 ML: at 11:30

## 2022-02-21 RX ADMIN — PROPOFOL 10 MCG/KG/MIN: 10 INJECTION, EMULSION INTRAVENOUS at 14:07

## 2022-02-21 RX ADMIN — Medication 1 PACKET: at 20:15

## 2022-02-21 ASSESSMENT — ACTIVITIES OF DAILY LIVING (ADL)
ADLS_ACUITY_SCORE: 19
DEPENDENT_IADLS:: INDEPENDENT
ADLS_ACUITY_SCORE: 19

## 2022-02-21 NOTE — PROGRESS NOTES
"SPIRITUAL HEALTH SERVICES  PALLIATIVE SPIRITUAL ASSESSMENT   [facility] ([place] 4E    PRIMARY FOCUS:     Emotional distress    Support for coping    Visit with patient Chano \"Husam\" Elizabeth's wife Meeta at his bedside.     Distress: Meeta is trying to focus on Husam \"doing a bit better\" today. She said they have had \"some setbacks\". She is watching and waiting with him while naming restorative goals and hope that he will recover.     Coping/Meaning Making:     Family: Per Meeta, she and Husma are very involved in the lives of their six grandchildren, who range in age from upper elementary to adult. Their two sons are checking on them regularly and making sure she is taking care of herself while she spends time with Husam.     Fany: Prayer (Oriental orthodox) has been comforting for Meeta during this time, especially prayer from family and friends. She and Husam sometimes attend [employer], [place]. She has her Bible with her in the hospital, and she was appreciative that   had anointed Husam.    Intervention: Reviewed documentation. Offered spiritual and emotional support through listening presence and affirmation of support system.    PLAN: I will follow for spiritual support while Palliative Care is consulted.    Lynn Patel  Palliative Care Consult Service   Pager [phone]  [facility] Inpatient Team Consult pager [phone] (M-F 8-4:30)  After-hours Answering Service [phone]       "

## 2022-02-21 NOTE — PROGRESS NOTES
"[facility]: [facility]  NeuroCritical Care Progress Note     Patient Name:  Chano Johnson  MRN:  [id number]    :  1950  Date of Service:  2022  Primary care provider:  Laith Limon     Overnight events:  -Had a turn down but  concern for hemothorax and a R sided chest tube  placed and CT scan showed contrast extravasation into the mediastinum for which IR was urgently involved for assess need of LIMA coiling, but there was no evidence of extravasation   -S/p several RBC boluses (up to 5 units) and platelet transfusion  -CT head read with indeterminant gray-white matter loss in the R occipital lobe which could be artifact vs true infarct.     -Wife at bedside updated and questions answered, and informed of patient's wishes    A/P:   Chano Johnson, \"Husam\" is a 71 year old male with a PMHx Afib s/p AF PVI ablation and Watchman (10/21/2021), Aflutter s/p ablation, HFpEF, hx tachycardia-mediated cardiomyopathy, HTN, HLD, ARSENIO, gout, GERD, EtOH use, and COVID infection (2021) who presented as transfer from [facility] for possible cardiac catheterization +/- ECMO after a Vfib arrest, for which NCC service was consulted to evaluate for prognostication. CT head completed on arrival to [facility] showed possible indeterminant gray-white matter loss in the right occipital lobe suspected to be  artifactual versus true, versus  CTH without acute intracranial pathology from OSH. VEEG so far no seizure,  severe encephalopathy. Updated the wife at bedside that it is currently to early to prognostic his neurologic recovery, but reassured by reports of patient having purposeful movements when off sedition, and reportedly eportedly sat up in bed strongly, and moving 4/4 though not following command. Further workup with continuing EEG and MRI when able, will help further assess neurologic status. Husam is currently sedated with 200 fent, 6 " "versed.        Recommendations:  - Continue vEEG while on sedation, monitoring EEG report  - Consider MRI brain when pt is stable and decanulated  - Avoid hypotonic solutions as they may worsen cerebral edema  - Avoid \"nitroprusside\",\"nitroglycerin\" as they may also worsen cerbral edema.  - Advise slow correction of any high Sodiums if needed  - When safe to do so, limitation of CNS acting medications will permit a more accurate neurological assessment  - NCC will continue to follow and monitor their EEG and neurologic exam        Physical Examination:   Pulse 96   Temp 98.4  F (36.9  C) (Rectal)   Resp 12   Ht 1.829 m (6')   Wt 101.7 kg (224 lb 3.3 oz)   SpO2 100%   BMI 30.41 kg/m    Exam while sedated on fent. 200 mcg/hr and midazolam 6 mg/hr    General: Adult male patient, lying in bed, NAD, wife at bedside  HEENT: ETT  Cardiac: telemetry regular rate  Pulm: ventilated  Abdomen: Soft, non distended  Extremities: Warm, no edema  Skin: No rash or lesion   Neuro:Sedated,  Eyes closed, does not opening eyes, no gaze preference or following commands. No spontaneous or abnormal movements. Pupils unequal with slightly larger left than right but reactive briskly. Cough intact with suction. withdrawals in 4/4 and grimaces      The patient was seen and discussed  Dr. Staples, staff attending, who agrees with this assessment and plan    Maty Tadeo (Olson), DO, MADDIE  PGY-2 Neurology Resident        "

## 2022-02-21 NOTE — PROCEDURES
CHEST TUBE INSERTION    Consent:   1. Obtained from wife after discussion of risk/benefit/alternatives) and all questions answered to the best of my knowledge. This signed consent is in the chart. Yes       Universal Protocol: Patient Identification was verified, time out was performed, site marking was done, Imaging data Yes. Full Barrier precaution done: Hands washed, mask, gloves, gown, cap and eye protection all used.    Indication:    Hemothorax: Yes      Left sided chest tube insertion    Narrative:  Ultrasound was used: No. Drugs given for sedation: fentanyl and versed boluses  Area prepped with chlorhexedine and draped in a sterile fashion. 1% lidocaine injected subcutaneously for local anesthesia. The needle was advanced above the superior margin of the rib in the 4th inter-costal space along the mid axillary line until the pleural space was entered. A 2cm skin incision was then performed. Utilizing blunt dissection a subcutaneous tunnel was created cephalad just adjacent to the superior rib using a Gisele clamp. The pleural space was entered bluntly and a gush of air/blood was observed. A gloved finger was used to confirm thoracic penetration and liberate the lung from adhesion. A 36 Sinhala Chest tube was then inserted suing a Gisele Clamp.The tube sutured securely to the skin and sterile dressing applied. The tube connected to the water seal chamber and wall suction and secured. Bandings were applied to the tubing connections. A chest Xray was then ordered     Complications: none noted. EBL minimum    Observations: immediate drainage of 1300 ml of old blood     Procedure performed with fellow supervising    Tila Michael MD  General Surgery PGY2

## 2022-02-21 NOTE — PROGRESS NOTES
THORACIC & FOREGUT SURGERY    S:  Intubated and sedated     O:  Vitals:    02/21/22 0915 02/21/22 0930 02/21/22 0945 02/21/22 1000   Pulse: 85 86 88 86   Resp:    12   Temp: 98.6  F (37  C) 98.6  F (37  C) 98.6  F (37  C) 98.6  F (37  C)   TempSrc:       SpO2: 100% 100% 100% 100%   Weight:       Height:           Intubated and sedated   Non-cyanotic  RR per tele    A/P: Pt is a 71 year old male who presented to an outside ED on 2/17 s/p VT/VF arrest 2/2 MI from LAD.  He required 8 shocks as well as chest compressions, was transferred and cannulated to VA ECMO. Now s/p 2 stents to LAD. The primary team was attempting an ECMO wean today, noticed on bedside ECHO that there was a pleural effusion, so a CT was obtained which shows bilateral fluid collections, Hounsfield units on L c/w hemothorax, R c/w simple fluid.  Thoracic consulted to assist with management.     -Continue L chest tube  -No R chest tube for now as density on scan c/w simple fluid  -Thoracic to follow    D/w Ismael Garcias PA-C

## 2022-02-21 NOTE — PROGRESS NOTES
CLINICAL NUTRITION SERVICES - BRIEF NOTE   (see RD note on 2/18 for full assessment)    Without nutrition x4 days. Plan to start TF via OGT today .    Nutrition changes today:  - Initiate Vital VHP @ 15 ml/hr. Adv by 10 ml q8h to eventual goal rate @ 75 ml/hr + Prosource (1 pkt TID) via OGT  - FWF 30 ml q4h for tube patency  - Certavite daily    Emily Cohen, RD, LD  [id number]  Pgr: [phone]

## 2022-02-21 NOTE — PROGRESS NOTES
PALLIATIVE CARE SOCIAL WORK Progress Note   [facility] ([facility] Unit 4[address]    REFERRAL SOURCE: Palliative Care Team; follow up, emotional support    PCSW stopped by Husam's room this afternoon to check in on wife Meeta. Meeta not present at the time, pt remains sedated.    Plan: PCSW will continue to follow while Palliative Team involved; continue to assess emotional support needs and coping.    Tasha Campos [facility]  Palliative Care   Pager [phone]    [facility] Inpatient Team Consult pager [phone] (M-F 8-4:30)  After-hours Answering Service [phone]'

## 2022-02-21 NOTE — PHARMACY-CONSULT NOTE
Pharmacy Tube Feeding Consult    Medication reviewed for administration by feeding tube and for potential food/drug interactions.    Recommendation: No changes are needed at this time.     Pharmacy will continue to follow as new medications are ordered.    Ingrid Cortez, PharmD, BCCCP

## 2022-02-21 NOTE — PROGRESS NOTES
[facility]  Cardiac ICU Progress Note  February 20, 2022            Key events - last 24 hours:     -During planned turn down, concern for hemothorax, turn down cancelled and patient taken for stat CT which revealed large effusion in r Lung and surrounding the pericardium, Thoracic surgery was consulted and placed a R sided chest tube with 1.2L out until midnight, 250cc after midnight sanginuous output. On the CT scan he was noted to have contrast extravasation into the mediastinum for which IR was urgently consulted and took the patient to the IR lab for possible LIMA coiling, but there was no evidence of extravasation during the IR procedure and thus no intervention was done. Additionally overnight he required several RBC boluses (up to 5 units) with bleeding from the L groin cannula site.  - CT head showed indeterminant gray-white matter loss in the R occipital lobe which could be artifact vs true infarct. Will discuss with neuro crit care.              Assessment & Plan        71 year old male who was admitted on 2/17/2022 s/p VT/VF arrest x8 shocks who was found to have LAD occlusion requiring 2x stents. He presented to local ED with chest pain that started at 1300 (within 1 hr of onset), he had a VT VF arrest in the ED after he was found to have an anterior STEMI on ECG.  Chest compressions and ACLS was started immediately.  Patient underwent 8 shocks for VT VF with ROSC afterwards.  He received 4 doses of epinephrine, 450 mg of amiodarone.  He was transferred to the [facility] for percutaneous intervention for his STEMI. Unfortunately his transfer was delayed because he was a very low dose of Epi (0.01) and the local hospital only had a BLS ambulance available. He arrived at [facility] and was found to have occlusion of the LAD. During the angiogram he was found to have significant hypotension and ectopy, which prompted placement on VA ECMO. PCI with 2x LETY was  done with ECMO support. TTM post arrest at 35C, rewarmed on 2/19/22.    Today's Changes  - Continue to hold heparin at this time  - Plt transfusion with monitoring afterwards, if not increasing appropriately will sent HIT screen  - Groin dressing take down to assess source of L groin bleeding   - Discuss with thoracic, and possible R sided chest tube  - Stop abx per usual protocol  - Discontinue stress dose steroids  - MATTHIEU turndown tomorrow  - Start trickle feeds and increase gradually     Neurology: #Possible neurological injury post cardiac arrest  Intubated, sedated, paralyzed. Cooled to 34 degrees.  Now rewarmed as of 2/19/2022  --continue versed, fentanyl, propofol. Attempting to wean off propfol.   - RASS goal -4 to -5   - CTH on presentation w/o acute intracranial pathology. Day 3 CTH showed indeterminant gray-white matter loss in the R occipital lobe which could be artifact vs true infarct. Will discuss with neuro crit care.  - EEG w/o significant abnormalities noted  - Pt has nonpurposeful movement of all 4 extremities when sedation lightened.   Cardiovascular / Hemodynamics: Refractory VF arrest requiring 8 shocks.  x2 LETY stents to LAD.  Peripheral V-A ECMO inserted for refractory cardiogenic shock. LA   TTE: LVEF 10-15%, RV moderately to severely reduced function. No prior hx of heart failure. No significant valvular disease. Pulsatility significant improved after rewarming, today 30mmHg on IABP pause.  EKG: FLORY V2-V4 consistent with anterior STEMI  --wean pressors/inotropes as able  --MATTHIEU ECMO turndown pending 2/22/2022, if does well could consider decann early next week  --continue ASA 81mg and ticagrelor 90mg BID as of now status post 2 LETY to LAD  --ACT goal 180-200, holding heparin in the setting of chest tube placement and mediastinal bleed concern  --hold lipitor for now given likely hepatic injury during arrest  --hold ACE/ARB for now given likely reduced renal fxn after arrest  --holding beta  blocker given shock      - CT CAP: Large right-sided diffuse effusion s/p chest tube placement      Pulmonary: #AHRF  ETT in appropriate position.  Now weaning vent requirements.  CXR: Lines in stable position.  --wean vent as able  --daily CXR  -Possibly some contribution from large right-sided pleural effusion which is s/p chest tube placement. continue to monitor chest tube output. Will consider   --Q2h ABGs for now  --consider scheduled duonebs if signs of lung dz, currently PRN     GI and Nutrition: No known medical hx.   --monitor BID LFTs  -Nutrition consult to start trickle feeds  --bowel regimen   --GI Prophylaxis: PPI    Renal, Fluid and Electrolytes: #SAHIL, likely prerenal from hypoperfusion. Monitoring for CRRT needs.  Monitoring UOP, will start CRRT through circuit if needed.  --maintain K>4 and Mg>2    Infectious Disease: No signs of infection. Leukocytosis c/w arrest. Blood cultures collected.   --vancomycin/zosyn x5 days for ECMO - completed course on 2/21/22  --daily blood cultures  --monitor for signs of infection given cooling, lines, and leukocytosis   Hematology and Oncology: #Acute blood loss anemia  #Thrombocytopenia - likely secondary to hemolysis. 2/21/22 4T score 3 (low risk, <5%)  Holding heparin for ECMO (see above) and ASA/ticagrelor for LETY.   --cryo PRN fibrinogen < 200; FFP for INR >2  --Transfuse for Hgb<8  --heparin gtt for ECMO on hold, see above  --US LE w/ arterial duplex per ECMO protocol   --DVT PPX: Heparin as above  -Continue to trend CBCs transfuse to goal hemoglobin of 8   Endocrinology:    No known medical history. BG elevated.  --insulin gtt  --stress dose steroids due to persistent hypotension - hydrocort 50mg IV Q8h discontinued  --f/u HgbA1c    Lines: L femoral arterial and venous ECMO cannulae February 17, 2022  R radial arterial line February 17, 2022  ETT February 17, 2022  Tapia catheter February 17, 2022  OG tube February 17, 2022  Restraint: needed  Current lines  are required for patient management       Family update by me today: Yes      Code Status: Full code at this time. Reviewed patients medical directives. He indicated he would like to be full code unless there was medical futility in additional aggressive interventions.     The Pt was discussed and evaluated with Dr. Ball, attending physician, who agrees with the assessment and plan above.     Mohsan Chaudhry, MD  Cardiology Fellow         Medications:       artificial tears   Both Eyes Q8H     aspirin  81 mg Per Feeding Tube Daily     pantoprazole (PROTONIX) IV  40 mg Intravenous Daily     ticagrelor  90 mg Oral BID      Temp: 98.4  F (36.9  C) Temp  Min: 97  F (36.1  C)  Max: 98.8  F (37.1  C)  Resp: 12 Resp  Min: 12  Max: 12  SpO2: 100 % SpO2  Min: 94 %  Max: 100 %  Pulse: 91 Pulse  Min: 83  Max: 99    No data recorded    No data recorded.  No data recorded.      Intake/Output Summary (Last 24 hours) at 2/20/2022 0736  Last data filed at 2/20/2022 0600  Gross per 24 hour   Intake 2188.88 ml   Output 1148 ml   Net 1040.88 ml          Physical Exam:   GENERAL: intubated and sedated  HEENT: ET tube in place  CV: S1/S2 heard without murmur   RESPIRATORY: CTAB, no increased WOB  GI: Soft and non distended with normoactive bowel sounds present in all quadrants. No tenderness, rebound, guarding. No palpable organomegaly.   EXTREMITIES: No peripheral edema. 2+ bilateral pedal pulses. Trace edema bilaterally in LE  NEUROLOGIC: not oriented to place or time, does not follow commands. Intermittently moving all extremities when sedation lightened.  MUSCULOSKELETAL: No joint swelling or tenderness.   SKIN: No jaundice. No acute rashes or lesions. L groin cannulation site with bleeding tracking along the cannulas, appears venous. No bleeding from IABP access site R groin.            Data:         Temp: 98.4  F (36.9  C) Temp src: EsophagealTemp  Min: 97  F (36.1  C)  Max: 98.8  F (37.1  C)   Recent Labs   Lab  02/21/22  0348 02/20/22  2252 02/20/22  1549 02/20/22  0956 02/20/22  0353   WBC 8.9 8.8 10.0 10.0 10.8   HGB 7.6* 8.0* 7.3* 7.3* 7.1*   HCT 21.9* 24.1* 22.1* 21.8* 21.7*   MCV 89 91 91 94 93   RDW 15.9* 15.6* 16.6* 16.6* 16.9*   PLT 59* 65* 66* 57* 69*     Recent Labs   Lab 02/21/22  0348 02/20/22  2252 02/20/22  1549 02/20/22  0956 02/20/22  0354   INR 1.12 1.11 1.14 1.12 1.16*   PTT 30 30 63* 93* 123*     Liver Function Studies -   Recent Labs   Lab Test 02/20/22  0353   PROTTOTAL 4.8*   ALBUMIN 2.7*   BILITOTAL 0.5   ALKPHOS 16*   *   ALT 92*       Last Arterial Blood Gas:  Recent Labs   Lab 02/21/22  0753 02/21/22  0555 02/21/22  0349 02/21/22  0348 02/21/22  0154   PH 7.43 7.43 7.45  --  7.47*   PCO2 36 37 35  --  33*   PO2 129* 146* 133*  --  125*   HCO3 24 25 24  --  24   O2PER 60 60 60  60 60 60       Venous Blood Gas  Recent Labs   Lab 02/21/22  0753 02/21/22  0555 02/21/22  0349 02/21/22  0348 02/20/22  1800 02/20/22  1549 02/20/22  0601 02/20/22  0354 02/19/22  1749 02/19/22  1549 02/19/22  0345 02/19/22  0343   PHV  --   --   --   --   --   --   --   --   --   --   --  7.28*   PCO2V  --   --   --   --   --   --   --   --   --   --   --  53*   PO2V  --   --   --   --   --   --   --   --   --   --   --  48*   HCO3V  --   --   --   --   --   --   --   --   --   --   --  25   MERLIN  --   --   --  0.3  --  -1.0  --  -0.6  --  -0.8  --  -1.8  -1.2   O2PER 60 60 60  60 60   < > 60  60  60   < > 60  60  60   < > 60   < > 60  60    < > = values in this interval not displayed.       Recent Labs   Lab Test 06/30/17  1355 05/12/16  0758   CHOL 173 196   HDL 42 43    125   TRIG 76 140     IMPRESSION:  Indeterminant gray-white matter loss in the right occipital lobe could  be artifactual from adjacent streak/photon starvation artifact versus  true infarct.    The pt was discussed and evaluated with Dr. Ball, attending physician, who agrees with the assessment and plan above.     Mohsan  Bhupendra JOHNSON  Fellow Physician F2  Division of Cardiovascular Disease

## 2022-02-21 NOTE — PHARMACY-ADMISSION MEDICATION HISTORY
Admission Medication History Completed by Pharmacy    See [facility] Admission Navigator for allergy information, preferred outpatient pharmacy, prior to admission medications and immunization status.     Medication History Sources:     Patient's wife    Medication list brought from home    Changes made to PTA medication list (reason):    Added: ubiquinol 100 mg daily, vitamin D3 2000 units daily, zinc 50 mg daily, alfuzosin 10 mg ER daily, aspirin 81 mg daily, and allopurinol 100 mg BID.    Deleted: multivitamin, omeprazole    Changed: None    Additional Information:    Patient's wife reports good medication adherence. Noted that he takes sildenafil daily (last dose 2/17/22).     Prior to Admission medications    Medication Sig Last Dose Taking? Auth Provider   alfuzosin ER (UROXATRAL) 10 MG 24 hr tablet Take 10 mg by mouth daily 2/17/2022 Yes Unknown, Entered By History   allopurinol (ZYLOPRIM) 100 MG tablet Take 100 mg by mouth 2 times daily 2/17/2022 Yes Unknown, Entered By History   ascorbic acid, vitamin C, (VITAMIN C) 500 MG tablet [ASCORBIC ACID, VITAMIN C, (VITAMIN C) 500 MG TABLET] Take 500 mg by mouth daily. 2/17/2022 Yes Provider, Historical   aspirin 81 MG EC tablet Take 81 mg by mouth daily 2/17/2022 Yes Unknown, Entered By History   sildenafil (VIAGRA) 100 MG tablet [SILDENAFIL (VIAGRA) 100 MG TABLET] Take 100 mg by mouth daily as needed for erectile dysfunction. 2/17/2022 Yes Provider, Historical   Ubiquinol 100 MG CAPS Take 100 mg by mouth daily 2/17/2022 Yes Unknown, Entered By History   vitamin D3 (CHOLECALCIFEROL) 50 mcg (2000 units) tablet Take 1 tablet by mouth daily 2/17/2022 Yes Unknown, Entered By History   zinc gluconate 50 MG tablet Take 50 mg by mouth daily 2/17/2022 Yes Unknown, Entered By History       Date completed: 02/21/22    Medication history completed by: Radha Marmolejo, PharmD IV student

## 2022-02-21 NOTE — PROGRESS NOTES
ECMO Shift Summary: [id number]    ECMO Equipment:  Console serial number: [id number]  Circuit Lot number: [id number]  Oxygenator Lot number: [id number]    Patient remains on VA ECMO, all equipment is functioning and alarms are appropriately set. RPM's 3700 with flow range 4.0-4.4 L/min. Sweep gas is at 1.5 LPM and FiO2 60%. Circuit remains free of air and clot; only small amount of fibrin noted at some connectors. Cannulas are secure. Extremities are cool to touch; has maintained appropriate NIRS levels and pulses can heard via doppler in all extremities.     Significant Shift Events:   Chest tube placed by CVTS at start of shift and then patient taken to IR for emobolization, but active bleed could not be found so patient returned to ICU without any interventions. Heparin remained off overnight and flows increased slightly to help keep circuit clean.   Steady oozing from venous cannula site and at suture site securing DPC take off connector. Dressing was changed overnight, pressure held and redressed with StatSeal discs and QuickClot; continues to bleed and require chux changes every couple hours.   Product administered: 2 RBCs, 1 FFP, 1 platelets.    Vent settings:  Vent Mode: CMV/AC  (Continuous Mandatory Ventilation/ Assist Control)  FiO2 (%): 60 %  Resp Rate (Set): 12 breaths/min  Tidal Volume (Set, mL): 470 mL  PEEP (cm H2O): 7 cmH2O  Inspiratory Pressure (cm H2O) (Drager Jennifer): 25  Resp: 12    Anticoagulation and Volume Status:  Heparin off since 1400 on 2/20/22. Last .    Urine output remains low (~30-40 mL/hr). Product given included 2 RBCs, 1 FFP, 1 platelets.    Intake/Output Summary (Last 24 hours) at 2/21/2022 0631  Last data filed at 2/21/2022 0600  Gross per 24 hour   Intake 3394.57 ml   Output 2900 ml   Net 494.57 ml       ECHO:  No results found for this or any previous visit.  No results found for this or any previous visit.      CXR:  Recent Results (from the past 24 hour(s))   XR Chest Port  1 View    Narrative    EXAM: XR CHEST PORT 1 VIEW  2022 9:07 AM     HISTORY:  Conditional for post ET tube advancement on 2022       COMPARISON:  Same day chest radiograph 0339. Chest radiograph  2022, 2022.    TECHNIQUE: Single frontal radiograph of the chest    FINDINGS:   Portable AP supine view of the chest. The endotracheal tube projects  over the midthoracic trachea. Esophageal temperature probe tip  projects over the lower thoracic esophagus. Enteric tube courses  beyond field of view. Postsurgical changes of spinal instrumentation.  Lower approach venous ECMO catheter tip projects at the high right  atrium, stable in position. Intraaortic balloon pump marker projects  about 5 cm below superior margin of proximal thoracic aorta.    Trachea is midline. Stable cardiac silhouette. Veil-like pulmonary  opacities, left greater than right, likely representing layering  pleural effusions. The right costophrenic sulcus is collimated from  the field-of-view. No appreciable pneumothorax, although supine  positioning limits detection for this. Bibasilar opacities. No acute  osseous abnormalities.      Impression    IMPRESSION:     1. Endotracheal tube tip projects 4.5cm above the judy. Otherwise  relatively unchanged position of support devices as described above.  2. Veil-like opacities overlying the lung fields bilaterally, left  greater than right, likely represent layering pleural effusions.  3. Bibasilar opacities likely represent atelectasis and/or pulmonary  edema.     I have personally reviewed the examination and initial interpretation  and I agree with the findings.    OPHELIA COX MD         SYSTEM ID:  [id number]   Echo Limited    Narrative    [id number]  [id number]  [id number]  [id number]^TAQUERIA^MOHSAN     [facility],[place]  Echocardiography Laboratory  [address] [place] [id number]     Name: NIURKA BEAR  MRN: [id number]  : 1950  Study  Date: 02/20/2022 11:51 AM  Age: 71 yrs  Gender: Male  Patient Location: [facility]  Reason For Study: ECMO turndown  Ordering Physician: CHAUDHRY, MOHSAN  Referring Physician: VITALY DURAN  Performed By: Mike Dominguez RDCS     BSA: 2.3 m2  Height: 72 in  Weight: 230 lb  ______________________________________________________________________________  Procedure  Limited Portable Echo Adult.  ______________________________________________________________________________  Interpretation Summary  VA ECMO 3.3 LPM, 1;1 IABP.     Pt taken stat to CT for echogenic mass surrounding LV apex.  Unable to assess fxn.     Subcostal images of RV suggest there is proably some improvement in RV  function.     No pericardial effusion is present.  ______________________________________________________________________________  Pericardium  No pericardial effusion is present.  ______________________________________________________________________________  Report approved by: Xuan MARTINEZ 02/20/2022 03:12 PM     ______________________________________________________________________________      CT Head w/o Contrast    Narrative    EXAM: CT HEAD W/O CONTRAST  2/20/2022 1:14 PM     HISTORY:  Headache, classic migraine       COMPARISON:  12/17/2022    TECHNIQUE: Helical CT of the head without IV contrast.  Coronal and  sagittal reformatted images were created, archived and reviewed at the  workstation for further assessment.    FINDINGS: Extensive streak artifacts from EEG leads surrounding the  head , limiting evaluation. Questionable gray-white matter  differentiation loss in the right occipital lobe, could be artifactual  from adjacent streak/photon starvation artifact versus true infarct  (series 6 image 24, 25).    No intracranial hemorrhage, mass effect, or midline shift. Gray-white  matter differentiation is preserved in the remainder of the brain. No  ventriculomegaly. Clear basal cisterns.    Atraumatic calvarium, skull  base. Clear paranasal sinuses and mastoid  air cells. Intact globes and intraocular structures. Visualized  nasopharyngeal mucosal and upper cervical spinal structures are within  normal limits.      Impression    IMPRESSION:  Indeterminant gray-white matter loss in the right occipital lobe could  be artifactual from adjacent streak/photon starvation artifact versus  true infarct.    I have personally reviewed the examination and initial interpretation  and I agree with the findings.    SHARON WITT MD         SYSTEM ID:  [id number]   CT Chest/Abdomen/Pelvis w Contrast   Result Value    Radiologist flags Active anterior mediastinal bleeding (Urgent)    Narrative    EXAMINATION: CT CHEST/ABDOMEN/PELVIS W CONTRAST, 2/20/2022 1:17 PM    TECHNIQUE:  Helical CT of the chest, abdomen and pelvis with contrast.   Coronal and sagittal reformatted images were created, archived and  reviewed at the workstation for further assessment.    CONTRAST: Isovue 370.    COMPARISON: 2/17/2022.    HISTORY: Status post CPR    FINDINGS:     LINES/TUBES/DEVICES: Tracheostomy tube is within the distal thoracic  trachea. Enteric tube is in the proximal duodenum. Left femoral vein  approach ECMO cannula tip is in the SVC. Left arterial catheter tip is  positioned in the left common iliac artery. Right femoral vein  approach central venous catheter tip is positioned in the retrohepatic  IVC. Right femoral artery approach aortic balloon pump tip is  positioned in the proximal aorta distal to the left subclavian artery.    Tapia catheter within the bladder.    CHEST: Anterior mediastinal fat hyper attenuation and expansion with  an ill-defined hyperdensity (coronal series 5, image 26). Bilateral  large pleural effusions and lower lobe atelectasis.    The thyroid, esophagus, lymph nodes, and major vascular structures are  within normal limits.    ABDOMEN/PELVIS: The stomach, liver, biliary system, pancreas, spleen,  adrenal glands, large and small  bowel, kidneys, ureters, bladder,  reproductive organs, major vascular structures, peritoneum and  retroperitoneum and lymph nodes are within normal limits.     BONES, BODY WALL AND SOFT TISSUES: No suspicious lesions. Multilevel  vertebral body, facet degenerative changes.      Impression    IMPRESSION:    1. Retrosternal ill-defined anterior mediastinal fat hemorrhage from  recent resuscitation attempt with focus of suspected active bleeding  from the left internal mammary artery.  2. Left hemothorax. - Blood products from hemorrhage extending into  the left pleural space.   3. Small right pleural effusion with associated atelectasis.  4. No acute findings in the abdomen/pelvis.  5. Support devices as detailed in the body of the report    [Urgent Result: Active anterior mediastinal bleeding]    Finding was identified on 2/20/2022 2:09 PM.     Dr. Palomino was contacted by Dr. Shepard at 2/20/2022 7:02 PM and  verbalized understanding of the urgent finding.      I have personally reviewed the examination and initial interpretation  and I agree with the findings.    PANCHO GREWAL MD         SYSTEM ID:  [id number]   XR Chest Port 1 View    Impression    RESIDENT PRELIMINARY INTERPRETATION  IMPRESSION:   Lines/tubes/Devices as follows:   --Endotracheal tube tip projects 3.4 cm above the judy.   --Inferior-approach ECMO cannula tip projects near the superior  cavoatrial junction.   --Intra-aortic balloon pump superior marker is at the level of the  judy.   --Left apical chest tube   --Partially visualized enteric tube.    Decreased left pleural effusion. Persistent right pleural effusion of  at least moderate size. No overt new airspace disease. No appreciable  pneumothorax. Unchanged widened appearance of the upper mediastinum.  Cardiomediastinal silhouette is similar. No acute abnormality of the  bones or visualized upper abdomen.      IR Upper Extremity Angiogram Left    Narrative    Procedure 2/20/2022 10:25  PM:  1. Ultrasound guidance for left radial arterial access.  2. Selective catheterization of the left subclavian artery with  diagnostic angiogram.  3. Subselective catheterization of the left internal mammary artery  with diagnostic angiogram.  4. Placement of TR band.    History: Active extravasation of the left internal mammary artery  after CPR.    Comparison: CT CAP same day    Staff: Dr. Zaman    Fellow: Dr. Barron  Resident: Dr. Leavitt    Monitoring/Medications: No sedation was administered. Patient remained  on ICU medications throughout the procedure.    Fluoro time: 6.4 minutes     Findings/procedure:    Prior to the procedure, both verbal and written informed consent  obtained from the patient. Patient placed supine. The left wrist were  prepped in usual sterile fashion. Preprocedure ultrasound demonstrated  a patent left radial artery.    1% lidocaine for local anesthesia. Under ultrasound guidance a  micropuncture needle was advanced into the left radial artery. Image  documenting the needle within the artery was archived. Microwire  advanced through the needle and needle exchanged for micropuncture  sheath. Micropuncture exchanged for a SWYF wire which was advanced.  Microsheath exchanged over the wire for a 4 vascular sheath.    An angled tapered glide catheter and baby J-wire were advanced into  the proximal left subclavian artery and diagnostic angiogram was  performed revealing a patent origin of the left intramammary artery.  The vessel was catheterized with this wire and catheter.    Angiography at this point demonstrated no active hemorrhage. More  subselective access was achieved with 2.8 Frisian ProGreat  microcatheter and microwire. Injection was performed in multiple  planes and projections near the level of the contrast extravasation  seen on prior CT CAP. However, no active contrast extravasation was  identified despite multiple injections.    At this point, the cardiology fellow  was contacted and we discussed  that no empiric embolization should be performed in order to preserve  the left internal mammary artery for potential future interventions.  They were amenable to this plan.    Wires and catheters were removed. Hemostasis achieved with TR band.    Patient tolerated procedure well. No immediate complications.       Impression    IMPRESSION: Angiogram of the left internal mammary artery demonstrated  no active arterial extravasation to target for embolization. No  embolization was performed. Findings were discussed with cardiology  fellow who agreed to the plan.    PLAN:  1. Return to patient care unit.  2. TR band removal per protocol.   XR Chest Port 1 View    Impression    RESIDENT PRELIMINARY INTERPRETATION  IMPRESSION:   Lines/tubes/Devices as follows:   --Endotracheal tube tip projects 3.5 cm above the judy.   --Inferior-approach ECMO cannula tip projects near the superior  cavoatrial junction.   --Intra-aortic balloon pump superior marker is at the level of the  judy.   --Left apical chest tube   --Partially visualized enteric tube.   --Atrial occluder device   --Esophageal temperature probe    Persistent right pleural effusion of at least moderate size. Trace  left pleural effusion. No overt new airspace disease. No appreciable  pneumothorax. Unchanged mediastinal widening.. Cardiomediastinal  silhouette is similar. No acute abnormality of the bones.        Labs:  Recent Labs   Lab 02/21/22  0555 02/21/22  0349 02/21/22  0348 02/21/22  0154 02/21/22  0019   PH 7.43 7.45  --  7.47* 7.42   PCO2 37 35  --  33* 38   PO2 146* 133*  --  125* 92   HCO3 25 24  --  24 24   O2PER 60 60  60 60 60 60       Lab Results   Component Value Date    HGB 7.6 (L) 02/21/2022    PHGB <30 02/21/2022    PLT 59 (L) 02/21/2022    FIBR 302 02/21/2022    INR 1.12 02/21/2022    PTT 30 02/21/2022    DD 1.51 (H) 02/21/2022    ANTCH 65 (L) 02/20/2022       Plan:  Plan is to continue full ECMO support with  anticipation of cardiac recovery. Hold heparin at least 24 hrs to help maintain hemostasis of hemothorax/left pleural bleed. May need right-sided chest tube placed as well.     Minda Juares, RT  ECMO Specialist  2/21/2022  6:30 AM

## 2022-02-21 NOTE — PRE-PROCEDURE
GENERAL PRE-PROCEDURE:   Procedure:  Cholecystostomy placement  Date/Time:  2/20/2022 8:34 PM    Written consent obtained?: Yes    Risks and benefits: Risks, benefits and alternatives were discussed    Consent given by:  Patient  Patient states understanding of procedure being performed: Yes    Patient's understanding of procedure matches consent: Yes    Procedure consent matches procedure scheduled: Yes    Expected level of sedation:  Moderate  Appropriately NPO:  Yes  ASA Class:  3  Mallampati  :  Grade 2- soft palate, base of uvula, tonsillar pillars, and portion of posterior pharyngeal wall visible  Lungs:  Lungs clear with good breath sounds bilaterally  Heart:  Normal heart sounds and rate  History & Physical reviewed:  History and physical reviewed and no updates needed  Statement of review:  I have reviewed the lab findings, diagnostic data, medications, and the plan for sedation

## 2022-02-21 NOTE — PHARMACY-VANCOMYCIN DOSING SERVICE
Pharmacy Vancomycin Note  Date of Service 2022  Patient's  1950   71 year old, male    Indication: Aspiration Pneumonia  Day of Therapy: 5  Current vancomycin regimen:  Intermittent based on levels  Current vancomycin monitoring method: intermittent based on levels  Current vancomycin therapeutic monitoring goal: 15-20 mg/L    Current estimated CrCl = Estimated Creatinine Clearance: 21.6 mL/min (A) (based on SCr of 3.75 mg/dL (H)).    Creatinine for last 3 days  2022:  3:45 PM Creatinine 2.16 mg/dL;  9:54 PM Creatinine 2.34 mg/dL  2022:  3:42 AM Creatinine 2.39 mg/dL; 10:01 AM Creatinine 2.56 mg/dL;  3:49 PM Creatinine 2.78 mg/dL;  9:51 PM Creatinine 2.91 mg/dL  2022:  3:53 AM Creatinine 2.99 mg/dL;  9:56 AM Creatinine 3.18 mg/dL;  3:49 PM Creatinine 3.34 mg/dL; 10:52 PM Creatinine 3.54 mg/dL  2022:  3:48 AM Creatinine 3.61 mg/dL;  9:49 AM Creatinine 3.75 mg/dL    Recent Vancomycin Levels (past 3 days)  2022:  7:46 PM Vancomycin 17.2 mg/L  2022:  3:48 AM Vancomycin 20.2 mg/L    Vancomycin IV Administrations (past 72 hours)                   vancomycin (VANCOCIN) 1,750 mg in sodium chloride 0.9 % 250 mL intermittent infusion (mg) 1,750 mg New Bag 226     1,750 mg New Bag 22 2100                Nephrotoxins and other renal medications (From now, onward)    Start     Dose/Rate Route Frequency Ordered Stop    22 1030  norepinephrine (LEVOPHED) 16 mg in  mL infusion MAX CONC CENTRAL LINE         0.01-0.6 mcg/kg/min × 99.8 kg (Dosing Weight)  0.9-56.1 mL/hr  Intravenous CONTINUOUS 22 1010      22 1700  vasopressin 0.2 units/mL in NS (PITRESSIN) standard conc infusion         0.5-4 Units/hr  2.5-20 mL/hr  Intravenous CONTINUOUS 22 1650               Contrast Orders - past 72 hours (72h ago, onward)            Start     Dose/Rate Route Frequency Ordered Stop    22 2230  iodixanol (VISIPAQUE 320) injection 100 mL          100 mL Intravascular ONCE 02/20/22 2225 02/20/22 2227    02/20/22 1300  iopamidol (ISOVUE-370) solution 135 mL         135 mL Intravenous ONCE 02/20/22 1246 02/20/22 1250          Interpretation of levels and current regimen:  Vancomycin level is reflective of therapeutic level    Has serum creatinine changed greater than 50% in last 72 hours: Yes    Urine output:  diminished urine output    Renal Function: Worsening      Plan:  1.) Vancomycin discontinued by primary team.    Nicolette Pickard RPH

## 2022-02-21 NOTE — PROGRESS NOTES
Patient Name: Chano Johnson  Medical Record Number: [id number]  Today's Date: 2/20/2022    Procedure: Left Upper Extremity Angiogram and Embolization of Bleed  Proceduralist: Dr. Zaman, Dr. Barron & Dr. Leavitt  Pathology present: N/A    Pt arrived at 2054    Procedure Start: 2133  Procedure end: 2218  Sedation medications administered: N/A on sedation gtts due to intubation     Left Radial access at 2137    Sheath removed at 2210 Manual pressure held for 0 minutes; Closure device deployed TR Band Applied at 2210   L radial site appearance: CDI,  Radial pulse assessment:  present   Additional Puncture site(s): N/A  Other:        Pt departed room at 2235    Report given to: Kayla MONTIEL  : N/A    Other Notes: Pt arrived to IR room 1 from . Consent reviewed. Pt denies any questions or concerns regarding procedure. Pt positioned supine and monitored per protocol. Pt tolerated procedure without any noted complications. Pt transferred back to .

## 2022-02-21 NOTE — PROCEDURES
[facility]    Procedure: Left upper extremity mediastinal angiography    Date/Time: 2/20/2022 10:20 PM  Performed by: Ze Barron  Authorized by: Ze Barron   IR Fellow Physician: Anderson  Radiology Resident Physician: Tree  Other(s) attending procedure: Zaman - staff      UNIVERSAL PROTOCOL   Site Marked: Yes  Prior Images Obtained and Reviewed:  Yes  Required items: Required blood products, implants, devices and special equipment available    Patient identity confirmed:  Verbally with patient, arm band, provided demographic data and hospital-assigned identification number  Patient was reevaluated immediately before administering moderate or deep sedation or anesthesia  Confirmation Checklist:  Patient's identity using two indicators, relevant allergies, procedure was appropriate and matched the consent or emergent situation and correct equipment/implants were available  Time out: Immediately prior to the procedure a time out was called    Universal Protocol: the Joint Commission Universal Protocol was followed    Preparation: Patient was prepped and draped in usual sterile fashion    ESBL (mL):  20     ANESTHESIA    Anesthesia: Local infiltration  Local Anesthetic:  Lidocaine 1% without epinephrine  Anesthetic Total (mL):  1      SEDATION  Patient Sedated: Yes    Sedation Type:  Moderate (conscious) sedation  Sedation:  Fentanyl and midazolam  Vital signs: Vital signs monitored during sedation    See dictated procedure note for full details.  Findings: Selective catheterization of the left internal mammary artery revealed no active contrast extravasation.     Specimens: none    Complications: None    Condition: Stable    Plan: Return to patient care unit. TR band removal per protocol.       PROCEDURE  Describe Procedure: Selective catheterization of the left internal mammary artery revealed no active contrast extravasation. No embolization was performed.  Findings were discussed with cardiology team at the time of the procedure who agreed that no embolization should be performed at this time.   Patient Tolerance:  Patient tolerated the procedure well with no immediate complications  Length of time physician/provider present for 1:1 monitoring during sedation: 45

## 2022-02-21 NOTE — PROGRESS NOTES
ECMO Attending Progress Note  2/21/2022    Chano Johnson is a 71 year old male who was cannulated for VA ECMO 2/17/22 due to in hospital VT/VF arrest at an OSH with intermittent ROSC and cardiogenic shock on arrival to [facility] s/p PCI to his LAD.     Cannulation Site:  17 Fr in the Left femoral artery  25 Fr in the Left femoral vein  8Fr distal reperfusion cannula    Interval events: he went to IR to get his LIMA embolized but there was no active extravasation, s/p L sided CT with 1.3L bloody output it is still putting out some heme but slowing down.     Physical Exam:  Temp:  [97  F (36.1  C)-98.8  F (37.1  C)] 98.6  F (37  C)  Pulse:  [83-97] 91  Resp:  [12] 12  MAP:  [61 mmHg-103 mmHg] 80 mmHg  Arterial Line BP: ()/(35-84) 115/51  FiO2 (%):  [45 %-60 %] 45 %  SpO2:  [95 %-100 %] 99 %    Intake/Output Summary (Last 24 hours) at 2/21/2022 1632  Last data filed at 2/21/2022 1600  Gross per 24 hour   Intake 4039.8 ml   Output 2985 ml   Net 1054.8 ml    Vent Mode: CMV/AC  (Continuous Mandatory Ventilation/ Assist Control)  FiO2 (%): (S) 45 %  Resp Rate (Set): 12 breaths/min  Tidal Volume (Set, mL): 470 mL  PEEP (cm H2O): 7 cmH2O  Inspiratory Pressure (cm H2O) (Drager Jennifer): 25  Resp: 12       Labs:  Recent Labs   Lab 02/21/22  1556 02/21/22  1400 02/21/22  1148 02/21/22  0949   PH 7.41 7.41 7.39 7.43   PCO2 40 40 40 36   PO2 102 105 102 105   HCO3 25 25 24 24   O2PER 60  45  45 45 50 50      Recent Labs   Lab 02/21/22  0949 02/21/22  0348 02/20/22  2252 02/20/22  1549   WBC 9.4 8.9 8.8 10.0   HGB 7.9* 7.6* 8.0* 7.3*     Creatinine   Date Value Ref Range Status   02/21/2022 3.75 (H) 0.66 - 1.25 mg/dL Final   02/21/2022 3.61 (H) 0.66 - 1.25 mg/dL Final   02/20/2022 3.54 (H) 0.66 - 1.25 mg/dL Final   02/20/2022 3.34 (H) 0.66 - 1.25 mg/dL Final       Blood Flow (Circuit) LPM: 4.26 LPM  Gas Flow  LPM: 1.5 LPM  Gas FiO2   %: 60 %  ACT  (seconds): 147 seconds  Blood Temp  (degrees C): 36.9 C  Pulse Oximetry  (SpO2%):  99 %  Arterial Pressure  mmH mmHg      ECMO Issues including assessments and plan on DOS 2022:  Neuro: Sedated for mechanical ventilation and ECMO.  No acute distress.  NIRS stable 60's b/l  RASS goal: 0-1. Starting low dose propofol to cut back on versed  CV: Cardiogenic shock.  Hemodynamically stable off pressors since yesterday flowing at 4 given no heparin and ACT low 100's this am, pulse pressure 30mmHg when IABP is paused  Pulm: Keep vent settings at rest settings as above.  FEN/Renal:*Electrolytes stable w/ replacement protocols in place, rising Cr stable, has some UOP,no indication for dialysis  Heme: starting heparin 500 straight rate tonight some fibrin deposit in oxygenator though pressures are stable, Hemoglobin 7.9 . Oozing around the ECMO cannulas has slowed down  ID: completed 5d course of empiric antibiotics  Cannulae: Position is acceptable on exam and the available imaging.  Distal perfusion cannula is in place and patent.  Extremities are well-perfused.     I have personally reviewed the ECMO flows, oxygenation and CO2 clearance, anticoagulation, and cannula position.  I have also personally assessed the patient's systemic response with hemodynamics, oxygenation, ventilation, and bleeding.       The patient requires continued ECMO support and management in the ICU.  I have discussed patient care and treatment plan with the primary team.      Talisha Blake MD  Cardiology critical care  Pager

## 2022-02-21 NOTE — PROGRESS NOTES
"[facility]  Palliative Care Daily Progress Note       Recommendations & Counseling       Recommend continued critical care. His current FULL CODE status is correct. Goals of care will be an ongoing discussion as we see his clinical course.    I provided emotional support to his wife Meeta and discussed the evemts of the weekend.  She remains hopeful but also clearly articulates her respect for his wishes.    He continues to have significant renal impairment, and would recommend relying on fentanyl for opioid pain control; if you wanted to start a p.o. med would recommend oxycodone.    Our team will continue to follow and provide support for family.       Thank you for the opportunity to continue to participate in the care of this patient and family.  Please feel free to contact on-call palliative provider with any emergent needs.  We can be reached via team pager [phone] (answered 8-4:30 Monday-Friday); after-hours answering service ([phone]);     Larry Morrison DO  Palliative Medicine Fellow     Assessments          Chano Johnson is a 71 year old male with past medical history significant for atrial fibrillation, well-controlled after several ablations with insertion of a \"Watchman\" device, who presented to [facility] yesterday with chest pain and suffered a cardiac arrest in the ED. He was resucitated, transferred to our cath lab with stent x2. He had significant hemodynamic instability and was started on ECMO. He is now rewarmed, moving all 4 extremities, but continues on ECMO and has needed a chest tube on the left for hemothorax.     Today, the patient was seen for:  STEMI s/p stent x 2  Cardiogenic shock requiring ECMO  Goals of Care  Family Support    Prognosis, Goals, or Advance Care Planning was addressed today with: Yes. Patient not able to interact. Wife meeta at bedside; I discussed the weekends events with her.  The fact that he is " moving when off sedation is promising; however his hemothorax requiring a chest tube is a significant setback.  I explained to her that we would likely need more time to get a sense of what's possible for Husam, and she confirms she understands this.     Mood, coping, and/or meaning in the context of serious illness were addressed today: Yes.  Meeta confirms that she is caring for herself and that her family is providing support. She remains hopeful for a good outcome.            Interval History:     Chart review/discussion with unit or clinical team members:   Chart reviewed, he developed a hemothorax requiring a chest tube yesterday and this has delayed plans for turning down the ECMO.  CT showed streak artifact versus a potential area of infarct in his right temporal brain, EEG shows diffuse encephalopathy with no seizure activity.  Per neuro critical care service it remains too early to prognosticate for him.      Per patient or family/caregivers today:  Husam is not able to give history today.  His wife Meeta is present at the bedside and seems to have a pretty good understanding of the events of the last few days.  I discussed current state affairs with her and offered emotional support as we wait for further prognostic information.    Key Palliative Symptoms:   Pain location: Unable to assess  We are not helping to manage these symptoms currently in this patient.    Patient is on opioids: bowels not assessed today.           Review of Systems:     Unable to complete ROS due to clinical condition.          Medications:     I have reviewed this patient's medication profile and medications during this hospitalization.    Noted meds:    Current Facility-Administered Medications   Medication     artificial tears ophthalmic ointment     aspirin (ASA) chewable tablet 81 mg     calcium chloride in  mL intermittent infusion 1 g     dextrose 10% infusion     fentaNYL (SUBLIMAZE) 50 mcg/mL bolus from infusion pump 50  mcg     fentaNYL (SUBLIMAZE) infusion     heparin 2 unit/mL in 0.9% NaCl (for REPERFUSION CATHETER)     heparin ANTICOAGULANT bolus dose from infusion pump 388-776 Units     heparin infusion 25,000 units in D5W 250 mL ANTICOAGULANT     lidocaine (LMX4) cream     lidocaine 1 % 0.1-1 mL     magnesium sulfate 2 g in water intermittent infusion     magnesium sulfate 4 g in 100 mL sterile water (premade)     midazolam (VERSED) drip - ADULT 100 mg/100 mL in NS (pre-mix)     midazolam (VERSED) injection 1-3 mg     multivitamins w/minerals liquid 15 mL     naloxone (NARCAN) injection 0.2 mg    Or     naloxone (NARCAN) injection 0.4 mg    Or     naloxone (NARCAN) injection 0.2 mg    Or     naloxone (NARCAN) injection 0.4 mg     norepinephrine (LEVOPHED) 16 mg in  mL infusion MAX CONC CENTRAL LINE     [START ON 2/22/2022] pantoprazole (PROTONIX) 2 mg/mL suspension 40 mg     potassium chloride 20 mEq in 50 mL intermittent infusion     propofol (DIPRIVAN) infusion     protein modular (PROSOURCE TF) 1 packet     sodium chloride (PF) 0.9% PF flush 3 mL     sodium chloride (PF) 0.9% PF flush 3 mL     sodium phosphate 10 mmol in D5W 250 mL intermittent infusion     sodium phosphate 15 mmol in D5W intermittent infusion     sodium phosphate 20 mmol in D5W 250 mL intermittent infusion     sodium phosphate 25 mmol in D5W 250 mL intermittent infusion     ticagrelor (BRILINTA) tablet 90 mg     vasopressin 0.2 units/mL in NS (PITRESSIN) standard conc infusion              Physical Exam:   Vitals were reviewed  Temp: 98.6  F (37  C) Temp src: Esophageal   Pulse: 90   Resp: 12 SpO2: 99 % O2 Device: Mechanical Ventilator    Constitutional: Sedated, intubated, unresponsive  Head: Normocephalic, atraumatic.  ENT: ETT/NJ in place  Cardiovascular: RRR with rate in the 80s., no S3/S4/murmur  Respiratory: Mechanical breath sounds throughout. No evidence of distress.  Gastrointestinal: Abdomen soft, nondistended, nontender. Bowel sounds  hypoactive.  : Tapia in place  Musculoskeletal: Normal bulk/tone  Skin: warm, dry, pale  Neurologic: Sedated, intubated.  Unresponsive to voice or light touch.  Psychiatric: unable to assess due to neuro status             Data Reviewed:     Reviewed recent labs and pertinent imaging  CBC, metabolic panel, troponin, ABG reviewed.  He continues to have significant renal impairment, and would recommend relying on fentanyl for opioid pain control; if you wanted to start a p.o. med would recommend oxycodone.  Overall clinical picture is of critical illness following cardiac arrest.

## 2022-02-21 NOTE — CONSULTS
INTERVENTIONAL RADIOLOGY CONSULT NOTE    Reason for referral:   LIMA embolization.    History:   71 year old male who was admitted on 2/17/2022 s/p VT/VF arrest x8 shocks who was found to have LAD occlusion requiring 2x stents. He presented to local ED with chest pain that started at 1300 (within 1 hr of onset), he had a VT VF arrest in the ED after he was found to have an anterior STEMI on ECG.  Chest compressions and ACLS was started immediately.  Patient underwent 8 shocks for VT VF with ROSC afterwards.  He received 4 doses of epinephrine, 450 mg of amiodarone.  He was transferred to the [facility] ER for percutaneous intervention for his STEMI.    Since admission and CPR on 2/17 pt has reportedly been requiring 3-4units of PRBCs/day to maintain Hb >7. CT perfromed today demonstrated stranding throughout the anterior mediastinal fat representing hematoma and suspected active extravasation arising from the left internal mammary artery. CT also demonstrated two large effusions. The left sided effusion was tapped and with 1300mL of old blood being drained out instantly.     Imaging:   EXAMINATION: CT CHEST/ABDOMEN/PELVIS W CONTRAST, 2/20/2022 1:17 PM     TECHNIQUE:  Helical CT of the chest, abdomen and pelvis with contrast.   Coronal and sagittal reformatted images were created, archived and  reviewed at the workstation for further assessment.     CONTRAST: Isovue 370.     COMPARISON: 2/17/2022.     HISTORY: Status post CPR     FINDINGS:     LINES/TUBES/DEVICES: Tracheostomy tube is within the distal thoracic  trachea. Enteric tube is in the proximal duodenum. Left femoral vein  approach ECMO cannula tip is in the SVC. Left arterial catheter tip is  positioned in the left common iliac artery. Right femoral vein  approach central venous catheter tip is positioned in the retrohepatic  IVC. Right femoral artery approach aortic balloon pump tip is  positioned in the proximal aorta distal to the left  subclavian artery.    Tapia catheter within the bladder.     CHEST: Anterior mediastinal fat hyper attenuation and expansion with  an ill-defined hyperdensity (coronal series 5, image 26). Bilateral  large pleural effusions and lower lobe atelectasis.     The thyroid, esophagus, lymph nodes, and major vascular structures are  within normal limits.     ABDOMEN/PELVIS: The stomach, liver, biliary system, pancreas, spleen,  adrenal glands, large and small bowel, kidneys, ureters, bladder,  reproductive organs, major vascular structures, peritoneum and  retroperitoneum and lymph nodes are within normal limits.      BONES, BODY WALL AND SOFT TISSUES: No suspicious lesions. Multilevel  vertebral body, facet degenerative changes.                                                                      IMPRESSION:    1. Retrosternal ill-defined anterior mediastinal fat hemorrhage from  recent resuscitation attempt with focus of suspected active bleeding  from the left internal mammary artery.  3. No acute findings in the abdomen/pelvis.  4. Support devices as detailed in the body of the report     [Urgent Result: Active anterior mediastinal bleeding]     Finding was identified on 2/20/2022 2:09 PM.      Dr. Palomino was contacted by Dr. Shepard at 2/20/2022 7:02 PM and  verbalized understanding of the urgent finding.      Assessment:   71 year old male with active extravasation from the left internal mammary artery as a complication of resuscitative measures on 2/17/22. Currently requiring approximately 3-4 units of PRBC transfusions/day. IR consulted for embolization of LIMA. Based on the review of the pt urgent/emergent embolization is appropriate. Pt is currently on ECMO w/ a left femoral ecmo cannula and a right femoral intra-aortic balloon so will likely need to obtain access via left radial artery.    Plan:   - Plan for urgent/emergent embolization of left internal mammary artery tonight. Order placed.  - Heparin been held  since 3pm. Continue to hold.  - NPO (pt intubated on ecmo)    Labs:  Lab Results   Component Value Date    HGB 7.3 02/20/2022     Lab Results   Component Value Date    PLT 66 02/20/2022     Lab Results   Component Value Date    WBC 10.0 02/20/2022       Lab Results   Component Value Date    INR 1.14 02/20/2022       Lab Results   Component Value Date    PROTTOTAL 4.6 02/20/2022      Lab Results   Component Value Date    ALBUMIN 2.4 02/20/2022     Lab Results   Component Value Date    BILITOTAL 0.4 02/20/2022     No results found for: BILICONJ   Lab Results   Component Value Date    ALKPHOS 17 02/20/2022     Lab Results   Component Value Date    AST 92 02/20/2022     Lab Results   Component Value Date    ALT 70 02/20/2022       Lab Results   Component Value Date    CR 3.34 02/20/2022     Lab Results   Component Value Date    BUN 53 02/20/2022         If procedure has been approved, IR charge RN can be contacted at [phone] for estimated time of procedure.     Discussed with Dr. Zaman.  Tanner Leavitt M.D.  Diagnostic/Interventional Radiology PGY-3  IR pass pager: [phone]

## 2022-02-21 NOTE — PROGRESS NOTES
ECMO Shift Summary: 1059-0649    ECMO Equipment:  Console serial number: [id number]  Circuit Lot number: [id number]  Oxygenator Lot number: [id number]      Patient remains on [facility] ECMO, all equipment is functioning and alarms are appropriately set. RPM's 3700 with flow range 4.2-4.58 L/min. Sweep gas is at 1.5 LPM and FiO2 60%. Circuit remains free of visible air and clot  Fibrin at connectors and post oxygenator at 12 o'clock. Cannulas are secure with less bleeding from venous cannula insertion site. Extremities are perfused.     Significant Shift Events: Patient remains off heparin.  Fibrin accumulating post oxygenator and at connectors.  No turn down today 2/2 anti coagulation status.  Will likely restart heparin at a straight rate later this evening.  Planning for a turn down in am.  If OK, will ask CT surgery for decannulation.  Bedside ultrasound of RLL, showing small effusion.  Unchanged from yesterday, so no intervention required.      Vent settings:  Vent Mode: CMV/AC  (Continuous Mandatory Ventilation/ Assist Control)  FiO2 (%): (S) 45 %  Resp Rate (Set): 12 breaths/min  Tidal Volume (Set, mL): 470 mL  PEEP (cm H2O): 7 cmH2O  Inspiratory Pressure (cm H2O) (Drager Jennifer): 25  Resp: 12  .    Heparin is running at 0 u/kg/hr, ACT range 105-147.    Urine output is 30-40 and hour, blood loss was fair. Product given included 2 unit(s) of PRBC's, and 2 units of platelets.       Intake/Output Summary (Last 24 hours) at 2/21/2022 1621  Last data filed at 2/21/2022 1600  Gross per 24 hour   Intake 4027.8 ml   Output 2975 ml   Net 1052.8 ml       ECHO:  No results found for this or any previous visit.  No results found for this or any previous visit.      CXR:  Recent Results (from the past 24 hour(s))   XR Chest Port 1 View    Narrative    EXAM: XR CHEST 1 VW 2/20/2022      HISTORY: s/p L chest tube placement.    COMPARISON: CT and radiograph from same day.     TECHNIQUE: Frontal view of the chest.    FINDINGS/     Impression    IMPRESSION:   Lines/tubes/Devices as follows:   --Endotracheal tube tip projects 3.4 cm above the judy.   --Inferior-approach ECMO cannula tip projects near the superior  cavoatrial junction.   --Intra-aortic balloon pump superior marker is at the level of the  judy.   --Left apical chest tube   --Partially visualized enteric tube.    Decreased left pleural effusion. Persistent right pleural effusion of  at least moderate size. No overt new airspace disease. No appreciable  pneumothorax. Unchanged widened appearance of the upper mediastinum.  Cardiomediastinal silhouette is similar. No acute abnormality of the  bones or visualized upper abdomen.     I have personally reviewed the examination and initial interpretation  and I agree with the findings.    MARTI BOURGEOIS MD         SYSTEM ID:  [id number]   IR Upper Extremity Angiogram Left    Narrative    Procedure 2/20/2022 10:25 PM:  1. Ultrasound guidance for left radial arterial access.  2. Selective catheterization of the left subclavian artery with  diagnostic angiogram.  3. Subselective catheterization of the left internal mammary artery  with diagnostic angiogram.  4. Placement of TR band.    History: Active extravasation of the left internal mammary artery  after CPR with mediastinal hematoma.    Comparison: CT CAP same day    Staff: Dr. Zaman    Fellow: Dr. Barron  Resident: Dr. Leavitt    Monitoring/Medications: No sedation was administered. Patient remained  on ICU medications throughout the procedure.    Fluoro time: 6.4 minutes     Findings/procedure:    Prior to the procedure, witnessed telephone consent obtained from the  patient's wife. Patient placed supine. The left wrist was prepped in  usual sterile fashion. Preprocedure ultrasound demonstrated a patent  left radial artery.    1% lidocaine for local anesthesia. Under ultrasound guidance a  micropuncture needle was advanced into the left radial artery. Image  documenting the  needle within the artery was archived. Microwire  advanced through the needle and needle exchanged for 5 [place] Slender  sheath. 200 mcg nitroglycerin administered.    Through this sheath, 5 [place] Irish angled tapered glide catheter and baby  J-wire were advanced into the proximal left subclavian artery and  diagnostic angiogram was performed revealing a patent origin of the  left internal mammary artery. The vessel was catheterized with an 035  angled glide wire and the 5 [place] angled taper glide catheter.    Angiography at this point demonstrated no active hemorrhage. More  subselective access was achieved with 2.8 [place] Irish ProGreat  microcatheter and microwire. Injection was performed in multiple  planes and projections near the level of the contrast extravasation  seen on prior CT CAP which was available for reference in the room  during the procedure. However, no active contrast extravasation was  identified despite multiple injections. It is noted that the patient's  heparin had been held for several hours prior to this procedure likely  accounting for the cessation of hemorrhage.    At this point, the cardiology fellow was contacted and we discussed  that no empiric embolization should be performed in order to preserve  the left internal mammary artery for potential future cardiac  interventions. They were amenable to this plan.    Wires and catheters were removed. Hemostasis achieved with TR band.    Patient tolerated procedure well. No immediate complications.       Impression    IMPRESSION: Angiogram of the left internal mammary artery demonstrated  no active arterial extravasation. Therefore, no embolization was  performed. Patient's heparin had been held for several hours prior to  this procedure likely accounting for the cessation of hemorrhage.  Findings were discussed with cardiology fellow who agreed to not  proceed with empiric embolization in order to spare the left internal  mammary artery for potential  future cardiac intervention/bypass if  that becomes necessary.    PLAN:  1. Return to patient care unit.  2. TR band removal per protocol.    Procedure performed by Dr. Barron and Tree under my supervision.   I, Dr. Elmer Zaman, was present for the entire procedure.    I have personally reviewed the examination and initial interpretation  and I agree with the findings.    ELMER ZAMAN MD         SYSTEM ID:  [id number]   XR Chest Port 1 View    Narrative    EXAM: XR CHEST 1 VW 2/21/2022      HISTORY: Eval position of lines and drains.    COMPARISON: Previous day.     TECHNIQUE: Frontal view of the chest.    FINDINGS/    Impression    IMPRESSION:   Lines/tubes/Devices as follows:   --Endotracheal tube tip projects 3.5 cm above the judy.   --Inferior-approach ECMO cannula tip projects near the superior  cavoatrial junction.   --Intra-aortic balloon pump superior marker is at the level of the  judy.   --Left apical chest tube   --Partially visualized enteric tube.   --Atrial occluder device   --Esophageal temperature probe    Persistent right pleural effusion of at least moderate size. Trace  left pleural effusion. No overt new airspace disease. No appreciable  pneumothorax. Unchanged mediastinal widening.. Cardiomediastinal  silhouette is similar. No acute abnormality of the bones.     I have personally reviewed the examination and initial interpretation  and I agree with the findings.    MARTI BOURGEOIS MD         SYSTEM ID:  [id number]       Labs:  Recent Labs   Lab 02/21/22  1556 02/21/22  1400 02/21/22  1148 02/21/22  0949   PH 7.41 7.41 7.39 7.43   PCO2 40 40 40 36   PO2 102 105 102 105   HCO3 25 25 24 24   O2PER 60  45  45 45 50 50       Lab Results   Component Value Date    HGB 7.9 (L) 02/21/2022    PHGB <30 02/21/2022    PLT 61 (L) 02/21/2022    FIBR 302 02/21/2022    INR 1.13 02/21/2022    PTT 30 02/21/2022    DD 1.51 (H) 02/21/2022    ANTCH 76 (L) 02/21/2022         Plan is continue VA  support.  May decannulate tomorrow.      Aurelio Vogel, RT  ECMO Specialist  2/21/2022 4:21 PM

## 2022-02-21 NOTE — CONSULTS
Care Management Initial Consult    General Information  Assessment completed with: Pt's wifeMeeta  Type of CM/SW Visit: Initial Assessment    Primary Care Provider verified and updated as needed: Yes   Readmission within the last 30 days: no previous admission in last 30 days   Reason for Consult: elevated risk score  Advance Care Planning: Advance Care Planning Reviewed: verified with spouse       Communication Assessment  Patient's communication style: spoken language (English or Bilingual)    Hearing Difficulty or Deaf: no   Wear Glasses or Blind: other (see comments) (contacts)    Cognitive  Cognitive/Neuro/Behavioral: .WDL except  Level of Consciousness: sedated  Arousal Level: arouses to pain  Orientation: other (see comments) (caroline)  Mood/Behavior: behavior appropriate to situation  Best Language:  (CAROLINE)  Speech: endotracheal tube    Living Environment:   People in home: spouse  Meeta  Current living Arrangements: house      Able to return to prior arrangements: to be determined by PT/OT evaluations        Family/Social Support:  Care provided by: self  Provides care for: no one  Marital Status:   Wife, Children          Description of Support System: Supportive, Involved    Support Assessment: Adequate family and caregiver support, Adequate social supports    Current Resources:   Patient receiving home care services: No     Community Resources: None  Equipment currently used at home: none  Supplies currently used at home: None    Employment/Financial:  Employment Status: retired        Financial Concerns: No concerns identified   Referral to Financial Worker: No       Lifestyle & Psychosocial Needs:  Social Determinants of Health     Tobacco Use: Unknown     Smoking Tobacco Use: Never Smoker     Smokeless Tobacco Use: Unknown   Alcohol Use: Not on file   Financial Resource Strain: Not on file   Food Insecurity: Not on file   Transportation Needs: Not on file   Physical Activity: Not on file    Stress: Not on file   Social Connections: Not on file   Intimate Partner Violence: Not on file   Depression: Not on file   Housing Stability: Not on file       Functional Status:  Prior to admission patient needed assistance:   Dependent ADLs:: Independent  Dependent IADLs:: Independent       Mental Health Status:  Mental Health Status: No Current Concerns       Chemical Dependency Status:  Chemical Dependency Status: No Current Concerns             Values/Beliefs:  Spiritual, Cultural Beliefs, Buddhist Practices, Values that affect care: yes  Description of Beliefs that Will Affect Care: Nést[place]            Additional Information:  Per H&P: Chano Johnson is a 71 year old male who was admitted on 2/17/2022 s/p VT/VF arrest x8 shocks who was found to have LAD occlusion requiring 2x stents.    Pt remains intubated and sedated. MARGARITA spoke with pt's wife Meeta Johnson at bedside to complete initial assessment. Introduced self and hospital role. Meeta confirmed that she and pt live in a house in [place]. Pt was IND with daily activities prior to admission, drove self. Pt did not use any supplies or DME. Pt and Meeta have two adult sons and six grandchildren, all but one grandchild live locally and Meeta noted that they have a robust support system. Pt is retired, per Meeta he was quite active prior to admission and worked out three times per week. Also per Meeta, pt has an active HCD on file here, although this is not reflected in the scanned documents in his chart.     Meeta has been talking with her sons about the possibility of pt needing a TCU at discharge and their first choice for this would be [facility] ([facility] although they are aware they will likely have to provide other TCU options due to limited availability. MARGARITA noted that at this time, pt's discharge needs are unclear, but we will continue to follow his chart and communicate with family regarding the discharge plan.     SW will continue to follow plan of  care.      JACKI Belcher, [facility]  ICU   [facility]  Phone: [phone]  Pager: [phone]

## 2022-02-21 NOTE — PLAN OF CARE
Major Shift Events:  L sided CT placed for hemothorax. Pt to IR for suspected active bleeding from L mammary artery. No bleed found, heparin gtt to remain off per MD for 24 hours. 2 U PRBC, 1 FFP, 1 platelet given overnight. Pt remains sedated on fentanyl and propofol, RASS -4. Pupils not equal L>R, MD aware. Normothermic, thermaguard capped. SR, HR 80s-90s, freq PACs/PVCs. Pressors off. VA ECMO 60%, sweep 1.5, flow 4, speed 3700. UOP 20+-40 mL/hr, MD aware. creat increasing overnight, MD aware.     Plan: possible turndown study in am.   For vital signs and complete assessments, please see documentation flowsheets.

## 2022-02-22 ENCOUNTER — APPOINTMENT (OUTPATIENT)
Dept: GENERAL RADIOLOGY | Facility: CLINIC | Age: 72
DRG: 003 | End: 2022-02-22
Payer: MEDICARE

## 2022-02-22 ENCOUNTER — PREP FOR PROCEDURE (OUTPATIENT)
Dept: CARDIOLOGY | Facility: CLINIC | Age: 72
End: 2022-02-22

## 2022-02-22 ENCOUNTER — ANESTHESIA (OUTPATIENT)
Dept: SURGERY | Facility: CLINIC | Age: 72
DRG: 003 | End: 2022-02-22
Payer: MEDICARE

## 2022-02-22 ENCOUNTER — APPOINTMENT (OUTPATIENT)
Dept: GENERAL RADIOLOGY | Facility: CLINIC | Age: 72
DRG: 003 | End: 2022-02-22
Attending: PHYSICIAN ASSISTANT
Payer: MEDICARE

## 2022-02-22 ENCOUNTER — APPOINTMENT (OUTPATIENT)
Dept: CARDIOLOGY | Facility: CLINIC | Age: 72
DRG: 003 | End: 2022-02-22
Payer: MEDICARE

## 2022-02-22 ENCOUNTER — ANESTHESIA EVENT (OUTPATIENT)
Dept: SURGERY | Facility: CLINIC | Age: 72
DRG: 003 | End: 2022-02-22
Payer: MEDICARE

## 2022-02-22 ENCOUNTER — APPOINTMENT (OUTPATIENT)
Dept: NEUROLOGY | Facility: CLINIC | Age: 72
DRG: 003 | End: 2022-02-22
Payer: MEDICARE

## 2022-02-22 DIAGNOSIS — I46.9 CARDIAC ARREST (H): Primary | ICD-10-CM

## 2022-02-22 PROBLEM — N17.0 ACUTE KIDNEY FAILURE WITH TUBULAR NECROSIS (H): Status: ACTIVE | Noted: 2022-02-22

## 2022-02-22 LAB
ACT BLD: 126 SECONDS (ref 74–150)
ACT BLD: 135 SECONDS (ref 74–150)
ACT BLD: 143 SECONDS (ref 74–150)
ACT BLD: 148 SECONDS (ref 74–150)
ACT BLD: 148 SECONDS (ref 74–150)
ACT BLD: 152 SECONDS (ref 74–150)
ALBUMIN SERPL-MCNC: 2.4 G/DL (ref 3.4–5)
ALBUMIN SERPL-MCNC: 2.4 G/DL (ref 3.4–5)
ALBUMIN SERPL-MCNC: 2.5 G/DL (ref 3.4–5)
ALP SERPL-CCNC: 19 U/L (ref 40–150)
ALP SERPL-CCNC: 19 U/L (ref 40–150)
ALP SERPL-CCNC: 22 U/L (ref 40–150)
ALT SERPL W P-5'-P-CCNC: 42 U/L (ref 0–70)
ALT SERPL W P-5'-P-CCNC: 44 U/L (ref 0–70)
ALT SERPL W P-5'-P-CCNC: 45 U/L (ref 0–70)
ANION GAP SERPL CALCULATED.3IONS-SCNC: 5 MMOL/L (ref 3–14)
ANION GAP SERPL CALCULATED.3IONS-SCNC: 6 MMOL/L (ref 3–14)
ANION GAP SERPL CALCULATED.3IONS-SCNC: 7 MMOL/L (ref 3–14)
APTT PPP: 28 SECONDS (ref 22–38)
APTT PPP: 33 SECONDS (ref 22–38)
APTT PPP: 34 SECONDS (ref 22–38)
AST SERPL W P-5'-P-CCNC: 60 U/L (ref 0–45)
AST SERPL W P-5'-P-CCNC: 61 U/L (ref 0–45)
AST SERPL W P-5'-P-CCNC: 62 U/L (ref 0–45)
AT III ACT/NOR PPP CHRO: 95 % (ref 85–135)
ATRIAL RATE - MUSE: 84 BPM
BACTERIA SPEC CULT: ABNORMAL
BACTERIA SPEC CULT: ABNORMAL
BACTERIA SPT CULT: ABNORMAL
BASE EXCESS BLDA CALC-SCNC: 0.2 MMOL/L (ref -9.6–2)
BASE EXCESS BLDA CALC-SCNC: 0.3 MMOL/L (ref -9.6–2)
BASE EXCESS BLDA CALC-SCNC: 0.5 MMOL/L (ref -9–1.8)
BASE EXCESS BLDA CALC-SCNC: 0.6 MMOL/L (ref -9–1.8)
BASE EXCESS BLDA CALC-SCNC: 0.7 MMOL/L (ref -9.6–2)
BASE EXCESS BLDA CALC-SCNC: 0.9 MMOL/L (ref -9.6–2)
BASE EXCESS BLDA CALC-SCNC: 1.1 MMOL/L (ref -9–1.8)
BASE EXCESS BLDA CALC-SCNC: 1.2 MMOL/L (ref -9–1.8)
BASE EXCESS BLDA CALC-SCNC: 1.4 MMOL/L (ref -9–1.8)
BASE EXCESS BLDA CALC-SCNC: 1.5 MMOL/L (ref -9–1.8)
BASE EXCESS BLDV CALC-SCNC: 0.7 MMOL/L (ref -7.7–1.9)
BASE EXCESS BLDV CALC-SCNC: 1.5 MMOL/L (ref -7.7–1.9)
BILIRUB SERPL-MCNC: 0.4 MG/DL (ref 0.2–1.3)
BILIRUB SERPL-MCNC: 0.5 MG/DL (ref 0.2–1.3)
BILIRUB SERPL-MCNC: 0.6 MG/DL (ref 0.2–1.3)
BLD PROD TYP BPU: NORMAL
BLOOD COMPONENT TYPE: NORMAL
BUN SERPL-MCNC: 87 MG/DL (ref 7–30)
BUN SERPL-MCNC: 93 MG/DL (ref 7–30)
BUN SERPL-MCNC: 94 MG/DL (ref 7–30)
CA-I BLD-MCNC: 4.5 MG/DL (ref 4.4–5.2)
CA-I BLD-MCNC: 4.6 MG/DL (ref 4.4–5.2)
CA-I BLD-MCNC: 4.6 MG/DL (ref 4.4–5.2)
CA-I BLD-MCNC: 4.8 MG/DL (ref 4.4–5.2)
CA-I BLD-MCNC: 4.9 MG/DL (ref 4.4–5.2)
CALCIUM SERPL-MCNC: 8 MG/DL (ref 8.5–10.1)
CALCIUM SERPL-MCNC: 8 MG/DL (ref 8.5–10.1)
CALCIUM SERPL-MCNC: 8.5 MG/DL (ref 8.5–10.1)
CHLORIDE BLD-SCNC: 116 MMOL/L (ref 94–109)
CHLORIDE BLD-SCNC: 116 MMOL/L (ref 94–109)
CHLORIDE BLD-SCNC: 117 MMOL/L (ref 94–109)
CO2 SERPL-SCNC: 24 MMOL/L (ref 20–32)
CO2 SERPL-SCNC: 24 MMOL/L (ref 20–32)
CO2 SERPL-SCNC: 25 MMOL/L (ref 20–32)
CODING SYSTEM: NORMAL
CREAT SERPL-MCNC: 3.55 MG/DL (ref 0.66–1.25)
CREAT SERPL-MCNC: 3.63 MG/DL (ref 0.66–1.25)
CREAT SERPL-MCNC: 3.68 MG/DL (ref 0.66–1.25)
CROSSMATCH: NORMAL
CRP SERPL-MCNC: 26 MG/L (ref 0–8)
D DIMER PPP FEU-MCNC: 3.28 UG/ML FEU (ref 0–0.5)
D DIMER PPP FEU-MCNC: 3.61 UG/ML FEU (ref 0–0.5)
DIASTOLIC BLOOD PRESSURE - MUSE: NORMAL MMHG
ERYTHROCYTE [DISTWIDTH] IN BLOOD BY AUTOMATED COUNT: 15.8 % (ref 10–15)
ERYTHROCYTE [DISTWIDTH] IN BLOOD BY AUTOMATED COUNT: 15.9 % (ref 10–15)
ERYTHROCYTE [DISTWIDTH] IN BLOOD BY AUTOMATED COUNT: 16 % (ref 10–15)
ERYTHROCYTE [DISTWIDTH] IN BLOOD BY AUTOMATED COUNT: 16.1 % (ref 10–15)
ERYTHROCYTE [SEDIMENTATION RATE] IN BLOOD BY WESTERGREN METHOD: 36 MM/HR (ref 0–20)
FIBRINOGEN PPP-MCNC: 288 MG/DL (ref 170–490)
FIBRINOGEN PPP-MCNC: 296 MG/DL (ref 170–490)
GFR SERPL CREATININE-BSD FRML MDRD: 17 ML/MIN/1.73M2
GLUCOSE BLD-MCNC: 118 MG/DL (ref 70–99)
GLUCOSE BLD-MCNC: 120 MG/DL (ref 70–99)
GLUCOSE BLD-MCNC: 123 MG/DL (ref 70–99)
GLUCOSE BLD-MCNC: 128 MG/DL (ref 70–99)
GLUCOSE BLD-MCNC: 128 MG/DL (ref 70–99)
GLUCOSE BLD-MCNC: 129 MG/DL (ref 70–99)
GLUCOSE BLD-MCNC: 130 MG/DL (ref 70–99)
GLUCOSE BLD-MCNC: 132 MG/DL (ref 70–99)
GLUCOSE BLDC GLUCOMTR-MCNC: 116 MG/DL (ref 70–99)
GLUCOSE BLDC GLUCOMTR-MCNC: 122 MG/DL (ref 70–99)
GLUCOSE BLDC GLUCOMTR-MCNC: 124 MG/DL (ref 70–99)
GLUCOSE BLDC GLUCOMTR-MCNC: 124 MG/DL (ref 70–99)
GLUCOSE BLDC GLUCOMTR-MCNC: 126 MG/DL (ref 70–99)
GLUCOSE BLDC GLUCOMTR-MCNC: 128 MG/DL (ref 70–99)
GLUCOSE BLDC GLUCOMTR-MCNC: 129 MG/DL (ref 70–99)
GLUCOSE BLDC GLUCOMTR-MCNC: 131 MG/DL (ref 70–99)
GLUCOSE BLDC GLUCOMTR-MCNC: 137 MG/DL (ref 70–99)
GLUCOSE BLDC GLUCOMTR-MCNC: 74 MG/DL (ref 70–99)
GRAM STAIN RESULT: ABNORMAL
HCO3 BLD-SCNC: 25 MMOL/L (ref 21–28)
HCO3 BLD-SCNC: 26 MMOL/L (ref 21–28)
HCO3 BLD-SCNC: 27 MMOL/L (ref 21–28)
HCO3 BLDA-SCNC: 25 MMOL/L (ref 21–28)
HCO3 BLDA-SCNC: 26 MMOL/L (ref 21–28)
HCO3 BLDA-SCNC: 26 MMOL/L (ref 21–28)
HCO3 BLDV-SCNC: 26 MMOL/L (ref 21–28)
HCO3 BLDV-SCNC: 27 MMOL/L (ref 21–28)
HCT VFR BLD AUTO: 22.5 % (ref 40–53)
HCT VFR BLD AUTO: 22.6 % (ref 40–53)
HCT VFR BLD AUTO: 24.9 % (ref 40–53)
HCT VFR BLD AUTO: 26.4 % (ref 40–53)
HGB BLD-MCNC: 7.5 G/DL (ref 13.3–17.7)
HGB BLD-MCNC: 7.5 G/DL (ref 13.3–17.7)
HGB BLD-MCNC: 8.2 G/DL (ref 13.3–17.7)
HGB BLD-MCNC: 8.4 G/DL (ref 13.3–17.7)
HGB BLD-MCNC: 8.5 G/DL (ref 13.3–17.7)
HGB BLD-MCNC: 8.7 G/DL (ref 13.3–17.7)
HGB BLD-MCNC: 8.8 G/DL (ref 13.3–17.7)
HGB BLD-MCNC: 9 G/DL (ref 13.3–17.7)
HGB FREE PLAS-MCNC: 30 MG/DL
INR PPP: 1.12 (ref 0.85–1.15)
INR PPP: 1.12 (ref 0.85–1.15)
INR PPP: 1.13 (ref 0.85–1.15)
INTERPRETATION ECG - MUSE: NORMAL
ISSUE DATE AND TIME: NORMAL
LACTATE BLD-SCNC: 0.8 MMOL/L
LACTATE BLD-SCNC: 1.1 MMOL/L
LACTATE BLD-SCNC: 1.4 MMOL/L
LACTATE BLD-SCNC: 1.6 MMOL/L
LACTATE SERPL-SCNC: 1.2 MMOL/L (ref 0.7–2)
LACTATE SERPL-SCNC: 1.3 MMOL/L (ref 0.7–2)
LACTATE SERPL-SCNC: 1.6 MMOL/L (ref 0.7–2)
LDH SERPL L TO P-CCNC: 368 U/L (ref 85–227)
MAGNESIUM SERPL-MCNC: 2.7 MG/DL (ref 1.8–2.6)
MAGNESIUM SERPL-MCNC: 2.9 MG/DL (ref 1.8–2.6)
MAGNESIUM SERPL-MCNC: 3.1 MG/DL (ref 1.6–2.3)
MCH RBC QN AUTO: 29.9 PG (ref 26.5–33)
MCH RBC QN AUTO: 30 PG (ref 26.5–33)
MCH RBC QN AUTO: 30.2 PG (ref 26.5–33)
MCH RBC QN AUTO: 30.9 PG (ref 26.5–33)
MCHC RBC AUTO-ENTMCNC: 33.2 G/DL (ref 31.5–36.5)
MCHC RBC AUTO-ENTMCNC: 33.3 G/DL (ref 31.5–36.5)
MCHC RBC AUTO-ENTMCNC: 33.3 G/DL (ref 31.5–36.5)
MCHC RBC AUTO-ENTMCNC: 33.7 G/DL (ref 31.5–36.5)
MCV RBC AUTO: 90 FL (ref 78–100)
MCV RBC AUTO: 93 FL (ref 78–100)
NSE SERPL IA-MCNC: 24.9 NG/ML
NSE SERPL IA-MCNC: 31 NG/ML
NSE SERPL IA-MCNC: 50.1 NG/ML
O2/TOTAL GAS SETTING VFR VENT: 100 %
O2/TOTAL GAS SETTING VFR VENT: 40 %
O2/TOTAL GAS SETTING VFR VENT: 60 %
O2/TOTAL GAS SETTING VFR VENT: 62 %
O2/TOTAL GAS SETTING VFR VENT: 70 %
OXYHGB MFR BLD: 94 % (ref 92–100)
OXYHGB MFR BLD: 95 % (ref 92–100)
OXYHGB MFR BLD: 96 % (ref 92–100)
OXYHGB MFR BLD: 97 % (ref 92–100)
OXYHGB MFR BLDA: 92 % (ref 92–100)
OXYHGB MFR BLDA: 95 % (ref 92–100)
OXYHGB MFR BLDA: 96 % (ref 92–100)
OXYHGB MFR BLDA: 97 % (ref 92–100)
OXYHGB MFR BLDA: 99 % (ref 75–100)
OXYHGB MFR BLDV: 67 %
OXYHGB MFR BLDV: 82 %
P AXIS - MUSE: 45 DEGREES
PCO2 BLD: 35 MM HG (ref 35–45)
PCO2 BLD: 36 MM HG (ref 35–45)
PCO2 BLD: 37 MM HG (ref 35–45)
PCO2 BLD: 37 MM HG (ref 35–45)
PCO2 BLD: 38 MM HG (ref 35–45)
PCO2 BLD: 40 MM HG (ref 35–45)
PCO2 BLD: 42 MM HG (ref 35–45)
PCO2 BLD: 42 MM HG (ref 35–45)
PCO2 BLD: 43 MM HG (ref 35–45)
PCO2 BLDA: 37 MM HG (ref 35–45)
PCO2 BLDA: 39 MM HG (ref 35–45)
PCO2 BLDA: 40 MM HG (ref 35–45)
PCO2 BLDA: 45 MM HG (ref 35–45)
PCO2 BLDA: 48 MM HG (ref 35–45)
PCO2 BLDV: 45 MM HG (ref 40–50)
PCO2 BLDV: 47 MM HG (ref 40–50)
PH BLD: 7.4 [PH] (ref 7.35–7.45)
PH BLD: 7.42 [PH] (ref 7.35–7.45)
PH BLD: 7.43 [PH] (ref 7.35–7.45)
PH BLD: 7.44 [PH] (ref 7.35–7.45)
PH BLD: 7.44 [PH] (ref 7.35–7.45)
PH BLD: 7.45 [PH] (ref 7.35–7.45)
PH BLD: 7.46 [PH] (ref 7.35–7.45)
PH BLDA: 7.34 [PH] (ref 7.35–7.45)
PH BLDA: 7.37 [PH] (ref 7.35–7.45)
PH BLDA: 7.41 [PH] (ref 7.35–7.45)
PH BLDA: 7.42 [PH] (ref 7.35–7.45)
PH BLDA: 7.44 [PH] (ref 7.35–7.45)
PH BLDV: 7.36 [PH] (ref 7.32–7.43)
PH BLDV: 7.39 [PH] (ref 7.32–7.43)
PHOSPHATE SERPL-MCNC: 4.7 MG/DL (ref 2.5–4.5)
PLATELET # BLD AUTO: 100 10E3/UL (ref 150–450)
PLATELET # BLD AUTO: 107 10E3/UL (ref 150–450)
PLATELET # BLD AUTO: 86 10E3/UL (ref 150–450)
PLATELET # BLD AUTO: 88 10E3/UL (ref 150–450)
PO2 BLD: 103 MM HG (ref 80–105)
PO2 BLD: 106 MM HG (ref 80–105)
PO2 BLD: 107 MM HG (ref 80–105)
PO2 BLD: 108 MM HG (ref 80–105)
PO2 BLD: 75 MM HG (ref 80–105)
PO2 BLD: 83 MM HG (ref 80–105)
PO2 BLD: 85 MM HG (ref 80–105)
PO2 BLD: 85 MM HG (ref 80–105)
PO2 BLD: 94 MM HG (ref 80–105)
PO2 BLDA: 109 MM HG (ref 80–105)
PO2 BLDA: 224 MM HG (ref 80–105)
PO2 BLDA: 70 MM HG (ref 80–105)
PO2 BLDA: 78 MM HG (ref 80–105)
PO2 BLDA: 97 MM HG (ref 80–105)
PO2 BLDV: 36 MM HG (ref 25–47)
PO2 BLDV: 49 MM HG (ref 25–47)
POTASSIUM BLD-SCNC: 4.5 MMOL/L (ref 3.4–5.3)
POTASSIUM BLD-SCNC: 4.5 MMOL/L (ref 3.4–5.3)
POTASSIUM BLD-SCNC: 4.6 MMOL/L (ref 3.5–5)
POTASSIUM BLD-SCNC: 4.7 MMOL/L (ref 3.5–5)
POTASSIUM BLD-SCNC: 4.8 MMOL/L (ref 3.4–5.3)
PR INTERVAL - MUSE: 140 MS
PROT SERPL-MCNC: 4.6 G/DL (ref 6.8–8.8)
PROT SERPL-MCNC: 4.7 G/DL (ref 6.8–8.8)
PROT SERPL-MCNC: 4.7 G/DL (ref 6.8–8.8)
QRS DURATION - MUSE: 80 MS
QT - MUSE: 358 MS
QTC - MUSE: 423 MS
R AXIS - MUSE: 75 DEGREES
RBC # BLD AUTO: 2.43 10E6/UL (ref 4.4–5.9)
RBC # BLD AUTO: 2.51 10E6/UL (ref 4.4–5.9)
RBC # BLD AUTO: 2.78 10E6/UL (ref 4.4–5.9)
RBC # BLD AUTO: 2.93 10E6/UL (ref 4.4–5.9)
S100 CA BINDING PROTEIN B SER-MCNC: 142 NG/L
S100 CA BINDING PROTEIN B SER-MCNC: 453 NG/L
S100 CA BINDING PROTEIN B SER-MCNC: 93 NG/L
SODIUM BLD-SCNC: 147 MMOL/L (ref 133–144)
SODIUM BLD-SCNC: 148 MMOL/L (ref 133–144)
SODIUM SERPL-SCNC: 145 MMOL/L (ref 133–144)
SODIUM SERPL-SCNC: 147 MMOL/L (ref 133–144)
SODIUM SERPL-SCNC: 148 MMOL/L (ref 133–144)
SYSTOLIC BLOOD PRESSURE - MUSE: NORMAL MMHG
T AXIS - MUSE: 60 DEGREES
TRIGL SERPL-MCNC: 150 MG/DL
TROPONIN I SERPL HS-MCNC: ABNORMAL NG/L
UFH PPP CHRO-ACNC: <0.1 IU/ML
UNIT ABO/RH: NORMAL
UNIT NUMBER: NORMAL
UNIT STATUS: NORMAL
UNIT TYPE ISBT: 5100
VENTRICULAR RATE- MUSE: 84 BPM
WBC # BLD AUTO: 10.7 10E3/UL (ref 4–11)
WBC # BLD AUTO: 14.6 10E3/UL (ref 4–11)
WBC # BLD AUTO: 8.3 10E3/UL (ref 4–11)
WBC # BLD AUTO: 9.1 10E3/UL (ref 4–11)

## 2022-02-22 PROCEDURE — 95718 EEG PHYS/QHP 2-12 HR W/VEEG: CPT | Performed by: PSYCHIATRY & NEUROLOGY

## 2022-02-22 PROCEDURE — 85027 COMPLETE CBC AUTOMATED: CPT | Performed by: INTERNAL MEDICINE

## 2022-02-22 PROCEDURE — 200N000002 HC R&B ICU UMMC

## 2022-02-22 PROCEDURE — 85610 PROTHROMBIN TIME: CPT | Performed by: INTERNAL MEDICINE

## 2022-02-22 PROCEDURE — 999N000015 HC STATISTIC ARTERIAL MONITORING DAILY

## 2022-02-22 PROCEDURE — 82803 BLOOD GASES ANY COMBINATION: CPT | Performed by: INTERNAL MEDICINE

## 2022-02-22 PROCEDURE — 258N000003 HC RX IP 258 OP 636: Performed by: NURSE ANESTHETIST, CERTIFIED REGISTERED

## 2022-02-22 PROCEDURE — 82810 BLOOD GASES O2 SAT ONLY: CPT

## 2022-02-22 PROCEDURE — 85384 FIBRINOGEN ACTIVITY: CPT | Performed by: INTERNAL MEDICINE

## 2022-02-22 PROCEDURE — 250N000013 HC RX MED GY IP 250 OP 250 PS 637: Performed by: INTERNAL MEDICINE

## 2022-02-22 PROCEDURE — 87205 SMEAR GRAM STAIN: CPT | Performed by: INTERNAL MEDICINE

## 2022-02-22 PROCEDURE — 82947 ASSAY GLUCOSE BLOOD QUANT: CPT | Performed by: INTERNAL MEDICINE

## 2022-02-22 PROCEDURE — 83605 ASSAY OF LACTIC ACID: CPT | Performed by: INTERNAL MEDICINE

## 2022-02-22 PROCEDURE — 71045 X-RAY EXAM CHEST 1 VIEW: CPT | Mod: 26 | Performed by: RADIOLOGY

## 2022-02-22 PROCEDURE — 84484 ASSAY OF TROPONIN QUANT: CPT | Performed by: INTERNAL MEDICINE

## 2022-02-22 PROCEDURE — 250N000011 HC RX IP 250 OP 636: Performed by: SURGERY

## 2022-02-22 PROCEDURE — 272N000001 HC OR GENERAL SUPPLY STERILE: Performed by: SURGERY

## 2022-02-22 PROCEDURE — C1763 CONN TISS, NON-HUMAN: HCPCS | Performed by: SURGERY

## 2022-02-22 PROCEDURE — 83615 LACTATE (LD) (LDH) ENZYME: CPT | Performed by: INTERNAL MEDICINE

## 2022-02-22 PROCEDURE — P9016 RBC LEUKOCYTES REDUCED: HCPCS | Performed by: INTERNAL MEDICINE

## 2022-02-22 PROCEDURE — 33949 ECMO/ECLS DAILY MGMT ARTERY: CPT

## 2022-02-22 PROCEDURE — 95711 VEEG 2-12 HR UNMONITORED: CPT

## 2022-02-22 PROCEDURE — 250N000009 HC RX 250

## 2022-02-22 PROCEDURE — 250N000011 HC RX IP 250 OP 636: Performed by: NURSE ANESTHETIST, CERTIFIED REGISTERED

## 2022-02-22 PROCEDURE — 71045 X-RAY EXAM CHEST 1 VIEW: CPT | Mod: 76

## 2022-02-22 PROCEDURE — 35286 RPR BLVSL GRF OTH/TH VN LXTR: CPT | Mod: XU | Performed by: SURGERY

## 2022-02-22 PROCEDURE — 82330 ASSAY OF CALCIUM: CPT | Performed by: INTERNAL MEDICINE

## 2022-02-22 PROCEDURE — 84155 ASSAY OF PROTEIN SERUM: CPT | Performed by: INTERNAL MEDICINE

## 2022-02-22 PROCEDURE — 71045 X-RAY EXAM CHEST 1 VIEW: CPT | Mod: 77

## 2022-02-22 PROCEDURE — 84478 ASSAY OF TRIGLYCERIDES: CPT

## 2022-02-22 PROCEDURE — 250N000011 HC RX IP 250 OP 636: Performed by: INTERNAL MEDICINE

## 2022-02-22 PROCEDURE — 86140 C-REACTIVE PROTEIN: CPT | Performed by: INTERNAL MEDICINE

## 2022-02-22 PROCEDURE — 250N000011 HC RX IP 250 OP 636

## 2022-02-22 PROCEDURE — 93010 ELECTROCARDIOGRAM REPORT: CPT | Mod: 59 | Performed by: INTERNAL MEDICINE

## 2022-02-22 PROCEDURE — 33949 ECMO/ECLS DAILY MGMT ARTERY: CPT | Performed by: STUDENT IN AN ORGANIZED HEALTH CARE EDUCATION/TRAINING PROGRAM

## 2022-02-22 PROCEDURE — 71045 X-RAY EXAM CHEST 1 VIEW: CPT

## 2022-02-22 PROCEDURE — 85347 COAGULATION TIME ACTIVATED: CPT

## 2022-02-22 PROCEDURE — 93308 TTE F-UP OR LMTD: CPT

## 2022-02-22 PROCEDURE — 84132 ASSAY OF SERUM POTASSIUM: CPT

## 2022-02-22 PROCEDURE — 85520 HEPARIN ASSAY: CPT | Performed by: INTERNAL MEDICINE

## 2022-02-22 PROCEDURE — 80053 COMPREHEN METABOLIC PANEL: CPT | Performed by: INTERNAL MEDICINE

## 2022-02-22 PROCEDURE — 93308 TTE F-UP OR LMTD: CPT | Mod: 26 | Performed by: INTERNAL MEDICINE

## 2022-02-22 PROCEDURE — 250N000013 HC RX MED GY IP 250 OP 250 PS 637

## 2022-02-22 PROCEDURE — 33984 ECMO/ECLS RMVL PRPH CANNULA: CPT | Mod: 51 | Performed by: SURGERY

## 2022-02-22 PROCEDURE — 999N000075 HC STATISTIC IABP MONITORING

## 2022-02-22 PROCEDURE — 370N000017 HC ANESTHESIA TECHNICAL FEE, PER MIN: Performed by: SURGERY

## 2022-02-22 PROCEDURE — 84100 ASSAY OF PHOSPHORUS: CPT | Performed by: INTERNAL MEDICINE

## 2022-02-22 PROCEDURE — 04UY0KZ SUPPLEMENT LOWER ARTERY WITH NONAUTOLOGOUS TISSUE SUBSTITUTE, OPEN APPROACH: ICD-10-PCS | Performed by: SURGERY

## 2022-02-22 PROCEDURE — 85379 FIBRIN DEGRADATION QUANT: CPT | Performed by: INTERNAL MEDICINE

## 2022-02-22 PROCEDURE — 94003 VENT MGMT INPAT SUBQ DAY: CPT

## 2022-02-22 PROCEDURE — 85300 ANTITHROMBIN III ACTIVITY: CPT | Performed by: INTERNAL MEDICINE

## 2022-02-22 PROCEDURE — 83735 ASSAY OF MAGNESIUM: CPT | Performed by: INTERNAL MEDICINE

## 2022-02-22 PROCEDURE — 85652 RBC SED RATE AUTOMATED: CPT | Performed by: INTERNAL MEDICINE

## 2022-02-22 PROCEDURE — 250N000013 HC RX MED GY IP 250 OP 250 PS 637: Performed by: PHYSICIAN ASSISTANT

## 2022-02-22 PROCEDURE — 250N000009 HC RX 250: Performed by: SURGERY

## 2022-02-22 PROCEDURE — 360N000079 HC SURGERY LEVEL 6, PER MIN: Performed by: SURGERY

## 2022-02-22 PROCEDURE — 999N000157 HC STATISTIC RCP TIME EA 10 MIN

## 2022-02-22 PROCEDURE — 258N000003 HC RX IP 258 OP 636

## 2022-02-22 PROCEDURE — 99232 SBSQ HOSP IP/OBS MODERATE 35: CPT | Mod: 25 | Performed by: PSYCHIATRY & NEUROLOGY

## 2022-02-22 PROCEDURE — 250N000009 HC RX 250: Performed by: NURSE ANESTHETIST, CERTIFIED REGISTERED

## 2022-02-22 PROCEDURE — 85730 THROMBOPLASTIN TIME PARTIAL: CPT | Performed by: INTERNAL MEDICINE

## 2022-02-22 PROCEDURE — 82805 BLOOD GASES W/O2 SATURATION: CPT | Performed by: INTERNAL MEDICINE

## 2022-02-22 PROCEDURE — 82805 BLOOD GASES W/O2 SATURATION: CPT

## 2022-02-22 PROCEDURE — 83051 HEMOGLOBIN PLASMA: CPT | Performed by: INTERNAL MEDICINE

## 2022-02-22 PROCEDURE — 84132 ASSAY OF SERUM POTASSIUM: CPT | Performed by: INTERNAL MEDICINE

## 2022-02-22 PROCEDURE — 93005 ELECTROCARDIOGRAM TRACING: CPT

## 2022-02-22 PROCEDURE — 99291 CRITICAL CARE FIRST HOUR: CPT | Mod: 25 | Performed by: STUDENT IN AN ORGANIZED HEALTH CARE EDUCATION/TRAINING PROGRAM

## 2022-02-22 PROCEDURE — 250N000025 HC SEVOFLURANE, PER MIN: Performed by: SURGERY

## 2022-02-22 DEVICE — GRAFT PATCH VASC XENOSURE BIOLOGIC 0.8X08CM E0.8P8: Type: IMPLANTABLE DEVICE | Site: CHEST | Status: FUNCTIONAL

## 2022-02-22 RX ORDER — HEPARIN SODIUM 10000 [USP'U]/100ML
10-50 INJECTION, SOLUTION INTRAVENOUS CONTINUOUS
Status: DISCONTINUED | OUTPATIENT
Start: 2022-02-22 | End: 2022-02-23

## 2022-02-22 RX ORDER — SODIUM CHLORIDE 9 MG/ML
INJECTION, SOLUTION INTRAVENOUS CONTINUOUS PRN
Status: DISCONTINUED | OUTPATIENT
Start: 2022-02-22 | End: 2022-02-22

## 2022-02-22 RX ORDER — SODIUM CHLORIDE, SODIUM LACTATE, POTASSIUM CHLORIDE, CALCIUM CHLORIDE 600; 310; 30; 20 MG/100ML; MG/100ML; MG/100ML; MG/100ML
INJECTION, SOLUTION INTRAVENOUS CONTINUOUS PRN
Status: DISCONTINUED | OUTPATIENT
Start: 2022-02-22 | End: 2022-02-22

## 2022-02-22 RX ORDER — HEPARIN SODIUM 1000 [USP'U]/ML
INJECTION, SOLUTION INTRAVENOUS; SUBCUTANEOUS PRN
Status: DISCONTINUED | OUTPATIENT
Start: 2022-02-22 | End: 2022-02-22

## 2022-02-22 RX ORDER — PROPOFOL 10 MG/ML
INJECTION, EMULSION INTRAVENOUS PRN
Status: DISCONTINUED | OUTPATIENT
Start: 2022-02-22 | End: 2022-02-22

## 2022-02-22 RX ORDER — CALCIUM CHLORIDE 100 MG/ML
INJECTION INTRAVENOUS; INTRAVENTRICULAR PRN
Status: DISCONTINUED | OUTPATIENT
Start: 2022-02-22 | End: 2022-02-22

## 2022-02-22 RX ORDER — FENTANYL CITRATE 50 UG/ML
INJECTION, SOLUTION INTRAMUSCULAR; INTRAVENOUS PRN
Status: DISCONTINUED | OUTPATIENT
Start: 2022-02-22 | End: 2022-02-22

## 2022-02-22 RX ORDER — FUROSEMIDE 10 MG/ML
60 INJECTION INTRAMUSCULAR; INTRAVENOUS ONCE
Status: COMPLETED | OUTPATIENT
Start: 2022-02-22 | End: 2022-02-22

## 2022-02-22 RX ORDER — PROTAMINE SULFATE 10 MG/ML
INJECTION, SOLUTION INTRAVENOUS PRN
Status: DISCONTINUED | OUTPATIENT
Start: 2022-02-22 | End: 2022-02-22

## 2022-02-22 RX ADMIN — Medication 40 MG: at 08:02

## 2022-02-22 RX ADMIN — FENTANYL CITRATE 200 MCG/HR: 50 INJECTION INTRAVENOUS at 15:06

## 2022-02-22 RX ADMIN — Medication 1 PACKET: at 08:03

## 2022-02-22 RX ADMIN — ROCURONIUM BROMIDE 40 MG: 50 INJECTION, SOLUTION INTRAVENOUS at 17:33

## 2022-02-22 RX ADMIN — PROTAMINE SULFATE 45 MG: 10 INJECTION, SOLUTION INTRAVENOUS at 18:39

## 2022-02-22 RX ADMIN — FENTANYL CITRATE 250 MCG: 50 INJECTION, SOLUTION INTRAMUSCULAR; INTRAVENOUS at 16:48

## 2022-02-22 RX ADMIN — NOREPINEPHRINE BITARTRATE 6.4 MCG: 1 INJECTION, SOLUTION, CONCENTRATE INTRAVENOUS at 19:22

## 2022-02-22 RX ADMIN — FENTANYL CITRATE 250 MCG: 50 INJECTION, SOLUTION INTRAMUSCULAR; INTRAVENOUS at 16:17

## 2022-02-22 RX ADMIN — CALCIUM CHLORIDE 250 MG: 100 INJECTION INTRAVENOUS; INTRAVENTRICULAR at 19:11

## 2022-02-22 RX ADMIN — PROPOFOL 20 MCG/KG/MIN: 10 INJECTION, EMULSION INTRAVENOUS at 00:13

## 2022-02-22 RX ADMIN — SODIUM CHLORIDE: 9 INJECTION, SOLUTION INTRAVENOUS at 16:35

## 2022-02-22 RX ADMIN — PROTAMINE SULFATE 5 MG: 10 INJECTION, SOLUTION INTRAVENOUS at 18:37

## 2022-02-22 RX ADMIN — ROCURONIUM BROMIDE 40 MG: 50 INJECTION, SOLUTION INTRAVENOUS at 16:15

## 2022-02-22 RX ADMIN — Medication 0.02 MCG/KG/MIN: at 18:56

## 2022-02-22 RX ADMIN — FENTANYL CITRATE 200 MCG: 50 INJECTION, SOLUTION INTRAMUSCULAR; INTRAVENOUS at 17:15

## 2022-02-22 RX ADMIN — Medication 15 ML: at 08:02

## 2022-02-22 RX ADMIN — SUGAMMADEX 200 MG: 100 INJECTION, SOLUTION INTRAVENOUS at 20:05

## 2022-02-22 RX ADMIN — ROCURONIUM BROMIDE 50 MG: 50 INJECTION, SOLUTION INTRAVENOUS at 17:09

## 2022-02-22 RX ADMIN — FENTANYL CITRATE 200 MCG/HR: 50 INJECTION INTRAVENOUS at 04:57

## 2022-02-22 RX ADMIN — POLYETHYLENE GLYCOL 3350 17 G: 17 POWDER, FOR SOLUTION ORAL at 08:02

## 2022-02-22 RX ADMIN — PROPOFOL 20 MCG/KG/MIN: 10 INJECTION, EMULSION INTRAVENOUS at 20:49

## 2022-02-22 RX ADMIN — ASPIRIN 81 MG CHEWABLE TABLET 81 MG: 81 TABLET CHEWABLE at 08:02

## 2022-02-22 RX ADMIN — PROPOFOL 20 MCG/KG/MIN: 10 INJECTION, EMULSION INTRAVENOUS at 15:06

## 2022-02-22 RX ADMIN — NOREPINEPHRINE BITARTRATE 6.4 MCG: 1 INJECTION, SOLUTION, CONCENTRATE INTRAVENOUS at 18:56

## 2022-02-22 RX ADMIN — PROPOFOL 20 MCG/KG/MIN: 10 INJECTION, EMULSION INTRAVENOUS at 07:01

## 2022-02-22 RX ADMIN — SODIUM CHLORIDE, POTASSIUM CHLORIDE, SODIUM LACTATE AND CALCIUM CHLORIDE: 600; 310; 30; 20 INJECTION, SOLUTION INTRAVENOUS at 16:12

## 2022-02-22 RX ADMIN — CALCIUM CHLORIDE 250 MG: 100 INJECTION INTRAVENOUS; INTRAVENTRICULAR at 18:53

## 2022-02-22 RX ADMIN — ROCURONIUM BROMIDE 20 MG: 50 INJECTION, SOLUTION INTRAVENOUS at 18:30

## 2022-02-22 RX ADMIN — HEPARIN SODIUM 2500 UNITS: 1000 INJECTION INTRAVENOUS; SUBCUTANEOUS at 18:27

## 2022-02-22 RX ADMIN — FUROSEMIDE 60 MG: 10 INJECTION, SOLUTION INTRAVENOUS at 13:36

## 2022-02-22 RX ADMIN — SENNOSIDES AND DOCUSATE SODIUM 1 TABLET: 8.6; 5 TABLET ORAL at 08:02

## 2022-02-22 RX ADMIN — CALCIUM CHLORIDE 250 MG: 100 INJECTION INTRAVENOUS; INTRAVENTRICULAR at 18:55

## 2022-02-22 RX ADMIN — ROCURONIUM BROMIDE 30 MG: 50 INJECTION, SOLUTION INTRAVENOUS at 16:25

## 2022-02-22 RX ADMIN — TICAGRELOR 90 MG: 90 TABLET ORAL at 08:02

## 2022-02-22 RX ADMIN — CALCIUM CHLORIDE 250 MG: 100 INJECTION INTRAVENOUS; INTRAVENTRICULAR at 19:15

## 2022-02-22 RX ADMIN — PROPOFOL 50 MG: 10 INJECTION, EMULSION INTRAVENOUS at 16:36

## 2022-02-22 ASSESSMENT — ACTIVITIES OF DAILY LIVING (ADL)
ADLS_ACUITY_SCORE: 19

## 2022-02-22 NOTE — ANESTHESIA PREPROCEDURE EVALUATION
"Anesthesia Pre-Procedure Evaluation    Patient: Chano Johnson   MRN: [id number] : 1950        Procedure : Procedure(s):  REMOVAL, CANNULA, ADULT, FOR ECMO          No past medical history on file.   Past Surgical History:   Procedure Laterality Date     ATRIAL ABLATION SURGERY       IR UPPER EXTREMITY ANGIOGRAM LEFT  2022      Allergies   Allergen Reactions     Chlorpheniramine-Phenylpropan [A.R.M.] Other (See Comments)     Watery eyes, sneezing     Percocet [Oxycodone-Acetaminophen] Rash     \"felt like skin was crawling off\"-pt's S.O.       Social History     Tobacco Use     Smoking status: Never Smoker     Smokeless tobacco: Not on file   Substance Use Topics     Alcohol use: Yes     Alcohol/week: 2.0 standard drinks      Wt Readings from Last 1 Encounters:   22 105 kg (231 lb 7.7 oz)        Anesthesia Evaluation   Pt has had prior anesthetic. Type: General.    No history of anesthetic complications       ROS/MED HX  ENT/Pulmonary: Comment: Vent Mode: CMV/AC  (Continuous Mandatory Ventilation/ Assist Control)  FiO2 (%): 40 %  Resp Rate (Set): 12 breaths/min  Tidal Volume (Set, mL): 470 mL  PEEP (cm H2O): 7 cmH2O  Resp: 12        Neurologic:  - neg neurologic ROS     Cardiovascular: Comment: VA ECMO secondary to VT/VF arrest  MI (anterior) within LAD territory necessitating two stents  LAD hemorrhage  Cardiogenic shock-resolved      METS/Exercise Tolerance:     Hematologic:     (+) anemia, history of blood transfusion, no previous transfusion reaction, Known PRBC Anitbodies: Yes,     Musculoskeletal:  - neg musculoskeletal ROS     GI/Hepatic:     (+) GERD, Asymptomatic on medication,     Renal/Genitourinary:     (+) renal disease, type: ARF,     Endo:  - neg endo ROS     Psychiatric/Substance Use:  - neg psychiatric ROS     Infectious Disease:  - neg infectious disease ROS     Malignancy:  - neg malignancy ROS     Other:  - neg other ROS          Physical Exam    Airway   unable to assess      "     Respiratory Devices and Support         Dental    unable to assess        Cardiovascular          Rhythm and rate: regular and normal     Pulmonary           (+) rhonchi           OUTSIDE LABS:  CBC:   Lab Results   Component Value Date    WBC 9.1 02/22/2022    WBC 8.3 02/22/2022    HGB 7.5 (L) 02/22/2022    HGB 7.5 (L) 02/22/2022    HCT 22.5 (L) 02/22/2022    HCT 22.6 (L) 02/22/2022    PLT 88 (L) 02/22/2022     (L) 02/22/2022     BMP:   Lab Results   Component Value Date     (H) 02/22/2022     (H) 02/22/2022    POTASSIUM 4.5 02/22/2022    POTASSIUM 4.5 02/22/2022    CHLORIDE 117 (H) 02/22/2022    CHLORIDE 116 (H) 02/22/2022    CO2 24 02/22/2022    CO2 24 02/22/2022    BUN 93 (H) 02/22/2022    BUN 87 (H) 02/22/2022    CR 3.63 (H) 02/22/2022    CR 3.68 (H) 02/22/2022     (H) 02/22/2022     (H) 02/22/2022     COAGS:   Lab Results   Component Value Date    PTT 33 02/22/2022    INR 1.13 02/22/2022    FIBR 288 02/22/2022     POC: No results found for: BGM, HCG, HCGS  HEPATIC:   Lab Results   Component Value Date    ALBUMIN 2.5 (L) 02/22/2022    PROTTOTAL 4.7 (L) 02/22/2022    ALT 45 02/22/2022    AST 61 (H) 02/22/2022    ALKPHOS 19 (L) 02/22/2022    BILITOTAL 0.4 02/22/2022     OTHER:   Lab Results   Component Value Date    PH 7.40 02/22/2022    LACT 1.3 02/22/2022    PEDRITO 8.0 (L) 02/22/2022    PHOS 4.7 (H) 02/22/2022    MAG 2.7 (H) 02/22/2022    CRP 26.0 (H) 02/22/2022    SED 36 (H) 02/22/2022       Anesthesia Plan    ASA Status:  4   NPO Status:  NPO Appropriate    Anesthesia Type: General.     - Airway: ETT   Induction: Intravenous.   Maintenance: Inhalation.   Techniques and Equipment:     - Lines/Monitors: Arterial Line, Central Line, MATTHIEU, BIS, NIRS            MATTHIEU Absolute Contra-indication: NONE            MATTHIEU Relative Contra-indication: NONE     - Blood: Blood in Room     Consents    Anesthesia Plan(s) and associated risks, benefits, and realistic alternatives discussed.  Questions answered and patient/representative(s) expressed understanding.    - Discussed:     - Discussed with:  Spouse      - Extended Intubation/Ventilatory Support Discussed: Yes.      - Patient is DNR/DNI Status: No    Use of blood products discussed: Yes.     - Discussed with: Patient.     - Consented: consented to blood products            Reason for refusal: other.     Postoperative Care    Pain management: IV analgesics.        Comments:    Other Comments: Patient seen and examined.  Risks, benefits and alternatives to GETA discussed with patient's wife.  Questions answered and she wishes to proceed            Elmer Montes De Oca MD

## 2022-02-22 NOTE — PROGRESS NOTES
Major Shift Events:  Sedation weaned, moves all extremities, doesn't follow commands. Pressors remain off. VA ECMO continued, 2 RBC, 2 plt given per ECMO protocol. IABP 1:1, 100%. Heparin gtt started. CT output 20-30/hr. UOP adequate.   Plan: Continue with plan of care. Possible ECMO turndown in AM.  For vital signs and complete assessments, please see documentation flowsheets.

## 2022-02-22 NOTE — PROGRESS NOTES
Major Shift Events:  Bedside turndown in AM. Currently in OR for ECMO de-cannulation. IABP, 1:1, 100%. 60 mg lasix given, adequate response. CT output minimal (clotty).   Plan: Continue with plan of care.   For vital signs and complete assessments, please see documentation flowsheets.

## 2022-02-22 NOTE — PROGRESS NOTES
ECMO Attending Progress Note  2022    Chano Johnson is a 71 year old male who was cannulated for VA ECMO 22 due to in hospital VT/VF arrest at an OSH with intermittent ROSC and cardiogenic shock on arrival to [facility] [place] s/p PCI to his LAD.      Cannulation Site:  17 Fr in the Left femoral artery  25 Fr in the Left femoral vein  8Fr distal reperfusion cannula    Interval events: challenged with low dose heparin overnight with no increase in bleeding. SCr peaked though bun continues to rise. Off pressors     Physical Exam:  Temp:  [98.1  F (36.7  C)-98.8  F (37.1  C)] 98.6  F (37  C)  Pulse:  [80-98] 93  Resp:  [12-14] 12  MAP:  [72 mmHg-107 mmHg] 84 mmHg  Arterial Line BP: (102-158)/(45-71) 113/59  FiO2 (%):  [40 %] 40 %  SpO2:  [96 %-100 %] 98 %    Intake/Output Summary (Last 24 hours) at 2022 1313  Last data filed at 2022 1300  Gross per 24 hour   Intake 2412.04 ml   Output 1875 ml   Net 537.04 ml    Vent Mode: CMV/AC  (Continuous Mandatory Ventilation/ Assist Control)  FiO2 (%): 40 %  Resp Rate (Set): 12 breaths/min  Tidal Volume (Set, mL): 470 mL  PEEP (cm H2O): 7 cmH2O  Resp: 12       Labs:  Recent Labs   Lab 22  1156 22  0944 22  0905 22  0756   PH 7.40 7.42 7.40 7.44   PCO2 43 40 42 37   PO2 94 85 75* 85   HCO3 27 26 26 25   O2PER 40 40 40 40      Recent Labs   Lab 22  0944 22  0330 22  2149 22  1756   WBC 9.1 8.3 9.3 9.1   HGB 7.5* 7.5* 7.3* 7.6*     Creatinine   Date Value Ref Range Status   2022 3.63 (H) 0.66 - 1.25 mg/dL Final   2022 3.68 (H) 0.66 - 1.25 mg/dL Final   2022 3.76 (H) 0.66 - 1.25 mg/dL Final   2022 3.86 (H) 0.66 - 1.25 mg/dL Final       Blood Flow (Circuit) LPM: 4.08 LPM  Gas Flow  LPM: 0.5 LPM  Gas FiO2   %: 60 %  ACT  (seconds): 126 seconds  Blood Temp  (degrees C): 36.9 C  Pulse Oximetry  (SpO2%): 98 %  Arterial Pressure  mmH mmHg      ECMO Issues including assessments and plan on DOS  2/22/2022:  Neuro: Sedated for mechanical ventilation and ECMO.  No acute distress.  NIRS stable 60's b/l  RASS goal: 0-1 on low dose propofol versed and fentanyl  CV: Cardiogenic shock.  Hemodynamically stable on VA ECMO not on any pressors, pulse pressure ~35mmHg while flowing 4LPM and IABP on hold  Pulm: Keep vent settings at rest settings as above.  FEN/Renal: Electrolytes stable w/ replacement protocols in place, Cr peaked BUN still rising, stable UOP will diurese  Heme: ACT goal: 140-160, Hemoglobin 7.3 .  Minimal oozing around the ECMO cannulas.  ID: finished empiric abx course  Cannulae: Position is acceptable on exam and the available imaging.  Distal perfusion cannula is in place and patent. IABP advanced 2cm  Extremities are well-perfused.     Plan for decannulation today    I have personally reviewed the ECMO flows, oxygenation and CO2 clearance, anticoagulation, and cannula position.  I have also personally assessed the patient's systemic response with hemodynamics, oxygenation, ventilation, and bleeding.       The patient requires continued ECMO support and management in the ICU.  I have discussed patient care and treatment plan with the primary team.      Talisha Blake MD  Cardiology critical care  Pager

## 2022-02-22 NOTE — PROGRESS NOTES
[place] NOTE:    ECMO FLOW HR BP (A-LINE) O2 SAT SVO2   4.1 86 125/53(83) 98% 81.1   3.0 86 102/66(84) 97% 79.6   2.0 88 128/63(88) 97% 76.1   1.0 91 130/60(83) 97% 71.5     She tolerated the procedure well without any complications. Maintained adequate MAPs. A mixed venous blood gas was drawn at 1 lpm flow.  Hemoglobin at time of turndown 7.3    CV Surgery consulted for decannulation.    Mohsan Chaudhry MD  Cardiology Fellow

## 2022-02-22 NOTE — PROGRESS NOTES
[facility]  Cardiac ICU Progress Note  February 20, 2022            Key events - last 24 hours:     - Received 1U RBC overnight  - HD stable  - No acute concerns overnight              Assessment & Plan        71 year old male who was admitted on 2/17/2022 s/p VT/VF arrest x8 shocks who was found to have LAD occlusion requiring 2x stents. He presented to local ED with chest pain that started at 1300 (within 1 hr of onset), he had a VT VF arrest in the ED after he was found to have an anterior STEMI on ECG.  Chest compressions and ACLS was started immediately.  Patient underwent 8 shocks for VT VF with ROSC afterwards.  He received 4 doses of epinephrine, 450 mg of amiodarone.  He was transferred to the [facility] for percutaneous intervention for his STEMI. Unfortunately his transfer was delayed because he was a very low dose of Epi (0.01) and the local hospital only had a BLS ambulance available. He arrived at [facility] and was found to have occlusion of the LAD. During the angiogram he was found to have significant hypotension and ectopy, which prompted placement on VA ECMO. PCI with 2x LETY was done with ECMO support. TTM post arrest at 35C, rewarmed on 2/19/22.    Today's Changes  - Increase heparin to ACT goal 140-160  - Continue to monitor groin bleeding  - TTE turndown  - TF on hold given pending decannulation  - Decannulation this afternoon     Neurology: #Possible neurological injury post cardiac arrest  Intubated, sedated, paralyzed. Cooled to 34 degrees.  Now rewarmed as of 2/19/2022  --continue versed, fentanyl, propofol. Attempting to wean off propfol.   - RASS goal -4 to -5   - CTH on presentation w/o acute intracranial pathology. Day 3 CTH showed indeterminant gray-white matter loss in the R occipital lobe which could be artifact vs true infarct. Will discuss with neuro crit care.  - EEG w/o significant abnormalities noted  - Pt has nonpurposeful movement  of all 4 extremities when sedation lightened.   Cardiovascular / Hemodynamics: Refractory VF arrest requiring 8 shocks.  x2 LETY stents to LAD.  Peripheral V-A ECMO inserted for refractory cardiogenic shock. LA   TTE: LVEF 10-15%, RV moderately to severely reduced function. No prior hx of heart failure. No significant valvular disease. Pulsatility significant improved after rewarming, today 30mmHg on IABP pause.  EKG: FLORY V2-V4 consistent with anterior STEMI  --wean pressors/inotropes as able  --MATTHIEU ECMO turndown 2/22/22: no concerns, stable. Okay to proceed with decannulation. Tentatively planned for today.  --continue ASA 81mg and ticagrelor 90mg BID as of now status post 2 LETY to LAD  --ACT goal 140-160, Increase heparin from fixed rate of 500U to ACT goal titrated  --hold lipitor for now given likely hepatic injury during arrest  --hold ACE/ARB for now given likely reduced renal fxn after arrest  --holding beta blocker given shock      - CT CAP: Large right-sided diffuse effusion s/p chest tube placement      Pulmonary: #AHRF  ETT in appropriate position.  Now weaning vent requirements.  CXR: Lines in stable position.  --wean vent as able  --daily CXR  -Possibly some contribution from large right-sided pleural effusion which is s/p chest tube placement. continue to monitor chest tube output. Will consider   --Q2h ABGs for now  --consider scheduled duonebs if signs of lung dz, currently PRN     GI and Nutrition: No known medical hx.   --monitor BID LFTs  -Nutrition consult to start trickle feeds  --bowel regimen   --GI Prophylaxis: PPI    Renal, Fluid and Electrolytes: #SAHIL, likely prerenal from hypoperfusion. Monitoring for CRRT needs.  Monitoring UOP, will start CRRT through circuit if needed.  --maintain K>4 and Mg>2    Infectious Disease: No signs of infection. Leukocytosis c/w arrest. Blood cultures collected.   --vancomycin/zosyn x5 days for ECMO - completed course on 2/21/22  --daily blood  cultures  --monitor for signs of infection given cooling, lines, and leukocytosis   Hematology and Oncology: #Acute blood loss anemia  #Thrombocytopenia - likely secondary to hemolysis. 2/21/22 4T score 3 (low risk, <5%)  --Heparin for ECMO (see above) and ASA/ticagrelor for LETY.   --cryo PRN fibrinogen < 200; FFP for INR >2  --Transfuse for Hgb<8  --heparin gtt for ECMO with ACT goal 140-160  --US LE w/ arterial duplex per ECMO protocol   --DVT PPX: Heparin as above  -Continue to trend CBCs transfuse to goal hemoglobin of 8   Endocrinology:    No known medical history. BG elevated.  --insulin gtt  --stress dose steroids due to persistent hypotension - hydrocort 50mg IV Q8h discontinued  --f/u HgbA1c    Lines: L femoral arterial and venous ECMO cannulae February 17, 2022  R radial arterial line February 17, 2022  ETT February 17, 2022  Tapia catheter February 17, 2022  OG tube February 17, 2022  Restraint: needed  Current lines are required for patient management       Family update by me today: Yes      Code Status: Full code at this time. Reviewed patients medical directives. He indicated he would like to be full code unless there was medical futility in additional aggressive interventions.     The Pt was discussed and evaluated with Dr. Ball, attending physician, who agrees with the assessment and plan above.     Mohsan Chaudhry, MD  Cardiology Fellow         Medications:       aspirin  81 mg Per Feeding Tube Daily     multivitamins w/minerals  15 mL Per Feeding Tube Daily     pantoprazole  40 mg Oral or Feeding Tube QAM AC     polyethylene glycol  17 g Oral BID     protein modular  1 packet Per Feeding Tube TID     senna-docusate  1 tablet Oral BID     ticagrelor  90 mg Oral BID      Temp: 98.6  F (37  C) Temp  Min: 97  F (36.1  C)  Max: 98.8  F (37.1  C)  Resp: 12 Resp  Min: 12  Max: 12  SpO2: 97 % SpO2  Min: 96 %  Max: 100 %  Pulse: 86 Pulse  Min: 80  Max: 98    No data recorded    No data recorded.  No  data recorded.      Intake/Output Summary (Last 24 hours) at 2/20/2022 0736  Last data filed at 2/20/2022 0600  Gross per 24 hour   Intake 2188.88 ml   Output 1148 ml   Net 1040.88 ml          Physical Exam:   GENERAL: intubated and sedated  HEENT: ET tube in place  CV: S1/S2 heard without murmur   RESPIRATORY: CTAB, no increased WOB  GI: Soft and non distended with normoactive bowel sounds present in all quadrants. No tenderness, rebound, guarding. No palpable organomegaly.   EXTREMITIES: No peripheral edema. 2+ bilateral pedal pulses. Trace edema bilaterally in LE  NEUROLOGIC: not oriented to place or time, does not follow commands. Intermittently moving all extremities when sedation lightened.  MUSCULOSKELETAL: No joint swelling or tenderness.   SKIN: No jaundice. No acute rashes or lesions. L groin cannulation site with bleeding tracking along the cannulas, appears venous. No bleeding from IABP access site R groin.            Data:         Temp: 98.6  F (37  C) Temp src: EsophagealTemp  Min: 97  F (36.1  C)  Max: 98.8  F (37.1  C)   Recent Labs   Lab 02/22/22  0330 02/21/22 2149 02/21/22  1756 02/21/22  1556 02/21/22  0949   WBC 8.3 9.3 9.1 8.9 9.4   HGB 7.5* 7.3* 7.6* 7.5* 7.9*   HCT 22.6* 22.0* 22.3* 22.3* 23.9*   MCV 90 91 91 91 91   RDW 15.8* 15.6* 15.7* 15.8* 15.8*   * 104* 105* 106* 61*     Recent Labs   Lab 02/22/22  0332 02/21/22  2149 02/21/22  1556 02/21/22  0949 02/21/22  0348   INR 1.12 1.15 1.15 1.13 1.12   PTT 34 34 31 30 30     Liver Function Studies -   Recent Labs   Lab Test 02/20/22  0353   PROTTOTAL 4.8*   ALBUMIN 2.7*   BILITOTAL 0.5   ALKPHOS 16*   *   ALT 92*       Last Arterial Blood Gas:  Recent Labs   Lab 02/22/22  0905 02/22/22  0756 02/22/22  0554 02/22/22  0333 02/22/22  0329   PH 7.40 7.44 7.44  --  7.46*   PCO2 42 37 38  --  35   PO2 75* 85 83  --  107*   HCO3 26 25 25  --  25   O2PER 40 40 40 40 60  40       Venous Blood Gas  Recent Labs   Lab 02/22/22 0905  02/22/22  0756 02/22/22  0554 02/22/22  0333 02/21/22  1756 02/21/22  1556 02/21/22  0349 02/21/22  0348 02/20/22  1800 02/20/22  1549 02/19/22  0345 02/19/22  0343   PHV  --   --   --   --   --   --   --   --   --   --   --  7.28*   PCO2V  --   --   --   --   --   --   --   --   --   --   --  53*   PO2V  --   --   --   --   --   --   --   --   --   --   --  48*   HCO3V  --   --   --   --   --   --   --   --   --   --   --  25   MERLIN  --   --   --  0.7  --  0.9  --  0.3  --  -1.0   < > -1.8  -1.2   O2PER 40 40 40 40   < > 60  45  45   < > 60   < > 60  60  60   < > 60  60    < > = values in this interval not displayed.       Recent Labs   Lab Test 06/30/17  1355 05/12/16  0758   CHOL 173 196   HDL 42 43    125   TRIG 76 140     IMPRESSION:  Indeterminant gray-white matter loss in the right occipital lobe could  be artifactual from adjacent streak/photon starvation artifact versus  true infarct.

## 2022-02-22 NOTE — PROGRESS NOTES
THORACIC & FOREGUT SURGERY    S:  Intubated and sedated     O:  Vitals:    02/22/22 0700 02/22/22 0800 02/22/22 0900 02/22/22 1000   Pulse: 83 86     Resp: 12 12 12 12   Temp: 98.6  F (37  C) 98.6  F (37  C)     TempSrc: Esophageal      SpO2: 97% 97%     Weight:       Height:           Intubated and sedated   Non-cyanotic  RR per tele    700cc CT output, small amount of clots present    CXR shows good drainage of R pleural    A/P: Pt is a 71 year old male who presented to an outside ED on 2/17 s/p VT/VF arrest 2/2 MI from LAD.  He required 8 shocks as well as chest compressions, was transferred and cannulated to VA ECMO. Now s/p 2 stents to LAD. The primary team was attempting an ECMO wean today, noticed on bedside ECHO that there was a pleural effusion, so a CT was obtained which shows bilateral fluid collections, Hounsfield units on L c/w hemothorax, R c/w simple fluid.  Thoracic consulted to assist with management.     -Continue L chest tube  -No R chest tube for now as density on scan c/w simple fluid  -Thoracic to follow    D/w SRINIVAS ThomsonC

## 2022-02-22 NOTE — PLAN OF CARE
D;P emains sedated and ventilated.V-A ECMo continue 3700/flow4.36-4.56/s to 1/60%See the written copy of this report in the patient's paper medical record.  These results did not interface directly into [facility] and are summarized here. ABG's.L chest tube dressing was saturated w/blood at beginning og the shift but since change the dressing w/quick clot dressing no further bleeding noted.Hgb 7.3 at 2200.Given 1u PRBC per ordered then 7.5.plt 100 at 0400.Notified H.O.Cannula site dry.Also CT skin area has old hematoma noted and marked.UO 35-65/hr.Open eyes with turning with all extremities moves but not follow commands.TF stopped at 12mn per ordered prior to MATTHIEU today.ETT secretion minmum.Sent Cx.  I;A;UO slightly improving.Bleeding slow down due to low heparin gtt;500u/hr all jimenez.  P;Continue to monitor fluids balances with any signs of bleeding.Assess hemodynamic status.

## 2022-02-22 NOTE — PROGRESS NOTES
Patient tolerated turn down well.  CT consult ordered for possible decannulation today.  Changed ACT goal to 140-160, and increased heparin rate to 600 unit(s)/hr.

## 2022-02-22 NOTE — PROGRESS NOTES
"[facility]: [place]  NeuroCritical Care Progress Note     Patient Name:  Chano Johnson  MRN:  [id number]    :  1950  Date of Service:  2022  Primary care provider:  Laith Limon     Overnight events:  -Nursing notes reviewed and mention that as sedation weaned, he was moving all extremities, doesn't follow commands.  -Plan for ECMO turndown this AM.  -EEG hygiene break    A/P:   Chano Johnson, \"Husam\" is a 71 year old male with a PMHx Afib s/p AF PVI ablation and Watchman (10/21/2021), Aflutter s/p ablation, HFpEF, hx tachycardia-mediated cardiomyopathy, HTN, HLD, ARSENIO, gout, GERD, EtOH use, and COVID infection (2021) who presented as transfer from [facility] for possible cardiac catheterization +/- ECMO after a Vfib arrest, for which NCC service was consulted to evaluate for prognostication. CT head completed on arrival to [facility] showed possible indeterminant gray-white matter loss in the right occipital lobe but on review appears more suspicious for artifact. VEEG so far no seizures, just severe encephalopathy and today on hygiene break. Updated the wife at bedside. Exam with reported purposeful movements 4/4 when off sedition, but not following commands. Further workup with continuing EEG, currently on hygiene break. Plan for ECMO turndown this AM and will assess neurologic exam off sedition, may considering MRI if needed.       Recommendations:  - Continue vEEG while on sedation, monitoring EEG report, currently on hygiene break  - Will hold on MRI brain for now, and reassesses neurologic exam off sedition and after decanulated, if exam is reassuring, patient may not need MRI workup  - Avoid hypotonic solutions as they may worsen cerebral edema  - Avoid \"nitroprusside\",\"nitroglycerin\" as they may also worsen cerbral edema.  - Advise slow correction of any high Sodiums if needed  - When safe to do so, limitation of CNS acting medications will permit a more " accurate neurological assessment  - [facility] will continue to follow and monitor their EEG and neurologic exam        Physical Examination:   Pulse 96   Temp 98.4  F (36.9  C) (Rectal)   Resp 12   Ht 1.829 m (6')   Wt 101.7 kg (224 lb 3.3 oz)   SpO2 100%   BMI 30.41 kg/m    Exam while sedated on fent. 200 mcg/hr and midazolam 1 mg/hr and 20 propofol     General: Adult male patient, lying in bed, NAD, wife at bedside  HEENT: ETT, EEG hygiene break today  Cardiac: telemetry regular rate, ECOM cannulation leads in place  Pulm: ventilated  Abdomen: Soft, non distended  Extremities: Warm, no edema  Skin: No rash or lesion   Neuro:Sedated, eyes closed, does not opening eyes but flickering eyes on voice stimulation, no gaze preference, does not track does not following commands. No spontaneous or abnormal movements noticed. Pupils slightly asymmetric, slightly larger left than right but reactive briskly bilatearlly. Facial grimace and withdrawals in 4/4       The patient was seen and discussed  Dr. Staples, staff attending, who agrees with this assessment and plan    Maty Qureshi), DO, MADDIE  PGY-2 Neurology Resident

## 2022-02-23 ENCOUNTER — APPOINTMENT (OUTPATIENT)
Dept: GENERAL RADIOLOGY | Facility: CLINIC | Age: 72
DRG: 003 | End: 2022-02-23
Payer: MEDICARE

## 2022-02-23 ENCOUNTER — APPOINTMENT (OUTPATIENT)
Dept: GENERAL RADIOLOGY | Facility: CLINIC | Age: 72
DRG: 003 | End: 2022-02-23
Attending: INTERNAL MEDICINE
Payer: MEDICARE

## 2022-02-23 LAB
ALBUMIN SERPL-MCNC: 2.5 G/DL (ref 3.4–5)
ALBUMIN SERPL-MCNC: 2.6 G/DL (ref 3.4–5)
ALBUMIN SERPL-MCNC: 2.6 G/DL (ref 3.4–5)
ALBUMIN SERPL-MCNC: 2.7 G/DL (ref 3.4–5)
ALP SERPL-CCNC: 22 U/L (ref 40–150)
ALP SERPL-CCNC: 24 U/L (ref 40–150)
ALP SERPL-CCNC: 24 U/L (ref 40–150)
ALP SERPL-CCNC: 25 U/L (ref 40–150)
ALT SERPL W P-5'-P-CCNC: 44 U/L (ref 0–70)
ALT SERPL W P-5'-P-CCNC: 51 U/L (ref 0–70)
ALT SERPL W P-5'-P-CCNC: 59 U/L (ref 0–70)
ALT SERPL W P-5'-P-CCNC: 68 U/L (ref 0–70)
ANION GAP SERPL CALCULATED.3IONS-SCNC: 6 MMOL/L (ref 3–14)
ANION GAP SERPL CALCULATED.3IONS-SCNC: 7 MMOL/L (ref 3–14)
ANION GAP SERPL CALCULATED.3IONS-SCNC: 7 MMOL/L (ref 3–14)
ANION GAP SERPL CALCULATED.3IONS-SCNC: 8 MMOL/L (ref 3–14)
APTT PPP: 29 SECONDS (ref 22–38)
APTT PPP: 29 SECONDS (ref 22–38)
APTT PPP: 31 SECONDS (ref 22–38)
APTT PPP: 32 SECONDS (ref 22–38)
AST SERPL W P-5'-P-CCNC: 53 U/L (ref 0–45)
AST SERPL W P-5'-P-CCNC: 58 U/L (ref 0–45)
AST SERPL W P-5'-P-CCNC: 61 U/L (ref 0–45)
AST SERPL W P-5'-P-CCNC: 78 U/L (ref 0–45)
ATRIAL RATE - MUSE: 121 BPM
BASE EXCESS BLDA CALC-SCNC: 0.1 MMOL/L (ref -9–1.8)
BASE EXCESS BLDA CALC-SCNC: 0.3 MMOL/L (ref -9–1.8)
BASE EXCESS BLDV CALC-SCNC: 1.2 MMOL/L (ref -7.7–1.9)
BASE EXCESS BLDV CALC-SCNC: 1.3 MMOL/L (ref -7.7–1.9)
BASE EXCESS BLDV CALC-SCNC: 1.3 MMOL/L (ref -7.7–1.9)
BILIRUB SERPL-MCNC: 0.6 MG/DL (ref 0.2–1.3)
BILIRUB SERPL-MCNC: 0.8 MG/DL (ref 0.2–1.3)
BILIRUB SERPL-MCNC: 1 MG/DL (ref 0.2–1.3)
BILIRUB SERPL-MCNC: 1.1 MG/DL (ref 0.2–1.3)
BUN SERPL-MCNC: 102 MG/DL (ref 7–30)
BUN SERPL-MCNC: 104 MG/DL (ref 7–30)
BUN SERPL-MCNC: 105 MG/DL (ref 7–30)
BUN SERPL-MCNC: 96 MG/DL (ref 7–30)
CA-I BLD-MCNC: 4.5 MG/DL (ref 4.4–5.2)
CA-I BLD-MCNC: 4.6 MG/DL (ref 4.4–5.2)
CALCIUM SERPL-MCNC: 8 MG/DL (ref 8.5–10.1)
CALCIUM SERPL-MCNC: 8.1 MG/DL (ref 8.5–10.1)
CALCIUM SERPL-MCNC: 8.1 MG/DL (ref 8.5–10.1)
CALCIUM SERPL-MCNC: 8.2 MG/DL (ref 8.5–10.1)
CHLORIDE BLD-SCNC: 116 MMOL/L (ref 94–109)
CHLORIDE BLD-SCNC: 117 MMOL/L (ref 94–109)
CHLORIDE BLD-SCNC: 117 MMOL/L (ref 94–109)
CHLORIDE BLD-SCNC: 119 MMOL/L (ref 94–109)
CO2 SERPL-SCNC: 23 MMOL/L (ref 20–32)
CO2 SERPL-SCNC: 24 MMOL/L (ref 20–32)
CO2 SERPL-SCNC: 26 MMOL/L (ref 20–32)
CO2 SERPL-SCNC: 26 MMOL/L (ref 20–32)
CREAT SERPL-MCNC: 3.15 MG/DL (ref 0.66–1.25)
CREAT SERPL-MCNC: 3.28 MG/DL (ref 0.66–1.25)
CREAT SERPL-MCNC: 3.34 MG/DL (ref 0.66–1.25)
CREAT SERPL-MCNC: 3.38 MG/DL (ref 0.66–1.25)
CRP SERPL-MCNC: 34 MG/L (ref 0–8)
D DIMER PPP FEU-MCNC: 3 UG/ML FEU (ref 0–0.5)
D DIMER PPP FEU-MCNC: 3.56 UG/ML FEU (ref 0–0.5)
DIASTOLIC BLOOD PRESSURE - MUSE: NORMAL MMHG
ERYTHROCYTE [DISTWIDTH] IN BLOOD BY AUTOMATED COUNT: 16.3 % (ref 10–15)
ERYTHROCYTE [DISTWIDTH] IN BLOOD BY AUTOMATED COUNT: 16.3 % (ref 10–15)
ERYTHROCYTE [DISTWIDTH] IN BLOOD BY AUTOMATED COUNT: 16.5 % (ref 10–15)
ERYTHROCYTE [DISTWIDTH] IN BLOOD BY AUTOMATED COUNT: 16.5 % (ref 10–15)
ERYTHROCYTE [SEDIMENTATION RATE] IN BLOOD BY WESTERGREN METHOD: 32 MM/HR (ref 0–20)
FIBRINOGEN PPP-MCNC: 322 MG/DL (ref 170–490)
FIBRINOGEN PPP-MCNC: 385 MG/DL (ref 170–490)
GFR SERPL CREATININE-BSD FRML MDRD: 18 ML/MIN/1.73M2
GFR SERPL CREATININE-BSD FRML MDRD: 19 ML/MIN/1.73M2
GFR SERPL CREATININE-BSD FRML MDRD: 19 ML/MIN/1.73M2
GFR SERPL CREATININE-BSD FRML MDRD: 20 ML/MIN/1.73M2
GLUCOSE BLD-MCNC: 128 MG/DL (ref 70–99)
GLUCOSE BLD-MCNC: 128 MG/DL (ref 70–99)
GLUCOSE BLD-MCNC: 129 MG/DL (ref 70–99)
GLUCOSE BLD-MCNC: 142 MG/DL (ref 70–99)
GLUCOSE BLD-MCNC: 156 MG/DL (ref 70–99)
GLUCOSE BLD-MCNC: 167 MG/DL (ref 70–99)
GLUCOSE BLD-MCNC: 170 MG/DL (ref 70–99)
GLUCOSE BLD-MCNC: 172 MG/DL (ref 70–99)
GLUCOSE BLDC GLUCOMTR-MCNC: 126 MG/DL (ref 70–99)
HCO3 BLD-SCNC: 24 MMOL/L (ref 21–28)
HCO3 BLD-SCNC: 25 MMOL/L (ref 21–28)
HCO3 BLDV-SCNC: 26 MMOL/L (ref 21–28)
HCO3 BLDV-SCNC: 27 MMOL/L (ref 21–28)
HCO3 BLDV-SCNC: 27 MMOL/L (ref 21–28)
HCT VFR BLD AUTO: 24.6 % (ref 40–53)
HCT VFR BLD AUTO: 25.4 % (ref 40–53)
HCT VFR BLD AUTO: 25.6 % (ref 40–53)
HCT VFR BLD AUTO: 25.6 % (ref 40–53)
HGB BLD-MCNC: 7.9 G/DL (ref 13.3–17.7)
HGB BLD-MCNC: 8.4 G/DL (ref 13.3–17.7)
HGB BLD-MCNC: 8.5 G/DL (ref 13.3–17.7)
HGB BLD-MCNC: 8.6 G/DL (ref 13.3–17.7)
HGB FREE PLAS-MCNC: 40 MG/DL
INR PPP: 1.13 (ref 0.85–1.15)
INR PPP: 1.16 (ref 0.85–1.15)
INR PPP: 1.16 (ref 0.85–1.15)
INR PPP: 1.18 (ref 0.85–1.15)
INTERPRETATION ECG - MUSE: NORMAL
LACTATE SERPL-SCNC: 1.4 MMOL/L (ref 0.7–2)
LACTATE SERPL-SCNC: 1.8 MMOL/L (ref 0.7–2)
LDH SERPL L TO P-CCNC: 367 U/L (ref 85–227)
MAGNESIUM SERPL-MCNC: 3.2 MG/DL (ref 1.6–2.3)
MCH RBC QN AUTO: 29.8 PG (ref 26.5–33)
MCH RBC QN AUTO: 30.4 PG (ref 26.5–33)
MCH RBC QN AUTO: 30.5 PG (ref 26.5–33)
MCH RBC QN AUTO: 30.7 PG (ref 26.5–33)
MCHC RBC AUTO-ENTMCNC: 32.1 G/DL (ref 31.5–36.5)
MCHC RBC AUTO-ENTMCNC: 32.8 G/DL (ref 31.5–36.5)
MCHC RBC AUTO-ENTMCNC: 33.5 G/DL (ref 31.5–36.5)
MCHC RBC AUTO-ENTMCNC: 33.6 G/DL (ref 31.5–36.5)
MCV RBC AUTO: 91 FL (ref 78–100)
MCV RBC AUTO: 92 FL (ref 78–100)
MCV RBC AUTO: 93 FL (ref 78–100)
MCV RBC AUTO: 93 FL (ref 78–100)
O2/TOTAL GAS SETTING VFR VENT: 40 %
OXYHGB MFR BLD: 98 % (ref 92–100)
OXYHGB MFR BLDV: 55 % (ref 70–75)
OXYHGB MFR BLDV: 65 % (ref 70–75)
OXYHGB MFR BLDV: 65 % (ref 70–75)
P AXIS - MUSE: 88 DEGREES
PCO2 BLD: 36 MM HG (ref 35–45)
PCO2 BLD: 39 MM HG (ref 35–45)
PCO2 BLDV: 42 MM HG (ref 40–50)
PCO2 BLDV: 46 MM HG (ref 40–50)
PCO2 BLDV: 47 MM HG (ref 40–50)
PH BLD: 7.42 [PH] (ref 7.35–7.45)
PH BLD: 7.44 [PH] (ref 7.35–7.45)
PH BLDV: 7.37 [PH] (ref 7.32–7.43)
PH BLDV: 7.38 [PH] (ref 7.32–7.43)
PH BLDV: 7.4 [PH] (ref 7.32–7.43)
PHOSPHATE SERPL-MCNC: 5.1 MG/DL (ref 2.5–4.5)
PLATELET # BLD AUTO: 78 10E3/UL (ref 150–450)
PLATELET # BLD AUTO: 91 10E3/UL (ref 150–450)
PLATELET # BLD AUTO: 93 10E3/UL (ref 150–450)
PLATELET # BLD AUTO: 95 10E3/UL (ref 150–450)
PO2 BLD: 138 MM HG (ref 80–105)
PO2 BLD: 140 MM HG (ref 80–105)
PO2 BLDV: 32 MM HG (ref 25–47)
PO2 BLDV: 36 MM HG (ref 25–47)
PO2 BLDV: 38 MM HG (ref 25–47)
POTASSIUM BLD-SCNC: 4.2 MMOL/L (ref 3.4–5.3)
POTASSIUM BLD-SCNC: 4.6 MMOL/L (ref 3.4–5.3)
POTASSIUM BLD-SCNC: 4.6 MMOL/L (ref 3.4–5.3)
POTASSIUM BLD-SCNC: 4.8 MMOL/L (ref 3.4–5.3)
PR INTERVAL - MUSE: 176 MS
PROT SERPL-MCNC: 4.9 G/DL (ref 6.8–8.8)
PROT SERPL-MCNC: 5.2 G/DL (ref 6.8–8.8)
PROT SERPL-MCNC: 5.3 G/DL (ref 6.8–8.8)
PROT SERPL-MCNC: 5.3 G/DL (ref 6.8–8.8)
QRS DURATION - MUSE: 78 MS
QT - MUSE: 318 MS
QTC - MUSE: 451 MS
R AXIS - MUSE: 87 DEGREES
RBC # BLD AUTO: 2.65 10E6/UL (ref 4.4–5.9)
RBC # BLD AUTO: 2.75 10E6/UL (ref 4.4–5.9)
RBC # BLD AUTO: 2.77 10E6/UL (ref 4.4–5.9)
RBC # BLD AUTO: 2.83 10E6/UL (ref 4.4–5.9)
SODIUM SERPL-SCNC: 148 MMOL/L (ref 133–144)
SODIUM SERPL-SCNC: 149 MMOL/L (ref 133–144)
SODIUM SERPL-SCNC: 149 MMOL/L (ref 133–144)
SODIUM SERPL-SCNC: 150 MMOL/L (ref 133–144)
SYSTOLIC BLOOD PRESSURE - MUSE: NORMAL MMHG
T AXIS - MUSE: 64 DEGREES
TROPONIN I SERPL HS-MCNC: 9185 NG/L
TROPONIN I SERPL HS-MCNC: ABNORMAL NG/L
UFH PPP CHRO-ACNC: <0.1 IU/ML
VENTRICULAR RATE- MUSE: 121 BPM
WBC # BLD AUTO: 11.3 10E3/UL (ref 4–11)
WBC # BLD AUTO: 11.8 10E3/UL (ref 4–11)
WBC # BLD AUTO: 14.4 10E3/UL (ref 4–11)
WBC # BLD AUTO: 15.3 10E3/UL (ref 4–11)

## 2022-02-23 PROCEDURE — 84155 ASSAY OF PROTEIN SERUM: CPT | Performed by: INTERNAL MEDICINE

## 2022-02-23 PROCEDURE — 999N000155 HC STATISTIC RAPCV CVP MONITORING

## 2022-02-23 PROCEDURE — 85014 HEMATOCRIT: CPT | Performed by: INTERNAL MEDICINE

## 2022-02-23 PROCEDURE — 85520 HEPARIN ASSAY: CPT | Performed by: INTERNAL MEDICINE

## 2022-02-23 PROCEDURE — 200N000002 HC R&B ICU UMMC

## 2022-02-23 PROCEDURE — 82805 BLOOD GASES W/O2 SATURATION: CPT | Performed by: INTERNAL MEDICINE

## 2022-02-23 PROCEDURE — 85610 PROTHROMBIN TIME: CPT | Performed by: INTERNAL MEDICINE

## 2022-02-23 PROCEDURE — 85730 THROMBOPLASTIN TIME PARTIAL: CPT | Performed by: INTERNAL MEDICINE

## 2022-02-23 PROCEDURE — 82330 ASSAY OF CALCIUM: CPT | Performed by: INTERNAL MEDICINE

## 2022-02-23 PROCEDURE — 99291 CRITICAL CARE FIRST HOUR: CPT | Mod: GC | Performed by: STUDENT IN AN ORGANIZED HEALTH CARE EDUCATION/TRAINING PROGRAM

## 2022-02-23 PROCEDURE — 82947 ASSAY GLUCOSE BLOOD QUANT: CPT | Performed by: INTERNAL MEDICINE

## 2022-02-23 PROCEDURE — 83051 HEMOGLOBIN PLASMA: CPT | Performed by: INTERNAL MEDICINE

## 2022-02-23 PROCEDURE — 85652 RBC SED RATE AUTOMATED: CPT | Performed by: INTERNAL MEDICINE

## 2022-02-23 PROCEDURE — 999N000065 XR ABDOMEN PORT 1 VIEWS

## 2022-02-23 PROCEDURE — 258N000003 HC RX IP 258 OP 636

## 2022-02-23 PROCEDURE — 85027 COMPLETE CBC AUTOMATED: CPT | Performed by: INTERNAL MEDICINE

## 2022-02-23 PROCEDURE — 86140 C-REACTIVE PROTEIN: CPT | Performed by: INTERNAL MEDICINE

## 2022-02-23 PROCEDURE — 83735 ASSAY OF MAGNESIUM: CPT | Performed by: INTERNAL MEDICINE

## 2022-02-23 PROCEDURE — 74018 RADEX ABDOMEN 1 VIEW: CPT | Mod: 26 | Performed by: RADIOLOGY

## 2022-02-23 PROCEDURE — 83615 LACTATE (LD) (LDH) ENZYME: CPT | Performed by: INTERNAL MEDICINE

## 2022-02-23 PROCEDURE — 83605 ASSAY OF LACTIC ACID: CPT | Performed by: INTERNAL MEDICINE

## 2022-02-23 PROCEDURE — 84484 ASSAY OF TROPONIN QUANT: CPT | Performed by: INTERNAL MEDICINE

## 2022-02-23 PROCEDURE — 94003 VENT MGMT INPAT SUBQ DAY: CPT

## 2022-02-23 PROCEDURE — 80053 COMPREHEN METABOLIC PANEL: CPT | Performed by: INTERNAL MEDICINE

## 2022-02-23 PROCEDURE — 93005 ELECTROCARDIOGRAM TRACING: CPT

## 2022-02-23 PROCEDURE — 82330 ASSAY OF CALCIUM: CPT

## 2022-02-23 PROCEDURE — 250N000009 HC RX 250: Performed by: NURSE PRACTITIONER

## 2022-02-23 PROCEDURE — 999N000065 XR CHEST PORT 1 VIEW

## 2022-02-23 PROCEDURE — 999N000015 HC STATISTIC ARTERIAL MONITORING DAILY

## 2022-02-23 PROCEDURE — 82803 BLOOD GASES ANY COMBINATION: CPT | Performed by: INTERNAL MEDICINE

## 2022-02-23 PROCEDURE — 71045 X-RAY EXAM CHEST 1 VIEW: CPT | Mod: 26 | Performed by: RADIOLOGY

## 2022-02-23 PROCEDURE — 999N000127 HC STATISTIC PERIPHERAL IV START W US GUIDANCE

## 2022-02-23 PROCEDURE — 84100 ASSAY OF PHOSPHORUS: CPT | Performed by: INTERNAL MEDICINE

## 2022-02-23 PROCEDURE — 250N000011 HC RX IP 250 OP 636

## 2022-02-23 PROCEDURE — 71045 X-RAY EXAM CHEST 1 VIEW: CPT | Mod: 26 | Performed by: STUDENT IN AN ORGANIZED HEALTH CARE EDUCATION/TRAINING PROGRAM

## 2022-02-23 PROCEDURE — 74018 RADEX ABDOMEN 1 VIEW: CPT | Mod: 26 | Performed by: STUDENT IN AN ORGANIZED HEALTH CARE EDUCATION/TRAINING PROGRAM

## 2022-02-23 PROCEDURE — 250N000013 HC RX MED GY IP 250 OP 250 PS 637

## 2022-02-23 PROCEDURE — 85379 FIBRIN DEGRADATION QUANT: CPT | Performed by: INTERNAL MEDICINE

## 2022-02-23 PROCEDURE — 999N000075 HC STATISTIC IABP MONITORING

## 2022-02-23 PROCEDURE — 250N000011 HC RX IP 250 OP 636: Performed by: INTERNAL MEDICINE

## 2022-02-23 PROCEDURE — 85384 FIBRINOGEN ACTIVITY: CPT | Performed by: INTERNAL MEDICINE

## 2022-02-23 PROCEDURE — 71045 X-RAY EXAM CHEST 1 VIEW: CPT

## 2022-02-23 PROCEDURE — 999N000157 HC STATISTIC RCP TIME EA 10 MIN

## 2022-02-23 PROCEDURE — 250N000013 HC RX MED GY IP 250 OP 250 PS 637: Performed by: PHYSICIAN ASSISTANT

## 2022-02-23 PROCEDURE — 93010 ELECTROCARDIOGRAM REPORT: CPT | Mod: 76 | Performed by: INTERNAL MEDICINE

## 2022-02-23 PROCEDURE — 250N000013 HC RX MED GY IP 250 OP 250 PS 637: Performed by: INTERNAL MEDICINE

## 2022-02-23 RX ORDER — HEPARIN SODIUM 5000 [USP'U]/.5ML
5000 INJECTION, SOLUTION INTRAVENOUS; SUBCUTANEOUS EVERY 12 HOURS SCHEDULED
Status: DISCONTINUED | OUTPATIENT
Start: 2022-02-23 | End: 2022-02-25

## 2022-02-23 RX ORDER — FUROSEMIDE 10 MG/ML
60 INJECTION INTRAMUSCULAR; INTRAVENOUS ONCE
Status: COMPLETED | OUTPATIENT
Start: 2022-02-23 | End: 2022-02-23

## 2022-02-23 RX ORDER — DEXMEDETOMIDINE HYDROCHLORIDE 4 UG/ML
.1-1.2 INJECTION, SOLUTION INTRAVENOUS CONTINUOUS
Status: DISCONTINUED | OUTPATIENT
Start: 2022-02-23 | End: 2022-02-25

## 2022-02-23 RX ADMIN — HEPARIN SODIUM 5000 UNITS: 5000 INJECTION, SOLUTION INTRAVENOUS; SUBCUTANEOUS at 09:54

## 2022-02-23 RX ADMIN — Medication 15 ML: at 09:16

## 2022-02-23 RX ADMIN — DEXMEDETOMIDINE HYDROCHLORIDE 1.2 MCG/KG/HR: 400 INJECTION INTRAVENOUS at 19:08

## 2022-02-23 RX ADMIN — DEXMEDETOMIDINE HYDROCHLORIDE 0.2 MCG/KG/HR: 400 INJECTION INTRAVENOUS at 09:24

## 2022-02-23 RX ADMIN — FENTANYL CITRATE 75 MCG/HR: 50 INJECTION INTRAVENOUS at 19:56

## 2022-02-23 RX ADMIN — Medication 25 MCG: at 16:43

## 2022-02-23 RX ADMIN — ASPIRIN 81 MG CHEWABLE TABLET 81 MG: 81 TABLET CHEWABLE at 09:04

## 2022-02-23 RX ADMIN — Medication 50 MCG: at 12:00

## 2022-02-23 RX ADMIN — HEPARIN SODIUM 5000 UNITS: 5000 INJECTION, SOLUTION INTRAVENOUS; SUBCUTANEOUS at 19:55

## 2022-02-23 RX ADMIN — TICAGRELOR 90 MG: 90 TABLET ORAL at 19:56

## 2022-02-23 RX ADMIN — FENTANYL CITRATE 200 MCG/HR: 50 INJECTION INTRAVENOUS at 00:50

## 2022-02-23 RX ADMIN — PROPOFOL 10 MCG/KG/MIN: 10 INJECTION, EMULSION INTRAVENOUS at 19:55

## 2022-02-23 RX ADMIN — PROPOFOL 30 MCG/KG/MIN: 10 INJECTION, EMULSION INTRAVENOUS at 03:01

## 2022-02-23 RX ADMIN — Medication 40 MG: at 09:04

## 2022-02-23 RX ADMIN — DEXMEDETOMIDINE HYDROCHLORIDE 1.2 MCG/KG/HR: 400 INJECTION INTRAVENOUS at 17:00

## 2022-02-23 RX ADMIN — Medication 1 PACKET: at 09:07

## 2022-02-23 RX ADMIN — PROPOFOL 30 MCG/KG/MIN: 10 INJECTION, EMULSION INTRAVENOUS at 08:11

## 2022-02-23 RX ADMIN — Medication 50 MCG: at 12:43

## 2022-02-23 RX ADMIN — Medication 50 MCG: at 08:13

## 2022-02-23 RX ADMIN — DEXMEDETOMIDINE HYDROCHLORIDE 1.2 MCG/KG/HR: 400 INJECTION INTRAVENOUS at 13:49

## 2022-02-23 RX ADMIN — Medication 50 MCG: at 07:30

## 2022-02-23 RX ADMIN — FUROSEMIDE 60 MG: 10 INJECTION, SOLUTION INTRAMUSCULAR; INTRAVENOUS at 09:17

## 2022-02-23 RX ADMIN — Medication 50 MCG: at 11:30

## 2022-02-23 RX ADMIN — AMIODARONE HYDROCHLORIDE 1 MG/MIN: 50 INJECTION, SOLUTION INTRAVENOUS at 09:36

## 2022-02-23 RX ADMIN — Medication 50 MCG: at 11:00

## 2022-02-23 RX ADMIN — Medication 1 PACKET: at 19:56

## 2022-02-23 RX ADMIN — TICAGRELOR 90 MG: 90 TABLET ORAL at 09:04

## 2022-02-23 RX ADMIN — Medication 50 MCG: at 09:00

## 2022-02-23 RX ADMIN — Medication 1 PACKET: at 13:51

## 2022-02-23 ASSESSMENT — ACTIVITIES OF DAILY LIVING (ADL)
ADLS_ACUITY_SCORE: 19

## 2022-02-23 NOTE — ANESTHESIA CARE TRANSFER NOTE
Patient: Chano Johnson    Procedure: Procedure(s):  EXTRACORPOREAL MEMBRANE OXYGENATION CANNULA REMOVAL, INTRAOPERATIVE TRANSESOPHAGEAL ECHOCARDIOGRAM PER ANESTHESIA.       Diagnosis: Cardiogenic shock (H) [R57.0]  Diagnosis Additional Information: No value filed.    Anesthesia Type:   General     Note:    Oropharynx: endotracheal tube in place  Level of Consciousness: iatrogenic sedation      Independent Airway: airway patency not satisfactory and stable  Dentition: dentition unchanged  Vital Signs Stable: post-procedure vital signs reviewed and stable  Report to RN Given: handoff report given  Patient transferred to: ICU    ICU Handoff: Call for PAUSE to initiate/utilize ICU HANDOFF, Identified Patient, Identified Responsible Provider, Reviewed the Pertinent Medical History, Discussed Surgical Course, Reviewed Intra-OP Anesthesia Management and Issues during Anesthesia, Set Expectations for Post Procedure Period and Allowed Opportunity for Questions and Acknowledgement of Understanding      Vitals:  Vitals Value Taken Time   BP     Temp     Pulse     Resp     SpO2         Electronically Signed By: BRANDON Engel CRNA  February 22, 2022  8:23 PM

## 2022-02-23 NOTE — PROGRESS NOTES
Major Shift Events: pt remains sedated fentanyl and dex gtt. Prop on and off. Not following commands. Remains on CMV settings. SR/ST with bigeminal PAC's, labile BP with slight movement, on and off levo gtt, IV lasix 60 mg given with good output. Balloon pump removed by CSI this am. Tapia in place. OG in place, TF 15 ml/hr goal 75, advanced 10 ml q8hr. CT x1 to L with minimal output. Lesions/bleeding to tongue.  Plan: continue to monitor BP and notify MD with changes.  For vital signs and complete assessments, please see documentation flowsheets.

## 2022-02-23 NOTE — PROGRESS NOTES
[facility], Procedure Note           Intra-Aortic Balloon Pump Discontinuation:       Chano Johnson  MRN# [id number]   February 23, 2022, 11:04 AM             IABP discontinued: February 23, 2022, 11:04 AM   Removed by: Dr. Huizar   Catheter saved: No      Recorded by Raj Antony, RRT

## 2022-02-23 NOTE — PROGRESS NOTES
ECLS Discontinuation Note:    ECLS was discontinued 2/22/2022 in OR.    Ella Verde, RT  ECMO Specialist  2/22/2022 7:00 PM

## 2022-02-23 NOTE — PROGRESS NOTES
"[facility]: [place]  NeuroCritical Care Progress Note     Patient Name:  Chano Johnson  MRN:  [id number]    :  1950  Date of Service:  2022  Primary care provider:  Laith Limon     Overnight events:  -Nursing notes reviewed   -ECMO turndown and decannulated yesterday with intermittent need for pressors, and working to wean sedation over night and early this AM, as vitally allowed with some variability  -Versed stopped overnight    A/P:   Chano Johnson, \"Husam\" is a 71 year old male with a PMHx Afib s/p AF PVI ablation and Watchman (10/21/2021), Aflutter s/p ablation, HFpEF, hx tachycardia-mediated cardiomyopathy, HTN, HLD, ARSENIO, gout, GERD, EtOH use, and COVID infection (2021) who presented as transfer from [facility] for possible cardiac catheterization +/- ECMO after a Vfib arrest, for which NCC service was consulted to evaluate for prognostication. CT head completed on arrival to [facility] showed possible indeterminant gray-white matter loss in the right occipital lobe but on review appears more suspicious for artifact. VEEG so far no seizures, just severe encephalopathy and today will remain on hygiene break. Updated the wife at bedside. Exam consistent with yesterday,  not following commands, eyes flickering, sediated. Further workup pending sedition wean as tolerated by vital lability, plan to continuing EEG tomorrow once sedition weaned.       Recommendations:  - Continue another day of Hygiene break from EEG while on sedation, will reassess EEG needs once off sediation  - Will hold on MRI brain for now, and reassesses neurologic exam off sedition, if exam is reassuring, patient may not need MRI workup  - Avoid hypotonic solutions as they may worsen cerebral edema  - Avoid \"nitroprusside\",\"nitroglycerin\" as they may also worsen cerbral edema.  - Advise slow correction of any high Na levels, if needed  - Limitation of CNS acting medications will permit a more " accurate neurological assessment  - NCC will continue to follow and monitor their EEG and neurologic exam        Physical Examination:   Pulse 96   Temp 98.4  F (36.9  C) (Rectal)   Resp 12   Ht 1.829 m (6')   Wt 101.7 kg (224 lb 3.3 oz)   SpO2 100%   BMI 30.41 kg/m    Exam while sedated on fent. 100 mcg/hr, precedex 1 mcg, and 15 minutes prior on 30 propofol     General: Adult male patient, lying in bed flat, NAD, wife at bedside  HEENT: ETT  Cardiac: telemetry regular rate, labile pressures while stimulating  Pulm: ventilated  Abdomen: Soft, non distended  Extremities: Warm, no edema  Skin: No rash or lesion   Neuro:Sedated, eyes closed, flickering eyes on voice stimulation pronounced with wife talking at bedside, no gaze preference, pupils midline, does not track does not following commands, pupils 2mm and equally brisk, No spontaneous or abnormal movements noticed. Minimal facial grimace on noxious stimuli and trace withdrawals in 4/4       The patient was discussed  Dr. Staples, staff attending, who agrees with this assessment and plan    Maty Qureshi), DO, MADDIE  PGY-2 Neurology Resident

## 2022-02-23 NOTE — PLAN OF CARE
"  Major Shift Events:  Titrated down sedation. Versed is off, Levo at 100 and Prop at 30. Attempted to wean prop to 25, patient hr jumped to 130 and Blood pressure systolic was 160 with Maps in the 90's and 100s. Patient will move head and both upper extremities. IABP continues with 1:1 at 100%. Access Site WNL. Ecmo decanulation site is WNL. A small amound of shadowing on the dressing has been marked. Pulses are palpable in UE and Doppler in the LE. Chest tube initially was draining about 100 every 15 minutes. It slowed quickly and has not had any out in the last few hours. The hematoma has spread, a new line was drawn to monty where it is. Remains on CMV vent settings, only change was decreasing fi02 from 70 to 40%. Sp02 remains at 100%. OG placed, and advanced per xray. Tapia in place, draining clear yellow urine approx. 60 to 70/hour. Patient has lesions in mouth that are actively bleeding. Frequent oral cares were performed.   Plan: Recheck OG by Xray, start TF. Wean sedation down, attempt pressure support trial.  For vital signs and complete assessments, please see documentation flowsheets.        Problem: Plan of Care - These are the overarching goals to be used throughout the patient stay.    Goal: Plan of Care Review/Shift Note  Description: The Plan of Care Review/Shift note should be completed every shift.  The Outcome Evaluation is a brief statement about your assessment that the patient is improving, declining, or no change.  This information will be displayed automatically on your shift note.  Outcome: Ongoing, Progressing  Goal: Patient-Specific Goal (Individualized)  Description: You can add care plan individualizations to a care plan. Examples of Individualization might be:  \"Parent requests to be called daily at 9am for status\", \"I have a hard time hearing out of my right ear\", or \"Do not touch me to wake me up as it startles me\".  Outcome: Ongoing, Progressing  Goal: Absence of Hospital-Acquired " Illness or Injury  Outcome: Ongoing, Progressing  Intervention: Identify and Manage Fall Risk  Recent Flowsheet Documentation  Taken 2/23/2022 0400 by Jessica Gale RN  Safety Promotion/Fall Prevention:   activity supervised   increase visualization of patient   supervised activity   safety round/check completed  Taken 2/23/2022 0000 by Jessica Gale RN  Safety Promotion/Fall Prevention:   activity supervised   increase visualization of patient   supervised activity   safety round/check completed  Taken 2/22/2022 2100 by Jessica Gale RN  Safety Promotion/Fall Prevention:   activity supervised   increase visualization of patient   supervised activity   safety round/check completed  Intervention: Prevent Skin Injury  Recent Flowsheet Documentation  Taken 2/23/2022 0400 by Jessica Gale RN  Body Position:   right   turned  Taken 2/23/2022 0200 by Jessica Gale RN  Body Position:   left   turned  Taken 2/23/2022 0000 by Jessica Gale RN  Body Position:   right   turned  Taken 2/22/2022 2200 by Jessica Gale RN  Body Position:   turned   left  Taken 2/22/2022 2100 by Jessica Gale RN  Body Position:   turned   right  Intervention: Prevent and Manage VTE (Venous Thromboembolism) Risk  Recent Flowsheet Documentation  Taken 2/23/2022 0400 by Jessica Gale RN  VTE Prevention/Management: SCDs (sequential compression devices) off  Activity Management: bedrest  Taken 2/23/2022 0000 by Jessica Gale RN  VTE Prevention/Management: SCDs (sequential compression devices) off  Activity Management: bedrest  Taken 2/22/2022 2100 by Jessica Gale RN  VTE Prevention/Management: SCDs (sequential compression devices) off  Activity Management: bedrest  Intervention: Prevent Infection  Recent Flowsheet Documentation  Taken 2/23/2022 0400 by Jessica Gale RN  Infection Prevention:   environmental surveillance performed   equipment surfaces disinfected   hand hygiene promoted   personal  protective equipment utilized   rest/sleep promoted   single patient room provided   visitors restricted/screened  Taken 2/23/2022 0000 by Jessica Gale RN  Infection Prevention:   environmental surveillance performed   equipment surfaces disinfected   hand hygiene promoted   personal protective equipment utilized   rest/sleep promoted   single patient room provided   visitors restricted/screened  Taken 2/22/2022 2100 by Jessica Gale RN  Infection Prevention:   environmental surveillance performed   equipment surfaces disinfected   hand hygiene promoted   personal protective equipment utilized   rest/sleep promoted   single patient room provided   visitors restricted/screened  Goal: Optimal Comfort and Wellbeing  Outcome: Ongoing, Progressing  Intervention: Monitor Pain and Promote Comfort  Recent Flowsheet Documentation  Taken 2/23/2022 0000 by Jessica Gale RN  Pain Management Interventions: medication (see MAR)  Taken 2/22/2022 2100 by Jessica Gale RN  Pain Management Interventions: medication (see MAR)  Goal: Readiness for Transition of Care  Outcome: Ongoing, Progressing   Goal Outcome Evaluation:    Plan of Care Reviewed With: spouse     Overall Patient Progress: improving    Outcome Evaluation: Pt is responding well to anesthesia care

## 2022-02-23 NOTE — PROGRESS NOTES
THORACIC & FOREGUT SURGERY    S:  Decanullated yesterday. Remains intubated, sedated.     O:  Vitals:    02/23/22 1115 02/23/22 1130 02/23/22 1145 02/23/22 1200   Pulse: 103 (!) 121 118 108   Resp:    16   Temp:    99  F (37.2  C)   TempSrc:    Axillary   SpO2: 98% 97% 98% 98%   Weight:       Height:         Gen: sedated  CV: 90s on tele   Resp: mechanically ventilated      680cc CT output, small amount of clots present    CXR  Small effusion, stable, left CT in good position     A/P: Pt is a 71 year old male who presented to an outside ED on 2/17 s/p VT/VF arrest 2/2 MI from LAD.  He required 8 shocks as well as chest compressions, was transferred and cannulated to VA ECMO. Now s/p 2 stents to LAD. The primary team was attempting an ECMO wean today, noticed on bedside ECHO that there was a pleural effusion, so a CT was obtained which shows bilateral fluid collections, Hounsfield units on L c/w hemothorax, R c/w simple fluid.  Thoracic consulted to assist with management.     -Continue L chest tube  -No R chest tube for now as density on scan c/w simple fluid  -Thoracic to follow      Seen and discussed with fellow, will discuss with staff.     Isela Farrell MD  General Surgery, PGY3

## 2022-02-23 NOTE — PROGRESS NOTES
[facility]  Cardiac ICU Progress Note  February 20, 2022            Key events - last 24 hours:     - Decannulated yesterday, afterwards she required low dose pressors briefly. HD stable.  - discontinue versed use  - heparin held              Assessment & Plan        71 year old male who was admitted on 2/17/2022 s/p VT/VF arrest x8 shocks who was found to have LAD occlusion requiring 2x stents. He presented to local ED with chest pain that started at 1300 (within 1 hr of onset), he had a VT VF arrest in the ED after he was found to have an anterior STEMI on ECG.  Chest compressions and ACLS was started immediately.  Patient underwent 8 shocks for VT VF with ROSC afterwards.  He received 4 doses of epinephrine, 450 mg of amiodarone.  He was transferred to the [facility] for percutaneous intervention for his STEMI. Unfortunately his transfer was delayed because he was a very low dose of Epi (0.01) and the local hospital only had a BLS ambulance available. He arrived at [facility] and was found to have occlusion of the LAD. During the angiogram he was found to have significant hypotension and ectopy, which prompted placement on VA ECMO. PCI with 2x LETY was done with ECMO support. TTM post arrest at 35C, rewarmed on 2/19/22. Decannulated 2/23/22.    Today's Changes  - discontinue heparin  - OG placement for feeds, resume feeds  - discontinue versed  - likely IABP removal today  - start Amio 1mg/mingiven increased ectopy this AM  - Wean sedation and SBT to assess for extubation readiness  - Repeat lasix 60mg IV 1x today for slightly net negative     Neurology: #Possible neurological injury post cardiac arrest  Intubated, sedated. Initially cooled to 34 degrees.  Now rewarmed as of 2/19/2022  --continue versed, fentanyl, propofol. Attempting to wean off propfol.   - RASS goal -1 to -2.  - CTH on presentation w/o acute intracranial pathology. Day 3 CTH showed indeterminant  gray-white matter loss in the R occipital lobe which could be artifact vs true infarct. Strong suspicion for artifact per neurocrit.  - EEG w/o significant abnormalities noted  - Pt has nonpurposeful movement of all 4 extremities when sedation lightened.   Cardiovascular / Hemodynamics: #Refractory VF arrest requiring 8 shocks.  x2 LETY stents to LAD.  Peripheral V-A ECMO inserted for refractory cardiogenic shock. LA   TTE: LVEF 10-15%, RV moderately to severely reduced function. No prior hx of heart failure. No significant valvular disease. Pulsatility significant improved after rewarming, decannulated 2/22/22.  EKG: FLORY V2-V4 consistent with anterior STEMI  --wean pressors/inotropes as needed  --MATTHIEU ECMO turndown 2/22/22: no concerns, stable. Okay to proceed with decannulation. Decannulated 2/22/22.  --continue ASA 81mg and ticagrelor 90mg BID as of now status post 2 LETY to LAD  --No indication for anticoagulation at this time  --hold lipitor for now given likely hepatic injury during arrest  --hold ACE/ARB for now given likely reduced renal fxn after arrest  --holding beta blocker given shock      - CT CAP: Large right-sided diffuse effusion s/p chest tube placement.       Pulmonary: #AHRF  #Pleural effusion - s/p chest tube placement on L side. Thrombus noted in the chest tube 2/22/22, evacuated in the OR during decannulation. Currently has chest wall hematoma which we are monitoring.  ETT in appropriate position.  Now weaning vent requirements.  CXR: Lines in stable position.  --wean vent as able  --daily CXR  --Q6h ABGs for now  --consider scheduled duonebs if signs of lung dz, currently PRN     GI and Nutrition: No known medical hx.   --monitor BID LFTs  --bowel regimen   -- TF at goal  --GI Prophylaxis: PPI    Renal, Fluid and Electrolytes: #SAHIL, likely ischemic ATN. Monitoring for CRRT needs.  Monitoring UOP, will start CRRT through circuit if needed.  --maintain K>4 and Mg>2    Infectious Disease: No signs  of infection. Leukocytosis c/w arrest. Blood cultures collected.   --vancomycin/zosyn x5 days for ECMO - completed course on 2/21/22  --monitor for signs of infection given cooling, lines, and leukocytosis   Hematology and Oncology: #Acute blood loss anemia  #Thrombocytopenia - likely secondary to hemolysis. 2/21/22 4T score 3 (low risk, <5%)  --Heparin discontinue after decannulation.  -- Antiplatelet: ASA/ticagrelor for LETY.   --cryo PRN fibrinogen < 200; FFP for INR >2  --Transfuse for Hgb<8  --DVT PPX: subcutaneous Heparin  -Continue to trend CBCs transfuse to goal hemoglobin of 8   Endocrinology:    No known medical history. BG elevated.  --insulin gtt  --stress dose steroids due to persistent hypotension - hydrocort 50mg IV Q8h discontinued   Lines: L femoral arterial and venous ECMO cannulae February 17, 2022 - 2/22/22  R radial arterial line February 17, 2022  ETT February 17, 2022  Tapia catheter February 17, 2022  OG tube February 17, 2022  Restraint: needed  Current lines are required for patient management       Family update by me today: Yes      Code Status: Full code at this time. Reviewed patients medical directives. He indicated he would like to be full code unless there was medical futility in additional aggressive interventions.     The Pt was discussed and evaluated with Dr. Ball, attending physician, who agrees with the assessment and plan above.     Mohsan Chaudhry, MD  Cardiology Fellow         Medications:       aspirin  81 mg Per Feeding Tube Daily     multivitamins w/minerals  15 mL Per Feeding Tube Daily     pantoprazole  40 mg Oral or Feeding Tube QAM AC     polyethylene glycol  17 g Oral BID     protein modular  1 packet Per Feeding Tube TID     senna-docusate  1 tablet Oral BID     ticagrelor  90 mg Oral BID      Temp: 99.8  F (37.7  C) Temp  Min: 97.9  F (36.6  C)  Max: 99.8  F (37.7  C)  Resp: 16 Resp  Min: 12  Max: 16  SpO2: 100 % SpO2  Min: 97 %  Max: 100 %  Pulse: (!) 123  Pulse  Min: 89  Max: 123    No data recorded    No data recorded.  No data recorded.      Intake/Output Summary (Last 24 hours) at 2/20/2022 0736  Last data filed at 2/20/2022 0600  Gross per 24 hour   Intake 2188.88 ml   Output 1148 ml   Net 1040.88 ml          Physical Exam:   GENERAL: intubated and sedated  HEENT: ET tube in place  CV: S1/S2 heard without murmur   RESPIRATORY: CTAB, no increased WOB  GI: Soft and non distended with normoactive bowel sounds present in all quadrants. No tenderness, rebound, guarding. No palpable organomegaly.   EXTREMITIES: No peripheral edema. 2+ bilateral pedal pulses. Trace edema bilaterally in LE  NEUROLOGIC: not oriented to place or time, does not follow commands. Intermittently moving all extremities when sedation lightened.  MUSCULOSKELETAL: No joint swelling or tenderness.   SKIN: No jaundice. No acute rashes or lesions. L groin with small blood on the dressing at the decannulation site. No hematoma. No bleeding from IABP access site R groin.            Data:         Temp: 99.8  F (37.7  C) Temp src: AxillaryTemp  Min: 97.9  F (36.6  C)  Max: 99.8  F (37.7  C)   Recent Labs   Lab 02/23/22  0346 02/22/22  2343 02/22/22 2021 02/22/22 1951 02/22/22  1854 02/22/22  1635 02/22/22  0944 02/22/22  0330   WBC 11.3* 10.7 14.6*  --   --   --  9.1 8.3   HGB 8.6* 8.4* 8.8* 9.0* 8.2*   < > 7.5* 7.5*   HCT 25.6* 24.9* 26.4*  --   --   --  22.5* 22.6*   MCV 91 90 90  --   --   --  93 90   RDW 16.3* 16.0* 16.1*  --   --   --  15.9* 15.8*   PLT 91* 86* 107*  --   --   --  88* 100*    < > = values in this interval not displayed.     Recent Labs   Lab 02/23/22  0346 02/22/22 2021 02/22/22  0944 02/22/22  0332 02/21/22  2149   INR 1.18* 1.12 1.13 1.12 1.15   PTT 29 28 33 34 34     Liver Function Studies -   Recent Labs   Lab Test 02/20/22  0353   PROTTOTAL 4.8*   ALBUMIN 2.7*   BILITOTAL 0.5   ALKPHOS 16*   *   ALT 92*       Last Arterial Blood Gas:  Recent Labs   Lab 02/23/22  0835  02/22/22 2019 02/22/22 1951 02/22/22 1854 02/22/22  1804   PH 7.44  --  7.41 7.42 7.44   PCO2 36  --  40 39 37   PO2 138*  --  109* 97 78*   HCO3 24  --  25 25 25   O2PER 40 70 60.0 60.0 62.0       Venous Blood Gas  Recent Labs   Lab 02/23/22  0835 02/22/22 2019 02/22/22 1951 02/22/22 1854 02/22/22  0554 02/22/22  0333 02/21/22  1756 02/21/22  1556 02/21/22 0349 02/21/22 0348 02/19/22 0345 02/19/22 0343   PHV  --   --   --   --   --   --   --   --   --   --   --  7.28*   PCO2V  --   --   --   --   --   --   --   --   --   --   --  53*   PO2V  --   --   --   --   --   --   --   --   --   --   --  48*   HCO3V  --   --   --   --   --   --   --   --   --   --   --  25   MERLIN  --  1.5  --   --   --  0.7  --  0.9  --  0.3   < > -1.8  -1.2   O2PER 40 70 60.0 60.0   < > 40   < > 60  45  45   < > 60   < > 60  60    < > = values in this interval not displayed.       Recent Labs   Lab Test 06/30/17  1355 05/12/16  0758   CHOL 173 196   HDL 42 43    125   TRIG 76 140     IMPRESSION:  Indeterminant gray-white matter loss in the right occipital lobe could  be artifactual from adjacent streak/photon starvation artifact versus  true infarct.

## 2022-02-23 NOTE — OP NOTE
DATE OF SERVICE: 2/22/2022.     PREOPERATIVE DIAGNOSES:  1.  Recent VT/VFcardiac arrest.  2.  Veno-arterial extracorporeal membrane oxygenation.  3.  Severe multivessel coronary artery disease status post percutaneous coronary intervention and drug eluting stent placement in the left anterior descending coronary artery.     POSTOPERATIVE DIAGNOSES:  1.  Severe multivessel coronary artery disease.  2.  Veno-arterial extracorporeal membrane oxygenation.     PROCEDURE PERFORMED:  Left femoral arterial and venous decannulation and primary repair of left femoral artery cannulation site.     SURGEON:  Rod Banuelos MD, PhD.      ANESTHESIA:  General endotracheal anesthesia.     ANESTHESIOLOGIST:  Elmer Montes De Oca MD.     ESTIMATED BLOOD LOSS:  100 mL.     SPECIMEN:  None.     INDICATIONS FOR PROCEDURE:  Mr. Chano Johnson is a 71-year-old male who was cannulated for veno-arterial extracorporeal membrane oxygenation (VA ECMO) 2/17/22 due to in-hospital VT/VF arrest at an OSH with intermittent ROSC and cardiogenic shock on arrival to cardiac cath laboratory who is also status post percutaneous coronary intervention with drug eluting stent placement in the LAD. He is now improved hemodynamically and able to wean off of VA-ECMO. The family of the patient understands the risks and benefits of the procedure and wishes for him to undergo the operation.     OPERATIVE FINDINGS:  The patient was hemodynamically stable and stable from a respiratory standpoint while weaning off of VA-ECMO. Hemostasis of the femoral vessels was noted. A distal femoral artery triphasic doppler signal was present after repair of the femoral artery.     OPERATIVE DESCRIPTION IN DETAIL:  After obtaining informed consent, the patient was brought to the operating room and placed in the supine on the operating room table.  Appropriate lines and devices for monitoring were already in place.  The patient underwent smooth induction of general  anesthesia with propofol.       The left femoral artery and vein were exposed through a vertical femoral incision. After weaning from VA-ECMO, the femoral venous cannula was clamped and removed as a 5-0 Prolene pursestring suture was tied down and cut.     After obtaining proximal and distal control of the common femoral artery, the femoral arterial cannula was removed and the femoral arteriotomy was repaired with a bovine pericardial patch with running 6-0 Prolene suture. The distal perfusion cannula was removed and pressure as held for 20 minutes.     Hemostasis was achieved. The wound was closed in layers with absorbable suture in the fascia and subdermal layer and interrupted vertical mattress 2-0 nylon suture in the skin. A sterile dressing was applied.  All sponge, needle and instrument counts were correct at the end of the case.        ROSETTA HERNANDEZ MD

## 2022-02-23 NOTE — PROGRESS NOTES
I discussed with Mr. Fine's with his primary team today.  He was decannulated from ECMO yesterday, and had his intra-aortic balloon pump removed today.  He has moved all 4 extremities and attempt to sit up when sedation has been lightened.  He has not been purposeful with his movements yet.  Overall he is showing some hope for prognostic signs of recovery, though he remains quite ill.  I stopped by the bedside and spoke with Husam's wife, who remains up-to-date on his current condition.  She remains hopeful for recovery and is glad that he is making progress.  Our team will continue to follow him and offer support to family.    Larry Morrison, DO  Palliative Medicine Fellow

## 2022-02-24 ENCOUNTER — APPOINTMENT (OUTPATIENT)
Dept: GENERAL RADIOLOGY | Facility: CLINIC | Age: 72
DRG: 003 | End: 2022-02-24
Payer: MEDICARE

## 2022-02-24 ENCOUNTER — APPOINTMENT (OUTPATIENT)
Dept: NEUROLOGY | Facility: CLINIC | Age: 72
DRG: 003 | End: 2022-02-24
Attending: STUDENT IN AN ORGANIZED HEALTH CARE EDUCATION/TRAINING PROGRAM
Payer: MEDICARE

## 2022-02-24 LAB
ALBUMIN SERPL-MCNC: 2.4 G/DL (ref 3.4–5)
ALBUMIN SERPL-MCNC: 2.6 G/DL (ref 3.4–5)
ALBUMIN SERPL-MCNC: 2.6 G/DL (ref 3.4–5)
ALP SERPL-CCNC: 24 U/L (ref 40–150)
ALP SERPL-CCNC: 26 U/L (ref 40–150)
ALP SERPL-CCNC: 26 U/L (ref 40–150)
ALT SERPL W P-5'-P-CCNC: 104 U/L (ref 0–70)
ALT SERPL W P-5'-P-CCNC: 82 U/L (ref 0–70)
ALT SERPL W P-5'-P-CCNC: 99 U/L (ref 0–70)
ANION GAP SERPL CALCULATED.3IONS-SCNC: 5 MMOL/L (ref 3–14)
ANION GAP SERPL CALCULATED.3IONS-SCNC: 5 MMOL/L (ref 3–14)
ANION GAP SERPL CALCULATED.3IONS-SCNC: 6 MMOL/L (ref 3–14)
APTT PPP: 31 SECONDS (ref 22–38)
APTT PPP: 35 SECONDS (ref 22–38)
AST SERPL W P-5'-P-CCNC: 82 U/L (ref 0–45)
AST SERPL W P-5'-P-CCNC: 92 U/L (ref 0–45)
AST SERPL W P-5'-P-CCNC: 95 U/L (ref 0–45)
ATRIAL RATE - MUSE: 106 BPM
ATRIAL RATE - MUSE: 78 BPM
ATRIAL RATE - MUSE: 90 BPM
BACTERIA BLD CULT: NO GROWTH
BACTERIA BLD CULT: NO GROWTH
BACTERIA SPT CULT: NORMAL
BASE EXCESS BLDA CALC-SCNC: 1.1 MMOL/L (ref -9–1.8)
BASE EXCESS BLDA CALC-SCNC: 2 MMOL/L (ref -9–1.8)
BASE EXCESS BLDA CALC-SCNC: 2.1 MMOL/L (ref -9–1.8)
BASE EXCESS BLDA CALC-SCNC: 2.2 MMOL/L (ref -9–1.8)
BASE EXCESS BLDA CALC-SCNC: 2.3 MMOL/L (ref -9–1.8)
BASE EXCESS BLDA CALC-SCNC: 2.5 MMOL/L (ref -9–1.8)
BASE EXCESS BLDA CALC-SCNC: 2.5 MMOL/L (ref -9–1.8)
BASE EXCESS BLDV CALC-SCNC: 2.1 MMOL/L (ref -7.7–1.9)
BASE EXCESS BLDV CALC-SCNC: 2.5 MMOL/L (ref -7.7–1.9)
BASOPHILS # BLD MANUAL: 0 10E3/UL (ref 0–0.2)
BASOPHILS NFR BLD MANUAL: 0 %
BILIRUB SERPL-MCNC: 1.1 MG/DL (ref 0.2–1.3)
BILIRUB SERPL-MCNC: 1.2 MG/DL (ref 0.2–1.3)
BILIRUB SERPL-MCNC: 1.3 MG/DL (ref 0.2–1.3)
BUN SERPL-MCNC: 100 MG/DL (ref 7–30)
BUN SERPL-MCNC: 104 MG/DL (ref 7–30)
BUN SERPL-MCNC: 106 MG/DL (ref 7–30)
CA-I BLD-MCNC: 4.5 MG/DL (ref 4.4–5.2)
CA-I BLD-MCNC: 4.6 MG/DL (ref 4.4–5.2)
CALCIUM SERPL-MCNC: 8 MG/DL (ref 8.5–10.1)
CALCIUM SERPL-MCNC: 8.1 MG/DL (ref 8.5–10.1)
CALCIUM SERPL-MCNC: 8.4 MG/DL (ref 8.5–10.1)
CHLORIDE BLD-SCNC: 121 MMOL/L (ref 94–109)
CHLORIDE BLD-SCNC: 122 MMOL/L (ref 94–109)
CHLORIDE BLD-SCNC: 124 MMOL/L (ref 94–109)
CO2 SERPL-SCNC: 25 MMOL/L (ref 20–32)
CO2 SERPL-SCNC: 26 MMOL/L (ref 20–32)
CO2 SERPL-SCNC: 26 MMOL/L (ref 20–32)
CREAT SERPL-MCNC: 2.9 MG/DL (ref 0.66–1.25)
CREAT SERPL-MCNC: 2.99 MG/DL (ref 0.66–1.25)
CREAT SERPL-MCNC: 3.06 MG/DL (ref 0.66–1.25)
CRP SERPL-MCNC: 130 MG/L (ref 0–8)
D DIMER PPP FEU-MCNC: <0.27 UG/ML FEU (ref 0–0.5)
DIASTOLIC BLOOD PRESSURE - MUSE: NORMAL MMHG
EOSINOPHIL # BLD MANUAL: 0 10E3/UL (ref 0–0.7)
EOSINOPHIL NFR BLD MANUAL: 0 %
ERYTHROCYTE [DISTWIDTH] IN BLOOD BY AUTOMATED COUNT: 16.9 % (ref 10–15)
ERYTHROCYTE [DISTWIDTH] IN BLOOD BY AUTOMATED COUNT: 17 % (ref 10–15)
ERYTHROCYTE [DISTWIDTH] IN BLOOD BY AUTOMATED COUNT: 17.2 % (ref 10–15)
ERYTHROCYTE [SEDIMENTATION RATE] IN BLOOD BY WESTERGREN METHOD: 52 MM/HR (ref 0–20)
FIBRINOGEN PPP-MCNC: 430 MG/DL (ref 170–490)
FIBRINOGEN PPP-MCNC: 476 MG/DL (ref 170–490)
GFR SERPL CREATININE-BSD FRML MDRD: 21 ML/MIN/1.73M2
GFR SERPL CREATININE-BSD FRML MDRD: 21 ML/MIN/1.73M2
GFR SERPL CREATININE-BSD FRML MDRD: 22 ML/MIN/1.73M2
GLUCOSE BLD-MCNC: 148 MG/DL (ref 70–99)
GLUCOSE BLD-MCNC: 149 MG/DL (ref 70–99)
GLUCOSE BLD-MCNC: 149 MG/DL (ref 70–99)
GLUCOSE BLD-MCNC: 150 MG/DL (ref 70–99)
GLUCOSE BLD-MCNC: 154 MG/DL (ref 70–99)
GLUCOSE BLD-MCNC: 158 MG/DL (ref 70–99)
GLUCOSE BLD-MCNC: 159 MG/DL (ref 70–99)
GLUCOSE BLDC GLUCOMTR-MCNC: 128 MG/DL (ref 70–99)
GRAM STAIN RESULT: NORMAL
GRAM STAIN RESULT: NORMAL
HCO3 BLD-SCNC: 25 MMOL/L (ref 21–28)
HCO3 BLD-SCNC: 25 MMOL/L (ref 21–28)
HCO3 BLD-SCNC: 26 MMOL/L (ref 21–28)
HCO3 BLDV-SCNC: 27 MMOL/L (ref 21–28)
HCO3 BLDV-SCNC: 28 MMOL/L (ref 21–28)
HCT VFR BLD AUTO: 22.9 % (ref 40–53)
HCT VFR BLD AUTO: 23 % (ref 40–53)
HCT VFR BLD AUTO: 23.8 % (ref 40–53)
HGB BLD-MCNC: 7.4 G/DL (ref 13.3–17.7)
HGB BLD-MCNC: 7.5 G/DL (ref 13.3–17.7)
HGB BLD-MCNC: 7.6 G/DL (ref 13.3–17.7)
HGB FREE PLAS-MCNC: <30 MG/DL
INR PPP: 1.12 (ref 0.85–1.15)
INR PPP: 1.17 (ref 0.85–1.15)
INTERPRETATION ECG - MUSE: NORMAL
LACTATE SERPL-SCNC: 1.3 MMOL/L (ref 0.7–2)
LACTATE SERPL-SCNC: 1.4 MMOL/L (ref 0.7–2)
LDH SERPL L TO P-CCNC: 421 U/L (ref 85–227)
LYMPHOCYTES # BLD MANUAL: 0.5 10E3/UL (ref 0.8–5.3)
LYMPHOCYTES NFR BLD MANUAL: 5 %
MAGNESIUM SERPL-MCNC: 3.3 MG/DL (ref 1.6–2.3)
MAGNESIUM SERPL-MCNC: 3.3 MG/DL (ref 1.6–2.3)
MCH RBC QN AUTO: 30.2 PG (ref 26.5–33)
MCH RBC QN AUTO: 30.7 PG (ref 26.5–33)
MCH RBC QN AUTO: 31 PG (ref 26.5–33)
MCHC RBC AUTO-ENTMCNC: 31.9 G/DL (ref 31.5–36.5)
MCHC RBC AUTO-ENTMCNC: 32.3 G/DL (ref 31.5–36.5)
MCHC RBC AUTO-ENTMCNC: 32.6 G/DL (ref 31.5–36.5)
MCV RBC AUTO: 94 FL (ref 78–100)
MCV RBC AUTO: 94 FL (ref 78–100)
MCV RBC AUTO: 96 FL (ref 78–100)
METAMYELOCYTES # BLD MANUAL: 0.2 10E3/UL
METAMYELOCYTES NFR BLD MANUAL: 2 %
MONOCYTES # BLD MANUAL: 0.9 10E3/UL (ref 0–1.3)
MONOCYTES NFR BLD MANUAL: 9 %
NEUTROPHILS # BLD MANUAL: 8.6 10E3/UL (ref 1.6–8.3)
NEUTROPHILS NFR BLD MANUAL: 84 %
O2/TOTAL GAS SETTING VFR VENT: 4 %
O2/TOTAL GAS SETTING VFR VENT: 4 %
O2/TOTAL GAS SETTING VFR VENT: 40 %
O2/TOTAL GAS SETTING VFR VENT: 6 %
OXYHGB MFR BLD: 98 % (ref 92–100)
OXYHGB MFR BLDV: 60 % (ref 70–75)
OXYHGB MFR BLDV: 61 % (ref 70–75)
P AXIS - MUSE: 59 DEGREES
P AXIS - MUSE: NORMAL DEGREES
P AXIS - MUSE: NORMAL DEGREES
PCO2 BLD: 32 MM HG (ref 35–45)
PCO2 BLD: 34 MM HG (ref 35–45)
PCO2 BLD: 36 MM HG (ref 35–45)
PCO2 BLD: 38 MM HG (ref 35–45)
PCO2 BLDV: 38 MM HG (ref 40–50)
PCO2 BLDV: 46 MM HG (ref 40–50)
PH BLD: 7.43 [PH] (ref 7.35–7.45)
PH BLD: 7.47 [PH] (ref 7.35–7.45)
PH BLD: 7.48 [PH] (ref 7.35–7.45)
PH BLD: 7.49 [PH] (ref 7.35–7.45)
PH BLD: 7.51 [PH] (ref 7.35–7.45)
PH BLDV: 7.39 [PH] (ref 7.32–7.43)
PH BLDV: 7.45 [PH] (ref 7.32–7.43)
PHOSPHATE SERPL-MCNC: 4.3 MG/DL (ref 2.5–4.5)
PLAT MORPH BLD: ABNORMAL
PLATELET # BLD AUTO: 100 10E3/UL (ref 150–450)
PLATELET # BLD AUTO: 102 10E3/UL (ref 150–450)
PLATELET # BLD AUTO: 91 10E3/UL (ref 150–450)
PO2 BLD: 110 MM HG (ref 80–105)
PO2 BLD: 111 MM HG (ref 80–105)
PO2 BLD: 116 MM HG (ref 80–105)
PO2 BLD: 123 MM HG (ref 80–105)
PO2 BLD: 123 MM HG (ref 80–105)
PO2 BLD: 126 MM HG (ref 80–105)
PO2 BLD: 128 MM HG (ref 80–105)
PO2 BLDV: 33 MM HG (ref 25–47)
PO2 BLDV: 35 MM HG (ref 25–47)
POTASSIUM BLD-SCNC: 3.9 MMOL/L (ref 3.4–5.3)
POTASSIUM BLD-SCNC: 4.2 MMOL/L (ref 3.4–5.3)
POTASSIUM BLD-SCNC: 4.4 MMOL/L (ref 3.4–5.3)
PR INTERVAL - MUSE: 140 MS
PR INTERVAL - MUSE: NORMAL MS
PR INTERVAL - MUSE: NORMAL MS
PROT SERPL-MCNC: 5.2 G/DL (ref 6.8–8.8)
PROT SERPL-MCNC: 5.3 G/DL (ref 6.8–8.8)
PROT SERPL-MCNC: 5.4 G/DL (ref 6.8–8.8)
QRS DURATION - MUSE: 80 MS
QRS DURATION - MUSE: 86 MS
QRS DURATION - MUSE: 88 MS
QT - MUSE: 308 MS
QT - MUSE: 324 MS
QT - MUSE: 338 MS
QTC - MUSE: 413 MS
QTC - MUSE: 430 MS
QTC - MUSE: 463 MS
R AXIS - MUSE: 84 DEGREES
R AXIS - MUSE: 85 DEGREES
R AXIS - MUSE: 86 DEGREES
RBC # BLD AUTO: 2.39 10E6/UL (ref 4.4–5.9)
RBC # BLD AUTO: 2.44 10E6/UL (ref 4.4–5.9)
RBC # BLD AUTO: 2.52 10E6/UL (ref 4.4–5.9)
RBC MORPH BLD: ABNORMAL
SODIUM SERPL-SCNC: 151 MMOL/L (ref 133–144)
SODIUM SERPL-SCNC: 154 MMOL/L (ref 133–144)
SODIUM SERPL-SCNC: 155 MMOL/L (ref 133–144)
SYSTOLIC BLOOD PRESSURE - MUSE: NORMAL MMHG
T AXIS - MUSE: -28 DEGREES
T AXIS - MUSE: 62 DEGREES
T AXIS - MUSE: 62 DEGREES
TOXIC GRANULES BLD QL SMEAR: PRESENT
TROPONIN I SERPL HS-MCNC: 6826 NG/L
TROPONIN I SERPL HS-MCNC: 7479 NG/L
UFH PPP CHRO-ACNC: <0.1 IU/ML
UFH PPP CHRO-ACNC: <0.1 IU/ML
VENTRICULAR RATE- MUSE: 106 BPM
VENTRICULAR RATE- MUSE: 136 BPM
VENTRICULAR RATE- MUSE: 90 BPM
WBC # BLD AUTO: 10 10E3/UL (ref 4–11)
WBC # BLD AUTO: 10.2 10E3/UL (ref 4–11)
WBC # BLD AUTO: 14.3 10E3/UL (ref 4–11)

## 2022-02-24 PROCEDURE — 85384 FIBRINOGEN ACTIVITY: CPT | Performed by: INTERNAL MEDICINE

## 2022-02-24 PROCEDURE — 85379 FIBRIN DEGRADATION QUANT: CPT | Performed by: INTERNAL MEDICINE

## 2022-02-24 PROCEDURE — 84155 ASSAY OF PROTEIN SERUM: CPT | Performed by: INTERNAL MEDICINE

## 2022-02-24 PROCEDURE — 83605 ASSAY OF LACTIC ACID: CPT | Performed by: INTERNAL MEDICINE

## 2022-02-24 PROCEDURE — 82803 BLOOD GASES ANY COMBINATION: CPT | Performed by: INTERNAL MEDICINE

## 2022-02-24 PROCEDURE — 93005 ELECTROCARDIOGRAM TRACING: CPT

## 2022-02-24 PROCEDURE — 85027 COMPLETE CBC AUTOMATED: CPT | Performed by: INTERNAL MEDICINE

## 2022-02-24 PROCEDURE — 999N000155 HC STATISTIC RAPCV CVP MONITORING

## 2022-02-24 PROCEDURE — 85652 RBC SED RATE AUTOMATED: CPT | Performed by: INTERNAL MEDICINE

## 2022-02-24 PROCEDURE — 250N000011 HC RX IP 250 OP 636

## 2022-02-24 PROCEDURE — 82330 ASSAY OF CALCIUM: CPT | Performed by: INTERNAL MEDICINE

## 2022-02-24 PROCEDURE — 71045 X-RAY EXAM CHEST 1 VIEW: CPT

## 2022-02-24 PROCEDURE — 80053 COMPREHEN METABOLIC PANEL: CPT | Performed by: INTERNAL MEDICINE

## 2022-02-24 PROCEDURE — 85610 PROTHROMBIN TIME: CPT | Performed by: INTERNAL MEDICINE

## 2022-02-24 PROCEDURE — 250N000011 HC RX IP 250 OP 636: Performed by: INTERNAL MEDICINE

## 2022-02-24 PROCEDURE — 85730 THROMBOPLASTIN TIME PARTIAL: CPT | Performed by: INTERNAL MEDICINE

## 2022-02-24 PROCEDURE — 999N000157 HC STATISTIC RCP TIME EA 10 MIN

## 2022-02-24 PROCEDURE — 250N000013 HC RX MED GY IP 250 OP 250 PS 637

## 2022-02-24 PROCEDURE — 999N000015 HC STATISTIC ARTERIAL MONITORING DAILY

## 2022-02-24 PROCEDURE — 250N000013 HC RX MED GY IP 250 OP 250 PS 637: Performed by: PHYSICIAN ASSISTANT

## 2022-02-24 PROCEDURE — 83051 HEMOGLOBIN PLASMA: CPT | Performed by: INTERNAL MEDICINE

## 2022-02-24 PROCEDURE — 250N000011 HC RX IP 250 OP 636: Performed by: NURSE PRACTITIONER

## 2022-02-24 PROCEDURE — 82805 BLOOD GASES W/O2 SATURATION: CPT | Performed by: INTERNAL MEDICINE

## 2022-02-24 PROCEDURE — 85520 HEPARIN ASSAY: CPT | Performed by: INTERNAL MEDICINE

## 2022-02-24 PROCEDURE — 258N000003 HC RX IP 258 OP 636: Performed by: NURSE PRACTITIONER

## 2022-02-24 PROCEDURE — 84484 ASSAY OF TROPONIN QUANT: CPT | Performed by: INTERNAL MEDICINE

## 2022-02-24 PROCEDURE — 84100 ASSAY OF PHOSPHORUS: CPT | Performed by: INTERNAL MEDICINE

## 2022-02-24 PROCEDURE — 83735 ASSAY OF MAGNESIUM: CPT | Performed by: INTERNAL MEDICINE

## 2022-02-24 PROCEDURE — 71045 X-RAY EXAM CHEST 1 VIEW: CPT | Mod: 26 | Performed by: RADIOLOGY

## 2022-02-24 PROCEDURE — 99232 SBSQ HOSP IP/OBS MODERATE 35: CPT | Mod: 25 | Performed by: PSYCHIATRY & NEUROLOGY

## 2022-02-24 PROCEDURE — 250N000009 HC RX 250: Performed by: NURSE PRACTITIONER

## 2022-02-24 PROCEDURE — 99291 CRITICAL CARE FIRST HOUR: CPT | Mod: GC | Performed by: STUDENT IN AN ORGANIZED HEALTH CARE EDUCATION/TRAINING PROGRAM

## 2022-02-24 PROCEDURE — 250N000013 HC RX MED GY IP 250 OP 250 PS 637: Performed by: INTERNAL MEDICINE

## 2022-02-24 PROCEDURE — 200N000002 HC R&B ICU UMMC

## 2022-02-24 PROCEDURE — 82947 ASSAY GLUCOSE BLOOD QUANT: CPT | Performed by: INTERNAL MEDICINE

## 2022-02-24 PROCEDURE — 94003 VENT MGMT INPAT SUBQ DAY: CPT

## 2022-02-24 PROCEDURE — 86140 C-REACTIVE PROTEIN: CPT | Performed by: INTERNAL MEDICINE

## 2022-02-24 PROCEDURE — 93010 ELECTROCARDIOGRAM REPORT: CPT | Mod: 76 | Performed by: INTERNAL MEDICINE

## 2022-02-24 PROCEDURE — 95714 VEEG EA 12-26 HR UNMNTR: CPT

## 2022-02-24 PROCEDURE — 95720 EEG PHY/QHP EA INCR W/VEEG: CPT | Performed by: PSYCHIATRY & NEUROLOGY

## 2022-02-24 PROCEDURE — 85014 HEMATOCRIT: CPT | Performed by: INTERNAL MEDICINE

## 2022-02-24 PROCEDURE — G0463 HOSPITAL OUTPT CLINIC VISIT: HCPCS

## 2022-02-24 PROCEDURE — 250N000009 HC RX 250: Performed by: INTERNAL MEDICINE

## 2022-02-24 PROCEDURE — 83615 LACTATE (LD) (LDH) ENZYME: CPT | Performed by: INTERNAL MEDICINE

## 2022-02-24 PROCEDURE — 258N000003 HC RX IP 258 OP 636

## 2022-02-24 RX ORDER — AMINO AC/PROTEIN HYDR/WHEY PRO 10G-100/30
1 LIQUID (ML) ORAL
Status: DISCONTINUED | OUTPATIENT
Start: 2022-02-24 | End: 2022-02-25

## 2022-02-24 RX ORDER — CEFTRIAXONE 1 G/1
1 INJECTION, POWDER, FOR SOLUTION INTRAMUSCULAR; INTRAVENOUS EVERY 24 HOURS
Status: DISCONTINUED | OUTPATIENT
Start: 2022-02-24 | End: 2022-02-24

## 2022-02-24 RX ORDER — HYDROMORPHONE HCL IN WATER/PF 6 MG/30 ML
0.2 PATIENT CONTROLLED ANALGESIA SYRINGE INTRAVENOUS EVERY 4 HOURS PRN
Status: DISCONTINUED | OUTPATIENT
Start: 2022-02-24 | End: 2022-03-11 | Stop reason: HOSPADM

## 2022-02-24 RX ORDER — CEFAZOLIN SODIUM 1 G/50ML
2 SOLUTION INTRAVENOUS EVERY 12 HOURS
Status: DISCONTINUED | OUTPATIENT
Start: 2022-02-24 | End: 2022-02-28

## 2022-02-24 RX ORDER — CEFAZOLIN SODIUM 1 G/50ML
2 SOLUTION INTRAVENOUS EVERY 24 HOURS
Status: DISCONTINUED | OUTPATIENT
Start: 2022-02-24 | End: 2022-02-24

## 2022-02-24 RX ADMIN — DEXMEDETOMIDINE HYDROCHLORIDE 1.2 MCG/KG/HR: 400 INJECTION INTRAVENOUS at 01:33

## 2022-02-24 RX ADMIN — DEXMEDETOMIDINE HYDROCHLORIDE 1.2 MCG/KG/HR: 400 INJECTION INTRAVENOUS at 07:43

## 2022-02-24 RX ADMIN — TICAGRELOR 90 MG: 90 TABLET ORAL at 07:31

## 2022-02-24 RX ADMIN — Medication 1 PACKET: at 07:32

## 2022-02-24 RX ADMIN — POLYETHYLENE GLYCOL 3350 17 G: 17 POWDER, FOR SOLUTION ORAL at 21:58

## 2022-02-24 RX ADMIN — Medication 50 MCG: at 04:59

## 2022-02-24 RX ADMIN — AMIODARONE HYDROCHLORIDE 1 MG/MIN: 50 INJECTION, SOLUTION INTRAVENOUS at 07:43

## 2022-02-24 RX ADMIN — TICAGRELOR 90 MG: 90 TABLET ORAL at 21:49

## 2022-02-24 RX ADMIN — DEXMEDETOMIDINE HYDROCHLORIDE 1.2 MCG/KG/HR: 400 INJECTION INTRAVENOUS at 12:00

## 2022-02-24 RX ADMIN — CEFAZOLIN SODIUM 2 G: 10 INJECTION, POWDER, FOR SOLUTION INTRAVENOUS at 11:48

## 2022-02-24 RX ADMIN — Medication 50 MCG: at 02:42

## 2022-02-24 RX ADMIN — Medication 40 MG: at 07:36

## 2022-02-24 RX ADMIN — DEXMEDETOMIDINE HYDROCHLORIDE 1.2 MCG/KG/HR: 400 INJECTION INTRAVENOUS at 06:21

## 2022-02-24 RX ADMIN — NICARDIPINE HYDROCHLORIDE 2.5 MG/HR: 0.2 INJECTION, SOLUTION INTRAVENOUS at 19:09

## 2022-02-24 RX ADMIN — DEXMEDETOMIDINE HYDROCHLORIDE 1.2 MCG/KG/HR: 400 INJECTION INTRAVENOUS at 02:41

## 2022-02-24 RX ADMIN — CEFAZOLIN SODIUM 2 G: 10 INJECTION, POWDER, FOR SOLUTION INTRAVENOUS at 23:13

## 2022-02-24 RX ADMIN — Medication 15 ML: at 07:31

## 2022-02-24 RX ADMIN — Medication 50 MCG: at 01:32

## 2022-02-24 RX ADMIN — ASPIRIN 81 MG CHEWABLE TABLET 81 MG: 81 TABLET CHEWABLE at 07:31

## 2022-02-24 RX ADMIN — HEPARIN SODIUM 5000 UNITS: 5000 INJECTION, SOLUTION INTRAVENOUS; SUBCUTANEOUS at 07:31

## 2022-02-24 RX ADMIN — HEPARIN SODIUM 5000 UNITS: 5000 INJECTION, SOLUTION INTRAVENOUS; SUBCUTANEOUS at 21:48

## 2022-02-24 RX ADMIN — HYDROMORPHONE HYDROCHLORIDE 0.2 MG: 0.2 INJECTION, SOLUTION INTRAMUSCULAR; INTRAVENOUS; SUBCUTANEOUS at 18:08

## 2022-02-24 RX ADMIN — PROPOFOL 20 MCG/KG/MIN: 10 INJECTION, EMULSION INTRAVENOUS at 08:48

## 2022-02-24 ASSESSMENT — ACTIVITIES OF DAILY LIVING (ADL)
ADLS_ACUITY_SCORE: 23
ADLS_ACUITY_SCORE: 25
ADLS_ACUITY_SCORE: 23
ADLS_ACUITY_SCORE: 25
ADLS_ACUITY_SCORE: 23
ADLS_ACUITY_SCORE: 25
ADLS_ACUITY_SCORE: 23
ADLS_ACUITY_SCORE: 25
ADLS_ACUITY_SCORE: 23

## 2022-02-24 NOTE — PLAN OF CARE
ICU End of Shift Summary. See flowsheets for vital signs and detailed assessment.    Changes this shift: Provided cares from [id number].    Sedated, does not follow commands but arouses to pain/vigorous stimulation. EEG holiday. CSI aware of restarting propofol as patient does get agitated, SBP up to 160s-180s. On and off levo to maintain MAP >65. L internal jugular CVC placed, CXR obtained, pending read, CSI aware. Remain on CMV settings. TF at 25 mL/hr with goal 75 mL/hr, standard flushes; patient tolerating. Tapia patent and intact. WOC consulted for oral lesions.    Plan: Remove R femoral line. Wean sedation. Resume EEG monitoring in the morning. Continue to monitor and follow POC.

## 2022-02-24 NOTE — PROGRESS NOTES
"[facility]: [place]  NeuroCritical Care Progress Note     Patient Name:  Chano Johnson  MRN:  [id number]    :  1950  Date of Service:  2022  Primary care provider:  Laith Limon     Overnight events:  -Nursing notes reviewed does not follow commands but arouses to pain/vigorous stimulation. Restarted on propofol as for agitation, as well as fentanyl and precedex  -Currently on 1.2 precdex, 75 fent and 10 propofol. Occasionally agitated with cares requiring PRN fent bumps  -Afebrile, SR 70s-90  -On and off levo overnight to maintain MAP >65, on 0.05 norepinephrine this morning    A/P:   Chano Johnson, \"Husam\" is a 71 year old male with a PMHx Afib s/p AF PVI ablation and Watchman (10/21/2021), Aflutter s/p ablation, HFpEF, hx tachycardia-mediated cardiomyopathy, HTN, HLD, ARSENIO, gout, GERD, EtOH use, and COVID infection (2021) who presented as transfer from [facility] for possible cardiac catheterization +/- ECMO after a Vfib arrest, for which [facility] service was consulted to evaluate for prognostication. CT head completed on arrival to [facility] showed possible indeterminant gray-white matter loss in the right occipital lobe but on review appears more suspicious for artifact. VEEG so far no seizures, just severe encephalopathy, and rehooked up today for further investigation. Updated the wife at bedside. Exam improved today, opening eyes, following commands.. Further workup pending sedition wean, and assessment with EEG, reassured with examination improvement but remains too early to neurologically prognosticate.        Recommendations:  - Rehook EEG  (ordered)  - Will hold on further imaging needs, no need for MRI brain right now considering improving neurologic exam   - Avoid hypotonic solutions as they may worsen cerebral edema  - Avoid \"nitroprusside\",\"nitroglycerin\" as they may also worsen cerbral edema.  - Advise slow correction of any high Na levels, if " needed  - Limitation of CNS acting medications will permit a more accurate neurological assessment  - NCC will continue to follow and monitor their EEG and neurologic exam        Physical Examination:   BP (!) 85/53   Pulse 93   Temp 97.6  F (36.4  C) (Axillary)   Resp 23   Ht 1.829 m (6')   Wt 103 kg (227 lb 1.2 oz)   SpO2 98%   BMI 30.80 kg/m     Examined on 1.2 precdex, 75 fent and 10 propofol  General: Adult male patient, lying in bed flat, in no acute distress, wife at bedside  HEENT: ETT  Cardiac: telemetry regular rate  Pulm: ventilated, appears comfortable on 40% Fio2, rate of 25 with set at 16, chest tube present  Extremities: Warm, minimal LE edema  Skin: No rash or lesions noticed  Neuro: Sedated, eyes closed but flicker open on voice stimulation, following commands such as opening eyes, shows thumbs up and wiggles toes, no gaze preference, pupils midline and briskly reactive and symmetric 3mm, no spontaneous or abnormal movements noticed.    The patient was discussed  Dr. Staples, staff attending, who agrees with this assessment and plan    Maty Qureshi), DO, MADDIE  PGY-2 Neurology Resident

## 2022-02-24 NOTE — PROGRESS NOTES
CLINICAL NUTRITION SERVICES - REASSESSMENT NOTE     Nutrition Prescription    RECOMMENDATIONS FOR MDs/PROVIDERS TO ORDER:  --Additional water flushes and bowel regimen as per primary team.     Malnutrition Status:    Severe malnutrition in the context of acute illness    Recommendations already ordered by Registered Dietitian (RD):  --NEW TF regimen:       --GOAL: Osmolite 1.5 Matteo @ goal of 65ml/hr (1560ml/day) +5 Prosource will provide: 2540 kcals (30 kcal/kg), 152 g PRO (1.8 g/kg), 1188 ml free H20, 317 g CHO, and 0 g fiber daily.            --Start TF @ 45 ml/hr and advance by 10 ml q 8 hrs until goal rate  --Metabolic cart study    Future/Additional Recommendations:  --TF tolerance/advancement with new regimen.  --Weight trends.  --If CRRT becomes POC: Osmolite 1.5 Matteo @ goal of 60ml/hr (1440ml/day) +8 Prosource will provide: 2480 kcals (29 kcal/kg), 178 g PRO (2.1 g/kg), 1097 ml free H20, 293 g CHO, and 0 g fiber daily.     EVALUATION OF THE PROGRESS TOWARD GOALS   Diet: NPO  Nutrition Support:    - ___: Vital High Protein @ 75ml/hr (1800ml/day) + Prosource (1 pkt TID) will provide: 1920 kcals (22 kcal/kg) and 190 g PRO (2.2 g/kg), 1504 ml free H20, 199 g CHO, and 0 g fiber daily.   Intake: pt has not reached goal TF rate, OGT was removed and replaced . Currently with TF @ 45 ml/hr.     Wife at bedside, reports pt was eating well PTA. Pt also exercises 3x/week for 3 hrs at a time. Pt waking up, possible extubation per RN. Discussed TF formula change if unable to extubate.      NEW FINDINGS   Weight: ~stable since admission  Labs: Na 154 (H),  (BUN), Cr 2.99 (H), .0 (H)  Meds: MVI with minerals, Miralax BID, Prosource TID, Senna-docusate BID, Precedex, Fentanyl, Norepi @ 0.05 mcg/kg/min  GI: no BM until BMx3 on   Renal: SAHIL - monitoring for CRRT vs iHD needs  Resp: intubated; COVID recovered  Skin: WOCN :  Pressure injury on sorin cleft, present on admission  Pressure injury is  stage:  Deep tissue pressure injury.   Status:  Initial assessment    Dosing Weight: 86 kg (adjusted) - based on lowest doc wt this adm (100 kg on 2/18) and IBW of 51    ASSESSED NUTRITION NEEDS:  Estimated Energy Needs: 7718-5018 kcals/day (25 - 30 kcals/kg)  Justification: Vented and ECMO (decannulated)  Estimated Protein Needs: 129-172-215 grams protein/day (1.5 - 2 - 2.5 grams of pro/kg)  Justification: Hypercatabolism with acute illness and ECMO (now decannulated)  Estimated Fluid Needs: 1 mL/kcal  Justification: Maintenance and Per provider pending fluid status    MALNUTRITION  % Intake: </= 50% for >/= 5 days (severe)  % Weight Loss: None noted  Subcutaneous Fat Loss: None observed  Muscle Loss: Thoracic region (clavicle, acromium bone, deltoid, trapezius, pectoral):  Moderate  Fluid Accumulation/Edema: Moderate 2-3+ edema  Malnutrition Diagnosis: Severe malnutrition in the context of acute illness    Previous Goals   Diet adv v nutrition support within 2-3 days.  Evaluation: Met    Previous Nutrition Diagnosis  Inadequate energy intake (calories/protein) related to inability to take oral PO due to medical status and vented as evidenced by NPO and currently meeting 0% of energy and protein needs.    Evaluation: Improving    CURRENT NUTRITION DIAGNOSIS  Inadequate protein-energy intake (calories/protein) related to inadequate enteral nutrition infusions and inability to take oral PO due to medical status and vented as evidenced by average nutrition intake <50% of needs over past 6 days.     INTERVENTIONS  Implementation  Collaboration with other nutrition professionals - fellow RD  Collaboration with other providers - RN  Enteral Nutrition - as above    Goals  Total avg nutritional intake to meet a minimum of 25 kcal/kg and 1.5 g PRO/kg daily (per dosing wt 86 kg).    Monitoring/Evaluation  Progress toward goals will be monitored and evaluated per protocol.    Ingrid Leavitt, MS, RD, LD, CNSC  CVICU RD: [phone]   Pgr: [phone]

## 2022-02-24 NOTE — PROGRESS NOTES
[facility]  Cardiac ICU Progress Note  February 20, 2022            Key events - last 24 hours:     - A fib episode this morning, watchman in place. No indication for heparin administration at this time. HD stable.  - wean sedation and SBT to assess extubation readiness              Assessment & Plan        71 year old male who was admitted on 2/17/2022 s/p VT/VF arrest x8 shocks who was found to have LAD occlusion requiring 2x stents. He presented to local ED with chest pain that started at 1300 (within 1 hr of onset), he had a VT VF arrest in the ED after he was found to have an anterior STEMI on ECG.  Chest compressions and ACLS was started immediately.  Patient underwent 8 shocks for VT VF with ROSC afterwards.  He received 4 doses of epinephrine, 450 mg of amiodarone.  He was transferred to the [facility] for percutaneous intervention for his STEMI. Unfortunately his transfer was delayed because he was a very low dose of Epi (0.01) and the local hospital only had a BLS ambulance available. He arrived at [facility] and was found to have occlusion of the LAD. During the angiogram he was found to have significant hypotension and ectopy, which prompted placement on VA ECMO. PCI with 2x LETY was done with ECMO support. TTM post arrest at 35C, rewarmed on 2/19/22. Decannulated 2/23/22.    Today's Changes  - Continue Amio 1mg/min  - Wean sedation and SBT to assess for extubation readiness  - Monitor UOP, diuretics as needed to maintain slightly net negative     Neurology: #Possible neurological injury post cardiac arrest  Intubated, sedated. Initially cooled to 34 degrees.  Now rewarmed as of 2/19/2022  --continue fentanyl and precedex. Continue to hold to monitor neurological recovery with plan for possible extubation if following command consistently.   - RASS goal -1 to -2.  - CTH on presentation w/o acute intracranial pathology. Day 3 CTH showed indeterminant gray-white  matter loss in the R occipital lobe which could be artifact vs true infarct. Strong suspicion for artifact per neurocrit.  - EEG w/o significant abnormalities noted  - Pt has nonpurposeful movement of all 4 extremities when sedation lightened.   Cardiovascular / Hemodynamics: #Refractory VF arrest requiring 8 shocks.  x2 LETY stents to LAD.  Peripheral V-A ECMO inserted for refractory cardiogenic shock. LA   TTE: LVEF 10-15%, RV moderately to severely reduced function. No prior hx of heart failure. No significant valvular disease. Pulsatility significant improved after rewarming, decannulated 2/22/22.  EKG: FLORY V2-V4 consistent with anterior STEMI  --wean pressors/inotropes as needed  --MATTHIEU ECMO turndown 2/22/22: no concerns, stable. Okay to proceed with decannulation. Decannulated 2/22/22.  --continue ASA 81mg and ticagrelor 90mg BID as of now status post 2 LETY to LAD  --No indication for anticoagulation at this time  --hold lipitor for now given likely hepatic injury during arrest  --hold ACE/ARB for now given likely reduced renal fxn after arrest  --holding beta blocker given shock      - CT CAP: Large right-sided diffuse effusion s/p chest tube placement.       Pulmonary: #AHRF  #Pleural effusion - s/p chest tube placement on L side. Thrombus noted in the chest tube 2/22/22, evacuated in the OR during decannulation. Currently has chest wall hematoma which we are monitoring.  ETT in appropriate position.  Now weaning vent requirements.  CXR: Lines in stable position.  --wean vent as able  --daily CXR  --Q6h ABGs for now  --consider scheduled duonebs if signs of lung dz, currently PRN     GI and Nutrition: No known medical hx.   --monitor BID LFTs  --bowel regimen   -- TF at goal  --GI Prophylaxis: PPI    Renal, Fluid and Electrolytes: #SAHIL, likely ischemic ATN. Monitoring for CRRT needs.  Monitoring UOP, will start CRRT through circuit if needed.  --maintain K>4 and Mg>2    Infectious Disease: #Sputum culture  2/20/22 positive for Staph Lugdunensis - start cefazolin (2/24/22 - now). Plan for 5 day course.  Leukocytosis c/w arrest. Blood cultures collected.   --vancomycin/zosyn x5 days for ECMO - completed course on 2/21/22  --monitor for signs of infection given cooling, lines, and leukocytosis   Hematology and Oncology: #Acute blood loss anemia  #Thrombocytopenia - likely secondary to hemolysis. 2/21/22 4T score 3 (low risk, <5%)  --Heparin discontinue after decannulation.  -- Antiplatelet: ASA/ticagrelor for LETY.   --cryo PRN fibrinogen < 200; FFP for INR >2  --Transfuse for Hgb<8  --DVT PPX: subcutaneous Heparin  -Continue to trend CBCs transfuse to goal hemoglobin of 8   Endocrinology:    No known medical history. BG elevated.  --insulin gtt  --stress dose steroids due to persistent hypotension - hydrocort 50mg IV Q8h discontinued   Lines: L femoral arterial and venous ECMO cannulae February 17, 2022 - 2/22/22  R radial arterial line February 17, 2022  ETT February 17, 2022  Tapia catheter February 17, 2022  OG tube February 17, 2022  Restraint: needed  Current lines are required for patient management       Family update by me today: Yes      Code Status: Full code at this time. Reviewed patients medical directives. He indicated he would like to be full code unless there was medical futility in additional aggressive interventions.     The Pt was discussed and evaluated with Dr. Ball, attending physician, who agrees with the assessment and plan above.     Mohsan Chaudhry, MD  Cardiology Fellow         Medications:       aspirin  81 mg Per Feeding Tube Daily     ceFAZolin  2 g Intravenous Q12H     heparin ANTICOAGULANT  5,000 Units Subcutaneous Q12H ERIKA     multivitamins w/minerals  15 mL Per Feeding Tube Daily     pantoprazole  40 mg Oral or Feeding Tube QAM AC     polyethylene glycol  17 g Oral BID     protein modular  1 packet Per Feeding Tube TID     senna-docusate  1 tablet Oral BID     ticagrelor  90 mg Oral  BID      Temp: 96.9  F (36.1  C) Temp  Min: 96.9  F (36.1  C)  Max: 98.4  F (36.9  C)  Resp: 20 Resp  Min: 11  Max: 39  SpO2: 100 % SpO2  Min: 96 %  Max: 100 %  Pulse: 92 Pulse  Min: 67  Max: 162    No data recorded  BP: (!) 85/53 Systolic (24hrs), Av , Min:85 , Max:85   Diastolic (24hrs), Av, Min:53, Max:53      Intake/Output Summary (Last 24 hours) at 2022 0736  Last data filed at 2022 0600  Gross per 24 hour   Intake 2188.88 ml   Output 1148 ml   Net 1040.88 ml          Physical Exam:   GENERAL: intubated and sedated  HEENT: ET tube in place  CV: S1/S2 heard without murmur   RESPIRATORY: CTAB, no increased WOB  GI: Soft and non distended with normoactive bowel sounds present in all quadrants. No tenderness, rebound, guarding. No palpable organomegaly.   EXTREMITIES: No peripheral edema. 2+ bilateral pedal pulses. Trace edema bilaterally in LE  NEUROLOGIC: not oriented to place or time, does not follow commands. Intermittently moving all extremities and following commands when sedation lightened.  MUSCULOSKELETAL: No joint swelling or tenderness.   SKIN: No jaundice. No acute rashes or lesions. L groin with small blood on the dressing at the decannulation site. No hematoma. No bleeding from IABP access site R groin.            Data:         Temp: 96.9  F (36.1  C) Temp src: AxillaryTemp  Min: 96.9  F (36.1  C)  Max: 98.4  F (36.9  C)   Recent Labs   Lab 22  0946 22  0331 22  2211 22  1552 22  1002   WBC 10.0 14.3* 14.4* 15.3* 11.8*   HGB 7.4* 7.6* 7.9* 8.4* 8.5*   HCT 22.9* 23.8* 24.6* 25.6* 25.4*   MCV 96 94 93 93 92   RDW 17.0* 16.9* 16.5* 16.5* 16.3*   PLT 91* 102* 95* 93* 78*     Recent Labs   Lab 22  0946 22  0331 22  2210 22  1552 22  1002   INR 1.17* 1.12 1.16* 1.16* 1.13   PTT 35 31 32 31 29     Liver Function Studies -   Recent Labs   Lab Test 22  0353   PROTTOTAL 4.8*   ALBUMIN 2.7*   BILITOTAL 0.5   ALKPHOS 16*   AST  127*   ALT 92*       Last Arterial Blood Gas:  Recent Labs   Lab 02/24/22  1433 02/24/22  1125 02/24/22  0945 02/24/22  0338   PH 7.49*  7.49* 7.49* 7.47* 7.43   PCO2 34*  34* 34* 36 38   PO2 123*  123* 110* 116* 128*   HCO3 26  26 26 26 25   O2PER 40  40 40 40 40       Venous Blood Gas  Recent Labs   Lab 02/24/22  1433 02/24/22  1125 02/24/22  0945 02/24/22  0338 02/24/22  0331 02/23/22  2211 02/23/22  1553 02/23/22  1552 02/23/22  0900   PHV  --   --   --   --  7.39 7.38  --  7.37 7.40   PCO2V  --   --   --   --  46 46  --  47 42   PO2V  --   --   --   --  35 38  --  32 36   HCO3V  --   --   --   --  28 27  --  27 26   MERLIN  --   --   --   --  2.1* 1.2  --  1.3 1.3   O2PER 40  40 40 40 40 40 40   < > 40 40    < > = values in this interval not displayed.       Recent Labs   Lab Test 06/30/17  1355 05/12/16  0758   CHOL 173 196   HDL 42 43    125   TRIG 76 140     IMPRESSION:  Indeterminant gray-white matter loss in the right occipital lobe could  be artifactual from adjacent streak/photon starvation artifact versus  true infarct.

## 2022-02-24 NOTE — PROGRESS NOTES
[facility], Procedure Note          Extubation:       Chano Johnson  MRN# [id number]   February 24, 2022, 5:48 PM         Patient extubated at: February 24, 2022, 5:48 PM   Supplemental Oxygen: Via Oxy mask at 6 liters per minute   Cough: The cough is good and non-productive   Secretion Mode: Able to clear   Secretion Amount: Small amount, thick and white / yellow in color   Respiratory Exam:: Breath sounds: coarse crackles     Location: all lobes and bilaterally   Skin Exam:: Patient color: natural   Patient Status: Currently anxious and arouses to verbal   Arterial Blood Gasses:          Recorded by Ronda Arteaga

## 2022-02-24 NOTE — PROGRESS NOTES
THORACIC & FOREGUT SURGERY    S:  Remains intubated, on NE at 0.03. Voiding.     O:  Vitals:    02/24/22 0745 02/24/22 0800 02/24/22 0815 02/24/22 0830   BP:       Pulse: 93 88 100 98   Resp: 22 18 24 24   Temp:       TempSrc:       SpO2: 98% 97% 97% 97%   Weight:       Height:         Gen: intubated, no response to voice   CV: 90s on tele   Resp: mechanically ventilated, CT w/ SS output    CT: 190cc last 24h, 50 since midnight     CXR  stable, left CT in good position     A/P: Pt is a 71 year old male who presented to an outside ED on 2/17 s/p VT/VF arrest 2/2 MI from LAD.  He required 8 shocks as well as chest compressions, was transferred and cannulated to VA ECMO. Now s/p 2 stents to LAD. The primary team was attempting an ECMO wean today, noticed on bedside ECHO that there was a pleural effusion, so a CT was obtained which shows bilateral fluid collections, Hounsfield units on L c/w hemothorax, R c/w simple fluid.  Thoracic consulted to assist with management. Lft chest tube placed at bedside 2/20.     -Continue L chest tube, WS today   -No R chest tube for now as density on scan c/w simple fluid  -Thoracic to follow    Seen and discussed with fellow, will discuss with staff.     Isela Farrell MD  General Surgery, PGY3

## 2022-02-24 NOTE — PLAN OF CARE
Major Shift Events: RASS -3/-4 throughout shift, occasionally agitated with cares requiring PRN fent bumps, doesn't follow commands or track, withdraws in all extremities, prop 10, fent at 75, precedex at 1.2. Afebrile, SR 70s-90s with frequent bigem PACs- up to 120s with agitation, MAP goal maintained with levo 0.03-0.08, amio at 1. CMV vent settings unchanged. Chest tube x1 with minimal output. No stool this shift, TF at 35 with standard FWF, adequate UOP per hopper. R fem thermoguard line pulled.      Plan: Wean sedation, titrate pressors. Continue to follow POC & update team with changes.    For vital signs and complete assessments, please see flowsheets.

## 2022-02-25 ENCOUNTER — APPOINTMENT (OUTPATIENT)
Dept: SPEECH THERAPY | Facility: CLINIC | Age: 72
DRG: 003 | End: 2022-02-25
Attending: SURGERY
Payer: MEDICARE

## 2022-02-25 ENCOUNTER — APPOINTMENT (OUTPATIENT)
Dept: NEUROLOGY | Facility: CLINIC | Age: 72
DRG: 003 | End: 2022-02-25
Attending: STUDENT IN AN ORGANIZED HEALTH CARE EDUCATION/TRAINING PROGRAM
Payer: MEDICARE

## 2022-02-25 ENCOUNTER — APPOINTMENT (OUTPATIENT)
Dept: OCCUPATIONAL THERAPY | Facility: CLINIC | Age: 72
DRG: 003 | End: 2022-02-25
Payer: MEDICARE

## 2022-02-25 LAB
ALBUMIN SERPL-MCNC: 2.5 G/DL (ref 3.4–5)
ALBUMIN SERPL-MCNC: 2.5 G/DL (ref 3.4–5)
ALP SERPL-CCNC: 28 U/L (ref 40–150)
ALP SERPL-CCNC: 30 U/L (ref 40–150)
ALT SERPL W P-5'-P-CCNC: 110 U/L (ref 0–70)
ALT SERPL W P-5'-P-CCNC: 122 U/L (ref 0–70)
ANION GAP SERPL CALCULATED.3IONS-SCNC: 6 MMOL/L (ref 3–14)
ANION GAP SERPL CALCULATED.3IONS-SCNC: 6 MMOL/L (ref 3–14)
AST SERPL W P-5'-P-CCNC: 113 U/L (ref 0–45)
AST SERPL W P-5'-P-CCNC: 96 U/L (ref 0–45)
ATRIAL RATE - MUSE: 93 BPM
BASE EXCESS BLDA CALC-SCNC: 0.6 MMOL/L (ref -9–1.8)
BASE EXCESS BLDA CALC-SCNC: 1.5 MMOL/L (ref -9–1.8)
BASE EXCESS BLDV CALC-SCNC: 1.2 MMOL/L (ref -7.7–1.9)
BASE EXCESS BLDV CALC-SCNC: 2.1 MMOL/L (ref -7.7–1.9)
BASOPHILS # BLD AUTO: 0 10E3/UL (ref 0–0.2)
BASOPHILS # BLD MANUAL: 0 10E3/UL (ref 0–0.2)
BASOPHILS NFR BLD AUTO: 0 %
BASOPHILS NFR BLD MANUAL: 0 %
BILIRUB SERPL-MCNC: 1.6 MG/DL (ref 0.2–1.3)
BILIRUB SERPL-MCNC: 1.8 MG/DL (ref 0.2–1.3)
BUN SERPL-MCNC: 90 MG/DL (ref 7–30)
BUN SERPL-MCNC: 92 MG/DL (ref 7–30)
CA-I BLD-MCNC: 4.7 MG/DL (ref 4.4–5.2)
CALCIUM SERPL-MCNC: 8.3 MG/DL (ref 8.5–10.1)
CALCIUM SERPL-MCNC: 8.5 MG/DL (ref 8.5–10.1)
CHLORIDE BLD-SCNC: 127 MMOL/L (ref 94–109)
CHLORIDE BLD-SCNC: 132 MMOL/L (ref 94–109)
CO2 SERPL-SCNC: 23 MMOL/L (ref 20–32)
CO2 SERPL-SCNC: 24 MMOL/L (ref 20–32)
CREAT SERPL-MCNC: 2.85 MG/DL (ref 0.66–1.25)
CREAT SERPL-MCNC: 2.99 MG/DL (ref 0.66–1.25)
CRP SERPL-MCNC: 170 MG/L (ref 0–8)
DIASTOLIC BLOOD PRESSURE - MUSE: NORMAL MMHG
EOSINOPHIL # BLD AUTO: 0 10E3/UL (ref 0–0.7)
EOSINOPHIL # BLD MANUAL: 0 10E3/UL (ref 0–0.7)
EOSINOPHIL NFR BLD AUTO: 0 %
EOSINOPHIL NFR BLD MANUAL: 0 %
ERYTHROCYTE [DISTWIDTH] IN BLOOD BY AUTOMATED COUNT: 17.6 % (ref 10–15)
ERYTHROCYTE [DISTWIDTH] IN BLOOD BY AUTOMATED COUNT: 18 % (ref 10–15)
ERYTHROCYTE [SEDIMENTATION RATE] IN BLOOD BY WESTERGREN METHOD: 72 MM/HR (ref 0–20)
FIBRINOGEN PPP-MCNC: 543 MG/DL (ref 170–490)
GFR SERPL CREATININE-BSD FRML MDRD: 21 ML/MIN/1.73M2
GFR SERPL CREATININE-BSD FRML MDRD: 23 ML/MIN/1.73M2
GLUCOSE BLD-MCNC: 149 MG/DL (ref 70–99)
GLUCOSE BLD-MCNC: 149 MG/DL (ref 70–99)
GLUCOSE BLD-MCNC: 185 MG/DL (ref 70–99)
GLUCOSE BLDC GLUCOMTR-MCNC: 147 MG/DL (ref 70–99)
GLUCOSE BLDC GLUCOMTR-MCNC: 147 MG/DL (ref 70–99)
HCO3 BLD-SCNC: 23 MMOL/L (ref 21–28)
HCO3 BLD-SCNC: 23 MMOL/L (ref 21–28)
HCO3 BLDV-SCNC: 25 MMOL/L (ref 21–28)
HCO3 BLDV-SCNC: 26 MMOL/L (ref 21–28)
HCT VFR BLD AUTO: 23.4 % (ref 40–53)
HCT VFR BLD AUTO: 23.8 % (ref 40–53)
HGB BLD-MCNC: 7.4 G/DL (ref 13.3–17.7)
HGB BLD-MCNC: 7.6 G/DL (ref 13.3–17.7)
HOLD SPECIMEN: NORMAL
IMM GRANULOCYTES # BLD: 0.7 10E3/UL
IMM GRANULOCYTES NFR BLD: 4 %
INTERPRETATION ECG - MUSE: NORMAL
LACTATE SERPL-SCNC: 1.9 MMOL/L (ref 0.7–2)
LDH SERPL L TO P-CCNC: 462 U/L (ref 85–227)
LYMPHOCYTES # BLD AUTO: 0.7 10E3/UL (ref 0.8–5.3)
LYMPHOCYTES # BLD MANUAL: 0.6 10E3/UL (ref 0.8–5.3)
LYMPHOCYTES NFR BLD AUTO: 4 %
LYMPHOCYTES NFR BLD MANUAL: 5 %
MAGNESIUM SERPL-MCNC: 3.5 MG/DL (ref 1.6–2.3)
MCH RBC QN AUTO: 30.5 PG (ref 26.5–33)
MCH RBC QN AUTO: 30.6 PG (ref 26.5–33)
MCHC RBC AUTO-ENTMCNC: 31.6 G/DL (ref 31.5–36.5)
MCHC RBC AUTO-ENTMCNC: 31.9 G/DL (ref 31.5–36.5)
MCV RBC AUTO: 96 FL (ref 78–100)
MCV RBC AUTO: 96 FL (ref 78–100)
METAMYELOCYTES # BLD MANUAL: 0.1 10E3/UL
METAMYELOCYTES NFR BLD MANUAL: 1 %
MONOCYTES # BLD AUTO: 1.1 10E3/UL (ref 0–1.3)
MONOCYTES # BLD MANUAL: 0.5 10E3/UL (ref 0–1.3)
MONOCYTES NFR BLD AUTO: 7 %
MONOCYTES NFR BLD MANUAL: 4 %
NEUTROPHILS # BLD AUTO: 13.4 10E3/UL (ref 1.6–8.3)
NEUTROPHILS # BLD MANUAL: 10.8 10E3/UL (ref 1.6–8.3)
NEUTROPHILS NFR BLD AUTO: 85 %
NEUTROPHILS NFR BLD MANUAL: 90 %
NRBC # BLD AUTO: 0 10E3/UL
NRBC BLD AUTO-RTO: 0 /100
O2/TOTAL GAS SETTING VFR VENT: 0 %
O2/TOTAL GAS SETTING VFR VENT: 0 %
O2/TOTAL GAS SETTING VFR VENT: 21 %
O2/TOTAL GAS SETTING VFR VENT: 4 %
OSMOLALITY UR: 474 MMOL/KG (ref 100–1200)
OXYHGB MFR BLD: 93 % (ref 92–100)
OXYHGB MFR BLD: 93 % (ref 92–100)
OXYHGB MFR BLDV: 45 % (ref 70–75)
OXYHGB MFR BLDV: 67 % (ref 70–75)
P AXIS - MUSE: NORMAL DEGREES
PCO2 BLD: 26 MM HG (ref 35–45)
PCO2 BLD: 27 MM HG (ref 35–45)
PCO2 BLDV: 32 MM HG (ref 40–50)
PCO2 BLDV: 36 MM HG (ref 40–50)
PH BLD: 7.54 [PH] (ref 7.35–7.45)
PH BLD: 7.57 [PH] (ref 7.35–7.45)
PH BLDV: 7.47 [PH] (ref 7.32–7.43)
PH BLDV: 7.49 [PH] (ref 7.32–7.43)
PHOSPHATE SERPL-MCNC: 2 MG/DL (ref 2.5–4.5)
PLAT MORPH BLD: ABNORMAL
PLATELET # BLD AUTO: 123 10E3/UL (ref 150–450)
PLATELET # BLD AUTO: 180 10E3/UL (ref 150–450)
PO2 BLD: 63 MM HG (ref 80–105)
PO2 BLD: 69 MM HG (ref 80–105)
PO2 BLDV: 26 MM HG (ref 25–47)
PO2 BLDV: 37 MM HG (ref 25–47)
POTASSIUM BLD-SCNC: 3.8 MMOL/L (ref 3.4–5.3)
POTASSIUM BLD-SCNC: 3.8 MMOL/L (ref 3.4–5.3)
PR INTERVAL - MUSE: NORMAL MS
PROT SERPL-MCNC: 5.5 G/DL (ref 6.8–8.8)
PROT SERPL-MCNC: 5.8 G/DL (ref 6.8–8.8)
QRS DURATION - MUSE: 82 MS
QT - MUSE: 312 MS
QTC - MUSE: 459 MS
R AXIS - MUSE: 90 DEGREES
RBC # BLD AUTO: 2.43 10E6/UL (ref 4.4–5.9)
RBC # BLD AUTO: 2.48 10E6/UL (ref 4.4–5.9)
RBC MORPH BLD: ABNORMAL
RBC MORPH BLD: NORMAL
SODIUM SERPL-SCNC: 157 MMOL/L (ref 133–144)
SODIUM SERPL-SCNC: 159 MMOL/L (ref 133–144)
SODIUM SERPL-SCNC: 161 MMOL/L (ref 133–144)
SODIUM SERPL-SCNC: 161 MMOL/L (ref 133–144)
SYSTOLIC BLOOD PRESSURE - MUSE: NORMAL MMHG
T AXIS - MUSE: 39 DEGREES
VENTRICULAR RATE- MUSE: 130 BPM
WBC # BLD AUTO: 12 10E3/UL (ref 4–11)
WBC # BLD AUTO: 16 10E3/UL (ref 4–11)

## 2022-02-25 PROCEDURE — 258N000003 HC RX IP 258 OP 636

## 2022-02-25 PROCEDURE — 85384 FIBRINOGEN ACTIVITY: CPT | Performed by: INTERNAL MEDICINE

## 2022-02-25 PROCEDURE — 250N000011 HC RX IP 250 OP 636: Performed by: NURSE PRACTITIONER

## 2022-02-25 PROCEDURE — 82805 BLOOD GASES W/O2 SATURATION: CPT | Performed by: INTERNAL MEDICINE

## 2022-02-25 PROCEDURE — 99291 CRITICAL CARE FIRST HOUR: CPT | Mod: GC | Performed by: STUDENT IN AN ORGANIZED HEALTH CARE EDUCATION/TRAINING PROGRAM

## 2022-02-25 PROCEDURE — 93010 ELECTROCARDIOGRAM REPORT: CPT | Performed by: INTERNAL MEDICINE

## 2022-02-25 PROCEDURE — 99231 SBSQ HOSP IP/OBS SF/LOW 25: CPT | Mod: 25 | Performed by: PSYCHIATRY & NEUROLOGY

## 2022-02-25 PROCEDURE — 84100 ASSAY OF PHOSPHORUS: CPT | Performed by: INTERNAL MEDICINE

## 2022-02-25 PROCEDURE — 250N000009 HC RX 250

## 2022-02-25 PROCEDURE — 97530 THERAPEUTIC ACTIVITIES: CPT | Mod: GO | Performed by: OCCUPATIONAL THERAPIST

## 2022-02-25 PROCEDURE — 200N000002 HC R&B ICU UMMC

## 2022-02-25 PROCEDURE — 83615 LACTATE (LD) (LDH) ENZYME: CPT | Performed by: INTERNAL MEDICINE

## 2022-02-25 PROCEDURE — 250N000011 HC RX IP 250 OP 636: Performed by: INTERNAL MEDICINE

## 2022-02-25 PROCEDURE — 80053 COMPREHEN METABOLIC PANEL: CPT | Performed by: INTERNAL MEDICINE

## 2022-02-25 PROCEDURE — 83935 ASSAY OF URINE OSMOLALITY: CPT | Performed by: INTERNAL MEDICINE

## 2022-02-25 PROCEDURE — 258N000003 HC RX IP 258 OP 636: Performed by: INTERNAL MEDICINE

## 2022-02-25 PROCEDURE — 250N000009 HC RX 250: Performed by: INTERNAL MEDICINE

## 2022-02-25 PROCEDURE — 84155 ASSAY OF PROTEIN SERUM: CPT | Performed by: INTERNAL MEDICINE

## 2022-02-25 PROCEDURE — 82947 ASSAY GLUCOSE BLOOD QUANT: CPT | Performed by: INTERNAL MEDICINE

## 2022-02-25 PROCEDURE — 93005 ELECTROCARDIOGRAM TRACING: CPT

## 2022-02-25 PROCEDURE — 92526 ORAL FUNCTION THERAPY: CPT | Mod: GN

## 2022-02-25 PROCEDURE — 95718 EEG PHYS/QHP 2-12 HR W/VEEG: CPT | Performed by: PSYCHIATRY & NEUROLOGY

## 2022-02-25 PROCEDURE — 999N000155 HC STATISTIC RAPCV CVP MONITORING

## 2022-02-25 PROCEDURE — 85652 RBC SED RATE AUTOMATED: CPT | Performed by: INTERNAL MEDICINE

## 2022-02-25 PROCEDURE — 95711 VEEG 2-12 HR UNMONITORED: CPT

## 2022-02-25 PROCEDURE — 97166 OT EVAL MOD COMPLEX 45 MIN: CPT | Mod: GO | Performed by: OCCUPATIONAL THERAPIST

## 2022-02-25 PROCEDURE — 250N000011 HC RX IP 250 OP 636

## 2022-02-25 PROCEDURE — 84295 ASSAY OF SERUM SODIUM: CPT | Performed by: INTERNAL MEDICINE

## 2022-02-25 PROCEDURE — 83735 ASSAY OF MAGNESIUM: CPT | Performed by: INTERNAL MEDICINE

## 2022-02-25 PROCEDURE — 258N000003 HC RX IP 258 OP 636: Performed by: NURSE PRACTITIONER

## 2022-02-25 PROCEDURE — 85027 COMPLETE CBC AUTOMATED: CPT | Performed by: INTERNAL MEDICINE

## 2022-02-25 PROCEDURE — 92610 EVALUATE SWALLOWING FUNCTION: CPT | Mod: GN

## 2022-02-25 PROCEDURE — 82330 ASSAY OF CALCIUM: CPT | Performed by: INTERNAL MEDICINE

## 2022-02-25 PROCEDURE — 999N000015 HC STATISTIC ARTERIAL MONITORING DAILY

## 2022-02-25 PROCEDURE — 86140 C-REACTIVE PROTEIN: CPT | Performed by: INTERNAL MEDICINE

## 2022-02-25 PROCEDURE — 250N000013 HC RX MED GY IP 250 OP 250 PS 637: Performed by: INTERNAL MEDICINE

## 2022-02-25 RX ORDER — DEXTROSE MONOHYDRATE 50 MG/ML
INJECTION, SOLUTION INTRAVENOUS CONTINUOUS
Status: DISCONTINUED | OUTPATIENT
Start: 2022-02-25 | End: 2022-02-26

## 2022-02-25 RX ORDER — HYDRALAZINE HYDROCHLORIDE 25 MG/1
25 TABLET, FILM COATED ORAL EVERY 8 HOURS
Status: DISCONTINUED | OUTPATIENT
Start: 2022-02-25 | End: 2022-02-26

## 2022-02-25 RX ORDER — ISOSORBIDE DINITRATE 10 MG/1
10 TABLET ORAL EVERY 8 HOURS
Status: DISCONTINUED | OUTPATIENT
Start: 2022-02-25 | End: 2022-02-27

## 2022-02-25 RX ORDER — HEPARIN SODIUM 10000 [USP'U]/100ML
500 INJECTION, SOLUTION INTRAVENOUS CONTINUOUS
Status: DISCONTINUED | OUTPATIENT
Start: 2022-02-25 | End: 2022-02-27

## 2022-02-25 RX ORDER — NOREPINEPHRINE BITARTRATE 0.06 MG/ML
.01-.6 INJECTION, SOLUTION INTRAVENOUS CONTINUOUS
Status: DISCONTINUED | OUTPATIENT
Start: 2022-02-26 | End: 2022-02-26

## 2022-02-25 RX ORDER — NOREPINEPHRINE BITARTRATE 0.06 MG/ML
INJECTION, SOLUTION INTRAVENOUS
Status: DISCONTINUED
Start: 2022-02-25 | End: 2022-02-26 | Stop reason: HOSPADM

## 2022-02-25 RX ADMIN — HEPARIN SODIUM AND DEXTROSE 500 UNITS/HR: 10000; 5 INJECTION INTRAVENOUS at 11:04

## 2022-02-25 RX ADMIN — ISOSORBIDE DINITRATE 10 MG: 10 TABLET ORAL at 23:13

## 2022-02-25 RX ADMIN — NICARDIPINE HYDROCHLORIDE 5 MG/HR: 0.2 INJECTION, SOLUTION INTRAVENOUS at 08:30

## 2022-02-25 RX ADMIN — NICARDIPINE HYDROCHLORIDE 5 MG/HR: 0.2 INJECTION, SOLUTION INTRAVENOUS at 16:31

## 2022-02-25 RX ADMIN — HYDRALAZINE HYDROCHLORIDE 25 MG: 25 TABLET, FILM COATED ORAL at 15:51

## 2022-02-25 RX ADMIN — DEXTROSE MONOHYDRATE: 50 INJECTION, SOLUTION INTRAVENOUS at 15:39

## 2022-02-25 RX ADMIN — TICAGRELOR 90 MG: 90 TABLET ORAL at 20:15

## 2022-02-25 RX ADMIN — ISOSORBIDE DINITRATE 10 MG: 10 TABLET ORAL at 15:51

## 2022-02-25 RX ADMIN — NICARDIPINE HYDROCHLORIDE 5 MG/HR: 0.2 INJECTION, SOLUTION INTRAVENOUS at 23:13

## 2022-02-25 RX ADMIN — HYDRALAZINE HYDROCHLORIDE 25 MG: 25 TABLET, FILM COATED ORAL at 23:13

## 2022-02-25 RX ADMIN — NICARDIPINE HYDROCHLORIDE 2.5 MG/HR: 0.2 INJECTION, SOLUTION INTRAVENOUS at 00:32

## 2022-02-25 RX ADMIN — CEFAZOLIN SODIUM 2 G: 10 INJECTION, POWDER, FOR SOLUTION INTRAVENOUS at 11:05

## 2022-02-25 RX ADMIN — CEFAZOLIN SODIUM 2 G: 10 INJECTION, POWDER, FOR SOLUTION INTRAVENOUS at 23:13

## 2022-02-25 RX ADMIN — AMIODARONE HYDROCHLORIDE 1 MG/MIN: 50 INJECTION, SOLUTION INTRAVENOUS at 08:30

## 2022-02-25 ASSESSMENT — ACTIVITIES OF DAILY LIVING (ADL)
ADLS_ACUITY_SCORE: 19
ADLS_ACUITY_SCORE: 19
ADLS_ACUITY_SCORE: 23
ADLS_ACUITY_SCORE: 19
ADLS_ACUITY_SCORE: 23
ADLS_ACUITY_SCORE: 23
ADLS_ACUITY_SCORE: 19
ADLS_ACUITY_SCORE: 23
ADLS_ACUITY_SCORE: 19
ADLS_ACUITY_SCORE: 19
ADLS_ACUITY_SCORE: 23
PREVIOUS_RESPONSIBILITIES: MEAL PREP;HOUSEKEEPING;SHOPPING;YARDWORK;MEDICATION MANAGEMENT;FINANCES;DRIVING
ADLS_ACUITY_SCORE: 23
ADLS_ACUITY_SCORE: 23
ADLS_ACUITY_SCORE: 19
ADLS_ACUITY_SCORE: 19
ADLS_ACUITY_SCORE: 23
ADLS_ACUITY_SCORE: 23
ADLS_ACUITY_SCORE: 19

## 2022-02-25 NOTE — PLAN OF CARE
Goal Outcome Evaluation:    Plan of Care Reviewed With: spouse     Overall Patient Progress: improving    No major shift events    Cards: remains in AFIB rate 120-130's occasionally in 140-150's. Discussed with MD, EKG ordered. Maintain MAPS< 80, Nicardapine    Pulm: Extubated yesterday afternoon to Oxymask 4LPM. Constant oral cares due to mouth injury during cardiac arrest    GI/: BM X2 thru night, Tapia with good output    Neuro: Alert, slightly confused with day/time, understandable given intubation time.    Discussed AM  labs with MD

## 2022-02-25 NOTE — PROGRESS NOTES
PALLIATIVE CARE SOCIAL WORK Progress Note   [facility] ([place]) Unit 4E    REFERRAL SOURCE: Palliative Care Team; PCSW follow up    PCSW attempted visit, pt sitting up in chair, being visited by Fr. Ramos and wife not in room. Chart reviewed, noted plan for Palliative Care Team to sign off.    Plan: No further PCSW visits planned. Palliative Care Team can be re-consulted if change in condition.    Tasha Campos, [facility]  Palliative Care   Pager [phone]    [facility] Inpatient Team Consult pager [phone] (M-F 8-4:30)  After-hours Answering Service [phone]

## 2022-02-25 NOTE — SIGNIFICANT EVENT
SPIRITUAL HEALTH SERVICES Significant Event  [employer]ING  [facility] ([place]) 4e    Pt anointed by Father Richard Mcdonald   Pager [phone]

## 2022-02-25 NOTE — PROGRESS NOTES
"   02/25/22 0918   General Information   Onset of Illness/Injury or Date of Surgery 02/17/22   Referring Physician Rod Banuelos MD   Patient/Family Therapy Goal Statement (SLP) Pt wants water, his wife states \"no nose hose\" (NG)   Pertinent History of Current Problem Pt is a 71 year old male w/ h/o ARSENIO and ED who presented to an OSH on 2/17 with chest pain had an in house VT/VF arrest with anterior STEMI in the ECG had 8 shocks with ROSC transferred here for further care with shock and ectopy during his angiogram s/p VA ECMO and PCI of his mLAD with 2 LETY with residual significant disease in a large Ramus. Course has been complicated by hemorrhagic shock and R hemothorax s/p chest tube placement.  Decannulated 2/22.IABP removed 2/23. Extubated 2/24. Pt upright in chair this AM, clinical swallow eval completed per MD order d/t prolonged intubation, s/p ECMO, and mouth trauma during arrest.    General Observations Alert, requires cues to attend to task   Past History of Dysphagia None per pt or chart review   Type of Evaluation   Type of Evaluation Swallow Evaluation   Oral Motor   Oral Musculature   (generalized weakness)   Structural Abnormalities none present   Mucosal Quality dry   Dentition (Oral Motor)   Dentition (Oral Motor) some missing teeth  (missing front tooth, mouth trama during arrest)   Facial Symmetry (Oral Motor)   Facial Symmetry (Oral Motor) WNL   Lip Function (Oral Motor)   Lip Range of Motion (Oral Motor) WNL   Tongue Function (Oral Motor)   Comment, Tongue Function (Oral Motor) tongue black and blue, edematous, slowed lateralization   Jaw Function (Oral Motor)   Jaw Function (Oral Motor) WNL   Cough/Swallow/Gag Reflex (Oral Motor)   Comment, Cough/Swallow/Gag Reflex (Oral Motor) adequate cough strength   Vocal Quality/Secretion Management (Oral Motor)   Vocal Quality (Oral Motor) dysphonic   Comment, Vocal Quality/Secretion Management (Oral Motor) slight roughness but clear, can " increase intensity with cues   General Swallowing Observations   Current Diet/Method of Nutritional Intake (General Swallowing Observations, NIS) NPO   Respiratory Support (General Swallowing Observations) nasal cannula   Swallowing Evaluation Clinical swallow evaluation   Clinical Swallow Evaluation   Feeding Assistance dependent   Clinical Swallow Evaluation Textures Trialed thin liquids;pureed   Clinical Swallow Eval: Thin Liquid Texture Trial   Mode of Presentation, Thin Liquids straw  (fed by spouse)   Volume of Liquid or Food Presented 5oz thin water   Oral Phase of Swallow WFL   Pharyngeal Phase of Swallow intact   Diagnostic Statement Swallow grossly functional, delayed cough appreciated x1 but unclear if it was directly related to PO intake   Clinical Swallow Evaluation: Puree Solid Texture Trial   Mode of Presentation, Puree spoon;fed by clinician   Volume of Puree Presented 2oz pudding   Oral Phase, Puree effortful AP movement;delayed AP movement   Pharyngeal Phase, Puree intact   Diagnostic Statement Oral phase prolonged/disorganized, and pt required cues to clear oral cavity d/t poor attention to task. No s/sx of aspiration   Esophageal Phase of Swallow   Patient reports or presents with symptoms of esophageal dysphagia No   Swallowing Recommendations   Diet Consistency Recommendations full liquid diet;thin liquids (level 0)   Supervision Level for Intake 1:1 supervision needed   Mode of Delivery Recommendations bolus size, small;food moistened;slow rate of intake   Monitoring/Assistance Required (Eating/Swallowing) stop eating activities when fatigue is present;monitor for cough or change in vocal quality with intake   Recommended Feeding/Eating Techniques (Swallow Eval) maintain upright sitting position for eating   Medication Administration Recommendations, Swallowing (SLP) crushed in pudding/applesauce   Instrumental Assessment Recommendations reassess via non-instrumental clinical swallow  "evaluation  (pending progress)   General Therapy Interventions   Planned Therapy Interventions Dysphagia Treatment   Dysphagia treatment Oropharyngeal exercise training;Modified diet education;Instruction of safe swallow strategies;Compensatory strategies for swallowing   Clinical Impression   Criteria for Skilled Therapeutic Interventions Met (SLP Eval) Yes, treatment indicated   SLP Diagnosis Mild-moderate dysphagia s/p extubation   Risks & Benefits of therapy have been explained evaluation/treatment results reviewed;care plan/treatment goals reviewed;risks/benefits reviewed;current/potential barriers reviewed;participants voiced agreement with care plan;participants included;patient;spouse/significant other   Clinical Impression Comments    Clinical swallow eval completed per MD order. Pt presents with mild-moderate dysphagia s/p extubation, pt previously intubated and on ECMO. Oral mech exam remarkable for bruised and swollen tongue, rough vocal quality, and some confusion/response latencies. Pt assessed with thin water via straw and pudding. Oral phase prolonged and disorganized with pudding, improved with verbal cues from SLP, no overt s/sx of aspiration during study. Of note, one delayed cough but unclear if this was related to PO. Pt's spouse adamant about \"no tubes.\" Cautiously recommend full liquid diet (thin consistency) with 1:1 assistance to ensure pt is alert and upright, taking small single sips at a slow rate. Pt OK for essential oral meds to be adminstered crushed in pudding or applesauce. Hold all PO with change in respiratory status. SLP will follow closely.     SLP Discharge Planning   SLP Discharge Recommendation Transitional Care Facility;home with home care speech therapy   SLP Rationale for DC Rec Dysphagia   SLP Brief overview of current status  Cautiously recommend full liquid diet (thin consistency) with 1:1 assistance to ensure pt is alert and upright, taking small single sips at a slow " rate. Pt OK for essential oral meds to be adminstered crushed in pudding or applesauce. Hold all PO with change in respiratory status. SLP will follow closely.    Total Evaluation Time   Total Evaluation Time (Minutes) 18   SLP Goals   Therapy Frequency (SLP Eval) 5 times/wk   SLP Predicated Duration/Target Date for Goal Attainment 03/11/22   SLP Goals Swallow   SLP: Safely tolerate diet without signs/symptoms of aspiration Regular diet;Thin liquids;With use of swallow precautions;Independently

## 2022-02-25 NOTE — PROGRESS NOTES
"[facility]: [place]  NeuroCritical Care Progress Note     Patient Name:  Chano Johnson  MRN:  [id number]    :  1950  Date of Service:  2022  Primary care provider:  Laith Limon     Overnight events:  -Nursing notes, extubated yesterday    A/P:   Chano Johnson, \"Husam\" is a 71 year old male with a PMHx Afib s/p AF PVI ablation and Watchman (10/21/2021), Aflutter s/p ablation, HFpEF, hx tachycardia-mediated cardiomyopathy, HTN, HLD, ARSENIO, gout, GERD, EtOH use, and COVID infection (2021) who presented as transfer from [facility] for possible cardiac catheterization +/- ECMO after a Vfib arrest, for which NCC service was consulted to evaluate for prognostication. CT head completed on arrival to [facility] showed possible indeterminant gray-white matter loss in the right occipital lobe but on review appears more suspicious for artifact. VEEG with no seizures. Extubated yesterday and on exam he is very improved today, awake, talking, moving all extremities antigravity and following commands. No further neurologic workup recommended at this time considering exam improvement, continue with therapies and medical management.      Recommendations:  - No further neurologic recommendations at this time  - NCC will signoff      Physical Examination:   BP (!) 85/53   Pulse 101   Temp 99.4  F (37.4  C) (Axillary)   Resp 22   Ht 1.829 m (6')   Wt 103 kg (227 lb 1.2 oz)   SpO2 96%   BMI 30.80 kg/m    General: Adult male patient, sitting up in chair, holding heart pillow and  in no acute distress, wife at bedside, on room air, chipped tooth in the front  Skin: No rash or lesions noticed, no lower extremity edema  Neuro: awake, oriented to month and year, eyes open, following commands, able to state birthday month and name, moving all four extremities spontaneously and antigravity, sitting up in the chair, engaged, speech is limited by non-dysarthric, midline gaze and tracking, " sensation intact to light touch in UE and LE bilaterally.     The patient was discussed  Dr. Staples, staff attending, who agrees with this assessment and plan    Maty Qureshi), DO, MADDIE  PGY-2 Neurology Resident

## 2022-02-25 NOTE — PROGRESS NOTES
"[facility]  Palliative Care Sign-Off Note    Palliative Care was consulted for goals of care and decisional support. At this time the patient does not have any needs we are actively addressing, and we are signing off.    However we know their condition may change -- please re-consult us if we can be helpful at any time in the future.     Thank you for the opportunity to be involved in the care of this patient.      Mr. Johnson was extubated yesterday.  When I visited with him today, he was sitting up in a chair at bedside, confused but able to tell me he was feeling well.  States \"I want to get out of here.\"  I reassured him that he was making progress toward a goal of discharge, but told him that he would be with us for a while longer in order to get stronger so it was safe for him.  Spoke with his wife who is at bedside; she is very happy with the \"big step in the right direction\" of extubation.  I discussed course going forward and explained that he would still have a long period of recovery and rehabilitation ahead of him, but that this was definitely a big step in the right direction.  I discussed patient's case with primary team; they are planning on having a brief meeting this afternoon to update family about course going forward.  They do not think that we need to continue following but will reconsult us if there are any new needs.  We will sign off at this time.    Larry Morrison, DO  Palliative Medicine Fellow      "

## 2022-02-25 NOTE — PROGRESS NOTES
[facility]  Cardiac ICU Progress Note              Key events - last 24 hours:     - A fib episode yesterday afternoon, watchman in place. No indication for heparin administration at this time. HD stable.  - Extubated yesterday, no complications.              Assessment & Plan        71 year old male who was admitted on 2/17/2022 s/p VT/VF arrest x8 shocks who was found to have LAD occlusion requiring 2x stents. He presented to local ED with chest pain that started at 1300 (within 1 hr of onset), he had a VT VF arrest in the ED after he was found to have an anterior STEMI on ECG.  Chest compressions and ACLS was started immediately.  Patient underwent 8 shocks for VT VF with ROSC afterwards.  He received 4 doses of epinephrine, 450 mg of amiodarone.  He was transferred to the [facility] for percutaneous intervention for his STEMI. Unfortunately his transfer was delayed because he was a very low dose of Epi (0.01) and the local hospital only had a BLS ambulance available. He arrived at [facility] and was found to have occlusion of the LAD. During the angiogram he was found to have significant hypotension and ectopy, which prompted placement on VA ECMO. PCI with 2x LETY was done with ECMO support. TTM post arrest at 35C, rewarmed on 2/19/22. Decannulated 2/23/22.    Today's Changes  - Continue Amio 1mg/min  - Extubated yesterday, currently on room air. Breathing comfortably. Continue to monitor.  - Autodiuresis with recovery phase of ATN, continue to monitor urine output  - Full liquid diet per speech evaluation, encourage fluid intake of at least 75cc per hour today for hypernatremia      Neurology: #Possible neurological injury post cardiac arrest  Intubated, sedated. Initially cooled to 34 degrees.  Now rewarmed as of 2/19/2022  --continue fentanyl and precedex. Continue to hold to monitor neurological recovery with plan for possible extubation if following command  consistently.   - RASS goal -1 to -2.  - CTH on presentation w/o acute intracranial pathology. Day 3 CTH showed indeterminant gray-white matter loss in the R occipital lobe which could be artifact vs true infarct. Strong suspicion for artifact per neurocrit.  - EEG w/o significant abnormalities noted  - Pt has nonpurposeful movement of all 4 extremities when sedation lightened.   Cardiovascular / Hemodynamics: #Refractory VF arrest requiring 8 shocks.  x2 LETY stents to LAD.  Peripheral V-A ECMO inserted for refractory cardiogenic shock. LA   TTE: LVEF 10-15%, RV moderately to severely reduced function. No prior hx of heart failure. No significant valvular disease. Pulsatility significant improved after rewarming, decannulated 2/22/22.  EKG: FLORY V2-V4 consistent with anterior STEMI  --wean pressors/inotropes as needed  --MATTHIEU ECMO turndown 2/22/22: no concerns, stable. Okay to proceed with decannulation. Decannulated 2/22/22.  --continue ASA 81mg and ticagrelor 90mg BID as of now status post 2 LETY to LAD  --No indication for anticoagulation at this time  --hold lipitor for now given likely hepatic injury during arrest  --hold ACE/ARB for now given likely reduced renal fxn after arrest  --holding beta blocker given shock      - CT CAP: Large right-sided diffuse effusion s/p chest tube placement.       Pulmonary: #AHRF  #Pleural effusion - s/p chest tube placement on L side. Thrombus noted in the chest tube 2/22/22, evacuated in the OR during decannulation. Currently has chest wall hematoma which we are monitoring.  ETT in appropriate position.  Now weaning vent requirements.  CXR: Lines in stable position.  --wean vent as able  --daily CXR  --Q6h ABGs for now  --consider scheduled duonebs if signs of lung dz, currently PRN     GI and Nutrition: No known medical hx.   --monitor BID LFTs  --bowel regimen   -- TF at goal  --GI Prophylaxis: PPI     Speech consulted post extubation, okay to start full liquid diet. Will  advance per recommendations.     Renal, Fluid and Electrolytes: #SAHIL, likely ischemic ATN. Monitoring for CRRT needs.  #Hypernatremia - encourage PO intake of free water  #Acute respiratory alkalosis  Monitoring UOP, will start CRRT through circuit if needed.  --maintain K>4 and Mg>2   -- Autodiuresing due to likely recovery phase of ATN   Infectious Disease: #Sputum culture 2/20/22 positive for Staph Lugdunensis - start cefazolin (2/24/22 - now). Plan for 5 day course.  Leukocytosis c/w arrest. Blood cultures collected.   --vancomycin/zosyn x5 days for ECMO - completed course on 2/21/22  --monitor for signs of infection given cooling, lines, and leukocytosis   Hematology and Oncology: #Acute blood loss anemia  #Thrombocytopenia - likely secondary to hemolysis. 2/21/22 4T score 3 (low risk, <5%)  --Heparin discontinue after decannulation.  -- Antiplatelet: ASA/ticagrelor for LETY.   --Transfuse for Hgb<7  --DVT PPX: subcutaneous Heparin  -Continue to trend CBCs transfuse to goal hemoglobin of 7   Endocrinology:    No known medical history. BG elevated.  --stress dose steroids due to persistent hypotension - hydrocort 50mg IV Q8h discontinued   Lines: L femoral arterial and venous ECMO cannulae February 17, 2022 - 2/22/22  R radial arterial line February 17, 2022  ETT February 17, 2022 - 2/24/22  Tapia catheter February 17, 2022 - 2/24/22  OG tube February 17, 2022 - 2/24/22  Restraint: no longer needed  Current lines are required for patient management       Family update by me today: Yes      Code Status: Full code at this time. Reviewed patients medical directives. He indicated he would like to be full code unless there was medical futility in additional aggressive interventions.     The Pt was discussed and evaluated with Dr. Ball, attending physician, who agrees with the assessment and plan above.     Mohsan Chaudhry, MD  Cardiology Fellow         Medications:       aspirin  81 mg Per Feeding Tube Daily      ceFAZolin  2 g Intravenous Q12H     multivitamins w/minerals  15 mL Per Feeding Tube Daily     pantoprazole  40 mg Oral or Feeding Tube QAM AC     polyethylene glycol  17 g Oral BID     protein modular  1 packet Per Feeding Tube 5x Daily     senna-docusate  1 tablet Oral BID     ticagrelor  90 mg Oral BID      Temp: 99.4  F (37.4  C) Temp  Min: 96.9  F (36.1  C)  Max: 99.4  F (37.4  C)  Resp: 22 Resp  Min: 6  Max: 31  SpO2: 94 % SpO2  Min: 93 %  Max: 100 %  Pulse: 106 Pulse  Min: 84  Max: 146    No data recorded   No data recorded.  No data recorded.      Intake/Output Summary (Last 24 hours) at 2/20/2022 0736  Last data filed at 2/20/2022 0600  Gross per 24 hour   Intake 2188.88 ml   Output 1148 ml   Net 1040.88 ml          Physical Exam:   GENERAL: NAD  HEENT: PERRL  CV: S1/S2 heard without murmur   RESPIRATORY: CTAB, no increased WOB  GI: Soft and non distended with normoactive bowel sounds present in all quadrants. No tenderness, rebound, guarding. No palpable organomegaly.   EXTREMITIES: No peripheral edema. 2+ bilateral pedal pulses. Trace edema bilaterally in LE  NEUROLOGIC: AxO x3. Following commands. CN II-XII intact. Moving all extremities.  MUSCULOSKELETAL: No joint swelling or tenderness.   SKIN: No jaundice. No acute rashes or lesions. No hematoma at groin sites.            Data:         Temp: 99.4  F (37.4  C) Temp src: AxillaryTemp  Min: 96.9  F (36.1  C)  Max: 99.4  F (37.4  C)   Recent Labs   Lab 02/25/22  0337 02/24/22  1634 02/24/22  0946 02/24/22  0331 02/23/22  2211   WBC 12.0* 10.2 10.0 14.3* 14.4*   HGB 7.4* 7.5* 7.4* 7.6* 7.9*   HCT 23.4* 23.0* 22.9* 23.8* 24.6*   MCV 96 94 96 94 93   RDW 17.6* 17.2* 17.0* 16.9* 16.5*   * 100* 91* 102* 95*     Recent Labs   Lab 02/24/22  0946 02/24/22  0331 02/23/22  2210 02/23/22  1552 02/23/22  1002   INR 1.17* 1.12 1.16* 1.16* 1.13   PTT 35 31 32 31 29     Liver Function Studies -   Recent Labs   Lab Test 02/20/22  0353   PROTTOTAL 4.8*   ALBUMIN  2.7*   BILITOTAL 0.5   ALKPHOS 16*   *   ALT 92*       Last Arterial Blood Gas:  Recent Labs   Lab 02/25/22  0859 02/25/22  0337 02/24/22  1836 02/24/22  1815 02/24/22  1634 02/24/22  1433   PH 7.57*  --   --  7.51* 7.48* 7.49*  7.49*   PCO2 26*  --   --  32* 34* 34*  34*   PO2 63*  --   --  111* 126* 123*  123*   HCO3 23  --   --  25 26 26  26   O2PER 21 4 4 4 6 40  40       Venous Blood Gas  Recent Labs   Lab 02/25/22  0859 02/25/22  0337 02/24/22  1836 02/24/22  1815 02/24/22  0338 02/24/22  0331 02/23/22  2211   PHV  --  7.47* 7.45*  --   --  7.39 7.38   PCO2V  --  36* 38*  --   --  46 46   PO2V  --  37 33  --   --  35 38   HCO3V  --  26 27  --   --  28 27   MERLIN  --  2.1* 2.5*  --   --  2.1* 1.2   O2PER 21 4 4 4   < > 40 40    < > = values in this interval not displayed.       Recent Labs   Lab Test 06/30/17  1355 05/12/16  0758   CHOL 173 196   HDL 42 43    125   TRIG 76 140     IMPRESSION:  Indeterminant gray-white matter loss in the right occipital lobe could  be artifactual from adjacent streak/photon starvation artifact versus  true infarct.

## 2022-02-25 NOTE — PROGRESS NOTES
[facility] Nurse Inpatient Pressure Injury Assessment   Reason for consultation: Evaluate and treat  Buttock wound  New:  eval and treat tongue wound    Assessment:    Pressure injury on tongue: present on admission  Pressure injury is stage:  Mucosal   Status:  improving    Below not assessed today  Pressure injury on  cleft, present on admission  Pressure injury is stage:  Deep tissue pressure injury.   Status:  Initial assessment    TREATMENT PLAN  tongue wound: Every 2 hours  Rotate ETT and perform oral cares per protocol    Orders Reviewed and Written  WO Nurse follow-up plan:weekly  Nursing to notify the Provider(s) and re-consult the WO Nurse if wound(s) deteriorates or new skin concern.    Pressure Injury Prevention Interventions In Place:  Pillows for repositioning, Heel off-loading boots, Pillows under calves for heel suspension and Mepilex Sacral Dressing   Current support surface: Standard  Low air loss mattress         Pressure injury interventions:  Wound location:  sorin cleft  Cleanse with MicroKlenz moistened gauze   Pat dry.   Apply no sting barrier film to the anuja wound skin and allow to dry   Cover with  Sacral  Mepilex dressing, carefully molding into place  Paint the edges of the mepilex dressing with no-sting barrier film  Change every third day and as needed          Patient History:   According to medical record: 71 year old male who was admitted on 2022 s/p VT/VF arrest x8 shocks who was found to have LAD occlusion requiring 2x stents. VT VF arrest in the ED after he was found to have an anterior STEMI on ECG.  Chest compressions and ACLS was started immediately.  transferred to the [facility] for percutaneous intervention for his STEMI.  arrived at [facility] and was found to have occlusion of the LAD. During the angiogram he was found to have significant hypotension and ectopy, which prompted placement on VA ECMO.  Now decannulated    Current Diet / Nutrition:      Orders Placed This Encounter      NPO for Medical/Clinical Reasons Except for: No Exceptions  tube feedings    Output:    I/O last 3 completed shifts:  In: 2766.44 [I.V.:1426.44; NG/GT:665]  Out: 2660 [Urine:2520; Chest Tube:140]  Containment: of urine/stool: Urinary Catheter    Risk Assessment:   Sensory Perception: 1-->completely limited  Moisture: 4-->rarely moist  Activity: 1-->bedfast  Mobility: 2-->very limited  Nutrition: 2-->probably inadequate  Friction and Shear: 1-->problem  Luis E Score: 11    Labs:    Recent Labs   Lab 22  1634 22  0946 22  0331   ALBUMIN 2.6* 2.4* 2.6*   HGB 7.5* 7.4* 7.6*   INR  --  1.17* 1.12   WBC 10.2 10.0 14.3*   CRP  --   --  130.0*       Focused Assessment: .  Pressure Injury Present::Yes   Physical Exam  Skin inspection: focused mouth  Wound Location:  Left tongue      Date of Photos:  and 22  Wound History: wound present on admission  Measurements (length x width x depth, in cm) 1 cm x 0.5 cm  x  0.1 cm   Wound Base:  Eroded mucosa   Palpation of the wound bed: normal   Periwound skin: scattered mucosal wounds on tip of tongue  Color: normal and consistent with surrounding tissue  Temperature: normal   Drainage:, dried coagulum   Odor: none  Pain: denies ,     Below not assessed today, from   Bilateral fleshy buttocks     Date of photo:    History:  Present on admission   Wound is on fleshy buttock sorin cleft walls, not over bone.  Appear to be from skin to skin pressure.    Measurements:  3 cm x 0.4 cm x 0 cm   Wound base:  100% intact nonblanchable deep purple erythema

## 2022-02-26 ENCOUNTER — APPOINTMENT (OUTPATIENT)
Dept: SPEECH THERAPY | Facility: CLINIC | Age: 72
DRG: 003 | End: 2022-02-26
Payer: MEDICARE

## 2022-02-26 ENCOUNTER — APPOINTMENT (OUTPATIENT)
Dept: OCCUPATIONAL THERAPY | Facility: CLINIC | Age: 72
DRG: 003 | End: 2022-02-26
Payer: MEDICARE

## 2022-02-26 ENCOUNTER — APPOINTMENT (OUTPATIENT)
Dept: GENERAL RADIOLOGY | Facility: CLINIC | Age: 72
DRG: 003 | End: 2022-02-26
Payer: MEDICARE

## 2022-02-26 LAB
ABO/RH(D): NORMAL
ALBUMIN SERPL-MCNC: 2.4 G/DL (ref 3.4–5)
ALBUMIN SERPL-MCNC: 2.4 G/DL (ref 3.4–5)
ALBUMIN UR-MCNC: 70 MG/DL
ALP SERPL-CCNC: 30 U/L (ref 40–150)
ALP SERPL-CCNC: 36 U/L (ref 40–150)
ALT SERPL W P-5'-P-CCNC: 133 U/L (ref 0–70)
ALT SERPL W P-5'-P-CCNC: 154 U/L (ref 0–70)
ANION GAP SERPL CALCULATED.3IONS-SCNC: 5 MMOL/L (ref 3–14)
ANION GAP SERPL CALCULATED.3IONS-SCNC: 7 MMOL/L (ref 3–14)
ANTIBODY SCREEN: NEGATIVE
APPEARANCE UR: ABNORMAL
AST SERPL W P-5'-P-CCNC: 140 U/L (ref 0–45)
AST SERPL W P-5'-P-CCNC: 170 U/L (ref 0–45)
BASE EXCESS BLDA CALC-SCNC: -0.2 MMOL/L (ref -9–1.8)
BASE EXCESS BLDA CALC-SCNC: 0.9 MMOL/L (ref -9–1.8)
BASE EXCESS BLDA CALC-SCNC: 1 MMOL/L (ref -9–1.8)
BASE EXCESS BLDV CALC-SCNC: 0.5 MMOL/L (ref -7.7–1.9)
BASE EXCESS BLDV CALC-SCNC: 1.2 MMOL/L (ref -7.7–1.9)
BASOPHILS # BLD AUTO: 0 10E3/UL (ref 0–0.2)
BASOPHILS NFR BLD AUTO: 0 %
BILIRUB SERPL-MCNC: 1.4 MG/DL (ref 0.2–1.3)
BILIRUB SERPL-MCNC: 1.4 MG/DL (ref 0.2–1.3)
BILIRUB UR QL STRIP: NEGATIVE
BLD PROD TYP BPU: NORMAL
BLOOD COMPONENT TYPE: NORMAL
BUN SERPL-MCNC: 82 MG/DL (ref 7–30)
BUN SERPL-MCNC: 90 MG/DL (ref 7–30)
CA-I BLD-MCNC: 4.5 MG/DL (ref 4.4–5.2)
CALCIUM SERPL-MCNC: 8 MG/DL (ref 8.5–10.1)
CALCIUM SERPL-MCNC: 8.1 MG/DL (ref 8.5–10.1)
CHLORIDE BLD-SCNC: 129 MMOL/L (ref 94–109)
CHLORIDE BLD-SCNC: 130 MMOL/L (ref 94–109)
CO2 SERPL-SCNC: 24 MMOL/L (ref 20–32)
CO2 SERPL-SCNC: 24 MMOL/L (ref 20–32)
CODING SYSTEM: NORMAL
COLOR UR AUTO: ABNORMAL
CREAT SERPL-MCNC: 3.01 MG/DL (ref 0.66–1.25)
CREAT SERPL-MCNC: 3.04 MG/DL (ref 0.66–1.25)
CROSSMATCH: NORMAL
CRP SERPL-MCNC: 180 MG/L (ref 0–8)
EOSINOPHIL # BLD AUTO: 0 10E3/UL (ref 0–0.7)
EOSINOPHIL NFR BLD AUTO: 0 %
ERYTHROCYTE [DISTWIDTH] IN BLOOD BY AUTOMATED COUNT: 18.6 % (ref 10–15)
ERYTHROCYTE [DISTWIDTH] IN BLOOD BY AUTOMATED COUNT: 19.5 % (ref 10–15)
ERYTHROCYTE [SEDIMENTATION RATE] IN BLOOD BY WESTERGREN METHOD: 87 MM/HR (ref 0–20)
GFR SERPL CREATININE-BSD FRML MDRD: 21 ML/MIN/1.73M2
GFR SERPL CREATININE-BSD FRML MDRD: 21 ML/MIN/1.73M2
GLUCOSE BLD-MCNC: 139 MG/DL (ref 70–99)
GLUCOSE BLD-MCNC: 176 MG/DL (ref 70–99)
GLUCOSE UR STRIP-MCNC: NEGATIVE MG/DL
HCO3 BLD-SCNC: 22 MMOL/L (ref 21–28)
HCO3 BLD-SCNC: 23 MMOL/L (ref 21–28)
HCO3 BLD-SCNC: 24 MMOL/L (ref 21–28)
HCO3 BLDV-SCNC: 24 MMOL/L (ref 21–28)
HCO3 BLDV-SCNC: 25 MMOL/L (ref 21–28)
HCT VFR BLD AUTO: 22.3 % (ref 40–53)
HCT VFR BLD AUTO: 25 % (ref 40–53)
HGB BLD-MCNC: 6.8 G/DL (ref 13.3–17.7)
HGB BLD-MCNC: 7.9 G/DL (ref 13.3–17.7)
HGB UR QL STRIP: ABNORMAL
IMM GRANULOCYTES # BLD: 0.6 10E3/UL
IMM GRANULOCYTES NFR BLD: 4 %
ISSUE DATE AND TIME: NORMAL
KETONES UR STRIP-MCNC: NEGATIVE MG/DL
LDH SERPL L TO P-CCNC: 547 U/L (ref 85–227)
LEUKOCYTE ESTERASE UR QL STRIP: ABNORMAL
LYMPHOCYTES # BLD AUTO: 0.6 10E3/UL (ref 0.8–5.3)
LYMPHOCYTES NFR BLD AUTO: 4 %
MAGNESIUM SERPL-MCNC: 3.6 MG/DL (ref 1.6–2.3)
MCH RBC QN AUTO: 30 PG (ref 26.5–33)
MCH RBC QN AUTO: 30 PG (ref 26.5–33)
MCHC RBC AUTO-ENTMCNC: 30.5 G/DL (ref 31.5–36.5)
MCHC RBC AUTO-ENTMCNC: 31.6 G/DL (ref 31.5–36.5)
MCV RBC AUTO: 95 FL (ref 78–100)
MCV RBC AUTO: 98 FL (ref 78–100)
MONOCYTES # BLD AUTO: 1 10E3/UL (ref 0–1.3)
MONOCYTES NFR BLD AUTO: 7 %
MUCOUS THREADS #/AREA URNS LPF: PRESENT /LPF
NEUTROPHILS # BLD AUTO: 12.2 10E3/UL (ref 1.6–8.3)
NEUTROPHILS NFR BLD AUTO: 85 %
NITRATE UR QL: NEGATIVE
NRBC # BLD AUTO: 0 10E3/UL
NRBC BLD AUTO-RTO: 0 /100
O2/TOTAL GAS SETTING VFR VENT: 0 %
O2/TOTAL GAS SETTING VFR VENT: 0 %
O2/TOTAL GAS SETTING VFR VENT: 4 %
OXYHGB MFR BLD: 90 % (ref 92–100)
OXYHGB MFR BLD: 96 % (ref 92–100)
OXYHGB MFR BLDV: 54 % (ref 70–75)
OXYHGB MFR BLDV: 64 % (ref 70–75)
PCO2 BLD: 25 MM HG (ref 35–45)
PCO2 BLD: 28 MM HG (ref 35–45)
PCO2 BLD: 29 MM HG (ref 35–45)
PCO2 BLDV: 31 MM HG (ref 40–50)
PCO2 BLDV: 33 MM HG (ref 40–50)
PH BLD: 7.52 [PH] (ref 7.35–7.45)
PH BLD: 7.53 [PH] (ref 7.35–7.45)
PH BLD: 7.55 [PH] (ref 7.35–7.45)
PH BLDV: 7.48 [PH] (ref 7.32–7.43)
PH BLDV: 7.49 [PH] (ref 7.32–7.43)
PH UR STRIP: 5.5 [PH] (ref 5–7)
PHOSPHATE SERPL-MCNC: 2.7 MG/DL (ref 2.5–4.5)
PLATELET # BLD AUTO: 181 10E3/UL (ref 150–450)
PLATELET # BLD AUTO: 206 10E3/UL (ref 150–450)
PO2 BLD: 56 MM HG (ref 80–105)
PO2 BLD: 81 MM HG (ref 80–105)
PO2 BLD: 87 MM HG (ref 80–105)
PO2 BLDV: 30 MM HG (ref 25–47)
PO2 BLDV: 35 MM HG (ref 25–47)
POTASSIUM BLD-SCNC: 3.7 MMOL/L (ref 3.4–5.3)
POTASSIUM BLD-SCNC: 3.9 MMOL/L (ref 3.4–5.3)
PROCALCITONIN SERPL-MCNC: 0.28 NG/ML
PROT SERPL-MCNC: 5.6 G/DL (ref 6.8–8.8)
PROT SERPL-MCNC: 5.9 G/DL (ref 6.8–8.8)
RBC # BLD AUTO: 2.27 10E6/UL (ref 4.4–5.9)
RBC # BLD AUTO: 2.63 10E6/UL (ref 4.4–5.9)
RBC URINE: 176 /HPF
SODIUM SERPL-SCNC: 155 MMOL/L (ref 133–144)
SODIUM SERPL-SCNC: 156 MMOL/L (ref 133–144)
SODIUM SERPL-SCNC: 158 MMOL/L (ref 133–144)
SODIUM SERPL-SCNC: 161 MMOL/L (ref 133–144)
SODIUM SERPL-SCNC: 161 MMOL/L (ref 133–144)
SP GR UR STRIP: 1.02 (ref 1–1.03)
SPECIMEN EXPIRATION DATE: NORMAL
SQUAMOUS EPITHELIAL: 1 /HPF
TRANSITIONAL EPI: <1 /HPF
UFH PPP CHRO-ACNC: <0.1 IU/ML
UNIT ABO/RH: NORMAL
UNIT NUMBER: NORMAL
UNIT STATUS: NORMAL
UNIT TYPE ISBT: 5100
UROBILINOGEN UR STRIP-MCNC: NORMAL MG/DL
WBC # BLD AUTO: 13.5 10E3/UL (ref 4–11)
WBC # BLD AUTO: 14.5 10E3/UL (ref 4–11)
WBC CLUMPS #/AREA URNS HPF: PRESENT /HPF
WBC URINE: 65 /HPF

## 2022-02-26 PROCEDURE — 250N000009 HC RX 250: Performed by: INTERNAL MEDICINE

## 2022-02-26 PROCEDURE — 250N000011 HC RX IP 250 OP 636: Performed by: NURSE PRACTITIONER

## 2022-02-26 PROCEDURE — 99291 CRITICAL CARE FIRST HOUR: CPT | Mod: GC | Performed by: STUDENT IN AN ORGANIZED HEALTH CARE EDUCATION/TRAINING PROGRAM

## 2022-02-26 PROCEDURE — 250N000011 HC RX IP 250 OP 636: Performed by: INTERNAL MEDICINE

## 2022-02-26 PROCEDURE — 94660 CPAP INITIATION&MGMT: CPT

## 2022-02-26 PROCEDURE — 999N000157 HC STATISTIC RCP TIME EA 10 MIN

## 2022-02-26 PROCEDURE — 258N000003 HC RX IP 258 OP 636: Performed by: INTERNAL MEDICINE

## 2022-02-26 PROCEDURE — 74018 RADEX ABDOMEN 1 VIEW: CPT | Mod: 26 | Performed by: RADIOLOGY

## 2022-02-26 PROCEDURE — 97535 SELF CARE MNGMENT TRAINING: CPT | Mod: GO | Performed by: OCCUPATIONAL THERAPIST

## 2022-02-26 PROCEDURE — 82803 BLOOD GASES ANY COMBINATION: CPT | Performed by: INTERNAL MEDICINE

## 2022-02-26 PROCEDURE — 86850 RBC ANTIBODY SCREEN: CPT

## 2022-02-26 PROCEDURE — 97530 THERAPEUTIC ACTIVITIES: CPT | Mod: GO | Performed by: OCCUPATIONAL THERAPIST

## 2022-02-26 PROCEDURE — 85652 RBC SED RATE AUTOMATED: CPT | Performed by: INTERNAL MEDICINE

## 2022-02-26 PROCEDURE — 86923 COMPATIBILITY TEST ELECTRIC: CPT | Performed by: INTERNAL MEDICINE

## 2022-02-26 PROCEDURE — 250N000013 HC RX MED GY IP 250 OP 250 PS 637: Performed by: NURSE PRACTITIONER

## 2022-02-26 PROCEDURE — 250N000013 HC RX MED GY IP 250 OP 250 PS 637: Performed by: INTERNAL MEDICINE

## 2022-02-26 PROCEDURE — 84155 ASSAY OF PROTEIN SERUM: CPT | Performed by: INTERNAL MEDICINE

## 2022-02-26 PROCEDURE — 87040 BLOOD CULTURE FOR BACTERIA: CPT

## 2022-02-26 PROCEDURE — 83735 ASSAY OF MAGNESIUM: CPT | Performed by: INTERNAL MEDICINE

## 2022-02-26 PROCEDURE — 85520 HEPARIN ASSAY: CPT | Performed by: INTERNAL MEDICINE

## 2022-02-26 PROCEDURE — 85027 COMPLETE CBC AUTOMATED: CPT | Performed by: NURSE PRACTITIONER

## 2022-02-26 PROCEDURE — 83615 LACTATE (LD) (LDH) ENZYME: CPT | Performed by: INTERNAL MEDICINE

## 2022-02-26 PROCEDURE — 36415 COLL VENOUS BLD VENIPUNCTURE: CPT

## 2022-02-26 PROCEDURE — 250N000011 HC RX IP 250 OP 636

## 2022-02-26 PROCEDURE — P9016 RBC LEUKOCYTES REDUCED: HCPCS | Performed by: INTERNAL MEDICINE

## 2022-02-26 PROCEDURE — 82805 BLOOD GASES W/O2 SATURATION: CPT | Performed by: INTERNAL MEDICINE

## 2022-02-26 PROCEDURE — 258N000003 HC RX IP 258 OP 636

## 2022-02-26 PROCEDURE — 258N000003 HC RX IP 258 OP 636: Performed by: NURSE PRACTITIONER

## 2022-02-26 PROCEDURE — 250N000013 HC RX MED GY IP 250 OP 250 PS 637

## 2022-02-26 PROCEDURE — 84145 PROCALCITONIN (PCT): CPT | Performed by: NURSE PRACTITIONER

## 2022-02-26 PROCEDURE — 84100 ASSAY OF PHOSPHORUS: CPT | Performed by: INTERNAL MEDICINE

## 2022-02-26 PROCEDURE — 92526 ORAL FUNCTION THERAPY: CPT | Mod: GN

## 2022-02-26 PROCEDURE — 200N000002 HC R&B ICU UMMC

## 2022-02-26 PROCEDURE — 999N000065 XR ABDOMEN PORT 1 VIEWS

## 2022-02-26 PROCEDURE — 80053 COMPREHEN METABOLIC PANEL: CPT | Performed by: INTERNAL MEDICINE

## 2022-02-26 PROCEDURE — 86140 C-REACTIVE PROTEIN: CPT | Performed by: INTERNAL MEDICINE

## 2022-02-26 PROCEDURE — 85025 COMPLETE CBC W/AUTO DIFF WBC: CPT | Performed by: INTERNAL MEDICINE

## 2022-02-26 PROCEDURE — 999N000015 HC STATISTIC ARTERIAL MONITORING DAILY

## 2022-02-26 PROCEDURE — 74018 RADEX ABDOMEN 1 VIEW: CPT

## 2022-02-26 PROCEDURE — 97110 THERAPEUTIC EXERCISES: CPT | Mod: GO | Performed by: OCCUPATIONAL THERAPIST

## 2022-02-26 PROCEDURE — 87086 URINE CULTURE/COLONY COUNT: CPT | Performed by: INTERNAL MEDICINE

## 2022-02-26 PROCEDURE — 250N000013 HC RX MED GY IP 250 OP 250 PS 637: Performed by: STUDENT IN AN ORGANIZED HEALTH CARE EDUCATION/TRAINING PROGRAM

## 2022-02-26 PROCEDURE — 81003 URINALYSIS AUTO W/O SCOPE: CPT | Performed by: INTERNAL MEDICINE

## 2022-02-26 PROCEDURE — 82330 ASSAY OF CALCIUM: CPT | Performed by: INTERNAL MEDICINE

## 2022-02-26 PROCEDURE — 84295 ASSAY OF SERUM SODIUM: CPT | Performed by: INTERNAL MEDICINE

## 2022-02-26 RX ORDER — QUETIAPINE FUMARATE 25 MG/1
25 TABLET, FILM COATED ORAL
Status: DISCONTINUED | OUTPATIENT
Start: 2022-02-26 | End: 2022-02-27

## 2022-02-26 RX ORDER — FUROSEMIDE 20 MG
60 TABLET ORAL ONCE
Status: COMPLETED | OUTPATIENT
Start: 2022-02-26 | End: 2022-02-26

## 2022-02-26 RX ORDER — HYDRALAZINE HYDROCHLORIDE 50 MG/1
50 TABLET, FILM COATED ORAL EVERY 8 HOURS
Status: DISCONTINUED | OUTPATIENT
Start: 2022-02-26 | End: 2022-02-27

## 2022-02-26 RX ORDER — FUROSEMIDE 10 MG/ML
60 INJECTION INTRAMUSCULAR; INTRAVENOUS ONCE
Status: COMPLETED | OUTPATIENT
Start: 2022-02-26 | End: 2022-02-26

## 2022-02-26 RX ORDER — QUETIAPINE FUMARATE 50 MG/1
50 TABLET, FILM COATED ORAL EVERY EVENING
Status: DISCONTINUED | OUTPATIENT
Start: 2022-02-26 | End: 2022-02-27

## 2022-02-26 RX ADMIN — AMIODARONE HYDROCHLORIDE 1 MG/MIN: 50 INJECTION, SOLUTION INTRAVENOUS at 06:20

## 2022-02-26 RX ADMIN — CEFAZOLIN SODIUM 2 G: 10 INJECTION, POWDER, FOR SOLUTION INTRAVENOUS at 23:33

## 2022-02-26 RX ADMIN — TICAGRELOR 90 MG: 90 TABLET ORAL at 08:22

## 2022-02-26 RX ADMIN — FUROSEMIDE 60 MG: 20 TABLET ORAL at 23:32

## 2022-02-26 RX ADMIN — DEXTROSE MONOHYDRATE: 50 INJECTION, SOLUTION INTRAVENOUS at 11:27

## 2022-02-26 RX ADMIN — NICARDIPINE HYDROCHLORIDE 2.5 MG/HR: 0.2 INJECTION, SOLUTION INTRAVENOUS at 16:04

## 2022-02-26 RX ADMIN — DEXTROSE MONOHYDRATE: 50 INJECTION, SOLUTION INTRAVENOUS at 06:20

## 2022-02-26 RX ADMIN — SENNOSIDES AND DOCUSATE SODIUM 1 TABLET: 8.6; 5 TABLET ORAL at 08:21

## 2022-02-26 RX ADMIN — ISOSORBIDE DINITRATE 10 MG: 10 TABLET ORAL at 15:25

## 2022-02-26 RX ADMIN — CEFAZOLIN SODIUM 2 G: 10 INJECTION, POWDER, FOR SOLUTION INTRAVENOUS at 11:28

## 2022-02-26 RX ADMIN — HYDRALAZINE HYDROCHLORIDE 50 MG: 50 TABLET, FILM COATED ORAL at 22:16

## 2022-02-26 RX ADMIN — FUROSEMIDE 60 MG: 10 INJECTION, SOLUTION INTRAVENOUS at 15:25

## 2022-02-26 RX ADMIN — ASPIRIN 81 MG CHEWABLE TABLET 81 MG: 81 TABLET CHEWABLE at 08:22

## 2022-02-26 RX ADMIN — ISOSORBIDE DINITRATE 10 MG: 10 TABLET ORAL at 08:22

## 2022-02-26 RX ADMIN — Medication 10 MG: at 20:19

## 2022-02-26 RX ADMIN — HYDRALAZINE HYDROCHLORIDE 50 MG: 50 TABLET, FILM COATED ORAL at 13:03

## 2022-02-26 RX ADMIN — ISOSORBIDE DINITRATE 10 MG: 10 TABLET ORAL at 23:32

## 2022-02-26 RX ADMIN — QUETIAPINE FUMARATE 50 MG: 50 TABLET ORAL at 20:19

## 2022-02-26 RX ADMIN — TICAGRELOR 90 MG: 90 TABLET ORAL at 20:19

## 2022-02-26 RX ADMIN — HYDRALAZINE HYDROCHLORIDE 25 MG: 25 TABLET, FILM COATED ORAL at 08:22

## 2022-02-26 RX ADMIN — ORAL VEHICLES - SUSP 5 MG: SUSPENSION at 15:27

## 2022-02-26 RX ADMIN — SENNOSIDES AND DOCUSATE SODIUM 1 TABLET: 8.6; 5 TABLET ORAL at 20:19

## 2022-02-26 RX ADMIN — DEXTROSE MONOHYDRATE: 50 INJECTION, SOLUTION INTRAVENOUS at 00:31

## 2022-02-26 ASSESSMENT — ACTIVITIES OF DAILY LIVING (ADL)
ADLS_ACUITY_SCORE: 19
ADLS_ACUITY_SCORE: 19
ADLS_ACUITY_SCORE: 23
ADLS_ACUITY_SCORE: 19
ADLS_ACUITY_SCORE: 23
ADLS_ACUITY_SCORE: 19
ADLS_ACUITY_SCORE: 19
ADLS_ACUITY_SCORE: 23
ADLS_ACUITY_SCORE: 19
ADLS_ACUITY_SCORE: 23
ADLS_ACUITY_SCORE: 19
ADLS_ACUITY_SCORE: 19
ADLS_ACUITY_SCORE: 23
ADLS_ACUITY_SCORE: 23
ADLS_ACUITY_SCORE: 19
ADLS_ACUITY_SCORE: 19
ADLS_ACUITY_SCORE: 23
ADLS_ACUITY_SCORE: 19
ADLS_ACUITY_SCORE: 23
ADLS_ACUITY_SCORE: 19
ADLS_ACUITY_SCORE: 23
ADLS_ACUITY_SCORE: 19

## 2022-02-26 NOTE — PLAN OF CARE
ICU End of Shift Summary. See flowsheets for vital signs and detailed assessment.    Changes this shift: AxOx4, intermittently confused but redirectable. R pupil >L pupil, CSI notified, no changes to POC overnight. TMAX 99.2, afib to sinus tach, amio gtt remains at 1, nicardipine titrated to keep MAP<80, became hypotensive with MAP 55 after oral hydralazine, levo ordered but not administered. Placed on 4L oxymask overnight, pt refused CPAP. Adequate UOP. Na 161, D5 increased to 200. Hgb 6.8, 1unit PRB ordered, awaiting delivery from blood bank.    Plan: Monitor hemodynamic and respiratory status. Monitor Na q4h and Hgb post transfusion.    Problem: Risk for Delirium  Goal: Optimal Coping  Outcome: Ongoing, Not Progressing  Intervention: Optimize Psychosocial Adjustment to Delirium  Recent Flowsheet Documentation  Taken 2/26/2022 0400 by Ky Ricks RN  Family/Support System Care: caregiver stress acknowledged  Taken 2/26/2022 0000 by Ky Ricks RN  Family/Support System Care: caregiver stress acknowledged  Taken 2/25/2022 2000 by Ky Ricks RN  Family/Support System Care: caregiver stress acknowledged

## 2022-02-26 NOTE — PLAN OF CARE
ICU End of Shift Summary. See flowsheets for vital signs and detailed assessment.    Changes this shift: A&Ox2; disoriented to time and situation for the majority of the shift. Pt increasingly slower to respond and more withdrawn. Pupils remain unequal. Afib. Nicardipine remains at 2.5 to maintain MAP <80 >65. 4L oxymask for arterial pO2 of 56. Weak cough and trouble coughing up secretions; cough appears to be getting weaker- CSI notified. CT with minimal output. NPO per speech eval at 1400. NG placed. Free water now 175mL q1hour. Lasix and metolazone given. Up to chair for ~3 hours. 1unit PRBC given this AM. Wife at bedside.    Plan: Wear CPAP at night Continue to monitor Na levels and respiratory status. Continue to follow POC and notify team of any changes.    Problem: Plan of Care - These are the overarching goals to be used throughout the patient stay.    Goal: Plan of Care Review/Shift Note  Description: The Plan of Care Review/Shift note should be completed every shift.  The Outcome Evaluation is a brief statement about your assessment that the patient is improving, declining, or no change.  This information will be displayed automatically on your shift note.  Outcome: Ongoing, Not Progressing

## 2022-02-26 NOTE — PROGRESS NOTES
Major Shift Events:  Up to chair from 0830 to 1600. A&O x4. Occasionally slow to respond. Frequent requests to go home. Wife at bedside. ST to A.fib to A.fib w RVR. HR 100s-150s. Amio gtt at 1. MAP <80, >65. Nicardipine gtt. RA all day, CPAP/oxymask overnight. Wife will bring in patient's home CPAP tomorrow (2/26). Patient/wife refused NG placement this morning, SLP consult. Passed for full liquids with meds crushed in applesauce/pudding. Sodium continued to rise, 161 this afternoon. D5 started at 100. Tapia in place with adequate UOP. Requested bedpan multiple times throughout shift, only small amount of stool. CT with minimal output.   Plan: Continue to encourage OOB. Monitor BP and vitals. Continue w current POC.  For vital signs and complete assessments, please see documentation flowsheets.     Transferred to: Unit 4C at 1830  Belongings:   Tapia removed? No: Placed by nephrology, lateral transfer  Central line removed? No, lateral transfer  Chart and medications sent with patient Yes  Family notified: Yes

## 2022-02-26 NOTE — PROGRESS NOTES
"   02/25/22 1400   Quick Adds   Type of Visit Initial Occupational Therapy Evaluation  (Cardiac Rehabilitation Evaluation)   Living Environment   People in Home spouse   Current Living Arrangements house   Home Accessibility stairs to enter home;stairs within home   Number of Stairs, Main Entrance 4;1  (4 from garage, 1 at front door)   Number of Stairs, Within Home, Primary   (split level entry, 6 to landing then 7 to main floor)   Transportation Anticipated car, drives self;family or friend will provide   Living Environment Comments Pt lives with his wife in a split-level entry home. Per wife, they typically enter from the garage into the basement x4 FLORY then take stairs to mainfloor, x6 [id number].   Self-Care   Usual Activity Tolerance excellent   Current Activity Tolerance poor   Regular Exercise Yes   Activity/Exercise Type strength training;walking  (kayaking)   Exercise Amount/Frequency 3-5 times/wk;45 mins   Equipment Currently Used at Home none   Fall history within last six months no   Activity/Exercise/Self-Care Comment Pt was IND w/ ADLs at baseline. Pt reports enjoying working out. Per wife, pt went to gym 3x/week, walked regularly.    Instrumental Activities of Daily Living (IADL)   Previous Responsibilities meal prep;housekeeping;shopping;yardwork;medication management;finances;driving   IADL Comments Pt was IND w/ IADLs at baseline. Pt's wife did majority of cooking/cleaning, but pt contributed as well. Pt's wife reported that they were activity w/ 6 grandchildren.    General Information   Onset of Illness/Injury or Date of Surgery 02/17/22   Referring Physician Chaudhry, Mohsan, MD   Patient/Family Therapy Goal Statement (OT) To return to active PLOF   Additional Occupational Profile Info/Pertinent History of Current Problem Per chart: \"Chano Johnson is a 71 year old male w/ h/o ARSENIO and ED who presented to an OSH on 2/17 with chest pain had an in house VT/VF arrest with anterior STEMI " "in the ECG had 8 shocks with ROSC transferred here for further care with shock and ectopy during his angiogram s/p VA ECMO and PCI of his mLAD with 2 LETY with residual significant disease in a large Ramus. Course has been complicated by hemorrhagic shock and R hemothorax s/p chest tube placement.  Decannulated 2/22.IABP removed 2/23.Extubated 2/24\"   Performance Patterns (Routines, Roles, Habits) Pt enjoys being active outdoors, spending time w/ grandchildren.   Existing Precautions/Restrictions fall;cardiac   Limitations/Impairments safety/cognitive   Left Upper Extremity (Weight-bearing Status) full weight-bearing (FWB)   Right Upper Extremity (Weight-bearing Status) full weight-bearing (FWB)   Left Lower Extremity (Weight-bearing Status) full weight-bearing (FWB)   Right Lower Extremity (Weight-bearing Status) full weight-bearing (FWB)   Cognitive Status Examination   Orientation Status person;place   Behavioral Issues overwhelmed easily;withdrawn   Affect/Mental Status (Cognitive) low arousal/lethargic   Follows Commands follows one-step commands;75-90% accuracy;delayed response/completion;increased processing time needed;verbal cues/prompting required;physical/tactile prompts required   Attention Deficit moderate deficit;focused/sustained attention;distractible in noisy environment   Executive Function Deficit unable/difficult to assess   Cognitive Status Comments Pt greeted seated in recliner. Pt's wife present and very actively involved in care. Pt oriented to self and hospital, date not formally assessed. Pt able to answer basic PLOF questions, turing to wife to provide details. Pt agreeing to wash face, but when handed washcloth and given general cues, pt not following. Pt following single-step commands w/ 75% accuracy. Pt seeming easily overwhelmed w/ mutiple people in room, staring off into distande during chair > recliner transfer, not following commnads.   Visual Perception   Impact of Vision Impairment " on Function (Vision) Will further assess   Sensory   Sensory Quick Adds No deficits were identified   Pain Assessment   Patient Currently in Pain No   Integumentary/Edema   Integumentary/Edema Comments Pt w/ trace-1+ edema in Paolo ankles and feet.    Range of Motion Comprehensive   Comment, General Range of Motion Will further assess.    Strength Comprehensive (MMT)   Comment, General Manual Muscle Testing (MMT) Assessment BUE generally weak, difficult to fully assess due to pt w/ limited commmand following.   Coordination   Coordination Comments Will further assess   Bed Mobility   Comment (Bed Mobility) Max-Total Ax2 for boost and rolling   Transfers   Transfers bed-chair transfer   Transfer Skill: Bed to Chair/Chair to Bed   Bed-Chair [place] (Transfers) dependent (less than 25% patient effort);2 person assist   Transfer Comments OH lift   Bathing Assessment/Intervention   [place] Level (Bathing) dependent (less than 25% patient effort)   Upper Body Dressing Assessment/Training   [place] Level (Upper Body Dressing) dependent (less than 25% patient effort)   Lower Body Dressing Assessment/Training   [place] Level (Lower Body Dressing) dependent (less than 25% patient effort)   Grooming Assessment/Training   [place] Level (Grooming) maximum assist (25% patient effort)   Eating/Self Feeding   [place] Level (Feeding) dependent (less than 25% patient effort)   Comment, (Feeding) Pt's wife providing assist.   Toileting   [place] Level (Toileting) dependent (less than 25% patient effort)   Clinical Impression   Criteria for Skilled Therapeutic Interventions Met (OT) Yes, treatment indicated   OT Diagnosis Impaired ADL performance   OT Problem List-Impairments impacting ADL problems related to;activity tolerance impaired;cognition;coordination;mobility;strength   Assessment of Occupational Performance 5 or more Performance Deficits   Identified Performance Deficits dressing, bathing,  toileting, self-feeding, meal prep, housekeeping, leisure   Planned Therapy Interventions (OT) ADL retraining;cognition;fine motor coordination training;strengthening;transfer training;home program guidelines;progressive activity/exercise   Intervention Comments therapeutic BUE exercises, further assess cognition, functional transfers   Clinical Decision Making Complexity (OT) moderate complexity   Anticipated Equipment Needs Upon Discharge (OT)   (TBD)   Risk & Benefits of therapy have been explained evaluation/treatment results reviewed;care plan/treatment goals reviewed;risks/benefits reviewed;current/potential barriers reviewed;participants voiced agreement with care plan;participants included;patient;spouse/significant other   Clinical Impression Comments Pt presents well below IND baseline, limited by reduced activity tolerance, tachycardia, impaired cognition, generalized weakness. Pt will benefit from IP OT to addres above deficits and progress ADL IND and safety.    OT Discharge Planning   OT Discharge Recommendation (DC Rec) Transitional Care Facility   OT Rationale for DC Rec Pt currently requires Total Ax1-2 for ADLs and OH lift w/ Ax2 for transfers. Pt will benefit from ongoing skilled therapies.    OT Brief overview of current status OH lift, please encourage OOB activity, IND w/ basic grooming   Total Evaluation Time (Minutes)   Total Evaluation Time (Minutes) 5   OT Goals   Therapy Frequency (OT) 5 times/wk   OT Predicated Duration/Target Date for Goal Attainment 03/11/22   OT: Hygiene/Grooming modified independent;using adaptive equipment;within precautions   OT: Lower Body Dressing Supervision/stand-by assist;using adaptive equipment;within precautions   OT: Lower Body Bathing Minimal assist;using adaptive equipment;with precautions   OT: Toilet Transfer/Toileting Minimal assist;toilet transfer;cleaning and garment management;using adaptive equipment;within precautions   OT: Cognitive  Patient/caregiver will verbalize understanding of cognitive assessment results/recommendations as needed for safe discharge planning   OT: Goal 1 Pt will complete BUE HEP w/ Min A to promote strength for improved ADL performance.

## 2022-02-26 NOTE — PROGRESS NOTES
[facility]  Cardiac ICU Progress Note              Key events - last 24 hours:     - A fib episodes overnight. His nicardipine was stopped briefly for hypotension.  - Delirium onset - waxing and waning alertness              Assessment & Plan        71 year old male who was admitted on 2/17/2022 s/p VT/VF arrest x8 shocks who was found to have LAD occlusion requiring 2x stents. He presented to local ED with chest pain that started at 1300 (within 1 hr of onset), he had a VT VF arrest in the ED after he was found to have an anterior STEMI on ECG.  Chest compressions and ACLS was started immediately.  Patient underwent 8 shocks for VT VF with ROSC afterwards.  He received 4 doses of epinephrine, 450 mg of amiodarone.  He was transferred to the [facility] for percutaneous intervention for his STEMI. Unfortunately his transfer was delayed because he was a very low dose of Epi (0.01) and the local hospital only had a BLS ambulance available. He arrived at [facility] and was found to have occlusion of the LAD. During the angiogram he was found to have significant hypotension and ectopy, which prompted placement on VA ECMO. PCI with 2x LETY was done with ECMO support. TTM post arrest at 35C, rewarmed on 2/19/22. Decannulated 2/23/22.    Today's Changes  - Continue Amio 1mg/min  - CVP guided diuresis with both a loop and thiazide diuretic  - Autodiuresis with recovery phase of ATN, continue to monitor urine output  - Full liquid diet per speech evaluation, encourage fluid intake of at least 75cc per hour today for hypernatremia. Will place NG for fluid administration for hypernatremia and medications.  - Procalcitonin  - Flutter valve     Neurology: #Possible neurological injury post cardiac arrest  Intubated, sedated. Initially cooled to 34 degrees.  Now rewarmed as of 2/19/2022  --continue fentanyl and precedex. Continue to hold to monitor neurological recovery with plan for  possible extubation if following command consistently.   - RASS goal -1 to -2.  - CTH on presentation w/o acute intracranial pathology. Day 3 CTH showed indeterminant gray-white matter loss in the R occipital lobe which could be artifact vs true infarct. Strong suspicion for artifact per neurocrit.  - EEG w/o significant abnormalities noted  - Pt has nonpurposeful movement of all 4 extremities when sedation lightened.    #Delirium  Normalize awake-sleep cycles. Frequent reorientation. Melatonin and seroquel at bedtime for sleep.     Cardiovascular / Hemodynamics: #Refractory VF arrest requiring 8 shocks.  x2 LETY stents to LAD.  Peripheral V-A ECMO inserted for refractory cardiogenic shock. LA   TTE: LVEF 10-15%, RV moderately to severely reduced function. No prior hx of heart failure. No significant valvular disease. Pulsatility significant improved after rewarming, decannulated 2/22/22.  EKG: FLORY V2-V4 consistent with anterior STEMI  --wean pressors/inotropes as needed  --MATTHIEU ECMO turndown 2/22/22: no concerns, stable. Okay to proceed with decannulation. Decannulated 2/22/22.  --continue ASA 81mg and ticagrelor 90mg BID as of now status post 2 LETY to LAD  --No indication for anticoagulation at this time  --hold lipitor for now given likely hepatic injury during arrest  --hold ACE/ARB for now given likely reduced renal fxn after arrest  --holding beta blocker given shock      - CT CAP: Large right-sided diffuse effusion s/p chest tube placement.   - CVP guided diuresis with both a loop and thiazide diuretic      Pulmonary: #Acute hypoxic respiratory failure  #Pleural effusion - s/p chest tube placement on L side. Thrombus noted in the chest tube 2/22/22, evacuated in the OR during decannulation. Currently has chest wall hematoma which we are monitoring.  ETT in appropriate position.  Now weaning vent requirements.  CXR: Lines in stable position.  --wean vent as able  --daily CXR  --Q6h ABGs for now  --consider  scheduled duonebs if signs of lung dz, currently PRN    -- Flutter valve and incentive spirometry  -- Procal pending   GI and Nutrition: No known medical hx.   --monitor BID LFTs  --bowel regimen   -- TF at goal  --GI Prophylaxis: PPI     Speech consulted post extubation, okay to start full liquid diet. Will advance per recommendations.     Renal, Fluid and Electrolytes: #SAHIL, likely ischemic ATN. Monitoring for CRRT needs.  #Hypernatremia - encourage PO intake of free water  #Acute respiratory alkalosis  Monitoring UOP, will start CRRT through circuit if needed.  --maintain K>4 and Mg>2   -- Autodiuresing due to likely recovery phase of ATN   Infectious Disease: #Sputum culture 2/20/22 positive for Staph Lugdunensis - start cefazolin (2/24/22 - now). Plan for 5 day course.  Leukocytosis c/w arrest. Blood cultures collected.   --vancomycin/zosyn x5 days for ECMO - completed course on 2/21/22  --monitor for signs of infection given cooling, lines, and leukocytosis   Hematology and Oncology: #Acute blood loss anemia  #Thrombocytopenia - likely secondary to hemolysis. 2/21/22 4T score 3 (low risk, <5%)  --Heparin discontinue after decannulation.  -- Antiplatelet: ASA/ticagrelor for LETY.   --Transfuse for Hgb<7  --DVT PPX: subcutaneous Heparin  -Continue to trend CBCs transfuse to goal hemoglobin of 7   Endocrinology:    No known medical history. BG elevated.  --stress dose steroids due to persistent hypotension - hydrocort 50mg IV Q8h discontinued   Lines: L femoral arterial and venous ECMO cannulae February 17, 2022 - 2/22/22  R radial arterial line February 17, 2022  ETT February 17, 2022 - 2/24/22  Tapia catheter February 17, 2022 - 2/24/22  OG tube February 17, 2022 - 2/24/22  Restraint: no longer needed  Current lines are required for patient management       Family update by me today: Yes      Code Status: DNR. Okay with intubation at this time, family is discussing whether to change code status to DNR/DNI.       The Pt was discussed and evaluated with Dr. Ball, attending physician, who agrees with the assessment and plan above.     Mohsan Chaudhry, MD  Cardiology Fellow         Medications:       aspirin  81 mg Per Feeding Tube Daily     ceFAZolin  2 g Intravenous Q12H     hydrALAZINE  50 mg Oral Q8H     isosorbide dinitrate  10 mg Oral Q8H     melatonin  10 mg Oral QPM     polyethylene glycol  17 g Oral BID     QUEtiapine  50 mg Oral QPM     senna-docusate  1 tablet Oral BID     ticagrelor  90 mg Oral BID      Temp: 98.1  F (36.7  C) Temp  Min: 98.1  F (36.7  C)  Max: 100.3  F (37.9  C)  Resp: 30 Resp  Min: 21  Max: 33  SpO2: 97 % SpO2  Min: 89 %  Max: 99 %  Pulse: 117 Pulse  Min: 87  Max: 149    No data recorded   No data recorded.  No data recorded.      Intake/Output Summary (Last 24 hours) at 2/20/2022 0736  Last data filed at 2/20/2022 0600  Gross per 24 hour   Intake 2188.88 ml   Output 1148 ml   Net 1040.88 ml          Physical Exam:   GENERAL: NAD  HEENT: PERRL  CV: S1/S2 heard without murmur   RESPIRATORY: CTAB, no increased WOB  GI: Soft and non distended with normoactive bowel sounds present in all quadrants. No tenderness, rebound, guarding. No palpable organomegaly.   EXTREMITIES: No peripheral edema. 2+ bilateral pedal pulses. Trace edema bilaterally in LE  NEUROLOGIC: Alert. Oriented x2. Following commands. CN II-XII intact. Moving all extremities.  MUSCULOSKELETAL: No joint swelling or tenderness.   SKIN: No jaundice. No acute rashes or lesions. No hematoma at groin sites.            Data:         Temp: 98.1  F (36.7  C) Temp src: AxillaryTemp  Min: 98.1  F (36.7  C)  Max: 100.3  F (37.9  C)   Recent Labs   Lab 02/26/22  0333 02/25/22  1604 02/25/22  0337 02/24/22  1634 02/24/22  0946   WBC 14.5* 16.0* 12.0* 10.2 10.0   HGB 6.8* 7.6* 7.4* 7.5* 7.4*   HCT 22.3* 23.8* 23.4* 23.0* 22.9*   MCV 98 96 96 94 96   RDW 18.6* 18.0* 17.6* 17.2* 17.0*    180 123* 100* 91*     Recent Labs   Lab  02/24/22  0946 02/24/22  0331 02/23/22  2210 02/23/22  1552 02/23/22  1002   INR 1.17* 1.12 1.16* 1.16* 1.13   PTT 35 31 32 31 29     Liver Function Studies -   Recent Labs   Lab Test 02/20/22  0353   PROTTOTAL 4.8*   ALBUMIN 2.7*   BILITOTAL 0.5   ALKPHOS 16*   *   ALT 92*       Last Arterial Blood Gas:  Recent Labs   Lab 02/26/22  0333 02/26/22  0331 02/25/22  1606 02/25/22  1605 02/25/22  0859 02/24/22  1836 02/24/22  1815   PH  --  7.52*  --  7.54* 7.57*  --  7.51*   PCO2  --  29*  --  27* 26*  --  32*   PO2  --  81  --  69* 63*  --  111*   HCO3  --  24  --  23 23  --  25   O2PER 4 4 0 0 21   < > 4    < > = values in this interval not displayed.       Venous Blood Gas  Recent Labs   Lab 02/26/22  0333 02/26/22  0331 02/25/22  1606 02/25/22  1605 02/25/22  0859 02/25/22  0337 02/24/22  1836   PHV 7.48*  --  7.49*  --   --  7.47* 7.45*   PCO2V 33*  --  32*  --   --  36* 38*   PO2V 35  --  26  --   --  37 33   HCO3V 25  --  25  --   --  26 27   MERLIN 1.2  --  1.2  --   --  2.1* 2.5*   O2PER 4 4 0 0   < > 4 4    < > = values in this interval not displayed.       Recent Labs   Lab Test 06/30/17  1355 05/12/16  0758   CHOL 173 196   HDL 42 43    125   TRIG 76 140     IMPRESSION:  Indeterminant gray-white matter loss in the right occipital lobe could  be artifactual from adjacent streak/photon starvation artifact versus  true infarct.

## 2022-02-26 NOTE — PROGRESS NOTES
Patient states he does not wear cpap at home and does not want to use hospital cpap machine. RT notified RN.

## 2022-02-27 ENCOUNTER — APPOINTMENT (OUTPATIENT)
Dept: GENERAL RADIOLOGY | Facility: CLINIC | Age: 72
DRG: 003 | End: 2022-02-27
Payer: MEDICARE

## 2022-02-27 ENCOUNTER — APPOINTMENT (OUTPATIENT)
Dept: SPEECH THERAPY | Facility: CLINIC | Age: 72
DRG: 003 | End: 2022-02-27
Payer: MEDICARE

## 2022-02-27 LAB
ALBUMIN SERPL-MCNC: 2.4 G/DL (ref 3.4–5)
ALBUMIN SERPL-MCNC: 2.5 G/DL (ref 3.4–5)
ALP SERPL-CCNC: 32 U/L (ref 40–150)
ALP SERPL-CCNC: 36 U/L (ref 40–150)
ALT SERPL W P-5'-P-CCNC: 122 U/L (ref 0–70)
ALT SERPL W P-5'-P-CCNC: 94 U/L (ref 0–70)
ANION GAP SERPL CALCULATED.3IONS-SCNC: 8 MMOL/L (ref 3–14)
ANION GAP SERPL CALCULATED.3IONS-SCNC: 8 MMOL/L (ref 3–14)
AST SERPL W P-5'-P-CCNC: 103 U/L (ref 0–45)
AST SERPL W P-5'-P-CCNC: 125 U/L (ref 0–45)
BACTERIA UR CULT: NO GROWTH
BASE EXCESS BLDA CALC-SCNC: 0.3 MMOL/L (ref -9–1.8)
BASE EXCESS BLDA CALC-SCNC: 2.1 MMOL/L (ref -9–1.8)
BASE EXCESS BLDV CALC-SCNC: 1.1 MMOL/L (ref -7.7–1.9)
BASE EXCESS BLDV CALC-SCNC: 2.5 MMOL/L (ref -7.7–1.9)
BASOPHILS # BLD AUTO: 0 10E3/UL (ref 0–0.2)
BASOPHILS # BLD AUTO: 0 10E3/UL (ref 0–0.2)
BASOPHILS NFR BLD AUTO: 0 %
BASOPHILS NFR BLD AUTO: 0 %
BILIRUB SERPL-MCNC: 0.9 MG/DL (ref 0.2–1.3)
BILIRUB SERPL-MCNC: 1.1 MG/DL (ref 0.2–1.3)
BUN SERPL-MCNC: 91 MG/DL (ref 7–30)
BUN SERPL-MCNC: 98 MG/DL (ref 7–30)
CA-I BLD-MCNC: 4.4 MG/DL (ref 4.4–5.2)
CALCIUM SERPL-MCNC: 8.2 MG/DL (ref 8.5–10.1)
CALCIUM SERPL-MCNC: 8.6 MG/DL (ref 8.5–10.1)
CHLORIDE BLD-SCNC: 116 MMOL/L (ref 94–109)
CHLORIDE BLD-SCNC: 122 MMOL/L (ref 94–109)
CO2 SERPL-SCNC: 22 MMOL/L (ref 20–32)
CO2 SERPL-SCNC: 25 MMOL/L (ref 20–32)
CREAT SERPL-MCNC: 3.31 MG/DL (ref 0.66–1.25)
CREAT SERPL-MCNC: 3.57 MG/DL (ref 0.66–1.25)
CRP SERPL-MCNC: 160 MG/L (ref 0–8)
EOSINOPHIL # BLD AUTO: 0.1 10E3/UL (ref 0–0.7)
EOSINOPHIL # BLD AUTO: 0.3 10E3/UL (ref 0–0.7)
EOSINOPHIL NFR BLD AUTO: 1 %
EOSINOPHIL NFR BLD AUTO: 2 %
ERYTHROCYTE [DISTWIDTH] IN BLOOD BY AUTOMATED COUNT: 18.8 % (ref 10–15)
ERYTHROCYTE [DISTWIDTH] IN BLOOD BY AUTOMATED COUNT: 19 % (ref 10–15)
ERYTHROCYTE [DISTWIDTH] IN BLOOD BY AUTOMATED COUNT: 19.2 % (ref 10–15)
ERYTHROCYTE [SEDIMENTATION RATE] IN BLOOD BY WESTERGREN METHOD: 89 MM/HR (ref 0–20)
GFR SERPL CREATININE-BSD FRML MDRD: 17 ML/MIN/1.73M2
GFR SERPL CREATININE-BSD FRML MDRD: 19 ML/MIN/1.73M2
GLUCOSE BLD-MCNC: 121 MG/DL (ref 70–99)
GLUCOSE BLD-MCNC: 131 MG/DL (ref 70–99)
GLUCOSE BLDC GLUCOMTR-MCNC: 109 MG/DL (ref 70–99)
GLUCOSE BLDC GLUCOMTR-MCNC: 115 MG/DL (ref 70–99)
HCO3 BLD-SCNC: 23 MMOL/L (ref 21–28)
HCO3 BLD-SCNC: 24 MMOL/L (ref 21–28)
HCO3 BLDV-SCNC: 25 MMOL/L (ref 21–28)
HCO3 BLDV-SCNC: 26 MMOL/L (ref 21–28)
HCT VFR BLD AUTO: 24.1 % (ref 40–53)
HCT VFR BLD AUTO: 24.2 % (ref 40–53)
HCT VFR BLD AUTO: 25 % (ref 40–53)
HGB BLD-MCNC: 7.6 G/DL (ref 13.3–17.7)
HGB BLD-MCNC: 7.6 G/DL (ref 13.3–17.7)
HGB BLD-MCNC: 7.9 G/DL (ref 13.3–17.7)
IMM GRANULOCYTES # BLD: 0.2 10E3/UL
IMM GRANULOCYTES # BLD: 0.2 10E3/UL
IMM GRANULOCYTES NFR BLD: 2 %
IMM GRANULOCYTES NFR BLD: 2 %
LDH SERPL L TO P-CCNC: 545 U/L (ref 85–227)
LYMPHOCYTES # BLD AUTO: 0.6 10E3/UL (ref 0.8–5.3)
LYMPHOCYTES # BLD AUTO: 0.6 10E3/UL (ref 0.8–5.3)
LYMPHOCYTES NFR BLD AUTO: 5 %
LYMPHOCYTES NFR BLD AUTO: 5 %
MAGNESIUM SERPL-MCNC: 3.2 MG/DL (ref 1.6–2.3)
MCH RBC QN AUTO: 30 PG (ref 26.5–33)
MCH RBC QN AUTO: 30.3 PG (ref 26.5–33)
MCH RBC QN AUTO: 30.5 PG (ref 26.5–33)
MCHC RBC AUTO-ENTMCNC: 31.4 G/DL (ref 31.5–36.5)
MCHC RBC AUTO-ENTMCNC: 31.5 G/DL (ref 31.5–36.5)
MCHC RBC AUTO-ENTMCNC: 31.6 G/DL (ref 31.5–36.5)
MCV RBC AUTO: 96 FL (ref 78–100)
MCV RBC AUTO: 96 FL (ref 78–100)
MCV RBC AUTO: 97 FL (ref 78–100)
MONOCYTES # BLD AUTO: 0.7 10E3/UL (ref 0–1.3)
MONOCYTES # BLD AUTO: 0.8 10E3/UL (ref 0–1.3)
MONOCYTES NFR BLD AUTO: 6 %
MONOCYTES NFR BLD AUTO: 7 %
NEUTROPHILS # BLD AUTO: 9.2 10E3/UL (ref 1.6–8.3)
NEUTROPHILS # BLD AUTO: 9.6 10E3/UL (ref 1.6–8.3)
NEUTROPHILS NFR BLD AUTO: 85 %
NEUTROPHILS NFR BLD AUTO: 85 %
NRBC # BLD AUTO: 0 10E3/UL
NRBC # BLD AUTO: 0 10E3/UL
NRBC BLD AUTO-RTO: 0 /100
NRBC BLD AUTO-RTO: 0 /100
O2/TOTAL GAS SETTING VFR VENT: 4 %
O2/TOTAL GAS SETTING VFR VENT: 4 %
O2/TOTAL GAS SETTING VFR VENT: 40 %
O2/TOTAL GAS SETTING VFR VENT: 40 %
OXYHGB MFR BLD: 97 % (ref 92–100)
OXYHGB MFR BLD: 97 % (ref 92–100)
OXYHGB MFR BLDV: 57 % (ref 70–75)
OXYHGB MFR BLDV: 65 % (ref 70–75)
PCO2 BLD: 29 MM HG (ref 35–45)
PCO2 BLD: 29 MM HG (ref 35–45)
PCO2 BLDV: 34 MM HG (ref 40–50)
PCO2 BLDV: 35 MM HG (ref 40–50)
PH BLD: 7.51 [PH] (ref 7.35–7.45)
PH BLD: 7.54 [PH] (ref 7.35–7.45)
PH BLDV: 7.46 [PH] (ref 7.32–7.43)
PH BLDV: 7.49 [PH] (ref 7.32–7.43)
PHOSPHATE SERPL-MCNC: 3.9 MG/DL (ref 2.5–4.5)
PLATELET # BLD AUTO: 216 10E3/UL (ref 150–450)
PLATELET # BLD AUTO: 216 10E3/UL (ref 150–450)
PLATELET # BLD AUTO: 236 10E3/UL (ref 150–450)
PO2 BLD: 102 MM HG (ref 80–105)
PO2 BLD: 104 MM HG (ref 80–105)
PO2 BLDV: 31 MM HG (ref 25–47)
PO2 BLDV: 35 MM HG (ref 25–47)
POTASSIUM BLD-SCNC: 3.7 MMOL/L (ref 3.4–5.3)
POTASSIUM BLD-SCNC: 3.9 MMOL/L (ref 3.4–5.3)
PROT SERPL-MCNC: 5.9 G/DL (ref 6.8–8.8)
PROT SERPL-MCNC: 6.2 G/DL (ref 6.8–8.8)
RBC # BLD AUTO: 2.49 10E6/UL (ref 4.4–5.9)
RBC # BLD AUTO: 2.53 10E6/UL (ref 4.4–5.9)
RBC # BLD AUTO: 2.61 10E6/UL (ref 4.4–5.9)
SODIUM SERPL-SCNC: 148 MMOL/L (ref 133–144)
SODIUM SERPL-SCNC: 149 MMOL/L (ref 133–144)
SODIUM SERPL-SCNC: 150 MMOL/L (ref 133–144)
SODIUM SERPL-SCNC: 150 MMOL/L (ref 133–144)
SODIUM SERPL-SCNC: 152 MMOL/L (ref 133–144)
SODIUM SERPL-SCNC: 154 MMOL/L (ref 133–144)
UFH PPP CHRO-ACNC: <0.1 IU/ML
WBC # BLD AUTO: 10.8 10E3/UL (ref 4–11)
WBC # BLD AUTO: 11.3 10E3/UL (ref 4–11)
WBC # BLD AUTO: 11.3 10E3/UL (ref 4–11)

## 2022-02-27 PROCEDURE — 250N000013 HC RX MED GY IP 250 OP 250 PS 637: Performed by: INTERNAL MEDICINE

## 2022-02-27 PROCEDURE — 84295 ASSAY OF SERUM SODIUM: CPT | Performed by: INTERNAL MEDICINE

## 2022-02-27 PROCEDURE — 85520 HEPARIN ASSAY: CPT | Performed by: INTERNAL MEDICINE

## 2022-02-27 PROCEDURE — 84100 ASSAY OF PHOSPHORUS: CPT | Performed by: INTERNAL MEDICINE

## 2022-02-27 PROCEDURE — 258N000003 HC RX IP 258 OP 636

## 2022-02-27 PROCEDURE — 94660 CPAP INITIATION&MGMT: CPT

## 2022-02-27 PROCEDURE — 250N000011 HC RX IP 250 OP 636: Performed by: NURSE PRACTITIONER

## 2022-02-27 PROCEDURE — 71045 X-RAY EXAM CHEST 1 VIEW: CPT | Mod: 26 | Performed by: RADIOLOGY

## 2022-02-27 PROCEDURE — 999N000157 HC STATISTIC RCP TIME EA 10 MIN

## 2022-02-27 PROCEDURE — 999N000015 HC STATISTIC ARTERIAL MONITORING DAILY

## 2022-02-27 PROCEDURE — 200N000002 HC R&B ICU UMMC

## 2022-02-27 PROCEDURE — 87205 SMEAR GRAM STAIN: CPT | Performed by: INTERNAL MEDICINE

## 2022-02-27 PROCEDURE — 258N000003 HC RX IP 258 OP 636: Performed by: INTERNAL MEDICINE

## 2022-02-27 PROCEDURE — 99291 CRITICAL CARE FIRST HOUR: CPT | Mod: GC | Performed by: STUDENT IN AN ORGANIZED HEALTH CARE EDUCATION/TRAINING PROGRAM

## 2022-02-27 PROCEDURE — 250N000011 HC RX IP 250 OP 636: Performed by: STUDENT IN AN ORGANIZED HEALTH CARE EDUCATION/TRAINING PROGRAM

## 2022-02-27 PROCEDURE — 85027 COMPLETE CBC AUTOMATED: CPT | Performed by: INTERNAL MEDICINE

## 2022-02-27 PROCEDURE — 250N000013 HC RX MED GY IP 250 OP 250 PS 637: Performed by: STUDENT IN AN ORGANIZED HEALTH CARE EDUCATION/TRAINING PROGRAM

## 2022-02-27 PROCEDURE — 999N000248 HC STATISTIC IV INSERT WITH US BY RN

## 2022-02-27 PROCEDURE — 82805 BLOOD GASES W/O2 SATURATION: CPT | Performed by: INTERNAL MEDICINE

## 2022-02-27 PROCEDURE — 85652 RBC SED RATE AUTOMATED: CPT | Performed by: INTERNAL MEDICINE

## 2022-02-27 PROCEDURE — 250N000009 HC RX 250: Performed by: INTERNAL MEDICINE

## 2022-02-27 PROCEDURE — 71045 X-RAY EXAM CHEST 1 VIEW: CPT

## 2022-02-27 PROCEDURE — 250N000011 HC RX IP 250 OP 636: Performed by: INTERNAL MEDICINE

## 2022-02-27 PROCEDURE — 83735 ASSAY OF MAGNESIUM: CPT | Performed by: INTERNAL MEDICINE

## 2022-02-27 PROCEDURE — 250N000013 HC RX MED GY IP 250 OP 250 PS 637: Performed by: NURSE PRACTITIONER

## 2022-02-27 PROCEDURE — 82330 ASSAY OF CALCIUM: CPT | Performed by: INTERNAL MEDICINE

## 2022-02-27 PROCEDURE — 92526 ORAL FUNCTION THERAPY: CPT | Mod: GN

## 2022-02-27 PROCEDURE — 250N000011 HC RX IP 250 OP 636

## 2022-02-27 PROCEDURE — 86140 C-REACTIVE PROTEIN: CPT | Performed by: INTERNAL MEDICINE

## 2022-02-27 PROCEDURE — 250N000013 HC RX MED GY IP 250 OP 250 PS 637

## 2022-02-27 PROCEDURE — 84155 ASSAY OF PROTEIN SERUM: CPT | Performed by: INTERNAL MEDICINE

## 2022-02-27 PROCEDURE — 258N000003 HC RX IP 258 OP 636: Performed by: NURSE PRACTITIONER

## 2022-02-27 PROCEDURE — 83615 LACTATE (LD) (LDH) ENZYME: CPT | Performed by: INTERNAL MEDICINE

## 2022-02-27 PROCEDURE — 85027 COMPLETE CBC AUTOMATED: CPT

## 2022-02-27 PROCEDURE — 80053 COMPREHEN METABOLIC PANEL: CPT | Performed by: INTERNAL MEDICINE

## 2022-02-27 RX ORDER — HYDRALAZINE HYDROCHLORIDE 50 MG/1
50 TABLET, FILM COATED ORAL EVERY 8 HOURS
Status: DISCONTINUED | OUTPATIENT
Start: 2022-02-27 | End: 2022-03-01

## 2022-02-27 RX ORDER — DEXTROSE MONOHYDRATE 100 MG/ML
INJECTION, SOLUTION INTRAVENOUS CONTINUOUS PRN
Status: DISCONTINUED | OUTPATIENT
Start: 2022-02-27 | End: 2022-03-11 | Stop reason: HOSPADM

## 2022-02-27 RX ORDER — QUETIAPINE FUMARATE 25 MG/1
25 TABLET, FILM COATED ORAL
Status: DISCONTINUED | OUTPATIENT
Start: 2022-02-27 | End: 2022-03-03

## 2022-02-27 RX ORDER — HEPARIN SODIUM 5000 [USP'U]/.5ML
5000 INJECTION, SOLUTION INTRAVENOUS; SUBCUTANEOUS EVERY 8 HOURS
Status: DISCONTINUED | OUTPATIENT
Start: 2022-02-27 | End: 2022-02-27

## 2022-02-27 RX ORDER — FUROSEMIDE 10 MG/ML
80 INJECTION INTRAMUSCULAR; INTRAVENOUS ONCE
Status: COMPLETED | OUTPATIENT
Start: 2022-02-27 | End: 2022-02-27

## 2022-02-27 RX ORDER — HEPARIN SODIUM 10000 [USP'U]/100ML
500 INJECTION, SOLUTION INTRAVENOUS CONTINUOUS
Status: DISCONTINUED | OUTPATIENT
Start: 2022-02-27 | End: 2022-03-11

## 2022-02-27 RX ORDER — ISOSORBIDE DINITRATE 10 MG/1
10 TABLET ORAL EVERY 8 HOURS
Status: DISCONTINUED | OUTPATIENT
Start: 2022-02-27 | End: 2022-03-01

## 2022-02-27 RX ORDER — AMINO AC/PROTEIN HYDR/WHEY PRO 10G-100/30
1 LIQUID (ML) ORAL DAILY
Status: DISCONTINUED | OUTPATIENT
Start: 2022-02-27 | End: 2022-03-11 | Stop reason: CLARIF

## 2022-02-27 RX ORDER — AMIODARONE HYDROCHLORIDE 200 MG/1
200 TABLET ORAL DAILY
Status: DISCONTINUED | OUTPATIENT
Start: 2022-02-28 | End: 2022-02-28

## 2022-02-27 RX ORDER — HEPARIN SODIUM 10000 [USP'U]/100ML
0-5000 INJECTION, SOLUTION INTRAVENOUS CONTINUOUS
Status: DISCONTINUED | OUTPATIENT
Start: 2022-02-27 | End: 2022-02-27

## 2022-02-27 RX ORDER — GUAIFENESIN 600 MG/1
15 TABLET, EXTENDED RELEASE ORAL DAILY
Status: DISCONTINUED | OUTPATIENT
Start: 2022-02-27 | End: 2022-03-10

## 2022-02-27 RX ORDER — AMIODARONE HYDROCHLORIDE 200 MG/1
200 TABLET ORAL DAILY
Status: DISCONTINUED | OUTPATIENT
Start: 2022-02-27 | End: 2022-02-27

## 2022-02-27 RX ORDER — AMINO AC/PROTEIN HYDR/WHEY PRO 10G-100/30
2 LIQUID (ML) ORAL 2 TIMES DAILY
Status: DISCONTINUED | OUTPATIENT
Start: 2022-02-27 | End: 2022-03-11 | Stop reason: CLARIF

## 2022-02-27 RX ORDER — METOLAZONE 5 MG/1
5 TABLET ORAL ONCE
Status: COMPLETED | OUTPATIENT
Start: 2022-02-27 | End: 2022-02-27

## 2022-02-27 RX ORDER — CARVEDILOL 6.25 MG/1
6.25 TABLET ORAL 2 TIMES DAILY WITH MEALS
Status: DISCONTINUED | OUTPATIENT
Start: 2022-02-27 | End: 2022-02-28

## 2022-02-27 RX ORDER — AMOXICILLIN 250 MG
1 CAPSULE ORAL 2 TIMES DAILY
Status: DISCONTINUED | OUTPATIENT
Start: 2022-02-27 | End: 2022-03-02

## 2022-02-27 RX ORDER — QUETIAPINE FUMARATE 50 MG/1
50 TABLET, FILM COATED ORAL EVERY EVENING
Status: DISCONTINUED | OUTPATIENT
Start: 2022-02-27 | End: 2022-03-03

## 2022-02-27 RX ORDER — POLYETHYLENE GLYCOL 3350 17 G/17G
17 POWDER, FOR SOLUTION ORAL 2 TIMES DAILY
Status: DISCONTINUED | OUTPATIENT
Start: 2022-02-27 | End: 2022-03-02

## 2022-02-27 RX ADMIN — AMIODARONE HYDROCHLORIDE 1 MG/MIN: 50 INJECTION, SOLUTION INTRAVENOUS at 04:42

## 2022-02-27 RX ADMIN — HEPARIN SODIUM AND DEXTROSE 500 UNITS/HR: 10000; 5 INJECTION INTRAVENOUS at 09:22

## 2022-02-27 RX ADMIN — TICAGRELOR 90 MG: 90 TABLET ORAL at 19:41

## 2022-02-27 RX ADMIN — SENNOSIDES AND DOCUSATE SODIUM 1 TABLET: 8.6; 5 TABLET ORAL at 07:37

## 2022-02-27 RX ADMIN — CALCIUM CHLORIDE 1 G: 100 INJECTION, SOLUTION INTRAVENOUS at 06:29

## 2022-02-27 RX ADMIN — Medication 10 MG: at 19:41

## 2022-02-27 RX ADMIN — FUROSEMIDE 80 MG: 10 INJECTION, SOLUTION INTRAVENOUS at 11:08

## 2022-02-27 RX ADMIN — METOLAZONE 5 MG: 5 TABLET ORAL at 11:38

## 2022-02-27 RX ADMIN — Medication 1 PACKET: at 14:26

## 2022-02-27 RX ADMIN — CEFAZOLIN SODIUM 2 G: 10 INJECTION, POWDER, FOR SOLUTION INTRAVENOUS at 11:08

## 2022-02-27 RX ADMIN — ISOSORBIDE DINITRATE 10 MG: 10 TABLET ORAL at 23:43

## 2022-02-27 RX ADMIN — ASPIRIN 81 MG CHEWABLE TABLET 81 MG: 81 TABLET CHEWABLE at 07:37

## 2022-02-27 RX ADMIN — HYDROMORPHONE HYDROCHLORIDE 0.2 MG: 0.2 INJECTION, SOLUTION INTRAMUSCULAR; INTRAVENOUS; SUBCUTANEOUS at 09:18

## 2022-02-27 RX ADMIN — Medication 15 ML: at 14:26

## 2022-02-27 RX ADMIN — Medication 2 PACKET: at 19:41

## 2022-02-27 RX ADMIN — HYDRALAZINE HYDROCHLORIDE 50 MG: 50 TABLET, FILM COATED ORAL at 05:31

## 2022-02-27 RX ADMIN — CEFAZOLIN SODIUM 2 G: 10 INJECTION, POWDER, FOR SOLUTION INTRAVENOUS at 23:43

## 2022-02-27 RX ADMIN — METOLAZONE 5 MG: 5 TABLET ORAL at 16:40

## 2022-02-27 RX ADMIN — HYDRALAZINE HYDROCHLORIDE 50 MG: 50 TABLET, FILM COATED ORAL at 13:01

## 2022-02-27 RX ADMIN — ISOSORBIDE DINITRATE 10 MG: 10 TABLET ORAL at 07:37

## 2022-02-27 RX ADMIN — AMIODARONE HYDROCHLORIDE 200 MG: 200 TABLET ORAL at 07:43

## 2022-02-27 RX ADMIN — FUROSEMIDE 80 MG: 10 INJECTION, SOLUTION INTRAVENOUS at 16:40

## 2022-02-27 RX ADMIN — QUETIAPINE FUMARATE 50 MG: 50 TABLET ORAL at 19:41

## 2022-02-27 RX ADMIN — TICAGRELOR 90 MG: 90 TABLET ORAL at 07:37

## 2022-02-27 RX ADMIN — HEPARIN SODIUM AND DEXTROSE 500 UNITS/HR: 10000; 5 INJECTION INTRAVENOUS at 11:20

## 2022-02-27 RX ADMIN — POLYETHYLENE GLYCOL 3350 17 G: 17 POWDER, FOR SOLUTION ORAL at 07:37

## 2022-02-27 RX ADMIN — ISOSORBIDE DINITRATE 10 MG: 10 TABLET ORAL at 15:41

## 2022-02-27 RX ADMIN — NICARDIPINE HYDROCHLORIDE 5 MG/HR: 0.2 INJECTION, SOLUTION INTRAVENOUS at 06:29

## 2022-02-27 ASSESSMENT — ACTIVITIES OF DAILY LIVING (ADL)
ADLS_ACUITY_SCORE: 25
ADLS_ACUITY_SCORE: 23
ADLS_ACUITY_SCORE: 25
ADLS_ACUITY_SCORE: 23
ADLS_ACUITY_SCORE: 25
ADLS_ACUITY_SCORE: 23
ADLS_ACUITY_SCORE: 25
ADLS_ACUITY_SCORE: 25
ADLS_ACUITY_SCORE: 23
ADLS_ACUITY_SCORE: 23
ADLS_ACUITY_SCORE: 25
ADLS_ACUITY_SCORE: 25
ADLS_ACUITY_SCORE: 23
ADLS_ACUITY_SCORE: 25
ADLS_ACUITY_SCORE: 23
ADLS_ACUITY_SCORE: 23
ADLS_ACUITY_SCORE: 25
ADLS_ACUITY_SCORE: 23

## 2022-02-27 NOTE — PHARMACY-CONSULT NOTE
Pharmacy Tube Feeding Consult    Medication reviewed for administration by feeding tube and for potential food/drug interactions.    Recommendation: No changes are needed at this time.     Pharmacy will continue to follow as new medications are ordered.      Jovani Rodríguez, NicholasD, BCCCP

## 2022-02-27 NOTE — PHARMACY
Pharmacy Tube Feeding Consult    Medication reviewed for administration by feeding tube and for potential food/drug interactions.    Recommendation: No changes are needed at this time.     Pharmacy will continue to follow as new medications are ordered.    Ani Munoz, NicholasD

## 2022-02-27 NOTE — PROGRESS NOTES
CLINICAL NUTRITION SERVICES - BRIEF NOTE     Nutrition Prescription    RECOMMENDATIONS FOR MDs/PROVIDERS TO ORDER:  --Additional water flushes and bowel regimen as per primary team.     Recommendations already ordered by Registered Dietitian (RD):  Enteral nutrition support via NGT: -Restart TFs per prior regimen  --Osmolite 1.5 Matteo @ goal of 65ml/hr (1560ml/day) +5 Prosource will provide: 2540 kcals (30 kcal/kg), 152 g PRO (1.8 g/kg), 1188 ml free H20, 317 g CHO, and 0 g fiber daily.    --Current FWF 75 mL every hour per provider (1800 mL/day from flushes + 1188 mL from TF when at goal = total 2988 mL free water daily; adjustments per MD with hypernatremia)    Future/Additional Recommendations:  --Monitor TF tolerance, SLP recs for diet advancement when appropriate, Na trends/flush needs with hypernatremia   For last full RD assessment, see note dated 2/24/22.      NEW FINDINGS   General:     RD consult received to start TFs. Pt previously receiving TF for nutrition support 2/21-2/25.     Was extubated 2/24, and started a full liquid diet 2/25 after SLP eval, however pt is now NPO again. NGT placed and confirmed by AXR.      Labs:    Na 150 (H), FWF Q1hr per provider and restart of TFs should help decrease. Monitor trends    Monitor BUN/creatinine; elevated but steady    K+ and PO4 WNL, stable    LFTs elevated, monitor     BG elevated but acceptable for critical care setting (140-180)    Meds:   Reviewed    INTERVENTIONS  Implementation  Collaboration with other providers - Bedside RN  Enteral Nutrition - Initiate  Feeding tube flush - Per provider  Multivitamin/mineral supplement therapy - Restart    Monitoring/Evaluation  Will continue to monitor and evaluate per protocol.    Sudeep Posada, TOMERN, LD, CNSC   Weekend/Holiday RD pager [phone]

## 2022-02-27 NOTE — PROGRESS NOTES
[facility]  Cardiac ICU Progress Note  February 27, 2022            Key events - last 24 hours:     Today's Changes  - Amiodarone 200mg Daily from ggt  -Patient and family requested DNI order, patient now DNR/DNI                  Assessment & Plan     71 year old male who was admitted on 2/17/2022 s/p VT/VF arrest x8 shocks who was found to have LAD occlusion requiring 2x stents. He presented to local ED with chest pain that started at 1300 (within 1 hr of onset), he had a VT VF arrest in the ED after he was found to have an anterior STEMI on ECG.  Chest compressions and ACLS was started immediately.  Patient underwent 8 shocks for VT VF with ROSC afterwards.  He received 4 doses of epinephrine, 450 mg of amiodarone.  He was transferred to the [facility] for percutaneous intervention for his STEMI. Unfortunately his transfer was delayed because he was a very low dose of Epi (0.01) and the local hospital only had a BLS ambulance available. He arrived at [facility] and was found to have occlusion of the LAD. During the angiogram he was found to have significant hypotension and ectopy, which prompted placement on VA ECMO. PCI with 2x LETY was done with ECMO support. TTM post arrest at 35C, rewarmed on 2/19/22. Decannulated 2/23/22.       Neurology: #Possible neurological injury post cardiac arrest  - CTH on presentation w/o acute intracranial pathology. Day 3 CTH showed indeterminant gray-white matter loss in the R occipital lobe which could be artifact vs true infarct. Strong suspicion for artifact per neurocrit.  - EEG w/o significant abnormalities noted    #Delirium  Normalize awake-sleep cycles. Frequent reorientation. Melatonin and seroquel at bedtime for sleep.     Cardiovascular / Hemodynamics: #Refractory VF arrest requiring 8 shocks.    x2 LETY stents to LAD.    Peripheral V-A ECMO inserted for refractory cardiogenic shock.   TTE: LVEF 10-15%, RV moderately to  severely reduced function. No prior hx of heart failure. No significant valvular disease. Pulsatility significant improved after rewarming, decannulated 2/22/22.  EKG: FLORY V2-V4 consistent with anterior STEMI  --wean pressors/inotropes as needed  --continue ASA 81mg and ticagrelor 90mg BID as of now status post 2 LETY to LAD  --No indication for anticoagulation at this time  --hold lipitor for now given likely hepatic injury during arrest  --hold ACE/ARB for now given likely reduced renal fxn after arrest  --Carvedilol to wean nicardipine ggt  - CT CAP: Large right-sided diffuse effusion s/p chest tube placement.         Pulmonary: #Acute hypoxic respiratory failure  #Pleural effusion - s/p chest tube placement on L side. Thrombus noted in the chest tube 2/22/22, evacuated in the OR during decannulation. Currently has chest wall hematoma which we are monitoring.  ETT in appropriate position.  Now weaning vent requirements.  CXR: Lines in stable position.  --wean vent as able  --daily CXR  --Q6h ABGs for now  --consider scheduled duonebs if signs of lung dz, currently PRN         GI and Nutrition: No known medical hx.   --monitor LFTs  --bowel regimen   -- TF at goal  --GI Prophylaxis: PPI     Speech consulted post extubation, okay to start full liquid diet. Will advance per recommendations.     Renal, Fluid and Electrolytes: #SAHIL, likely ischemic ATN. Monitoring for CRRT needs.  #Hypernatremia - encourage PO intake of free water  #Acute respiratory alkalosis  Monitoring UOP, will start CRRT through circuit if needed.  --maintain K>4 and Mg>2   -- Autodiuresing due to likely recovery phase of ATN   Infectious Disease: #Sputum culture 2/20/22 positive for Staph Lugdunensis - start cefazolin (2/24/22 - now). Plan for 5 day course.  Leukocytosis c/w arrest. Blood cultures collected.   --vancomycin/zosyn x5 days for ECMO - completed course on 2/21/22  --monitor for signs of infection given cooling, lines, and leukocytosis    Hematology and Oncology: #Acute blood loss anemia  #Thrombocytopenia - likely secondary to hemolysis. 2/21/22 4T score 3 (low risk, <5%)  -- Antiplatelet: ASA/ticagrelor for LETY.   --Transfuse for Hgb<7  --DVT PPX: straight rate heparin  -Continue to trend CBCs transfuse to goal hemoglobin of 7   Endocrinology:  No known medical history. BG elevated.  --stress dose steroids due to persistent hypotension   - hydrocort 50mg IV Q8h discontinued   Lines: L femoral arterial and venous ECMO cannulae February 17, 2022 - 2/22/22  R radial arterial line February 17, 2022  ETT February 17, 2022 - 2/24/22  Tapia catheter February 17, 2022 - 2/24/22  OG tube February 17, 2022 - 2/24/22  Restraint: no longer needed  Current lines are required for patient management       Family update by me today: Yes      Code Status: DNR/DNI.      The Pt was discussed and evaluated with Dr. Paulino, attending physician, who agrees with the assessment and plan above.     Cyrus Andrews MD  Cardiology Fellow            Medications:       aspirin  81 mg Per Feeding Tube Daily     ceFAZolin  2 g Intravenous Q12H     hydrALAZINE  50 mg Oral Q8H     isosorbide dinitrate  10 mg Oral Q8H     melatonin  10 mg Oral QPM     polyethylene glycol  17 g Oral BID     QUEtiapine  50 mg Oral QPM     senna-docusate  1 tablet Oral BID     ticagrelor  90 mg Oral BID        Vitals  Temp: 99  F (37.2  C) Temp  Min: 98.1  F (36.7  C)  Max: 99.7  F (37.6  C)  Resp: 26 Resp  Min: 24  Max: 34  SpO2: 99 % SpO2  Min: 89 %  Max: 100 %  Pulse: (!) 123   Pulse  Min: 81  Max: 150    Intake/Output Summary (Last 24 hours) at 2/27/2022 0721  Last data filed at 2/27/2022 0700  Gross per 24 hour   Intake 5963.13 ml   Output 4945 ml   Net 1018.13 ml     VI/O last 3 completed shifts:  In: 6023.63 [I.V.:2693.63; NG/GT:2980]  Out: 4945 [Urine:4875; Chest Tube:70]  Vent Settings:  Resp: 26 SpO2: 99 % O2 Device: BiPAP/CPAP Oxygen Delivery: 4 LPM  FiO2 (%): 40 %  Resp: 26               Physical Exam:   Exam and Labs by System  Neuro:   Exam: A&Ox3 NAD, pleasant, tearful , conversational  General Appearance:   Comfortable, no pain or respiratory distress       Cardiovascular:       Exam:    Normal neck veins  Normal and symmetrical radial, femoral and carotid pulses     Pulm:   Exam:   Symmetrical chest shape and movements with each tidal breath         GI:   Exam:    Non-distended, soft, normal bowel sounds                   Data:        Temp: 99  F (37.2  C) Temp src: AxillaryTemp  Min: 98.1  F (36.7  C)  Max: 99.7  F (37.6  C)   Recent Labs   Lab 02/27/22  0426 02/26/22  1636 02/26/22  0333 02/25/22  1604 02/25/22  0337   WBC 10.8 13.5* 14.5* 16.0* 12.0*   HGB 7.6* 7.9* 6.8* 7.6* 7.4*   HCT 24.2* 25.0* 22.3* 23.8* 23.4*   MCV 96 95 98 96 96   RDW 19.2* 19.5* 18.6* 18.0* 17.6*    206 181 180 123*     Recent Labs   Lab 02/24/22  0946 02/24/22  0331 02/23/22  2210 02/23/22  1552 02/23/22  1002   INR 1.17* 1.12 1.16* 1.16* 1.13   PTT 35 31 32 31 29     Liver Function Studies -   Recent Labs   Lab Test 02/27/22 0426   PROTTOTAL 5.9*   ALBUMIN 2.4*   BILITOTAL 0.9   ALKPHOS 32*   *   *       Last Arterial Blood Gas:  Recent Labs   Lab 02/27/22  0426 02/27/22  0423 02/26/22  2030 02/26/22  1554 02/26/22  0333 02/26/22 0331   PH  --  7.51* 7.53* 7.55*  --  7.52*   PCO2  --  29* 28* 25*  --  29*   PO2  --  104 87 56*  --  81   HCO3  --  23 23 22  --  24   O2PER 40 40 4 0  0   < > 4    < > = values in this interval not displayed.       Venous Blood Gas  Recent Labs   Lab 02/27/22  0426 02/27/22  0423 02/26/22  2030 02/26/22  1554 02/26/22  0333 02/26/22  0331 02/25/22  1606   PHV 7.46*  --   --  7.49* 7.48*  --  7.49*   PCO2V 35*  --   --  31* 33*  --  32*   PO2V 31  --   --  30 35  --  26   HCO3V 25  --   --  24 25  --  25   MERLIN 1.1  --   --  0.5 1.2  --  1.2   O2PER 40 40 4 0  0 4   < > 0    < > = values in this interval not displayed.       Recent Labs   Lab Test  02/22/22  0330 06/30/17  1355 05/12/16  0758   CHOL  --  173 196   HDL  --  42 43   LDL  --  116 125   TRIG 150* 76 140     The pt was discussed and evaluated with Dr. Paulino attending physician, who agrees with the assessment and plan above.     Cyrus Andrews MD  Fellow Physician   Division of Cardiovascular Disease   PGY-4     February 27, 2022

## 2022-02-27 NOTE — PLAN OF CARE
ICU End of Shift Summary. See flowsheets for vital signs and detailed assessment.    Changes this shift: AxOx2, pupils remain unequal. Afebrile, minimal output from CT, nicardipine titrated to keep MAP <80, remains afib w/amio gtt. Placed on CPAP overnight and tolerating well, LS course/wheezy, became diminished throughout at 4AM, CXR done awaiting results at this time. 175ml/h FWF, Na down to 152. Diuresed with 60 of lasix. Ical 4.4, replaced.     Plan: Monitor hemodynamic and respiratory status.     Problem: Fluid Volume Excess  Goal: Fluid Balance  Outcome: Ongoing, Progressing   Goal Outcome Evaluation:

## 2022-02-27 NOTE — PLAN OF CARE
ICU End of Shift Summary. See flowsheets for vital signs and detailed assessment.    Changes this shift: A&O x2 this morning; A&O x4 the remainder of the shift. Pupils remain unequal. Complaints of abd pain; dilaudid given with relief. Afib. Nicardipine turned off around 10AM; pt sustaining MAP < 80. Weak cough. 4L NC. NG with FWF 75mL q1h and TF initiated 25mL at 1600. NPO otherwise but small sips of water on spoon and while sitting up okay per speech eval. Diuresis today; good UOP. Up to chair today for ~2 hours. Wife at bedside.    Plan: Increase TF 10mL to 35mL/hr at 0000 if tolerating. Continue to monitor Na levels and respiratory status. Possible small bore NJ placement tomorrow to acomodate CPAP at night. Continue POC and notify team of any questions.    Problem: Plan of Care - These are the overarching goals to be used throughout the patient stay.    Goal: Plan of Care Review/Shift Note  Description: The Plan of Care Review/Shift note should be completed every shift.  The Outcome Evaluation is a brief statement about your assessment that the patient is improving, declining, or no change.  This information will be displayed automatically on your shift note.  Outcome: Ongoing, Progressing

## 2022-02-28 ENCOUNTER — APPOINTMENT (OUTPATIENT)
Dept: GENERAL RADIOLOGY | Facility: CLINIC | Age: 72
DRG: 003 | End: 2022-02-28
Payer: MEDICARE

## 2022-02-28 ENCOUNTER — APPOINTMENT (OUTPATIENT)
Dept: SPEECH THERAPY | Facility: CLINIC | Age: 72
DRG: 003 | End: 2022-02-28
Payer: MEDICARE

## 2022-02-28 ENCOUNTER — APPOINTMENT (OUTPATIENT)
Dept: CARDIOLOGY | Facility: CLINIC | Age: 72
DRG: 003 | End: 2022-02-28
Payer: MEDICARE

## 2022-02-28 ENCOUNTER — APPOINTMENT (OUTPATIENT)
Dept: OCCUPATIONAL THERAPY | Facility: CLINIC | Age: 72
DRG: 003 | End: 2022-02-28
Payer: MEDICARE

## 2022-02-28 LAB
ALBUMIN SERPL-MCNC: 2.4 G/DL (ref 3.4–5)
ALBUMIN SERPL-MCNC: 2.4 G/DL (ref 3.4–5)
ALP SERPL-CCNC: 37 U/L (ref 40–150)
ALP SERPL-CCNC: 39 U/L (ref 40–150)
ALT SERPL W P-5'-P-CCNC: 56 U/L (ref 0–70)
ALT SERPL W P-5'-P-CCNC: 73 U/L (ref 0–70)
ANION GAP SERPL CALCULATED.3IONS-SCNC: 10 MMOL/L (ref 3–14)
ANION GAP SERPL CALCULATED.3IONS-SCNC: 9 MMOL/L (ref 3–14)
AST SERPL W P-5'-P-CCNC: 82 U/L (ref 0–45)
AST SERPL W P-5'-P-CCNC: 88 U/L (ref 0–45)
ATRIAL RATE - MUSE: 159 BPM
BASE EXCESS BLDA CALC-SCNC: 2.8 MMOL/L (ref -9–1.8)
BASE EXCESS BLDA CALC-SCNC: 3.7 MMOL/L (ref -9–1.8)
BASE EXCESS BLDV CALC-SCNC: 3.6 MMOL/L (ref -7.7–1.9)
BASOPHILS # BLD AUTO: 0 10E3/UL (ref 0–0.2)
BASOPHILS # BLD AUTO: 0 10E3/UL (ref 0–0.2)
BASOPHILS NFR BLD AUTO: 0 %
BASOPHILS NFR BLD AUTO: 0 %
BILIRUB SERPL-MCNC: 0.8 MG/DL (ref 0.2–1.3)
BILIRUB SERPL-MCNC: 0.9 MG/DL (ref 0.2–1.3)
BUN SERPL-MCNC: 109 MG/DL (ref 7–30)
BUN SERPL-MCNC: 111 MG/DL (ref 7–30)
CA-I BLD-MCNC: 4.6 MG/DL (ref 4.4–5.2)
CALCIUM SERPL-MCNC: 8.5 MG/DL (ref 8.5–10.1)
CALCIUM SERPL-MCNC: 8.8 MG/DL (ref 8.5–10.1)
CHLORIDE BLD-SCNC: 115 MMOL/L (ref 94–109)
CHLORIDE BLD-SCNC: 117 MMOL/L (ref 94–109)
CO2 SERPL-SCNC: 24 MMOL/L (ref 20–32)
CO2 SERPL-SCNC: 26 MMOL/L (ref 20–32)
CREAT SERPL-MCNC: 3.53 MG/DL (ref 0.66–1.25)
CREAT SERPL-MCNC: 3.71 MG/DL (ref 0.66–1.25)
CRP SERPL-MCNC: 130 MG/L (ref 0–8)
DIASTOLIC BLOOD PRESSURE - MUSE: NORMAL MMHG
EOSINOPHIL # BLD AUTO: 0.5 10E3/UL (ref 0–0.7)
EOSINOPHIL # BLD AUTO: 0.6 10E3/UL (ref 0–0.7)
EOSINOPHIL NFR BLD AUTO: 5 %
EOSINOPHIL NFR BLD AUTO: 5 %
ERYTHROCYTE [DISTWIDTH] IN BLOOD BY AUTOMATED COUNT: 18.1 % (ref 10–15)
ERYTHROCYTE [DISTWIDTH] IN BLOOD BY AUTOMATED COUNT: 18.1 % (ref 10–15)
ERYTHROCYTE [SEDIMENTATION RATE] IN BLOOD BY WESTERGREN METHOD: 106 MM/HR (ref 0–20)
GFR SERPL CREATININE-BSD FRML MDRD: 16 ML/MIN/1.73M2
GFR SERPL CREATININE-BSD FRML MDRD: 17 ML/MIN/1.73M2
GLUCOSE BLD-MCNC: 130 MG/DL (ref 70–99)
GLUCOSE BLD-MCNC: 138 MG/DL (ref 70–99)
HCO3 BLD-SCNC: 26 MMOL/L (ref 21–28)
HCO3 BLD-SCNC: 26 MMOL/L (ref 21–28)
HCO3 BLDV-SCNC: 28 MMOL/L (ref 21–28)
HCT VFR BLD AUTO: 24.9 % (ref 40–53)
HCT VFR BLD AUTO: 25.6 % (ref 40–53)
HGB BLD-MCNC: 7.9 G/DL (ref 13.3–17.7)
HGB BLD-MCNC: 8 G/DL (ref 13.3–17.7)
IMM GRANULOCYTES # BLD: 0.2 10E3/UL
IMM GRANULOCYTES # BLD: 0.2 10E3/UL
IMM GRANULOCYTES NFR BLD: 2 %
IMM GRANULOCYTES NFR BLD: 2 %
INTERPRETATION ECG - MUSE: NORMAL
LDH SERPL L TO P-CCNC: 467 U/L (ref 85–227)
LVEF ECHO: NORMAL
LYMPHOCYTES # BLD AUTO: 0.7 10E3/UL (ref 0.8–5.3)
LYMPHOCYTES # BLD AUTO: 0.7 10E3/UL (ref 0.8–5.3)
LYMPHOCYTES NFR BLD AUTO: 5 %
LYMPHOCYTES NFR BLD AUTO: 6 %
MAGNESIUM SERPL-MCNC: 3 MG/DL (ref 1.6–2.3)
MCH RBC QN AUTO: 30 PG (ref 26.5–33)
MCH RBC QN AUTO: 30.6 PG (ref 26.5–33)
MCHC RBC AUTO-ENTMCNC: 31.3 G/DL (ref 31.5–36.5)
MCHC RBC AUTO-ENTMCNC: 31.7 G/DL (ref 31.5–36.5)
MCV RBC AUTO: 96 FL (ref 78–100)
MCV RBC AUTO: 97 FL (ref 78–100)
MONOCYTES # BLD AUTO: 0.7 10E3/UL (ref 0–1.3)
MONOCYTES # BLD AUTO: 0.9 10E3/UL (ref 0–1.3)
MONOCYTES NFR BLD AUTO: 7 %
MONOCYTES NFR BLD AUTO: 7 %
NEUTROPHILS # BLD AUTO: 10 10E3/UL (ref 1.6–8.3)
NEUTROPHILS # BLD AUTO: 8.8 10E3/UL (ref 1.6–8.3)
NEUTROPHILS NFR BLD AUTO: 80 %
NEUTROPHILS NFR BLD AUTO: 81 %
NRBC # BLD AUTO: 0 10E3/UL
NRBC # BLD AUTO: 0 10E3/UL
NRBC BLD AUTO-RTO: 0 /100
NRBC BLD AUTO-RTO: 0 /100
O2/TOTAL GAS SETTING VFR VENT: 21 %
O2/TOTAL GAS SETTING VFR VENT: 4 %
O2/TOTAL GAS SETTING VFR VENT: 4 %
OXYHGB MFR BLD: 94 % (ref 92–100)
OXYHGB MFR BLD: 98 % (ref 92–100)
OXYHGB MFR BLDV: 65 % (ref 70–75)
P AXIS - MUSE: NORMAL DEGREES
PCO2 BLD: 30 MM HG (ref 35–45)
PCO2 BLD: 33 MM HG (ref 35–45)
PCO2 BLDV: 39 MM HG (ref 40–50)
PH BLD: 7.5 [PH] (ref 7.35–7.45)
PH BLD: 7.54 [PH] (ref 7.35–7.45)
PH BLDV: 7.47 [PH] (ref 7.32–7.43)
PHOSPHATE SERPL-MCNC: 6.2 MG/DL (ref 2.5–4.5)
PLATELET # BLD AUTO: 241 10E3/UL (ref 150–450)
PLATELET # BLD AUTO: 279 10E3/UL (ref 150–450)
PO2 BLD: 131 MM HG (ref 80–105)
PO2 BLD: 76 MM HG (ref 80–105)
PO2 BLDV: 37 MM HG (ref 25–47)
POTASSIUM BLD-SCNC: 3.6 MMOL/L (ref 3.4–5.3)
POTASSIUM BLD-SCNC: 3.7 MMOL/L (ref 3.4–5.3)
PR INTERVAL - MUSE: NORMAL MS
PROT SERPL-MCNC: 6 G/DL (ref 6.8–8.8)
PROT SERPL-MCNC: 6.2 G/DL (ref 6.8–8.8)
QRS DURATION - MUSE: 90 MS
QT - MUSE: 302 MS
QTC - MUSE: 464 MS
R AXIS - MUSE: 83 DEGREES
RBC # BLD AUTO: 2.58 10E6/UL (ref 4.4–5.9)
RBC # BLD AUTO: 2.67 10E6/UL (ref 4.4–5.9)
SODIUM SERPL-SCNC: 148 MMOL/L (ref 133–144)
SODIUM SERPL-SCNC: 150 MMOL/L (ref 133–144)
SODIUM SERPL-SCNC: 151 MMOL/L (ref 133–144)
SODIUM SERPL-SCNC: 151 MMOL/L (ref 133–144)
SYSTOLIC BLOOD PRESSURE - MUSE: NORMAL MMHG
T AXIS - MUSE: 16 DEGREES
VENTRICULAR RATE- MUSE: 142 BPM
WBC # BLD AUTO: 10.9 10E3/UL (ref 4–11)
WBC # BLD AUTO: 12.3 10E3/UL (ref 4–11)

## 2022-02-28 PROCEDURE — 255N000002 HC RX 255 OP 636: Performed by: STUDENT IN AN ORGANIZED HEALTH CARE EDUCATION/TRAINING PROGRAM

## 2022-02-28 PROCEDURE — 82330 ASSAY OF CALCIUM: CPT | Performed by: INTERNAL MEDICINE

## 2022-02-28 PROCEDURE — 86140 C-REACTIVE PROTEIN: CPT | Performed by: INTERNAL MEDICINE

## 2022-02-28 PROCEDURE — 250N000011 HC RX IP 250 OP 636: Performed by: NURSE PRACTITIONER

## 2022-02-28 PROCEDURE — 82805 BLOOD GASES W/O2 SATURATION: CPT | Performed by: INTERNAL MEDICINE

## 2022-02-28 PROCEDURE — 94660 CPAP INITIATION&MGMT: CPT

## 2022-02-28 PROCEDURE — 93308 TTE F-UP OR LMTD: CPT | Mod: 26 | Performed by: INTERNAL MEDICINE

## 2022-02-28 PROCEDURE — 250N000013 HC RX MED GY IP 250 OP 250 PS 637

## 2022-02-28 PROCEDURE — 71045 X-RAY EXAM CHEST 1 VIEW: CPT

## 2022-02-28 PROCEDURE — 93325 DOPPLER ECHO COLOR FLOW MAPG: CPT

## 2022-02-28 PROCEDURE — 84100 ASSAY OF PHOSPHORUS: CPT | Performed by: INTERNAL MEDICINE

## 2022-02-28 PROCEDURE — 999N000065 XR ABDOMEN PORT 1 VIEWS

## 2022-02-28 PROCEDURE — 92526 ORAL FUNCTION THERAPY: CPT | Mod: GN

## 2022-02-28 PROCEDURE — 93321 DOPPLER ECHO F-UP/LMTD STD: CPT | Mod: 26 | Performed by: INTERNAL MEDICINE

## 2022-02-28 PROCEDURE — 85652 RBC SED RATE AUTOMATED: CPT | Performed by: INTERNAL MEDICINE

## 2022-02-28 PROCEDURE — 272N000452 HC KIT SHRLOCK 5FR POWER PICC TRIPLE LUMEN

## 2022-02-28 PROCEDURE — 74018 RADEX ABDOMEN 1 VIEW: CPT | Mod: 26 | Performed by: RADIOLOGY

## 2022-02-28 PROCEDURE — 84295 ASSAY OF SERUM SODIUM: CPT | Performed by: INTERNAL MEDICINE

## 2022-02-28 PROCEDURE — 250N000013 HC RX MED GY IP 250 OP 250 PS 637: Performed by: NURSE PRACTITIONER

## 2022-02-28 PROCEDURE — 93325 DOPPLER ECHO COLOR FLOW MAPG: CPT | Mod: 26 | Performed by: INTERNAL MEDICINE

## 2022-02-28 PROCEDURE — 85018 HEMOGLOBIN: CPT | Performed by: INTERNAL MEDICINE

## 2022-02-28 PROCEDURE — 250N000013 HC RX MED GY IP 250 OP 250 PS 637: Performed by: INTERNAL MEDICINE

## 2022-02-28 PROCEDURE — 97530 THERAPEUTIC ACTIVITIES: CPT | Mod: GO | Performed by: OCCUPATIONAL THERAPIST

## 2022-02-28 PROCEDURE — 250N000013 HC RX MED GY IP 250 OP 250 PS 637: Performed by: STUDENT IN AN ORGANIZED HEALTH CARE EDUCATION/TRAINING PROGRAM

## 2022-02-28 PROCEDURE — 272N000473 HC KIT, VPS RHYTHM STYLET

## 2022-02-28 PROCEDURE — 99291 CRITICAL CARE FIRST HOUR: CPT | Mod: GC | Performed by: INTERNAL MEDICINE

## 2022-02-28 PROCEDURE — 80053 COMPREHEN METABOLIC PANEL: CPT | Performed by: INTERNAL MEDICINE

## 2022-02-28 PROCEDURE — 71045 X-RAY EXAM CHEST 1 VIEW: CPT | Mod: 26 | Performed by: RADIOLOGY

## 2022-02-28 PROCEDURE — 250N000011 HC RX IP 250 OP 636: Performed by: INTERNAL MEDICINE

## 2022-02-28 PROCEDURE — 83615 LACTATE (LD) (LDH) ENZYME: CPT | Performed by: INTERNAL MEDICINE

## 2022-02-28 PROCEDURE — 93321 DOPPLER ECHO F-UP/LMTD STD: CPT

## 2022-02-28 PROCEDURE — 93010 ELECTROCARDIOGRAM REPORT: CPT | Performed by: INTERNAL MEDICINE

## 2022-02-28 PROCEDURE — 84155 ASSAY OF PROTEIN SERUM: CPT | Performed by: INTERNAL MEDICINE

## 2022-02-28 PROCEDURE — 258N000003 HC RX IP 258 OP 636: Performed by: INTERNAL MEDICINE

## 2022-02-28 PROCEDURE — 97535 SELF CARE MNGMENT TRAINING: CPT | Mod: GO | Performed by: OCCUPATIONAL THERAPIST

## 2022-02-28 PROCEDURE — 36569 INSJ PICC 5 YR+ W/O IMAGING: CPT

## 2022-02-28 PROCEDURE — 83735 ASSAY OF MAGNESIUM: CPT | Performed by: INTERNAL MEDICINE

## 2022-02-28 PROCEDURE — 93005 ELECTROCARDIOGRAM TRACING: CPT

## 2022-02-28 PROCEDURE — 258N000003 HC RX IP 258 OP 636: Performed by: NURSE PRACTITIONER

## 2022-02-28 PROCEDURE — 120N000002 HC R&B MED SURG/OB UMMC

## 2022-02-28 PROCEDURE — G0463 HOSPITAL OUTPT CLINIC VISIT: HCPCS

## 2022-02-28 RX ORDER — HEPARIN SODIUM,PORCINE 10 UNIT/ML
5-20 VIAL (ML) INTRAVENOUS
Status: DISCONTINUED | OUTPATIENT
Start: 2022-02-28 | End: 2022-03-11 | Stop reason: HOSPADM

## 2022-02-28 RX ORDER — SALIVA STIMULANT COMB. NO.3
2 SPRAY, NON-AEROSOL (ML) MUCOUS MEMBRANE 4 TIMES DAILY PRN
Status: DISCONTINUED | OUTPATIENT
Start: 2022-02-28 | End: 2022-03-11 | Stop reason: HOSPADM

## 2022-02-28 RX ORDER — HEPARIN SODIUM,PORCINE 10 UNIT/ML
5-20 VIAL (ML) INTRAVENOUS EVERY 24 HOURS
Status: DISCONTINUED | OUTPATIENT
Start: 2022-02-28 | End: 2022-03-11 | Stop reason: HOSPADM

## 2022-02-28 RX ORDER — LIDOCAINE 40 MG/G
CREAM TOPICAL
Status: DISCONTINUED | OUTPATIENT
Start: 2022-02-28 | End: 2022-03-09 | Stop reason: CLARIF

## 2022-02-28 RX ADMIN — AMIODARONE HYDROCHLORIDE 1 MG/MIN: 50 INJECTION, SOLUTION INTRAVENOUS at 10:15

## 2022-02-28 RX ADMIN — QUETIAPINE FUMARATE 25 MG: 25 TABLET ORAL at 21:42

## 2022-02-28 RX ADMIN — Medication 2 SPRAY: at 20:20

## 2022-02-28 RX ADMIN — ISOSORBIDE DINITRATE 10 MG: 10 TABLET ORAL at 15:58

## 2022-02-28 RX ADMIN — DOCUSATE SODIUM 50 MG AND SENNOSIDES 8.6 MG 1 TABLET: 8.6; 5 TABLET, FILM COATED ORAL at 08:36

## 2022-02-28 RX ADMIN — Medication 2 PACKET: at 20:21

## 2022-02-28 RX ADMIN — HYDRALAZINE HYDROCHLORIDE 50 MG: 50 TABLET, FILM COATED ORAL at 13:37

## 2022-02-28 RX ADMIN — CEFAZOLIN SODIUM 2 G: 10 INJECTION, POWDER, FOR SOLUTION INTRAVENOUS at 12:06

## 2022-02-28 RX ADMIN — Medication 12.5 MG: at 20:19

## 2022-02-28 RX ADMIN — HUMAN ALBUMIN MICROSPHERES AND PERFLUTREN 5 ML: 10; .22 INJECTION, SOLUTION INTRAVENOUS at 08:33

## 2022-02-28 RX ADMIN — Medication 2 PACKET: at 08:37

## 2022-02-28 RX ADMIN — POLYETHYLENE GLYCOL 3350 17 G: 17 POWDER, FOR SOLUTION ORAL at 08:37

## 2022-02-28 RX ADMIN — Medication 12.5 MG: at 12:06

## 2022-02-28 RX ADMIN — TICAGRELOR 90 MG: 90 TABLET ORAL at 20:19

## 2022-02-28 RX ADMIN — Medication 10 MG: at 20:19

## 2022-02-28 RX ADMIN — QUETIAPINE FUMARATE 50 MG: 50 TABLET ORAL at 20:19

## 2022-02-28 RX ADMIN — Medication 15 ML: at 08:36

## 2022-02-28 RX ADMIN — HYDRALAZINE HYDROCHLORIDE 50 MG: 50 TABLET, FILM COATED ORAL at 05:45

## 2022-02-28 RX ADMIN — ASPIRIN 81 MG CHEWABLE TABLET 81 MG: 81 TABLET CHEWABLE at 08:36

## 2022-02-28 RX ADMIN — TICAGRELOR 90 MG: 90 TABLET ORAL at 08:36

## 2022-02-28 RX ADMIN — HYDRALAZINE HYDROCHLORIDE 50 MG: 50 TABLET, FILM COATED ORAL at 21:42

## 2022-02-28 RX ADMIN — ISOSORBIDE DINITRATE 10 MG: 10 TABLET ORAL at 08:36

## 2022-02-28 ASSESSMENT — ACTIVITIES OF DAILY LIVING (ADL)
ADLS_ACUITY_SCORE: 23

## 2022-02-28 NOTE — PROGRESS NOTES
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   Status:  Follow up assessment, healed on     TREATMENT PLAN  tongue wound: Every 2 hours  Rotate ETT and perform oral cares per protocol    Orders Reviewed and Written  WO Nurse follow-up plan:weekly  Nursing to notify the Provider(s) and re-consult the WO Nurse if wound(s) deteriorates or new skin concern.    Pressure Injury Prevention Interventions In Place:  Pillows for repositioning, Heel off-loading boots, Pillows under calves for heel suspension and Mepilex Sacral Dressing   Current support surface: Standard  Low air loss mattress         Pressure injury interventions:  Wound location:  sorin cleft  Cleanse with MicroKlenz moistened gauze   Pat dry.   Apply no sting barrier film to the anuja wound skin and allow to dry   Cover with  Sacral  Mepilex dressing, carefully molding into place  Paint the edges of the mepilex dressing with no-sting barrier film  Change every third day and as needed          Patient History:   According to medical record: 71 year old male who was admitted on 2022 s/p VT/VF arrest x8 shocks who was found to have LAD occlusion requiring 2x stents. VT VF arrest in the ED after he was found to have an anterior STEMI on ECG.  Chest compressions and ACLS was started immediately.  transferred to the [facility] for percutaneous intervention for his STEMI.  arrived at [facility] and was found to have occlusion of the LAD. During the angiogram he was found to have significant hypotension and ectopy, which prompted placement on VA ECMO.  Now decannulated    Current Diet / Nutrition:      Orders Placed This Encounter      NPO for Medical/Clinical Reasons Except for: NPO but receiving Tube Feeding  tube feedings    Output:    I/O last 3 completed shifts:  In: 3762.75 [I.V.:687.75; NG/GT:2630]  Out: 8190 [Urine:8110; Chest Tube:80]  Containment: of urine/stool: Urinary Catheter    Risk Assessment:   Sensory Perception: 3-->slightly limited  Moisture: 3-->occasionally moist  Activity: 2-->chairfast  Mobility: 2-->very limited  Nutrition: 3-->adequate  Friction and Shear: 2-->potential problem  Luis E Score: 15    Labs:    Recent Labs   Lab 22  0336 22  1634 22  0946   ALBUMIN 2.4*   < > 2.4*   HGB 7.9*   < > 7.4*   INR  --   --  1.17*   WBC 10.9   < > 10.0   .0*   < >  --     < > = values in this interval not displayed.       Focused Assessment: .  Pressure Injury Present::Yes   Physical Exam  Skin inspection: focused mouth  Wound Location:  Left tongue          Date of Photos:  and 22 and   Wound History: wound present on admission  Measurements (length x width x depth, in cm) 1 cm x 0.5 cm  x  0.0 cm   Wound Base:  Eroded mucosa   Palpation of the wound bed: normal   Periwound skin: scattered mucosal wounds on tip of tongue  Color: normal and consistent with surrounding tissue  Temperature: normal   Drainage:, dried coagulum   Odor: none  Pain: denies ,       Bilateral fleshy buttocks - healed    Date of photo:    History:  Present on admission   Wound is on fleshy buttock  cleft walls, not over bone.  Appear to be from skin to skin pressure.      Wound base:  100% intact- faded discolored skin

## 2022-02-28 NOTE — PROGRESS NOTES
[facility]  Cardiac ICU Progress Note  February 27, 2022            Key events - last 24 hours:     Subjective:  No acute events this morning. He would like to eat if possible. Alert and oriented x3. Overnight had Afib with HR in the 130-140s.    Today's Changes  - Resume Amio gtt at 1mg/min, thus far 4900g load thus far.  - Discuss chest tube removal with thoracic this AM  - repeat echo  - start metop 12.5mg BID  - change NG to small feeding tube with dietician  - repeat speech evaluation today  - Q6H Na checks  - ICU CAM assessment of delirium  - Lines: remove hopper, likely remove arterial line and triple lumen.  - transfer to floor.                Assessment & Plan     71 year old male who was admitted on 2/17/2022 s/p VT/VF arrest x8 shocks who was found to have LAD occlusion requiring 2x stents. He presented to local ED with chest pain that started at 1300 (within 1 hr of onset), he had a VT VF arrest in the ED after he was found to have an anterior STEMI on ECG.  Chest compressions and ACLS was started immediately.  Patient underwent 8 shocks for VT VF with ROSC afterwards.  He received 4 doses of epinephrine, 450 mg of amiodarone.  He was transferred to the [facility] for percutaneous intervention for his STEMI. Unfortunately his transfer was delayed because he was a very low dose of Epi (0.01) and the local hospital only had a BLS ambulance available. He arrived at [facility] and was found to have occlusion of the LAD. During the angiogram he was found to have significant hypotension and ectopy, which prompted placement on VA ECMO. PCI with 2x LETY was done with ECMO support. TTM post arrest at 35C, rewarmed on 2/19/22. Decannulated 2/23/22.       Neurology: #Possible neurological injury post cardiac arrest  - CTH on presentation w/o acute intracranial pathology. Day 3 CTH showed indeterminant gray-white matter loss in the R occipital lobe which could be artifact  vs true infarct. Strong suspicion for artifact per neurocrit.  - EEG w/o significant abnormalities noted    #Delirium  Normalize awake-sleep cycles. Frequent reorientation. Melatonin and seroquel at bedtime for sleep.  - ICU Cam today for delirium assessment     Cardiovascular / Hemodynamics: #Refractory VF arrest requiring 8 shocks  x2 LETY stents to LAD.    Peripheral V-A ECMO inserted for refractory cardiogenic shock.   TTE: LVEF 10-15%, RV moderately to severely reduced function. No prior hx of heart failure. No significant valvular disease. Pulsatility significant improved after rewarming, decannulated 2/22/22.  EKG: FLORY V2-V4 consistent with anterior STEMI  --wean pressors/inotropes as needed  --continue ASA 81mg and ticagrelor 90mg BID as of now status post 2 LETY to LAD  --No indication for anticoagulation at this time  --hold lipitor for now given likely hepatic injury during arrest  --hold ACE/ARB for now given likely reduced renal fxn after arrest  --Carvedilol to wean nicardipine ggt  -- Start Metop 12.5mg BID  - CT CAP: Large right-sided diffuse effusion s/p chest tube placement.      Pulmonary: #Acute hypoxic respiratory failure  #Pleural effusion - s/p chest tube placement on L side. Thrombus noted in the chest tube 2/22/22, evacuated in the OR during decannulation. Currently has chest wall hematoma which we are monitoring.  ETT in appropriate position.  Now weaning vent requirements.  CXR: Lines in stable position.  --wean vent as able  --daily CXR  --Q6h ABGs for now  --consider scheduled duonebs if signs of lung dz, currently PRN    --Discuss with thoracic regarding removal of chest tube     GI and Nutrition: No known medical hx.   --monitor LFTs  --bowel regimen   -- TF at goal  --GI Prophylaxis: PPI     Speech to re-eval diet goals today. In the interim change NG to small feeding tube with dietician.      Renal, Fluid and Electrolytes: #SAHIL, likely ischemic ATN. Monitoring for CRRT  needs.  #Hypernatremia - encourage PO intake of free water  #Acute respiratory alkalosis  Monitoring UOP, will start CRRT through circuit if needed.  --maintain K>4 and Mg>2      Infectious Disease: #Sputum culture 2/20/22 positive for Staph Lugdunensis - cefazolin (2/24/22 - 2/28/22). Plan for 5 day course.  Leukocytosis c/w arrest. Blood cultures collected.   --vancomycin/zosyn x5 days for ECMO - completed course on 2/21/22  --Monitor for signs of infection given cooling, lines, and leukocytosis   Hematology and Oncology: #Acute blood loss anemia  #Thrombocytopenia - likely secondary to hemolysis. 2/21/22 4T score 3 (low risk, <5%)  -- Antiplatelet: ASA/ticagrelor for LETY.   --Transfuse for Hgb<7  --DVT PPX: straight rate heparin  -Continue to trend CBCs transfuse to goal hemoglobin of 7   Endocrinology:  No known medical history. BG elevated.  --stress dose steroids due to persistent hypotension   - hydrocort 50mg IV Q8h discontinued   Lines: L femoral arterial and venous ECMO cannulae February 17, 2022 - 2/22/22  R radial arterial line February 17, 2022  ETT February 17, 2022 - 2/24/22  Tapia catheter February 17, 2022 - 2/24/22  OG tube February 17, 2022 - 2/24/22  Restraint: no longer needed  Current lines are required for patient management       Family update by me today: Yes      Code Status: DNR/DNI      The Pt was discussed and evaluated with Dr. Murcia, attending physician, who agrees with the assessment and plan above.     Mohsan Chaudhry, MD  Cardiology Fellow            Medications:       aspirin  81 mg Per Feeding Tube Daily     [Held by provider] carvedilol  6.25 mg Oral BID w/meals     hydrALAZINE  50 mg Oral or Feeding Tube Q8H     isosorbide dinitrate  10 mg Oral or Feeding Tube Q8H     melatonin  10 mg Oral or Feeding Tube QPM     metoprolol tartrate  12.5 mg Oral BID     multivitamins w/minerals  15 mL Per Feeding Tube Daily     polyethylene glycol  17 g Oral or Feeding Tube BID     protein  modular  2 packet Per Feeding Tube BID    And     protein modular  1 packet Per Feeding Tube Daily     QUEtiapine  50 mg Oral or Feeding Tube QPM     senna-docusate  1 tablet Oral or Feeding Tube BID     sodium chloride (PF)  10 mL Intracatheter Q8H     ticagrelor  90 mg Oral or Feeding Tube BID      Vitals  Temp: 97.6  F (36.4  C) Temp  Min: 97.6  F (36.4  C)  Max: 99.2  F (37.3  C)  Resp: 20 Resp  Min: 20  Max: 30  SpO2: 96 % SpO2  Min: 94 %  Max: 100 %  Pulse: 96 Pulse  Min: 85  Max: 155    Intake/Output Summary (Last 24 hours) at 2/27/2022 0721  Last data filed at 2/27/2022 0700  Gross per 24 hour   Intake 5963.13 ml   Output 4945 ml   Net 1018.13 ml     VI/O last 3 completed shifts:  In: 3762.75 [I.V.:687.75; NG/GT:2630]  Out: 8190 [Urine:8110; Chest Tube:80]  Vent Settings:  Resp: 20 SpO2: 96 % O2 Device: Nasal cannula Oxygen Delivery: 4 LPM  FiO2 (%): 40 %  Resp: 20              Physical Exam:     GENERAL: NAD  HEENT: PERRL  CV: S1/S2 heard without murmur   RESPIRATORY: CTAB, no increased WOB  GI: Soft and non distended with normoactive bowel sounds present in all quadrants. No tenderness, rebound, guarding. No palpable organomegaly.   EXTREMITIES: No peripheral edema. 2+ bilateral pedal pulses. Trace edema bilaterally in LE  NEUROLOGIC: Alert. Oriented x2. Following commands. CN II-XII intact. Moving all extremities.  MUSCULOSKELETAL: No joint swelling or tenderness.   SKIN: No jaundice. No acute rashes or lesions. No hematoma at groin sites.         Data:     Recent Labs   Lab 02/28/22  0336 02/27/22  1556 02/27/22  1137 02/27/22  0426 02/26/22  1636   WBC 10.9 11.3* 11.3* 10.8 13.5*   HGB 7.9* 7.9* 7.6* 7.6* 7.9*   HCT 24.9* 25.0* 24.1* 24.2* 25.0*   MCV 97 96 97 96 95   RDW 18.1* 18.8* 19.0* 19.2* 19.5*    236 216 216 206     Recent Labs   Lab 02/24/22  0946 02/24/22  0331 02/23/22  2210 02/23/22  1552 02/23/22  1002   INR 1.17* 1.12 1.16* 1.16* 1.13   PTT 35 31 32 31 29     Liver Function Studies  -   Recent Labs   Lab Test 02/27/22 0426   PROTTOTAL 5.9*   ALBUMIN 2.4*   BILITOTAL 0.9   ALKPHOS 32*   *   *       Last Arterial Blood Gas:  Recent Labs   Lab 02/28/22 0336 02/28/22 0334 02/27/22 1624 02/27/22 1556 02/27/22 0426 02/27/22 0423 02/26/22 2030   PH  --  7.50* 7.54*  --   --  7.51* 7.53*   PCO2  --  33* 29*  --   --  29* 28*   PO2  --  131* 102  --   --  104 87   HCO3  --  26 24  --   --  23 23   O2PER 4 4 4 4   < > 40 4    < > = values in this interval not displayed.       Venous Blood Gas  Recent Labs   Lab 02/28/22 0336 02/28/22 0334 02/27/22 1624 02/27/22 1556 02/27/22 0426 02/26/22 2030 02/26/22 1554   PHV 7.47*  --   --  7.49* 7.46*  --  7.49*   PCO2V 39*  --   --  34* 35*  --  31*   PO2V 37  --   --  35 31  --  30   HCO3V 28  --   --  26 25  --  24   MERLIN 3.6*  --   --  2.5* 1.1  --  0.5   O2PER 4 4 4 4 40   < > 0  0    < > = values in this interval not displayed.       Recent Labs   Lab Test 02/22/22  0330 06/30/17  1355 05/12/16  0758   CHOL  --  173 196   HDL  --  42 43   LDL  --  116 125   TRIG 150* 76 140

## 2022-02-28 NOTE — PROCEDURES
[facility]    Double Lumen Midline Placement    Date/Time: 2/28/2022 2:18 PM  Performed by: Saul Enamorado RN  Authorized by: Chaudhry, Mohsan, MD   Indications: vascular access      UNIVERSAL PROTOCOL   Site Marked: Yes  Prior Images Obtained and Reviewed:  Yes  Required items: Required blood products, implants, devices and special equipment available    Patient identity confirmed:  Verbally with patient, arm band, provided demographic data, hospital-assigned identification number and anonymous protocol, patient vented/unresponsive  NA - No sedation, light sedation, or local anesthesia  Confirmation Checklist:  Patient's identity using two indicators, relevant allergies, procedure was appropriate and matched the consent or emergent situation and correct equipment/implants were available  Time out: Immediately prior to the procedure a time out was called (Saul)    Universal Protocol: the Joint Commission Universal Protocol was followed    Preparation: Patient was prepped and draped in usual sterile fashion       ANESTHESIA    Anesthesia: Local infiltration  Local Anesthetic:  Lidocaine 1% without epinephrine  Anesthetic Total (mL):  5      SEDATION    Patient Sedated: No        Preparation: skin prepped with 2% chlorhexidine  Skin prep agent: skin prep agent completely dried prior to procedure  Sterile barriers: maximum sterile barriers were used: cap, mask, sterile gown, sterile gloves, and large sterile sheet  Hand hygiene: hand hygiene performed prior to central venous catheter insertion  Type of line used: Midline  Catheter type: double lumen  Lumen type: non-valved  Catheter size: 5 Fr  Brand: Bard  Lot number: [id number]  Placement method: venipuncture, MST and ultrasound  Number of attempts: 1  Successful placement: yes  Orientation: left  Location: brachial vein (medial) (0.59cm)  Arm circumference: adults 10 cm  Extremity circumference: 28  Visible catheter  length: 1  Total catheter length: 20  Dressing and securement: blood removed, blood cleaned with CHG, chlorhexidine patch applied, secure lock, site cleaned and securement device  Post procedure assessment: blood return through all ports and free fluid flow  PROCEDURE   Patient Tolerance:  Patient tolerated the procedure well with no immediate complicationsDescribe Procedure: Blood return on all ports.Midline okay to use.

## 2022-02-28 NOTE — PLAN OF CARE
ICU End of Shift Summary. See flowsheets for vital signs and detailed assessment.    Changes this shift: AxOx4 throughout shift, became increasingly confused at 5AM, pupils more equal. Tmax 99.2, minimal output from CT. Remains in afib, rates up to 150s, hypotensive at times with MAP down to 55, team notified of all changes. On CPAP when sleeping, on 4L NC when awake, cough remains weak but productive. TF advanced to 35mL/hr. MITTS placed due to confusion and pt attempting to remove NG. Na up to 150, no changes to FWF, remains at 75mL/hr.    Plan: Continue POC and notify provider of changes.     Problem: Risk for Delirium  Goal: Optimal Coping  Outcome: Ongoing, Progressing  Intervention: Optimize Psychosocial Adjustment to Delirium  Recent Flowsheet Documentation  Taken 2/28/2022 0000 by Ky Ricks RN  Family/Support System Care: caregiver stress acknowledged  Taken 2/27/2022 2000 by Ky Ricks RN  Family/Support System Care: caregiver stress acknowledged

## 2022-02-28 NOTE — PROVIDER NOTIFICATION
-150's sustaining, afib, intermittently increasing to 150-160's at rest, Dr. Zaldivar? CSI notified, reports understanding and ok with it. Jaylene PO due at 0800.

## 2022-03-01 ENCOUNTER — APPOINTMENT (OUTPATIENT)
Dept: GENERAL RADIOLOGY | Facility: CLINIC | Age: 72
DRG: 003 | End: 2022-03-01
Payer: MEDICARE

## 2022-03-01 ENCOUNTER — APPOINTMENT (OUTPATIENT)
Dept: PHYSICAL THERAPY | Facility: CLINIC | Age: 72
DRG: 003 | End: 2022-03-01
Payer: MEDICARE

## 2022-03-01 ENCOUNTER — APPOINTMENT (OUTPATIENT)
Dept: OCCUPATIONAL THERAPY | Facility: CLINIC | Age: 72
DRG: 003 | End: 2022-03-01
Payer: MEDICARE

## 2022-03-01 ENCOUNTER — APPOINTMENT (OUTPATIENT)
Dept: SPEECH THERAPY | Facility: CLINIC | Age: 72
DRG: 003 | End: 2022-03-01
Payer: MEDICARE

## 2022-03-01 LAB
ALBUMIN SERPL-MCNC: 2.4 G/DL (ref 3.4–5)
ALP SERPL-CCNC: 43 U/L (ref 40–150)
ALT SERPL W P-5'-P-CCNC: 51 U/L (ref 0–70)
ANION GAP SERPL CALCULATED.3IONS-SCNC: 6 MMOL/L (ref 3–14)
AST SERPL W P-5'-P-CCNC: 69 U/L (ref 0–45)
BACTERIA ASPIRATE CULT: NORMAL
BASE EXCESS BLDA CALC-SCNC: 2.2 MMOL/L (ref -9–1.8)
BASE EXCESS BLDV CALC-SCNC: 2.6 MMOL/L (ref -7.7–1.9)
BASOPHILS # BLD AUTO: 0 10E3/UL (ref 0–0.2)
BASOPHILS NFR BLD AUTO: 0 %
BILIRUB SERPL-MCNC: 0.8 MG/DL (ref 0.2–1.3)
BUN SERPL-MCNC: 105 MG/DL (ref 7–30)
CA-I BLD-MCNC: 4.6 MG/DL (ref 4.4–5.2)
CALCIUM SERPL-MCNC: 8.7 MG/DL (ref 8.5–10.1)
CHLORIDE BLD-SCNC: 116 MMOL/L (ref 94–109)
CO2 SERPL-SCNC: 26 MMOL/L (ref 20–32)
CREAT SERPL-MCNC: 3.15 MG/DL (ref 0.66–1.25)
CRP SERPL-MCNC: 110 MG/L (ref 0–8)
EOSINOPHIL # BLD AUTO: 0.7 10E3/UL (ref 0–0.7)
EOSINOPHIL NFR BLD AUTO: 6 %
ERYTHROCYTE [DISTWIDTH] IN BLOOD BY AUTOMATED COUNT: 17.8 % (ref 10–15)
ERYTHROCYTE [SEDIMENTATION RATE] IN BLOOD BY WESTERGREN METHOD: 105 MM/HR (ref 0–20)
GFR SERPL CREATININE-BSD FRML MDRD: 20 ML/MIN/1.73M2
GLUCOSE BLD-MCNC: 150 MG/DL (ref 70–99)
GRAM STAIN RESULT: NORMAL
GRAM STAIN RESULT: NORMAL
HCO3 BLD-SCNC: 26 MMOL/L (ref 21–28)
HCO3 BLDV-SCNC: 27 MMOL/L (ref 21–28)
HCT VFR BLD AUTO: 26.1 % (ref 40–53)
HGB BLD-MCNC: 8.1 G/DL (ref 13.3–17.7)
IMM GRANULOCYTES # BLD: 0.2 10E3/UL
IMM GRANULOCYTES NFR BLD: 1 %
LDH SERPL L TO P-CCNC: 440 U/L (ref 85–227)
LYMPHOCYTES # BLD AUTO: 0.7 10E3/UL (ref 0.8–5.3)
LYMPHOCYTES NFR BLD AUTO: 6 %
MAGNESIUM SERPL-MCNC: 3 MG/DL (ref 1.6–2.3)
MCH RBC QN AUTO: 29.7 PG (ref 26.5–33)
MCHC RBC AUTO-ENTMCNC: 31 G/DL (ref 31.5–36.5)
MCV RBC AUTO: 96 FL (ref 78–100)
MONOCYTES # BLD AUTO: 0.7 10E3/UL (ref 0–1.3)
MONOCYTES NFR BLD AUTO: 6 %
NEUTROPHILS # BLD AUTO: 8.9 10E3/UL (ref 1.6–8.3)
NEUTROPHILS NFR BLD AUTO: 81 %
NRBC # BLD AUTO: 0 10E3/UL
NRBC BLD AUTO-RTO: 0 /100
O2/TOTAL GAS SETTING VFR VENT: 20 %
O2/TOTAL GAS SETTING VFR VENT: 28 %
OXYHGB MFR BLD: 97 % (ref 92–100)
OXYHGB MFR BLDV: 35 % (ref 70–75)
PCO2 BLD: 34 MM HG (ref 35–45)
PCO2 BLDV: 42 MM HG (ref 40–50)
PH BLD: 7.48 [PH] (ref 7.35–7.45)
PH BLDV: 7.43 [PH] (ref 7.32–7.43)
PHOSPHATE SERPL-MCNC: 5.3 MG/DL (ref 2.5–4.5)
PLATELET # BLD AUTO: 267 10E3/UL (ref 150–450)
PO2 BLD: 106 MM HG (ref 80–105)
PO2 BLDV: 24 MM HG (ref 25–47)
POTASSIUM BLD-SCNC: 3.9 MMOL/L (ref 3.4–5.3)
PROT SERPL-MCNC: 6 G/DL (ref 6.8–8.8)
RBC # BLD AUTO: 2.73 10E6/UL (ref 4.4–5.9)
SODIUM SERPL-SCNC: 148 MMOL/L (ref 133–144)
SODIUM SERPL-SCNC: 148 MMOL/L (ref 133–144)
SODIUM SERPL-SCNC: 149 MMOL/L (ref 133–144)
WBC # BLD AUTO: 11.1 10E3/UL (ref 4–11)

## 2022-03-01 PROCEDURE — 82805 BLOOD GASES W/O2 SATURATION: CPT | Performed by: INTERNAL MEDICINE

## 2022-03-01 PROCEDURE — 250N000013 HC RX MED GY IP 250 OP 250 PS 637

## 2022-03-01 PROCEDURE — 97161 PT EVAL LOW COMPLEX 20 MIN: CPT | Mod: GP

## 2022-03-01 PROCEDURE — 94660 CPAP INITIATION&MGMT: CPT

## 2022-03-01 PROCEDURE — 85025 COMPLETE CBC W/AUTO DIFF WBC: CPT | Performed by: INTERNAL MEDICINE

## 2022-03-01 PROCEDURE — 84295 ASSAY OF SERUM SODIUM: CPT | Performed by: INTERNAL MEDICINE

## 2022-03-01 PROCEDURE — 97530 THERAPEUTIC ACTIVITIES: CPT | Mod: GP

## 2022-03-01 PROCEDURE — 97530 THERAPEUTIC ACTIVITIES: CPT | Mod: GO | Performed by: OCCUPATIONAL THERAPIST

## 2022-03-01 PROCEDURE — 250N000013 HC RX MED GY IP 250 OP 250 PS 637: Performed by: PHYSICIAN ASSISTANT

## 2022-03-01 PROCEDURE — 80053 COMPREHEN METABOLIC PANEL: CPT | Performed by: INTERNAL MEDICINE

## 2022-03-01 PROCEDURE — 92526 ORAL FUNCTION THERAPY: CPT | Mod: GN

## 2022-03-01 PROCEDURE — 99291 CRITICAL CARE FIRST HOUR: CPT | Mod: GC | Performed by: INTERNAL MEDICINE

## 2022-03-01 PROCEDURE — 82330 ASSAY OF CALCIUM: CPT | Performed by: INTERNAL MEDICINE

## 2022-03-01 PROCEDURE — 250N000011 HC RX IP 250 OP 636

## 2022-03-01 PROCEDURE — 85652 RBC SED RATE AUTOMATED: CPT | Performed by: INTERNAL MEDICINE

## 2022-03-01 PROCEDURE — 71045 X-RAY EXAM CHEST 1 VIEW: CPT | Mod: 26 | Performed by: RADIOLOGY

## 2022-03-01 PROCEDURE — 120N000002 HC R&B MED SURG/OB UMMC

## 2022-03-01 PROCEDURE — 250N000013 HC RX MED GY IP 250 OP 250 PS 637: Performed by: INTERNAL MEDICINE

## 2022-03-01 PROCEDURE — 258N000003 HC RX IP 258 OP 636: Performed by: INTERNAL MEDICINE

## 2022-03-01 PROCEDURE — 999N000015 HC STATISTIC ARTERIAL MONITORING DAILY

## 2022-03-01 PROCEDURE — 83615 LACTATE (LD) (LDH) ENZYME: CPT | Performed by: INTERNAL MEDICINE

## 2022-03-01 PROCEDURE — 250N000013 HC RX MED GY IP 250 OP 250 PS 637: Performed by: STUDENT IN AN ORGANIZED HEALTH CARE EDUCATION/TRAINING PROGRAM

## 2022-03-01 PROCEDURE — 250N000011 HC RX IP 250 OP 636: Performed by: INTERNAL MEDICINE

## 2022-03-01 PROCEDURE — 71045 X-RAY EXAM CHEST 1 VIEW: CPT

## 2022-03-01 PROCEDURE — 86140 C-REACTIVE PROTEIN: CPT | Performed by: INTERNAL MEDICINE

## 2022-03-01 PROCEDURE — 83735 ASSAY OF MAGNESIUM: CPT | Performed by: INTERNAL MEDICINE

## 2022-03-01 PROCEDURE — 84100 ASSAY OF PHOSPHORUS: CPT | Performed by: INTERNAL MEDICINE

## 2022-03-01 RX ORDER — ROSUVASTATIN CALCIUM 10 MG/1
10 TABLET, COATED ORAL EVERY EVENING
Status: DISCONTINUED | OUTPATIENT
Start: 2022-03-01 | End: 2022-03-03

## 2022-03-01 RX ORDER — METOPROLOL TARTRATE 25 MG/1
25 TABLET, FILM COATED ORAL 2 TIMES DAILY
Status: DISCONTINUED | OUTPATIENT
Start: 2022-03-01 | End: 2022-03-02

## 2022-03-01 RX ORDER — ROSUVASTATIN CALCIUM 20 MG/1
20 TABLET, COATED ORAL EVERY EVENING
Status: DISCONTINUED | OUTPATIENT
Start: 2022-03-01 | End: 2022-03-01 | Stop reason: DRUGHIGH

## 2022-03-01 RX ADMIN — HYDRALAZINE HYDROCHLORIDE 75 MG: 25 TABLET ORAL at 22:23

## 2022-03-01 RX ADMIN — Medication 2 PACKET: at 19:54

## 2022-03-01 RX ADMIN — Medication 2 PACKET: at 07:59

## 2022-03-01 RX ADMIN — METOPROLOL TARTRATE 25 MG: 25 TABLET, FILM COATED ORAL at 08:02

## 2022-03-01 RX ADMIN — ISOSORBIDE DINITRATE 15 MG: 10 TABLET ORAL at 17:08

## 2022-03-01 RX ADMIN — ASPIRIN 81 MG CHEWABLE TABLET 81 MG: 81 TABLET CHEWABLE at 07:58

## 2022-03-01 RX ADMIN — DOCUSATE SODIUM 50 MG AND SENNOSIDES 8.6 MG 1 TABLET: 8.6; 5 TABLET, FILM COATED ORAL at 07:58

## 2022-03-01 RX ADMIN — Medication 10 MG: at 19:53

## 2022-03-01 RX ADMIN — ROSUVASTATIN CALCIUM 10 MG: 10 TABLET, FILM COATED ORAL at 19:53

## 2022-03-01 RX ADMIN — HYDRALAZINE HYDROCHLORIDE 75 MG: 25 TABLET ORAL at 14:57

## 2022-03-01 RX ADMIN — ISOSORBIDE DINITRATE 10 MG: 10 TABLET ORAL at 00:28

## 2022-03-01 RX ADMIN — AMIODARONE HYDROCHLORIDE 1 MG/MIN: 50 INJECTION, SOLUTION INTRAVENOUS at 08:15

## 2022-03-01 RX ADMIN — TICAGRELOR 90 MG: 90 TABLET ORAL at 19:54

## 2022-03-01 RX ADMIN — ISOSORBIDE DINITRATE 15 MG: 10 TABLET ORAL at 09:51

## 2022-03-01 RX ADMIN — SODIUM CHLORIDE, PRESERVATIVE FREE 5 ML: 5 INJECTION INTRAVENOUS at 14:56

## 2022-03-01 RX ADMIN — Medication 15 ML: at 07:58

## 2022-03-01 RX ADMIN — METOPROLOL TARTRATE 25 MG: 25 TABLET, FILM COATED ORAL at 19:54

## 2022-03-01 RX ADMIN — POLYETHYLENE GLYCOL 3350 17 G: 17 POWDER, FOR SOLUTION ORAL at 07:58

## 2022-03-01 RX ADMIN — QUETIAPINE FUMARATE 50 MG: 50 TABLET ORAL at 19:54

## 2022-03-01 RX ADMIN — TICAGRELOR 90 MG: 90 TABLET ORAL at 07:58

## 2022-03-01 RX ADMIN — HYDRALAZINE HYDROCHLORIDE 50 MG: 50 TABLET, FILM COATED ORAL at 05:58

## 2022-03-01 ASSESSMENT — ACTIVITIES OF DAILY LIVING (ADL)
ADLS_ACUITY_SCORE: 23
ADLS_ACUITY_SCORE: 27
ADLS_ACUITY_SCORE: 25
ADLS_ACUITY_SCORE: 25
ADLS_ACUITY_SCORE: 23
ADLS_ACUITY_SCORE: 25
ADLS_ACUITY_SCORE: 23
ADLS_ACUITY_SCORE: 25
ADLS_ACUITY_SCORE: 23
ADLS_ACUITY_SCORE: 25
ADLS_ACUITY_SCORE: 23

## 2022-03-01 NOTE — ANESTHESIA POSTPROCEDURE EVALUATION
Patient: Chano Johnson    Procedure: Procedure(s):  EXTRACORPOREAL MEMBRANE OXYGENATION CANNULA REMOVAL, INTRAOPERATIVE TRANSESOPHAGEAL ECHOCARDIOGRAM PER ANESTHESIA.       Anesthesia Type:  General    Note:  Disposition: ICU            ICU Sign Out: Anesthesiologist/ICU physician sign out WAS performed   Postop Pain Control: Uneventful            Sign Out: Well controlled pain   PONV: No   Neuro/Psych: Uneventful            Sign Out: Acceptable/Baseline neuro status   Airway/Respiratory: Uneventful            Sign Out: Acceptable/Baseline resp. status   CV/Hemodynamics: Uneventful            Sign Out: Acceptable CV status; No obvious hypovolemia; No obvious fluid overload   Other NRE: NONE   DID A NON-ROUTINE EVENT OCCUR? No           Last vitals:  Vitals Value Taken Time   BP 85/53 02/23/22 1618   Temp 37.1  C (98.8  F) 03/01/22 0800   Pulse 75 03/01/22 0807   Resp 20 03/01/22 0600   SpO2 96 % 03/01/22 0807   Vitals shown include unvalidated device data.    Electronically Signed By: Elmer Montes De Oca MD  March 1, 2022  8:08 AM

## 2022-03-01 NOTE — PROGRESS NOTES
[facility]  Cardiac ICU Progress Note  February 27, 2022            Key events - last 24 hours:     Subjective:  No acute events this morning. Alert and oriented x3. No pain this morning.    Today's Changes  - Continue Amio at 0.5mg/min  - Discuss chest tube removal with thoracic surgery  - Increaser metop to 25mg BID  - repeat speech evaluation today, advance diet to Full liquid with thickened liquids per recommendation. Diet with 1:1 observation while eating.  - Q6H Na checks  - transfer to floor order pending                Assessment & Plan     71 year old male who was admitted on 2/17/2022 s/p VT/VF arrest x8 shocks who was found to have LAD occlusion requiring 2x stents. He presented to local ED with chest pain that started at 1300 (within 1 hr of onset), he had a VT VF arrest in the ED after he was found to have an anterior STEMI on ECG.  Chest compressions and ACLS was started immediately.  Patient underwent 8 shocks for VT VF with ROSC afterwards.  He received 4 doses of epinephrine, 450 mg of amiodarone.  He was transferred to the [facility] for percutaneous intervention for his STEMI. Unfortunately his transfer was delayed because he was a very low dose of Epi (0.01) and the local hospital only had a BLS ambulance available. He arrived at [facility] and was found to have occlusion of the LAD. During the angiogram he was found to have significant hypotension and ectopy, which prompted placement on VA ECMO. PCI with 2x LETY was done with ECMO support. TTM post arrest at 35C, rewarmed on 2/19/22. Decannulated 2/23/22.       Neurology: #Possible neurological injury post cardiac arrest  - CTH on presentation w/o acute intracranial pathology. Day 3 CTH showed indeterminant gray-white matter loss in the R occipital lobe which could be artifact vs true infarct. Strong suspicion for artifact per neurocrit.  - EEG w/o significant abnormalities noted    #Delirium -  Resolving  Normalize awake-sleep cycles. Frequent reorientation. Melatonin and seroquel at bedtime for sleep.       Cardiovascular / Hemodynamics: #Refractory VF arrest requiring 8 shocks  x2 LETY stents to LAD.    Peripheral V-A ECMO inserted for refractory cardiogenic shock.   TTE: LVEF 10-15%, RV moderately to severely reduced function. No prior hx of heart failure. No significant valvular disease. Pulsatility significant improved after rewarming, decannulated 2/22/22.  EKG: FLORY V2-V4 consistent with anterior STEMI  --wean pressors/inotropes as needed  --continue ASA 81mg and ticagrelor 90mg BID as of now status post 2 LETY to LAD  --No indication for anticoagulation at this time  -- Rosuva 10mg: Titrate up when renal dysfunction has improved.  --hold ACE/ARB for now given likely reduced renal dysfunction. Will plan to start Losartan with transition to Entresto.  -- Start Metop 25mg BID  - CT CAP: Large right-sided diffuse effusion s/p chest tube placement.      Pulmonary: #Acute hypoxic respiratory failure  #Pleural effusion - s/p chest tube placement on L side. Thrombus noted in the chest tube 2/22/22, evacuated in the OR during decannulation. Currently has chest wall hematoma which we are monitoring.  ETT in appropriate position.  Now weaning vent requirements.  CXR: Lines in stable position.  --wean vent as able  --daily CXR  --Q6h ABGs for now  --consider scheduled duonebs if signs of lung dz, currently PRN    --Discuss with thoracic regarding removal of chest tube     GI and Nutrition: No known medical hx.   --monitor LFTs  --bowel regimen   -- TF at goal  --GI Prophylaxis: PPI     Speech to re-eval diet goals today. In the interim change NG to small feeding tube with dietician.      Renal, Fluid and Electrolytes: #SAHIL, likely ischemic ATN. Monitoring for CRRT needs.  #Hypernatremia - encourage PO intake of free water  #Acute respiratory alkalosis  Monitoring UOP, will start CRRT through circuit if  needed.  --maintain K>4 and Mg>2      Infectious Disease: #Sputum culture 2/20/22 positive for Staph Lugdunensis - cefazolin (2/24/22 - 2/28/22). Plan for 5 day course.  Leukocytosis c/w arrest. Blood cultures collected.   --vancomycin/zosyn x5 days for ECMO - completed course on 2/21/22  --Monitor for signs of infection given cooling, lines, and leukocytosis   Hematology and Oncology: #Acute blood loss anemia  #Thrombocytopenia - likely secondary to hemolysis. 2/21/22 4T score 3 (low risk, <5%)  -- Antiplatelet: ASA/ticagrelor for LETY.   --Transfuse for Hgb<7  --DVT PPX: straight rate heparin  -Continue to trend CBCs transfuse to goal hemoglobin of 7   Endocrinology:  No known medical history. BG elevated.  --stress dose steroids due to persistent hypotension   - hydrocort 50mg IV Q8h discontinued   Lines: L femoral arterial and venous ECMO cannulae February 17, 2022 - 2/22/22  R radial arterial line February 17, 2022  ETT February 17, 2022 - 2/24/22  Tapia catheter February 17, 2022 - 2/24/22  OG tube February 17, 2022 - 2/24/22  Restraint: no longer needed  Current lines are required for patient management       Family update by me today: Yes      Code Status: DNR/DNI      The Pt was discussed and evaluated with Dr. Murcia, attending physician, who agrees with the assessment and plan above.     Mohsan Chaudhry, MD  Cardiology Fellow            Medications:       aspirin  81 mg Per Feeding Tube Daily     heparin lock flush  5-20 mL Intracatheter Q24H     hydrALAZINE  75 mg Oral or Feeding Tube Q8H     isosorbide dinitrate  15 mg Oral or Feeding Tube Q8H     melatonin  10 mg Oral or Feeding Tube QPM     metoprolol tartrate  25 mg Oral BID     multivitamins w/minerals  15 mL Per Feeding Tube Daily     polyethylene glycol  17 g Oral or Feeding Tube BID     protein modular  2 packet Per Feeding Tube BID    And     protein modular  1 packet Per Feeding Tube Daily     QUEtiapine  50 mg Oral or Feeding Tube QPM      rosuvastatin  10 mg Oral QPM     senna-docusate  1 tablet Oral or Feeding Tube BID     sodium chloride (PF)  10 mL Intracatheter Q8H     ticagrelor  90 mg Oral or Feeding Tube BID      Vitals  Temp: 98.8  F (37.1  C) Temp  Min: 98.7  F (37.1  C)  Max: 99.4  F (37.4  C)  Resp: 18 Resp  Min: 18  Max: 24  SpO2: 98 % SpO2  Min: 94 %  Max: 100 %  Pulse: 69 Pulse  Min: 66  Max: 88    Intake/Output Summary (Last 24 hours) at 2/27/2022 0721  Last data filed at 2/27/2022 0700  Gross per 24 hour   Intake 5963.13 ml   Output 4945 ml   Net 1018.13 ml     VI/O last 3 completed shifts:  In: 4936.5 [I.V.:351.5; NG/GT:3345]  Out: 2545 [Urine:2355; Chest Tube:190]  Vent Settings:  Resp: 18 SpO2: 98 % O2 Device: None (Room air) Oxygen Delivery: 3 LPM  FiO2 (%): 28 %  Resp: 18              Physical Exam:     GENERAL: NAD  HEENT: PERRL  CV: S1/S2 heard without murmur   RESPIRATORY: CTAB, no increased WOB  GI: Soft and non distended with normoactive bowel sounds present in all quadrants. No tenderness, rebound, guarding. No palpable organomegaly.   EXTREMITIES: No peripheral edema. 2+ bilateral pedal pulses. Trace edema bilaterally in LE  NEUROLOGIC: Alert. Oriented x2. Following commands. CN II-XII intact. Moving all extremities.  MUSCULOSKELETAL: No joint swelling or tenderness.   SKIN: No jaundice. No acute rashes or lesions. No hematoma at groin sites.         Data:     Recent Labs   Lab 03/01/22  0352 02/28/22  1722 02/28/22  0336 02/27/22  1556 02/27/22  1137   WBC 11.1* 12.3* 10.9 11.3* 11.3*   HGB 8.1* 8.0* 7.9* 7.9* 7.6*   HCT 26.1* 25.6* 24.9* 25.0* 24.1*   MCV 96 96 97 96 97   RDW 17.8* 18.1* 18.1* 18.8* 19.0*    279 241 236 216     Recent Labs   Lab 02/24/22  0946 02/24/22  0331 02/23/22  2210 02/23/22  1552 02/23/22  1002   INR 1.17* 1.12 1.16* 1.16* 1.13   PTT 35 31 32 31 29     Liver Function Studies -   Recent Labs   Lab Test 02/27/22  0426   PROTTOTAL 5.9*   ALBUMIN 2.4*   BILITOTAL 0.9   ALKPHOS 32*   *    *       Last Arterial Blood Gas:  Recent Labs   Lab 03/01/22  1009 03/01/22  0426 02/28/22  1724 02/28/22  0336 02/28/22  0334 02/27/22  1624   PH  --  7.48* 7.54*  --  7.50* 7.54*   PCO2  --  34* 30*  --  33* 29*   PO2  --  106* 76*  --  131* 102   HCO3  --  26 26  --  26 24   O2PER 20 28 21 4 4 4       Venous Blood Gas  Recent Labs   Lab 03/01/22  1009 03/01/22  0426 02/28/22  1724 02/28/22  0336 02/27/22  1624 02/27/22  1556 02/27/22  0426   PHV 7.43  --   --  7.47*  --  7.49* 7.46*   PCO2V 42  --   --  39*  --  34* 35*   PO2V 24*  --   --  37  --  35 31   HCO3V 27  --   --  28  --  26 25   MERLIN 2.6*  --   --  3.6*  --  2.5* 1.1   O2PER 20 28 21 4   < > 4 40    < > = values in this interval not displayed.       Recent Labs   Lab Test 02/22/22  0330 06/30/17  1355 05/12/16  0758   CHOL  --  173 196   HDL  --  42 43   LDL  --  116 125   TRIG 150* 76 140

## 2022-03-01 NOTE — PROGRESS NOTES
03/01/22 1500   Quick Adds   Type of Visit Initial PT Evaluation   Living Environment   People in Home spouse   Current Living Arrangements house   Home Accessibility stairs to enter home;stairs within home   Number of Stairs, Main Entrance 5  (4+1)   Number of Stairs, Within Home, Primary other (see comments)  (6+7 splint level)   Transportation Anticipated family or friend will provide   Living Environment Comments Pt lives with supportive wife in home, typically enter garage to basement then up to main floor with living/bed/bath   Self-Care   Usual Activity Tolerance excellent   Current Activity Tolerance fair   Regular Exercise Yes   Activity/Exercise Type walking;strength training   Exercise Amount/Frequency 3-5 times/wk   Fall history within last six months no   Activity/Exercise/Self-Care Comment No DME or A previous   General Information   Onset of Illness/Injury or Date of Surgery 02/17/22   Referring Physician Griffin Murcia MD   Patient/Family Therapy Goals Statement (PT) get stronger   Pertinent History of Current Problem (include personal factors and/or comorbidities that impact the POC) 71 year old M with a pmhx sig for CAD who was admitted 2/17/2022 with CP X1 hour c/b VT/VF in the ER s/p 8 shocks and ROSC, due to LAD occlusion (Ant STEMI on ekg) s/p PCI with LETY X2 he was transferred to [facility] for STEMI intervention. He was placed on VA ECMO for hypotension and ectopy during his cath. Post procedurally he was cooled for 24 hours then rewarmed, he was decannulated on 2/23 and has since been extubated. Course has been complicated by L hemothorax s/p chest tube, and jaquan.    General Observations Pt supine, agreeable to PT   Cognition   Affect/Mental Status (Cognition) confused  (mild confusion though fairly sensicle during session)   Orientation Status (Cognition) oriented to;person   Safety Deficit (Cognition) moderate deficit;awareness of need for assistance;insight into  deficits/self-awareness;problem-solving   Posture    Posture Forward head position;Protracted shoulders   Strength Comprehensive (MMT)   Comment, General Manual Muscle Testing (MMT) Assessment B LEs functional   Strength (Manual Muscle Testing)   Strength Comments B LEs decreased due to prolonged medical course   Bed Mobility   Comment, (Bed Mobility) Mod A sup<>sit   Transfers   Comment, (Transfers) Mod A x2 partial sit<>stand   Gait/Stairs (Locomotion)   Comment, (Gait/Stairs) dependent   Clinical Impression   Criteria for Skilled Therapeutic Intervention Yes, treatment indicated   PT Diagnosis (PT) impaired functional mobility   Influenced by the following impairments decreased strength, balance, act tolerance,    Functional limitations due to impairments decreased I with transfers, gait, bed mob, barrier navigations   Clinical Presentation (PT Evaluation Complexity) Stable/Uncomplicated   Clinical Presentation Rationale clinical judgement   Clinical Decision Making (Complexity) low complexity   Planned Therapy Interventions (PT) balance training;bed mobility training;gait training;home exercise program;patient/family education;ROM (range of motion);stair training;strengthening;transfer training   Risk & Benefits of therapy have been explained evaluation/treatment results reviewed;care plan/treatment goals reviewed;risks/benefits reviewed;current/potential barriers reviewed;participants voiced agreement with care plan;participants included;patient;spouse/significant other;son   PT Discharge Planning   PT Discharge Recommendation (DC Rec) Transitional Care Facility   PT Rationale for DC Rec Pt currently Ax2 or lift, will need rehab stay to maximize functional I prior to home   PT Brief overview of current status Mod A sup<>sit, mod/max A x2 sit<>partial stand   Plan of Care Review   Plan of Care Reviewed With patient;spouse   Total Evaluation Time   Total Evaluation Time (Minutes) 8   Physical Therapy Goals   PT  Frequency 5x/week   PT Predicated Duration/Target Date for Goal Attainment 03/24/22   PT: Bed Mobility Modified independent;Supine to/from sit;Rolling;Bridging;Within precautions   PT: Transfers Modified independent;Sit to/from stand;Bed to/from chair;Assistive device;Within precautions   PT: Gait Modified independent;Assistive device;Within precautions;Greater than 200 feet   PT: Stairs Modified independent;Greater than 10 stairs;Rail on right;Within precautions

## 2022-03-01 NOTE — PLAN OF CARE
ICU End of Shift Summary. See flowsheets for vital signs and detailed assessment.    Changes this shift: VSS. Afebrile. Arterial line and chest tube pulled today. Amio decreased to 0.5 today. Up to chair with lift assistance.    Plan: Continue plan of care, adjust as needed.      Problem: Plan of Care - These are the overarching goals to be used throughout the patient stay.    Goal: Plan of Care Review/Shift Note  Outcome: Ongoing, Progressing     Problem: Plan of Care - These are the overarching goals to be used throughout the patient stay.    Goal: Patient-Specific Goal (Individualized)  Outcome: Ongoing, Progressing     Problem: Plan of Care - These are the overarching goals to be used throughout the patient stay.    Goal: Absence of Hospital-Acquired Illness or Injury  Outcome: Ongoing, Progressing

## 2022-03-01 NOTE — PLAN OF CARE
ICU End of Shift Summary. See flowsheets for vital signs and detailed assessment.    Changes this shift: Alert and oriented x3-4 intermittently. -160's afib, Amio IV started, rate control 80-90's. Echo completed, EKG completed, CXR completed. Feeding tube exchanged from 16F to 12F, re-XR, OK per MD, restarted TF, increased TF to 55/hr, FWF increased 125/hr. Tapia removed. No longer can trend CVP, RIJ removed, only has midline.    Plan: Increase TF at 0200 to 65/hr, goal. Monitor confusion, mitts on. Monitor HR. Transfer orders 6C. Continue with POC.     Goal Outcome Evaluation: No change.     Problem: Hemodynamic Instability (Extracorporeal Life Support)  Goal: Hemodynamic Instability  Outcome: Ongoing, Not Progressing  Intervention: Optimize Blood Flow, Oxygenation and Ventilation  Recent Flowsheet Documentation  Taken 2/28/2022 0800 by Mayuri Jones, RN  Environmental Support:    calm environment promoted    personal routine supported

## 2022-03-01 NOTE — PLAN OF CARE
ICU End of Shift Summary. See flowsheets for vital signs and detailed assessment.    Changes this shift: AOx2; disoriented to time/sitch. Pleasant, follows commands, however restless. 1:1 sitter trialed during NOC, no major issues. Seroquel hs + PRN given, pt slept off and on. Pt tolerated hospital CPAP for approximately 2hrs, before wanting to transition to Oxi+ mask (3-4L). Home CPAP will be brought-in today by family.  Clinically afebrile. Controlled afib 70s-80s; requiring Amio gtt~ 1/min. L-CT still present to water seal. SLP following for diet. Failing bedside swallowing. Continuing NPO ex; TF to NGT (60-62cm externally measured) increased rate to goal 65mL/hr, with 125mL/hr FWF. Na+ now 148 and downtrending from 151.  Had 2x bladder incontinence; ext Primofit applied. Cr 3.15 from 3.53.  and downtrending.     Plan: Pull Art Line and Remove chest tube? SLP. Monitor renal function and delirium.     Problem: Skin and Tissue Injury (Mechanical Ventilation, Invasive)  Goal: Absence of Device-Related Skin and Tissue Injury  Outcome: Ongoing, Progressing  Intervention: Maintain Skin and Tissue Health  Recent Flowsheet Documentation  Taken 3/1/2022 0000 by Aiden Beltran, RN  Device Skin Pressure Protection:    skin-to-skin areas padded    skin-to-device areas padded    pressure points protected    positioning supports utilized    adhesive use limited    absorbent pad utilized/changed  Taken 2/28/2022 2000 by Aiden Beltran, RN  Device Skin Pressure Protection:    skin-to-skin areas padded    skin-to-device areas padded    pressure points protected    positioning supports utilized    adhesive use limited    absorbent pad utilized/changed

## 2022-03-01 NOTE — PROGRESS NOTES
CVTS Progress Note     Patient is a 71 year old year old male s/p ECMO decan on 2/22/2022. Skin wound vac removed. Incision c/d/i, no signs of infection. Retention sutures in place, can be removed by primary team 2 weeks post op (3/8), please call if assistance is needed. Please cleanse wound with Microklenz BID, otherwise keep incision dry. Other cares per primary team. CVTS will sign off. Do not hesitate to call with any questions or concerns.    Dave Bates PA-C  Cardiothoracic Surgery  March 1, 2022 8:56 AM   p: [phone]

## 2022-03-02 ENCOUNTER — APPOINTMENT (OUTPATIENT)
Dept: GENERAL RADIOLOGY | Facility: CLINIC | Age: 72
DRG: 003 | End: 2022-03-02
Payer: MEDICARE

## 2022-03-02 ENCOUNTER — APPOINTMENT (OUTPATIENT)
Dept: SPEECH THERAPY | Facility: CLINIC | Age: 72
DRG: 003 | End: 2022-03-02
Payer: MEDICARE

## 2022-03-02 ENCOUNTER — APPOINTMENT (OUTPATIENT)
Dept: PHYSICAL THERAPY | Facility: CLINIC | Age: 72
DRG: 003 | End: 2022-03-02
Payer: MEDICARE

## 2022-03-02 ENCOUNTER — TELEPHONE (OUTPATIENT)
Dept: NURSING | Facility: CLINIC | Age: 72
End: 2022-03-02
Payer: MEDICARE

## 2022-03-02 LAB
ALBUMIN SERPL-MCNC: 2.4 G/DL (ref 3.4–5)
ALP SERPL-CCNC: 53 U/L (ref 40–150)
ALT SERPL W P-5'-P-CCNC: 74 U/L (ref 0–70)
ANION GAP SERPL CALCULATED.3IONS-SCNC: 9 MMOL/L (ref 3–14)
AST SERPL W P-5'-P-CCNC: 88 U/L (ref 0–45)
BASOPHILS # BLD AUTO: 0 10E3/UL (ref 0–0.2)
BASOPHILS NFR BLD AUTO: 0 %
BILIRUB SERPL-MCNC: 0.8 MG/DL (ref 0.2–1.3)
BUN SERPL-MCNC: 96 MG/DL (ref 7–30)
CA-I BLD-MCNC: 4.6 MG/DL (ref 4.4–5.2)
CALCIUM SERPL-MCNC: 8.5 MG/DL (ref 8.5–10.1)
CHLORIDE BLD-SCNC: 114 MMOL/L (ref 94–109)
CO2 SERPL-SCNC: 25 MMOL/L (ref 20–32)
CREAT SERPL-MCNC: 2.8 MG/DL (ref 0.66–1.25)
CRP SERPL-MCNC: 87 MG/L (ref 0–8)
EOSINOPHIL # BLD AUTO: 0.7 10E3/UL (ref 0–0.7)
EOSINOPHIL NFR BLD AUTO: 6 %
ERYTHROCYTE [DISTWIDTH] IN BLOOD BY AUTOMATED COUNT: 17.3 % (ref 10–15)
ERYTHROCYTE [SEDIMENTATION RATE] IN BLOOD BY WESTERGREN METHOD: 97 MM/HR (ref 0–20)
GFR SERPL CREATININE-BSD FRML MDRD: 23 ML/MIN/1.73M2
GLUCOSE BLD-MCNC: 133 MG/DL (ref 70–99)
HCT VFR BLD AUTO: 26.7 % (ref 40–53)
HGB BLD-MCNC: 8.3 G/DL (ref 13.3–17.7)
HOLD SPECIMEN: NORMAL
HOLD SPECIMEN: NORMAL
IMM GRANULOCYTES # BLD: 0.2 10E3/UL
IMM GRANULOCYTES NFR BLD: 2 %
LDH SERPL L TO P-CCNC: 528 U/L (ref 85–227)
LYMPHOCYTES # BLD AUTO: 0.6 10E3/UL (ref 0.8–5.3)
LYMPHOCYTES NFR BLD AUTO: 5 %
MAGNESIUM SERPL-MCNC: 2.8 MG/DL (ref 1.6–2.3)
MCH RBC QN AUTO: 30.3 PG (ref 26.5–33)
MCHC RBC AUTO-ENTMCNC: 31.1 G/DL (ref 31.5–36.5)
MCV RBC AUTO: 97 FL (ref 78–100)
MONOCYTES # BLD AUTO: 0.8 10E3/UL (ref 0–1.3)
MONOCYTES NFR BLD AUTO: 7 %
NEUTROPHILS # BLD AUTO: 9.2 10E3/UL (ref 1.6–8.3)
NEUTROPHILS NFR BLD AUTO: 80 %
NRBC # BLD AUTO: 0 10E3/UL
NRBC BLD AUTO-RTO: 0 /100
PHOSPHATE SERPL-MCNC: 4 MG/DL (ref 2.5–4.5)
PLATELET # BLD AUTO: 293 10E3/UL (ref 150–450)
POTASSIUM BLD-SCNC: 3.9 MMOL/L (ref 3.4–5.3)
PROT SERPL-MCNC: 6 G/DL (ref 6.8–8.8)
RBC # BLD AUTO: 2.74 10E6/UL (ref 4.4–5.9)
SODIUM SERPL-SCNC: 144 MMOL/L (ref 133–144)
SODIUM SERPL-SCNC: 144 MMOL/L (ref 133–144)
SODIUM SERPL-SCNC: 148 MMOL/L (ref 133–144)
WBC # BLD AUTO: 11.5 10E3/UL (ref 4–11)

## 2022-03-02 PROCEDURE — 74018 RADEX ABDOMEN 1 VIEW: CPT | Mod: 26 | Performed by: RADIOLOGY

## 2022-03-02 PROCEDURE — 85652 RBC SED RATE AUTOMATED: CPT | Performed by: INTERNAL MEDICINE

## 2022-03-02 PROCEDURE — 85025 COMPLETE CBC W/AUTO DIFF WBC: CPT | Performed by: INTERNAL MEDICINE

## 2022-03-02 PROCEDURE — 83615 LACTATE (LD) (LDH) ENZYME: CPT | Performed by: INTERNAL MEDICINE

## 2022-03-02 PROCEDURE — 120N000002 HC R&B MED SURG/OB UMMC

## 2022-03-02 PROCEDURE — 250N000013 HC RX MED GY IP 250 OP 250 PS 637: Performed by: STUDENT IN AN ORGANIZED HEALTH CARE EDUCATION/TRAINING PROGRAM

## 2022-03-02 PROCEDURE — 999N000065 XR ABDOMEN PORT 1 VIEWS

## 2022-03-02 PROCEDURE — 83735 ASSAY OF MAGNESIUM: CPT | Performed by: INTERNAL MEDICINE

## 2022-03-02 PROCEDURE — 92526 ORAL FUNCTION THERAPY: CPT | Mod: GN

## 2022-03-02 PROCEDURE — 36415 COLL VENOUS BLD VENIPUNCTURE: CPT | Performed by: INTERNAL MEDICINE

## 2022-03-02 PROCEDURE — 99233 SBSQ HOSP IP/OBS HIGH 50: CPT | Mod: GC | Performed by: INTERNAL MEDICINE

## 2022-03-02 PROCEDURE — 84100 ASSAY OF PHOSPHORUS: CPT | Performed by: INTERNAL MEDICINE

## 2022-03-02 PROCEDURE — 92612 ENDOSCOPY SWALLOW (FEES) VID: CPT | Mod: GN

## 2022-03-02 PROCEDURE — 71045 X-RAY EXAM CHEST 1 VIEW: CPT

## 2022-03-02 PROCEDURE — 250N000009 HC RX 250: Performed by: INTERNAL MEDICINE

## 2022-03-02 PROCEDURE — 84295 ASSAY OF SERUM SODIUM: CPT | Performed by: INTERNAL MEDICINE

## 2022-03-02 PROCEDURE — 71045 X-RAY EXAM CHEST 1 VIEW: CPT | Mod: 26 | Performed by: RADIOLOGY

## 2022-03-02 PROCEDURE — 82040 ASSAY OF SERUM ALBUMIN: CPT | Performed by: INTERNAL MEDICINE

## 2022-03-02 PROCEDURE — 250N000013 HC RX MED GY IP 250 OP 250 PS 637: Performed by: INTERNAL MEDICINE

## 2022-03-02 PROCEDURE — 82330 ASSAY OF CALCIUM: CPT | Performed by: INTERNAL MEDICINE

## 2022-03-02 PROCEDURE — 250N000013 HC RX MED GY IP 250 OP 250 PS 637: Performed by: PHYSICIAN ASSISTANT

## 2022-03-02 PROCEDURE — 80053 COMPREHEN METABOLIC PANEL: CPT | Performed by: INTERNAL MEDICINE

## 2022-03-02 PROCEDURE — 999N000248 HC STATISTIC IV INSERT WITH US BY RN

## 2022-03-02 PROCEDURE — 250N000013 HC RX MED GY IP 250 OP 250 PS 637

## 2022-03-02 PROCEDURE — 97530 THERAPEUTIC ACTIVITIES: CPT | Mod: GP

## 2022-03-02 PROCEDURE — 250N000011 HC RX IP 250 OP 636: Performed by: INTERNAL MEDICINE

## 2022-03-02 PROCEDURE — 86140 C-REACTIVE PROTEIN: CPT | Performed by: INTERNAL MEDICINE

## 2022-03-02 RX ORDER — AMIODARONE HYDROCHLORIDE 200 MG/1
200 TABLET ORAL DAILY
Status: DISCONTINUED | OUTPATIENT
Start: 2022-03-02 | End: 2022-03-11 | Stop reason: HOSPADM

## 2022-03-02 RX ORDER — POLYETHYLENE GLYCOL 3350 17 G/17G
17 POWDER, FOR SOLUTION ORAL 2 TIMES DAILY PRN
Status: DISCONTINUED | OUTPATIENT
Start: 2022-03-02 | End: 2022-03-11 | Stop reason: HOSPADM

## 2022-03-02 RX ORDER — AMOXICILLIN 250 MG
1 CAPSULE ORAL 2 TIMES DAILY PRN
Status: DISCONTINUED | OUTPATIENT
Start: 2022-03-02 | End: 2022-03-11 | Stop reason: HOSPADM

## 2022-03-02 RX ORDER — FUROSEMIDE 10 MG/ML
60 INJECTION INTRAMUSCULAR; INTRAVENOUS ONCE
Status: COMPLETED | OUTPATIENT
Start: 2022-03-02 | End: 2022-03-02

## 2022-03-02 RX ORDER — METOPROLOL TARTRATE 25 MG/1
50 TABLET, FILM COATED ORAL 2 TIMES DAILY
Status: DISCONTINUED | OUTPATIENT
Start: 2022-03-02 | End: 2022-03-03

## 2022-03-02 RX ADMIN — FUROSEMIDE 60 MG: 10 INJECTION, SOLUTION INTRAVENOUS at 10:11

## 2022-03-02 RX ADMIN — ISOSORBIDE DINITRATE 15 MG: 10 TABLET ORAL at 15:55

## 2022-03-02 RX ADMIN — Medication 15 ML: at 07:22

## 2022-03-02 RX ADMIN — ISOSORBIDE DINITRATE 15 MG: 10 TABLET ORAL at 00:20

## 2022-03-02 RX ADMIN — HYDRALAZINE HYDROCHLORIDE 75 MG: 25 TABLET ORAL at 15:55

## 2022-03-02 RX ADMIN — ROSUVASTATIN CALCIUM 10 MG: 10 TABLET, FILM COATED ORAL at 20:08

## 2022-03-02 RX ADMIN — Medication 2 PACKET: at 07:23

## 2022-03-02 RX ADMIN — QUETIAPINE FUMARATE 50 MG: 50 TABLET ORAL at 20:04

## 2022-03-02 RX ADMIN — METOPROLOL TARTRATE 25 MG: 25 TABLET, FILM COATED ORAL at 07:22

## 2022-03-02 RX ADMIN — METOPROLOL TARTRATE 50 MG: 25 TABLET, FILM COATED ORAL at 20:04

## 2022-03-02 RX ADMIN — ISOSORBIDE DINITRATE 15 MG: 10 TABLET ORAL at 11:14

## 2022-03-02 RX ADMIN — SODIUM CHLORIDE, PRESERVATIVE FREE 5 ML: 5 INJECTION INTRAVENOUS at 15:54

## 2022-03-02 RX ADMIN — TICAGRELOR 90 MG: 90 TABLET ORAL at 07:22

## 2022-03-02 RX ADMIN — ORAL VEHICLES - SUSP 5 MG: SUSPENSION at 10:11

## 2022-03-02 RX ADMIN — TICAGRELOR 90 MG: 90 TABLET ORAL at 20:08

## 2022-03-02 RX ADMIN — ASPIRIN 81 MG CHEWABLE TABLET 81 MG: 81 TABLET CHEWABLE at 07:22

## 2022-03-02 RX ADMIN — Medication 2 PACKET: at 20:08

## 2022-03-02 RX ADMIN — Medication 10 MG: at 20:07

## 2022-03-02 RX ADMIN — HYDRALAZINE HYDROCHLORIDE 75 MG: 25 TABLET ORAL at 05:48

## 2022-03-02 RX ADMIN — AMIODARONE HYDROCHLORIDE 200 MG: 200 TABLET ORAL at 12:48

## 2022-03-02 RX ADMIN — HYDRALAZINE HYDROCHLORIDE 75 MG: 25 TABLET ORAL at 22:22

## 2022-03-02 RX ADMIN — Medication 1 PACKET: at 07:23

## 2022-03-02 RX ADMIN — DOCUSATE SODIUM 50 MG AND SENNOSIDES 8.6 MG 1 TABLET: 8.6; 5 TABLET, FILM COATED ORAL at 07:22

## 2022-03-02 ASSESSMENT — ACTIVITIES OF DAILY LIVING (ADL)
ADLS_ACUITY_SCORE: 27

## 2022-03-02 NOTE — PROGRESS NOTES
[facility]  Cardiac ICU Progress Note  February 27, 2022            Key events - last 24 hours:     Subjective:  No acute events this morning. Alert and oriented x3. No pain this morning.    Today's Changes  - Change Amio to 200/day  - Increaser metop to 50mg BID  - Dietician evaluation today, continue full liquid diet with mildly thickened liquids  - BID Na checks  - Transfer to floor pending                Assessment & Plan     71 year old male who was admitted on 2/17/2022 s/p VT/VF arrest x8 shocks who was found to have LAD occlusion requiring 2x stents. He presented to local ED with chest pain that started at 1300 (within 1 hr of onset), he had a VT VF arrest in the ED after he was found to have an anterior STEMI on ECG.  Chest compressions and ACLS was started immediately.  Patient underwent 8 shocks for VT VF with ROSC afterwards.  He received 4 doses of epinephrine, 450 mg of amiodarone.  He was transferred to the [facility] for percutaneous intervention for his STEMI. Unfortunately his transfer was delayed because he was a very low dose of Epi (0.01) and the local hospital only had a BLS ambulance available. He arrived at [facility] and was found to have occlusion of the LAD. During the angiogram he was found to have significant hypotension and ectopy, which prompted placement on VA ECMO. PCI with 2x LETY was done with ECMO support. TTM post arrest at 35C, rewarmed on 2/19/22. Decannulated 2/23/22.       Neurology: #Possible neurological injury post cardiac arrest  - CTH on presentation w/o acute intracranial pathology. Day 3 CTH showed indeterminant gray-white matter loss in the R occipital lobe which could be artifact vs true infarct. Strong suspicion for artifact per neurocrit.  - EEG w/o significant abnormalities noted    #Delirium - Resolving  Normalize awake-sleep cycles. Frequent reorientation. Melatonin and seroquel at bedtime for sleep.  - Sitter at  night       Cardiovascular / Hemodynamics: #Refractory VF arrest requiring 8 shocks  x2 LETY stents to LAD.    Peripheral V-A ECMO inserted for refractory cardiogenic shock.   TTE: LVEF 10-15%, RV moderately to severely reduced function. No prior hx of heart failure. No significant valvular disease. Pulsatility significant improved after rewarming, decannulated 2/22/22.  EKG: FLORY V2-V4 consistent with anterior STEMI  --wean pressors/inotropes as needed  --continue ASA 81mg and ticagrelor 90mg BID as of now status post 2 LETY to LAD  --No indication for anticoagulation at this time  -- Rosuva 10mg: Titrate up when renal dysfunction has improved.  --hold ACE/ARB for now given likely reduced renal dysfunction. Will plan to start Losartan with transition to Entresto.  -- BB: Metop 50mg BID  - CT CAP: Large right-sided diffuse effusion s/p chest tube placement.     #pAF s/p watchman. Change amio gtt 0.5mg/min to Amiodarone 200mg daily     Pulmonary: #Acute hypoxic respiratory failure  #Pleural effusion - s/p chest tube placement on L side. Thrombus noted in the chest tube 2/22/22, evacuated in the OR during decannulation. Currently has chest wall hematoma which we are monitoring.  ETT in appropriate position.  Now weaning vent requirements.  CXR: Lines in stable position.  --wean vent as able  --daily CXR  --Q6h ABGs for now  --consider scheduled duonebs if signs of lung dz, currently PRN    --Discuss with thoracic regarding removal of chest tube     GI and Nutrition: No known medical hx.   --monitor LFTs  --bowel regimen   -- TF at goal  --GI Prophylaxis: PPI     Speech to re-eval diet goals today. In the interim change NG to small feeding tube with dietician.      Renal, Fluid and Electrolytes: #SAHIL, likely ischemic ATN. Monitoring for CRRT needs.  #Hypernatremia - resolved - - Decrease FWF to 100cc/hr  #Acute respiratory alkalosis  Monitoring UOP, will start CRRT through circuit if needed.  --maintain K>4 and Mg>2      Infectious Disease: #Sputum culture 2/20/22 positive for Staph Lugdunensis - cefazolin (2/24/22 - 2/28/22). Plan for 5 day course.  Leukocytosis c/w arrest. Blood cultures collected.   --vancomycin/zosyn x5 days for ECMO - completed course on 2/21/22  --Monitor for signs of infection given cooling, lines, and leukocytosis   Hematology and Oncology: #Acute blood loss anemia  #Thrombocytopenia - likely secondary to hemolysis. 2/21/22 4T score 3 (low risk, <5%)  -- Antiplatelet: ASA/ticagrelor for LETY.   --Transfuse for Hgb<7  --DVT PPX: straight rate heparin @ 500/hr  -Continue to trend CBCs transfuse to goal hemoglobin of 7   Endocrinology:  No known medical history. BG elevated.  --stress dose steroids due to persistent hypotension   - hydrocort 50mg IV Q8h discontinued   Lines: L femoral arterial and venous ECMO cannulae February 17, 2022 - 2/22/22  R radial arterial line February 17, 2022 - 2/28/22  ETT February 17, 2022 - 2/24/22  Tapia catheter February 17, 2022 - 2/24/22  OG tube February 17, 2022 - 2/24/22  Restraint: no longer needed  Current lines are required for patient management       Family update by me today: Yes      Code Status: DNR/DNI      The Pt was discussed and evaluated with Dr. Murcia, attending physician, who agrees with the assessment and plan above.     Mohsan Chaudhry, MD  Cardiology Fellow            Medications:       amiodarone  200 mg Oral Daily     aspirin  81 mg Per Feeding Tube Daily     heparin lock flush  5-20 mL Intracatheter Q24H     hydrALAZINE  75 mg Oral or Feeding Tube Q8H     isosorbide dinitrate  15 mg Oral or Feeding Tube Q8H     melatonin  10 mg Oral or Feeding Tube QPM     metoprolol tartrate  50 mg Oral BID     multivitamins w/minerals  15 mL Per Feeding Tube Daily     protein modular  2 packet Per Feeding Tube BID    And     protein modular  1 packet Per Feeding Tube Daily     QUEtiapine  50 mg Oral or Feeding Tube QPM     rosuvastatin  10 mg Oral QPM     sodium  chloride (PF)  10 mL Intracatheter Q8H     ticagrelor  90 mg Oral or Feeding Tube BID      Vitals  Temp: 98.1  F (36.7  C) Temp  Min: 97.5  F (36.4  C)  Max: 99.4  F (37.4  C)  Resp: 20 Resp  Min: 16  Max: 20  SpO2: 98 % SpO2  Min: 94 %  Max: 99 %  Pulse: 73 Pulse  Min: 66  Max: 84    Intake/Output Summary (Last 24 hours) at 2/27/2022 0721  Last data filed at 2/27/2022 0700  Gross per 24 hour   Intake 5963.13 ml   Output 4945 ml   Net 1018.13 ml     VI/O last 3 completed shifts:  In: 5378.92 [I.V.:283.92; NG/GT:3535]  Out: 3225 [Urine:3225]  Vent Settings:  Resp: 20 SpO2: 98 % O2 Device: None (Room air)    FiO2 (%): 28 %  Resp: 20              Physical Exam:     GENERAL: NAD  HEENT: PERRL  CV: S1/S2 heard without murmur   RESPIRATORY: CTAB, no increased WOB  GI: Soft and non distended with normoactive bowel sounds present in all quadrants. No tenderness, rebound, guarding. No palpable organomegaly.   EXTREMITIES: No peripheral edema. 2+ bilateral pedal pulses. Trace edema bilaterally in LE  NEUROLOGIC: Alert. Oriented x2. Following commands. CN II-XII intact. Moving all extremities.  MUSCULOSKELETAL: No joint swelling or tenderness.   SKIN: No jaundice. No acute rashes or lesions. No hematoma at groin sites.         Data:     Recent Labs   Lab 03/02/22  0344 03/01/22  0352 02/28/22  1722 02/28/22  0336 02/27/22  1556   WBC 11.5* 11.1* 12.3* 10.9 11.3*   HGB 8.3* 8.1* 8.0* 7.9* 7.9*   HCT 26.7* 26.1* 25.6* 24.9* 25.0*   MCV 97 96 96 97 96   RDW 17.3* 17.8* 18.1* 18.1* 18.8*    267 279 241 236     Recent Labs   Lab 02/24/22  0946 02/24/22  0331 02/23/22  2210 02/23/22  1552   INR 1.17* 1.12 1.16* 1.16*   PTT 35 31 32 31     Liver Function Studies -   Recent Labs   Lab Test 02/27/22  0426   PROTTOTAL 5.9*   ALBUMIN 2.4*   BILITOTAL 0.9   ALKPHOS 32*   *   *       Last Arterial Blood Gas:  Recent Labs   Lab 03/01/22  1009 03/01/22  0426 02/28/22  1724 02/28/22  0336 02/28/22  0334 02/27/22  1624    PH  --  7.48* 7.54*  --  7.50* 7.54*   PCO2  --  34* 30*  --  33* 29*   PO2  --  106* 76*  --  131* 102   HCO3  --  26 26  --  26 24   O2PER 20 28 21 4 4 4       Venous Blood Gas  Recent Labs   Lab 03/01/22  1009 03/01/22  0426 02/28/22  1724 02/28/22  0336 02/27/22  1624 02/27/22  1556 02/27/22  0426   PHV 7.43  --   --  7.47*  --  7.49* 7.46*   PCO2V 42  --   --  39*  --  34* 35*   PO2V 24*  --   --  37  --  35 31   HCO3V 27  --   --  28  --  26 25   MERLIN 2.6*  --   --  3.6*  --  2.5* 1.1   O2PER 20 28 21 4   < > 4 40    < > = values in this interval not displayed.       Recent Labs   Lab Test 02/22/22  0330 06/30/17  1355 05/12/16  0758   CHOL  --  173 196   HDL  --  42 43   LDL  --  116 125   TRIG 150* 76 140

## 2022-03-02 NOTE — PLAN OF CARE
Major Shift Events: No acute changes overnight.   Neuro: Pt remains restless throughout shift. Pt states he cannot sleep d/t excitement for his speech therapy appointment. A&Ox2-3.   CV: Rate controlled a fib. BP WNL.  Pulm: Room air. Pt does not wish to wear home CPAP.  GI/: TF at goal w/ 125 FWF q1h. BM x1 overnight. Continent/incontinent of urine.  Other: Heparin gtt restarted per team.  Plan: Continue plan of care.  For vital signs and complete assessments, please see documentation flowsheets.

## 2022-03-02 NOTE — PROGRESS NOTES
THORACIC & FOREGUT SURGERY    S:  Patient seen at bedside, doing well, in good spirits. Alert, interactive. No complaints.     O:  Vitals:    03/01/22 1445 03/01/22 1500 03/01/22 1515 03/01/22 1600   BP:  125/68     Pulse: 74 75 66    Resp:    18   Temp:    98.7  F (37.1  C)   TempSrc:    Axillary   SpO2: 98% 97% 98%    Weight:       Height:         Gen: Alert, interactive, NAD  CV: regular rate, appears well perfused  Resp: Breathing non-labored on RA. CT to WS, serosang output.     CT: 200cc/24 hr yesterday,  0 since midnight     CXR stable    A/P: Pt is a 71 year old male who presented to an outside ED on 2/17 s/p VT/VF arrest 2/2 MI from LAD.  He required 8 shocks as well as chest compressions, was transferred and cannulated to VA ECMO. Now s/p 2 stents to LAD.  Lft chest tube placed at bedside 2/20 for pleural effusion. Patient is extubated, stable off pressors, chest tube to water seal with decreasing output.    -Remove left chest tube today  -Post-pull CXR      Discussed with fellow.       Jaylin Stevens MD  General Surgery, PGY1

## 2022-03-02 NOTE — PROGRESS NOTES
03/02/22 1110   General Information   Onset of Illness/Injury or Date of Surgery 02/17/22   Referring Physician Chaudhry, Mohsan, MD   Patient/Family Therapy Goal Statement (SLP) Pt wants to eat/drink   Pertinent History of Current Problem Pt is a 71 year old M with a pmhx sig for CAD who was admitted 2/17/2022 with CP X1 hour c/b VT/VF in the ER s/p 8 shocks and ROSC, due to LAD occlusion (Ant STEMI on ekg) s/p PCI with LETY X2 he was transferred to [facility] for STEMI intervention. He was placed on VA ECMO for hypotension and ectopy during his cath. Post procedurally he was cooled for 24 hours then rewarmed, he was decannulated on 2/23 and has since been extubated. Course has been complicated by L hemothorax s/p chest tube, and jaquan. Pt has demonstrated inconsistent s/sx of aspiration at bedside, fluctuating respiratory status, FEES completed today to objecitvely assess swallow mechanism.   General Observations Pt is alert, conversant. Per discussion with pt's spouse and pt's son, they do not want providers to discuss the full extent of pt's medical hx with pt in effort to reduce anxiety.    Past History of Dysphagia Pt initially evaluated at bedside on 2/25. Fluctating performance, has been on thickened liquids since yesterday.   Type of Evaluation   Type of Evaluation Swallow Evaluation   Tongue Function (Oral Motor)   Comment, Tongue Function (Oral Motor) persisting slurred speech but improved when compared to initial evaluation   Vocal Quality/Secretion Management (Oral Motor)   Comment, Vocal Quality/Secretion Management (Oral Motor) improving   General Swallowing Observations   Current Diet/Method of Nutritional Intake (General Swallowing Observations, NIS) full liquid diet;mildly thick liquids (level 2)   Respiratory Support (General Swallowing Observations) none   Swallowing Evaluation Fiberoptic Endoscopic Evaluation of Swallowing (FEES)   FEES Eval   Type of Scope Adult   Scope Serial Number [id number]   Debi  Entry nostril entered using no topical anesthetic   Nares Passage Scope passed though right nares   NG Tube/Nasal O2 cannula NG tube present  (larger bore, occasionally obstructing view)   Symmetry of Anatomy upon entry;upon command   FEES Textures Trialed thin liquids;mildly thick liquids;puree textures;solid foods   FEES Eval: Thin Liquid Texture Trial   Mode of Presentation, Thin Liquid straw;fed by clinician   Penetration? Yes   Aspiration? Yes   Epiglottic inversion Incomplete   Penetration Occurence During swallow   Aspiration Occurrence During swallow;After swallow   Aspiration Location Spillage through posterior commisure   Patient Response to Aspiration Immediate;Absent  (inconsistent)   FEES Eval: Mildly Thick Liquids    Mode of presentation cup;straw;fed by clinician   Penetration? Yes   Aspiration? No   Epiglottic inversion Incomplete   Post swallow residuals Valleculae;Pyriforms   Penetration Occurrence After swallow;During swallow   Location of Penetration Through interarytenoid space   Patient Response to Penetration Clears with cueing   FEES Eval: Puree Solid Texture Trial   Mode of Presentation, Puree spoon;fed by clinician   Penetration? Yes   Aspiration? No   Epiglottic inversion Incomplete   Post swallow residuals Valleculae;Pyriforms   Penetration Occurrence After swallow   Location of Penetration Through interarytenoid space   Patient Response to Penetration Clears with cueing   FEES Eval: Solid Food Texture Trial   Mode of Presentation fed by clinician   Penetration? Yes   Aspiration? Yes   Epiglottic inversion Incomplete   Post swallow residuals Valleculae;Pyriforms   Penetration Occurrence After swallow   Location of Penetration Through interarytenoid space   Patient Response to Penetration Clears with cueing   Aspiration Occurrence After swallow   Aspiration Location Spillage through posterior commisure   Patient Response to Aspiration Cleared with cueing   Esophageal Phase of Swallow    Patient reports or presents with symptoms of esophageal dysphagia No   Swallowing Recommendations   Diet Consistency Recommendations mildly thick liquids (level 2);full liquid diet   Supervision Level for Intake 1:1 supervision needed   Mode of Delivery Recommendations bolus size, small;food moistened;slow rate of intake   Swallowing Maneuver Recommendations double dry swallow;supraglottic swallow  (*cued cough intermittently throughout meals)   Monitoring/Assistance Required (Eating/Swallowing) stop eating activities when fatigue is present;monitor for cough or change in vocal quality with intake   Recommended Feeding/Eating Techniques (Swallow Eval) maintain upright sitting position for eating;maintain upright posture during/after eating for 30 minutes   Medication Administration Recommendations, Swallowing (SLP) via NG or crushed and administered in pudding/applesauce   Instrumental Assessment Recommendations VFSS (videofluoroscopic swallowing study)   General Therapy Interventions   Planned Therapy Interventions Dysphagia Treatment   Dysphagia treatment Oropharyngeal exercise training;Modified diet education;Instruction of safe swallow strategies;Compensatory strategies for swallowing   Clinical Impression   Criteria for Skilled Therapeutic Interventions Met (SLP Eval) Yes, treatment indicated   SLP Diagnosis Moderate oropharyngeal dysphagia in setting of generalized weakness after prolonged intubation/ECMO, exacerbated by cognitive deficits   Risks & Benefits of therapy have been explained evaluation/treatment results reviewed;care plan/treatment goals reviewed;risks/benefits reviewed;current/potential barriers reviewed;participants voiced agreement with care plan;participants included;patient;spouse/significant other;son   Clinical Impression Comments   FEES completed per MD order. Pt presents with moderate oropharyngeal dysphagia in setting of generalized weakness after prolonged intubation/ECMO,  exacerbated by cognitive deficits. Risks and benefits of procedure discussed with pt, who provided verbal consent. Flexible adult endoscope passed through right nares without incident. Pharyngeal/laryngeal anatomy grossly symmetrical. Assessed pt with thin liquid, mildly-thick liquid, pudding, and cracker with pudding. Oral phase grossly functional, slightly prolonged with cracker. Pharyngeal phase characterized by timely swallow trigger, reduced BOT retraction and incomplete epiglottic inversion. NG tube potentially impacting epiglottic inversion but not primary source of dysphagia. Pharyngeal constriction reduced. After the swallow, pharyngeal residue appreciated across consistencies in the vallecula and pyriforms. Pt demonstrated silent aspiration of thin liquids at least x1, and then again likely aspirated on thin water followed by a cough, but unable to visualize any aspirated water in trachea. No aspiration of mildly-thick liquids via cup or straw. Suspect small amount of aspiration after the swallow with cracker/pudding mix via interarytenoid space, which was cleared with a cued cough.     Recommend pt continue mildly-thick full liquid diet. Pt is still at increased risk for aspiration of thicker consistencies like pudding d/t pharyngeal residue, but with specific strategies, risk can be reduced. Pt's family feel strongly about pt continuing an oral diet in effort to have NG removed. Recommend pt sit fully upright for all PO, take small bites/sips at a slow rate, and occasionally cough throughout meals to expectorate any potentially aspirated material. SLP will follow for pharyngeal strengthening and to repeat study in approx. 1 week.      SLP Discharge Planning   SLP Discharge Recommendation Transitional Care Facility;home with home care speech therapy   SLP Rationale for DC Rec Dysphagia, aspiration risk (inconsistent silent aspiration)   SLP Brief overview of current status  Recommend pt continue  mildly-thick full liquid diet. Pt is still at increased risk for aspiration of thicker consistencies like pudding but with specific strategies, risk can be reduced. Pt's family feel strongly about pt continuing an oral diet in effort to have NG removed. Recommend pt sit fully upright for all PO, take small bites/sips at a slow rate, and occasionally cough throughout meals to expectorate any potentially aspirated material. SLP will follow for pharyngeal strengthening and to repeat study in approx. 1 week.     Total Evaluation Time   Total Evaluation Time (Minutes) 15   SLP Goals   Therapy Frequency (SLP Eval) 5 times/wk   SLP Predicated Duration/Target Date for Goal Attainment 03/23/22   SLP: Safely tolerate diet without signs/symptoms of aspiration Regular diet;Thin liquids;With use of swallow precautions;Independently

## 2022-03-03 ENCOUNTER — APPOINTMENT (OUTPATIENT)
Dept: PHYSICAL THERAPY | Facility: CLINIC | Age: 72
DRG: 003 | End: 2022-03-03
Payer: MEDICARE

## 2022-03-03 ENCOUNTER — APPOINTMENT (OUTPATIENT)
Dept: GENERAL RADIOLOGY | Facility: CLINIC | Age: 72
DRG: 003 | End: 2022-03-03
Payer: MEDICARE

## 2022-03-03 ENCOUNTER — APPOINTMENT (OUTPATIENT)
Dept: GENERAL RADIOLOGY | Facility: CLINIC | Age: 72
DRG: 003 | End: 2022-03-03
Attending: PHYSICIAN ASSISTANT
Payer: MEDICARE

## 2022-03-03 ENCOUNTER — APPOINTMENT (OUTPATIENT)
Dept: OCCUPATIONAL THERAPY | Facility: CLINIC | Age: 72
DRG: 003 | End: 2022-03-03
Payer: MEDICARE

## 2022-03-03 ENCOUNTER — APPOINTMENT (OUTPATIENT)
Dept: SPEECH THERAPY | Facility: CLINIC | Age: 72
DRG: 003 | End: 2022-03-03
Payer: MEDICARE

## 2022-03-03 LAB
ALBUMIN SERPL-MCNC: 2.7 G/DL (ref 3.4–5)
ALP SERPL-CCNC: 52 U/L (ref 40–150)
ALT SERPL W P-5'-P-CCNC: 85 U/L (ref 0–70)
ANION GAP SERPL CALCULATED.3IONS-SCNC: 5 MMOL/L (ref 3–14)
AST SERPL W P-5'-P-CCNC: 58 U/L (ref 0–45)
BACTERIA BLD CULT: NO GROWTH
BACTERIA BLD CULT: NO GROWTH
BASE EXCESS BLDV CALC-SCNC: 2.2 MMOL/L (ref -7.7–1.9)
BASOPHILS # BLD AUTO: 0 10E3/UL (ref 0–0.2)
BASOPHILS NFR BLD AUTO: 0 %
BILIRUB SERPL-MCNC: 0.8 MG/DL (ref 0.2–1.3)
BUN SERPL-MCNC: 100 MG/DL (ref 7–30)
CALCIUM SERPL-MCNC: 8.7 MG/DL (ref 8.5–10.1)
CHLORIDE BLD-SCNC: 111 MMOL/L (ref 94–109)
CHOLEST SERPL-MCNC: 118 MG/DL
CO2 SERPL-SCNC: 26 MMOL/L (ref 20–32)
CREAT SERPL-MCNC: 2.77 MG/DL (ref 0.66–1.25)
CRP SERPL-MCNC: 69 MG/L (ref 0–8)
EOSINOPHIL # BLD AUTO: 0.5 10E3/UL (ref 0–0.7)
EOSINOPHIL NFR BLD AUTO: 6 %
ERYTHROCYTE [DISTWIDTH] IN BLOOD BY AUTOMATED COUNT: 17.2 % (ref 10–15)
ERYTHROCYTE [SEDIMENTATION RATE] IN BLOOD BY WESTERGREN METHOD: 82 MM/HR (ref 0–20)
GFR SERPL CREATININE-BSD FRML MDRD: 23 ML/MIN/1.73M2
GLUCOSE BLD-MCNC: 161 MG/DL (ref 70–99)
HCO3 BLDV-SCNC: 27 MMOL/L (ref 21–28)
HCT VFR BLD AUTO: 30.5 % (ref 40–53)
HDLC SERPL-MCNC: 32 MG/DL
HGB BLD-MCNC: 9.4 G/DL (ref 13.3–17.7)
IMM GRANULOCYTES # BLD: 0.1 10E3/UL
IMM GRANULOCYTES NFR BLD: 1 %
LDLC SERPL CALC-MCNC: 66 MG/DL
LYMPHOCYTES # BLD AUTO: 0.7 10E3/UL (ref 0.8–5.3)
LYMPHOCYTES NFR BLD AUTO: 7 %
MAGNESIUM SERPL-MCNC: 2.7 MG/DL (ref 1.6–2.3)
MCH RBC QN AUTO: 29.6 PG (ref 26.5–33)
MCHC RBC AUTO-ENTMCNC: 30.8 G/DL (ref 31.5–36.5)
MCV RBC AUTO: 96 FL (ref 78–100)
MONOCYTES # BLD AUTO: 0.8 10E3/UL (ref 0–1.3)
MONOCYTES NFR BLD AUTO: 9 %
NEUTROPHILS # BLD AUTO: 7.5 10E3/UL (ref 1.6–8.3)
NEUTROPHILS NFR BLD AUTO: 77 %
NONHDLC SERPL-MCNC: 86 MG/DL
NRBC # BLD AUTO: 0 10E3/UL
NRBC BLD AUTO-RTO: 0 /100
O2/TOTAL GAS SETTING VFR VENT: 21 %
OXYHGB MFR BLDV: 64 % (ref 70–75)
PCO2 BLDV: 41 MM HG (ref 40–50)
PH BLDV: 7.43 [PH] (ref 7.32–7.43)
PHOSPHATE SERPL-MCNC: 4.7 MG/DL (ref 2.5–4.5)
PLATELET # BLD AUTO: 319 10E3/UL (ref 150–450)
PO2 BLDV: 36 MM HG (ref 25–47)
POTASSIUM BLD-SCNC: 3.8 MMOL/L (ref 3.4–5.3)
PROT SERPL-MCNC: 6.7 G/DL (ref 6.8–8.8)
RBC # BLD AUTO: 3.18 10E6/UL (ref 4.4–5.9)
SODIUM SERPL-SCNC: 142 MMOL/L (ref 133–144)
TRIGL SERPL-MCNC: 98 MG/DL
WBC # BLD AUTO: 9.6 10E3/UL (ref 4–11)

## 2022-03-03 PROCEDURE — 84100 ASSAY OF PHOSPHORUS: CPT | Performed by: INTERNAL MEDICINE

## 2022-03-03 PROCEDURE — 120N000002 HC R&B MED SURG/OB UMMC

## 2022-03-03 PROCEDURE — 80053 COMPREHEN METABOLIC PANEL: CPT | Performed by: INTERNAL MEDICINE

## 2022-03-03 PROCEDURE — 92526 ORAL FUNCTION THERAPY: CPT | Mod: GN

## 2022-03-03 PROCEDURE — 250N000011 HC RX IP 250 OP 636: Performed by: INTERNAL MEDICINE

## 2022-03-03 PROCEDURE — 74018 RADEX ABDOMEN 1 VIEW: CPT

## 2022-03-03 PROCEDURE — 99357 PR PROLONGED SERV,INPATIENT,EA ADDL 30 MIN: CPT | Mod: FS | Performed by: INTERNAL MEDICINE

## 2022-03-03 PROCEDURE — 85652 RBC SED RATE AUTOMATED: CPT | Performed by: INTERNAL MEDICINE

## 2022-03-03 PROCEDURE — 99356 PR PROLONGED SERV,INPATIENT,1ST HR: CPT | Mod: FS | Performed by: INTERNAL MEDICINE

## 2022-03-03 PROCEDURE — 85025 COMPLETE CBC W/AUTO DIFF WBC: CPT | Performed by: INTERNAL MEDICINE

## 2022-03-03 PROCEDURE — 44500 INTRO GASTROINTESTINAL TUBE: CPT

## 2022-03-03 PROCEDURE — G0463 HOSPITAL OUTPT CLINIC VISIT: HCPCS

## 2022-03-03 PROCEDURE — 86140 C-REACTIVE PROTEIN: CPT | Performed by: INTERNAL MEDICINE

## 2022-03-03 PROCEDURE — 93005 ELECTROCARDIOGRAM TRACING: CPT

## 2022-03-03 PROCEDURE — 99233 SBSQ HOSP IP/OBS HIGH 50: CPT | Mod: FS | Performed by: INTERNAL MEDICINE

## 2022-03-03 PROCEDURE — 250N000011 HC RX IP 250 OP 636

## 2022-03-03 PROCEDURE — 250N000009 HC RX 250: Performed by: INTERNAL MEDICINE

## 2022-03-03 PROCEDURE — 250N000013 HC RX MED GY IP 250 OP 250 PS 637: Performed by: INTERNAL MEDICINE

## 2022-03-03 PROCEDURE — 250N000013 HC RX MED GY IP 250 OP 250 PS 637: Performed by: PHYSICIAN ASSISTANT

## 2022-03-03 PROCEDURE — 97530 THERAPEUTIC ACTIVITIES: CPT | Mod: GO | Performed by: OCCUPATIONAL THERAPIST

## 2022-03-03 PROCEDURE — 74018 RADEX ABDOMEN 1 VIEW: CPT | Mod: 26 | Performed by: RADIOLOGY

## 2022-03-03 PROCEDURE — 80061 LIPID PANEL: CPT | Performed by: PHYSICIAN ASSISTANT

## 2022-03-03 PROCEDURE — 999N000065 XR ABDOMEN PORT 1 VIEWS

## 2022-03-03 PROCEDURE — 97110 THERAPEUTIC EXERCISES: CPT | Mod: GO | Performed by: OCCUPATIONAL THERAPIST

## 2022-03-03 PROCEDURE — 97110 THERAPEUTIC EXERCISES: CPT | Mod: GP

## 2022-03-03 PROCEDURE — 250N000013 HC RX MED GY IP 250 OP 250 PS 637: Performed by: STUDENT IN AN ORGANIZED HEALTH CARE EDUCATION/TRAINING PROGRAM

## 2022-03-03 PROCEDURE — 97530 THERAPEUTIC ACTIVITIES: CPT | Mod: GP

## 2022-03-03 PROCEDURE — 250N000013 HC RX MED GY IP 250 OP 250 PS 637

## 2022-03-03 PROCEDURE — 83735 ASSAY OF MAGNESIUM: CPT | Performed by: INTERNAL MEDICINE

## 2022-03-03 PROCEDURE — 82805 BLOOD GASES W/O2 SATURATION: CPT | Performed by: STUDENT IN AN ORGANIZED HEALTH CARE EDUCATION/TRAINING PROGRAM

## 2022-03-03 PROCEDURE — 36415 COLL VENOUS BLD VENIPUNCTURE: CPT | Performed by: INTERNAL MEDICINE

## 2022-03-03 PROCEDURE — 93010 ELECTROCARDIOGRAM REPORT: CPT | Performed by: INTERNAL MEDICINE

## 2022-03-03 RX ORDER — LIDOCAINE HYDROCHLORIDE 20 MG/ML
5 SOLUTION OROPHARYNGEAL ONCE
Status: COMPLETED | OUTPATIENT
Start: 2022-03-03 | End: 2022-03-03

## 2022-03-03 RX ORDER — ROSUVASTATIN CALCIUM 10 MG/1
20 TABLET, COATED ORAL EVERY EVENING
Status: DISCONTINUED | OUTPATIENT
Start: 2022-03-03 | End: 2022-03-11 | Stop reason: HOSPADM

## 2022-03-03 RX ORDER — HALOPERIDOL 5 MG/ML
5 INJECTION INTRAMUSCULAR EVERY 6 HOURS PRN
Status: DISCONTINUED | OUTPATIENT
Start: 2022-03-03 | End: 2022-03-11 | Stop reason: HOSPADM

## 2022-03-03 RX ORDER — HALOPERIDOL 5 MG/ML
2 INJECTION INTRAMUSCULAR EVERY 6 HOURS PRN
Status: DISCONTINUED | OUTPATIENT
Start: 2022-03-03 | End: 2022-03-03

## 2022-03-03 RX ORDER — LOPERAMIDE HCL 1 MG/7.5ML
2 SUSPENSION ORAL 4 TIMES DAILY PRN
Status: DISCONTINUED | OUTPATIENT
Start: 2022-03-03 | End: 2022-03-11 | Stop reason: HOSPADM

## 2022-03-03 RX ADMIN — ISOSORBIDE DINITRATE 15 MG: 10 TABLET ORAL at 16:06

## 2022-03-03 RX ADMIN — HYDRALAZINE HYDROCHLORIDE 75 MG: 25 TABLET ORAL at 22:49

## 2022-03-03 RX ADMIN — AMIODARONE HYDROCHLORIDE 200 MG: 200 TABLET ORAL at 07:43

## 2022-03-03 RX ADMIN — ROSUVASTATIN CALCIUM 20 MG: 20 TABLET, FILM COATED ORAL at 19:57

## 2022-03-03 RX ADMIN — Medication 2 PACKET: at 19:57

## 2022-03-03 RX ADMIN — Medication 10 MG: at 19:57

## 2022-03-03 RX ADMIN — TICAGRELOR 90 MG: 90 TABLET ORAL at 07:43

## 2022-03-03 RX ADMIN — LIDOCAINE HYDROCHLORIDE 5 ML: 20 SOLUTION ORAL; TOPICAL at 14:32

## 2022-03-03 RX ADMIN — METOPROLOL TARTRATE 75 MG: 50 TABLET, FILM COATED ORAL at 19:57

## 2022-03-03 RX ADMIN — ISOSORBIDE DINITRATE 15 MG: 10 TABLET ORAL at 23:53

## 2022-03-03 RX ADMIN — QUETIAPINE FUMARATE 25 MG: 25 TABLET ORAL at 02:27

## 2022-03-03 RX ADMIN — HYDRALAZINE HYDROCHLORIDE 75 MG: 25 TABLET ORAL at 06:37

## 2022-03-03 RX ADMIN — HYDROMORPHONE HYDROCHLORIDE 0.2 MG: 0.2 INJECTION, SOLUTION INTRAMUSCULAR; INTRAVENOUS; SUBCUTANEOUS at 06:37

## 2022-03-03 RX ADMIN — TICAGRELOR 90 MG: 90 TABLET ORAL at 19:57

## 2022-03-03 RX ADMIN — HEPARIN SODIUM AND DEXTROSE 500 UNITS/HR: 10000; 5 INJECTION INTRAVENOUS at 02:27

## 2022-03-03 RX ADMIN — ISOSORBIDE DINITRATE 15 MG: 10 TABLET ORAL at 01:08

## 2022-03-03 RX ADMIN — Medication 2 PACKET: at 07:45

## 2022-03-03 RX ADMIN — ASPIRIN 81 MG CHEWABLE TABLET 81 MG: 81 TABLET CHEWABLE at 07:43

## 2022-03-03 RX ADMIN — HYDRALAZINE HYDROCHLORIDE 75 MG: 25 TABLET ORAL at 14:17

## 2022-03-03 ASSESSMENT — ACTIVITIES OF DAILY LIVING (ADL)
ADLS_ACUITY_SCORE: 19
ADLS_ACUITY_SCORE: 17
ADLS_ACUITY_SCORE: 25
ADLS_ACUITY_SCORE: 17
ADLS_ACUITY_SCORE: 19
ADLS_ACUITY_SCORE: 17
ADLS_ACUITY_SCORE: 17
ADLS_ACUITY_SCORE: 25
ADLS_ACUITY_SCORE: 17
ADLS_ACUITY_SCORE: 19
ADLS_ACUITY_SCORE: 21
ADLS_ACUITY_SCORE: 17
ADLS_ACUITY_SCORE: 21
ADLS_ACUITY_SCORE: 21
ADLS_ACUITY_SCORE: 25
ADLS_ACUITY_SCORE: 21
ADLS_ACUITY_SCORE: 17
ADLS_ACUITY_SCORE: 27
ADLS_ACUITY_SCORE: 17
ADLS_ACUITY_SCORE: 21
ADLS_ACUITY_SCORE: 19
ADLS_ACUITY_SCORE: 17
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
## 2022-03-03 NOTE — PROCEDURES
Small Bowel Feeding Tube Placement Assessment  Reason for Feeding Tube Placement: Pt/family request for small bore FT after loss of NGT  Cortrak Start Time: 3:00   Cortrak End Time: 3:20  Medicine Delivered During Procedure: Lidocaine   Placement Successful: Presume pre-pyloric and left gastric intentionally (pending AXR confirmation).  Initially difficult to achieve even gastric position/FT difficult to advance FT beyond ~ GE junction.   Procedure Complications: NA   Final Placement Francesco at exit of nare: 85 cm  Face to Face time with patient: 30 minutes     Segundo Beckwith RD, BENEDICTO, [facility]  Neuro ICU  Pager: [phone]

## 2022-03-03 NOTE — PROGRESS NOTES
Interventional Cardiology Progress Note      Assessment & Plan:  Chano Johnson is a 71 year old male with a history of atrial fibrillation  s/p ablation, GERD, ARSENIO, and erectile dysfunction who initially presented to [facility] ED on 2/17/22 with chest pain and EKG evidence of anterior STEMI. In the ED, patient went into ventricular fibrillation. CPR was initiated. Total of 8  shocks administered due to refractory VF. Amiodarone 450 mg and epinephrine x4 administered. Intubated. ROSC achieved at  1405. Transferred directly to [facility] cath lab where he was found to have occlusion of the LAD. During angiogram he was unstable with significant hypotension and ectopy, which prompted placement on VA ECMO. PCI was performed with patient on ECMO with LETY x2 to LAD. Course complicated by hemorrhage shock and left hemothorax requiring chest tube placement, SAHIL, and delirium.     Changes today:  - abdominal x-ray 2/2 FT removed  - SLP reevaluation  - possible re-insertion of post-pyloric TF  - increase rosuvastatin to 20 mg   - add PRN haldol   - increase metoprolol to 75 mg   - decrease FWF to 50   - stop scheduled Seroquel     Important events:  VA ECMO canulation 2/17/22  LETY x2 to LAD 2/17/22  Rewarmed on 2/19/22  CRRT started 2/19/22  Left sided chest tube placed on 2/20/22 for hemothorax  Emobolization of left internal mammary artery on 2/20/22  Decannulation 2/22/22  IABP removed 2/23/22  Extubated 2/24/22  Chest tube removed 3/1/22  Transferred to floor 3/2/22    Neurology:  # Possible anoxic brain injury post cardiac arrest   Head CT on presentation w/o acute pathology. Day 3 CT head showed indeterminate gray-white matter loss in the right occipital lobe which could be artifact vs true infarct. Strong suspicion for artifact per neurocrit. EEG w/o significant abnormalities. Alert and oriented x4 during the day. Able to recall long-term events. Some difficulty with short term memory.     # Delirium   # Agitation    Delirium is improving but patient is continuing to demonstrate nocturnal agitation. Required mitts/restraints on 3/3/22 as well as 1:1 sitter due to significant agitation. Patient kicked RN in chest. Restraints removed at 0500. Patient reports that he does not remember this event and thought it was a dream.   - maintain appropriate awake/sleep cycle   - delirium precautions  - stop Seroquel  - melatonin 10 mg at bedtime   - haldol 5 mg q 6hr PRN  - sitter at bedtime     Cardiovascular   # VF cardiac arrest   # Cardiogenic/hemorrhagic shock, resovled  # Anterior STEMI s/p LETY x2  # Acute on chronic systolic heart failure (EF 25/30%)  # Ischemic cardiomyopathy   Coronary angiogram with culprit lesion to be mLADs/p LETY x2. Has significant residual disease in the a large ramus with 70% stenosis. Peripheral V-A ECMO inserted for refractory cardiogenic shock on 2/17/22: 17 Fr arterial and 25 Fr venous cannula, IABP and distal reperfusion cannula placed. Decannulated on 2/22/22. IABP removed 2/23/22. Echo  2/18/22 10-15% on ECMO. Echo with LVEF of 25-30% with apical akinesis on 2/28/22.   - DAPT: aspirin + ticagrelor   - statin: increase rosuvastatin to 20 mg daily now that CrCL >30  - Guideline directed HF medications  - BB: increase metoprolol tartrate 75 mg BID (will need to transition to Toprol XL once patient able to take po pills)  - ACEi/ARB/ARNI: defer due to SAHIL, will hopefully start before discharge  - Afterload reduction: hydralazine 75 mg  TID/ isordil 15 mg TID  - AA: defer due to SAHIL, will hopefully start before discharge  - SGLT2-I: defer due to SAHIL  - diuretics: appears euvolemic. Hold on diuretics today as patient is diuresing well  - daily weight   - strict I&O  - daily BMP  - electrolyte replacement protocol     # Paroxysmal atrial fibrillation  # s/p Watchman implantation   Hx of paroxysmal atrial fibrillation s/p ablations and Watchman device. Afib with RVR requiring IV amiodarone in the ICU.  Transitioned to po amiodarone on 3/2/22. Currently in sinus rhythm.   - po amiodarone 200 mg daily   - [id number] score 3, no anticoagulation necessary with Watchman device in place  - rate control with metoprolol tartrate 75 mg BID (will transition to long-acting as above for HF)    Pulmonary   # Acute hypoxic respiratory failure, resolved   # Pleural effusion, improved  # Left hemothorax, resolved  # ARSENIO  Intubated due to cardiac arrest on 2/17/22. Extubated on 2/24/22. On room air. CXR clear.   - CPAP/BiPAP at night     GI/Nutrition  # Severe malnutrition in the context of acute illness  # Dysphagia   # loose stools 2/2 TF  TF removed by patient on 3/3/22. SLP to evaluate patient. Likely will require placement of new post-pyloric feeding tube this afternoon.   - SLP following   - RD following  - diet: full liquid diet, mildly thickened liquids  - continuous tube feeds held this morning   - PRN imodium for loose stools  - decrease FWF to 50 ml per hour    # Transaminitis, in setting of shock, cardiac arrest   ,  on admission. ALT normalized on 2/20 but has gila intermittently rising. ALT 85 today, AST 58.   - daily CMP    Renal, Electrolytes   # SAHIL, likely ATN in setting of cardiac arrest   # Hypernatremia, resolved   # Hypermagnesemia, Mg 2.7  # Hyperchloremia, Cl 111  # Uremia,   Cr on admission 1.41. Cr peaked at 3.86 on 2/21. Now improving. No need for CRRT. Auto-diuresing. Cr 2.77 today/. Total urine output 4.2 L yesterday.   - strict I&O  - daily weight   - daily BMP  - decrease FWF to 50 ml per hour    Infectious Disease   Sputum cultures 2/20/22-- Staph lugdunesis. Prophylactic treatment with  cefazolin 2 g BID x5 days, 2/24-2/28. WBC 9.6. Afebrile.     Hematology   # Anemia due to acute blood loss, stable  # Left hemothorax  Required frequent blood transfusions of 3-4 units/day to maintain hgb > 7 on admission. Extravasation from left internal mammary artery noted on CT. Left  sided chest tube placed at bedside by thoracic surgery on 2/20/22 with 1300 ml old blood return. IR performed emobolization of left internal mammary artery on 2/20/22.   - hgb 9.4 today  - daily CBC      Musculoskeletal/Integument   # Left groin wound 2/2 ECMO decannulation   - cleanse wound with Microklenz BID   - Sutures to be removed on 3/8/22.     # Pressure wound of buttock   # Pressure wound of left tongue 2/2 ET   WOC following     # Generalized weakness  # Deconditioning   PT/OT recommending TCU placement       Diet: full liquid, mildly thickened liquids   Activity: as tolerated with assistance  Code Status: DNR/DNI  Disposition: not medically stable, will need TCU placement     Patient discussed with staff cardiologist, Dr. Murcia, and cardiology fellow.      Mirlande Kirkpatrick PA-C  [facility]  Interventional Cardiology  [phone]    MELANIE Time Statement:  I spent a total of 175 minutes face-to-face or coordinating care of Chano Johnson. 60 minutes reviewing the chart,  75 minutes spent minutes face-to-face with patient and wife discussing plan for TCU placement, SLP evaluation, possible need for feeding tube insertion, optimization of heart failure medications, management of delirium.  40 minutes spent on documentation.       Interval History:  Patient significantly agitated overnight and unable to be redirected. This morning, patient reports that he does not remember this event and thought that he was dreaming. Required use of mitts, restraints, and 1:1 sitter. Kicked RN in chest. During bout of agitation, patient pulled back feeding tube. Abdominal x-ray revealed current placement in esophagus so feeding tube completely removed. Renal function improving. Cr 2.77/. Hgb stable at 9.4. Total urine output 4.2 L yesterday. Telemetry with SR and avg HR of 76 bpm. No ventricular ectopy. No chest pain, dyspnea, dizziness, lightheadedness, or palpitations. Groin sites without  pain.       Most recent vital signs:  /57 (BP Location: Right arm)   Pulse 78   Temp 98.1  F (36.7  C) (Axillary)   Resp 16   Ht 1.829 m (6')   Wt 92.2 kg (203 lb 4.2 oz)   SpO2 99%   BMI 27.57 kg/m    Temp:  [97.4  F (36.3  C)-98.5  F (36.9  C)] 98.1  F (36.7  C)  Pulse:  [68-88] 78  Resp:  [15-16] 16  BP: ()/(53-98) 106/57  SpO2:  [95 %-100 %] 99 %  Wt Readings from Last 2 Encounters:   03/02/22 92.2 kg (203 lb 4.2 oz)   02/17/22 99.8 kg (220 lb)       Intake/Output Summary (Last 24 hours) at 3/3/2022 0823  Last data filed at 3/3/2022 0800  Gross per 24 hour   Intake 3224.75 ml   Output 4625 ml   Net -1400.25 ml       Physical exam:  General: Pleasant elderly male. Appears comfortable and in no acute distress. Alert and interactive  HEENT: Normocephalic, atraumatic. Mild bilateral scleral icterus. Tongue is edematous and erythematous. Ulceration on superior distal aspect and inferior. Front tooth is broken. Ecchymosis of right neck from removal of lines.   Neck: JVP not visible above the clavicle while sitting upright in chair.  CARDIAC: Regular rate and rhythm, no m/r/g appreciated. Radial pulses 2+ bilaterally. Pedal pulses 1+ bilaterally.   RESP: Normal work of breathing on room air without use of accessory breathing muscles. Bibasilar rales extending group home up lung fields. No wheezing or rhonchi. Sounds more consistent with atelectasis than pulmonary edema. Bandage on left lateral ribs from CT removal. Bandage is saturated with sanguinous drainage.   GI: Soft and nontender to palpation. Not distended.  Bandage on left lateral abdomen.   EXTREMITIES: Without lower extremity edema. Warm and well perfused. Feet are cooler to touch but perfused. No cyanosis. Right femoral access site is c/d/i. Flat and soft to palpation. No hematoma. Left groin ECMO incision covered with bandage. Bandage without drainage. Soft to palpation.   SKIN: No acute lesions appreciated. Warm and dry to touch. Ecchymoses  of bilateral wrists.   NEURO: Alert and oriented X4, weak but able to move all extremities. Speech is intermittently slurred due to swollen tongue. speech  PSYCH: Mood and affect are appropriate    Lines/Drains:   - PICC in right arm--placed 2/28/22  - external urinary catheter    Labs (Past three days):  CBC  Recent Labs   Lab 03/03/22  0645 03/02/22  0344 03/01/22  0352 02/28/22  1722   WBC 9.6 11.5* 11.1* 12.3*   RBC 3.18* 2.74* 2.73* 2.67*   HGB 9.4* 8.3* 8.1* 8.0*   HCT 30.5* 26.7* 26.1* 25.6*   MCV 96 97 96 96   MCH 29.6 30.3 29.7 30.0   MCHC 30.8* 31.1* 31.0* 31.3*   RDW 17.2* 17.3* 17.8* 18.1*    293 267 279     BMP  Recent Labs   Lab 03/03/22  0645 03/02/22  1631 03/02/22  1015 03/02/22  0344 03/01/22  1009 03/01/22  0352 02/28/22  1722 02/28/22  0336    144 144 148*   < > 148* 151*  151* 150*   POTASSIUM 3.8  --   --  3.9  --  3.9 3.7 3.6   CHLORIDE 111*  --   --  114*  --  116* 117* 115*   CO2 26  --   --  25  --  26 24 26   ANIONGAP 5  --   --  9  --  6 10 9   *  --   --  133*  --  150* 138* 130*   *  --   --  96*  --  105* 111* 109*   CR 2.77*  --   --  2.80*  --  3.15* 3.53* 3.71*   GFRESTIMATED 23*  --   --  23*  --  20* 17* 16*   PEDRITO 8.7  --   --  8.5  --  8.7 8.8 8.5   MAG 2.7*  --   --  2.8*  --  3.0*  --  3.0*   PHOS 4.7*  --   --  4.0  --  5.3*  --  6.2*    < > = values in this interval not displayed.      INR  Recent Labs   Lab 02/24/22  0946   INR 1.17*     Liver panel  Recent Labs   Lab 03/03/22  0645 03/02/22  0344 03/01/22  0352 02/28/22  1722   PROTTOTAL 6.7* 6.0* 6.0* 6.2*   ALBUMIN 2.7* 2.4* 2.4* 2.4*   BILITOTAL 0.8 0.8 0.8 0.8   ALKPHOS 52 53 43 39*   AST 58* 88* 69* 82*   ALT 85* 74* 51 56       Imaging/procedure results:  EKG 12 Lead 3/2/22:       ECHO 2/28/22:  Interpretation Summary  The visual ejection fraction is 25-30%.  Akinetic apical segments.  Right ventricular function, chamber size, wall motion, and thickness are  normal.  Dilation of the  inferior vena cava is present with abnormal respiratory  variation in diameter.  No pericardial effusion is present.    Coronary Angiogram 2/17/22:  Left Main   Minimal luminal irregularities   Left Anterior Descending   Mid LAD lesion is 100% stenosed. Culprit lesion. The lesion is type B2 - medium risk and thrombotic.   Ramus Intermedius   The vessel is large.   Ramus lesion is 70% stenosed.   Left Circumflex   Minimal luminal irregularities   Right Coronary Artery   Mid RCA lesion is 30% stenosed.       Intervention  Mid LAD lesion   Stent   Lesion length: 20 mm. There is no pre-interventional antegrade distal flow (KATHLEEN 0). Pre-stent angioplasty was performed. The post-interventional distal flow is normal (KATHLEEN 3). A 6Fr XB3.5 guide was engaged in the LM. Heparin was used for anticoagulation with ACT > 250 sec. A runthrough wire was advanced across the lesion to the distal LAD. The lesion was pre-dilated with a 2.5mm emerge balloon. A 3.5x24mm synergy LETY was successfully deployed across the lesion. A 4.0x8mm synergy LETY was successfully deployed proximal to, and overlapping, the first stent. The stents were post-dilated with 3.5 and 4mm NC balloons. There were no complications. KATHLEEN 3 flow was present in all segments upon completion of the procedure.   There is a 0% residual stenosis post intervention.     EXTRACORPOREAL MEMBRANE OXYGENATION CANNULATION 2/17/22:  A 17 Fr ECMO cannula was inserted into the left common femoral artery and a 25 Fr cannula was inserted into the left common femoral vein.  The arterial cannula was positioned in the iliac artery and the venous canula was positioned across the RA.  The ECMO circuit was primed, closely inspected to ensure no bubbles present and ECMO was initiated.  An anterograde perfusion 8 Fr sheath was inserted in the left superficial femoral artery.    CT Chest/Abdomen 2/20/22  IMPRESSION:    1. Retrosternal ill-defined anterior mediastinal fat hemorrhage  from  recent resuscitation attempt with focus of suspected active bleeding  from the left internal mammary artery.  2. Left hemothorax. - Blood products from hemorrhage extending into  the left pleural space.   3. Small right pleural effusion with associated atelectasis.  4. No acute findings in the abdomen/pelvis.  5. Support devices as detailed in the body of the report    CT Head 2/20/22  IMPRESSION:  Indeterminant gray-white matter loss in the right occipital lobe could  be artifactual from adjacent streak/photon starvation artifact versus  true infarct.

## 2022-03-03 NOTE — PROGRESS NOTES
CLINICAL NUTRITION SERVICES - REASSESSMENT NOTE     Nutrition Prescription    RECOMMENDATIONS FOR MDs/PROVIDERS TO ORDER:  - Total daily fluids/adjustments per MD   - Continue EN until pt demonstrates adequate PO with regular diet as evidenced by at least 3 day matteo count averages.  - Diet per SLP      Malnutrition Status:    - Severe malnutrition in the context of acute illness    Recommendations already ordered by Registered Dietitian (RD):  - Once enteral access verified for use: Osmolite 1.5 Matteo @ goal of 65ml/hr (1560ml/day) +5 Prosource will provide: 2540 kcals (30 kcal/kg), 152 g PRO (1.8 g/kg), 1188 ml free H20, 317 g CHO, and 0 g fiber daily.  - Elevated HOB   - Continue supplements as ordered     Future/Additional Recommendations:  - FT position   - EN initiation/tolerance via new access  - SLP/diet (matteo counts once appro)     EVALUATION OF THE PROGRESS TOWARD GOALS   Diet: Full Liquid mildly thick. SLP following.   Last SLP assessment 3/2: Pt is still at increased risk for aspiration of thicker consistencies like pudding d/t pharyngeal residue, but with specific strategies, risk can be reduced. Pt's family feel strongly about pt continuing an oral diet in effort to have NG removed.    Nutrition Support: Osmolite 1.5 Matteo @ goal of 65ml/hr (1560ml/day) +5 Prosource will provide: 2540 kcals (30 kcal/kg), 152 g PRO (1.8 g/kg), 1188 ml free H20, 317 g CHO, and 0 g fiber daily.    Intake: 672 ml, 1008 kcals (12 kcals/kg) and 42 gm (0.5 gm/kg) + 1208 kcals (14 kcals/kg) and 97 gm PRO (1.1 gm/kg)      NEW FINDINGS   Nutrition/GI: Pt with EN via NGT (replaced OGT post extubation). Pt pulled NGT. Plan to place NGT but wife requesting smaller FT, thus gastrically placed small bore FT was placed. SLP following and pt current has oral diet ordered. TF removed by patient on 3/3/22. SLP to evaluate patient. EN held this a.m. Per pt, tolerating small amounts of Magic Cup and applesauce.     Cardiovascular  VF cardiac  arrest ; Cardiogenic/hemorrhagic shock, resovled. Anterior STEMI s/p ELTY x2; Acute on chronic systolic heart failure (EF 25/30%); Ischemic cardiomyopathy. Decannulated on 2/22/22. Was on diuretics; atuo-diuresing.    Pulmonary  Acute hypoxic respiratory failure, resolved   Extubated on 2/24/22. On room air - CPAP/BiPAP at night     Renal, Electrolytes  SAHIL, likely ATN in setting of cardiac arrest; Uremia; lytes replaced. Now improving. No need for CRRT.    Skin: Pressure wound of buttock and Pressure wound of left tongue 2/2 ET - WOC following     Labs/meds: BUN: 100 (H); creatinine: 2.77 (H); Mg++ 2.7 (H); AST: 58 (H); ALT: 85 (H); CRP: 69.0 (H)    Weight: Overall down from admit: 100 kg --> 92.2 kg = 8% weight decline. Cannot r/o role of fluid status in weight changes. Pt diuresed.     MALNUTRITION  % Intake: </= 50% for >/= 5 days (severe)  % Weight Loss: > 5% in 1 month (severe) - cannot r/o role of fluid in at least some weight change  Subcutaneous Fat Loss: None observed  Muscle Loss: Thoracic region (clavicle, acromium bone, deltoid, trapezius, pectoral): moderate Upper arm (bicep, tricep), Lower arm  (forearm): mild and Dorsal hand: moderate  Fluid Accumulation/Edema: None noted  Malnutrition Diagnosis: Severe malnutrition in the context of acute illness    Previous Goals   Total avg nutritional intake to meet a minimum of 25 kcal/kg and 1.5 g PRO/kg daily (per dosing wt 86 kg).  Evaluation: Not met    Previous Nutrition Diagnosis  Inadequate protein-energy intake (calories/protein) related to inadequate enteral nutrition infusions and inability to take oral PO due to medical status and vented as evidenced by average nutrition intake <50% of needs over past 6 days.   Evaluation: Declining    CURRENT NUTRITION DIAGNOSIS  Inadequate protein-energy intake related to disruptions to EN infusions as evidenced by pt not meeting goal provisions with 7-day averages meeting 14 kcals/kg and 1.1 gm/kg per dosing weight.       INTERVENTIONS  Implementation  Enteral Nutrition - continue as ordered once new access verified for use     Goals  Total avg nutritional intake to meet a minimum of 25 kcal/kg and 1.5 g PRO/kg daily (per dosing wt 86 kg).    Monitoring/Evaluation  Progress toward goals will be monitored and evaluated per protocol.     Segundo Beckwith RD, BENEDICT[facility], [facility]  Neuro ICU  Pager: [phone]

## 2022-03-03 NOTE — PROGRESS NOTES
[facility] Nurse Inpatient Pressure Injury Assessment   Reason for consultation: Evaluate and treat tongue wound    Assessment:    Pressure injury on tongue: present on admission  Pressure injury is stage:  Mucosal , dry  Status:  improving      TREATMENT PLAN  tongue wound: Every 2 hours  perform oral cares per protocol    Orders Reviewed   WO Nurse follow-up plan:weekly  Nursing to notify the Provider(s) and re-consult the WO Nurse if wound(s) deteriorates or new skin concern.    Pressure Injury Prevention Interventions In Place:  Pillows for repositioning, Heel off-loading boots, Pillows under calves for heel suspension and Mepilex Sacral Dressing   Current support surface: Standard  Low air loss mattress         Pressure injury interventions:  Wound location:   cleft  Cleanse with MicroKlenz moistened gauze   Pat dry.   Apply no sting barrier film to the anuja wound skin and allow to dry   Cover with  Sacral  Mepilex dressing, carefully molding into place  Paint the edges of the mepilex dressing with no-sting barrier film  Change every third day and as needed          Patient History:   According to medical record: 71 year old male who was admitted on 2022 s/p VT/VF arrest x8 shocks who was found to have LAD occlusion requiring 2x stents. VT VF arrest in the ED after he was found to have an anterior STEMI on ECG.  Chest compressions and ACLS was started immediately.  transferred to the [facility] for percutaneous intervention for his STEMI.  arrived at [facility] and was found to have occlusion of the LAD. During the angiogram he was found to have significant hypotension and ectopy, which prompted placement on VA ECMO.  Now decannulated and no longer intubated    Current Diet / Nutrition:     Orders Placed This Encounter      Full Liquid Diet Mildly Thick (level 2) (with one to one supervision)  tube feedings    Output:    I/O last 3 completed shifts:  In: 3724.75 [I.V.:154.75;  NG/GT:2335]  Out: 4395 [Urine:4395]  Containment: of urine/stool: Urinary Catheter    Risk Assessment:   Sensory Perception: 3-->slightly limited  Moisture: 3-->occasionally moist  Activity: 2-->chairfast  Mobility: 2-->very limited  Nutrition: 2-->probably inadequate  Friction and Shear: 2-->potential problem  Luis E Score: 14    Labs:    Recent Labs   Lab 03/03/22  0645   ALBUMIN 2.7*   HGB 9.4*   WBC 9.6   CRP 69.0*       Focused Assessment: .  Pressure Injury Present::Yes   Physical Exam  Skin inspection: focused mouth  Wound Location:  Left tongue          Date of Photos: 2/21 and 2/24/22 and 2/28  Wound History: wound present on admission  Measurements (length x width x depth, in cm) approximately 75% on top of the tongue covered with eroded mucosa and dried drainage   Palpation of the wound bed: normal   Periwound skin: scattered mucosal wounds on tip of tongue  Color: normal and consistent with surrounding tissue  Temperature: normal   Drainage:, dried coagulum   Odor: none  Pain: moderate

## 2022-03-03 NOTE — PROCEDURES
Bridle Placement:   Reason for bridle placement: securement of FT    Medicine delivered during procedure: lubricating jelly   Procedure: Successful   Location of top of clip on FT: @ 86 cm marker   Condition of nose/skin at time of bridle placement: Unremarkable   Face to Face time with patient: 5 minutes.    Segundo Beckwith RD, BENEDICT[place], [facility]  Neuro ICU  Pager: [phone]

## 2022-03-04 ENCOUNTER — APPOINTMENT (OUTPATIENT)
Dept: OCCUPATIONAL THERAPY | Facility: CLINIC | Age: 72
DRG: 003 | End: 2022-03-04
Payer: MEDICARE

## 2022-03-04 ENCOUNTER — APPOINTMENT (OUTPATIENT)
Dept: SPEECH THERAPY | Facility: CLINIC | Age: 72
DRG: 003 | End: 2022-03-04
Payer: MEDICARE

## 2022-03-04 ENCOUNTER — APPOINTMENT (OUTPATIENT)
Dept: PHYSICAL THERAPY | Facility: CLINIC | Age: 72
DRG: 003 | End: 2022-03-04
Payer: MEDICARE

## 2022-03-04 LAB
ALBUMIN SERPL-MCNC: 2.6 G/DL (ref 3.4–5)
ALP SERPL-CCNC: 65 U/L (ref 40–150)
ALT SERPL W P-5'-P-CCNC: 88 U/L (ref 0–70)
ANION GAP SERPL CALCULATED.3IONS-SCNC: 9 MMOL/L (ref 3–14)
AST SERPL W P-5'-P-CCNC: 49 U/L (ref 0–45)
ATRIAL RATE - MUSE: 78 BPM
BASOPHILS # BLD AUTO: 0 10E3/UL (ref 0–0.2)
BASOPHILS NFR BLD AUTO: 0 %
BILIRUB SERPL-MCNC: 0.8 MG/DL (ref 0.2–1.3)
BUN SERPL-MCNC: 100 MG/DL (ref 7–30)
CALCIUM SERPL-MCNC: 9.1 MG/DL (ref 8.5–10.1)
CHLORIDE BLD-SCNC: 113 MMOL/L (ref 94–109)
CO2 SERPL-SCNC: 24 MMOL/L (ref 20–32)
CREAT SERPL-MCNC: 2.71 MG/DL (ref 0.66–1.25)
DIASTOLIC BLOOD PRESSURE - MUSE: NORMAL MMHG
EOSINOPHIL # BLD AUTO: 0.5 10E3/UL (ref 0–0.7)
EOSINOPHIL NFR BLD AUTO: 6 %
ERYTHROCYTE [DISTWIDTH] IN BLOOD BY AUTOMATED COUNT: 17.5 % (ref 10–15)
GFR SERPL CREATININE-BSD FRML MDRD: 24 ML/MIN/1.73M2
GLUCOSE BLD-MCNC: 157 MG/DL (ref 70–99)
HCT VFR BLD AUTO: 32.4 % (ref 40–53)
HGB BLD-MCNC: 10.1 G/DL (ref 13.3–17.7)
IMM GRANULOCYTES # BLD: 0.2 10E3/UL
IMM GRANULOCYTES NFR BLD: 2 %
INTERPRETATION ECG - MUSE: NORMAL
LYMPHOCYTES # BLD AUTO: 0.8 10E3/UL (ref 0.8–5.3)
LYMPHOCYTES NFR BLD AUTO: 9 %
MAGNESIUM SERPL-MCNC: 2.9 MG/DL (ref 1.6–2.3)
MCH RBC QN AUTO: 30.1 PG (ref 26.5–33)
MCHC RBC AUTO-ENTMCNC: 31.2 G/DL (ref 31.5–36.5)
MCV RBC AUTO: 97 FL (ref 78–100)
MONOCYTES # BLD AUTO: 0.9 10E3/UL (ref 0–1.3)
MONOCYTES NFR BLD AUTO: 9 %
NEUTROPHILS # BLD AUTO: 6.9 10E3/UL (ref 1.6–8.3)
NEUTROPHILS NFR BLD AUTO: 74 %
NRBC # BLD AUTO: 0 10E3/UL
NRBC BLD AUTO-RTO: 0 /100
P AXIS - MUSE: 67 DEGREES
PHOSPHATE SERPL-MCNC: 4.9 MG/DL (ref 2.5–4.5)
PLATELET # BLD AUTO: 360 10E3/UL (ref 150–450)
POTASSIUM BLD-SCNC: 4.4 MMOL/L (ref 3.4–5.3)
PR INTERVAL - MUSE: 156 MS
PROT SERPL-MCNC: 6.7 G/DL (ref 6.8–8.8)
QRS DURATION - MUSE: 92 MS
QT - MUSE: 404 MS
QTC - MUSE: 460 MS
R AXIS - MUSE: 77 DEGREES
RBC # BLD AUTO: 3.35 10E6/UL (ref 4.4–5.9)
SODIUM SERPL-SCNC: 143 MMOL/L (ref 133–144)
SODIUM SERPL-SCNC: 146 MMOL/L (ref 133–144)
SYSTOLIC BLOOD PRESSURE - MUSE: NORMAL MMHG
T AXIS - MUSE: 76 DEGREES
VENTRICULAR RATE- MUSE: 78 BPM
WBC # BLD AUTO: 9.2 10E3/UL (ref 4–11)

## 2022-03-04 PROCEDURE — 97535 SELF CARE MNGMENT TRAINING: CPT | Mod: GO | Performed by: OCCUPATIONAL THERAPIST

## 2022-03-04 PROCEDURE — 250N000011 HC RX IP 250 OP 636

## 2022-03-04 PROCEDURE — 250N000011 HC RX IP 250 OP 636: Performed by: INTERNAL MEDICINE

## 2022-03-04 PROCEDURE — 85004 AUTOMATED DIFF WBC COUNT: CPT | Performed by: INTERNAL MEDICINE

## 2022-03-04 PROCEDURE — 84295 ASSAY OF SERUM SODIUM: CPT | Performed by: INTERNAL MEDICINE

## 2022-03-04 PROCEDURE — 97530 THERAPEUTIC ACTIVITIES: CPT | Mod: GP

## 2022-03-04 PROCEDURE — 99233 SBSQ HOSP IP/OBS HIGH 50: CPT | Mod: GC | Performed by: INTERNAL MEDICINE

## 2022-03-04 PROCEDURE — 92526 ORAL FUNCTION THERAPY: CPT | Mod: GN

## 2022-03-04 PROCEDURE — 250N000013 HC RX MED GY IP 250 OP 250 PS 637: Performed by: PHYSICIAN ASSISTANT

## 2022-03-04 PROCEDURE — 250N000013 HC RX MED GY IP 250 OP 250 PS 637: Performed by: STUDENT IN AN ORGANIZED HEALTH CARE EDUCATION/TRAINING PROGRAM

## 2022-03-04 PROCEDURE — 83735 ASSAY OF MAGNESIUM: CPT | Performed by: INTERNAL MEDICINE

## 2022-03-04 PROCEDURE — 250N000013 HC RX MED GY IP 250 OP 250 PS 637: Performed by: INTERNAL MEDICINE

## 2022-03-04 PROCEDURE — 97116 GAIT TRAINING THERAPY: CPT | Mod: GP

## 2022-03-04 PROCEDURE — 250N000013 HC RX MED GY IP 250 OP 250 PS 637

## 2022-03-04 PROCEDURE — 36415 COLL VENOUS BLD VENIPUNCTURE: CPT | Performed by: INTERNAL MEDICINE

## 2022-03-04 PROCEDURE — 84100 ASSAY OF PHOSPHORUS: CPT | Performed by: INTERNAL MEDICINE

## 2022-03-04 PROCEDURE — 97530 THERAPEUTIC ACTIVITIES: CPT | Mod: GO | Performed by: OCCUPATIONAL THERAPIST

## 2022-03-04 PROCEDURE — 80053 COMPREHEN METABOLIC PANEL: CPT | Performed by: INTERNAL MEDICINE

## 2022-03-04 PROCEDURE — 120N000002 HC R&B MED SURG/OB UMMC

## 2022-03-04 RX ORDER — ONDANSETRON 4 MG/1
4 TABLET, ORALLY DISINTEGRATING ORAL ONCE
Status: COMPLETED | OUTPATIENT
Start: 2022-03-04 | End: 2022-03-04

## 2022-03-04 RX ORDER — ONDANSETRON 4 MG/1
4 TABLET, ORALLY DISINTEGRATING ORAL EVERY 6 HOURS PRN
Status: DISCONTINUED | OUTPATIENT
Start: 2022-03-04 | End: 2022-03-04

## 2022-03-04 RX ADMIN — HEPARIN SODIUM AND DEXTROSE 500 UNITS/HR: 10000; 5 INJECTION INTRAVENOUS at 18:48

## 2022-03-04 RX ADMIN — TICAGRELOR 90 MG: 90 TABLET ORAL at 08:58

## 2022-03-04 RX ADMIN — METOPROLOL TARTRATE 75 MG: 50 TABLET, FILM COATED ORAL at 20:19

## 2022-03-04 RX ADMIN — ISOSORBIDE DINITRATE 15 MG: 10 TABLET ORAL at 16:32

## 2022-03-04 RX ADMIN — HYDRALAZINE HYDROCHLORIDE 75 MG: 25 TABLET ORAL at 23:05

## 2022-03-04 RX ADMIN — ONDANSETRON 4 MG: 4 TABLET, ORALLY DISINTEGRATING ORAL at 02:20

## 2022-03-04 RX ADMIN — TICAGRELOR 90 MG: 90 TABLET ORAL at 20:19

## 2022-03-04 RX ADMIN — Medication 2 PACKET: at 09:00

## 2022-03-04 RX ADMIN — ISOSORBIDE DINITRATE 15 MG: 10 TABLET ORAL at 08:59

## 2022-03-04 RX ADMIN — Medication 2 PACKET: at 20:32

## 2022-03-04 RX ADMIN — Medication 15 ML: at 08:58

## 2022-03-04 RX ADMIN — HYDRALAZINE HYDROCHLORIDE 75 MG: 25 TABLET ORAL at 14:05

## 2022-03-04 RX ADMIN — Medication 10 MG: at 20:19

## 2022-03-04 RX ADMIN — HYDRALAZINE HYDROCHLORIDE 75 MG: 25 TABLET ORAL at 06:21

## 2022-03-04 RX ADMIN — AMIODARONE HYDROCHLORIDE 200 MG: 200 TABLET ORAL at 08:58

## 2022-03-04 RX ADMIN — METOPROLOL TARTRATE 75 MG: 50 TABLET, FILM COATED ORAL at 08:58

## 2022-03-04 RX ADMIN — ASPIRIN 81 MG CHEWABLE TABLET 81 MG: 81 TABLET CHEWABLE at 08:57

## 2022-03-04 RX ADMIN — ROSUVASTATIN CALCIUM 20 MG: 20 TABLET, FILM COATED ORAL at 20:19

## 2022-03-04 ASSESSMENT — ACTIVITIES OF DAILY LIVING (ADL)
ADLS_ACUITY_SCORE: 19

## 2022-03-04 NOTE — PROGRESS NOTES
Interventional Cardiology Progress Note      Assessment & Plan:  Chano Johnson is a 71 year old male with a history of atrial fibrillation  s/p ablation, GERD, ARSENIO, and erectile dysfunction who initially presented to [facility] ED on 2/17/22 with chest pain and EKG evidence of anterior STEMI. In the ED, patient went into ventricular fibrillation. CPR was initiated. Total of 8  shocks administered due to refractory VF. Amiodarone 450 mg and epinephrine x4 administered. Intubated. ROSC achieved at  1405. Transferred directly to [facility] cath lab where he was found to have occlusion of the LAD. During angiogram he was unstable with significant hypotension and ectopy, which prompted placement on VA ECMO. PCI was performed with patient on ECMO with LETY x2 to LAD. Course complicated by hemorrhage shock and left hemothorax requiring chest tube placement, SAHIL, and delirium.     Changes today:  - Adjust free water flushes based on repeat Na this afternoon  - Continue to monitor renal function  - placement pending per RT and OT recommendations    Important events:  VA ECMO canulation 2/17/22  LETY x2 to LAD 2/17/22  Rewarmed on 2/19/22  CRRT started 2/19/22  Left sided chest tube placed on 2/20/22 for hemothorax  Emobolization of left internal mammary artery on 2/20/22  Decannulation 2/22/22  IABP removed 2/23/22  Extubated 2/24/22  Chest tube removed 3/1/22  Transferred to floor 3/2/22    Neurology:  # Possible anoxic brain injury post cardiac arrest   Head CT on presentation w/o acute pathology. Day 3 CT head showed indeterminate gray-white matter loss in the right occipital lobe which could be artifact vs true infarct. Strong suspicion for artifact per neurocrit. EEG w/o significant abnormalities. Alert and oriented x4 during the day. Able to recall long-term events. Some difficulty with short term memory.     # Delirium   # Agitation   Delirium is improving but patient is continuing to demonstrate nocturnal agitation.  Required mitts/restraints on 3/3/22 as well as 1:1 sitter due to significant agitation. Patient kicked RN in chest. Restraints removed at 0500. Patient reports that he does not remember this event and thought it was a dream.   - maintain appropriate awake/sleep cycle   - delirium precautions  - Continue to hold Seroquel  - melatonin 10 mg at bedtime   - haldol 5 mg q 6hr PRN  - sitter at bedtime     Cardiovascular   # VF cardiac arrest   # Cardiogenic/hemorrhagic shock, resovled  # Anterior STEMI s/p LETY x2  # Acute on chronic systolic heart failure (EF 25/30%)  # Ischemic cardiomyopathy   Coronary angiogram with culprit lesion to be mLADs/p LETY x2. Has significant residual disease in the a large ramus with 70% stenosis. Peripheral V-A ECMO inserted for refractory cardiogenic shock on 2/17/22: 17 Fr arterial and 25 Fr venous cannula, IABP and distal reperfusion cannula placed. Decannulated on 2/22/22. IABP removed 2/23/22. Echo  2/18/22 10-15% on ECMO. Echo with LVEF of 25-30% with apical akinesis on 2/28/22.   - DAPT: aspirin + ticagrelor   - statin: increase rosuvastatin to 20 mg daily now that CrCL >30  - Guideline directed HF medications  - BB: increase metoprolol tartrate 75 mg BID (will need to transition to Toprol XL once patient able to take po pills)  - ACEi/ARB/ARNI: defer due to SAHIL, will hopefully start before discharge  - Afterload reduction: hydralazine 75 mg  TID/ isordil 15 mg TID  - AA: defer due to SAHIL, will hopefully start before discharge  - SGLT2-I: defer due to SAHIL  - Diuretics: appears euvolemic. Hold on diuretics today as patient is diuresing well      # Paroxysmal atrial fibrillation  # s/p Watchman implantation   Hx of paroxysmal atrial fibrillation s/p ablations and Watchman device. Afib with RVR requiring IV amiodarone in the ICU. Transitioned to po amiodarone on 3/2/22. Currently in sinus rhythm.   - po amiodarone 200 mg daily   - [id number] score 3, no anticoagulation necessary with  Watchman device in place  - rate control with metoprolol tartrate 75 mg BID (will transition to long-acting as above for HF)    Pulmonary   # Acute hypoxic respiratory failure, resolved   # Pleural effusion, improved  # Left hemothorax, resolved  # ARSENIO  Intubated due to cardiac arrest on 2/17/22. Extubated on 2/24/22. On room air. CXR clear.   - CPAP/BiPAP at night     GI/Nutrition  # Severe malnutrition in the context of acute illness  # Dysphagia   # loose stools 2/2 TF  TF removed by patient on 3/3/22. SLP to evaluate patient. Likely will require placement of new post-pyloric feeding tube this afternoon.   - SLP following   - RD following  - diet: full liquid diet, mildly thickened liquids  - continuous tube feeds held this morning   - PRN imodium for loose stools  - Continue FWF to 50 ml per hour    # Transaminitis, in setting of shock, cardiac arrest   ,  on admission. ALT normalized on 2/20 but has gila intermittently rising.     Renal, Electrolytes   # SAHIL, likely ATN in setting of cardiac arrest   # Hypernatremia, resolved   # Hypermagnesemia, Mg 2.7  # Hyperchloremia, Cl 111  # Uremia,   Cr on admission 1.41. Cr peaked at 3.86 on 2/21. Now improving. No need for CRRT. Auto-diuresing. Creatinine improving.  - strict I&O  - Continue FWF to 50 ml/hr    Infectious Disease   Sputum cultures 2/20/22-- Staph lugdunesis. Prophylactic treatment with  cefazolin 2 g BID x5 days, 2/24-2/28. WBC 9.6. Afebrile.     Hematology   # Anemia due to acute blood loss, stable  # Left hemothorax  Required frequent blood transfusions of 3-4 units/day to maintain hgb > 7 on admission. Extravasation from left internal mammary artery noted on CT. Left sided chest tube placed at bedside by thoracic surgery on 2/20/22 with 1300 ml old blood return. IR performed emobolization of left internal mammary artery on 2/20/22.   - daily CBC      Musculoskeletal/Integument   # Left groin wound 2/2 ECMO decannulation   -  cleanse wound with Microklenz BID   - Sutures to be removed on 3/8/22.     # Pressure wound of buttock   # Pressure wound of left tongue 2/2 ET   WOC following     # Generalized weakness  # Deconditioning   PT/OT recommending TCU placement     Diet: full liquid, mildly thickened liquids   Activity: as tolerated with assistance  Code Status: DNR/DNI  Disposition: not medically stable, will need TCU placement     Patient discussed with staff cardiologist, Dr. Murcia, and cardiology fellow.    Mohsan Chaudhry MD  Cardiology Fellow    Interval History:  No significant overnight events.  NO overnight delirium. Slept well with CPAP. Limited interruptions overnight.    Most recent vital signs:  /59 (BP Location: Right arm)   Pulse 65   Temp 97.7  F (36.5  C) (Oral)   Resp 16   Ht 1.829 m (6')   Wt 85.7 kg (188 lb 15 oz)   SpO2 97%   BMI 25.62 kg/m    Temp:  [97  F (36.1  C)-97.8  F (36.6  C)] 97.7  F (36.5  C)  Pulse:  [62-85] 65  Resp:  [16-20] 16  BP: ()/(52-78) 100/59  SpO2:  [95 %-100 %] 97 %  Wt Readings from Last 2 Encounters:   03/04/22 85.7 kg (188 lb 15 oz)   02/17/22 99.8 kg (220 lb)       Intake/Output Summary (Last 24 hours) at 3/3/2022 0823  Last data filed at 3/3/2022 0800  Gross per 24 hour   Intake 3224.75 ml   Output 4625 ml   Net -1400.25 ml       Physical exam:  General: Pleasant elderly male. Appears comfortable and in no acute distress. Alert and interactive  HEENT: Normocephalic, atraumatic. Mild bilateral scleral icterus. Tongue is edematous and erythematous. Ulceration on superior distal aspect and inferior. Front tooth is broken. Ecchymosis of right neck from removal of lines.   Neck: JVP not visible above the clavicle while sitting upright in chair.  CARDIAC: Regular rate and rhythm, no m/r/g appreciated. Radial pulses 2+ bilaterally. Pedal pulses 1+ bilaterally.   RESP: Normal work of breathing on room air without use of accessory breathing muscles. Bibasilar rales extending  senior care up lung fields. No wheezing or rhonchi. Sounds more consistent with atelectasis than pulmonary edema. Bandage on left lateral ribs from CT removal. Bandage is saturated with sanguinous drainage.   GI: Soft and nontender to palpation. Not distended.  Bandage on left lateral abdomen.   EXTREMITIES: Without lower extremity edema. Warm and well perfused. Feet are cooler to touch but perfused. No cyanosis. Right femoral access site is c/d/i. Flat and soft to palpation. No hematoma. Left groin ECMO incision covered with bandage. Bandage without drainage. Soft to palpation.   SKIN: No acute lesions appreciated. Warm and dry to touch. Ecchymoses of bilateral wrists.   NEURO: Alert and oriented X4, weak but able to move all extremities. Speech is intermittently slurred due to swollen tongue but improving.   PSYCH: Mood and affect are appropriate    Lines/Drains:   - PICC in right arm--placed 2/28/22  - external urinary catheter    Labs (Past three days):  CBC  Recent Labs   Lab 03/04/22  0627 03/03/22  0645 03/02/22  0344 03/01/22  0352   WBC 9.2 9.6 11.5* 11.1*   RBC 3.35* 3.18* 2.74* 2.73*   HGB 10.1* 9.4* 8.3* 8.1*   HCT 32.4* 30.5* 26.7* 26.1*   MCV 97 96 97 96   MCH 30.1 29.6 30.3 29.7   MCHC 31.2* 30.8* 31.1* 31.0*   RDW 17.5* 17.2* 17.3* 17.8*    319 293 267     BMP  Recent Labs   Lab 03/04/22  0627 03/03/22  0645 03/02/22  1631 03/02/22  1015 03/02/22  0344 03/01/22  1009 03/01/22  0352   * 142 144 144 148*   < > 148*   POTASSIUM 4.4 3.8  --   --  3.9  --  3.9   CHLORIDE 113* 111*  --   --  114*  --  116*   CO2 24 26  --   --  25  --  26   ANIONGAP 9 5  --   --  9  --  6   * 161*  --   --  133*  --  150*   * 100*  --   --  96*  --  105*   CR 2.71* 2.77*  --   --  2.80*  --  3.15*   GFRESTIMATED 24* 23*  --   --  23*  --  20*   PEDRITO 9.1 8.7  --   --  8.5  --  8.7   MAG 2.9* 2.7*  --   --  2.8*  --  3.0*   PHOS 4.9* 4.7*  --   --  4.0  --  5.3*    < > = values in this interval not  displayed.      INR  No lab results found in last 7 days.  Liver panel  Recent Labs   Lab 03/04/22  0627 03/03/22  0645 03/02/22  0344 03/01/22  0352   PROTTOTAL 6.7* 6.7* 6.0* 6.0*   ALBUMIN 2.6* 2.7* 2.4* 2.4*   BILITOTAL 0.8 0.8 0.8 0.8   ALKPHOS 65 52 53 43   AST 49* 58* 88* 69*   ALT 88* 85* 74* 51     Imaging/procedure results:  EKG 12 Lead 3/2/22:     ECHO 2/28/22:  Interpretation Summary  The visual ejection fraction is 25-30%.  Akinetic apical segments.  Right ventricular function, chamber size, wall motion, and thickness are  normal.  Dilation of the inferior vena cava is present with abnormal respiratory  variation in diameter.  No pericardial effusion is present.    Coronary Angiogram 2/17/22:  Left Main   Minimal luminal irregularities   Left Anterior Descending   Mid LAD lesion is 100% stenosed. Culprit lesion. The lesion is type B2 - medium risk and thrombotic.   Ramus Intermedius   The vessel is large.   Ramus lesion is 70% stenosed.   Left Circumflex   Minimal luminal irregularities   Right Coronary Artery   Mid RCA lesion is 30% stenosed.       Intervention  Mid LAD lesion   Stent   Lesion length: 20 mm. There is no pre-interventional antegrade distal flow (KATHLEEN 0). Pre-stent angioplasty was performed. The post-interventional distal flow is normal (KATHLEEN 3). A 6Fr XB3.5 guide was engaged in the LM. Heparin was used for anticoagulation with ACT > 250 sec. A runthrough wire was advanced across the lesion to the distal LAD. The lesion was pre-dilated with a 2.5mm emerge balloon. A 3.5x24mm synergy LETY was successfully deployed across the lesion. A 4.0x8mm synergy LETY was successfully deployed proximal to, and overlapping, the first stent. The stents were post-dilated with 3.5 and 4mm NC balloons. There were no complications. KATHLEEN 3 flow was present in all segments upon completion of the procedure.   There is a 0% residual stenosis post intervention.     EXTRACORPOREAL MEMBRANE OXYGENATION CANNULATION  2/17/22:  A 17 Fr ECMO cannula was inserted into the left common femoral artery and a 25 Fr cannula was inserted into the left common femoral vein.  The arterial cannula was positioned in the iliac artery and the venous canula was positioned across the RA.  The ECMO circuit was primed, closely inspected to ensure no bubbles present and ECMO was initiated.  An anterograde perfusion 8 Fr sheath was inserted in the left superficial femoral artery.    CT Chest/Abdomen 2/20/22  IMPRESSION:    1. Retrosternal ill-defined anterior mediastinal fat hemorrhage from  recent resuscitation attempt with focus of suspected active bleeding  from the left internal mammary artery.  2. Left hemothorax. - Blood products from hemorrhage extending into  the left pleural space.   3. Small right pleural effusion with associated atelectasis.  4. No acute findings in the abdomen/pelvis.  5. Support devices as detailed in the body of the report    CT Head 2/20/22  IMPRESSION:  Indeterminant gray-white matter loss in the right occipital lobe could  be artifactual from adjacent streak/photon starvation artifact versus  true infarct.

## 2022-03-04 NOTE — PLAN OF CARE
ICU End of Shift Summary. See flowsheets for vital signs and detailed assessment.    Changes this shift: Alert and oriented. Working with PT/OT with standing and pivoting with walker. Vitally stable. Room air. NG found to be misplaced in AM, tube feeds held, unable to give some medications. Small bore placed by dietician in afternoon. Tube feeds resumed. Speech eval completed again today. Remains on full liquid mildly thick diet. Tolerating well with fair appetite. One small bowel movement in AM. Great urine output via external cath. Up in chair most of today.    Plan: Limit interruptions overnight for patient sleep per CSI order. Transfer to  when bed available.

## 2022-03-04 NOTE — PROGRESS NOTES
"Care Management Follow Up    Length of Stay (days): 15    Expected Discharge Date: 3/8/22     Concerns to be Addressed:   Discharge planning   Patient plan of care discussed at interdisciplinary rounds: Yes    Anticipated Discharge Disposition:  TCU      Anticipated Discharge Services:  PT, OT, SLP, transportation  Anticipated Discharge DME:      Patient/family educated on Medicare website which has current facility and service quality ratings:  Yes  Education Provided on the Discharge Plan:  Yes  Patient/Family in Agreement with the Plan:  No    Referrals Placed by CM/SW:  None yet  Private pay costs discussed: Not applicable    Additional Information:  MARGARITA met with pt and spouse, Cherelle, at bedside to discuss TCU recommendation. Cherelle stated \"We're not considering TCU as an option. We're taking him home\".  Pt expressed relief stating \"I thought you were going to send me somewhere.\" SW explained PT and OT recs. Cherelle reported that their sons are offering to take time off work to help pt at home. Cherelle and pt expressed understanding of home health care and are receptive to talking with someone regarding this. SW will talk with team and follow up with pt and Cherelle regarding recommendations and concerns. Team expects pt to be medically ready for discharge early next week (3/7, 3/8).     1349: After talking with PT and primary team, TCU is still strongly recommended at this time. SW reported this to pt and Cherelle. Cherelle and pt expressed understanding of recommendation but are still not considering TCU as an option. They are determined to discharge pt home. Cherelle wants care management assistance in getting home care for pt. MARGARITA ensured Cherelle that once pt is medically ready, home health referrals will be made.     MARGARITA/RNCC will continue to follow for discharge needs    JACKI Diehl, ELMA  4A/4C   Ph: [phone]  Pager: [phone]  "

## 2022-03-04 NOTE — PLAN OF CARE
"Major Shift Events:  Remains oriented, calm, and cooperative throughout shift. Patient did complain of slight nausea, order for zofran received and given. Patient stated that he felt much better.     Plan: Continue with ST, PT, and OT. Transfer to      For vital signs and complete assessments, please see documentation flowsheets.      Problem: Plan of Care - These are the overarching goals to be used throughout the patient stay.    Goal: Plan of Care Review/Shift Note  Description: The Plan of Care Review/Shift note should be completed every shift.  The Outcome Evaluation is a brief statement about your assessment that the patient is improving, declining, or no change.  This information will be displayed automatically on your shift note.  Outcome: Ongoing, Progressing  Goal: Patient-Specific Goal (Individualized)  Description: You can add care plan individualizations to a care plan. Examples of Individualization might be:  \"Parent requests to be called daily at 9am for status\", \"I have a hard time hearing out of my right ear\", or \"Do not touch me to wake me up as it startles me\".  Outcome: Ongoing, Progressing  Goal: Absence of Hospital-Acquired Illness or Injury  Outcome: Ongoing, Progressing  Intervention: Identify and Manage Fall Risk  Recent Flowsheet Documentation  Taken 3/4/2022 0400 by Jessica Gale RN  Safety Promotion/Fall Prevention:   activity supervised   patient and family education   room door open   room near nurse's station   safety round/check completed  Taken 3/4/2022 0000 by Jessica Gale RN  Safety Promotion/Fall Prevention:   activity supervised   patient and family education   room door open   room near nurse's station   safety round/check completed  Taken 3/3/2022 2000 by Jessica Gale RN  Safety Promotion/Fall Prevention:   activity supervised   patient and family education   room door open   room near nurse's station   safety round/check completed  Intervention: Prevent Skin " Injury  Recent Flowsheet Documentation  Taken 3/4/2022 0400 by Jessica Gale RN  Body Position: position changed independently  Taken 3/4/2022 0200 by Jessica Gale RN  Body Position: position changed independently  Taken 3/4/2022 0000 by Jessica Gale RN  Body Position: position changed independently  Taken 3/3/2022 2200 by Jessica Gale RN  Body Position: position changed independently  Taken 3/3/2022 2000 by Jessica Gale RN  Body Position:   turned   left  Intervention: Prevent and Manage VTE (Venous Thromboembolism) Risk  Recent Flowsheet Documentation  Taken 3/4/2022 0400 by Jessica Gale RN  Range of Motion: active ROM (range of motion) encouraged  VTE Prevention/Management: SCDs (sequential compression devices) off  Activity Management: activity adjusted per tolerance  Taken 3/4/2022 0000 by Jessica Gale RN  Range of Motion: active ROM (range of motion) encouraged  VTE Prevention/Management: SCDs (sequential compression devices) off  Activity Management: activity adjusted per tolerance  Taken 3/3/2022 2000 by Jessica Gale RN  Range of Motion: active ROM (range of motion) encouraged  VTE Prevention/Management: SCDs (sequential compression devices) off  Activity Management: activity adjusted per tolerance  Intervention: Prevent Infection  Recent Flowsheet Documentation  Taken 3/4/2022 0400 by Jessica Gale RN  Infection Prevention:   environmental surveillance performed   equipment surfaces disinfected   hand hygiene promoted   rest/sleep promoted   single patient room provided   visitors restricted/screened  Taken 3/4/2022 0000 by Jessica Gale RN  Infection Prevention:   environmental surveillance performed   equipment surfaces disinfected   hand hygiene promoted   rest/sleep promoted   single patient room provided   visitors restricted/screened  Taken 3/3/2022 2000 by Jessica Gale RN  Infection Prevention:   environmental surveillance performed   equipment  surfaces disinfected   hand hygiene promoted   rest/sleep promoted   single patient room provided   visitors restricted/screened  Goal: Optimal Comfort and Wellbeing  Outcome: Ongoing, Progressing  Intervention: Provide Person-Centered Care  Recent Flowsheet Documentation  Taken 3/4/2022 0400 by Jessica Gale RN  Trust Relationship/Rapport:   care explained   emotional support provided   questions answered   questions encouraged  Taken 3/4/2022 0000 by Jessica Gale RN  Trust Relationship/Rapport:   care explained   emotional support provided   questions answered   questions encouraged  Taken 3/3/2022 2000 by Jessica Gale RN  Trust Relationship/Rapport:   care explained   emotional support provided   questions answered   questions encouraged  Goal: Readiness for Transition of Care  Outcome: Ongoing, Progressing   Goal Outcome Evaluation:

## 2022-03-05 ENCOUNTER — APPOINTMENT (OUTPATIENT)
Dept: SPEECH THERAPY | Facility: CLINIC | Age: 72
DRG: 003 | End: 2022-03-05
Payer: MEDICARE

## 2022-03-05 ENCOUNTER — APPOINTMENT (OUTPATIENT)
Dept: PHYSICAL THERAPY | Facility: CLINIC | Age: 72
DRG: 003 | End: 2022-03-05
Payer: MEDICARE

## 2022-03-05 LAB
ALBUMIN SERPL-MCNC: 2.8 G/DL (ref 3.4–5)
ALP SERPL-CCNC: 75 U/L (ref 40–150)
ALT SERPL W P-5'-P-CCNC: 94 U/L (ref 0–70)
ANION GAP SERPL CALCULATED.3IONS-SCNC: 9 MMOL/L (ref 3–14)
ANION GAP SERPL CALCULATED.3IONS-SCNC: 9 MMOL/L (ref 3–14)
AST SERPL W P-5'-P-CCNC: 47 U/L (ref 0–45)
BASOPHILS # BLD AUTO: 0 10E3/UL (ref 0–0.2)
BASOPHILS NFR BLD AUTO: 0 %
BILIRUB SERPL-MCNC: 0.8 MG/DL (ref 0.2–1.3)
BUN SERPL-MCNC: 99 MG/DL (ref 7–30)
BUN SERPL-MCNC: 99 MG/DL (ref 7–30)
CALCIUM SERPL-MCNC: 8.4 MG/DL (ref 8.5–10.1)
CALCIUM SERPL-MCNC: 8.7 MG/DL (ref 8.5–10.1)
CHLORIDE BLD-SCNC: 111 MMOL/L (ref 94–109)
CHLORIDE BLD-SCNC: 111 MMOL/L (ref 94–109)
CO2 SERPL-SCNC: 24 MMOL/L (ref 20–32)
CO2 SERPL-SCNC: 25 MMOL/L (ref 20–32)
CREAT SERPL-MCNC: 2.4 MG/DL (ref 0.66–1.25)
CREAT SERPL-MCNC: 2.44 MG/DL (ref 0.66–1.25)
EOSINOPHIL # BLD AUTO: 0.6 10E3/UL (ref 0–0.7)
EOSINOPHIL NFR BLD AUTO: 6 %
ERYTHROCYTE [DISTWIDTH] IN BLOOD BY AUTOMATED COUNT: 17.2 % (ref 10–15)
GFR SERPL CREATININE-BSD FRML MDRD: 27 ML/MIN/1.73M2
GFR SERPL CREATININE-BSD FRML MDRD: 28 ML/MIN/1.73M2
GLUCOSE BLD-MCNC: 138 MG/DL (ref 70–99)
GLUCOSE BLD-MCNC: 158 MG/DL (ref 70–99)
HCT VFR BLD AUTO: 32.6 % (ref 40–53)
HGB BLD-MCNC: 10 G/DL (ref 13.3–17.7)
IMM GRANULOCYTES # BLD: 0.2 10E3/UL
IMM GRANULOCYTES NFR BLD: 2 %
LYMPHOCYTES # BLD AUTO: 1.2 10E3/UL (ref 0.8–5.3)
LYMPHOCYTES NFR BLD AUTO: 12 %
MAGNESIUM SERPL-MCNC: 2.9 MG/DL (ref 1.6–2.3)
MCH RBC QN AUTO: 30.4 PG (ref 26.5–33)
MCHC RBC AUTO-ENTMCNC: 30.7 G/DL (ref 31.5–36.5)
MCV RBC AUTO: 99 FL (ref 78–100)
MONOCYTES # BLD AUTO: 0.9 10E3/UL (ref 0–1.3)
MONOCYTES NFR BLD AUTO: 9 %
NEUTROPHILS # BLD AUTO: 7.3 10E3/UL (ref 1.6–8.3)
NEUTROPHILS NFR BLD AUTO: 71 %
NRBC # BLD AUTO: 0 10E3/UL
NRBC BLD AUTO-RTO: 0 /100
PHOSPHATE SERPL-MCNC: 4.2 MG/DL (ref 2.5–4.5)
PLATELET # BLD AUTO: 400 10E3/UL (ref 150–450)
POTASSIUM BLD-SCNC: 4.1 MMOL/L (ref 3.4–5.3)
POTASSIUM BLD-SCNC: 4.3 MMOL/L (ref 3.4–5.3)
PROT SERPL-MCNC: 7.1 G/DL (ref 6.8–8.8)
RBC # BLD AUTO: 3.29 10E6/UL (ref 4.4–5.9)
SODIUM SERPL-SCNC: 144 MMOL/L (ref 133–144)
SODIUM SERPL-SCNC: 145 MMOL/L (ref 133–144)
WBC # BLD AUTO: 10.2 10E3/UL (ref 4–11)

## 2022-03-05 PROCEDURE — 99233 SBSQ HOSP IP/OBS HIGH 50: CPT | Mod: GC | Performed by: INTERNAL MEDICINE

## 2022-03-05 PROCEDURE — 250N000013 HC RX MED GY IP 250 OP 250 PS 637: Performed by: STUDENT IN AN ORGANIZED HEALTH CARE EDUCATION/TRAINING PROGRAM

## 2022-03-05 PROCEDURE — 83735 ASSAY OF MAGNESIUM: CPT | Performed by: INTERNAL MEDICINE

## 2022-03-05 PROCEDURE — 82040 ASSAY OF SERUM ALBUMIN: CPT | Performed by: INTERNAL MEDICINE

## 2022-03-05 PROCEDURE — 80053 COMPREHEN METABOLIC PANEL: CPT | Performed by: INTERNAL MEDICINE

## 2022-03-05 PROCEDURE — 250N000013 HC RX MED GY IP 250 OP 250 PS 637: Performed by: PHYSICIAN ASSISTANT

## 2022-03-05 PROCEDURE — 85025 COMPLETE CBC W/AUTO DIFF WBC: CPT | Performed by: INTERNAL MEDICINE

## 2022-03-05 PROCEDURE — 36415 COLL VENOUS BLD VENIPUNCTURE: CPT

## 2022-03-05 PROCEDURE — 36415 COLL VENOUS BLD VENIPUNCTURE: CPT | Performed by: INTERNAL MEDICINE

## 2022-03-05 PROCEDURE — 97530 THERAPEUTIC ACTIVITIES: CPT | Mod: GP | Performed by: REHABILITATION PRACTITIONER

## 2022-03-05 PROCEDURE — 92526 ORAL FUNCTION THERAPY: CPT | Mod: GN

## 2022-03-05 PROCEDURE — 120N000002 HC R&B MED SURG/OB UMMC

## 2022-03-05 PROCEDURE — 250N000013 HC RX MED GY IP 250 OP 250 PS 637: Performed by: INTERNAL MEDICINE

## 2022-03-05 PROCEDURE — 97116 GAIT TRAINING THERAPY: CPT | Mod: GP | Performed by: REHABILITATION PRACTITIONER

## 2022-03-05 PROCEDURE — 250N000013 HC RX MED GY IP 250 OP 250 PS 637

## 2022-03-05 PROCEDURE — 84100 ASSAY OF PHOSPHORUS: CPT | Performed by: INTERNAL MEDICINE

## 2022-03-05 RX ADMIN — ISOSORBIDE DINITRATE 15 MG: 10 TABLET ORAL at 16:25

## 2022-03-05 RX ADMIN — TICAGRELOR 90 MG: 90 TABLET ORAL at 19:37

## 2022-03-05 RX ADMIN — ROSUVASTATIN CALCIUM 20 MG: 20 TABLET, FILM COATED ORAL at 19:37

## 2022-03-05 RX ADMIN — ISOSORBIDE DINITRATE 15 MG: 10 TABLET ORAL at 01:22

## 2022-03-05 RX ADMIN — HYDRALAZINE HYDROCHLORIDE 75 MG: 25 TABLET ORAL at 14:07

## 2022-03-05 RX ADMIN — Medication 2 PACKET: at 08:18

## 2022-03-05 RX ADMIN — ASPIRIN 81 MG CHEWABLE TABLET 81 MG: 81 TABLET CHEWABLE at 08:16

## 2022-03-05 RX ADMIN — TICAGRELOR 90 MG: 90 TABLET ORAL at 08:16

## 2022-03-05 RX ADMIN — HYDRALAZINE HYDROCHLORIDE 75 MG: 25 TABLET ORAL at 06:09

## 2022-03-05 RX ADMIN — Medication 10 MG: at 19:37

## 2022-03-05 RX ADMIN — Medication 2 PACKET: at 19:42

## 2022-03-05 RX ADMIN — METOPROLOL TARTRATE 75 MG: 50 TABLET, FILM COATED ORAL at 19:37

## 2022-03-05 RX ADMIN — Medication 15 ML: at 08:16

## 2022-03-05 RX ADMIN — AMIODARONE HYDROCHLORIDE 200 MG: 200 TABLET ORAL at 08:16

## 2022-03-05 RX ADMIN — ISOSORBIDE DINITRATE 15 MG: 10 TABLET ORAL at 10:22

## 2022-03-05 RX ADMIN — HYDRALAZINE HYDROCHLORIDE 75 MG: 25 TABLET ORAL at 22:23

## 2022-03-05 RX ADMIN — METOPROLOL TARTRATE 75 MG: 50 TABLET, FILM COATED ORAL at 08:16

## 2022-03-05 ASSESSMENT — ACTIVITIES OF DAILY LIVING (ADL)
ADLS_ACUITY_SCORE: 19
ADLS_ACUITY_SCORE: 17
ADLS_ACUITY_SCORE: 17
ADLS_ACUITY_SCORE: 19
ADLS_ACUITY_SCORE: 19
ADLS_ACUITY_SCORE: 17
ADLS_ACUITY_SCORE: 19
ADLS_ACUITY_SCORE: 17
ADLS_ACUITY_SCORE: 19
ADLS_ACUITY_SCORE: 17
ADLS_ACUITY_SCORE: 19
ADLS_ACUITY_SCORE: 17
ADLS_ACUITY_SCORE: 19
ADLS_ACUITY_SCORE: 17
ADLS_ACUITY_SCORE: 19

## 2022-03-05 NOTE — PROGRESS NOTES
71 year old M with a pmhx sig for CAD who was admitted 2/17/2022 with CP X1 hour c/b VT/VF in the ER s/p 8 shocks and ROSC, due to LAD occlusion (Ant STEMI on ekg) s/p PCI with LETY X2 he was transferred to [facility] for STEMI intervention. He was placed on VA ECMO for hypotension and ectopy during his cath. Post procedurally he was cooled for 24 hours then rewarmed, he was decannulated on 2/23 and has since been extubated. Course has been complicated by L hemothorax s/p chest tube, jaquan resolving and heart failure.     Events since last note: KAITLYNNEO wife wants to know when the sw will come back with assistance/list of supplies and agencies for home assistance    I personally examined the patient, reviewed vital signs, medications, laboratory values, imaging studies, and consults from the past 24 hours. The care plan was developed with the fellow/MELANIE and I agree with the findings and plan out lined below with the following additions or exceptions.     Neuro:   bedtime melatonin minimize nocturnal sleep interruptions, low bed, delirium improving  Pulm: Home bipap/cpap nightly RA oob for resp mechanics  CV: afib rates controlled, amio po and metoprolol for gdmt and rate control. Noted pt on home sotalol and has watchman because did not want to be on ac long term. LV EF 25-30% on 2/28 tte isurdil  hydral and metop 75 mg bid. Will likely add other gdmt when recovery of renal function. RV normal continue rosuvastatin ticaga and aspirin for cad  GI: AST 47 ALT 94 AP 75 Tbili 0.8 alb 2.6 Last BM 3/4TF goal 65cc/hr, ok for mildly thickened liquid diet but pt doesn't prefer, slp to see again with probable fees on next Tuesday   Renal: Cr: 2.4, improving,  fwf adjusted if na rises again maybe to 75cc/hr  k 4.3 bun 99 will leave him alone re volume status today. I/O +518cc and +2cc since midnight. Tapia out, straight cath as need for bladder scans >450cc, scan at least q shift  Id:wbc 10.2 tmax 98.5 s/p Cefazolin for empiric 5 day  course given S. Lugdensis on sputum   Endo: BG goal <200, -160 ssi as needed  Heme: stable; hgb: 10  plt: 400 no acute issues   Skin: woc following tongue laceration, will need dental consult for teeth  Prophy: bowel reg as appropriate, heparin gtt for DVT prophy (straight rate, pt refusing subcutaneous)  Dispo: floor transfer pt ot and likely to home v tcu (family strongly against the later)    I have personally spent a total of  38 minutes providing care services excluding procedure, teaching and ECMO management time for Chano Johnson.       Griffin Chavez MD  Critical Care Cardiology  [phone] (c)     March 5, 2022

## 2022-03-05 NOTE — PLAN OF CARE
ICU End of Shift Summary. See flowsheets for vital signs and detailed assessment.    Changes this shift: No acute events on shift. Patient worked with PT, OT and Speech. PT recommending TCU; however spouse refusing recommendation.    Plan:  Transfer to  once a bed becomes available. Continue with POC and notify team of any changes or concerns.

## 2022-03-05 NOTE — PLAN OF CARE
ICU End of Shift Summary. See flowsheets for vital signs and detailed assessment.    Changes this shift:  no acute changes this shift.     Plan: Transfer patient to lower acuity unit as patient is not requiring ICU level cares. Continue to monitor patient and notify primary team with any changes or concerns.      Problem: Plan of Care - These are the overarching goals to be used throughout the patient stay.    Goal: Plan of Care Review/Shift Note  Description: The Plan of Care Review/Shift note should be completed every shift.  The Outcome Evaluation is a brief statement about your assessment that the patient is improving, declining, or no change.  This information will be displayed automatically on your shift note.  Outcome: Ongoing, Progressing     Problem: Risk for Delirium  Goal: Optimal Coping  Outcome: Ongoing, Progressing     Problem: Risk for Delirium  Goal: Improved Attention and Thought Clarity  Outcome: Ongoing, Progressing   Goal Outcome Evaluation:

## 2022-03-06 ENCOUNTER — APPOINTMENT (OUTPATIENT)
Dept: PHYSICAL THERAPY | Facility: CLINIC | Age: 72
DRG: 003 | End: 2022-03-06
Payer: MEDICARE

## 2022-03-06 ENCOUNTER — APPOINTMENT (OUTPATIENT)
Dept: SPEECH THERAPY | Facility: CLINIC | Age: 72
DRG: 003 | End: 2022-03-06
Payer: MEDICARE

## 2022-03-06 LAB
ALBUMIN SERPL-MCNC: 2.6 G/DL (ref 3.4–5)
ALP SERPL-CCNC: 67 U/L (ref 40–150)
ALT SERPL W P-5'-P-CCNC: 100 U/L (ref 0–70)
ANION GAP SERPL CALCULATED.3IONS-SCNC: 9 MMOL/L (ref 3–14)
AST SERPL W P-5'-P-CCNC: 51 U/L (ref 0–45)
BASOPHILS # BLD AUTO: 0 10E3/UL (ref 0–0.2)
BASOPHILS NFR BLD AUTO: 0 %
BILIRUB SERPL-MCNC: 0.8 MG/DL (ref 0.2–1.3)
BUN SERPL-MCNC: 92 MG/DL (ref 7–30)
CALCIUM SERPL-MCNC: 8.4 MG/DL (ref 8.5–10.1)
CHLORIDE BLD-SCNC: 113 MMOL/L (ref 94–109)
CO2 SERPL-SCNC: 20 MMOL/L (ref 20–32)
CREAT SERPL-MCNC: 2.29 MG/DL (ref 0.66–1.25)
EOSINOPHIL # BLD AUTO: 0.4 10E3/UL (ref 0–0.7)
EOSINOPHIL NFR BLD AUTO: 3 %
ERYTHROCYTE [DISTWIDTH] IN BLOOD BY AUTOMATED COUNT: 17.2 % (ref 10–15)
GFR SERPL CREATININE-BSD FRML MDRD: 29 ML/MIN/1.73M2
GLUCOSE BLD-MCNC: 118 MG/DL (ref 70–99)
HCT VFR BLD AUTO: 30.3 % (ref 40–53)
HGB BLD-MCNC: 9.1 G/DL (ref 13.3–17.7)
IMM GRANULOCYTES # BLD: 0.2 10E3/UL
IMM GRANULOCYTES NFR BLD: 1 %
LYMPHOCYTES # BLD AUTO: 0.8 10E3/UL (ref 0.8–5.3)
LYMPHOCYTES NFR BLD AUTO: 7 %
MAGNESIUM SERPL-MCNC: 2.8 MG/DL (ref 1.6–2.3)
MCH RBC QN AUTO: 29.6 PG (ref 26.5–33)
MCHC RBC AUTO-ENTMCNC: 30 G/DL (ref 31.5–36.5)
MCV RBC AUTO: 99 FL (ref 78–100)
MONOCYTES # BLD AUTO: 1 10E3/UL (ref 0–1.3)
MONOCYTES NFR BLD AUTO: 8 %
NEUTROPHILS # BLD AUTO: 9.7 10E3/UL (ref 1.6–8.3)
NEUTROPHILS NFR BLD AUTO: 81 %
NRBC # BLD AUTO: 0 10E3/UL
NRBC BLD AUTO-RTO: 0 /100
PHOSPHATE SERPL-MCNC: 3.7 MG/DL (ref 2.5–4.5)
PLATELET # BLD AUTO: 323 10E3/UL (ref 150–450)
POTASSIUM BLD-SCNC: 4.3 MMOL/L (ref 3.4–5.3)
PROT SERPL-MCNC: 6.6 G/DL (ref 6.8–8.8)
RBC # BLD AUTO: 3.07 10E6/UL (ref 4.4–5.9)
SODIUM SERPL-SCNC: 142 MMOL/L (ref 133–144)
WBC # BLD AUTO: 11.9 10E3/UL (ref 4–11)

## 2022-03-06 PROCEDURE — 82040 ASSAY OF SERUM ALBUMIN: CPT | Performed by: INTERNAL MEDICINE

## 2022-03-06 PROCEDURE — 250N000013 HC RX MED GY IP 250 OP 250 PS 637

## 2022-03-06 PROCEDURE — 84100 ASSAY OF PHOSPHORUS: CPT | Performed by: INTERNAL MEDICINE

## 2022-03-06 PROCEDURE — 92526 ORAL FUNCTION THERAPY: CPT | Mod: GN

## 2022-03-06 PROCEDURE — 83735 ASSAY OF MAGNESIUM: CPT | Performed by: INTERNAL MEDICINE

## 2022-03-06 PROCEDURE — 999N000015 HC STATISTIC ARTERIAL MONITORING DAILY

## 2022-03-06 PROCEDURE — 120N000002 HC R&B MED SURG/OB UMMC

## 2022-03-06 PROCEDURE — 250N000013 HC RX MED GY IP 250 OP 250 PS 637: Performed by: INTERNAL MEDICINE

## 2022-03-06 PROCEDURE — 36415 COLL VENOUS BLD VENIPUNCTURE: CPT | Performed by: INTERNAL MEDICINE

## 2022-03-06 PROCEDURE — 99233 SBSQ HOSP IP/OBS HIGH 50: CPT | Mod: GC | Performed by: INTERNAL MEDICINE

## 2022-03-06 PROCEDURE — 250N000013 HC RX MED GY IP 250 OP 250 PS 637: Performed by: STUDENT IN AN ORGANIZED HEALTH CARE EDUCATION/TRAINING PROGRAM

## 2022-03-06 PROCEDURE — 250N000011 HC RX IP 250 OP 636

## 2022-03-06 PROCEDURE — 250N000013 HC RX MED GY IP 250 OP 250 PS 637: Performed by: PHYSICIAN ASSISTANT

## 2022-03-06 PROCEDURE — 80053 COMPREHEN METABOLIC PANEL: CPT | Performed by: INTERNAL MEDICINE

## 2022-03-06 PROCEDURE — 97530 THERAPEUTIC ACTIVITIES: CPT | Mod: GP | Performed by: REHABILITATION PRACTITIONER

## 2022-03-06 PROCEDURE — 97116 GAIT TRAINING THERAPY: CPT | Mod: GP | Performed by: REHABILITATION PRACTITIONER

## 2022-03-06 PROCEDURE — 85048 AUTOMATED LEUKOCYTE COUNT: CPT | Performed by: INTERNAL MEDICINE

## 2022-03-06 RX ADMIN — Medication 2 PACKET: at 08:23

## 2022-03-06 RX ADMIN — HEPARIN SODIUM AND DEXTROSE 500 UNITS/HR: 10000; 5 INJECTION INTRAVENOUS at 21:06

## 2022-03-06 RX ADMIN — METOPROLOL TARTRATE 75 MG: 50 TABLET, FILM COATED ORAL at 20:53

## 2022-03-06 RX ADMIN — Medication 15 ML: at 08:23

## 2022-03-06 RX ADMIN — TICAGRELOR 90 MG: 90 TABLET ORAL at 08:23

## 2022-03-06 RX ADMIN — ASPIRIN 81 MG CHEWABLE TABLET 81 MG: 81 TABLET CHEWABLE at 08:23

## 2022-03-06 RX ADMIN — Medication 5 MG: at 20:53

## 2022-03-06 RX ADMIN — Medication 2 PACKET: at 20:54

## 2022-03-06 RX ADMIN — METOPROLOL TARTRATE 75 MG: 50 TABLET, FILM COATED ORAL at 08:23

## 2022-03-06 RX ADMIN — TICAGRELOR 90 MG: 90 TABLET ORAL at 20:54

## 2022-03-06 RX ADMIN — ISOSORBIDE DINITRATE 15 MG: 10 TABLET ORAL at 08:22

## 2022-03-06 RX ADMIN — HYDRALAZINE HYDROCHLORIDE 75 MG: 25 TABLET ORAL at 06:41

## 2022-03-06 RX ADMIN — AMIODARONE HYDROCHLORIDE 200 MG: 200 TABLET ORAL at 08:23

## 2022-03-06 RX ADMIN — HYDRALAZINE HYDROCHLORIDE 75 MG: 25 TABLET ORAL at 13:57

## 2022-03-06 RX ADMIN — ISOSORBIDE DINITRATE 15 MG: 10 TABLET ORAL at 00:26

## 2022-03-06 RX ADMIN — ROSUVASTATIN CALCIUM 20 MG: 20 TABLET, FILM COATED ORAL at 20:54

## 2022-03-06 RX ADMIN — ISOSORBIDE DINITRATE 15 MG: 10 TABLET ORAL at 16:29

## 2022-03-06 RX ADMIN — HYDRALAZINE HYDROCHLORIDE 75 MG: 25 TABLET ORAL at 23:23

## 2022-03-06 ASSESSMENT — ACTIVITIES OF DAILY LIVING (ADL)
ADLS_ACUITY_SCORE: 17
ADLS_ACUITY_SCORE: 19
ADLS_ACUITY_SCORE: 19
ADLS_ACUITY_SCORE: 17
ADLS_ACUITY_SCORE: 16
ADLS_ACUITY_SCORE: 19
ADLS_ACUITY_SCORE: 19
ADLS_ACUITY_SCORE: 21
ADLS_ACUITY_SCORE: 17
ADLS_ACUITY_SCORE: 16
ADLS_ACUITY_SCORE: 17
ADLS_ACUITY_SCORE: 17
ADLS_ACUITY_SCORE: 16
ADLS_ACUITY_SCORE: 17
ADLS_ACUITY_SCORE: 16
ADLS_ACUITY_SCORE: 17
ADLS_ACUITY_SCORE: 16
ADLS_ACUITY_SCORE: 17
ADLS_ACUITY_SCORE: 19
ADLS_ACUITY_SCORE: 17

## 2022-03-06 NOTE — PLAN OF CARE
ICU End of Shift Summary. See flowsheets for vital signs and detailed assessment.    Changes this shift: No acute changes.    Plan: Repeat FEES and sutures to be removed from ECMO decannulation site on Tuesday. Transfer to  once a bed becomes available. Continue with POC and notify team of any changes or concerns.

## 2022-03-06 NOTE — PROGRESS NOTES
Interventional Cardiology Progress Note      Assessment & Plan:  Chano Johnson is a 71 year old male with a history of atrial fibrillation  s/p ablation, GERD, ARSENIO, and erectile dysfunction who initially presented to [facility] ED on 2/17/22 with chest pain and EKG evidence of anterior STEMI. In the ED, patient went into ventricular fibrillation. CPR was initiated. Total of 8  shocks administered due to refractory VF. Amiodarone 450 mg and epinephrine x4 administered. Intubated. ROSC achieved at  1405. Transferred directly to [facility] cath lab where he was found to have occlusion of the LAD. During angiogram he was unstable with significant hypotension and ectopy, which prompted placement on VA ECMO. PCI was performed with patient on ECMO with LETY x2 to LAD. Course complicated by hemorrhage shock and left hemothorax requiring chest tube placement, SAHIL, and delirium.     Changes today:  - Continue to monitor renal function  - Decrease melatonin to 5 from 10 mg daily per patient request  - PT recommending LTAC but family hesitant and will like to talk to social work tomorrow  - Speech is planning for repeat video swallow eval possibly on Tuesday  - Remains floor status and can transfer to a cardiac telemetry bed once one is available, transfer orders placed since 3/2/22    Important events:  VA ECMO canulation 2/17/22  LETY x2 to LAD 2/17/22  Rewarmed on 2/19/22  CRRT started 2/19/22  Left sided chest tube placed on 2/20/22 for hemothorax  Emobolization of left internal mammary artery on 2/20/22  Decannulation 2/22/22  IABP removed 2/23/22  Extubated 2/24/22  Chest tube removed 3/1/22  Transferred to floor ordered placed 3/2/22    Neurology:  # Possible anoxic brain injury post cardiac arrest   Head CT on presentation w/o acute pathology. Day 3 CT head showed indeterminate gray-white matter loss in the right occipital lobe which could be artifact vs true infarct. Strong suspicion for artifact per neurocrit. EEG w/o  significant abnormalities. Alert and oriented x4 during the day. Able to recall long-term events. Some difficulty with short term memory.     # Delirium; resolved  # Agitation ; resolved  - maintain appropriate awake/sleep cycle   - Continue delirium precautions  - Continue to hold Seroquel  - Melatonin 5 mg at bedtime   - haldol 5 mg q 6hr PRN  - sitter at bedtime     Cardiovascular   # VF cardiac arrest   # Cardiogenic/hemorrhagic shock, resovled  # Anterior STEMI s/p LETY x2  # Acute on chronic systolic heart failure (EF 25/30%)  # Ischemic cardiomyopathy   Coronary angiogram with culprit lesion to be mLADs/p LETY x2. Has significant residual disease in the a large ramus with 70% stenosis. Peripheral V-A ECMO inserted for refractory cardiogenic shock on 2/17/22: 17 Fr arterial and 25 Fr venous cannula, IABP and distal reperfusion cannula placed. Decannulated on 2/22/22. IABP removed 2/23/22. Echo  2/18/22 10-15% on ECMO. Echo with LVEF of 25-30% with apical akinesis on 2/28/22.   - DAPT: aspirin + ticagrelor   - statin: increase rosuvastatin to 20 mg daily now that CrCL >30  - Guideline directed HF medications  - BB: increase metoprolol tartrate 75 mg BID (will need to transition to Toprol XL once patient able to take po pills)  - ACEi/ARB/ARNI: defer due to SAHIL, will hopefully start before discharge  - Afterload reduction: hydralazine 75 mg  TID/ isordil 15 mg TID  - AA: defer due to SAHIL, will hopefully start before discharge  - SGLT2-I: defer due to SAHIL  - Diuretics: appears euvolemic. Hold on diuretics today as patient is diuresing well    # Paroxysmal atrial fibrillation  # s/p Watchman implantation   Hx of paroxysmal atrial fibrillation s/p ablations and Watchman device. Afib with RVR requiring IV amiodarone in the ICU. Transitioned to po amiodarone on 3/2/22. Currently in sinus rhythm.   - po amiodarone 200 mg daily   - [id number] score 3, no anticoagulation necessary with Watchman device in place  - rate  control with metoprolol tartrate 75 mg BID (will transition to long-acting as above for HF)    Pulmonary   # Acute hypoxic respiratory failure, resolved   # Pleural effusion, improved  # Left hemothorax, resolved  # ARSENIO  Intubated due to cardiac arrest on 2/17/22. Extubated on 2/24/22. On room air. CXR clear.   - CPAP/BiPAP at night     GI/Nutrition  # Severe malnutrition in the context of acute illness  # Dysphagia   # loose stools 2/2 TF  TF removed by patient on 3/3/22. SLP to evaluate patient. Likely will require placement of new post-pyloric feeding tube this afternoon.   - SLP following   - RD following  - diet: full liquid diet, mildly thickened liquids  - continuous tube feeds held this morning   - PRN imodium for loose stools  - Continue FWF to 50 ml per hour    # Transaminitis, in setting of shock, cardiac arrest   ,  on admission. ALT normalized on 2/20 but has gila intermittently rising.     Renal, Electrolytes   # SAHIL, likely ATN in setting of cardiac arrest   # Hypernatremia, resolved   # Hypermagnesemia, Mg 2.7  # Hyperchloremia, Cl 111  # Uremia,   Cr on admission 1.41. Cr peaked at 3.86 on 2/21. Now improving. No need for CRRT. Auto-diuresing. Creatinine improving.  - strict I&O  - Continue FWF to 50 ml/hr    Infectious Disease   Sputum cultures 2/20/22-- Staph lugdunesis. Prophylactic treatment with  cefazolin 2 g BID x5 days, 2/24-2/28. WBC 9.6. Afebrile.     Hematology   # Anemia due to acute blood loss, stable  # Left hemothorax  Required frequent blood transfusions of 3-4 units/day to maintain hgb > 7 on admission. Extravasation from left internal mammary artery noted on CT. Left sided chest tube placed at bedside by thoracic surgery on 2/20/22 with 1300 ml old blood return. IR performed emobolization of left internal mammary artery on 2/20/22.   - daily CBC      Musculoskeletal/Integument   # Left groin wound 2/2 ECMO decannulation   - cleanse wound with Microklenz BID   -  Sutures to be removed on 3/8/22.     # Pressure wound of buttock   # Pressure wound of left tongue 2/2 ET   WOC following     # Generalized weakness  # Deconditioning   PT/OT recommending TCU placement     Diet: full liquid, mildly thickened liquids   Activity: as tolerated with assistance  Code Status: DNR/DNI  Disposition: not medically stable, will need TCU placement     Patient discussed with staff cardiologist, Dr. Murcia, and cardiology fellow.    Keren Reilly MD, PhD  Cardiology Fellow  Pager: [phone]      Interval History:  No significant overnight events.  NO overnight delirium.  CPAP at night is going well but last night he did sleep well last night and feels tired today.    Most recent vital signs:  /60   Pulse 65   Temp 97.7  F (36.5  C) (Axillary)   Resp 16   Ht 1.829 m (6')   Wt 85.7 kg (188 lb 15 oz)   SpO2 97%   BMI 25.62 kg/m    Temp:  [97.2  F (36.2  C)-98.6  F (37  C)] 97.7  F (36.5  C)  Pulse:  [65-77] 65  Resp:  [16-18] 16  BP: ()/(58-62) 100/60  SpO2:  [97 %-99 %] 97 %  Wt Readings from Last 2 Encounters:   03/04/22 85.7 kg (188 lb 15 oz)   02/17/22 99.8 kg (220 lb)       Intake/Output Summary (Last 24 hours) at 3/3/2022 0823  Last data filed at 3/3/2022 0800  Gross per 24 hour   Intake 3224.75 ml   Output 4625 ml   Net -1400.25 ml       Physical exam:  General: Pleasant elderly male. Appears comfortable and in no acute distress. Alert and interactive  HEENT: Normocephalic, atraumatic. Mild bilateral scleral icterus. Tongue is edematous and erythematous. Ulceration on superior distal aspect and inferior. Front tooth is broken. Minimal ecchymosis of right neck from removal of lines.   Neck: JVP not visible above the clavicle while sitting upright in chair.  CARDIAC: Regular rate and rhythm, no m/r/g appreciated. Radial pulses 2+ bilaterally. Pedal pulses 1+ bilaterally.   RESP: Normal work of breathing on room air without use of accessory breathing muscles. Bibasilar  rales extending retirement up lung fields. No wheezing or rhonchi. Sounds more consistent with atelectasis than pulmonary edema. Bandage on left lateral ribs from CT removal. Bandage is saturated with sanguinous drainage.   GI: Soft and nontender to palpation. Not distended.  Bandage on left lateral abdomen.   EXTREMITIES: Without lower extremity edema. Warm and well perfused. Feet are cooler to touch but perfused. No cyanosis. Right femoral access site is c/d/i. Flat and soft to palpation. No hematoma. Left groin ECMO incision covered with bandage. Bandage without drainage. Soft to palpation.   SKIN: No acute lesions appreciated. Warm and dry to touch. Ecchymoses of bilateral wrists.   NEURO: Alert and oriented X4, weak but able to move all extremities. Speech is clear.    PSYCH: Mood and affect are appropriate    Lines/Drains:   - PICC in right arm--placed 2/28/22  - external urinary catheter    Labs (Past three days):  CBC  Recent Labs   Lab 03/06/22  0347 03/05/22  0445 03/04/22  0627 03/03/22  0645   WBC 11.9* 10.2 9.2 9.6   RBC 3.07* 3.29* 3.35* 3.18*   HGB 9.1* 10.0* 10.1* 9.4*   HCT 30.3* 32.6* 32.4* 30.5*   MCV 99 99 97 96   MCH 29.6 30.4 30.1 29.6   MCHC 30.0* 30.7* 31.2* 30.8*   RDW 17.2* 17.2* 17.5* 17.2*    400 360 319     BMP  Recent Labs   Lab 03/06/22  0347 03/05/22  1524 03/05/22  0445 03/04/22  1707 03/04/22  0627 03/03/22  0645    144 145* 143 146* 142   POTASSIUM 4.3 4.1 4.3  --  4.4 3.8   CHLORIDE 113* 111* 111*  --  113* 111*   CO2 20 24 25  --  24 26   ANIONGAP 9 9 9  --  9 5   * 158* 138*  --  157* 161*   BUN 92* 99* 99*  --  100* 100*   CR 2.29* 2.44* 2.40*  --  2.71* 2.77*   GFRESTIMATED 29* 27* 28*  --  24* 23*   PEDRITO 8.4* 8.4* 8.7  --  9.1 8.7   MAG 2.8*  --  2.9*  --  2.9* 2.7*   PHOS 3.7  --  4.2  --  4.9* 4.7*      INR  No lab results found in last 7 days.  Liver panel  Recent Labs   Lab 03/06/22  0347 03/05/22  0445 03/04/22  0627 03/03/22  0645   PROTTOTAL 6.6* 7.1  6.7* 6.7*   ALBUMIN 2.6* 2.8* 2.6* 2.7*   BILITOTAL 0.8 0.8 0.8 0.8   ALKPHOS 67 75 65 52   AST 51* 47* 49* 58*   * 94* 88* 85*     Imaging/procedure results:  EKG 12 Lead 3/2/22:     ECHO 2/28/22:  Interpretation Summary  The visual ejection fraction is 25-30%.  Akinetic apical segments.  Right ventricular function, chamber size, wall motion, and thickness are  normal.  Dilation of the inferior vena cava is present with abnormal respiratory  variation in diameter.  No pericardial effusion is present.    Coronary Angiogram 2/17/22:  Left Main   Minimal luminal irregularities   Left Anterior Descending   Mid LAD lesion is 100% stenosed. Culprit lesion. The lesion is type B2 - medium risk and thrombotic.   Ramus Intermedius   The vessel is large.   Ramus lesion is 70% stenosed.   Left Circumflex   Minimal luminal irregularities   Right Coronary Artery   Mid RCA lesion is 30% stenosed.       Intervention  Mid LAD lesion   Stent   Lesion length: 20 mm. There is no pre-interventional antegrade distal flow (KATHLEEN 0). Pre-stent angioplasty was performed. The post-interventional distal flow is normal (KATHLEEN 3). A 6Fr XB3.5 guide was engaged in the LM. Heparin was used for anticoagulation with ACT > 250 sec. A runthrough wire was advanced across the lesion to the distal LAD. The lesion was pre-dilated with a 2.5mm emerge balloon. A 3.5x24mm synergy LETY was successfully deployed across the lesion. A 4.0x8mm synergy LETY was successfully deployed proximal to, and overlapping, the first stent. The stents were post-dilated with 3.5 and 4mm NC balloons. There were no complications. KATHLEEN 3 flow was present in all segments upon completion of the procedure.   There is a 0% residual stenosis post intervention.     EXTRACORPOREAL MEMBRANE OXYGENATION CANNULATION 2/17/22:  A 17 Fr ECMO cannula was inserted into the left common femoral artery and a 25 Fr cannula was inserted into the left common femoral vein.  The arterial cannula  was positioned in the iliac artery and the venous canula was positioned across the RA.  The ECMO circuit was primed, closely inspected to ensure no bubbles present and ECMO was initiated.  An anterograde perfusion 8 Fr sheath was inserted in the left superficial femoral artery.    CT Chest/Abdomen 2/20/22  IMPRESSION:    1. Retrosternal ill-defined anterior mediastinal fat hemorrhage from  recent resuscitation attempt with focus of suspected active bleeding  from the left internal mammary artery.  2. Left hemothorax. - Blood products from hemorrhage extending into  the left pleural space.   3. Small right pleural effusion with associated atelectasis.  4. No acute findings in the abdomen/pelvis.  5. Support devices as detailed in the body of the report    CT Head 2/20/22  IMPRESSION:  Indeterminant gray-white matter loss in the right occipital lobe could  be artifactual from adjacent streak/photon starvation artifact versus  true infarct.

## 2022-03-06 NOTE — PLAN OF CARE
ICU End of Shift Summary. See flowsheets for vital signs and detailed assessment.    Changes this shift: No acute changes.    Plan: Transfer to  once a bed becomes available. Repeat FEES to be completed early next week (probably Tuesday). Meeta (Spouse) would like to see  on Monday to discuss discharge needs.

## 2022-03-06 NOTE — PROGRESS NOTES
Interventional Cardiology Progress Note      Assessment & Plan:  Chano Johnson is a 71 year old male with a history of atrial fibrillation  s/p ablation, GERD, ARSENIO, and erectile dysfunction who initially presented to [facility] ED on 2/17/22 with chest pain and EKG evidence of anterior STEMI. In the ED, patient went into ventricular fibrillation. CPR was initiated. Total of 8  shocks administered due to refractory VF. Amiodarone 450 mg and epinephrine x4 administered. Intubated. ROSC achieved at  1405. Transferred directly to [facility] cath lab where he was found to have occlusion of the LAD. During angiogram he was unstable with significant hypotension and ectopy, which prompted placement on VA ECMO. PCI was performed with patient on ECMO with LETY x2 to LAD. Course complicated by hemorrhage shock and left hemothorax requiring chest tube placement, SAHIL, and delirium.     Changes today:  - Adjust free water flushes based on repeat Na this afternoon  - Continue to monitor renal function  - PT recommending LTAC but family hesitant  - Speech is planning for repeat video swallow eval possibly on tuesday    Important events:  VA ECMO canulation 2/17/22  LETY x2 to LAD 2/17/22  Rewarmed on 2/19/22  CRRT started 2/19/22  Left sided chest tube placed on 2/20/22 for hemothorax  Emobolization of left internal mammary artery on 2/20/22  Decannulation 2/22/22  IABP removed 2/23/22  Extubated 2/24/22  Chest tube removed 3/1/22  Transferred to floor ordered placed 3/2/22    Neurology:  # Possible anoxic brain injury post cardiac arrest   Head CT on presentation w/o acute pathology. Day 3 CT head showed indeterminate gray-white matter loss in the right occipital lobe which could be artifact vs true infarct. Strong suspicion for artifact per neurocrit. EEG w/o significant abnormalities. Alert and oriented x4 during the day. Able to recall long-term events. Some difficulty with short term memory.     # Delirium   # Agitation    Delirium is improving but patient is continuing to demonstrate nocturnal agitation. Required mitts/restraints on 3/3/22 as well as 1:1 sitter due to significant agitation. Patient kicked RN in chest. Restraints removed at 0500. Patient reports that he does not remember this event and thought it was a dream.   - maintain appropriate awake/sleep cycle   - delirium precautions  - Continue to hold Seroquel  - melatonin 10 mg at bedtime   - haldol 5 mg q 6hr PRN  - sitter at bedtime     Cardiovascular   # VF cardiac arrest   # Cardiogenic/hemorrhagic shock, resovled  # Anterior STEMI s/p LETY x2  # Acute on chronic systolic heart failure (EF 25/30%)  # Ischemic cardiomyopathy   Coronary angiogram with culprit lesion to be mLADs/p LETY x2. Has significant residual disease in the a large ramus with 70% stenosis. Peripheral V-A ECMO inserted for refractory cardiogenic shock on 2/17/22: 17 Fr arterial and 25 Fr venous cannula, IABP and distal reperfusion cannula placed. Decannulated on 2/22/22. IABP removed 2/23/22. Echo  2/18/22 10-15% on ECMO. Echo with LVEF of 25-30% with apical akinesis on 2/28/22.   - DAPT: aspirin + ticagrelor   - statin: increase rosuvastatin to 20 mg daily now that CrCL >30  - Guideline directed HF medications  - BB: increase metoprolol tartrate 75 mg BID (will need to transition to Toprol XL once patient able to take po pills)  - ACEi/ARB/ARNI: defer due to SAHIL, will hopefully start before discharge  - Afterload reduction: hydralazine 75 mg  TID/ isordil 15 mg TID  - AA: defer due to SAHIL, will hopefully start before discharge  - SGLT2-I: defer due to SAHIL  - Diuretics: appears euvolemic. Hold on diuretics today as patient is diuresing well      # Paroxysmal atrial fibrillation  # s/p Watchman implantation   Hx of paroxysmal atrial fibrillation s/p ablations and Watchman device. Afib with RVR requiring IV amiodarone in the ICU. Transitioned to po amiodarone on 3/2/22. Currently in sinus rhythm.   - po  amiodarone 200 mg daily   - [id number] score 3, no anticoagulation necessary with Watchman device in place  - rate control with metoprolol tartrate 75 mg BID (will transition to long-acting as above for HF)    Pulmonary   # Acute hypoxic respiratory failure, resolved   # Pleural effusion, improved  # Left hemothorax, resolved  # ARSENIO  Intubated due to cardiac arrest on 2/17/22. Extubated on 2/24/22. On room air. CXR clear.   - CPAP/BiPAP at night     GI/Nutrition  # Severe malnutrition in the context of acute illness  # Dysphagia   # loose stools 2/2 TF  TF removed by patient on 3/3/22. SLP to evaluate patient. Likely will require placement of new post-pyloric feeding tube this afternoon.   - SLP following   - RD following  - diet: full liquid diet, mildly thickened liquids  - continuous tube feeds held this morning   - PRN imodium for loose stools  - Continue FWF to 50 ml per hour    # Transaminitis, in setting of shock, cardiac arrest   ,  on admission. ALT normalized on 2/20 but has gila intermittently rising.     Renal, Electrolytes   # SAHIL, likely ATN in setting of cardiac arrest   # Hypernatremia, resolved   # Hypermagnesemia, Mg 2.7  # Hyperchloremia, Cl 111  # Uremia,   Cr on admission 1.41. Cr peaked at 3.86 on 2/21. Now improving. No need for CRRT. Auto-diuresing. Creatinine improving.  - strict I&O  - Continue FWF to 50 ml/hr    Infectious Disease   Sputum cultures 2/20/22-- Staph lugdunesis. Prophylactic treatment with  cefazolin 2 g BID x5 days, 2/24-2/28. WBC 9.6. Afebrile.     Hematology   # Anemia due to acute blood loss, stable  # Left hemothorax  Required frequent blood transfusions of 3-4 units/day to maintain hgb > 7 on admission. Extravasation from left internal mammary artery noted on CT. Left sided chest tube placed at bedside by thoracic surgery on 2/20/22 with 1300 ml old blood return. IR performed emobolization of left internal mammary artery on 2/20/22.   - daily  CBC      Musculoskeletal/Integument   # Left groin wound 2/2 ECMO decannulation   - cleanse wound with Microklenz BID   - Sutures to be removed on 3/8/22.     # Pressure wound of buttock   # Pressure wound of left tongue 2/2 ET   WOC following     # Generalized weakness  # Deconditioning   PT/OT recommending TCU placement     Diet: full liquid, mildly thickened liquids   Activity: as tolerated with assistance  Code Status: DNR/DNI  Disposition: not medically stable, will need TCU placement     Patient discussed with staff cardiologist, Dr. Murcia, and cardiology fellow.    Keren Reilly MD, PhD  Cardiology Fellow  Pager: [phone]      Interval History:  No significant overnight events.  NO overnight delirium. Slept well with CPAP. Limited interruptions overnight.    Most recent vital signs:  /59 (BP Location: Right arm)   Pulse 71   Temp 97.4  F (36.3  C) (Oral)   Resp 16   Ht 1.829 m (6')   Wt 85.7 kg (188 lb 15 oz)   SpO2 98%   BMI 25.62 kg/m    Temp:  [97.4  F (36.3  C)-98.5  F (36.9  C)] 97.4  F (36.3  C)  Pulse:  [63-77] 71  Resp:  [16] 16  BP: ()/(58-63) 108/59  FiO2 (%):  [21 %] 21 %  SpO2:  [97 %-100 %] 98 %  Wt Readings from Last 2 Encounters:   03/04/22 85.7 kg (188 lb 15 oz)   02/17/22 99.8 kg (220 lb)       Intake/Output Summary (Last 24 hours) at 3/3/2022 0823  Last data filed at 3/3/2022 0800  Gross per 24 hour   Intake 3224.75 ml   Output 4625 ml   Net -1400.25 ml       Physical exam:  General: Pleasant elderly male. Appears comfortable and in no acute distress. Alert and interactive  HEENT: Normocephalic, atraumatic. Mild bilateral scleral icterus. Tongue is edematous and erythematous. Ulceration on superior distal aspect and inferior. Front tooth is broken. Minimal ecchymosis of right neck from removal of lines.   Neck: JVP not visible above the clavicle while sitting upright in chair.  CARDIAC: Regular rate and rhythm, no m/r/g appreciated. Radial pulses 2+ bilaterally. Pedal  pulses 1+ bilaterally.   RESP: Normal work of breathing on room air without use of accessory breathing muscles. Bibasilar rales extending skilled nursing up lung fields. No wheezing or rhonchi. Sounds more consistent with atelectasis than pulmonary edema. Bandage on left lateral ribs from CT removal. Bandage is saturated with sanguinous drainage.   GI: Soft and nontender to palpation. Not distended.  Bandage on left lateral abdomen.   EXTREMITIES: Without lower extremity edema. Warm and well perfused. Feet are cooler to touch but perfused. No cyanosis. Right femoral access site is c/d/i. Flat and soft to palpation. No hematoma. Left groin ECMO incision covered with bandage. Bandage without drainage. Soft to palpation.   SKIN: No acute lesions appreciated. Warm and dry to touch. Ecchymoses of bilateral wrists.   NEURO: Alert and oriented X4, weak but able to move all extremities. Speech is intermittently slurred due to swollen tongue but improving.   PSYCH: Mood and affect are appropriate    Lines/Drains:   - PICC in right arm--placed 2/28/22  - external urinary catheter    Labs (Past three days):  CBC  Recent Labs   Lab 03/05/22  0445 03/04/22  0627 03/03/22  0645 03/02/22  0344   WBC 10.2 9.2 9.6 11.5*   RBC 3.29* 3.35* 3.18* 2.74*   HGB 10.0* 10.1* 9.4* 8.3*   HCT 32.6* 32.4* 30.5* 26.7*   MCV 99 97 96 97   MCH 30.4 30.1 29.6 30.3   MCHC 30.7* 31.2* 30.8* 31.1*   RDW 17.2* 17.5* 17.2* 17.3*    360 319 293     BMP  Recent Labs   Lab 03/05/22  1524 03/05/22  0445 03/04/22  1707 03/04/22  0627 03/03/22  0645 03/02/22  1015 03/02/22  0344    145* 143 146* 142   < > 148*   POTASSIUM 4.1 4.3  --  4.4 3.8  --  3.9   CHLORIDE 111* 111*  --  113* 111*  --  114*   CO2 24 25  --  24 26  --  25   ANIONGAP 9 9  --  9 5  --  9   * 138*  --  157* 161*  --  133*   BUN 99* 99*  --  100* 100*  --  96*   CR 2.44* 2.40*  --  2.71* 2.77*  --  2.80*   GFRESTIMATED 27* 28*  --  24* 23*  --  23*   PEDRITO 8.4* 8.7  --  9.1 8.7   --  8.5   MAG  --  2.9*  --  2.9* 2.7*  --  2.8*   PHOS  --  4.2  --  4.9* 4.7*  --  4.0    < > = values in this interval not displayed.      INR  No lab results found in last 7 days.  Liver panel  Recent Labs   Lab 03/05/22  0445 03/04/22  0627 03/03/22  0645 03/02/22  0344   PROTTOTAL 7.1 6.7* 6.7* 6.0*   ALBUMIN 2.8* 2.6* 2.7* 2.4*   BILITOTAL 0.8 0.8 0.8 0.8   ALKPHOS 75 65 52 53   AST 47* 49* 58* 88*   ALT 94* 88* 85* 74*     Imaging/procedure results:  EKG 12 Lead 3/2/22:     ECHO 2/28/22:  Interpretation Summary  The visual ejection fraction is 25-30%.  Akinetic apical segments.  Right ventricular function, chamber size, wall motion, and thickness are  normal.  Dilation of the inferior vena cava is present with abnormal respiratory  variation in diameter.  No pericardial effusion is present.    Coronary Angiogram 2/17/22:  Left Main   Minimal luminal irregularities   Left Anterior Descending   Mid LAD lesion is 100% stenosed. Culprit lesion. The lesion is type B2 - medium risk and thrombotic.   Ramus Intermedius   The vessel is large.   Ramus lesion is 70% stenosed.   Left Circumflex   Minimal luminal irregularities   Right Coronary Artery   Mid RCA lesion is 30% stenosed.       Intervention  Mid LAD lesion   Stent   Lesion length: 20 mm. There is no pre-interventional antegrade distal flow (KATHLEEN 0). Pre-stent angioplasty was performed. The post-interventional distal flow is normal (KATHLEEN 3). A 6Fr XB3.5 guide was engaged in the LM. Heparin was used for anticoagulation with ACT > 250 sec. A runthrough wire was advanced across the lesion to the distal LAD. The lesion was pre-dilated with a 2.5mm emerge balloon. A 3.5x24mm synergy LETY was successfully deployed across the lesion. A 4.0x8mm synergy LETY was successfully deployed proximal to, and overlapping, the first stent. The stents were post-dilated with 3.5 and 4mm NC balloons. There were no complications. KATHLEEN 3 flow was present in all segments upon  completion of the procedure.   There is a 0% residual stenosis post intervention.     EXTRACORPOREAL MEMBRANE OXYGENATION CANNULATION 2/17/22:  A 17 Fr ECMO cannula was inserted into the left common femoral artery and a 25 Fr cannula was inserted into the left common femoral vein.  The arterial cannula was positioned in the iliac artery and the venous canula was positioned across the RA.  The ECMO circuit was primed, closely inspected to ensure no bubbles present and ECMO was initiated.  An anterograde perfusion 8 Fr sheath was inserted in the left superficial femoral artery.    CT Chest/Abdomen 2/20/22  IMPRESSION:    1. Retrosternal ill-defined anterior mediastinal fat hemorrhage from  recent resuscitation attempt with focus of suspected active bleeding  from the left internal mammary artery.  2. Left hemothorax. - Blood products from hemorrhage extending into  the left pleural space.   3. Small right pleural effusion with associated atelectasis.  4. No acute findings in the abdomen/pelvis.  5. Support devices as detailed in the body of the report    CT Head 2/20/22  IMPRESSION:  Indeterminant gray-white matter loss in the right occipital lobe could  be artifactual from adjacent streak/photon starvation artifact versus  true infarct.

## 2022-03-07 ENCOUNTER — APPOINTMENT (OUTPATIENT)
Dept: CARDIOLOGY | Facility: CLINIC | Age: 72
DRG: 003 | End: 2022-03-07
Attending: STUDENT IN AN ORGANIZED HEALTH CARE EDUCATION/TRAINING PROGRAM
Payer: MEDICARE

## 2022-03-07 ENCOUNTER — APPOINTMENT (OUTPATIENT)
Dept: PHYSICAL THERAPY | Facility: CLINIC | Age: 72
DRG: 003 | End: 2022-03-07
Payer: MEDICARE

## 2022-03-07 ENCOUNTER — APPOINTMENT (OUTPATIENT)
Dept: CT IMAGING | Facility: CLINIC | Age: 72
DRG: 003 | End: 2022-03-07
Payer: MEDICARE

## 2022-03-07 ENCOUNTER — APPOINTMENT (OUTPATIENT)
Dept: OCCUPATIONAL THERAPY | Facility: CLINIC | Age: 72
DRG: 003 | End: 2022-03-07
Payer: MEDICARE

## 2022-03-07 LAB
ALBUMIN SERPL-MCNC: 2.8 G/DL (ref 3.4–5)
ALP SERPL-CCNC: 74 U/L (ref 40–150)
ALT SERPL W P-5'-P-CCNC: 94 U/L (ref 0–70)
ANION GAP SERPL CALCULATED.3IONS-SCNC: 8 MMOL/L (ref 3–14)
AST SERPL W P-5'-P-CCNC: 41 U/L (ref 0–45)
BASOPHILS # BLD AUTO: 0 10E3/UL (ref 0–0.2)
BASOPHILS NFR BLD AUTO: 0 %
BILIRUB SERPL-MCNC: 0.8 MG/DL (ref 0.2–1.3)
BUN SERPL-MCNC: 84 MG/DL (ref 7–30)
CALCIUM SERPL-MCNC: 8.6 MG/DL (ref 8.5–10.1)
CHLORIDE BLD-SCNC: 112 MMOL/L (ref 94–109)
CO2 SERPL-SCNC: 24 MMOL/L (ref 20–32)
CREAT SERPL-MCNC: 1.97 MG/DL (ref 0.66–1.25)
EOSINOPHIL # BLD AUTO: 0.2 10E3/UL (ref 0–0.7)
EOSINOPHIL NFR BLD AUTO: 3 %
ERYTHROCYTE [DISTWIDTH] IN BLOOD BY AUTOMATED COUNT: 17.7 % (ref 10–15)
GFR SERPL CREATININE-BSD FRML MDRD: 35 ML/MIN/1.73M2
GLUCOSE BLD-MCNC: 144 MG/DL (ref 70–99)
GLUCOSE BLDC GLUCOMTR-MCNC: 137 MG/DL (ref 70–99)
GLUCOSE BLDC GLUCOMTR-MCNC: 140 MG/DL (ref 70–99)
HCT VFR BLD AUTO: 30.8 % (ref 40–53)
HGB BLD-MCNC: 9.3 G/DL (ref 13.3–17.7)
IMM GRANULOCYTES # BLD: 0.2 10E3/UL
IMM GRANULOCYTES NFR BLD: 2 %
LVEF ECHO: NORMAL
LYMPHOCYTES # BLD AUTO: 0.9 10E3/UL (ref 0.8–5.3)
LYMPHOCYTES NFR BLD AUTO: 9 %
MAGNESIUM SERPL-MCNC: 2.7 MG/DL (ref 1.6–2.3)
MCH RBC QN AUTO: 30.3 PG (ref 26.5–33)
MCHC RBC AUTO-ENTMCNC: 30.2 G/DL (ref 31.5–36.5)
MCV RBC AUTO: 100 FL (ref 78–100)
MONOCYTES # BLD AUTO: 0.8 10E3/UL (ref 0–1.3)
MONOCYTES NFR BLD AUTO: 9 %
NEUTROPHILS # BLD AUTO: 7.2 10E3/UL (ref 1.6–8.3)
NEUTROPHILS NFR BLD AUTO: 77 %
NRBC # BLD AUTO: 0 10E3/UL
NRBC BLD AUTO-RTO: 0 /100
PHOSPHATE SERPL-MCNC: 4.3 MG/DL (ref 2.5–4.5)
PLATELET # BLD AUTO: 318 10E3/UL (ref 150–450)
POTASSIUM BLD-SCNC: 4.4 MMOL/L (ref 3.4–5.3)
PROT SERPL-MCNC: 6.7 G/DL (ref 6.8–8.8)
RBC # BLD AUTO: 3.07 10E6/UL (ref 4.4–5.9)
SODIUM SERPL-SCNC: 144 MMOL/L (ref 133–144)
WBC # BLD AUTO: 9.3 10E3/UL (ref 4–11)

## 2022-03-07 PROCEDURE — 93321 DOPPLER ECHO F-UP/LMTD STD: CPT | Mod: 26 | Performed by: STUDENT IN AN ORGANIZED HEALTH CARE EDUCATION/TRAINING PROGRAM

## 2022-03-07 PROCEDURE — 70486 CT MAXILLOFACIAL W/O DYE: CPT

## 2022-03-07 PROCEDURE — 36415 COLL VENOUS BLD VENIPUNCTURE: CPT | Performed by: INTERNAL MEDICINE

## 2022-03-07 PROCEDURE — 120N000002 HC R&B MED SURG/OB UMMC

## 2022-03-07 PROCEDURE — 85025 COMPLETE CBC W/AUTO DIFF WBC: CPT | Performed by: INTERNAL MEDICINE

## 2022-03-07 PROCEDURE — 97116 GAIT TRAINING THERAPY: CPT | Mod: GP

## 2022-03-07 PROCEDURE — 93308 TTE F-UP OR LMTD: CPT | Mod: 26 | Performed by: STUDENT IN AN ORGANIZED HEALTH CARE EDUCATION/TRAINING PROGRAM

## 2022-03-07 PROCEDURE — 99233 SBSQ HOSP IP/OBS HIGH 50: CPT | Mod: 25 | Performed by: INTERNAL MEDICINE

## 2022-03-07 PROCEDURE — 250N000013 HC RX MED GY IP 250 OP 250 PS 637: Performed by: INTERNAL MEDICINE

## 2022-03-07 PROCEDURE — 97535 SELF CARE MNGMENT TRAINING: CPT | Mod: GO | Performed by: OCCUPATIONAL THERAPIST

## 2022-03-07 PROCEDURE — 250N000013 HC RX MED GY IP 250 OP 250 PS 637

## 2022-03-07 PROCEDURE — 93325 DOPPLER ECHO COLOR FLOW MAPG: CPT | Mod: 26 | Performed by: STUDENT IN AN ORGANIZED HEALTH CARE EDUCATION/TRAINING PROGRAM

## 2022-03-07 PROCEDURE — 80053 COMPREHEN METABOLIC PANEL: CPT | Performed by: INTERNAL MEDICINE

## 2022-03-07 PROCEDURE — 83735 ASSAY OF MAGNESIUM: CPT | Performed by: INTERNAL MEDICINE

## 2022-03-07 PROCEDURE — 70486 CT MAXILLOFACIAL W/O DYE: CPT | Mod: 26 | Performed by: RADIOLOGY

## 2022-03-07 PROCEDURE — G1004 CDSM NDSC: HCPCS | Mod: GC | Performed by: RADIOLOGY

## 2022-03-07 PROCEDURE — 97110 THERAPEUTIC EXERCISES: CPT | Mod: GO | Performed by: OCCUPATIONAL THERAPIST

## 2022-03-07 PROCEDURE — 999N000208 ECHOCARDIOGRAM LIMITED

## 2022-03-07 PROCEDURE — 84100 ASSAY OF PHOSPHORUS: CPT | Performed by: INTERNAL MEDICINE

## 2022-03-07 PROCEDURE — 250N000013 HC RX MED GY IP 250 OP 250 PS 637: Performed by: PHYSICIAN ASSISTANT

## 2022-03-07 PROCEDURE — 93321 DOPPLER ECHO F-UP/LMTD STD: CPT

## 2022-03-07 PROCEDURE — 250N000013 HC RX MED GY IP 250 OP 250 PS 637: Performed by: STUDENT IN AN ORGANIZED HEALTH CARE EDUCATION/TRAINING PROGRAM

## 2022-03-07 PROCEDURE — 255N000002 HC RX 255 OP 636: Performed by: STUDENT IN AN ORGANIZED HEALTH CARE EDUCATION/TRAINING PROGRAM

## 2022-03-07 RX ADMIN — ROSUVASTATIN CALCIUM 20 MG: 20 TABLET, FILM COATED ORAL at 20:18

## 2022-03-07 RX ADMIN — ISOSORBIDE DINITRATE 15 MG: 10 TABLET ORAL at 08:35

## 2022-03-07 RX ADMIN — HYDRALAZINE HYDROCHLORIDE 75 MG: 25 TABLET ORAL at 06:23

## 2022-03-07 RX ADMIN — METOPROLOL TARTRATE 75 MG: 50 TABLET, FILM COATED ORAL at 20:18

## 2022-03-07 RX ADMIN — Medication 15 ML: at 08:35

## 2022-03-07 RX ADMIN — Medication 2 PACKET: at 20:19

## 2022-03-07 RX ADMIN — HYDRALAZINE HYDROCHLORIDE 75 MG: 25 TABLET ORAL at 13:53

## 2022-03-07 RX ADMIN — TICAGRELOR 90 MG: 90 TABLET ORAL at 20:18

## 2022-03-07 RX ADMIN — ASPIRIN 81 MG CHEWABLE TABLET 81 MG: 81 TABLET CHEWABLE at 08:35

## 2022-03-07 RX ADMIN — AMIODARONE HYDROCHLORIDE 200 MG: 200 TABLET ORAL at 08:35

## 2022-03-07 RX ADMIN — HUMAN ALBUMIN MICROSPHERES AND PERFLUTREN 6 ML: 10; .22 INJECTION, SOLUTION INTRAVENOUS at 14:48

## 2022-03-07 RX ADMIN — ISOSORBIDE DINITRATE 15 MG: 10 TABLET ORAL at 15:21

## 2022-03-07 RX ADMIN — METOPROLOL TARTRATE 75 MG: 50 TABLET, FILM COATED ORAL at 08:35

## 2022-03-07 RX ADMIN — Medication 1 PACKET: at 08:36

## 2022-03-07 RX ADMIN — ISOSORBIDE DINITRATE 15 MG: 10 TABLET ORAL at 00:51

## 2022-03-07 RX ADMIN — TICAGRELOR 90 MG: 90 TABLET ORAL at 08:35

## 2022-03-07 RX ADMIN — Medication 5 MG: at 20:18

## 2022-03-07 RX ADMIN — HYDRALAZINE HYDROCHLORIDE 75 MG: 25 TABLET ORAL at 22:49

## 2022-03-07 ASSESSMENT — ACTIVITIES OF DAILY LIVING (ADL)
ADLS_ACUITY_SCORE: 15
ADLS_ACUITY_SCORE: 17
ADLS_ACUITY_SCORE: 16
ADLS_ACUITY_SCORE: 17
ADLS_ACUITY_SCORE: 15
ADLS_ACUITY_SCORE: 18
ADLS_ACUITY_SCORE: 15
ADLS_ACUITY_SCORE: 16
ADLS_ACUITY_SCORE: 15
ADLS_ACUITY_SCORE: 15
ADLS_ACUITY_SCORE: 17
ADLS_ACUITY_SCORE: 15
ADLS_ACUITY_SCORE: 16
ADLS_ACUITY_SCORE: 15
ADLS_ACUITY_SCORE: 16
ADLS_ACUITY_SCORE: 15
ADLS_ACUITY_SCORE: 15
ADLS_ACUITY_SCORE: 17
ADLS_ACUITY_SCORE: 15
ADLS_ACUITY_SCORE: 16
ADLS_ACUITY_SCORE: 18
ADLS_ACUITY_SCORE: 15

## 2022-03-07 NOTE — PROGRESS NOTES
Care Management Follow Up    Length of Stay (days): 18    Expected Discharge Date:       Concerns to be Addressed:  Home Care    Patient plan of care discussed at interdisciplinary rounds: Yes    Anticipated Discharge Disposition:  Home with home care for PT/OT/RN     Anticipated Discharge Services:  Home Care  Anticipated Discharge DME:  Possible walker, commode?    Patient/family educated on Medicare website which has current facility and service quality ratings:  yes  Education Provided on the Discharge Plan:  yes  Patient/Family in Agreement with the Plan:  yes    Referrals Placed by CM/SW:  Home care for PT/OT/RN  Private pay costs discussed: Not applicable    Additional Information:  Patient and family have declined TCU and would like to discharge home, per wife their son will be taking time off of work to help with cares and has received care giver training. Therapy changed their recs to home with PT/OT/RN. Writer talked to them about home care and provided home care list, and she picked the 3 choices below. Writer placed the following referrals below. Awaiting for acceptance. [facility] will continue to follow for discharge needs     Update 1330- [facility] accepted patient for PT/OT/RN services and will plan to see patient upon discharge. Orders placed.   They would like a phone call at the end of the week to discuss discharge plans, so they can look at schedule.  [facility]- Accepted   [facility]  [phone]  F: [phone]          [facility]-Awaitng response   [address] [place] [address]  Phone [phone]   Fax [phone]    [facility]- Awaiting Response  [facility]  [phone]    Elza Summers  RN Care Coordinator   MICU/SICU  [phone]           Elza Summers, RN

## 2022-03-07 NOTE — PROGRESS NOTES
Interventional Cardiology Progress Note      Assessment & Plan:  Chano Johnson is a 71 year old male with a history of atrial fibrillation  s/p ablation, GERD, ARSENIO, and erectile dysfunction who initially presented to [facility] ED on 2/17/22 with chest pain and EKG evidence of anterior STEMI. In the ED, patient went into ventricular fibrillation. CPR was initiated. Total of 8  shocks administered due to refractory VF. Amiodarone 450 mg and epinephrine x4 administered. Intubated. ROSC achieved at  1405. Transferred directly to [facility] cath lab where he was found to have occlusion of the LAD. During angiogram he was unstable with significant hypotension and ectopy, which prompted placement on VA ECMO. PCI was performed with patient on ECMO with LETY x2 to LAD. Course complicated by hemorrhage shock and left hemothorax requiring chest tube placement, SAHIL, and delirium.     Changes today:  - Continue to monitor renal function  - PT recommending LTAC but family hesitant and will like to talk to social work today for assistance with setting up home PT  - Speech is planning for repeat video swallow eval possibly on  3/8  - Remains floor status and can transfer to a cardiac telemetry bed once one is available, transfer orders placed since 3/2/22  - Dental consult given bleeding upper incisors  - TTE to re-evaluate EF (was 25-35% on 2/28)    Important events:  VA ECMO canulation 2/17/22  LETY x2 to LAD 2/17/22  Rewarmed on 2/19/22  CRRT started 2/19/22  Left sided chest tube placed on 2/20/22 for hemothorax  Emobolization of left internal mammary artery on 2/20/22  Decannulation 2/22/22  IABP removed 2/23/22  Extubated 2/24/22  Chest tube removed 3/1/22  Transferred to floor ordered placed 3/2/22    Neurology:  # Possible anoxic brain injury post cardiac arrest   Head CT on presentation w/o acute pathology. Day 3 CT head showed indeterminate gray-white matter loss in the right occipital lobe which could be artifact vs true  infarct. Strong suspicion for artifact per neurocrit. EEG w/o significant abnormalities. Alert and oriented x4 during the day. Able to recall long-term events. Some difficulty with short term memory.     # Delirium; resolved  # Agitation ; resolved  - maintain appropriate awake/sleep cycle   - Continue delirium precautions  - Continue to hold Seroquel  - Melatonin 5 mg at bedtime   - haldol 5 mg q 6hr PRN  - sitter at bedtime     Cardiovascular   # VF cardiac arrest   # Cardiogenic/hemorrhagic shock, resovled  # Anterior STEMI s/p LETY x2  # Acute on chronic systolic heart failure (EF 25/30%)  # Ischemic cardiomyopathy   Coronary angiogram with culprit lesion to be mLADs/p LETY x2. Has significant residual disease in the a large ramus with 70% stenosis. Peripheral V-A ECMO inserted for refractory cardiogenic shock on 2/17/22: 17 Fr arterial and 25 Fr venous cannula, IABP and distal reperfusion cannula placed. Decannulated on 2/22/22. IABP removed 2/23/22. Echo  2/18/22 10-15% on ECMO. Echo with LVEF of 25-30% with apical akinesis on 2/28/22.   - DAPT: aspirin + ticagrelor   - statin: increase rosuvastatin to 20 mg daily now that CrCL >30  - Guideline directed HF medications  - BB: increase metoprolol tartrate 75 mg BID (will need to transition to Toprol XL once patient able to take po pills)  - ACEi/ARB/ARNI: defer due to SAHIL, will hopefully start before discharge  - Afterload reduction: hydralazine 75 mg  TID/ isordil 15 mg TID  - AA: defer due to SAHIL, will hopefully start before discharge  - SGLT2-I: defer due to SAHIL  - Diuretics: appears euvolemic. Hold on diuretics today as patient is diuresing well    # Paroxysmal atrial fibrillation  # s/p Watchman implantation   Hx of paroxysmal atrial fibrillation s/p ablations and Watchman device. Afib with RVR requiring IV amiodarone in the ICU. Transitioned to po amiodarone on 3/2/22. Currently in sinus rhythm.   - po amiodarone 200 mg daily   - [id number] score 3, no  anticoagulation necessary with Watchman device in place  - rate control with metoprolol tartrate 75 mg BID (will transition to long-acting as above for HF)    Pulmonary   # Acute hypoxic respiratory failure, resolved   # Pleural effusion, improved  # Left hemothorax, resolved  # ARSENIO  Intubated due to cardiac arrest on 2/17/22. Extubated on 2/24/22. On room air. CXR clear.   - CPAP/BiPAP at night     GI/Nutrition  # Severe malnutrition in the context of acute illness  # Dysphagia   # loose stools 2/2 TF  TF removed by patient on 3/3/22. SLP to evaluate patient. Likely will require placement of new post-pyloric feeding tube this afternoon.   - SLP following   - RD following  - diet: full liquid diet, mildly thickened liquids  - continuous tube feeds held this morning   - PRN imodium for loose stools  - Continue FWF to 50 ml per hour    # Transaminitis, in setting of shock, cardiac arrest   ,  on admission. ALT normalized on 2/20 but has gila intermittently rising.     Renal, Electrolytes   # SAHIL, likely ATN in setting of cardiac arrest, resolving  # Hypernatremia, resolved   # Hypermagnesemia, Mg 2.7  # Hyperchloremia, Cl 111  # Uremia,   Cr on admission 1.41. Cr peaked at 3.86 on 2/21. Now improving. No need for CRRT. Auto-diuresing. Creatinine improving.  - strict I&O  - Continue FWF to 50 ml/hr    Infectious Disease   Sputum cultures 2/20/22-- Staph lugdunesis. Prophylactic treatment with  cefazolin 2 g BID x5 days, 2/24-2/28. WBC 9.6. Afebrile.     Hematology   # Anemia due to acute blood loss, stable  # Left hemothorax  Required frequent blood transfusions of 3-4 units/day to maintain hgb > 7 on admission. Extravasation from left internal mammary artery noted on CT. Left sided chest tube placed at bedside by thoracic surgery on 2/20/22 with 1300 ml old blood return. IR performed emobolization of left internal mammary artery on 2/20/22.   - daily CBC      Musculoskeletal/Integument   # Left  groin wound 2/2 ECMO decannulation   - cleanse wound with Microklenz BID   - Sutures to be removed on 3/8/22.     # Pressure wound of buttock   # Pressure wound of left tongue 2/2 ET   WOC following     # Generalized weakness  # Deconditioning   PT/OT recommending TCU placement     Diet: full liquid, mildly thickened liquids   Activity: as tolerated with assistance  Code Status: DNR/DNI  Disposition: not medically stable, will need TCU placement     Patient discussed with staff cardiologist, Dr. Cintron.    Keren Reilly MD, PhD  Cardiology Fellow  Pager: [phone]      Interval History:  No significant overnight events.  Reports that he had a good night sleep last night and feel better rested.     Most recent vital signs:  /58   Pulse 70   Temp 97.4  F (36.3  C)   Resp 16   Ht 1.829 m (6')   Wt 88 kg (194 lb 0.1 oz)   SpO2 (!) 87%   BMI 26.31 kg/m    Temp:  [96.4  F (35.8  C)-98.6  F (37  C)] 97.4  F (36.3  C)  Pulse:  [63-78] 70  Resp:  [14-16] 16  BP: ()/(47-64) 110/58  SpO2:  [87 %-99 %] 87 %  Wt Readings from Last 2 Encounters:   03/07/22 88 kg (194 lb 0.1 oz)   02/17/22 99.8 kg (220 lb)       Intake/Output Summary (Last 24 hours) at 3/3/2022 0823  Last data filed at 3/3/2022 0800  Gross per 24 hour   Intake 3224.75 ml   Output 4625 ml   Net -1400.25 ml       Physical exam:  General: Pleasant elderly male. Appears comfortable and in no acute distress. Alert and interactive  HEENT: Normocephalic, atraumatic. Mild bilateral scleral icterus. Tongue is edematous and erythematous. Ulceration on superior distal aspect and inferior. Front tooth is broken. Minimal ecchymosis of right neck from removal of lines.   Neck: JVP not visible above the clavicle while sitting upright in chair.  CARDIAC: Regular rate and rhythm, no m/r/g appreciated. Radial pulses 2+ bilaterally. Pedal pulses 1+ bilaterally.   RESP: Normal work of breathing on room air without use of accessory breathing muscles. Bibasilar  rales extending snf up lung fields. No wheezing or rhonchi. Sounds more consistent with atelectasis than pulmonary edema. Bandage on left lateral ribs from CT removal. Bandage is saturated with sanguinous drainage.   GI: Soft and nontender to palpation. Not distended.  Bandage on left lateral abdomen.   EXTREMITIES: Without lower extremity edema. Warm and well perfused. Feet are cooler to touch but perfused. No cyanosis. Right femoral access site is c/d/i. Flat and soft to palpation. No hematoma. Left groin ECMO incision covered with bandage. Bandage without drainage. Soft to palpation.   SKIN: No acute lesions appreciated. Warm and dry to touch. Ecchymoses of bilateral wrists.   NEURO: Alert and oriented X4, weak but able to move all extremities. Speech is clear.    PSYCH: Mood and affect are appropriate    Lines/Drains:   - PICC in right arm--placed 2/28/22  - external urinary catheter    Labs (Past three days):  CBC  Recent Labs   Lab 03/07/22  0439 03/06/22  0347 03/05/22  0445 03/04/22  0627   WBC 9.3 11.9* 10.2 9.2   RBC 3.07* 3.07* 3.29* 3.35*   HGB 9.3* 9.1* 10.0* 10.1*   HCT 30.8* 30.3* 32.6* 32.4*    99 99 97   MCH 30.3 29.6 30.4 30.1   MCHC 30.2* 30.0* 30.7* 31.2*   RDW 17.7* 17.2* 17.2* 17.5*    323 400 360     BMP  Recent Labs   Lab 03/07/22  0845 03/07/22  0439 03/06/22  0347 03/05/22  1524 03/05/22  0445 03/04/22  1707 03/04/22  0627   NA  --  144 142 144 145*   < > 146*   POTASSIUM  --  4.4 4.3 4.1 4.3  --  4.4   CHLORIDE  --  112* 113* 111* 111*  --  113*   CO2  --  24 20 24 25  --  24   ANIONGAP  --  8 9 9 9  --  9   * 144* 118* 158* 138*  --  157*   BUN  --  84* 92* 99* 99*  --  100*   CR  --  1.97* 2.29* 2.44* 2.40*  --  2.71*   GFRESTIMATED  --  35* 29* 27* 28*  --  24*   PEDRITO  --  8.6 8.4* 8.4* 8.7  --  9.1   MAG  --  2.7* 2.8*  --  2.9*  --  2.9*   PHOS  --  4.3 3.7  --  4.2  --  4.9*    < > = values in this interval not displayed.      INR  No lab results found in last  7 days.  Liver panel  Recent Labs   Lab 03/07/22  0439 03/06/22  0347 03/05/22  0445 03/04/22  0627   PROTTOTAL 6.7* 6.6* 7.1 6.7*   ALBUMIN 2.8* 2.6* 2.8* 2.6*   BILITOTAL 0.8 0.8 0.8 0.8   ALKPHOS 74 67 75 65   AST 41 51* 47* 49*   ALT 94* 100* 94* 88*     Imaging/procedure results:  EKG 12 Lead 3/2/22:     ECHO 2/28/22:  Interpretation Summary  The visual ejection fraction is 25-30%.  Akinetic apical segments.  Right ventricular function, chamber size, wall motion, and thickness are  normal.  Dilation of the inferior vena cava is present with abnormal respiratory  variation in diameter.  No pericardial effusion is present.    Coronary Angiogram 2/17/22:  Left Main   Minimal luminal irregularities   Left Anterior Descending   Mid LAD lesion is 100% stenosed. Culprit lesion. The lesion is type B2 - medium risk and thrombotic.   Ramus Intermedius   The vessel is large.   Ramus lesion is 70% stenosed.   Left Circumflex   Minimal luminal irregularities   Right Coronary Artery   Mid RCA lesion is 30% stenosed.       Intervention  Mid LAD lesion   Stent   Lesion length: 20 mm. There is no pre-interventional antegrade distal flow (KATHLEEN 0). Pre-stent angioplasty was performed. The post-interventional distal flow is normal (KATHLEEN 3). A 6Fr XB3.5 guide was engaged in the LM. Heparin was used for anticoagulation with ACT > 250 sec. A runthrough wire was advanced across the lesion to the distal LAD. The lesion was pre-dilated with a 2.5mm emerge balloon. A 3.5x24mm synergy LETY was successfully deployed across the lesion. A 4.0x8mm synergy LETY was successfully deployed proximal to, and overlapping, the first stent. The stents were post-dilated with 3.5 and 4mm NC balloons. There were no complications. KATHLEEN 3 flow was present in all segments upon completion of the procedure.   There is a 0% residual stenosis post intervention.     EXTRACORPOREAL MEMBRANE OXYGENATION CANNULATION 2/17/22:  A 17 Fr ECMO cannula was inserted into  the left common femoral artery and a 25 Fr cannula was inserted into the left common femoral vein.  The arterial cannula was positioned in the iliac artery and the venous canula was positioned across the RA.  The ECMO circuit was primed, closely inspected to ensure no bubbles present and ECMO was initiated.  An anterograde perfusion 8 Fr sheath was inserted in the left superficial femoral artery.    CT Chest/Abdomen 2/20/22  IMPRESSION:    1. Retrosternal ill-defined anterior mediastinal fat hemorrhage from  recent resuscitation attempt with focus of suspected active bleeding  from the left internal mammary artery.  2. Left hemothorax. - Blood products from hemorrhage extending into  the left pleural space.   3. Small right pleural effusion with associated atelectasis.  4. No acute findings in the abdomen/pelvis.  5. Support devices as detailed in the body of the report    CT Head 2/20/22  IMPRESSION:  Indeterminant gray-white matter loss in the right occipital lobe could  be artifactual from adjacent streak/photon starvation artifact versus  true infarct.

## 2022-03-07 NOTE — PLAN OF CARE
ICU End of Shift Summary. See flowsheets for vital signs and detailed assessment.    Changes this shift: Patient alert and oriented. Up doing stairs with PT today, assist of 1 to walk. Swallowing pills with applesauce. On room air. Staples removed by vascular, steri strips and ACE wrap placed. CT done for dental workup. External catheter with good UOP. No stool yet, pt refusing bowel meds. Up to commode to try x2. Bedside echo today with EF 45-50%.     Plan: Speech video eval tomorrow. Coordinating PT/OT/RN homecare for discharge.

## 2022-03-07 NOTE — PLAN OF CARE
ICU End of Shift Summary. See flowsheets for vital signs and detailed assessment.    Changes this shift: No significant changes overnight. Pt reports able to sleep fairly well in between cares. VSS.     Plan:  Awaiting available bed on [facility]. Repeat video swallow study tomorrow with SLP. Continue plan of care.         Problem: Plan of Care - These are the overarching goals to be used throughout the patient stay.    Goal: Plan of Care Review/Shift Note  Description: The Plan of Care Review/Shift note should be completed every shift.  The Outcome Evaluation is a brief statement about your assessment that the patient is improving, declining, or no change.  This information will be displayed automatically on your shift note.  Outcome: Ongoing, Progressing

## 2022-03-07 NOTE — CONSULTS
"                                                                        Dental Service Consultation          Chano Johnson MRN# [id number]  YOB: 1950 Age: 71 year old  Date of Admission: 2/17/2022    Reason for consult: I was asked by Dr. Reilly to evaluate this patient for possible dental fracture      Assessment:    Extra oral exam:  No sign of swelling, lymphadenopathy or infection  Patient denies difficulty breathing, swallowing or breathing     Intra oral exam:   Floor of mouth soft nontender  Moderate dental condition, fracture of PFM crown tooth #8 middle third.   Patient explained that the incident happened when they were intubating him  IOM approximately 29 mm  Mallampati III  Two ulcers:  -Right lateral side of the tongue 1x1.5 cm.  -Left lateral side of the tongue 1.2x1.7 cm   Most likely diagnosis: Traumatic ulcer due to intubation.      Plan:  -Patient advised to be on a soft and bland diet and use ice cubes in the mouth to help manage the pain  -Plan will see his dentist in private practice once he is discharged for follow up ( has an appointment at the end of March)   - Use of topical anesthetic to help with the pain ( orajel type)   Medical team informed about the findings ( Called back #[id number])    Chief Complaint: \" My front tooth is fractured\"    History obtained from :[id number]::\"History is obtained from the patient        History of Present Illness:  Chano Johnson is a 71 year old male with a history of atrial fibrillation  s/p ablation, GERD, ARSENIO, and erectile dysfunction who initially presented to [facility] ED on 2/17/22 with chest pain and EKG evidence of anterior STEMI. In the ED, patient went into ventricular fibrillation. CPR was initiated. Total of 8  shocks administered due to refractory VF. Amiodarone 450 mg and epinephrine x4 administered. Intubated. ROSC achieved at  1405. Transferred directly to [facility] cath lab where he was found to have occlusion of the LAD. During " angiogram he was unstable with significant hypotension and ectopy, which prompted placement on VA ECMO. PCI was performed with patient on ECMO with LETY x2 to LAD. Course complicated by hemorrhage shock and left hemothorax requiring chest tube.  Patient is evaluated today for dental fracture following intubation      Past Medical History:  No past medical history on file.    Past Surgical History:  Past Surgical History:   Procedure Laterality Date     ATRIAL ABLATION SURGERY       CV ARTERIAL LINE PLACEMENT N/A 2/17/2022    Procedure: Arterial Line Placement;  Surgeon: Larry Cintron MD;  Location: [facility] CARDIAC CATH LAB     CV CORONARY ANGIOGRAM N/A 2/17/2022    Procedure: Coronary Angiogram;  Surgeon: Larry Cintron MD;  Location: [facility] CARDIAC CATH LAB     CV EXTRACORPERAL MEMBRANE OXYGENATION N/A 2/17/2022    Procedure: Extracorporeal Membrance Oxygenation;  Surgeon: Larry Cintron MD;  Location: [facility] CARDIAC CATH LAB     CV INTRA AORTIC BALLOON N/A 2/17/2022    Procedure: Intra Aortic Balloon Pump Insertion;  Surgeon: Larry Cintron MD;  Location: [facility] CARDIAC CATH LAB     CV PCI ANGIOPLASTY N/A 2/17/2022    Procedure: Percutaneous Transluminal Angioplasty;  Surgeon: Larry Cintron MD;  Location: [facility] CARDIAC CATH LAB     CV THERAPEUTIC HYPOTHERMIA N/A 2/17/2022    Procedure: Therapeutic Hypothermia;  Surgeon: Larry Cintron MD;  Location: [facility] CARDIAC CATH LAB     IR UPPER EXTREMITY ANGIOGRAM LEFT  02/20/2022     MIDLINE DOUBLE LUMEN PLACEMENT Left 02/28/2022    Left brachial vein medial 0.59cm.Blood return on all ports midline okay to use.     REMOVE EXTRACORPORAL MEMBRANE OXYGENATOR ADULT N/A 02/22/2022    Procedure: EXTRACORPOREAL MEMBRANE OXYGENATION CANNULA REMOVAL, INTRAOPERATIVE TRANSESOPHAGEAL ECHOCARDIOGRAM PER ANESTHESIA.;  Surgeon: Rod Banuelos MD;  Location:  OR       Social History:  Social History     Tobacco Use     Smoking status: Never Smoker  "    Smokeless tobacco: Not on file   Substance Use Topics     Alcohol use: Yes     Alcohol/week: 2.0 standard drinks       Family History:  No family history on file.    Immunizations:  Immunization History   Administered Date(s) Administered     COVID-19,PF,Moderna 03/04/2021, 04/03/2021, 11/13/2021       Allergies:  Allergies   Allergen Reactions     Chlorpheniramine-Phenylpropan [A.R.M.] Other (See Comments)     Watery eyes, sneezing     Seroquel [Quetiapine] Other (See Comments)     Per Meeta (Wife), would like this medication to be listed as an allergy due to patient becoming combative and delirious after taking it.      Percocet [Oxycodone-Acetaminophen] Rash     \"felt like skin was crawling off\"-pt's S.O.        Medications:  Current Facility-Administered Medications Ordered in Epic   Medication Dose Route Frequency Last Rate Last Admin     amiodarone (PACERONE) tablet 200 mg  200 mg Oral Daily   200 mg at 03/07/22 0835     artificial saliva (BIOTENE MT) solution 2 spray  2 spray Swish & Spit 4x Daily PRN   2 spray at 02/28/22 2020     artificial tears ophthalmic ointment   Both Eyes Q8H PRN         aspirin (ASA) chewable tablet 81 mg  81 mg Per Feeding Tube Daily   81 mg at 03/07/22 0835     calcium chloride in  mL intermittent infusion 1 g  1 g Intravenous Q6H PRN   1 g at 02/27/22 0629     dextrose 10% infusion   Intravenous Continuous PRN         dextrose 10% infusion   Intravenous Continuous PRN         haloperidol lactate (HALDOL) injection 5 mg  5 mg Intravenous Q6H PRN         heparin infusion 25,000 units in D5W 250 mL ANTICOAGULANT  500 Units/hr Intravenous Continuous 5 mL/hr at 03/07/22 1600 500 Units/hr at 03/07/22 1600     heparin lock flush 10 UNIT/ML injection 5-20 mL  5-20 mL Intracatheter Q24H   5 mL at 03/02/22 1554     heparin lock flush 10 UNIT/ML injection 5-20 mL  5-20 mL Intracatheter Q1H PRN         hydrALAZINE (APRESOLINE) tablet 75 mg  75 mg Oral or Feeding Tube Q8H   75 mg at " 03/07/22 1353     HYDROmorphone (DILAUDID) injection 0.2 mg  0.2 mg Intravenous Q4H PRN   0.2 mg at 03/03/22 0637     isosorbide dinitrate (ISORDIL) tablet 15 mg  15 mg Oral or Feeding Tube Q8H   15 mg at 03/07/22 1521     lidocaine (LMX4) cream   Topical Q1H PRN         lidocaine (LMX4) cream   Topical Q1H PRN         lidocaine 1 % 0.1-1 mL  0.1-1 mL Other Q1H PRN         lidocaine 1 % 0.1-1 mL  0.1-1 mL Other Q1H PRN         loperamide (IMODIUM) liquid 2 mg  2 mg Oral 4x Daily PRN         magnesium sulfate 2 g in water intermittent infusion  2 g Intravenous Daily PRN   2 g at 02/18/22 1645     magnesium sulfate 4 g in 100 mL sterile water (premade)  4 g Intravenous Q4H PRN         melatonin tablet 5 mg  5 mg Oral or Feeding Tube QPM   5 mg at 03/06/22 2053     metoprolol tartrate (LOPRESSOR) tablet 75 mg  75 mg Oral BID   75 mg at 03/07/22 0835     multivitamins w/minerals liquid 15 mL  15 mL Per Feeding Tube Daily   15 mL at 03/07/22 0835     naloxone (NARCAN) injection 0.2 mg  0.2 mg Intravenous Q2 Min PRN        Or     naloxone (NARCAN) injection 0.4 mg  0.4 mg Intravenous Q2 Min PRN        Or     naloxone (NARCAN) injection 0.2 mg  0.2 mg Intramuscular Q2 Min PRN        Or     naloxone (NARCAN) injection 0.4 mg  0.4 mg Intramuscular Q2 Min PRN         polyethylene glycol (MIRALAX) Packet 17 g  17 g Oral or Feeding Tube BID PRN         potassium chloride 20 mEq in 50 mL intermittent infusion  20 mEq Intravenous Q1H PRN         protein modular (PROSOURCE TF) 2 packet  2 packet Per Feeding Tube BID   2 packet at 03/06/22 2054    And     protein modular (PROSOURCE TF) 1 packet  1 packet Per Feeding Tube Daily   1 packet at 03/07/22 0836     rosuvastatin (CRESTOR) tablet 20 mg  20 mg Oral QPM   20 mg at 03/06/22 2054     senna-docusate (SENOKOT-S/PERICOLACE) 8.6-50 MG per tablet 1 tablet  1 tablet Oral or Feeding Tube BID PRN         sodium chloride (PF) 0.9% PF flush 10 mL  10 mL Intracatheter Q8H   10 mL at  03/07/22 1255     sodium chloride (PF) 0.9% PF flush 10-20 mL  10-20 mL Intracatheter q1 min prn         sodium chloride (PF) 0.9% PF flush 3 mL  3 mL Intracatheter q1 min prn         sodium phosphate 10 mmol in D5W 250 mL intermittent infusion  10 mmol Intravenous Daily PRN         sodium phosphate 15 mmol in D5W intermittent infusion  15 mmol Intravenous Daily PRN         sodium phosphate 20 mmol in D5W 250 mL intermittent infusion  20 mmol Intravenous Q6H PRN         sodium phosphate 25 mmol in D5W 250 mL intermittent infusion  25 mmol Intravenous Q8H PRN         ticagrelor (BRILINTA) tablet 90 mg  90 mg Oral or Feeding Tube BID   90 mg at 03/07/22 0835     No current Epic-ordered outpatient medications on file.       Review of Systems:        The 10 point Review of Systems is negative other than noted in the HPI    Physical Exam:  Vitals were reviewed  Temp: 98  F (36.7  C) Temp src: Axillary BP: 103/62 Pulse: 67   Resp: 18 SpO2: 98 % O2 Device: BiPAP/CPAP          Head and neck exam:  Extra oral exam:  No sign of swelling, lymphadenopathy or infection  Patient denies difficulty breathing, swallowing or breathing     Intra oral exam:   Floor of mouth soft nontender  Moderate dental condition, fracture of PFM crown tooth #8 middle third.   Patient explained that the incident happened when they were intubating him  IOM approximately 29 mm  Mallampati III  Two ulcers:  -Right lateral side of the tongue 1x1.5 cm.  -Left lateral side of the tongue 1.2x1.7 cm   Most likely diagnosis: Traumatic ulcer due to intubation.      Data:  Radiographic interpretation: Dental CT taken on 3/7/2022  Osseous pathology: No evidence of fracture  Pulpal Pathology: Not evaluated  Periodontal Pathology: Not evaluated  Caries: Not evaluated  Odontogenic pathology:Not evaluated        The patient was discussed with: Dr. Francesco Nunes DMD  PGY1  Pager: [phone]

## 2022-03-08 ENCOUNTER — APPOINTMENT (OUTPATIENT)
Dept: PHYSICAL THERAPY | Facility: CLINIC | Age: 72
DRG: 003 | End: 2022-03-08
Payer: MEDICARE

## 2022-03-08 ENCOUNTER — APPOINTMENT (OUTPATIENT)
Dept: SPEECH THERAPY | Facility: CLINIC | Age: 72
DRG: 003 | End: 2022-03-08
Payer: MEDICARE

## 2022-03-08 ENCOUNTER — APPOINTMENT (OUTPATIENT)
Dept: GENERAL RADIOLOGY | Facility: CLINIC | Age: 72
DRG: 003 | End: 2022-03-08
Attending: STUDENT IN AN ORGANIZED HEALTH CARE EDUCATION/TRAINING PROGRAM
Payer: MEDICARE

## 2022-03-08 LAB
7AMINOCLONAZEPAM SERPL-MCNC: NEGATIVE NG/ML
ALBUMIN SERPL-MCNC: 2.9 G/DL (ref 3.4–5)
ALP SERPL-CCNC: 85 U/L (ref 40–150)
ALPRAZ SERPL-MCNC: NEGATIVE NG/ML
ALT SERPL W P-5'-P-CCNC: 111 U/L (ref 0–70)
AMPHET BLD CFM-MCNC: NEGATIVE NG/ML
ANION GAP SERPL CALCULATED.3IONS-SCNC: 8 MMOL/L (ref 3–14)
APAP BLD-MCNC: NEGATIVE UG/ML
AST SERPL W P-5'-P-CCNC: 52 U/L (ref 0–45)
BARBITURATES SPEC-MCNC: NEGATIVE UG/ML
BASOPHILS # BLD AUTO: 0 10E3/UL (ref 0–0.2)
BASOPHILS NFR BLD AUTO: 0 %
BENZODIAZ SPEC QL: POSITIVE
BENZODIAZ SPEC-MCNC: ABNORMAL NG/ML
BILIRUB SERPL-MCNC: 0.9 MG/DL (ref 0.2–1.3)
BUN SERPL-MCNC: 79 MG/DL (ref 7–30)
BUPRENORPHINE SERPL-MCNC: NEGATIVE NG/ML
BZE BLD CFM-MCNC: NEGATIVE NG/ML
CALCIUM SERPL-MCNC: 9.1 MG/DL (ref 8.5–10.1)
CARBOXYTHC BLD-MCNC: NEGATIVE NG/ML
CARISOPRODOL IA: NEGATIVE UG/ML
CHLORDIAZEP SERPL-MCNC: NEGATIVE NG/ML
CHLORIDE BLD-SCNC: 111 MMOL/L (ref 94–109)
CLONAZEPAM SERPL-MCNC: NEGATIVE NG/ML
CO2 SERPL-SCNC: 24 MMOL/L (ref 20–32)
CREAT SERPL-MCNC: 2.1 MG/DL (ref 0.66–1.25)
DECLARED MEDICATIONS: ABNORMAL
DESALKYLFLURAZ SERPL CFM-MCNC: NEGATIVE NG/ML
DIAZEPAM SERPL-MCNC: NEGATIVE NG/ML
DRUGS FLD: ABNORMAL
EOSINOPHIL # BLD AUTO: 0.3 10E3/UL (ref 0–0.7)
EOSINOPHIL NFR BLD AUTO: 3 %
ERYTHROCYTE [DISTWIDTH] IN BLOOD BY AUTOMATED COUNT: 18 % (ref 10–15)
ETHANOL BLD-MCNC: NEGATIVE GM/DL
FENTANYL BLD CFM-MCNC: 1.1 NG/ML
FENTANYL IA: ABNORMAL NG/ML
FENTANYL SPEC QL: POSITIVE
FLURAZEPAM SPEC-MCNC: NEGATIVE NG/ML
GABAPENTIN IA: NEGATIVE UG/ML
GFR SERPL CREATININE-BSD FRML MDRD: 33 ML/MIN/1.73M2
GLUCOSE BLD-MCNC: 134 MG/DL (ref 70–99)
HCT VFR BLD AUTO: 32.3 % (ref 40–53)
HGB BLD-MCNC: 9.6 G/DL (ref 13.3–17.7)
IMM GRANULOCYTES # BLD: 0.3 10E3/UL
IMM GRANULOCYTES NFR BLD: 3 %
LORAZEPAM SERPL-MCNC: NEGATIVE NG/ML
LYMPHOCYTES # BLD AUTO: 1.1 10E3/UL (ref 0.8–5.3)
LYMPHOCYTES NFR BLD AUTO: 11 %
MAGNESIUM SERPL-MCNC: 2.8 MG/DL (ref 1.6–2.3)
MCH RBC QN AUTO: 30.3 PG (ref 26.5–33)
MCHC RBC AUTO-ENTMCNC: 29.7 G/DL (ref 31.5–36.5)
MCV RBC AUTO: 102 FL (ref 78–100)
MEPERIDINE SERPLBLD-MCNC: NEGATIVE NG/ML
METHADONE SAL CFM-MCNC: NEGATIVE NG/ML
MIDAZOLAM SERPL-MCNC: 70.8 NG/ML
MONOCYTES # BLD AUTO: 0.8 10E3/UL (ref 0–1.3)
MONOCYTES NFR BLD AUTO: 8 %
NEUTROPHILS # BLD AUTO: 7.5 10E3/UL (ref 1.6–8.3)
NEUTROPHILS NFR BLD AUTO: 75 %
NORCHLORDIAZEP SERPL-MCNC: NEGATIVE UG/ML
NORDIAZEPAM SPEC-MCNC: NEGATIVE NG/ML
NORFENTANYL BLD CFM-MCNC: 0.1 NG/ML
NRBC # BLD AUTO: 0 10E3/UL
NRBC BLD AUTO-RTO: 0 /100
OPIATES SPEC-MCNC: NEGATIVE NG/ML
OXAZEPAM SERPL CFM-MCNC: NEGATIVE NG/ML
OXYCODONE SERPLBLD SCN-MCNC: NEGATIVE NG/ML
PCP SPEC-MCNC: NEGATIVE NG/ML
PHOSPHATE SERPL-MCNC: 4 MG/DL (ref 2.5–4.5)
PLATELET # BLD AUTO: 338 10E3/UL (ref 150–450)
POTASSIUM BLD-SCNC: 4.7 MMOL/L (ref 3.4–5.3)
PROPOXYPH SPEC-MCNC: NEGATIVE NG/ML
PROT SERPL-MCNC: 7.2 G/DL (ref 6.8–8.8)
RBC # BLD AUTO: 3.17 10E6/UL (ref 4.4–5.9)
SODIUM SERPL-SCNC: 143 MMOL/L (ref 133–144)
TEMAZEPAM SERPL-MCNC: NEGATIVE NG/ML
TRAMADOL BLD-MCNC: NEGATIVE NG/ML
TRIAZOLAM SPEC-MCNC: NEGATIVE NG/ML
WBC # BLD AUTO: 10 10E3/UL (ref 4–11)

## 2022-03-08 PROCEDURE — 97116 GAIT TRAINING THERAPY: CPT | Mod: GP

## 2022-03-08 PROCEDURE — 80053 COMPREHEN METABOLIC PANEL: CPT | Performed by: INTERNAL MEDICINE

## 2022-03-08 PROCEDURE — 250N000013 HC RX MED GY IP 250 OP 250 PS 637: Performed by: PHYSICIAN ASSISTANT

## 2022-03-08 PROCEDURE — 250N000011 HC RX IP 250 OP 636

## 2022-03-08 PROCEDURE — 36415 COLL VENOUS BLD VENIPUNCTURE: CPT | Performed by: INTERNAL MEDICINE

## 2022-03-08 PROCEDURE — 92611 MOTION FLUOROSCOPY/SWALLOW: CPT | Mod: GN

## 2022-03-08 PROCEDURE — 120N000002 HC R&B MED SURG/OB UMMC

## 2022-03-08 PROCEDURE — 92526 ORAL FUNCTION THERAPY: CPT | Mod: GN

## 2022-03-08 PROCEDURE — 83735 ASSAY OF MAGNESIUM: CPT | Performed by: INTERNAL MEDICINE

## 2022-03-08 PROCEDURE — 250N000013 HC RX MED GY IP 250 OP 250 PS 637

## 2022-03-08 PROCEDURE — 250N000013 HC RX MED GY IP 250 OP 250 PS 637: Performed by: STUDENT IN AN ORGANIZED HEALTH CARE EDUCATION/TRAINING PROGRAM

## 2022-03-08 PROCEDURE — 74230 X-RAY XM SWLNG FUNCJ C+: CPT

## 2022-03-08 PROCEDURE — 82040 ASSAY OF SERUM ALBUMIN: CPT | Performed by: INTERNAL MEDICINE

## 2022-03-08 PROCEDURE — 99233 SBSQ HOSP IP/OBS HIGH 50: CPT | Mod: GC | Performed by: INTERNAL MEDICINE

## 2022-03-08 PROCEDURE — 250N000013 HC RX MED GY IP 250 OP 250 PS 637: Performed by: INTERNAL MEDICINE

## 2022-03-08 PROCEDURE — 74230 X-RAY XM SWLNG FUNCJ C+: CPT | Mod: 26 | Performed by: RADIOLOGY

## 2022-03-08 PROCEDURE — 97530 THERAPEUTIC ACTIVITIES: CPT | Mod: GP

## 2022-03-08 PROCEDURE — 85025 COMPLETE CBC W/AUTO DIFF WBC: CPT | Performed by: INTERNAL MEDICINE

## 2022-03-08 PROCEDURE — 84100 ASSAY OF PHOSPHORUS: CPT | Performed by: INTERNAL MEDICINE

## 2022-03-08 RX ORDER — BARIUM SULFATE 400 MG/ML
SUSPENSION ORAL ONCE
Status: DISCONTINUED | OUTPATIENT
Start: 2022-03-08 | End: 2022-03-09 | Stop reason: CLARIF

## 2022-03-08 RX ORDER — BARIUM SULFATE 400 MG/ML
SUSPENSION ORAL ONCE
Status: COMPLETED | OUTPATIENT
Start: 2022-03-08 | End: 2022-03-08

## 2022-03-08 RX ADMIN — METOPROLOL TARTRATE 75 MG: 50 TABLET, FILM COATED ORAL at 08:15

## 2022-03-08 RX ADMIN — ISOSORBIDE DINITRATE 15 MG: 10 TABLET ORAL at 00:39

## 2022-03-08 RX ADMIN — ASPIRIN 81 MG CHEWABLE TABLET 81 MG: 81 TABLET CHEWABLE at 08:15

## 2022-03-08 RX ADMIN — BARIUM SULFATE 20 ML: 400 SUSPENSION ORAL at 11:35

## 2022-03-08 RX ADMIN — ISOSORBIDE DINITRATE 15 MG: 10 TABLET ORAL at 22:56

## 2022-03-08 RX ADMIN — Medication 15 ML: at 08:14

## 2022-03-08 RX ADMIN — HYDRALAZINE HYDROCHLORIDE 75 MG: 25 TABLET ORAL at 14:50

## 2022-03-08 RX ADMIN — AMIODARONE HYDROCHLORIDE 200 MG: 200 TABLET ORAL at 08:15

## 2022-03-08 RX ADMIN — Medication 2 PACKET: at 20:18

## 2022-03-08 RX ADMIN — Medication 1 PACKET: at 08:15

## 2022-03-08 RX ADMIN — ISOSORBIDE DINITRATE 15 MG: 10 TABLET ORAL at 16:01

## 2022-03-08 RX ADMIN — TICAGRELOR 90 MG: 90 TABLET ORAL at 08:14

## 2022-03-08 RX ADMIN — HEPARIN SODIUM AND DEXTROSE 500 UNITS/HR: 10000; 5 INJECTION INTRAVENOUS at 20:38

## 2022-03-08 RX ADMIN — ISOSORBIDE DINITRATE 15 MG: 10 TABLET ORAL at 08:14

## 2022-03-08 RX ADMIN — HYDRALAZINE HYDROCHLORIDE 75 MG: 25 TABLET ORAL at 21:38

## 2022-03-08 RX ADMIN — ROSUVASTATIN CALCIUM 20 MG: 20 TABLET, FILM COATED ORAL at 20:17

## 2022-03-08 RX ADMIN — Medication 5 MG: at 20:17

## 2022-03-08 RX ADMIN — DOCUSATE SODIUM 50 MG AND SENNOSIDES 8.6 MG 1 TABLET: 8.6; 5 TABLET, FILM COATED ORAL at 12:24

## 2022-03-08 RX ADMIN — METOPROLOL TARTRATE 75 MG: 50 TABLET, FILM COATED ORAL at 20:19

## 2022-03-08 RX ADMIN — TICAGRELOR 90 MG: 90 TABLET ORAL at 20:17

## 2022-03-08 ASSESSMENT — ACTIVITIES OF DAILY LIVING (ADL)
ADLS_ACUITY_SCORE: 15
ADLS_ACUITY_SCORE: 17
ADLS_ACUITY_SCORE: 17
ADLS_ACUITY_SCORE: 15
ADLS_ACUITY_SCORE: 17
ADLS_ACUITY_SCORE: 15
ADLS_ACUITY_SCORE: 17
ADLS_ACUITY_SCORE: 15
ADLS_ACUITY_SCORE: 17
ADLS_ACUITY_SCORE: 15
ADLS_ACUITY_SCORE: 15

## 2022-03-08 NOTE — PLAN OF CARE
ICU End of Shift Summary. See flowsheets for vital signs and detailed assessment.    Changes this shift: HR and BP stable except when pt got up to commode and resulted in hypotension. Afebrile. RA and home CPAP overnight. Patient slept okay overnight. Was emotional in AM thinking about parents who had passed. Attempted to have a BM- patient requested to have some bowel beds but nothing to aggressive.     Plan: Continue to monitor and assess- notify MD of any changes. Plans for speech today- patient is hoping to pass and be able to eat better      Goal Outcome Evaluation:    Problem: Plan of Care - These are the overarching goals to be used throughout the patient stay.    Goal: Absence of Hospital-Acquired Illness or Injury  Outcome: Ongoing, Progressing  Intervention: Identify and Manage Fall Risk  Recent Flowsheet Documentation  Taken 3/8/2022 0400 by Rosa Ramos RN  Safety Promotion/Fall Prevention:   activity supervised   lighting adjusted   fall prevention program maintained  Taken 3/8/2022 0000 by Rosa Ramos RN  Safety Promotion/Fall Prevention:   activity supervised   lighting adjusted   fall prevention program maintained  Intervention: Prevent Skin Injury  Recent Flowsheet Documentation  Taken 3/8/2022 0400 by Rosa Ramos RN  Body Position: position changed independently  Skin Protection:   adhesive use limited   incontinence pads utilized   silicone foam dressing in place   transparent dressing maintained   tubing/devices free from skin contact  Taken 3/8/2022 0000 by Rosa Ramos RN  Body Position: position changed independently  Skin Protection:   adhesive use limited   incontinence pads utilized   silicone foam dressing in place   transparent dressing maintained   tubing/devices free from skin contact  Taken 3/7/2022 2000 by Rosa Ramos, RN  Skin Protection:   adhesive use limited   incontinence pads utilized   silicone foam dressing in place   transparent dressing maintained    tubing/devices free from skin contact  Intervention: Prevent and Manage VTE (Venous Thromboembolism) Risk  Recent Flowsheet Documentation  Taken 3/8/2022 0400 by Rosa Ramos RN  Range of Motion: active ROM (range of motion) encouraged  Activity Management: activity adjusted per tolerance  Taken 3/8/2022 0000 by Rosa Ramos RN  Range of Motion: active ROM (range of motion) encouraged  Activity Management: activity adjusted per tolerance  Taken 3/7/2022 2000 by Rosa Ramos RN  Activity Management: activity adjusted per tolerance  Intervention: Prevent Infection  Recent Flowsheet Documentation  Taken 3/8/2022 0400 by Rosa Ramos RN  Infection Prevention:   single patient room provided   visitors restricted/screened   rest/sleep promoted   personal protective equipment utilized   hand hygiene promoted   equipment surfaces disinfected   environmental surveillance performed  Taken 3/8/2022 0000 by Rosa Ramos RN  Infection Prevention:   single patient room provided   visitors restricted/screened   rest/sleep promoted   personal protective equipment utilized   hand hygiene promoted   equipment surfaces disinfected   environmental surveillance performed  Goal: Optimal Comfort and Wellbeing  Outcome: Ongoing, Progressing  Intervention: Provide Person-Centered Care  Recent Flowsheet Documentation  Taken 3/8/2022 0400 by Rosa Ramos RN  Trust Relationship/Rapport:   care explained   thoughts/feelings acknowledged   reassurance provided   questions encouraged  Taken 3/8/2022 0000 by Rosa Ramos RN  Trust Relationship/Rapport:   care explained   thoughts/feelings acknowledged   reassurance provided   questions encouraged  Taken 3/7/2022 2000 by Rosa Ramos RN  Trust Relationship/Rapport:   care explained   thoughts/feelings acknowledged   reassurance provided   questions encouraged  Goal: Readiness for Transition of Care  Outcome: Ongoing, Progressing

## 2022-03-08 NOTE — PROGRESS NOTES
03/08/22 1102   General Information   Onset of Illness/Injury or Date of Surgery 02/25/22   Referring Physician Malik Henriquez MD   Patient/Family Therapy Goal Statement (SLP) To eat and drink   Pertinent History of Current Problem Chano Johnson is a 71 year old male with a history of atrial fibrillation  s/p ablation, GERD, and ARSENIO who initially presented to [facility] ED on 2/17/22 with chest pain and EKG evidence of anterior STEMI. In the ED, patient went into ventricular fibrillation. CPR was initiated. Total of 8  shocks administered due to refractory VF. Amiodarone 450 mg and epinephrine x4 administered. Intubated. ROSC achieved at  1405. Transferred directly to [facility] cath lab where he was found to have occlusion of the LAD. During angiogram he was unstable with significant hypotension and ectopy, which prompted placement on VA ECMO. PCI was performed with patient on ECMO with LETY x2 to LAD. Video swallow exam completed per md order to reassess pt's oropharyngeal swallowing mechanism. Pt has been followed by SLP from admission, presents with moderate oropharyngeal dysphagia in setting of generalized weakness due to complicatations by hemorrhage shock and left hemothorax requiring chest tube placement, SAHIL, delirium, prolonged intubtion, and mouth traum during arrest.    Past History of Dysphagia Pt has been followed this admission for moderate oropharyngeal dysphagia in setting of prolonged intubation, generalized weakness, and delirium. Pt noted to silently aspirate on thin liquids and solid texture seen on FEES from 3/2/22.   Type of Evaluation   Type of Evaluation Swallow Evaluation   General Swallowing Observations   Current Diet/Method of Nutritional Intake (General Swallowing Observations, NIS) full liquid diet;mildly thick liquids (level 2)   Respiratory Support (General Swallowing Observations) none   Swallowing Evaluation Videofluoroscopic swallow study (VFSS)   VFSS Evaluation    Radiologist [facility] Resident   Views Taken left lateral   Physical Location of Procedure Radiology Suite #5   VFSS Textures Trialed thin liquids;mildly thick liquids;pureed;soft & bite-sized   VFSS Eval: Thin Liquid Texture Trial   Mode of Presentation, Thin Liquid cup;straw;self-fed   Order of Presentation 1, 2, 3, 8, 10   Preparatory Phase WFL   Oral Phase, Thin Liquid WFL   Pharyngeal Phase, Thin Liquid WFL;Residue in valleculae;Residue in pyriform sinus  (mild residue noted)   Rosenbek's Penetration Aspiration Scale: Thin Liquid Trial Results 1 - no aspiration, contrast does not enter airway   Diagnostic Statement no aspiration, note pt cleared throat, coughed throughout all trials in absence of aspiration   VFSS Eval: Mildly Thick Liquids   Mode of Presentation cup   Order of Presentation 4, 5   Preparatory Phase WFL   Oral Phase WFL   Pharyngeal Phase WFL;Residue in valleculae  (mild residue)   Rosenbek's Penetration Aspiration Scale 1 - no aspiration, contrast does not enter airway   Diagnostic Statement no aspiration or penetration. No significant pharyngeal residue. Of note, pt consistently coughed throughout trials in absence of aspiration.   VFSS Evaluation: Puree Solid Texture Trial   Mode of Presentation, Puree spoon;self-fed   Order of Presentation 6, 7   Preparatory Phase WFL   Oral Phase, Puree WFL   Pharyngeal Phase, Puree WFL;Residue in valleculae;Residue in pyriform sinus  (mild amounts of residue noted)   Rosenbek's Penetration Aspiration Scale: Puree Food Trial Results 1 - no aspiration, contrast does not enter airway   Diagnostic Statement no aspiration or penetration. Trace pharyngeal residue. Of note, pt consistently coughed throughout trials in absence of aspiration.   VFSS Eval: Soft & Bite Sized   Mode of Presentation spoon;self-fed   Order of presentation 9   Preparatory Phase WFL   Oral Phase WFL;Impaired mastication  (pt with tongue trauma, prolonged however mastication WFL)   Pharyngeal  Phase WFL   Rosenbek's Penetration Aspiration Scale 1 - no aspiration, contrast does not enter airway   Diagnostic Statement no aspiration or penetration. Trace pharyngeal residue. Of note, pt consistently coughed throughout trials in absence of aspiration.   Esophageal Phase of Swallow   Patient reports or presents with symptoms of esophageal dysphagia No   Swallowing Recommendations   Diet Consistency Recommendations thin liquids (level 0);minced & moist (level 5)   Supervision Level for Intake patient independent   Mode of Delivery Recommendations bolus size, small;food moistened;slow rate of intake   Swallowing Maneuver Recommendations alternate food and liquid intake   Monitoring/Assistance Required (Eating/Swallowing) stop eating activities when fatigue is present;optimize oral intake to minimize need for tube feeding   Recommended Feeding/Eating Techniques (Swallow Eval) maintain upright sitting position for eating;maintain upright posture during/after eating for 30 minutes;provide 6 smaller meals throughout day;set-up and prepare tray   Medication Administration Recommendations, Swallowing (SLP) as tolerated   Instrumental Assessment Recommendations instrumental evaluation not recommended at this time   General Therapy Interventions   Planned Therapy Interventions Dysphagia Treatment   Dysphagia treatment Modified diet education;Instruction of safe swallow strategies   Clinical Impression   Criteria for Skilled Therapeutic Interventions Met (SLP Eval) Yes, treatment indicated   SLP Diagnosis mild oropharyngeal dysphagia   Risks & Benefits of therapy have been explained evaluation/treatment results reviewed;care plan/treatment goals reviewed;participants voiced agreement with care plan;patient;spouse/significant other   Clinical Impression Comments Video swallow study completed per MD order to objectively reassess oropharyngeal swallowing mechanism. Under fluoroscopy pt presents with mild oropharyngeal  dysphagia in setting of generalized weakness and prolonged intubation. Assessed pt with thin liquids, mildly thick liquids, puree textures, and soft solid textues. Pt with prolonged time for mastication due to bruised and swollen tongue. Pt reported pain and difficulty with mastication of solid textures. Pharyngeal phase characterized by slightly reduced pharyngeal contraction resulting in trace amounts of residue on valleculae and pyriforms (seen on trials 7 & 10). BOT retraction, laryngeal elevation, and epiglottic inversion WFL. No aspiration appreciated during the study. Important to note that pt coughed and cleared his throat consistently throughout trials in absence of aspiration or penetration. Significat improvements with pharyngeal swallow function and airway protection when compared to previous FEES completed on 3/2. Recommend pt initiate minced and moist diet (IDDSI 5) and thin liquids (IDDSI 0). Ensure pt is upright and alert for all PO. Per dental note, pt will require ongoing soft, modified diet. Pt will benefit from follow up with speech at discharge.   SLP Discharge Planning   SLP Discharge Recommendation home   SLP Rationale for DC Rec Suspect pt will meet goals prior to discharge, diet advancement defer to dental   SLP Brief overview of current status  Recommend pt advance to minced and moist diet (IDDSI 5) and thin liquids (IDDSI 0). Ensure pt is upright and alert for all PO, is eating smaller and more frequent meals to optimize PO intake, and oral rinse throughout meal.     Total Evaluation Time   Total Evaluation Time (Minutes) 35   SLP Goals   Therapy Frequency (SLP Eval) 3 times/wk   SLP Predicated Duration/Target Date for Goal Attainment 03/23/22   SLP Goals Swallow   SLP: Safely tolerate diet without signs/symptoms of aspiration Soft & bite sized diet;Thin liquids

## 2022-03-08 NOTE — PROGRESS NOTES
Interventional Cardiology Progress Note      Assessment & Plan:  Chano Johnson is a 71 year old male with a history of atrial fibrillation  s/p ablation, GERD, ARSENIO, and erectile dysfunction who initially presented to [facility] ED on 2/17/22 with chest pain and EKG evidence of anterior STEMI. In the ED, patient went into ventricular fibrillation. CPR was initiated. Total of 8  shocks administered due to refractory VF. Amiodarone 450 mg and epinephrine x4 administered. Intubated. ROSC achieved at  1405. Transferred directly to [facility] cath lab where he was found to have occlusion of the LAD. During angiogram he was unstable with significant hypotension and ectopy, which prompted placement on VA ECMO. PCI was performed with patient on ECMO with LETY x2 to LAD. Course complicated by hemorrhage shock and left hemothorax requiring chest tube placement, SAHIL, and delirium.     Changes today:  - Video swallow today with no evidence of penetration or aspiration   - Diet advanced to moist food with thin liquids  - Obtained approval for home PT/OT  - Start calorie counts in the setting of advanced diet to ensure good tolerance given known tongue laceration  - Remove ECMO access site sutures  - Discharge planning    Important events:  VA ECMO canulation 2/17/22  LETY x2 to LAD 2/17/22  Rewarmed on 2/19/22  CRRT started 2/19/22  Left sided chest tube placed on 2/20/22 for hemothorax  Emobolization of left internal mammary artery on 2/20/22  Decannulation 2/22/22  IABP removed 2/23/22  Extubated 2/24/22  Chest tube removed 3/1/22  Transferred to floor ordered placed 3/2/22    Neurology:  # Possible anoxic brain injury post cardiac arrest   Head CT on presentation w/o acute pathology. Day 3 CT head showed indeterminate gray-white matter loss in the right occipital lobe which could be artifact vs true infarct. Strong suspicion for artifact per neurocrit. EEG w/o significant abnormalities. Alert and oriented x4 during the day. Able  to recall long-term events. Some difficulty with short term memory.     # Delirium; resolved  # Agitation ; resolved  - maintain appropriate awake/sleep cycle   - Continue delirium precautions  - Continue to hold Seroquel  - Melatonin 5 mg at bedtime   - haldol 5 mg q 6hr PRN  - sitter at bedtime     Cardiovascular   # VF cardiac arrest   # Cardiogenic/hemorrhagic shock, resovled  # Anterior STEMI s/p LETY x2  # Acute on chronic systolic heart failure (EF 25/30%)  # Ischemic cardiomyopathy   Coronary angiogram with culprit lesion to be mLADs/p LETY x2. Has significant residual disease in the a large ramus with 70% stenosis. Peripheral V-A ECMO inserted for refractory cardiogenic shock on 2/17/22: 17 Fr arterial and 25 Fr venous cannula, IABP and distal reperfusion cannula placed. Decannulated on 2/22/22. IABP removed 2/23/22. Echo  2/18/22 10-15% on ECMO. Echo with LVEF of 25-30% with apical akinesis on 2/28/22.   - DAPT: aspirin + ticagrelor   - statin: increase rosuvastatin to 20 mg daily now that CrCL >30  - Guideline directed HF medications  - BB: increase metoprolol tartrate 75 mg BID (will need to transition to Toprol XL once patient able to take po pills)  - ACEi/ARB/ARNI: defer due to SAHIL, will hopefully start before discharge  - Afterload reduction: hydralazine 75 mg  TID/ isordil 15 mg TID  - AA: defer due to SAHIL, will hopefully start before discharge  - SGLT2-I: defer due to SAHIL  - Diuretics: appears euvolemic. Hold on diuretics today as patient is diuresing well    # Paroxysmal atrial fibrillation  # s/p Watchman implantation   Hx of paroxysmal atrial fibrillation s/p ablations and Watchman device. Afib with RVR requiring IV amiodarone in the ICU. Transitioned to po amiodarone on 3/2/22. Currently in sinus rhythm.   - po amiodarone 200 mg daily   - [id number] score 3, no anticoagulation necessary with Watchman device in place  - rate control with metoprolol tartrate 75 mg BID (will transition to  long-acting as above for HF)    Pulmonary   # Acute hypoxic respiratory failure, resolved   # Pleural effusion, improved  # Left hemothorax, resolved  # ARSENIO  Intubated due to cardiac arrest on 2/17/22. Extubated on 2/24/22. On room air. CXR clear.   - CPAP/BiPAP at night     GI/Nutrition  # Severe malnutrition in the context of acute illness  # Dysphagia   # loose stools 2/2 TF  TF removed by patient on 3/3/22. SLP to evaluate patient. Likely will require placement of new post-pyloric feeding tube this afternoon.   - SLP following   - RD following  - diet: full liquid diet, mildly thickened liquids  - continuous tube feeds held this morning   - PRN imodium for loose stools  - Continue FWF to 50 ml per hour    # Transaminitis, in setting of shock, cardiac arrest   ,  on admission. ALT normalized on 2/20 but has gila intermittently rising.     Renal, Electrolytes   # SAHIL, likely ATN in setting of cardiac arrest, resolving  # Hypernatremia, resolved   # Hypermagnesemia, Mg 2.7  # Hyperchloremia, Cl 111  # Uremia,   Cr on admission 1.41. Cr peaked at 3.86 on 2/21. Now improving. No need for CRRT. Auto-diuresing. Creatinine improving.  - strict I&O  - Continue FWF to 50 ml/hr    Infectious Disease   Sputum cultures 2/20/22-- Staph lugdunesis. Prophylactic treatment with  cefazolin 2 g BID x5 days, 2/24-2/28. WBC 9.6. Afebrile.     Hematology   # Anemia due to acute blood loss, stable  # Left hemothorax  Required frequent blood transfusions of 3-4 units/day to maintain hgb > 7 on admission. Extravasation from left internal mammary artery noted on CT. Left sided chest tube placed at bedside by thoracic surgery on 2/20/22 with 1300 ml old blood return. IR performed emobolization of left internal mammary artery on 2/20/22.   - daily CBC      Musculoskeletal/Integument   # Left groin wound 2/2 ECMO decannulation   - cleanse wound with Microklenz BID   - Sutures to be removed on 3/8/22.     # Pressure wound  of buttock   # Pressure wound of left tongue 2/2 ET   WOC following     # Generalized weakness  # Deconditioning   PT/OT recommending TCU placement, family prefer home with home PT and OT, and this was approved on 3/7/22    Diet: minced and moist diet with full thin liquid, will advance as tolerated  Activity: as tolerated with assistance  Code Status: DNR/DNI  Disposition: not medically stable, will need TCU placement     Patient discussed with staff cardiologist, Dr. Cintron.    Keren Reilly MD, PhD  Cardiology Fellow  Pager: [phone]      Interval History:  No significant overnight events.      Most recent vital signs:  /61   Pulse 78   Temp 97.8  F (36.6  C) (Axillary)   Resp 14   Ht 1.829 m (6')   Wt 88 kg (194 lb 0.1 oz)   SpO2 98%   BMI 26.31 kg/m    Temp:  [97.1  F (36.2  C)-98.6  F (37  C)] 97.8  F (36.6  C)  Pulse:  [63-78] 78  Resp:  [12-16] 14  BP: ()/(48-72) 113/61  FiO2 (%):  [21 %] 21 %  SpO2:  [97 %-100 %] 98 %  Wt Readings from Last 2 Encounters:   03/07/22 88 kg (194 lb 0.1 oz)   02/17/22 99.8 kg (220 lb)       Intake/Output Summary (Last 24 hours) at 3/3/2022 0823  Last data filed at 3/3/2022 0800  Gross per 24 hour   Intake 3224.75 ml   Output 4625 ml   Net -1400.25 ml       Physical exam:  General: Pleasant elderly male. Appears comfortable and in no acute distress. Alert and interactive  HEENT: Normocephalic, atraumatic. Mild bilateral scleral icterus. Tongue is edematous and erythematous. Ulceration on superior distal aspect and inferior. Front tooth is broken. Minimal ecchymosis of right neck from removal of lines.   Neck: JVP not visible above the clavicle while sitting upright in chair.  CARDIAC: Regular rate and rhythm, no m/r/g appreciated. Radial pulses 2+ bilaterally. Pedal pulses 1+ bilaterally.   RESP: Normal work of breathing on room air without use of accessory breathing muscles. Bibasilar rales extending assisted up lung fields. No wheezing or rhonchi. Sounds  more consistent with atelectasis than pulmonary edema. Bandage on left lateral ribs from CT removal. Bandage is saturated with sanguinous drainage.   GI: Soft and nontender to palpation. Not distended.  Bandage on left lateral abdomen.   EXTREMITIES: Without lower extremity edema. Warm and well perfused. Feet are cooler to touch but perfused. No cyanosis. Right femoral access site is c/d/i. Flat and soft to palpation. No hematoma. Left groin ECMO incision covered with bandage. Bandage without drainage. Soft to palpation.   SKIN: No acute lesions appreciated. Warm and dry to touch. Ecchymoses of bilateral wrists.   NEURO: Alert and oriented X4, weak but able to move all extremities. Speech is clear.    PSYCH: Mood and affect are appropriate    Lines/Drains:   - PICC in right arm--placed 2/28/22    Labs (Past three days):  CBC  Recent Labs   Lab 03/08/22 0457 03/07/22 0439 03/06/22 0347 03/05/22  0445   WBC 10.0 9.3 11.9* 10.2   RBC 3.17* 3.07* 3.07* 3.29*   HGB 9.6* 9.3* 9.1* 10.0*   HCT 32.3* 30.8* 30.3* 32.6*   * 100 99 99   MCH 30.3 30.3 29.6 30.4   MCHC 29.7* 30.2* 30.0* 30.7*   RDW 18.0* 17.7* 17.2* 17.2*    318 323 400     BMP  Recent Labs   Lab 03/08/22 0457 03/07/22 2014 03/07/22  0845 03/07/22  0439 03/06/22 0347 03/05/22  1524 03/05/22  0445     --   --  144 142 144 145*   POTASSIUM 4.7  --   --  4.4 4.3 4.1 4.3   CHLORIDE 111*  --   --  112* 113* 111* 111*   CO2 24  --   --  24 20 24 25   ANIONGAP 8  --   --  8 9 9 9   * 140* 137* 144* 118* 158* 138*   BUN 79*  --   --  84* 92* 99* 99*   CR 2.10*  --   --  1.97* 2.29* 2.44* 2.40*   GFRESTIMATED 33*  --   --  35* 29* 27* 28*   PEDRITO 9.1  --   --  8.6 8.4* 8.4* 8.7   MAG 2.8*  --   --  2.7* 2.8*  --  2.9*   PHOS 4.0  --   --  4.3 3.7  --  4.2      INR  No lab results found in last 7 days.  Liver panel  Recent Labs   Lab 03/08/22  0457 03/07/22  0439 03/06/22  0347 03/05/22  0445   PROTTOTAL 7.2 6.7* 6.6* 7.1   ALBUMIN 2.9* 2.8*  2.6* 2.8*   BILITOTAL 0.9 0.8 0.8 0.8   ALKPHOS 85 74 67 75   AST 52* 41 51* 47*   * 94* 100* 94*     Imaging/procedure results:  EKG 12 Lead 3/2/22:     ECHO 2/28/22:  Interpretation Summary  The visual ejection fraction is 25-30%.  Akinetic apical segments.  Right ventricular function, chamber size, wall motion, and thickness are  normal.  Dilation of the inferior vena cava is present with abnormal respiratory  variation in diameter.  No pericardial effusion is present.    Coronary Angiogram 2/17/22:  Left Main   Minimal luminal irregularities   Left Anterior Descending   Mid LAD lesion is 100% stenosed. Culprit lesion. The lesion is type B2 - medium risk and thrombotic.   Ramus Intermedius   The vessel is large.   Ramus lesion is 70% stenosed.   Left Circumflex   Minimal luminal irregularities   Right Coronary Artery   Mid RCA lesion is 30% stenosed.       Intervention  Mid LAD lesion   Stent   Lesion length: 20 mm. There is no pre-interventional antegrade distal flow (KATHLEEN 0). Pre-stent angioplasty was performed. The post-interventional distal flow is normal (KATHLEEN 3). A 6Fr XB3.5 guide was engaged in the LM. Heparin was used for anticoagulation with ACT > 250 sec. A runthrough wire was advanced across the lesion to the distal LAD. The lesion was pre-dilated with a 2.5mm emerge balloon. A 3.5x24mm synergy LETY was successfully deployed across the lesion. A 4.0x8mm synergy LETY was successfully deployed proximal to, and overlapping, the first stent. The stents were post-dilated with 3.5 and 4mm NC balloons. There were no complications. KATHLEEN 3 flow was present in all segments upon completion of the procedure.   There is a 0% residual stenosis post intervention.     EXTRACORPOREAL MEMBRANE OXYGENATION CANNULATION 2/17/22:  A 17 Fr ECMO cannula was inserted into the left common femoral artery and a 25 Fr cannula was inserted into the left common femoral vein.  The arterial cannula was positioned in the iliac  artery and the venous canula was positioned across the RA.  The ECMO circuit was primed, closely inspected to ensure no bubbles present and ECMO was initiated.  An anterograde perfusion 8 Fr sheath was inserted in the left superficial femoral artery.    CT Chest/Abdomen 2/20/22  IMPRESSION:    1. Retrosternal ill-defined anterior mediastinal fat hemorrhage from  recent resuscitation attempt with focus of suspected active bleeding  from the left internal mammary artery.  2. Left hemothorax. - Blood products from hemorrhage extending into  the left pleural space.   3. Small right pleural effusion with associated atelectasis.  4. No acute findings in the abdomen/pelvis.  5. Support devices as detailed in the body of the report    CT Head 2/20/22  IMPRESSION:  Indeterminant gray-white matter loss in the right occipital lobe could  be artifactual from adjacent streak/photon starvation artifact versus  true infarct.

## 2022-03-09 ENCOUNTER — APPOINTMENT (OUTPATIENT)
Dept: OCCUPATIONAL THERAPY | Facility: CLINIC | Age: 72
DRG: 003 | End: 2022-03-09
Payer: MEDICARE

## 2022-03-09 ENCOUNTER — APPOINTMENT (OUTPATIENT)
Dept: PHYSICAL THERAPY | Facility: CLINIC | Age: 72
DRG: 003 | End: 2022-03-09
Payer: MEDICARE

## 2022-03-09 ENCOUNTER — APPOINTMENT (OUTPATIENT)
Dept: SPEECH THERAPY | Facility: CLINIC | Age: 72
DRG: 003 | End: 2022-03-09
Payer: MEDICARE

## 2022-03-09 LAB
ALBUMIN SERPL-MCNC: 2.7 G/DL (ref 3.4–5)
ALP SERPL-CCNC: 72 U/L (ref 40–150)
ALT SERPL W P-5'-P-CCNC: 106 U/L (ref 0–70)
ANION GAP SERPL CALCULATED.3IONS-SCNC: 6 MMOL/L (ref 3–14)
AST SERPL W P-5'-P-CCNC: 48 U/L (ref 0–45)
BASOPHILS # BLD AUTO: 0 10E3/UL (ref 0–0.2)
BASOPHILS NFR BLD AUTO: 0 %
BILIRUB SERPL-MCNC: 0.7 MG/DL (ref 0.2–1.3)
BUN SERPL-MCNC: 72 MG/DL (ref 7–30)
CALCIUM SERPL-MCNC: 8.9 MG/DL (ref 8.5–10.1)
CHLORIDE BLD-SCNC: 113 MMOL/L (ref 94–109)
CO2 SERPL-SCNC: 24 MMOL/L (ref 20–32)
CREAT SERPL-MCNC: 2.05 MG/DL (ref 0.66–1.25)
EOSINOPHIL # BLD AUTO: 0.3 10E3/UL (ref 0–0.7)
EOSINOPHIL NFR BLD AUTO: 4 %
ERYTHROCYTE [DISTWIDTH] IN BLOOD BY AUTOMATED COUNT: 18.2 % (ref 10–15)
GFR SERPL CREATININE-BSD FRML MDRD: 34 ML/MIN/1.73M2
GLUCOSE BLD-MCNC: 125 MG/DL (ref 70–99)
HCT VFR BLD AUTO: 30.1 % (ref 40–53)
HGB BLD-MCNC: 9 G/DL (ref 13.3–17.7)
IMM GRANULOCYTES # BLD: 0.3 10E3/UL
IMM GRANULOCYTES NFR BLD: 3 %
LYMPHOCYTES # BLD AUTO: 1 10E3/UL (ref 0.8–5.3)
LYMPHOCYTES NFR BLD AUTO: 11 %
MAGNESIUM SERPL-MCNC: 2.5 MG/DL (ref 1.6–2.3)
MCH RBC QN AUTO: 29.9 PG (ref 26.5–33)
MCHC RBC AUTO-ENTMCNC: 29.9 G/DL (ref 31.5–36.5)
MCV RBC AUTO: 100 FL (ref 78–100)
MONOCYTES # BLD AUTO: 0.8 10E3/UL (ref 0–1.3)
MONOCYTES NFR BLD AUTO: 9 %
NEUTROPHILS # BLD AUTO: 6.5 10E3/UL (ref 1.6–8.3)
NEUTROPHILS NFR BLD AUTO: 73 %
NRBC # BLD AUTO: 0 10E3/UL
NRBC BLD AUTO-RTO: 0 /100
PHOSPHATE SERPL-MCNC: 3.6 MG/DL (ref 2.5–4.5)
PLATELET # BLD AUTO: 298 10E3/UL (ref 150–450)
POTASSIUM BLD-SCNC: 4.8 MMOL/L (ref 3.4–5.3)
PROT SERPL-MCNC: 6.4 G/DL (ref 6.8–8.8)
RBC # BLD AUTO: 3.01 10E6/UL (ref 4.4–5.9)
SODIUM SERPL-SCNC: 143 MMOL/L (ref 133–144)
WBC # BLD AUTO: 8.9 10E3/UL (ref 4–11)

## 2022-03-09 PROCEDURE — 36415 COLL VENOUS BLD VENIPUNCTURE: CPT | Performed by: INTERNAL MEDICINE

## 2022-03-09 PROCEDURE — 250N000013 HC RX MED GY IP 250 OP 250 PS 637: Performed by: PHYSICIAN ASSISTANT

## 2022-03-09 PROCEDURE — 120N000003 HC R&B IMCU UMMC

## 2022-03-09 PROCEDURE — 80053 COMPREHEN METABOLIC PANEL: CPT | Performed by: INTERNAL MEDICINE

## 2022-03-09 PROCEDURE — 97535 SELF CARE MNGMENT TRAINING: CPT | Mod: GO

## 2022-03-09 PROCEDURE — 250N000013 HC RX MED GY IP 250 OP 250 PS 637: Performed by: NURSE PRACTITIONER

## 2022-03-09 PROCEDURE — 97110 THERAPEUTIC EXERCISES: CPT | Mod: GO

## 2022-03-09 PROCEDURE — 250N000013 HC RX MED GY IP 250 OP 250 PS 637

## 2022-03-09 PROCEDURE — 250N000013 HC RX MED GY IP 250 OP 250 PS 637: Performed by: STUDENT IN AN ORGANIZED HEALTH CARE EDUCATION/TRAINING PROGRAM

## 2022-03-09 PROCEDURE — 84100 ASSAY OF PHOSPHORUS: CPT | Performed by: INTERNAL MEDICINE

## 2022-03-09 PROCEDURE — 85004 AUTOMATED DIFF WBC COUNT: CPT | Performed by: INTERNAL MEDICINE

## 2022-03-09 PROCEDURE — 99233 SBSQ HOSP IP/OBS HIGH 50: CPT | Performed by: NURSE PRACTITIONER

## 2022-03-09 PROCEDURE — 97530 THERAPEUTIC ACTIVITIES: CPT | Mod: GP

## 2022-03-09 PROCEDURE — 83735 ASSAY OF MAGNESIUM: CPT | Performed by: INTERNAL MEDICINE

## 2022-03-09 PROCEDURE — 92526 ORAL FUNCTION THERAPY: CPT | Mod: GN

## 2022-03-09 PROCEDURE — 97116 GAIT TRAINING THERAPY: CPT | Mod: GP

## 2022-03-09 PROCEDURE — 83695 ASSAY OF LIPOPROTEIN(A): CPT | Performed by: NURSE PRACTITIONER

## 2022-03-09 PROCEDURE — 36415 COLL VENOUS BLD VENIPUNCTURE: CPT | Performed by: NURSE PRACTITIONER

## 2022-03-09 PROCEDURE — 250N000013 HC RX MED GY IP 250 OP 250 PS 637: Performed by: INTERNAL MEDICINE

## 2022-03-09 RX ORDER — METOPROLOL SUCCINATE 50 MG/1
150 TABLET, EXTENDED RELEASE ORAL DAILY
Status: DISCONTINUED | OUTPATIENT
Start: 2022-03-10 | End: 2022-03-11 | Stop reason: HOSPADM

## 2022-03-09 RX ADMIN — Medication 5 MG: at 20:08

## 2022-03-09 RX ADMIN — ASPIRIN 81 MG CHEWABLE TABLET 81 MG: 81 TABLET CHEWABLE at 08:02

## 2022-03-09 RX ADMIN — DOCUSATE SODIUM 50 MG AND SENNOSIDES 8.6 MG 1 TABLET: 8.6; 5 TABLET, FILM COATED ORAL at 08:15

## 2022-03-09 RX ADMIN — TICAGRELOR 90 MG: 90 TABLET ORAL at 08:02

## 2022-03-09 RX ADMIN — METOPROLOL TARTRATE 75 MG: 50 TABLET, FILM COATED ORAL at 08:02

## 2022-03-09 RX ADMIN — ROSUVASTATIN CALCIUM 20 MG: 20 TABLET, FILM COATED ORAL at 20:08

## 2022-03-09 RX ADMIN — HYDRALAZINE HYDROCHLORIDE 75 MG: 25 TABLET ORAL at 05:54

## 2022-03-09 RX ADMIN — ISOSORBIDE DINITRATE 15 MG: 10 TABLET ORAL at 16:29

## 2022-03-09 RX ADMIN — METOPROLOL TARTRATE 75 MG: 50 TABLET, FILM COATED ORAL at 20:09

## 2022-03-09 RX ADMIN — HYDRALAZINE HYDROCHLORIDE 75 MG: 25 TABLET ORAL at 13:53

## 2022-03-09 RX ADMIN — HYDRALAZINE HYDROCHLORIDE 75 MG: 25 TABLET ORAL at 22:13

## 2022-03-09 RX ADMIN — TICAGRELOR 90 MG: 90 TABLET ORAL at 20:09

## 2022-03-09 RX ADMIN — Medication 15 ML: at 08:02

## 2022-03-09 RX ADMIN — AMIODARONE HYDROCHLORIDE 200 MG: 200 TABLET ORAL at 08:02

## 2022-03-09 RX ADMIN — ISOSORBIDE DINITRATE 15 MG: 10 TABLET ORAL at 08:02

## 2022-03-09 RX ADMIN — Medication 1 PACKET: at 08:02

## 2022-03-09 ASSESSMENT — ACTIVITIES OF DAILY LIVING (ADL)
ADLS_ACUITY_SCORE: 15

## 2022-03-09 NOTE — PLAN OF CARE
ICU End of Shift Summary. See flowsheets for vital signs and detailed assessment.    Changes this shift: Pt A/O, vitals stable, afebrile. Adequate urine output. Pt. Slept okay overnight.     Plan:  Possibly discontinue tube feeds today? Continue POC.

## 2022-03-09 NOTE — PROGRESS NOTES
CLINICAL NUTRITION SERVICES - BRIEF NOTE     Nutrition Prescription    Recommendations already ordered by Registered Dietitian (RD):  1. Calorie counts 3/10-3/12  2. Ensure Enlive, BID; Ensure Clear once daily   3. Hold TFs overnight to stimulate PO; consider nocturnal feedings over next 1-2 days if PO intake low     NEW FINDINGS   -Diet advanced to level 5 minced and moist; per SLP recs will likely be further advanced to level-6 soft and bite sized today    -Remains on continuous TF with: Osmolite 1.5 Matteo @ goal of 65ml/hr (1560ml/day) +5 Prosource will provide: 2540 kcals (30 kcal/kg), 152 g PRO (1.8 g/kg), 1188 ml free H20, 317 g CHO, and 0 g fiber daily    -Has been feeling very full and feels TF are inhibiting his appetite during the day. Received page from CSI provider asking if able to modify TF regimen with goal to eventually wean, if appropriate.     -Pt very motivated to do well with PO. Ate 100% of 2 meals today on level 5 M&M diet. Is willing to est up ONS. Wife very caring and involved with plan. With diet order further progressing, and pt being willing to start ONS, suggested holding TFs tonight to see how PO goes over next 1-2 days with calorie counts. Team agrees with this plan. Calorie counts in-progress.     -RD will closely follow PO intake, matteo cts, ONS acceptance. Due for full re-assessment tomorrow. Pt/family aware that if PO and/or wt status declines, may need to restart TFs as a cyclic regimen.     INTERVENTIONS  Implementation  Collaboration with other providers - [facility]SI team, bedside RN  Enteral Nutrition - Hold tonight  Medical food supplement therapy - ONS TID     Monitoring/Evaluation  Will continue to monitor and evaluate per protocol.    Sudeep Posada, MISTI, LD, CNSC  [address]B RD pager: [phone] Phone: *[phone]

## 2022-03-09 NOTE — PROGRESS NOTES
Transferred to: 6B @ 1045am  Status at time of transfer: Alert and oriented. VSS. Went up in wheelchair.   Belongings: In patient belonging bags, sent with patient. Walker and home bipap as well.   Hopper removed? (if no, why?): n/a no hopper  Chart and medications: went with patient in bag  Family notified: Wife went with patient.

## 2022-03-09 NOTE — PLAN OF CARE
ICU End of Shift Summary. See flowsheets for vital signs and detailed assessment.    Changes this shift: Patient continues to be alert and oriented and stand by assist. Doing stairs with PT. Vital signs stable. New plan for minced and moist diet with thin liquids okay after successful swallow study. Pt had 100% of dinner - tracking calorie and carb counts. Plan to do tube feeds and free water flushes overnight only from 8pm-6am. Gave senna x1 with no BM yet, last one on 3/5. Using urinal with good output. Sutures taken out this evening.     Plan: Continue carb and calorie counting and hopefully wean off of tube feeds. If not tolerating overnight feeds can work with MD to decrease rate. Heparin will be D/Terry on discharge.

## 2022-03-09 NOTE — PROGRESS NOTES
Interventional Cardiology Progress Note      Assessment & Plan:  Chano Johnson is a 71 year old male with a history of atrial fibrillation  s/p ablation, GERD, ARSENIO, and erectile dysfunction who initially presented to [facility] ED on 2/17/22 with chest pain and EKG evidence of anterior STEMI. In the ED, patient went into ventricular fibrillation. CPR was initiated. Total of 8  shocks administered due to refractory VF. Amiodarone 450 mg and epinephrine x4 administered. Intubated. ROSC achieved at  1405. Transferred directly to [facility] cath lab where he was found to have occlusion of the LAD. During angiogram he was unstable with significant hypotension and ectopy, which prompted placement on VA ECMO. PCI was performed with patient on ECMO with LETY x2 to LAD. Course complicated by hemorrhage shock and left hemothorax requiring chest tube placement, SAHIL, and delirium.      Changes today:  - Change Lopressor to Toprol XL  - Continue minced and moist diet with think liquids   - Continue calorie counts (started on 3/7)  - Obtained approval for home PT/OT  - Discharge planning (pending stable calorie intake), should be ready for discharge once able to meet calorie needs via PO intake  - Continue TF only at night: discuss decreasing due to AM fullness with nutrition        Important events:  VA ECMO canulation 2/17/22  LETY x2 to LAD 2/17/22  Rewarmed on 2/19/22  CRRT started 2/19/22  Left sided chest tube placed on 2/20/22 for hemothorax  Emobolization of left internal mammary artery on 2/20/22  Decannulation 2/22/22  IABP removed 2/23/22  Extubated 2/24/22  Chest tube removed 3/1/22  Transferred to floor ordered placed 3/2/22  ECMO incision sutures removed 3/8/22     Neurology:  # Possible anoxic brain injury post cardiac arrest   Head CT on presentation w/o acute pathology. Day 3 CT head showed indeterminate gray-white matter loss in the right occipital lobe which could be artifact vs true infarct. Strong suspicion for  artifact per neurocrit. EEG w/o significant abnormalities. Alert and oriented x4 during the day. Able to recall long-term events. Some difficulty with short term memory.      # Delirium; resolved  # Agitation; resolved  - maintain appropriate awake/sleep cycle   - Continue delirium precautions  - Continue to hold Seroquel  - Melatonin 5 mg at bedtime   - haldol 5 mg q 6hr PRN      Cardiovascular   # VF cardiac arrest   # Cardiogenic/hemorrhagic shock, resovled  # Anterior STEMI s/p LETY x2  # Chronic HFrEF with improved EF   # Ischemic cardiomyopathy   Coronary angiogram with culprit lesion to be mLADs/p LETY x2. Has significant residual disease in the a large ramus with 70% stenosis. Peripheral V-A ECMO inserted for refractory cardiogenic shock on 2/17/22: 17 Fr arterial and 25 Fr venous cannula, IABP and distal reperfusion cannula placed. Decannulated on 2/22/22. IABP removed 2/23/22. Echo  2/18/22 10-15% on ECMO. Echo with LVEF of 25-30% with apical akinesis on 2/28/22. Echo on 3/7/22 with improvement of LVEF to 45-50% but persistent akinesis of the mid radha septum and apical segments, consistent with LAD infarction.   - DAPT: aspirin (lifelong) + ticagrelor (minimum of 12 months)  - statin: rosuvastatin to 20 mg daily   - Guideline directed HF medications  - BB: transition from metoprolol tartrate 75 mg BID to toprol  mg daily  - ACEi/ARB/ARNI: defer due to SAHIL, will hopefully start before discharge  - Afterload reduction: hydralazine 75 mg TID/ isordil 15 mg TID  - AA: defer due to SAHIL, will hopefully start before discharge  - SGLT2-I: defer due to SAHIL  - SCD ppx: not indicated as EF >35%  - Diuretics: appears euvolemic.   - Discharge planing: will need cardiology clinic f/u 1 month after discharge and then follow up coronary angio to fix the Ramus lesion        # Paroxysmal atrial fibrillation  # s/p Watchman implantation   Hx of paroxysmal atrial fibrillation s/p ablations and Watchman device. Afib with  RVR requiring IV amiodarone in the ICU. Transitioned to po amiodarone on 3/2/22. Currently in sinus rhythm.   - po amiodarone 200 mg daily   - [id number] score 3, no anticoagulation necessary with Watchman device in place  - rate control with metoprolol tartrate 75 mg BID (will transition to long-acting as above for HF)     Pulmonary   # Acute hypoxic respiratory failure, resolved   # Pleural effusion, improved  # Left hemothorax, resolved  # ARSENIO  Intubated due to cardiac arrest on 2/17/22. Extubated on 2/24/22. On room air. CXR clear.   - CPAP/BiPAP at night      GI/Nutrition  # Severe malnutrition in the context of acute illness  # Dysphagia   # loose stools 2/2 TF, resolved  # Constipation  Patient passed swallow study and diet advanced to minced and moist on 3/8/22. Calorie counts placed. TF to be removed once po intake is sufficient. If patient is unable to meet caloric needs with po intake by time of discharge, he may discharge home with NJ tube and nocturnal TF. Currently patient and family are resistant to this option. Hopefully po intake will continue to improve.   - SLP and RD  following   - Diet advanced to minced and moist with think liquids on 3/8 given no aspiration on video swallow  - Continue FWF at 50 ml/hr only when TF are running  - PRN imodium for loose stools  - Senokot PRN for constipation  - encourage po intake.      # Transaminitis, in setting of shock, cardiac arrest, improved.   ,  on admission. ALT normalized on 2/20 but has been intermittently rising. , AST 48 today.      Renal, Electrolytes   # SAHIL, likely ATN in setting of cardiac arrest, stable   # Hypernatremia, resolved   # Hypermagnesemia, Mg 2.5  # Hyperchloremia, Cl 113  # Uremia, improving, BUN 72  Cr on admission 1.41. Cr peaked at 3.86 on 2/21. Now improving. No need for CRRT. Auto-diuresing. Creatinine stable at 2.05 today.   - strict I&O  - Continue FWF 50 ml/hr with TF overnight.      Infectious  Disease   Sputum cultures 2/20/22-- Staph lugdunesis. Prophylactic treatment with cefazolin 2 g BID x5 days, 2/24-2/28. WBC 8.9. Afebrile. No concern for acute infection.      Hematology   # Anemia due to acute blood loss, stable  # Left hemothorax  Required frequent blood transfusions of 3-4 units/day to maintain hgb > 7 on admission. Extravasation from left internal mammary artery noted on CT. Left sided chest tube placed at bedside by thoracic surgery on 2/20/22 with 1300 ml old blood return. IR performed emobolization of left internal mammary artery on 2/20/22. Hgb 9.0 today.   - daily CBC     Musculoskeletal/Integument   # Left groin wound 2/2 ECMO decannulation   - cleanse wound with Microklenz BID   - Sutures removed on 3/8/22.      # Pressure wound of buttock   # Pressure wound of left tongue 2/2 ET   WOC following      # Generalized weakness  # Deconditioning   PT/OT recommending TCU placement, family prefer home with home PT and OT, and this was approved on 3/7/22     Diet: minced and moist diet with full thin liquid  Activity: as tolerated with assistance  Code Status: DNR/DNI  Disposition: not medically stable, will need PT/OT home care  Patient seen and discussed w/ Dr. Nichols.      BRANDON Cotto CNP  [facility]  Interventional Cardiology  [phone]      Time Spent on this Encounter   I spent 60 minutes on the unit/floor managing the care of Chano Johnson. Over 50% of my time was spent on the following:   - Counseling the patient and/or family regarding: diagnostic results, prognosis, risks and benefits of treatment options, recommended follow-up, medical compliance and prevention of disease  - Coordination of care with the: consultant(s), care coordinator/ and nurse        BRANDON Bosch CNP        Interval History:  - No acute events overnight  - Successful BM post senokot  - Improved oral intake. Continue calorie count and will  discuss decreasing overnight tube feeding with nutrition  - Continue PT/OT for discharge      Most recent vital signs:  /60   Pulse 66   Temp 98.1  F (36.7  C) (Axillary)   Resp 16   Ht 1.829 m (6')   Wt 88 kg (194 lb 0.1 oz)   SpO2 98%   BMI 26.31 kg/m    Temp:  [97.5  F (36.4  C)-98.2  F (36.8  C)] 98.1  F (36.7  C)  Pulse:  [62-78] 66  Resp:  [14-16] 16  BP: (101-114)/(57-63) 110/60  SpO2:  [95 %-99 %] 98 %  Wt Readings from Last 2 Encounters:   03/07/22 88 kg (194 lb 0.1 oz)   02/17/22 99.8 kg (220 lb)       Intake/Output Summary (Last 24 hours) at 3/9/2022 1425  Last data filed at 3/9/2022 1300  Gross per 24 hour   Intake 2690 ml   Output 1937 ml   Net 753 ml       Physical exam:  General: Pleasant elderly male. Appears comfortable and in no acute distress. Alert and interactive  HEENT: Normocephalic, atraumatic. No scleral icterus or injection  Neck: JVP not elevated  CARDIAC: Regular rate and rhythm, no m/r/g appreciated. Peripheral pulses 2+  RESP: Normal work of breathing on room air without use of accessory breathing muscles. Clear to auscultation in all fields. No wheezes, rhonchi or crackles appreciated.  GI: No abdominal distention. Soft and nontender.   EXTREMITIES: Without lower extremity edema. No cyanosis or clubbing. Warm and well perfused. No venous stasis changes. Femoral access sites are c/d/I without hematoma or bruit.   SKIN: No acute lesions appreciated. Warm and dry to touch  NEURO: Alert and oriented X3, CN II-XII grossly intact, no focal neurological deficits noted, normal speech  PSYCH: Mood and affect are appropriate    Labs (Past three days):  CBC  Recent Labs   Lab 03/09/22  0434 03/08/22  0457 03/07/22  0439 03/06/22  0347   WBC 8.9 10.0 9.3 11.9*   RBC 3.01* 3.17* 3.07* 3.07*   HGB 9.0* 9.6* 9.3* 9.1*   HCT 30.1* 32.3* 30.8* 30.3*    102* 100 99   MCH 29.9 30.3 30.3 29.6   MCHC 29.9* 29.7* 30.2* 30.0*   RDW 18.2* 18.0* 17.7* 17.2*    338 318 323      BMP  Recent Labs   Lab 03/09/22  0434 03/08/22  0457 03/07/22 2014 03/07/22  0845 03/07/22 0439 03/06/22  0347    143  --   --  144 142   POTASSIUM 4.8 4.7  --   --  4.4 4.3   CHLORIDE 113* 111*  --   --  112* 113*   CO2 24 24  --   --  24 20   ANIONGAP 6 8  --   --  8 9   * 134* 140* 137* 144* 118*   BUN 72* 79*  --   --  84* 92*   CR 2.05* 2.10*  --   --  1.97* 2.29*   GFRESTIMATED 34* 33*  --   --  35* 29*   PEDRITO 8.9 9.1  --   --  8.6 8.4*   MAG 2.5* 2.8*  --   --  2.7* 2.8*   PHOS 3.6 4.0  --   --  4.3 3.7     Troponins: No results found for: TROPI, TROPONIN, TROPR, TROPN     INRNo lab results found in last 7 days.  Liver panel  Recent Labs   Lab 03/09/22  0434 03/08/22 0457 03/07/22 0439 03/06/22  0347   PROTTOTAL 6.4* 7.2 6.7* 6.6*   ALBUMIN 2.7* 2.9* 2.8* 2.6*   BILITOTAL 0.7 0.9 0.8 0.8   ALKPHOS 72 85 74 67   AST 48* 52* 41 51*   * 111* 94* 100*       Imaging/procedure results:  EKG 12 Lead 3/2/22:     ECHO 3/7/22  Limited TTE for LV function.  Left ventricular function is decreased. The ejection fraction is 45-50%  (mildly reduced). There is a pattern of wall motion abnormalities that is  consistent with a prior LAD culprit infarction.     ECHO 2/28/22:  Interpretation Summary  The visual ejection fraction is 25-30%.  Akinetic apical segments.  Right ventricular function, chamber size, wall motion, and thickness are  normal.  Dilation of the inferior vena cava is present with abnormal respiratory  variation in diameter.  No pericardial effusion is present.     Coronary Angiogram 2/17/22:  Left Main   Minimal luminal irregularities   Left Anterior Descending   Mid LAD lesion is 100% stenosed. Culprit lesion. The lesion is type B2 - medium risk and thrombotic.   Ramus Intermedius   The vessel is large.   Ramus lesion is 70% stenosed.   Left Circumflex   Minimal luminal irregularities   Right Coronary Artery   Mid RCA lesion is 30% stenosed.        Intervention  Mid LAD lesion    Stent   Lesion length: 20 mm. There is no pre-interventional antegrade distal flow (KATHLEEN 0). Pre-stent angioplasty was performed. The post-interventional distal flow is normal (KATHLEEN 3). A 6Fr XB3.5 guide was engaged in the LM. Heparin was used for anticoagulation with ACT > 250 sec. A runthrough wire was advanced across the lesion to the distal LAD. The lesion was pre-dilated with a 2.5mm emerge balloon. A 3.5x24mm synergy LETY was successfully deployed across the lesion. A 4.0x8mm synergy LETY was successfully deployed proximal to, and overlapping, the first stent. The stents were post-dilated with 3.5 and 4mm NC balloons. There were no complications. KATHLEEN 3 flow was present in all segments upon completion of the procedure.   There is a 0% residual stenosis post intervention.      EXTRACORPOREAL MEMBRANE OXYGENATION CANNULATION 2/17/22:  A 17 Fr ECMO cannula was inserted into the left common femoral artery and a 25 Fr cannula was inserted into the left common femoral vein.  The arterial cannula was positioned in the iliac artery and the venous canula was positioned across the RA.  The ECMO circuit was primed, closely inspected to ensure no bubbles present and ECMO was initiated.  An anterograde perfusion 8 Fr sheath was inserted in the left superficial femoral artery.     CT Chest/Abdomen 2/20/22  IMPRESSION:    1. Retrosternal ill-defined anterior mediastinal fat hemorrhage from  recent resuscitation attempt with focus of suspected active bleeding  from the left internal mammary artery.  2. Left hemothorax. - Blood products from hemorrhage extending into  the left pleural space.   3. Small right pleural effusion with associated atelectasis.  4. No acute findings in the abdomen/pelvis.  5. Support devices as detailed in the body of the report     CT Head 2/20/22  IMPRESSION:  Indeterminant gray-white matter loss in the right occipital lobe could  be artifactual from adjacent streak/photon starvation artifact  versus  true infarct.

## 2022-03-09 NOTE — PLAN OF CARE
Neuro: A&Ox4.   Cardiac: SR. VSS. B/P: 100/58, T: 97.6, P: 75, R: 18  Respiratory: O2 sats stable on RA  GI/: Adequate urine output via urinal. BM X1  Diet/appetite: Tolerating level 6 diet with thin liquids. Calorie counts. Eating well. TF discontinued. 30ml FWF Q4H via NJ.  Activity:  Assist of 1, up to chair and in halls.  Pain: Denies pain.  Skin: No new deficits noted. Mouth sore noted and in flowsheets.  LDA's: PIV, midline    Heparin gtt @ 500ml/h    Plan: Continue with POC. Notify primary team with changes.

## 2022-03-10 ENCOUNTER — APPOINTMENT (OUTPATIENT)
Dept: SPEECH THERAPY | Facility: CLINIC | Age: 72
DRG: 003 | End: 2022-03-10
Payer: MEDICARE

## 2022-03-10 ENCOUNTER — APPOINTMENT (OUTPATIENT)
Dept: OCCUPATIONAL THERAPY | Facility: CLINIC | Age: 72
DRG: 003 | End: 2022-03-10
Payer: MEDICARE

## 2022-03-10 ENCOUNTER — APPOINTMENT (OUTPATIENT)
Dept: PHYSICAL THERAPY | Facility: CLINIC | Age: 72
DRG: 003 | End: 2022-03-10
Payer: MEDICARE

## 2022-03-10 LAB
ALBUMIN SERPL-MCNC: 2.6 G/DL (ref 3.4–5)
ALP SERPL-CCNC: 51 U/L (ref 40–150)
ALT SERPL W P-5'-P-CCNC: 97 U/L (ref 0–70)
ANION GAP SERPL CALCULATED.3IONS-SCNC: 7 MMOL/L (ref 3–14)
APTT PPP: 28 SECONDS (ref 22–38)
AST SERPL W P-5'-P-CCNC: 39 U/L (ref 0–45)
BASOPHILS # BLD AUTO: 0 10E3/UL (ref 0–0.2)
BASOPHILS NFR BLD AUTO: 0 %
BILIRUB SERPL-MCNC: 0.9 MG/DL (ref 0.2–1.3)
BUN SERPL-MCNC: 67 MG/DL (ref 7–30)
CALCIUM SERPL-MCNC: 8.8 MG/DL (ref 8.5–10.1)
CHLORIDE BLD-SCNC: 111 MMOL/L (ref 94–109)
CO2 SERPL-SCNC: 22 MMOL/L (ref 20–32)
CREAT SERPL-MCNC: 2.19 MG/DL (ref 0.66–1.25)
EOSINOPHIL # BLD AUTO: 0.3 10E3/UL (ref 0–0.7)
EOSINOPHIL NFR BLD AUTO: 3 %
ERYTHROCYTE [DISTWIDTH] IN BLOOD BY AUTOMATED COUNT: 18.6 % (ref 10–15)
GFR SERPL CREATININE-BSD FRML MDRD: 31 ML/MIN/1.73M2
GLUCOSE BLD-MCNC: 107 MG/DL (ref 70–99)
HCT VFR BLD AUTO: 27.8 % (ref 40–53)
HGB BLD-MCNC: 8.5 G/DL (ref 13.3–17.7)
IMM GRANULOCYTES # BLD: 0.3 10E3/UL
IMM GRANULOCYTES NFR BLD: 3 %
LYMPHOCYTES # BLD AUTO: 0.9 10E3/UL (ref 0.8–5.3)
LYMPHOCYTES NFR BLD AUTO: 10 %
MCH RBC QN AUTO: 30.8 PG (ref 26.5–33)
MCHC RBC AUTO-ENTMCNC: 30.6 G/DL (ref 31.5–36.5)
MCV RBC AUTO: 101 FL (ref 78–100)
MONOCYTES # BLD AUTO: 0.8 10E3/UL (ref 0–1.3)
MONOCYTES NFR BLD AUTO: 9 %
NEUTROPHILS # BLD AUTO: 6.6 10E3/UL (ref 1.6–8.3)
NEUTROPHILS NFR BLD AUTO: 75 %
NRBC # BLD AUTO: 0 10E3/UL
NRBC BLD AUTO-RTO: 0 /100
PLATELET # BLD AUTO: 288 10E3/UL (ref 150–450)
POTASSIUM BLD-SCNC: 5.2 MMOL/L (ref 3.4–5.3)
PROT SERPL-MCNC: 6.2 G/DL (ref 6.8–8.8)
RBC # BLD AUTO: 2.76 10E6/UL (ref 4.4–5.9)
SODIUM SERPL-SCNC: 140 MMOL/L (ref 133–144)
WBC # BLD AUTO: 8.9 10E3/UL (ref 4–11)

## 2022-03-10 PROCEDURE — 85025 COMPLETE CBC W/AUTO DIFF WBC: CPT | Performed by: INTERNAL MEDICINE

## 2022-03-10 PROCEDURE — 97535 SELF CARE MNGMENT TRAINING: CPT | Mod: GO | Performed by: OCCUPATIONAL THERAPIST

## 2022-03-10 PROCEDURE — 80053 COMPREHEN METABOLIC PANEL: CPT | Performed by: INTERNAL MEDICINE

## 2022-03-10 PROCEDURE — 250N000013 HC RX MED GY IP 250 OP 250 PS 637: Performed by: INTERNAL MEDICINE

## 2022-03-10 PROCEDURE — 97116 GAIT TRAINING THERAPY: CPT | Mod: GP

## 2022-03-10 PROCEDURE — 36592 COLLECT BLOOD FROM PICC: CPT | Performed by: INTERNAL MEDICINE

## 2022-03-10 PROCEDURE — 250N000013 HC RX MED GY IP 250 OP 250 PS 637: Performed by: PHYSICIAN ASSISTANT

## 2022-03-10 PROCEDURE — 250N000011 HC RX IP 250 OP 636

## 2022-03-10 PROCEDURE — 85730 THROMBOPLASTIN TIME PARTIAL: CPT | Performed by: INTERNAL MEDICINE

## 2022-03-10 PROCEDURE — G0463 HOSPITAL OUTPT CLINIC VISIT: HCPCS

## 2022-03-10 PROCEDURE — 250N000013 HC RX MED GY IP 250 OP 250 PS 637

## 2022-03-10 PROCEDURE — 92526 ORAL FUNCTION THERAPY: CPT | Mod: GN

## 2022-03-10 PROCEDURE — 99233 SBSQ HOSP IP/OBS HIGH 50: CPT | Mod: FS | Performed by: INTERNAL MEDICINE

## 2022-03-10 PROCEDURE — 97530 THERAPEUTIC ACTIVITIES: CPT | Mod: GP

## 2022-03-10 PROCEDURE — 120N000003 HC R&B IMCU UMMC

## 2022-03-10 PROCEDURE — 250N000013 HC RX MED GY IP 250 OP 250 PS 637: Performed by: NURSE PRACTITIONER

## 2022-03-10 PROCEDURE — 250N000013 HC RX MED GY IP 250 OP 250 PS 637: Performed by: STUDENT IN AN ORGANIZED HEALTH CARE EDUCATION/TRAINING PROGRAM

## 2022-03-10 RX ORDER — MULTIVITAMIN,THERAPEUTIC
1 TABLET ORAL DAILY
Status: DISCONTINUED | OUTPATIENT
Start: 2022-03-10 | End: 2022-03-11 | Stop reason: HOSPADM

## 2022-03-10 RX ADMIN — Medication 5 MG: at 20:27

## 2022-03-10 RX ADMIN — ISOSORBIDE DINITRATE 15 MG: 10 TABLET ORAL at 23:05

## 2022-03-10 RX ADMIN — TICAGRELOR 90 MG: 90 TABLET ORAL at 20:27

## 2022-03-10 RX ADMIN — AMIODARONE HYDROCHLORIDE 200 MG: 200 TABLET ORAL at 08:50

## 2022-03-10 RX ADMIN — HEPARIN SODIUM AND DEXTROSE 500 UNITS/HR: 10000; 5 INJECTION INTRAVENOUS at 20:22

## 2022-03-10 RX ADMIN — HYDRALAZINE HYDROCHLORIDE 75 MG: 25 TABLET ORAL at 05:04

## 2022-03-10 RX ADMIN — THERA TABS 1 TABLET: TAB at 08:50

## 2022-03-10 RX ADMIN — ISOSORBIDE DINITRATE 15 MG: 10 TABLET ORAL at 08:49

## 2022-03-10 RX ADMIN — METOPROLOL SUCCINATE 150 MG: 50 TABLET, EXTENDED RELEASE ORAL at 08:49

## 2022-03-10 RX ADMIN — TICAGRELOR 90 MG: 90 TABLET ORAL at 08:49

## 2022-03-10 RX ADMIN — HYDRALAZINE HYDROCHLORIDE 75 MG: 25 TABLET ORAL at 15:15

## 2022-03-10 RX ADMIN — HYDRALAZINE HYDROCHLORIDE 75 MG: 25 TABLET ORAL at 23:05

## 2022-03-10 RX ADMIN — ISOSORBIDE DINITRATE 15 MG: 10 TABLET ORAL at 15:15

## 2022-03-10 RX ADMIN — ISOSORBIDE DINITRATE 15 MG: 10 TABLET ORAL at 00:13

## 2022-03-10 RX ADMIN — ROSUVASTATIN CALCIUM 20 MG: 20 TABLET, FILM COATED ORAL at 20:27

## 2022-03-10 RX ADMIN — ASPIRIN 81 MG CHEWABLE TABLET 81 MG: 81 TABLET CHEWABLE at 08:49

## 2022-03-10 ASSESSMENT — ACTIVITIES OF DAILY LIVING (ADL)
ADLS_ACUITY_SCORE: 15
ADLS_ACUITY_SCORE: 14
ADLS_ACUITY_SCORE: 15
ADLS_ACUITY_SCORE: 14
ADLS_ACUITY_SCORE: 15
ADLS_ACUITY_SCORE: 15
ADLS_ACUITY_SCORE: 17
ADLS_ACUITY_SCORE: 15
ADLS_ACUITY_SCORE: 17
ADLS_ACUITY_SCORE: 17
ADLS_ACUITY_SCORE: 15
ADLS_ACUITY_SCORE: 15
ADLS_ACUITY_SCORE: 17
ADLS_ACUITY_SCORE: 15
ADLS_ACUITY_SCORE: 15
ADLS_ACUITY_SCORE: 17
ADLS_ACUITY_SCORE: 17
ADLS_ACUITY_SCORE: 15

## 2022-03-10 NOTE — PLAN OF CARE
Neuro: A&Ox4. Frustrated.  Cardiac: SR. VSS. B/P: 108/60, T: 98, P: 74, R: 16  Respiratory: O2 sats stable on RA  GI/: Adequate urine output via urinal  Diet/appetite: Tolerating level 6 diet with thin liquids. Calorie counts. Eating well. TF discontinued. 30ml FWF Q4H via NJ.  Activity:  Assist of 1, up to chair and in halls.  Pain: Denies pain.  Skin: No new deficits noted.   LDA's: PIV, midline    Heparin gtt @ 500ml/h    Plan: Continue with POC. Notify primary team with changes.

## 2022-03-10 NOTE — PROGRESS NOTES
CLINICAL NUTRITION SERVICES - REASSESSMENT NOTE     Nutrition Prescription    RECOMMENDATIONS FOR MDs/PROVIDERS TO ORDER:  --Consider scheduling bowel meds to promote regular stooling. Currently has Senna as a PRN order only. Only one BM noted over past 4-days. (if pt agrees)    --Goal for pt to consistently consume at least 1300 Kcals and 75 gm of protein daily (for minimum 2-3 days) before completely removing EN access/NGT. RD following for need to restart cyclic/nocturnal TFs pending PO intake.     Malnutrition Status:    Moderate malnutrition in the context of acute illness/injury    Recommendations already ordered by Registered Dietitian (RD):  1. Calorie counts 3/10-3/12  2. Ensure Enlive increased to TID (350 Kcals, 20 gm protein per carton)  3. Provided education r/t calorie goals, protein goals, provided with Kcal/protein content of [facility] menu items  4. Continue to hold TFs tonight; possible NGT removal tomorrow if does well with PO, shows consistency for one more day.    Future/Additional Recommendations:  --Monitoring diet adv/SLP recs, Matteo cts, wts, ability to remove FT if continues with good PO vs need to resume nocturnal/cycled TF tomorrow if PO declines.      EVALUATION OF THE PROGRESS TOWARD GOALS   Diet: Level 6: Soft & Bite-Sized Dysphagia Diet [upgraded yesterday per SLP recs]  --Level 5 minced and moist 3/8-3/9  --Full liquid 3/1-3/8    PO Intake: % of meals documented per food record flowsheet. Per review of room-service selections, patient ordered 3 full meals yesterday; Kcal intake of ~1211 Kcals (+/-~350 if took Ensure shake) and 43 gm protein (+/- 20 grams if took Ensure shake).     Calorie counts are in place to start today through 3/12. Will provide pt and wife with a menu that has Kcal/protein content of menu items listed to help maximize energy/protein intake of meals with pt goal to eventually have NGT removed.     Nutrition Support:   -Dosing weight: 86 kg [adjusted]   -Regimen:  Osmolite 1.5 Matteo @ goal of 65ml/hr (1560ml/day) +5 Prosource will provide: 2540 kcals (30 kcal/kg), 152 g PRO (1.8 g/kg), 1188 ml free H20, 317 g CHO, and 0 g fiber daily.  --TFs held overnight to stimulate appetite while following PO intake adequacy  -FWF 50 mL/hr (1200 mL/day) --Will look at adjusting, given that pt is now taking a PO diet.   -EN access via NGT/bridle     EN Intake: Average 7-day TF intakes: 1235 mL formula, 3.3 packets Prosource =  1985 Kcals (23 Kcal/kg), and 114 g pro (1.3 g/kg). This is meeting 92% of low-end est Kcal needs, and 88% of low-end est protein needs. -- Per current est needs. Pt has been decannulated from ECMO and is no longer vented or in ICU setting. Will adjust estimated needs accordingly.      NEW FINDINGS   General:    Pt tx from ICU to IMC yesterday.     TF hold hold starting yesterday evening, while assessing how pt does with full PO intake, while still maintaining EN access incase of need. Pt and wife are eager to have his FT removed but are aware and agree that if PO intake declines or appears suboptimal, we may need to consider nocturnal/cyclc TFs. So far pt is doing quite well. Started ONS regimen yesterday; will adjust to accomodate flavor preferences today.    GI:    Only 1 BM Documented recently (3/9). No BM 3/6-3/8. Not currently on a scheduled bowel regimen.     Weights:    Weight overall down 11.7 kg since admission (21-days). Significant loss for the time frame; likely multifactorial (hypermetabolism w/ critical illness, fluid status, muscle depletion with prolonged hospital stay)   03/10/22 0451 88.3 kg (194 lb 10.7 oz)   03/07/22 0300 88 kg (194 lb 0.1 oz)   03/04/22 0600 85.7 kg (188 lb 15 oz)   03/02/22 0000 92.2 kg (203 lb 4.2 oz)   02/28/22 0400 103.3 kg (227 lb 11.8 oz)   02/27/22 0430 102.8 kg (226 lb 10.1 oz)   02/24/22 0000 103 kg (227 lb 1.2 oz)   02/23/22 0600 100.3 kg (221 lb 1.9 oz)   02/22/22 0500 105 kg (231 lb 7.7 oz)   02/21/22 0130 101.6 kg (223  lb 15.8 oz)   02/20/22 0400 101.7 kg (224 lb 3.3 oz)   02/19/22 0800 102.8 kg (226 lb 10.1 oz)   02/19/22 0400 110.1 kg (242 lb 11.6 oz)   02/18/22 0400 100 kg (220 lb 7.4 oz)     Dosing Weight: 86 kg (Lowest wt this admit 3/4 and IBW 81 kg)     ASSESSED NUTRITION NEEDS: --Updated 3/10  Estimated Energy Needs: 1131-2273 kcals/day (25 - 30 kcals/kg)   Justification: Maintenance   Estimated Protein Needs: 105-130 grams protein/day (1.2-1.5 grams of pro/kg)   Justification: Increased needs s/p ECMO (decannulated), lean body mass preservation   Estimated Fluid Needs: 1 mL/kcal   Justification: Maintenance and Per provider pending fluid status     Labs:    Na 140, WNL and stable [RN states not flushing Q1hr]    BUN elevated, but trending down; Creatinine 2.2 elevated but stable    Glucose trends 107-140     Medications:    Thera-Vit    Prosource, 5 pkt/day    Senna, PRN only     Skin:    WOCN following for tongue wound (improving); see note 3/3 if needed.     MALNUTRITION  % Intake: Decreased intake does not meet criteria  % Weight Loss: > 5% in 1 month (severe)  Subcutaneous Fat Loss: Facial region:  Mild, Upper arm:  Mild and Lower arm:  Mild  Muscle Loss: Temporal: Mild, Thoracic region (clavicle, acromium bone, deltoid, trapezius, pectoral):  Mild, Upper arm (bicep, tricep):  Mild, Upper leg (quadricep, hamstring):  Mild and Posterior calf:  Mild  Fluid Accumulation/Edema: Does not meet criteria (trace)  Malnutrition Diagnosis: Moderate malnutrition in the context of acute illness/injury    Previous Goals   Total avg nutritional intake to meet a minimum of 25 kcal/kg and 1.5 g PRO/kg daily (per dosing wt 86 kg).  Evaluation: Met    Previous Nutrition Diagnosis  Inadequate protein-energy intake related to disruptions to EN infusions as evidenced by pt not meeting goal provisions with 7-day averages meeting 14 kcals/kg and 1.1 gm/kg per dosing weight.    Evaluation: Improving    CURRENT NUTRITION DIAGNOSIS  Increased  nutrient needs (energy/protein) related to recovering from critical illness/prolonged hospital stay as evidenced by est nutrition needs 30 Kcal/kg, 1.5 gm/kg protein for repletion s/p wt loss >5% over past month, mild muscle/fat depletion, prior nutrition support dependence.       INTERVENTIONS  Implementation  Collaboration with other nutrition professionals - Matteo cts per NSA  Collaboration with other providers - Cardiology NP  Enteral Nutrition - Continue to hold one more night; anticipate may be able to remove tomorrow   Medical food supplement therapy - Adjust frequency/flavors per pt  Modify composition of meals/snacks - None, declined  Multivitamin/mineral supplement therapy - Continue   Nutrition education for recommended modifications - Kcal/pro content of menu items and goals    Goals  1. Pt to consistently consume at least 1300 Kcals and 75 gm of protein daily (for minimum 2-3 days) before completely removing EN access/NGT.     Monitoring/Evaluation  Progress toward goals will be monitored and evaluated per protocol.    Sudeep Posada RDN, LD, CNSC  [address] RD pager: [phone] Phone: [phone]

## 2022-03-10 NOTE — PROGRESS NOTES
Care Management Follow Up    Length of Stay (days): 21    Expected Discharge Date: 3/11     Concerns to be Addressed: Discharge planning, medically readiness     Patient plan of care discussed at interdisciplinary rounds: Yes    Anticipated Discharge Disposition: Home  Anticipated Discharge Services: home care   Anticipated Discharge DME:  Potentially needing a walker    Education Provided on the Discharge Plan:  Yes  Patient/Family in Agreement with the Plan:  Yes    Referrals Placed by CM/SW: Home care  Private pay costs discussed: Not applicable    Additional Information:  Patient likely to be medically ready for discharge tomorrow. Per discussion with unit charge nurse and bedside nurse, patient is requesting a private room and is debating leaving AMA if he does not receive a private room. A referral had been sent to [facility], they have been updated on anticipated discharge tomorrow. Care coordination will continue to follow for discharge planning.     [facility] (RN/OT/OT)  Phone: [phone]  Fax: [phone]    Angy Beckett, RNCC, BSN    [facility]    [address]  [address] [place] [id number]    [email or web address]    Office: [phone] Pager: [phone]  To contact the weekend RNCC, page [phone].

## 2022-03-10 NOTE — PLAN OF CARE
Neuro: A&Ox4.   Cardiac: SR HR 70s-80s. ST elevation unchanged throughout shift. VSS. MAP>65.    Respiratory: Sating 98% on RA. CPAP at night while sleeping.   GI/: Adequate urine output. No BM overnight.   Diet/appetite: Level 6, soft and bite sized diet with thin liquids. Eating well. Tube feeds held overnight to stimulate PO intake. Calorie counts.   Activity:  Assist of 1 with gait belt and walker.   Pain: At acceptable level on current regimen. Pt denied pain overnight.   Skin: No new deficits noted. Mouth sores, bruising on left flank, and generalized bruising.   LDA's: R PIV infusing, L midline SL.     Heparin gtt @500units/hr continuous rate.     Plan: Continue with POC. Notify primary team with changes.

## 2022-03-10 NOTE — PLAN OF CARE
"Speech Language Therapy Discharge Summary    Reason for therapy discharge:    All goals and outcomes met, no further needs identified.    Progress towards therapy goal(s). See goals on Care Plan in [facility] electronic health record for goal details.  Goals met    Therapy recommendation(s):    No further therapy is recommended.     From SLP perspective, pt can be on a regular diet/thin liquids with little to no risk for aspiration. VFSS on 3/8 revealed \"prolonged time for mastication due to bruised and swollen tongue. Pt reported pain and difficulty with mastication of solid textures. Pharyngeal phase characterized by slightly reduced pharyngeal contraction resulting in trace amounts of residue on valleculae and pyriforms... BOT retraction, laryngeal elevation, and epiglottic inversion WFL. No aspiration appreciated during the study. Important to note that pt coughed and cleared his throat consistently throughout trials in absence of aspiration or penetration. Significant improvements with pharyngeal swallow function and airway protection when compared to previous FEES completed on 3/2.\"    Per chart review and discussion with primary provider, dental is recommending pt be on a soft and bland diet. Training provided for pt and family today re: modifying dry/crunchy/hard foods to be softer and self-selecting softer solids from regular menu. No additional SLP services indicated.  "

## 2022-03-10 NOTE — PROGRESS NOTES
[facility] Nurse Inpatient Pressure Injury Assessment   Reason for consultation: Evaluate and treat tongue wound    Assessment:    Pressure injury on tongue: present on admission  Pressure injury is stage:  Mucosal , moist  Status:  Improving, anterior aspect healed, left lateral aspect healing      TREATMENT PLAN  tongue wound: Every 2 hours  perform oral cares per protocol    Orders Reviewed   WO Nurse follow-up plan: Bi weekly  Nursing to notify the Provider(s) and re-consult the [facility] Nurse if wound(s) deteriorates or new skin concern.         Pressure injury interventions:  Wound location:  sorin cleft  Cleanse with MicroKlenz moistened gauze   Pat dry.   Apply no sting barrier film to the anuja wound skin and allow to dry   Cover with  Sacral  Mepilex dressing, carefully molding into place  Paint the edges of the mepilex dressing with no-sting barrier film  Change every third day and as needed          Patient History:   According to medical record: 71 year old male who was admitted on 2022 s/p VT/VF arrest x8 shocks who was found to have LAD occlusion requiring 2x stents. VT VF arrest in the ED after he was found to have an anterior STEMI on ECG.  Chest compressions and ACLS was started immediately.  transferred to the [facility] for percutaneous intervention for his STEMI.  arrived at [facility] and was found to have occlusion of the LAD. During the angiogram he was found to have significant hypotension and ectopy, which prompted placement on VA ECMO.  Now decannulated and no longer intubated    Current Diet / Nutrition:     Orders Placed This Encounter      Soft & Bite Sized Diet (level 6) Thin Liquids (level 0)  tube feedings    Output:    I/O last 3 completed shifts:  In: 1390 [P.O.:1020; I.V.:130; NG/GT:240]  Out: 1425 [Urine:1425]  Containment: of urine/stool: Urinary Catheter    Risk Assessment:   Sensory Perception: 4-->no impairment  Moisture: 3-->occasionally moist  Activity: 3-->walks  occasionally  Mobility: 3-->slightly limited  Nutrition: 3-->adequate  Friction and Shear: 2-->potential problem  Luis E Score: 18    Labs:    Recent Labs   Lab 03/10/22  0440   ALBUMIN 2.6*   HGB 8.5*   WBC 8.9       Focused Assessment: .  Pressure Injury Present::Yes   Physical Exam  Skin inspection: focused mouth  Wound Location:  Left tongue            Date of Photos: 2/21 and 2/24/22 and 2/28  Wound History: wound present on admission  Measurements (length x width x depth, in cm) Anterior aspect all healed, Left lateral tongue: 1.1x3.2x0.1  Palpation of the wound bed: normal   Periwound skin: healed mucosal  Color: normal and consistent with surrounding tissue  Temperature: normal   Drainage:, none  Odor: none  Pain: mild to moderate

## 2022-03-10 NOTE — PROGRESS NOTES
I have seen and examined the patient with the CSI team. I agree with the assessment and plan of the note above.I have reviewed pertinent labs.     Nithin Ybarra MD  Interventional Cardiology  Pager: [phone]    Interventional Cardiology Progress Note      Assessment & Plan:  Chano Johnson is a 71 year old male with a history of atrial fibrillation  s/p ablation, GERD, ARSENIO, and erectile dysfunction who initially presented to [facility] ED on 2/17/22 with chest pain and EKG evidence of anterior STEMI. In the ED, patient went into ventricular fibrillation. CPR was initiated. Total of 8  shocks administered due to refractory VF. Amiodarone 450 mg and epinephrine x4 administered. Intubated. ROSC achieved at  1405. Transferred directly to [facility] cath lab where he was found to have occlusion of the LAD. During angiogram he was unstable with significant hypotension and ectopy, which prompted placement on VA ECMO. PCI was performed with patient on ECMO with LETY x2 to LAD. Course complicated by hemorrhage shock and left hemothorax requiring chest tube placement, SAHIL, and delirium.      Changes today:  - Hold TF  - Calorie count  - Continue PT/OT      Important events:  VA ECMO canulation 2/17/22  LETY x2 to LAD 2/17/22  Rewarmed on 2/19/22  CRRT started 2/19/22  Left sided chest tube placed on 2/20/22 for hemothorax  Emobolization of left internal mammary artery on 2/20/22  Decannulation 2/22/22  IABP removed 2/23/22  Extubated 2/24/22  Chest tube removed 3/1/22  Transferred to floor ordered placed 3/2/22  ECMO incision sutures removed 3/8/22     Neurology:  # Possible anoxic brain injury post cardiac arrest   Head CT on presentation w/o acute pathology. Day 3 CT head showed indeterminate gray-white matter loss in the right occipital lobe which could be artifact vs true infarct. Strong suspicion for artifact per neurocrit. EEG w/o significant abnormalities. Alert and oriented x4 during the day. Able to recall long-term  events. Some difficulty with short term memory.      # Delirium; resolved  # Agitation; resolved  - maintain appropriate awake/sleep cycle   - Continue delirium precautions  - Continue to hold Seroquel  - Melatonin 5 mg at bedtime   - haldol 5 mg q 6hr PRN      Cardiovascular   # VF cardiac arrest   # Cardiogenic/hemorrhagic shock, resovled  # Anterior STEMI s/p LETY x2  # Chronic HFrEF with improved EF   # Ischemic cardiomyopathy   Coronary angiogram with culprit lesion to be mLADs/p LETY x2. Has significant residual disease in the a large ramus with 70% stenosis. Peripheral V-A ECMO inserted for refractory cardiogenic shock on 2/17/22: 17 Fr arterial and 25 Fr venous cannula, IABP and distal reperfusion cannula placed. Decannulated on 2/22/22. IABP removed 2/23/22. Echo  2/18/22 10-15% on ECMO. Echo with LVEF of 25-30% with apical akinesis on 2/28/22. Echo on 3/7/22 with improvement of LVEF to 45-50% but persistent akinesis of the mid radha septum and apical segments, consistent with LAD infarction.   - DAPT: aspirin (lifelong) + ticagrelor (minimum of 12 months)  - statin: rosuvastatin to 20 mg daily   - Guideline directed HF medications  - BB: Toprol  mg daily  - ACEi/ARB/ARNI: defer due to SAHIL  - Afterload reduction: hydralazine 75 mg TID/ isordil 15 mg TID  - AA: defer due to SAHIL  - SGLT2-I: defer due to SAHIL  - SCD ppx: not indicated as EF >35%  - Diuretics: appears euvolemic.   - Discharge planing: will need cardiology clinic f/u 1 month after discharge and then follow up coronary angio to fix the Ramus lesion         # Paroxysmal atrial fibrillation  # s/p Watchman implantation   Hx of paroxysmal atrial fibrillation s/p ablations and Watchman device. Afib with RVR requiring IV amiodarone in the ICU. Transitioned to po amiodarone on 3/2/22. Currently in sinus rhythm.   - po amiodarone 200 mg daily   - [id number] score 3, no anticoagulation necessary with Watchman device in place  - rate control with  Toprol  mg daily     Pulmonary   # Acute hypoxic respiratory failure, resolved   # Pleural effusion, improved  # Left hemothorax, resolved  # ARSENIO  Intubated due to cardiac arrest on 2/17/22. Extubated on 2/24/22. On room air. CXR clear.   - CPAP/BiPAP at night      GI/Nutrition  # Severe malnutrition in the context of acute illness  # Dysphagia   # loose stools 2/2 TF, resolved  # Constipation  Patient passed swallow study and diet advanced to minced and moist on 3/8/22. Calorie counts placed. TF to be removed once po intake is sufficient. If patient is unable to meet caloric needs with po intake by time of discharge, he may discharge home with NJ tube and nocturnal TF. Currently patient and family are resistant to this option.   - SLP and RD  following. In discussion with RD, would recommend 1 more day of calorie counts, holding TF but leave feeding tube in place until 3/11/22  - Diet advanced to minced and moist with thick liquids on 3/8 given no aspiration on video swallow  - PO intake continues to improve  - PRN imodium for loose stools  - Senokot PRN for constipation  - encourage po intake.      # Transaminitis, in setting of shock, cardiac arrest, improved.   ,  on admission. ALT normalized on 2/20 but has been intermittently rising. ALT 97, AST 39 today.      Renal, Electrolytes   # SAHIL, likely ATN in setting of cardiac arrest, stable   # Hypernatremia, resolved   # Hypermagnesemia, Mg 2.5  # Hyperchloremia, Cl 113  # Uremia, improving, BUN 72  Cr on admission 1.41. Cr peaked at 3.86 on 2/21. Now improving. No need for CRRT. Auto-diuresing. Creatinine stable at 2.19 today.   - strict I&O  - daily [facility]      Infectious Disease   Sputum cultures 2/20/22-- Staph lugdunesis. Prophylactic treatment with cefazolin 2 g BID x5 days, 2/24-2/28. WBC 8.9. Afebrile. No concern for acute infection.      Hematology   # Anemia due to acute blood loss, stable  # Left hemothorax  Required frequent blood  transfusions of 3-4 units/day to maintain hgb > 7 on admission. Extravasation from left internal mammary artery noted on CT. Left sided chest tube placed at bedside by thoracic surgery on 2/20/22 with 1300 ml old blood return. IR performed emobolization of left internal mammary artery on 2/20/22. Hgb 8.5 today.   - daily CBC     Musculoskeletal/Integument   # Left groin wound 2/2 ECMO decannulation   - Sutures removed on 3/8/22.   - Large amounts of serosanguinous drainage noted 3/10 PM, no signs of infection. CTVS rounded on patient and thought to be lymphatic drainage. Offered wound vac which patient declined. Reiterated the importance of keeping incision dry with frequent dressing changes and to monitor for signs/symptoms of infection.      # Pressure wound of buttock   # Pressure wound of left tongue 2/2 ET   WOC following      # Generalized weakness  # Deconditioning   PT/OT recommending TCU placement, family prefer home with home PT and OT, and this was approved on 3/7/22     Diet: minced and moist diet with full thin liquid  Activity: as tolerated with assistance  Code Status: DNR/DNI  Disposition: medically stable, awaiting calorie count and improved PO intake, will need PT/OT home care  Patient seen and discussed w/ Dr. Ybarra.        BRANDON Cotto CNP  [facility]  Interventional Cardiology    [phone] Spent on this Encounter   I spent 60 minutes on the unit/floor managing the care of Chano Johnson. Over 50% of my time was spent on the following:   - Counseling the patient and/or family regarding: diagnostic results, prognosis, risks and benefits of treatment options, recommended follow-up, medical compliance and prevention of disease  - Coordination of care with the: consultant(s), care coordinator/ and nurse        BRANDON Bosch CNP         Interval History:  - No acute events overnight other than not ideal sleep due to double  patient room. States his stomach 'feels great' with holding the TF overnight. Continue calorie counts, appreciate RD input.   - Continue to work with PT/OT, hopeful discharge 3/11/22 if po intake continues to be adequate.     Most recent vital signs:  /60 (BP Location: Right arm)   Pulse 74   Temp 98  F (36.7  C) (Oral)   Resp 16   Ht 1.829 m (6')   Wt 88.3 kg (194 lb 10.7 oz)   SpO2 98%   BMI 26.40 kg/m    Temp:  [97.6  F (36.4  C)-98.1  F (36.7  C)] 98  F (36.7  C)  Pulse:  [66-77] 74  Resp:  [14-18] 16  BP: ()/(53-65) 110/60  SpO2:  [96 %-99 %] 98 %  Wt Readings from Last 2 Encounters:   03/10/22 88.3 kg (194 lb 10.7 oz)   02/17/22 99.8 kg (220 lb)       Intake/Output Summary (Last 24 hours) at 3/10/2022 1029  Last data filed at 3/10/2022 1000  Gross per 24 hour   Intake 905 ml   Output 1575 ml   Net -670 ml       Physical exam:  General: Pleasant elderly male. Appears comfortable and in no acute distress. Alert and interactive  HEENT: Normocephalic, atraumatic. No scleral icterus or injection  Neck: JVP not elevated  CARDIAC: Regular rate and rhythm, no m/r/g appreciated. Peripheral pulses 2+  RESP: Normal work of breathing on room air without use of accessory breathing muscles. Clear to auscultation in all fields. No wheezes, rhonchi or crackles appreciated.  GI: No abdominal distention. Soft and nontender.   EXTREMITIES: Without lower extremity edema. No cyanosis or clubbing. Warm and well perfused. No venous stasis changes.   SKIN: No acute lesions appreciated. Warm and dry to touch  NEURO: Alert and oriented X3, CN II-XII grossly intact, no focal neurological deficits noted, normal speech  PSYCH: Mood and affect are appropriate    Labs (Past three days):  CBC  Recent Labs   Lab 03/10/22  0440 03/09/22  0434 03/08/22  0457 03/07/22  0439   WBC 8.9 8.9 10.0 9.3   RBC 2.76* 3.01* 3.17* 3.07*   HGB 8.5* 9.0* 9.6* 9.3*   HCT 27.8* 30.1* 32.3* 30.8*   * 100 102* 100   MCH 30.8 29.9 30.3  30.3   MCHC 30.6* 29.9* 29.7* 30.2*   RDW 18.6* 18.2* 18.0* 17.7*    298 338 318     BMP  Recent Labs   Lab 03/10/22  0440 03/09/22  0434 03/08/22  0457 03/07/22  2014 03/07/22  0845 03/07/22 0439 03/06/22  0347    143 143  --   --  144 142   POTASSIUM 5.2 4.8 4.7  --   --  4.4 4.3   CHLORIDE 111* 113* 111*  --   --  112* 113*   CO2 22 24 24  --   --  24 20   ANIONGAP 7 6 8  --   --  8 9   * 125* 134* 140*   < > 144* 118*   BUN 67* 72* 79*  --   --  84* 92*   CR 2.19* 2.05* 2.10*  --   --  1.97* 2.29*   GFRESTIMATED 31* 34* 33*  --   --  35* 29*   PEDRITO 8.8 8.9 9.1  --   --  8.6 8.4*   MAG  --  2.5* 2.8*  --   --  2.7* 2.8*   PHOS  --  3.6 4.0  --   --  4.3 3.7    < > = values in this interval not displayed.     Troponins: No results found for: TROPI, TROPONIN, TROPR, TROPN     INRNo lab results found in last 7 days.  Liver panel  Recent Labs   Lab 03/10/22  0440 03/09/22  0434 03/08/22  0457 03/07/22  0439   PROTTOTAL 6.2* 6.4* 7.2 6.7*   ALBUMIN 2.6* 2.7* 2.9* 2.8*   BILITOTAL 0.9 0.7 0.9 0.8   ALKPHOS 51 72 85 74   AST 39 48* 52* 41   ALT 97* 106* 111* 94*       Imaging/procedure results:  EKG 12 Lead 3/2/22:     ECHO 3/7/22  Limited TTE for LV function.  Left ventricular function is decreased. The ejection fraction is 45-50%  (mildly reduced). There is a pattern of wall motion abnormalities that is  consistent with a prior LAD culprit infarction.     ECHO 2/28/22:  Interpretation Summary  The visual ejection fraction is 25-30%.  Akinetic apical segments.  Right ventricular function, chamber size, wall motion, and thickness are  normal.  Dilation of the inferior vena cava is present with abnormal respiratory  variation in diameter.  No pericardial effusion is present.     Coronary Angiogram 2/17/22:  Left Main   Minimal luminal irregularities   Left Anterior Descending   Mid LAD lesion is 100% stenosed. Culprit lesion. The lesion is type B2 - medium risk and thrombotic.   Ramus Intermedius    The vessel is large.   Ramus lesion is 70% stenosed.   Left Circumflex   Minimal luminal irregularities   Right Coronary Artery   Mid RCA lesion is 30% stenosed.        Intervention  Mid LAD lesion   Stent   Lesion length: 20 mm. There is no pre-interventional antegrade distal flow (KATHLEEN 0). Pre-stent angioplasty was performed. The post-interventional distal flow is normal (KATHLEEN 3). A 6Fr XB3.5 guide was engaged in the LM. Heparin was used for anticoagulation with ACT > 250 sec. A runthrough wire was advanced across the lesion to the distal LAD. The lesion was pre-dilated with a 2.5mm emerge balloon. A 3.5x24mm synergy LETY was successfully deployed across the lesion. A 4.0x8mm synergy LETY was successfully deployed proximal to, and overlapping, the first stent. The stents were post-dilated with 3.5 and 4mm NC balloons. There were no complications. KATHLEEN 3 flow was present in all segments upon completion of the procedure.   There is a 0% residual stenosis post intervention.      EXTRACORPOREAL MEMBRANE OXYGENATION CANNULATION 2/17/22:  A 17 Fr ECMO cannula was inserted into the left common femoral artery and a 25 Fr cannula was inserted into the left common femoral vein.  The arterial cannula was positioned in the iliac artery and the venous canula was positioned across the RA.  The ECMO circuit was primed, closely inspected to ensure no bubbles present and ECMO was initiated.  An anterograde perfusion 8 Fr sheath was inserted in the left superficial femoral artery.     CT Chest/Abdomen 2/20/22  IMPRESSION:    1. Retrosternal ill-defined anterior mediastinal fat hemorrhage from  recent resuscitation attempt with focus of suspected active bleeding  from the left internal mammary artery.  2. Left hemothorax. - Blood products from hemorrhage extending into  the left pleural space.   3. Small right pleural effusion with associated atelectasis.  4. No acute findings in the abdomen/pelvis.  5. Support devices as detailed  in the body of the report     CT Head 2/20/22  IMPRESSION:  Indeterminant gray-white matter loss in the right occipital lobe could  be artifactual from adjacent streak/photon starvation artifact versus  true infarct.

## 2022-03-10 NOTE — DISCHARGE SUMMARY
I have seen and examined the patient with the CSI team. I agree with the assessment and plan of the note above.I have reviewed pertinent labs.     Nithin Ybarra MD  Interventional Cardiology  Pager: [phone]    [address] [address]  p: [phone]    Discharge Summary: Cardiology Service    Chano Johnson MRN# [id number]   YOB: 1950 Age: 71 year old       Admission Date: 2/17/2022  Discharge Date: 03/10/22    Discharge Diagnoses:  # VF cardiac arrest   # Cardiogenic/hemorrhagic shock, resovled  # Anterior STEMI s/p LETY x2  # Chronic HFrEF with improved EF   # Ischemic cardiomyopathy   # Possible anoxic brain injury post cardiac arrest=  # Delirium; resolved  # Agitation; resolved  # Paroxysmal atrial fibrillation  # s/p Watchman implantation   # Acute hypoxic respiratory failure, resolved   # Pleural effusion, improved  # Left hemothorax, resolved  # ARSENIO  # Severe malnutrition in the context of acute illness  # Dysphagia   # loose stools 2/2 TF, resolved  # Constipation  # Transaminitis, in setting of shock, cardiac arrest, improved.   # SAHIL, likely ATN in setting of cardiac arrest, stable   # Hypernatremia, resolved   # Hypermagnesemia, Mg 2.5  # Hyperchloremia, Cl 113  # Uremia, improving, BUN 72  # Anemia due to acute blood loss, stable  # Left hemothorax  # Left groin wound 2/2 ECMO decannulation   # Pressure wound of buttock   # Pressure wound of left tongue 2/2 ET  # Generalized weakness  # Deconditioning     Brief HPI:  Chano Johnson is a 71 year old male with a history of atrial fibrillation  s/p ablation, GERD, ARSENIO, and erectile dysfunction who initially presented to [facility] ED on 2/17/22 with chest pain and EKG evidence of anterior STEMI. In the ED, patient went into ventricular fibrillation. CPR was initiated. Total of 8  shocks administered due to refractory VF. Amiodarone 450 mg and epinephrine x4 administered.  Intubated. ROSC achieved at  1405. Transferred directly to [facility] cath lab where he was found to have occlusion of the LAD. During angiogram he was unstable with significant hypotension and ectopy, which prompted placement on VA ECMO. PCI was performed with patient on ECMO with LETY x2 to LAD. Course complicated by hemorrhage shock and left hemothorax requiring chest tube placement, SAHIL, and delirium.     Hospital Course by Diagnosis:    # VF cardiac arrest   # Cardiogenic/hemorrhagic shock, resovled  # Anterior STEMI s/p LETY x2  # Chronic HFrEF with improved EF   # Ischemic cardiomyopathy   Coronary angiogram with culprit lesion to be mLADs/p LETY x2. Has significant residual disease in the a large ramus with 70% stenosis. Peripheral V-A ECMO inserted for refractory cardiogenic shock on 2/17/22: 17 Fr arterial and 25 Fr venous cannula, IABP and distal reperfusion cannula placed. Decannulated on 2/22/22. IABP removed 2/23/22. Echo  2/18/22 10-15% on ECMO. Echo with LVEF of 25-30% with apical akinesis on 2/28/22. Echo on 3/7/22 with improvement of LVEF to 45-50% but persistent akinesis of the mid radha septum and apical segments, consistent with LAD infarction.   - DAPT: aspirin (lifelong) + ticagrelor (minimum of 12 months)  - statin: rosuvastatin 20 mg daily   - Guideline directed HF medications  - BB: Toprol  mg daily  - ACEi/ARB/ARNI: defer due to SAHIL  - Afterload reduction: hydralazine 75 mg TID/ isordil 15 mg TID  - AA: defer due to SAHIL  - SGLT2-I: defer due to SAHIL  - SCD ppx: not indicated as EF >35%  - Diuretics: appears euvolemic.   - Discharge planing: will need cardiology clinic f/u 1 month after discharge and then follow up coronary angio to fix the Ramus lesion         # Paroxysmal atrial fibrillation  # s/p Watchman implantation   Hx of paroxysmal atrial fibrillation s/p ablations and Watchman device. Afib with RVR requiring IV amiodarone in the ICU. Transitioned to po amiodarone on 3/2/22. Currently  in sinus rhythm.   - po amiodarone 200 mg daily   - [id number] score 3, no anticoagulation necessary with Watchman device in place  - rate control with Toprol  mg daily     # Possible anoxic brain injury post cardiac arrest   Head CT on presentation w/o acute pathology. Day 3 CT head showed indeterminate gray-white matter loss in the right occipital lobe which could be artifact vs true infarct. Strong suspicion for artifact per neurocrit. EEG w/o significant abnormalities. Alert and oriented x4 during the day. Able to recall long-term events. Some difficulty with short term memory. Improved.     # Delirium; resolved  # Agitation; resolved  - maintain appropriate awake/sleep cycle   - Melatonin 5 mg at bedtime      Pulmonary   # Acute hypoxic respiratory failure, resolved   # Pleural effusion, improved  # Left hemothorax, resolved  # ARSENIO  Intubated due to cardiac arrest on 2/17/22. Extubated on 2/24/22. On room air. CXR clear.   - CPAP/BiPAP at night      GI/Nutrition  # Severe malnutrition in the context of acute illness  # Dysphagia   # loose stools 2/2 TF, resolved  # Constipation  Patient passed swallow study and diet advanced to minced and moist on 3/8/22. Calorie counts placed. TF to be removed once po intake is sufficient. If patient is unable to meet caloric needs with po intake by time of discharge, he may discharge home with NJ tube and nocturnal TF. Currently patient and family are resistant to this option.   - PO intake adequate (>2000 calories 3/10/22). NJ tube removed  - PRN imodium for loose stools  - Senokot PRN for constipation    # Transaminitis, in setting of shock, cardiac arrest, improved.   ,  on admission. ALT normalized on 2/20 but has been intermittently rising. ALT 97, AST 39 day of discharge.      Renal, Electrolytes   # SAHIL, likely ATN in setting of cardiac arrest, stable   # Hypernatremia, resolved   # Hypermagnesemia, Mg 2.5  # Hyperchloremia, Cl 113  # Uremia,  improving, BUN 72  Cr on admission 1.41. Cr peaked at 3.86 on 2/21. Now improving. No need for CRRT. Auto-diuresing. Creatinine stable at 2.19 day of discharge.        Infectious Disease   Sputum cultures 2/20/22-- Staph lugdunesis. Prophylactic treatment with cefazolin 2 g BID x5 days, 2/24-2/28. WBC 8.9. Afebrile. No concern for acute infection.      Hematology   # Anemia due to acute blood loss, stable  # Left hemothorax  Required frequent blood transfusions of 3-4 units/day to maintain hgb > 7 on admission. Extravasation from left internal mammary artery noted on CT. Left sided chest tube placed at bedside by thoracic surgery on 2/20/22 with 1300 ml old blood return. IR performed emobolization of left internal mammary artery on 2/20/22. Hgb 8.5 today.        Musculoskeletal/Integument   # Left groin wound 2/2 ECMO decannulation   - Sutures removed on 3/8/22.   - Large amounts of serosanguinous drainage noted 3/10 PM, no signs of infection. CTVS rounded on patient and thought to be lymphatic drainage. Offered wound vac which patient declined. Reiterated the importance of keeping incision dry with frequent dressing changes and to monitor for signs/symptoms of infection.    # Pressure wound of buttock   # Pressure wound of left tongue 2/2 ET      # Generalized weakness  # Deconditioning   Home with PT/OT per family request    Pertinent Procedures:  VA ECMO canulation 2/17/22  LETY x2 to LAD 2/17/22  Rewarmed on 2/19/22  CRRT started 2/19/22  Left sided chest tube placed on 2/20/22 for hemothorax  Emobolization of left internal mammary artery on 2/20/22  Decannulation 2/22/22  IABP removed 2/23/22  Extubated 2/24/22  Chest tube removed 3/1/22  Transferred to floor ordered placed 3/2/22  ECMO incision sutures removed 3/8/22    Consults:  Neurology  CVTS  Urology  IR  Palliative  Dental  PT/OT      Discharge medications:   Current Discharge Medication List        CONTINUE these medications which have NOT CHANGED     Details   alfuzosin ER (UROXATRAL) 10 MG 24 hr tablet Take 10 mg by mouth daily      allopurinol (ZYLOPRIM) 100 MG tablet Take 100 mg by mouth 2 times daily      ascorbic acid, vitamin C, (VITAMIN C) 500 MG tablet [ASCORBIC ACID, VITAMIN C, (VITAMIN C) 500 MG TABLET] Take 500 mg by mouth daily.      aspirin 81 MG EC tablet Take 81 mg by mouth daily      sildenafil (VIAGRA) 100 MG tablet [SILDENAFIL (VIAGRA) 100 MG TABLET] Take 100 mg by mouth daily as needed for erectile dysfunction.      Ubiquinol 100 MG CAPS Take 100 mg by mouth daily      vitamin D3 (CHOLECALCIFEROL) 50 mcg (2000 units) tablet Take 1 tablet by mouth daily      zinc gluconate 50 MG tablet Take 50 mg by mouth daily             Follow-up:  7-10 days with PCP  1 month with cardiologist (Dr. Cintron per family request)  Dentist    Labs or imaging requiring follow-up after discharge:  BMP/CBC in 1 week at PCP    Code status:  FULL    Condition on discharge  Temp:  [97.6  F (36.4  C)-98.1  F (36.7  C)] 98.1  F (36.7  C)  Pulse:  [66-77] 73  Resp:  [14-18] 16  BP: ()/(53-65) 112/59  SpO2:  [96 %-99 %] 97 %  General: Alert, interactive, no acute distress  HEENT: Normocephalic, atraumatic. Sclera anicteric.   Neck: JVP not elevated  Cardiovascular: Regular rate and rhythm, normal S1 and S2, no murmurs, gallops, or rubs. Radial and pedal pulses palpable bilaterally.   Resp: Regular work of breathing on room air. Clear to auscultation bilaterally, no rales, wheezes, or rhonchi  GI: Soft, nontender, nondistended.   Extremities: no edema, no cyanosis or clubbing, warm and well perfused  Skin: Warm and dry, no jaundice or rash  Neuro: Alert and oriented x 3. CN 2-12 intact, moves all extremities equally, normal speech  Psych: Mood and affect are appropriate    Imaging with results:  EKG 12 Lead 3/2/22:     ECHO 3/7/22  Limited TTE for LV function.  Left ventricular function is decreased. The ejection fraction is 45-50%  (mildly reduced). There is a pattern  of wall motion abnormalities that is  consistent with a prior LAD culprit infarction.     ECHO 2/28/22:  Interpretation Summary  The visual ejection fraction is 25-30%.  Akinetic apical segments.  Right ventricular function, chamber size, wall motion, and thickness are  normal.  Dilation of the inferior vena cava is present with abnormal respiratory  variation in diameter.  No pericardial effusion is present.     Coronary Angiogram 2/17/22:  Left Main   Minimal luminal irregularities   Left Anterior Descending   Mid LAD lesion is 100% stenosed. Culprit lesion. The lesion is type B2 - medium risk and thrombotic.   Ramus Intermedius   The vessel is large.   Ramus lesion is 70% stenosed.   Left Circumflex   Minimal luminal irregularities   Right Coronary Artery   Mid RCA lesion is 30% stenosed.        Intervention  Mid LAD lesion   Stent   Lesion length: 20 mm. There is no pre-interventional antegrade distal flow (KATHLEEN 0). Pre-stent angioplasty was performed. The post-interventional distal flow is normal (KATHLEEN 3). A 6Fr XB3.5 guide was engaged in the LM. Heparin was used for anticoagulation with ACT > 250 sec. A runthrough wire was advanced across the lesion to the distal LAD. The lesion was pre-dilated with a 2.5mm emerge balloon. A 3.5x24mm synergy LETY was successfully deployed across the lesion. A 4.0x8mm synergy LETY was successfully deployed proximal to, and overlapping, the first stent. The stents were post-dilated with 3.5 and 4mm NC balloons. There were no complications. KATHLEEN 3 flow was present in all segments upon completion of the procedure.   There is a 0% residual stenosis post intervention.      EXTRACORPOREAL MEMBRANE OXYGENATION CANNULATION 2/17/22:  A 17 Fr ECMO cannula was inserted into the left common femoral artery and a 25 Fr cannula was inserted into the left common femoral vein.  The arterial cannula was positioned in the iliac artery and the venous canula was positioned across the RA.  The ECMO  circuit was primed, closely inspected to ensure no bubbles present and ECMO was initiated.  An anterograde perfusion 8 Fr sheath was inserted in the left superficial femoral artery.     CT Chest/Abdomen 2/20/22  IMPRESSION:    1. Retrosternal ill-defined anterior mediastinal fat hemorrhage from  recent resuscitation attempt with focus of suspected active bleeding  from the left internal mammary artery.  2. Left hemothorax. - Blood products from hemorrhage extending into  the left pleural space.   3. Small right pleural effusion with associated atelectasis.  4. No acute findings in the abdomen/pelvis.  5. Support devices as detailed in the body of the report     CT Head 2/20/22  IMPRESSION:  Indeterminant gray-white matter loss in the right occipital lobe could  be artifactual from adjacent streak/photon starvation artifact versus  true infarct.        Recent Labs   Lab 03/10/22  0440 03/09/22  0434 03/08/22  0457 03/07/22 2014 03/07/22  0845 03/07/22  0439 03/06/22  0347    143 143  --   --  144 142   POTASSIUM 5.2 4.8 4.7  --   --  4.4 4.3   CHLORIDE 111* 113* 111*  --   --  112* 113*   CO2 22 24 24  --   --  24 20   ANIONGAP 7 6 8  --   --  8 9   * 125* 134* 140*   < > 144* 118*   BUN 67* 72* 79*  --   --  84* 92*   CR 2.19* 2.05* 2.10*  --   --  1.97* 2.29*   GFRESTIMATED 31* 34* 33*  --   --  35* 29*   PEDRITO 8.8 8.9 9.1  --   --  8.6 8.4*   MAG  --  2.5* 2.8*  --   --  2.7* 2.8*   PHOS  --  3.6 4.0  --   --  4.3 3.7    < > = values in this interval not displayed.         Patient Care Team:  Laith Limon MD as PCP - General (Family Practice)    Time Spent on this Encounter   IGogo APRN CNP, personally saw the patient today and spent greater than 30 minutes discharging this patient.    >30 minutes spent in discharge, including >50% in counseling and coordination of care, medication review and plan of care recommended on follow up. Questions were answered.   It was our pleasure to  care for Chano HOLGUIN Elizabeth during this hospitalization. Please do not hesitate to contact me should there be questions regarding the hospital course or discharge plan.    Patient discussed with staff cardiologist, Dr. Ybarra, who agrees with the above documentation and plan. Documentation represents joint decision making.      BRANDON Cotto, CNP  [facility] Cardiology

## 2022-03-11 VITALS
SYSTOLIC BLOOD PRESSURE: 105 MMHG | RESPIRATION RATE: 16 BRPM | TEMPERATURE: 97.5 F | DIASTOLIC BLOOD PRESSURE: 59 MMHG | OXYGEN SATURATION: 99 % | HEART RATE: 70 BPM | WEIGHT: 198.19 LBS | BODY MASS INDEX: 26.84 KG/M2 | HEIGHT: 72 IN

## 2022-03-11 LAB
ALBUMIN SERPL-MCNC: 2.5 G/DL (ref 3.4–5)
ALP SERPL-CCNC: 47 U/L (ref 40–150)
ALT SERPL W P-5'-P-CCNC: 86 U/L (ref 0–70)
ANION GAP SERPL CALCULATED.3IONS-SCNC: 8 MMOL/L (ref 3–14)
APTT PPP: 29 SECONDS (ref 22–38)
AST SERPL W P-5'-P-CCNC: 34 U/L (ref 0–45)
BASOPHILS # BLD AUTO: 0 10E3/UL (ref 0–0.2)
BASOPHILS NFR BLD AUTO: 0 %
BILIRUB SERPL-MCNC: 0.8 MG/DL (ref 0.2–1.3)
BUN SERPL-MCNC: 60 MG/DL (ref 7–30)
CALCIUM SERPL-MCNC: 8.4 MG/DL (ref 8.5–10.1)
CHLORIDE BLD-SCNC: 110 MMOL/L (ref 94–109)
CO2 SERPL-SCNC: 21 MMOL/L (ref 20–32)
CREAT SERPL-MCNC: 2.24 MG/DL (ref 0.66–1.25)
EOSINOPHIL # BLD AUTO: 0.4 10E3/UL (ref 0–0.7)
EOSINOPHIL NFR BLD AUTO: 5 %
ERYTHROCYTE [DISTWIDTH] IN BLOOD BY AUTOMATED COUNT: 18.7 % (ref 10–15)
GFR SERPL CREATININE-BSD FRML MDRD: 30 ML/MIN/1.73M2
GLUCOSE BLD-MCNC: 105 MG/DL (ref 70–99)
HCT VFR BLD AUTO: 27.5 % (ref 40–53)
HGB BLD-MCNC: 8.5 G/DL (ref 13.3–17.7)
IMM GRANULOCYTES # BLD: 0.3 10E3/UL
IMM GRANULOCYTES NFR BLD: 4 %
LPA SERPL-MCNC: 72 MG/DL
LYMPHOCYTES # BLD AUTO: 0.8 10E3/UL (ref 0.8–5.3)
LYMPHOCYTES NFR BLD AUTO: 10 %
MCH RBC QN AUTO: 30.8 PG (ref 26.5–33)
MCHC RBC AUTO-ENTMCNC: 30.9 G/DL (ref 31.5–36.5)
MCV RBC AUTO: 100 FL (ref 78–100)
MONOCYTES # BLD AUTO: 0.7 10E3/UL (ref 0–1.3)
MONOCYTES NFR BLD AUTO: 9 %
NEUTROPHILS # BLD AUTO: 5.9 10E3/UL (ref 1.6–8.3)
NEUTROPHILS NFR BLD AUTO: 72 %
NRBC # BLD AUTO: 0 10E3/UL
NRBC BLD AUTO-RTO: 0 /100
PLATELET # BLD AUTO: 254 10E3/UL (ref 150–450)
POTASSIUM BLD-SCNC: 4.8 MMOL/L (ref 3.4–5.3)
PROT SERPL-MCNC: 6.1 G/DL (ref 6.8–8.8)
RBC # BLD AUTO: 2.76 10E6/UL (ref 4.4–5.9)
SODIUM SERPL-SCNC: 139 MMOL/L (ref 133–144)
WBC # BLD AUTO: 8.2 10E3/UL (ref 4–11)

## 2022-03-11 PROCEDURE — 85730 THROMBOPLASTIN TIME PARTIAL: CPT | Performed by: INTERNAL MEDICINE

## 2022-03-11 PROCEDURE — 250N000013 HC RX MED GY IP 250 OP 250 PS 637: Performed by: INTERNAL MEDICINE

## 2022-03-11 PROCEDURE — 250N000013 HC RX MED GY IP 250 OP 250 PS 637: Performed by: NURSE PRACTITIONER

## 2022-03-11 PROCEDURE — 36415 COLL VENOUS BLD VENIPUNCTURE: CPT | Performed by: INTERNAL MEDICINE

## 2022-03-11 PROCEDURE — 99207 PR SC NO CHARGE VISIT: CPT | Performed by: INTERNAL MEDICINE

## 2022-03-11 PROCEDURE — 99239 HOSP IP/OBS DSCHRG MGMT >30: CPT | Mod: FS | Performed by: INTERNAL MEDICINE

## 2022-03-11 PROCEDURE — 250N000013 HC RX MED GY IP 250 OP 250 PS 637

## 2022-03-11 PROCEDURE — 85025 COMPLETE CBC W/AUTO DIFF WBC: CPT | Performed by: INTERNAL MEDICINE

## 2022-03-11 PROCEDURE — 80053 COMPREHEN METABOLIC PANEL: CPT | Performed by: INTERNAL MEDICINE

## 2022-03-11 RX ORDER — HYDRALAZINE HYDROCHLORIDE 25 MG/1
75 TABLET, FILM COATED ORAL EVERY 8 HOURS
Qty: 90 TABLET | Refills: 11 | Status: SHIPPED | OUTPATIENT
Start: 2022-03-11 | End: 2022-04-06

## 2022-03-11 RX ORDER — AMOXICILLIN 250 MG
1 CAPSULE ORAL 2 TIMES DAILY PRN
Qty: 30 TABLET | Refills: 1 | Status: SHIPPED | OUTPATIENT
Start: 2022-03-11 | End: 2022-04-06

## 2022-03-11 RX ORDER — SALIVA STIMULANT COMB. NO.3
2 SPRAY, NON-AEROSOL (ML) MUCOUS MEMBRANE 4 TIMES DAILY PRN
Qty: 44.3 ML | Refills: 1 | Status: SHIPPED | OUTPATIENT
Start: 2022-03-11 | End: 2022-04-11

## 2022-03-11 RX ORDER — POLYETHYLENE GLYCOL 3350 17 G/17G
17 POWDER, FOR SOLUTION ORAL DAILY
Qty: 510 G | Refills: 1 | Status: SHIPPED | OUTPATIENT
Start: 2022-03-11 | End: 2023-03-22

## 2022-03-11 RX ORDER — AMIODARONE HYDROCHLORIDE 200 MG/1
200 TABLET ORAL DAILY
Qty: 30 TABLET | Refills: 11 | Status: SHIPPED | OUTPATIENT
Start: 2022-03-11 | End: 2022-06-10

## 2022-03-11 RX ORDER — METOPROLOL SUCCINATE 50 MG/1
150 TABLET, EXTENDED RELEASE ORAL DAILY
Qty: 90 TABLET | Refills: 11 | Status: SHIPPED | OUTPATIENT
Start: 2022-03-11 | End: 2022-04-11

## 2022-03-11 RX ORDER — ROSUVASTATIN CALCIUM 20 MG/1
20 TABLET, COATED ORAL EVERY EVENING
Qty: 30 TABLET | Refills: 11 | Status: SHIPPED | OUTPATIENT
Start: 2022-03-11 | End: 2022-04-11

## 2022-03-11 RX ORDER — MULTIVITAMIN,THERAPEUTIC
1 TABLET ORAL DAILY
Qty: 30 TABLET | Refills: 11 | Status: SHIPPED | OUTPATIENT
Start: 2022-03-11 | End: 2022-04-11

## 2022-03-11 RX ORDER — ISOSORBIDE DINITRATE 5 MG/1
15 TABLET ORAL EVERY 8 HOURS
Qty: 90 TABLET | Refills: 11 | Status: SHIPPED | OUTPATIENT
Start: 2022-03-11 | End: 2022-04-06 | Stop reason: ALTCHOICE

## 2022-03-11 RX ADMIN — METOPROLOL SUCCINATE 150 MG: 50 TABLET, EXTENDED RELEASE ORAL at 08:45

## 2022-03-11 RX ADMIN — HYDRALAZINE HYDROCHLORIDE 75 MG: 25 TABLET ORAL at 05:30

## 2022-03-11 RX ADMIN — THERA TABS 1 TABLET: TAB at 08:45

## 2022-03-11 RX ADMIN — ASPIRIN 81 MG CHEWABLE TABLET 81 MG: 81 TABLET CHEWABLE at 08:45

## 2022-03-11 RX ADMIN — TICAGRELOR 90 MG: 90 TABLET ORAL at 08:46

## 2022-03-11 RX ADMIN — AMIODARONE HYDROCHLORIDE 200 MG: 200 TABLET ORAL at 08:44

## 2022-03-11 RX ADMIN — ISOSORBIDE DINITRATE 15 MG: 10 TABLET ORAL at 08:45

## 2022-03-11 ASSESSMENT — ACTIVITIES OF DAILY LIVING (ADL)
ADLS_ACUITY_SCORE: 14
ADLS_ACUITY_SCORE: 16
ADLS_ACUITY_SCORE: 14

## 2022-03-11 NOTE — PROGRESS NOTES
CLINICAL NUTRITION SERVICES - BRIEF NOTE     Nutrition Prescription    Recommendations already ordered by Registered Dietitian (RD):  -Discontinued enteral nutrition orders, modular, and flushes with plan for FT removal prior to discharge (today)   For last full RD assessment, see note dated 3/10.      NEW FINDINGS   -Adequate PO intake per calorie counts; stable for past 2-days since TF trialed off  -D/W provider and acceptable to remove FT. Pt likely discharging today.      INTERVENTIONS  Implementation  Collaboration with other providers - PAWILL  Enteral Nutrition - Discontinue  Feeding tube flush - Discontinue     Monitoring/Evaluation  Will continue to monitor and evaluate per protocol.    Sudeep Posada, TOMERN, LD, CNSC  [address] pager: [phone] Phone: [phone]

## 2022-03-11 NOTE — PLAN OF CARE
Goal Outcome Evaluation: Met    /59 (BP Location: Right arm)   Pulse 70   Temp 97.5  F (36.4  C) (Axillary)   Resp 16   Ht 1.829 m (6')   Wt 89.9 kg (198 lb 3.1 oz)   SpO2 99%   BMI 26.88 kg/m      VSS. RA. Denies pain. A&O x4. Tolerating diet. NJ removed. Voiding adequately. L groin ECMO site with drainage-dressing changed, primary team and CT surg eval at bedside. Will continue with dry guaze dressings at home. Up with SBA and walker. Pt discharged to home with wife transport. AVS and meds reviewed with pt and wife. All questions answered.

## 2022-03-11 NOTE — PROGRESS NOTES
CVTS:     Rechecked L groin wound at request of primary team.     Patient has serous fluid draining from inferior apex of groin decannulation incision.   Recommended wound vac closure, patient does not want to do this.     Recommended dry gauze and medipore tape (NON-occlusive dressings) changed every 2-12 hours to keep area dry. Must keep area dry.     Follow up with CVTS PRN. Would recommend vac dressing if unable to control drainage.      Ryan Barrientos PA-C  Cardiothoracic Surgery  Pager [phone]    10:42 AM   March 11, 2022

## 2022-03-11 NOTE — PLAN OF CARE
Neuro: A&Ox4.   Cardiac: SR HR 70-80. VSS.   Respiratory: Sating % on RA. CPAP at night while sleeping.   GI/: Adequate urine output. No BM overnight.   Diet/appetite: Regular diet.  Activity:  Assist of 1 with gait belt and walker.   Pain: At acceptable level on current regimen. Pt denied pain overnight.   Skin: No new deficits noted. Mouth sores, bruising on left flank and abdomen, generalized bruising.   LDA's: L midline double lumen, NJ clamped with 60mL Q8hr FWF.     Heparin gtt @500units/hr continuous rate    Plan: Continue with POC. Notify primary team with changes.

## 2022-03-11 NOTE — PROGRESS NOTES
Calorie Count  Intake recorded for: 3/10  Total Kcals: 2362 Total Protein: 110g  Kcals from Hospital Food: 2362   Protein: 110g  Kcals from Outside Food (average):0 Protein: 0g  # Meals Ordered from Kitchen: 3 meals  # Meals Recorded: 3 meals  (First - 100% oatmeal w/ brown sugar, blueberry muffin, peaches, 1 8oz 1% milk, 1 4oz cranberry juice, tea)  (Second - 33% scrambled eggs, sausage)  (Third - 100% chicken, mashed potatoes w/ gravy, 1 8oz 1% milk)  # Supplements Recorded: 100% 2 Ensure Enlive, 1 Ensure Clear 50% 1 Ensure Clear

## 2022-03-11 NOTE — PROGRESS NOTES
Care Management Discharge Note    Discharge Date: 03/11/2022     Discharge Disposition: Home    Discharge Services:  Home care    Discharge DME:  Walker    Discharge Transportation: family or friend will provide    Private pay costs discussed: Not applicable    Education Provided on the Discharge Plan:  Yes  Persons Notified of Discharge Plans: Patient, home care  Patient/Family in Agreement with the Plan:  Yes    Handoff Referral Completed: Yes, external    Additional Information:  Medically ready for discharge. [facility] updated, discharge orders faxed at this time. IMM completed w/patient. Family will transport. No additional care coordination needs noted at this time.     [facility] (RN/OT/OT)  Phone: [phone]  Fax: [phone]    Angy Beckett, RNCC, BSN    [facility]    [address]  [address] [place] [id number]    [email or web address]    Office: [phone] Pager: [phone]  To contact the weekend RNCC, page [phone].

## 2022-03-11 NOTE — PROGRESS NOTES
"Occupational Therapy Discharge Summary    Reason for therapy discharge:    Discharged to home with home therapy.    Progress towards therapy goal(s). See goals on Care Plan in [facility] electronic health record for goal details.  Goals not met.  Barriers to achieving goals:   discharge from facility.    Therapy recommendation(s):    Continued therapy is recommended.  Rationale/Recommendations:  Pt demonstrating good progress w/ IP therapies but still below IND baseline, requiring Ax1 and FWW for safe functional mobility and assist w/ ADL/IADLs. Pt will benefit from continued skilled OT. Pt and family adamantly refusing TCU. Pt's son planning to take time off of work, \"as much time as needed\" to provide 24-hr assist in addition to pt's wife. If pt were to return home, 24 hr supervision for safety,  OT/PT.  Pt. has walk in shower with shower chair and grab bars, comfort ht. toilet. .      "

## 2022-03-14 NOTE — PLAN OF CARE
Physical Therapy Discharge Summary    Reason for therapy discharge:    All goals and outcomes met, no further needs identified.    Progress towards therapy goal(s). See goals on Care Plan in [facility] electronic health record for goal details.  Goals met    Therapy recommendation(s):    Continued therapy is recommended.  Rationale/Recommendations:  Home health PT in order to regain baseline functional status.

## 2022-03-25 ENCOUNTER — MYC MEDICAL ADVICE (OUTPATIENT)
Dept: CARDIOLOGY | Facility: CLINIC | Age: 72
End: 2022-03-25
Payer: MEDICARE

## 2022-03-25 DIAGNOSIS — I46.9 CARDIAC ARREST (H): Primary | ICD-10-CM

## 2022-03-25 NOTE — TELEPHONE ENCOUNTER
Fluid collection above his ECMO site,looks like a small collection of clear fluid, no fever, no drainage, no pain  Will watch over the weekend and if it should become hot or painful, or if he develops a fever he will go to urgent care.

## 2022-03-26 ENCOUNTER — NURSE TRIAGE (OUTPATIENT)
Dept: NURSING | Facility: CLINIC | Age: 72
End: 2022-03-26
Payer: MEDICARE

## 2022-03-26 NOTE — TELEPHONE ENCOUNTER
"Caller, wife, Cherelle.  Caller states pt had \"massive heart attack\" 2/17/22. Was seen at [facility] ED and then transferred to [facility]. Pt had been on ECMO there. Pt is on blood thinner per caller.    ECMO site, in left groin, is not draining but collecting fluid past 2-3 days. It has not increased in size and is approx size of \"apricot\". No redness, or fever, or pain.    Caller states pt is anxious that the fluid is collecting and not draining, and caller is asking if pt needs assistance this weekend, can they go to [facility] ED for evaluation, as pt declines using [facility].. Per EHR: Pt spoke with provider/nurse via [employer] 3/25/22, and was advised to go to C if wound worsens or symptoms of infection appear. Pt was advised to apply heat to site, which has been doing, and monitor site. Pt does not like [facility], therefore, wife states they will go to ED this weekend if symptoms worsen.     Caller states no current concerns, but asking if [facility] ED could drain the wound if it should worsen since the pt will not go to [facility].     Caller not with pt at time of call. RN advised caller to call back when with pt if symptoms worsen or if any questions. No triage provided, but RN reiterated what was advised in note from nurse on 3/25/22.    Per RN Triage protocol, Caller advised to call back if any concerning symptoms when she is with pt. No triage provided during this call. Caller plans to take pt to [facility] ED if symptoms worsen or pt wants site to be evaluated. Caller given care advice per RN triage protocol. Caller verbalizes understanding and agreement of plan.    Bibiana Melendez RN   03/26/22 7:45 AM  [facility] Nurse Advisor    Reason for Disposition    [1] Caller is not with the adult (patient) AND [2] probable NON-URGENT symptoms    Additional Information    Negative: [1] Caller is not with the adult (patient) AND [2] reporting urgent symptoms    Negative: Lab result questions    Negative: Medication " questions    Negative: Caller can't be reached by phone    Negative: Caller has already spoken to PCP or another triager    Negative: RN needs further essential information from caller in order to complete triage    Negative: Requesting regular office appointment    Negative: [1] Caller requesting NON-URGENT health information AND [2] PCP's office is the best resource    Negative: Health Information question, no triage required and triager able to answer question    Negative: General information question, no triage required and triager able to answer question    Negative: Question about upcoming scheduled test, no triage required and triager able to answer question    Protocols used: INFORMATION ONLY CALL - NO TRIAGE-A-

## 2022-03-27 ENCOUNTER — HEALTH MAINTENANCE LETTER (OUTPATIENT)
Age: 72
End: 2022-03-27

## 2022-04-05 NOTE — PROGRESS NOTES
"Chief complaint: Post hospital follow up    HPI: Mr Johnson is a 73 yo man with a pmhx sig for afib s/p ablation, arsenio, smoking, and cad s/p STEMI c/b VF arrest supported va ecmo   As you well know but for my Records:  Chano Johnson is a 72 year old M with a history of afib s/p ablation, and ARSENIO who initially presented to [facility] ED on 2/17/22 with CP and EKG evidence of anterior STEMI. While in the ED, patient went into V fib --> CPR --> 8 shocks+ Amiodarone and epinephrine-->intubated -->ROSC --> [facility] cath lab --> occlusion of the mid LAD. During angiogram he was unstable with significant hypotension and ectopy --> VA ECMO -->PCI with LETY x2 to mid LAD course c/b hemorrhage shock and left hemothorax  2/2 bleeing from his L WES requiring chest tube placement and attempted IR embolization on 2/20/2022 though no bleeding was active at the time of the procedure, SAHIL, and delirium. He was discharged on  3/10/2022 to home. Since then he has been weak, tired and borderline hypotensive at times. His labs have been notable for slightly rising cr, normalized LFTs and persistent anemia. In addition,  he has had mild oozing from the ECMO site that has now stopped, he does feel anxious about having another event and feels that a rest sensation in his left chest (ache not present with exertion) is his blockage and he wants hit addressed \"as soon as possible\". Lastly he also has a history of urethral diverticulum with reconstruction in the past and states his symptoms are now worse after he had the hopper placed during his hospitalization.     Today: he denies any chest pressure, dyspnea at rest or with exertion, PND, orthopnea, peripheral edema, palpitations, lightheadedness or syncope.  He continues to work with physical therapy twice a week. He has no chest pain or shortness of breath while doing the activities with physical therapy. He is interested in cardiac rehabilitation. Patient feels fatigued, but is gaining his " strength back slowly. He and his wife want to know if he can go on a trip in the next month or so.      PAST MEDICAL HISTORY:  See above    CURRENT MEDICATIONS:  Current Outpatient Medications   Medication Sig Dispense Refill     allopurinol (ZYLOPRIM) 100 MG tablet Take 100 mg by mouth 2 times daily       amiodarone (PACERONE) 200 MG tablet Take 1 tablet (200 mg) by mouth daily 30 tablet 11     artificial saliva (BIOTENE MT) SOLN solution Swish and spit 2 mLs (2 sprays) in mouth 4 times daily as needed for dry mouth 44.3 mL 1     ascorbic acid, vitamin C, (VITAMIN C) 500 MG tablet [ASCORBIC ACID, VITAMIN C, (VITAMIN C) 500 MG TABLET] Take 500 mg by mouth daily.       aspirin 81 MG EC tablet Take 81 mg by mouth daily       hydrALAZINE (APRESOLINE) 25 MG tablet 3 tablets (75 mg) by Oral or Feeding Tube route every 8 hours 90 tablet 11     isosorbide dinitrate (ISORDIL) 5 MG tablet 3 tablets (15 mg) by Oral or Feeding Tube route every 8 hours 90 tablet 11     melatonin 5 MG tablet 1 tablet (5 mg) by Oral or Feeding Tube route every evening 30 tablet 11     metoprolol succinate ER (TOPROL-XL) 50 MG 24 hr tablet Take 3 tablets (150 mg) by mouth daily 90 tablet 11     multivitamin, therapeutic (THERA-VIT) TABS tablet Take 1 tablet by mouth daily 30 tablet 11     polyethylene glycol (MIRALAX) 17 GM/Dose powder Take 17 g by mouth daily 510 g 1     rosuvastatin (CRESTOR) 20 MG tablet Take 1 tablet (20 mg) by mouth every evening 30 tablet 11     senna-docusate (SENOKOT-S/PERICOLACE) 8.6-50 MG tablet 1 tablet by Oral or Feeding Tube route 2 times daily as needed for constipation 30 tablet 1     ticagrelor (BRILINTA) 90 MG tablet 1 tablet (90 mg) by Oral or Feeding Tube route 2 times daily 60 tablet 11     Ubiquinol 100 MG CAPS Take 100 mg by mouth daily       vitamin D3 (CHOLECALCIFEROL) 50 mcg (2000 units) tablet Take 1 tablet by mouth daily       zinc gluconate 50 MG tablet Take 50 mg by mouth daily         PAST SURGICAL  "HISTORY:  Past Surgical History:   Procedure Laterality Date     ATRIAL ABLATION SURGERY       CV ARTERIAL LINE PLACEMENT N/A 2/17/2022    Procedure: Arterial Line Placement;  Surgeon: Larry Cintron MD;  Location: [facility] CARDIAC CATH LAB     CV CORONARY ANGIOGRAM N/A 2/17/2022    Procedure: Coronary Angiogram;  Surgeon: Larry Cintron MD;  Location: [facility] CARDIAC CATH LAB     CV EXTRACORPERAL MEMBRANE OXYGENATION N/A 2/17/2022    Procedure: Extracorporeal Membrance Oxygenation;  Surgeon: Larry Cintron MD;  Location: [facility] CARDIAC CATH LAB     CV INTRA AORTIC BALLOON N/A 2/17/2022    Procedure: Intra Aortic Balloon Pump Insertion;  Surgeon: Larry Cintron MD;  Location: [facility] CARDIAC CATH LAB     CV PCI ANGIOPLASTY N/A 2/17/2022    Procedure: Percutaneous Transluminal Angioplasty;  Surgeon: Larry Cintron MD;  Location: [facility] CARDIAC CATH LAB     CV THERAPEUTIC HYPOTHERMIA N/A 2/17/2022    Procedure: Therapeutic Hypothermia;  Surgeon: Larry Cintron MD;  Location: [facility] CARDIAC CATH LAB     IR UPPER EXTREMITY ANGIOGRAM LEFT  02/20/2022     MIDLINE DOUBLE LUMEN PLACEMENT Left 02/28/2022    Left brachial vein medial 0.59cm.Blood return on all ports midline okay to use.     REMOVE EXTRACORPORAL MEMBRANE OXYGENATOR ADULT N/A 02/22/2022    Procedure: EXTRACORPOREAL MEMBRANE OXYGENATION CANNULA REMOVAL, INTRAOPERATIVE TRANSESOPHAGEAL ECHOCARDIOGRAM PER ANESTHESIA.;  Surgeon: Rod Banuelos MD;  Location:  OR       ALLERGIES:     Allergies   Allergen Reactions     Chlorpheniramine-Phenylpropan [A.R.M.] Other (See Comments)     Watery eyes, sneezing     Seroquel [Quetiapine] Other (See Comments)     Per Meeta (Wife), would like this medication to be listed as an allergy due to patient becoming combative and delirious after taking it.      Percocet [Oxycodone-Acetaminophen] Rash     \"felt like skin was crawling off\"-pt's S.O.        FAMILY HISTORY:  No significant family history "     SOCIAL HISTORY:  Social History     Tobacco Use     Smoking status: Never Smoker     Smokeless tobacco: Not on file   Substance Use Topics     Alcohol use: Yes     Alcohol/week: 2.0 standard drinks     Drug use: No       ROS:   A comprehensive 14 point review of systems is negative other than as mentioned in HPI.    Exam:  BP 99/55 (BP Location: Right arm, Patient Position: Chair, Cuff Size: Adult Regular)   Pulse 54   Wt 93.6 kg (206 lb 6.4 oz)   SpO2 99%   BMI 27.99 kg/m    GENERAL APPEARANCE: healthy, alert and no distress  EYES: no icterus, no xanthelasmas  ENT: normal palate, mucosa moist, no central cyanosis  NECK: JVP not elevated  RESPIRATORY: lungs clear to auscultation - no rales, rhonchi or wheezes, no use of accessory muscles, no retractions, respirations are unlabored, normal respiratory rate  CARDIOVASCULAR: regular rhythm, normal S1 with physiologic split S2, no S3 or S4 and no murmur, click or rub.  GI: soft, non tender, bowel sounds normal,no abdominal bruits  EXTREMITIES: no edema, no bruits  NEURO: alert and oriented to person/place/time, normal speech, gait and affect  VASC: Radial, dorsalis pedis and posterior tibialis pulses 2+ bilaterally.  SKIN: no ecchymoses, no rashes. ECMO site looks c/d/i.  PSYCH: cooperative, affect appropriate.     Labs:  Reviewed.   Recent Labs   Lab Test 03/11/22  0525 03/10/22  0440    140   POTASSIUM 4.8 5.2   CHLORIDE 110* 111*   CO2 21 22   BUN 60* 67*   CR 2.24* 2.19*     CBC RESULTS:   Recent Labs   Lab Test 03/11/22  0525   WBC 8.2   RBC 2.76*   HGB 8.5*   HCT 27.5*      MCH 30.8   MCHC 30.9*   RDW 18.7*        Cholesterol   Date Value Ref Range Status   03/03/2022 118 <200 mg/dL Final   06/30/2017 173 <=199 mg/dL Final     Direct Measure HDL   Date Value Ref Range Status   03/03/2022 32 (L) >=40 mg/dL Final   06/30/2017 42 >=40 mg/dL Final     LDL Cholesterol Calculated   Date Value Ref Range Status   03/03/2022 66 <=100 mg/dL Final    06/30/2017 116 <=129 mg/dL Final     Triglycerides   Date Value Ref Range Status   03/03/2022 98 <150 mg/dL Final   02/22/2022 150 (H) <150 mg/dL Final     Comment:     0-9 years:  Normal:    Less than 75 mg/dL  Borderline high:  75-99 mg/dL  High:             Greater than or equal to 100 mg/dL    0-19 years:  Normal:    Less than 90 mg/dL  Borderline high:   mg/dL  High:             Greater than or equal to 130 mg/dL    20 years and older:  Normal:    Less than 150 mg/dL  Borderline high:  150-199 mg/dL  High:             200-499 mg/dL  Very high:   Greater than or equal to 500 mg/dL     No results found for: CHOLHDLRATIO  INR   Date Value Ref Range Status   02/24/2022 1.17 (H) 0.85 - 1.15 Final       No results found for: TSH  No results for input(s): A1C in the last 35274 hours.    Testing/Procedures:  I personally reviewed all the following information:    EKG (Date 3/3/22): NSR. Q waves in V2-V5. Low voltage QRS.  Coronary angiogram (2/17/22): culprit lesion to be mLAD s/p LETY x2. Has significant residual disease in the a large ramus with 70% stenosis. S/P V-A ECMO (2/17/22 -2/22/22).  Echo on 3/7/22: LVEF to 45-50% but persistent akinesis of the mid radha septum and apical segments, consistent with LAD infarction.     Assessment and Plan:   Refractory VF/VT cardiac arrest date 02/17/2022  Etiology: Ischemic s/p PCI to mLAD   Fully revasc: No. Has residual disease in RI  LVEF:  TTE 03/07/2022 LVEF: 45-50% RV function: Normal.  ICD:  Not indicated giving EF is 45-50%.  - defer Neuropsych referral for now  - Behavioral health/psychology referral; details for calling for appointment provided  - Supplied with [place] sudden cardiac arrest  group details ([email or web address]; [phone])  - Continue work with ot/slp and cardiac rehab  - Driving: state law to refrain from driving at least three months from cardiac arrest, CDL liscences do not allow ICD   -Comprehensive driving assessment with OT  -->cost associated, reviewed with pt   -continue to work with OT at home  -Ordered Cardiac rehab  - Lab abnormalities to follow up on: Anemia; lfts normalized, rising cr, will trend and hold off on acei for now    #VF cardiac arrest  2/2 Anterior STEMI s/ s/p LETY x2 2/2022; Ischemic cardiomyopathy  LV EF 45-50% Coronary angiogram  (2/17/22) with culprit lesion to be mLAD s/p LETY x2. Has significant residual disease in the a large ramus with 70% stenosis. S/P V-A ECMO (2/17/22 -2/22/22).  - Echo on 3/7/22 showed LVEF to 45-50% but persistent akinesis of the mid radha septum and apical segments, consistent with LAD infarction.   -no active symptoms with exertion, will follow up with Dr. Cintron by phone in one week to discuss possible revasc RI no urgency though the pt would like to address before his trip  - DAPT: Aspirin (lifelong) + ticagrelor (minimum of 12 months)  - Statin: Rosuvastatin 20 mg daily   - Continue Toprol  mg daily  - cr rising will hold off on addition of SLGT2i and/or acei for now  -mild hypotension, pt feels as if symptoms may be related to borderline hypotension, decrease Hydralazine to 25 mg Q8 hours (from 50mg q8)  -For ease of use (and hf improved now for MI/CAD) stop Isordil. Start Imdur 30 mg daily.  termedius.   -per pt and family want a nutrition consult for weight loss and poor appetite with dietary restrictions     #Pafib s/p ablation and Watchman implantation   - Afib with RVR requiring IV amiodarone while in the ICU. Transitioned to po Amiodarone on 3/2/22.  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   - ,  on 2/17/22.   - ALT 47, AST 21 on  4/6/22     # SAHIL, likely ATN in setting of cardiac arrest, stable now at new baseline thought has been on consistent rise.   - Cr on admission 1.41 -->peaked at 3.86 on 2/21-->2.24 at the day of discharge (3/11/2022) Simon 1.9  And has been climbing slowly since then on 4/6/22 is 2.34.  His new baseline might be 2-2.3.   -Will repeat BMP in 1 week to follow trend.  - Avoid nephrotoxic agents  -hold on addition of acei while monitoring      # Anemia due to acute blood loss, stable ~8 though today's lab slightly lower than prior this may be due to lab variation  - Required frequent blood transfusions of 3-4 units/day to maintain hgb > 7 on admission.  - Iron panel is normal. B12 is normal. Folic acid - pending, will gather with labs next week.  - Monitor Hgb, no transfusion indicated    # LIMA extravasation on CT  - Extravasation from left internal mammary artery noted on CT. IR attempted emobolization of left internal mammary artery on 2/20/22, but they did not see any active extravasation during the procedure. No embolization was done.    # Left groin wound at the ECMO site  - Sutures removed on 3/8/22  - Incision is clean, dry, and intact today    Return to clinic in 3-4 months    In total I spent 40 minutes on the date of the encounter doing chart review, interpretation of tests, and the medical decision making during this visit. The visit was of high complexity.     Griffin Chavez MD  Cardiology Critical Care

## 2022-04-06 ENCOUNTER — OFFICE VISIT (OUTPATIENT)
Dept: CARDIOLOGY | Facility: CLINIC | Age: 72
End: 2022-04-06
Attending: INTERNAL MEDICINE
Payer: MEDICARE

## 2022-04-06 ENCOUNTER — LAB (OUTPATIENT)
Dept: LAB | Facility: CLINIC | Age: 72
End: 2022-04-06
Attending: INTERNAL MEDICINE
Payer: MEDICARE

## 2022-04-06 VITALS
BODY MASS INDEX: 27.99 KG/M2 | DIASTOLIC BLOOD PRESSURE: 55 MMHG | HEART RATE: 54 BPM | SYSTOLIC BLOOD PRESSURE: 99 MMHG | OXYGEN SATURATION: 99 % | WEIGHT: 206.4 LBS

## 2022-04-06 DIAGNOSIS — D63.1 ANEMIA DUE TO STAGE 3 CHRONIC KIDNEY DISEASE, UNSPECIFIED WHETHER STAGE 3A OR 3B CKD (H): ICD-10-CM

## 2022-04-06 DIAGNOSIS — I46.9 CARDIAC ARREST (H): Primary | ICD-10-CM

## 2022-04-06 DIAGNOSIS — N18.31 STAGE 3A CHRONIC KIDNEY DISEASE (H): ICD-10-CM

## 2022-04-06 DIAGNOSIS — N18.30 ANEMIA DUE TO STAGE 3 CHRONIC KIDNEY DISEASE, UNSPECIFIED WHETHER STAGE 3A OR 3B CKD (H): ICD-10-CM

## 2022-04-06 DIAGNOSIS — I21.3 ST ELEVATION MYOCARDIAL INFARCTION (STEMI), UNSPECIFIED ARTERY (H): ICD-10-CM

## 2022-04-06 DIAGNOSIS — D64.9 ANEMIA: ICD-10-CM

## 2022-04-06 DIAGNOSIS — I46.9 CARDIAC ARREST (H): ICD-10-CM

## 2022-04-06 LAB
ALBUMIN SERPL-MCNC: 2.9 G/DL (ref 3.4–5)
ALP SERPL-CCNC: 50 U/L (ref 40–150)
ALT SERPL W P-5'-P-CCNC: 47 U/L (ref 0–70)
ANION GAP SERPL CALCULATED.3IONS-SCNC: 6 MMOL/L (ref 3–14)
AST SERPL W P-5'-P-CCNC: 21 U/L (ref 0–45)
BILIRUB SERPL-MCNC: 0.6 MG/DL (ref 0.2–1.3)
BUN SERPL-MCNC: 21 MG/DL (ref 7–30)
CALCIUM SERPL-MCNC: 8.6 MG/DL (ref 8.5–10.1)
CHLORIDE BLD-SCNC: 111 MMOL/L (ref 94–109)
CO2 SERPL-SCNC: 25 MMOL/L (ref 20–32)
CREAT SERPL-MCNC: 2.34 MG/DL (ref 0.66–1.25)
ERYTHROCYTE [DISTWIDTH] IN BLOOD BY AUTOMATED COUNT: 20.2 % (ref 10–15)
FERRITIN SERPL-MCNC: 222 NG/ML (ref 26–388)
GFR SERPL CREATININE-BSD FRML MDRD: 28 ML/MIN/1.73M2
GLUCOSE BLD-MCNC: 124 MG/DL (ref 70–99)
HCT VFR BLD AUTO: 26.6 % (ref 40–53)
HGB BLD-MCNC: 8.1 G/DL (ref 13.3–17.7)
IRON SATN MFR SERPL: 22 % (ref 15–46)
IRON SERPL-MCNC: 59 UG/DL (ref 35–180)
MCH RBC QN AUTO: 31.9 PG (ref 26.5–33)
MCHC RBC AUTO-ENTMCNC: 30.5 G/DL (ref 31.5–36.5)
MCV RBC AUTO: 105 FL (ref 78–100)
PLATELET # BLD AUTO: 193 10E3/UL (ref 150–450)
POTASSIUM BLD-SCNC: 4 MMOL/L (ref 3.4–5.3)
PROT SERPL-MCNC: 6.4 G/DL (ref 6.8–8.8)
RBC # BLD AUTO: 2.54 10E6/UL (ref 4.4–5.9)
SODIUM SERPL-SCNC: 142 MMOL/L (ref 133–144)
TIBC SERPL-MCNC: 266 UG/DL (ref 240–430)
VIT B12 SERPL-MCNC: 426 PG/ML (ref 193–986)
WBC # BLD AUTO: 5.3 10E3/UL (ref 4–11)

## 2022-04-06 PROCEDURE — 82728 ASSAY OF FERRITIN: CPT | Performed by: PATHOLOGY

## 2022-04-06 PROCEDURE — 83550 IRON BINDING TEST: CPT | Performed by: PATHOLOGY

## 2022-04-06 PROCEDURE — 99000 SPECIMEN HANDLING OFFICE-LAB: CPT | Performed by: PATHOLOGY

## 2022-04-06 PROCEDURE — 82746 ASSAY OF FOLIC ACID SERUM: CPT | Performed by: INTERNAL MEDICINE

## 2022-04-06 PROCEDURE — 80053 COMPREHEN METABOLIC PANEL: CPT | Performed by: PATHOLOGY

## 2022-04-06 PROCEDURE — 36415 COLL VENOUS BLD VENIPUNCTURE: CPT | Performed by: PATHOLOGY

## 2022-04-06 PROCEDURE — 99215 OFFICE O/P EST HI 40 MIN: CPT | Performed by: INTERNAL MEDICINE

## 2022-04-06 PROCEDURE — 82607 VITAMIN B-12: CPT | Performed by: PATHOLOGY

## 2022-04-06 PROCEDURE — G0463 HOSPITAL OUTPT CLINIC VISIT: HCPCS

## 2022-04-06 PROCEDURE — 85027 COMPLETE CBC AUTOMATED: CPT | Performed by: PATHOLOGY

## 2022-04-06 RX ORDER — HYDRALAZINE HYDROCHLORIDE 25 MG/1
25 TABLET, FILM COATED ORAL 3 TIMES DAILY
Qty: 90 TABLET | Refills: 3 | Status: SHIPPED | OUTPATIENT
Start: 2022-04-06 | End: 2022-04-11

## 2022-04-06 RX ORDER — ISOSORBIDE MONONITRATE 30 MG/1
30 TABLET, EXTENDED RELEASE ORAL DAILY
Qty: 90 TABLET | Refills: 3 | Status: SHIPPED | OUTPATIENT
Start: 2022-04-06 | End: 2022-05-09

## 2022-04-06 ASSESSMENT — PAIN SCALES - GENERAL: PAINLEVEL: NO PAIN (0)

## 2022-04-06 NOTE — PATIENT INSTRUCTIONS
You were seen today in the Cardiovascular Clinic at the [facility] by:       Dr. Griffin Chavez       Your visit summary and instructions are as follows:    1. We are waiting on some additional labs today to determine if the addition of a vitamin would be helfpul to treat your anemia (will be back with labs in one week)  2. We would like you to get lab work in 1 week to follow your cr. You will likely need to follow with your primary care for chronic kidney disease moving forward.   3. Speak to Dr. Cintron when schedule for evaluation and planning regarding your un revascularized coronary artery disease (RI)  4. Please schedule with the following specialist: Urology, nutrition and Cardiac rehab  5. Consider reaching out to [place] sudden cardiac arrest  group details ([email or web address]; [phone])  6. Reviewed the [place] state law re refraining from driving for 3 months post cardiac arrest.  7. Stop Isosorbide Dinitrate and start Isosorbide mononitrate once a day (30mg)  8. Reduce the hydralazine to 25mg three times a day  9. Stop amiodarone after your trip, on May 25th  9.  Continue to work toward the recommendation of 150 minutes of moderate intensity exercise/week and maintain a healthy lifestyle (avoid illicit drugs, smoking and moderate alcohol consumption)    Follow up with me or Dr. Cintron in cardiology clinic in 3-4 months.     Thank you for your visit today!     Please MyChart message me or call my nurse if you have any questions or concerns.      During Business Hours:  [phone], option # 1 (University) then option # 4 (medical questions) and ask to speak with my nurse.     After hours, weekends or holidays:   [phone], Option #4  Ask to speak to the On-Call Cardiologist. Inform them you are a heart failure patient at the [place].  ________________________________________________________________________    What is Health Psychology?  Health Psychology is a specialty that  helps people cope with the stress and anxiety that often occurs with illness, emotional and psychological issues, and preparation for medical treatment including surgical procedures.    Our Care Team  Working with primary and specialty physicians at [facility] and [facility], we see patients in the hospital and clinic, allowing us to coordinate our services with the rest of people's medical needs.    Please contact our psychologists directly with questions or to make an appointment.    Jaylin Jean, Ph.D.,                 [phone]  Shelby Urban, Ph.D.                      [phone]  Kellen Jones, Ph.D.,                    [phone]          James Cha, Ph.D., [facility],   [phone]     Telehealth services are now being provided to patients.    Location:    [facility]                      [address] [place] [id number]                     Please arrive 15 minutes early to check in for in-person appointments.      We can help patients affected by  Adjustment problems  Anxiety  Cancer  Chronic digestive disorders  Depression  Diabetes  Disability  Health-related behavior change  Insomnia  Other medical and nervous system conditions  People experiencing or wishing to prevent health problems  Smoking cessation  Transplants    Treatments we offer include  Psychological assessment  Psychotherapy/counseling  Cognitive behavioral therapy  Relaxation training  Behavior therapy  Motivational interviewing    How We Help  Coping: We help people with the emotional issues related to their illness.  Challenges: Difficult challenges seem to increase during illness. We teach steps and strategies for managing stressful situations.  Feelings: We help people deal with anger, anxiety, confusion, depression, fear, frustration, grief, loss of control, sadness, uncertainty, and motivational issues.  Behaviors: When people are ill, it can be harder to  take care of themselves. We help people manage weight, follow health regimens, relax, and quit smoking.  Counseling: We offer several types of counseling and can create a plan that meets the needs of a patient. Our practice works with individuals, couples, and families.    NOTE: Services are confidential. Insurance coverage varies. Mental health benefits of health plans may have different levels of coverage than medical benefits. Please confirm the coverage details with the insurance plan or our business office.

## 2022-04-06 NOTE — LETTER
"4/6/2022      RE: Chano Johnson  [address]  [facility] [address]       Dear Colleague,    Thank you for the opportunity to participate in the care of your patient, Chano Johnson, at the [facility] [place] [facility]. Please see a copy of my visit note below.    Chief complaint: Post hospital follow up    HPI: Mr Johnson is a 73 yo man with a pmhx sig for afib s/p ablation, arsenio, smoking, and cad s/p STEMI c/b VF arrest supported va ecmo   As you well know but for my Records:  Chano Johnson is a 72 year old M with a history of afib s/p ablation, and ARSENIO who initially presented to [facility] ED on 2/17/22 with CP and EKG evidence of anterior STEMI. While in the ED, patient went into V fib --> CPR --> 8 shocks+ Amiodarone and epinephrine-->intubated -->ROSC --> [facility] cath lab --> occlusion of the mid LAD. During angiogram he was unstable with significant hypotension and ectopy --> VA ECMO -->PCI with LETY x2 to mid LAD course c/b hemorrhage shock and left hemothorax  2/2 bleeing from his L WES requiring chest tube placement and attempted IR embolization on 2/20/2022 though no bleeding was active at the time of the procedure, SAHIL, and delirium. He was discharged on  3/10/2022 to home. Since then he has been weak, tired and borderline hypotensive at times. His labs have been notable for slightly rising cr, normalized LFTs and persistent anemia. In addition,  he has had mild oozing from the ECMO site that has now stopped, he does feel anxious about having another event and feels that a rest sensation in his left chest (ache not present with exertion) is his blockage and he wants hit addressed \"as soon as possible\". Lastly he also has a history of urethral diverticulum with reconstruction in the past and states his symptoms are now worse after he had the hopper placed during his hospitalization.     Today: he denies any chest pressure, dyspnea at rest " or with exertion, PND, orthopnea, peripheral edema, palpitations, lightheadedness or syncope.  He continues to work with physical therapy twice a week. He has no chest pain or shortness of breath while doing the activities with physical therapy. He is interested in cardiac rehabilitation. Patient feels fatigued, but is gaining his strength back slowly. He and his wife want to know if he can go on a trip in the next month or so.      PAST MEDICAL HISTORY:  See above    CURRENT MEDICATIONS:  Current Outpatient Medications   Medication Sig Dispense Refill     allopurinol (ZYLOPRIM) 100 MG tablet Take 100 mg by mouth 2 times daily       amiodarone (PACERONE) 200 MG tablet Take 1 tablet (200 mg) by mouth daily 30 tablet 11     artificial saliva (BIOTENE MT) SOLN solution Swish and spit 2 mLs (2 sprays) in mouth 4 times daily as needed for dry mouth 44.3 mL 1     ascorbic acid, vitamin C, (VITAMIN C) 500 MG tablet [ASCORBIC ACID, VITAMIN C, (VITAMIN C) 500 MG TABLET] Take 500 mg by mouth daily.       aspirin 81 MG EC tablet Take 81 mg by mouth daily       hydrALAZINE (APRESOLINE) 25 MG tablet 3 tablets (75 mg) by Oral or Feeding Tube route every 8 hours 90 tablet 11     isosorbide dinitrate (ISORDIL) 5 MG tablet 3 tablets (15 mg) by Oral or Feeding Tube route every 8 hours 90 tablet 11     melatonin 5 MG tablet 1 tablet (5 mg) by Oral or Feeding Tube route every evening 30 tablet 11     metoprolol succinate ER (TOPROL-XL) 50 MG 24 hr tablet Take 3 tablets (150 mg) by mouth daily 90 tablet 11     multivitamin, therapeutic (THERA-VIT) TABS tablet Take 1 tablet by mouth daily 30 tablet 11     polyethylene glycol (MIRALAX) 17 GM/Dose powder Take 17 g by mouth daily 510 g 1     rosuvastatin (CRESTOR) 20 MG tablet Take 1 tablet (20 mg) by mouth every evening 30 tablet 11     senna-docusate (SENOKOT-S/PERICOLACE) 8.6-50 MG tablet 1 tablet by Oral or Feeding Tube route 2 times daily as needed for constipation 30 tablet 1      ticagrelor (BRILINTA) 90 MG tablet 1 tablet (90 mg) by Oral or Feeding Tube route 2 times daily 60 tablet 11     Ubiquinol 100 MG CAPS Take 100 mg by mouth daily       vitamin D3 (CHOLECALCIFEROL) 50 mcg (2000 units) tablet Take 1 tablet by mouth daily       zinc gluconate 50 MG tablet Take 50 mg by mouth daily         PAST SURGICAL HISTORY:  Past Surgical History:   Procedure Laterality Date     ATRIAL ABLATION SURGERY       CV ARTERIAL LINE PLACEMENT N/A 2/17/2022    Procedure: Arterial Line Placement;  Surgeon: Larry Cintron MD;  Location: [facility] CARDIAC CATH LAB     CV CORONARY ANGIOGRAM N/A 2/17/2022    Procedure: Coronary Angiogram;  Surgeon: Larry Cintron MD;  Location: [facility] CARDIAC CATH LAB     CV EXTRACORPERAL MEMBRANE OXYGENATION N/A 2/17/2022    Procedure: Extracorporeal Membrance Oxygenation;  Surgeon: Larry Cintron MD;  Location: [facility] CARDIAC CATH LAB     CV INTRA AORTIC BALLOON N/A 2/17/2022    Procedure: Intra Aortic Balloon Pump Insertion;  Surgeon: Larry Cintron MD;  Location: [facility] CARDIAC CATH LAB     CV PCI ANGIOPLASTY N/A 2/17/2022    Procedure: Percutaneous Transluminal Angioplasty;  Surgeon: Larry Cintron MD;  Location: [facility] CARDIAC CATH LAB     CV THERAPEUTIC HYPOTHERMIA N/A 2/17/2022    Procedure: Therapeutic Hypothermia;  Surgeon: Larry Cintron MD;  Location: [facility] CARDIAC CATH LAB     IR UPPER EXTREMITY ANGIOGRAM LEFT  02/20/2022     MIDLINE DOUBLE LUMEN PLACEMENT Left 02/28/2022    Left brachial vein medial 0.59cm.Blood return on all ports midline okay to use.     REMOVE EXTRACORPORAL MEMBRANE OXYGENATOR ADULT N/A 02/22/2022    Procedure: EXTRACORPOREAL MEMBRANE OXYGENATION CANNULA REMOVAL, INTRAOPERATIVE TRANSESOPHAGEAL ECHOCARDIOGRAM PER ANESTHESIA.;  Surgeon: Rod Banuelos MD;  Location:  OR       ALLERGIES:     Allergies   Allergen Reactions     Chlorpheniramine-Phenylpropan [A.R.M.] Other (See Comments)     Watery eyes, sneezing  "    Seroquel [Quetiapine] Other (See Comments)     Per Meeta (Wife), would like this medication to be listed as an allergy due to patient becoming combative and delirious after taking it.      Percocet [Oxycodone-Acetaminophen] Rash     \"felt like skin was crawling off\"-pt's S.O.        FAMILY HISTORY:  No significant family history     SOCIAL HISTORY:  Social History     Tobacco Use     Smoking status: Never Smoker     Smokeless tobacco: Not on file   Substance Use Topics     Alcohol use: Yes     Alcohol/week: 2.0 standard drinks     Drug use: No       ROS:   A comprehensive 14 point review of systems is negative other than as mentioned in HPI.    Exam:  BP 99/55 (BP Location: Right arm, Patient Position: Chair, Cuff Size: Adult Regular)   Pulse 54   Wt 93.6 kg (206 lb 6.4 oz)   SpO2 99%   BMI 27.99 kg/m    GENERAL APPEARANCE: healthy, alert and no distress  EYES: no icterus, no xanthelasmas  ENT: normal palate, mucosa moist, no central cyanosis  NECK: JVP not elevated  RESPIRATORY: lungs clear to auscultation - no rales, rhonchi or wheezes, no use of accessory muscles, no retractions, respirations are unlabored, normal respiratory rate  CARDIOVASCULAR: regular rhythm, normal S1 with physiologic split S2, no S3 or S4 and no murmur, click or rub.  GI: soft, non tender, bowel sounds normal,no abdominal bruits  EXTREMITIES: no edema, no bruits  NEURO: alert and oriented to person/place/time, normal speech, gait and affect  VASC: Radial, dorsalis pedis and posterior tibialis pulses 2+ bilaterally.  SKIN: no ecchymoses, no rashes. ECMO site looks c/d/i.  PSYCH: cooperative, affect appropriate.     Labs:  Reviewed.   Recent Labs   Lab Test 03/11/22  0525 03/10/22  0440    140   POTASSIUM 4.8 5.2   CHLORIDE 110* 111*   CO2 21 22   BUN 60* 67*   CR 2.24* 2.19*     CBC RESULTS:   Recent Labs   Lab Test 03/11/22  0525   WBC 8.2   RBC 2.76*   HGB 8.5*   HCT 27.5*      MCH 30.8   MCHC 30.9*   RDW 18.7*   PLT " 254     Cholesterol   Date Value Ref Range Status   03/03/2022 118 <200 mg/dL Final   06/30/2017 173 <=199 mg/dL Final     Direct Measure HDL   Date Value Ref Range Status   03/03/2022 32 (L) >=40 mg/dL Final   06/30/2017 42 >=40 mg/dL Final     LDL Cholesterol Calculated   Date Value Ref Range Status   03/03/2022 66 <=100 mg/dL Final   06/30/2017 116 <=129 mg/dL Final     Triglycerides   Date Value Ref Range Status   03/03/2022 98 <150 mg/dL Final   02/22/2022 150 (H) <150 mg/dL Final     Comment:     0-9 years:  Normal:    Less than 75 mg/dL  Borderline high:  75-99 mg/dL  High:             Greater than or equal to 100 mg/dL    0-19 years:  Normal:    Less than 90 mg/dL  Borderline high:   mg/dL  High:             Greater than or equal to 130 mg/dL    20 years and older:  Normal:    Less than 150 mg/dL  Borderline high:  150-199 mg/dL  High:             200-499 mg/dL  Very high:   Greater than or equal to 500 mg/dL     No results found for: CHOLHDLRATIO  INR   Date Value Ref Range Status   02/24/2022 1.17 (H) 0.85 - 1.15 Final       No results found for: TSH  No results for input(s): A1C in the last 95139 hours.    Testing/Procedures:  I personally reviewed all the following information:    EKG (Date 3/3/22): NSR. Q waves in V2-V5. Low voltage QRS.  Coronary angiogram (2/17/22): culprit lesion to be mLAD s/p LETY x2. Has significant residual disease in the a large ramus with 70% stenosis. S/P V-A ECMO (2/17/22 -2/22/22).  Echo on 3/7/22: LVEF to 45-50% but persistent akinesis of the mid radha septum and apical segments, consistent with LAD infarction.     Assessment and Plan:   Refractory VF/VT cardiac arrest date 02/17/2022  Etiology: Ischemic s/p PCI to mLAD   Fully revasc: No. Has residual disease in RI  LVEF:  TTE 03/07/2022 LVEF: 45-50% RV function: Normal.  ICD:  Not indicated giving EF is 45-50%.  - defer Neuropsych referral for now  - Behavioral health/psychology referral; details for calling for  appointment provided  - Supplied with [place] sudden cardiac arrest  group details ([email or web address]; [phone])  - Continue work with ot/slp and cardiac rehab  - Driving: state law to refrain from driving at least three months from cardiac arrest, CDL liscences do not allow ICD   -Comprehensive driving assessment with OT -->cost associated, reviewed with pt   -continue to work with OT at home  -Ordered Cardiac rehab  - Lab abnormalities to follow up on: Anemia; lfts normalized, rising cr, will trend and hold off on acei for now    #VF cardiac arrest  2/2 Anterior STEMI s/ s/p LETY x2 2/2022; Ischemic cardiomyopathy  LV EF 45-50% Coronary angiogram  (2/17/22) with culprit lesion to be mLAD s/p LETY x2. Has significant residual disease in the a large ramus with 70% stenosis. S/P V-A ECMO (2/17/22 -2/22/22).  - Echo on 3/7/22 showed LVEF to 45-50% but persistent akinesis of the mid radha septum and apical segments, consistent with LAD infarction.   -no active symptoms with exertion, will follow up with Dr. Cintron by phone in one week to discuss possible revasc RI no urgency though the pt would like to address before his trip  - DAPT: Aspirin (lifelong) + ticagrelor (minimum of 12 months)  - Statin: Rosuvastatin 20 mg daily   - Continue Toprol  mg daily  - cr rising will hold off on addition of SLGT2i and/or acei for now  -mild hypotension, pt feels as if symptoms may be related to borderline hypotension, decrease Hydralazine to 25 mg Q8 hours (from 50mg q8)  -For ease of use (and hf improved now for MI/CAD) stop Isordil. Start Imdur 30 mg daily.  termedius.   -per pt and family want a nutrition consult for weight loss and poor appetite with dietary restrictions     #Pafib s/p ablation and Watchman implantation   - Afib with RVR requiring IV amiodarone while in the ICU. Transitioned to po Amiodarone on 3/2/22.  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   - ,  on 2/17/22.   - ALT 47, AST 21 on 4/6/22     # SAHIL, likely ATN in setting of cardiac arrest, stable now at new baseline thought has been on consistent rise.   - Cr on admission 1.41 -->peaked at 3.86 on 2/21-->2.24 at the day of discharge (3/11/2022) Simon 1.9  And has been climbing slowly since then on 4/6/22 is 2.34.  His new baseline might be 2-2.3.   -Will repeat BMP in 1 week to follow trend.  - Avoid nephrotoxic agents  -hold on addition of acei while monitoring      # Anemia due to acute blood loss, stable ~8 though today's lab slightly lower than prior this may be due to lab variation  - Required frequent blood transfusions of 3-4 units/day to maintain hgb > 7 on admission.  - Iron panel is normal. B12 is normal. Folic acid - pending, will gather with labs next week.  - Monitor Hgb, no transfusion indicated    # LIMA extravasation on CT  - Extravasation from left internal mammary artery noted on CT. IR attempted emobolization of left internal mammary artery on 2/20/22, but they did not see any active extravasation during the procedure. No embolization was done.    # Left groin wound at the ECMO site  - Sutures removed on 3/8/22  - Incision is clean, dry, and intact today    Return to clinic in 3-4 months    In total I spent 40 minutes on the date of the encounter doing chart review, interpretation of tests, and the medical decision making during this visit. The visit was of high complexity.     Griffin Chavez MD  Cardiology Critical Care

## 2022-04-06 NOTE — NURSING NOTE
Chief Complaint   Patient presents with     Follow Up     Return cardiology- S/P arrest/ECMO, S/P Afib ablation & Watchman procedures     Vitals were taken and medications reconciled.    Lasha Lee, EMT  8:56 AM

## 2022-04-07 ENCOUNTER — MYC MEDICAL ADVICE (OUTPATIENT)
Dept: CARDIOLOGY | Facility: CLINIC | Age: 72
End: 2022-04-07
Payer: MEDICARE

## 2022-04-07 DIAGNOSIS — D64.9 ANEMIA: ICD-10-CM

## 2022-04-07 DIAGNOSIS — I46.9 CARDIAC ARREST (H): Primary | ICD-10-CM

## 2022-04-07 DIAGNOSIS — I21.3 ST ELEVATION MYOCARDIAL INFARCTION (STEMI), UNSPECIFIED ARTERY (H): ICD-10-CM

## 2022-04-07 DIAGNOSIS — E78.5 HYPERLIPIDEMIA, UNSPECIFIED HYPERLIPIDEMIA TYPE: ICD-10-CM

## 2022-04-07 DIAGNOSIS — I46.9 CARDIAC ARREST (H): ICD-10-CM

## 2022-04-07 LAB — FOLATE SERPL-MCNC: 33.7 NG/ML

## 2022-04-11 RX ORDER — MULTIVITAMIN,THERAPEUTIC
1 TABLET ORAL DAILY
Qty: 90 TABLET | Refills: 3 | Status: SHIPPED | OUTPATIENT
Start: 2022-04-11 | End: 2023-03-22

## 2022-04-11 RX ORDER — HYDRALAZINE HYDROCHLORIDE 25 MG/1
25 TABLET, FILM COATED ORAL 3 TIMES DAILY
Qty: 270 TABLET | Refills: 3 | Status: SHIPPED | OUTPATIENT
Start: 2022-04-11 | End: 2022-05-09

## 2022-04-11 RX ORDER — ROSUVASTATIN CALCIUM 20 MG/1
20 TABLET, COATED ORAL EVERY EVENING
Qty: 90 TABLET | Refills: 3 | Status: SHIPPED | OUTPATIENT
Start: 2022-04-11 | End: 2023-03-22

## 2022-04-11 RX ORDER — METOPROLOL SUCCINATE 50 MG/1
150 TABLET, EXTENDED RELEASE ORAL DAILY
Qty: 270 TABLET | Refills: 3 | Status: SHIPPED | OUTPATIENT
Start: 2022-04-11 | End: 2023-03-22

## 2022-04-12 ENCOUNTER — LAB (OUTPATIENT)
Dept: LAB | Facility: CLINIC | Age: 72
End: 2022-04-12
Payer: MEDICARE

## 2022-04-12 DIAGNOSIS — N18.31 STAGE 3A CHRONIC KIDNEY DISEASE (H): ICD-10-CM

## 2022-04-12 LAB
ALBUMIN SERPL-MCNC: 3.2 G/DL (ref 3.5–5)
ALP SERPL-CCNC: 48 U/L (ref 45–120)
ALT SERPL W P-5'-P-CCNC: 36 U/L (ref 0–45)
ANION GAP SERPL CALCULATED.3IONS-SCNC: 11 MMOL/L (ref 5–18)
AST SERPL W P-5'-P-CCNC: 24 U/L (ref 0–40)
BILIRUB SERPL-MCNC: 0.6 MG/DL (ref 0–1)
BUN SERPL-MCNC: 16 MG/DL (ref 8–28)
CALCIUM SERPL-MCNC: 9 MG/DL (ref 8.5–10.5)
CHLORIDE BLD-SCNC: 109 MMOL/L (ref 98–107)
CO2 SERPL-SCNC: 21 MMOL/L (ref 22–31)
CREAT SERPL-MCNC: 2.13 MG/DL (ref 0.7–1.3)
GFR SERPL CREATININE-BSD FRML MDRD: 32 ML/MIN/1.73M2
GLUCOSE BLD-MCNC: 102 MG/DL (ref 70–125)
POTASSIUM BLD-SCNC: 4.1 MMOL/L (ref 3.5–5)
PROT SERPL-MCNC: 6.1 G/DL (ref 6–8)
SODIUM SERPL-SCNC: 141 MMOL/L (ref 136–145)

## 2022-04-12 PROCEDURE — 80053 COMPREHEN METABOLIC PANEL: CPT

## 2022-04-12 PROCEDURE — 36415 COLL VENOUS BLD VENIPUNCTURE: CPT

## 2022-04-13 ENCOUNTER — HOSPITAL ENCOUNTER (OUTPATIENT)
Dept: CARDIAC REHAB | Facility: CLINIC | Age: 72
Discharge: HOME | End: 2022-04-13
Attending: INTERNAL MEDICINE
Payer: MEDICARE

## 2022-04-13 DIAGNOSIS — I46.9 CARDIAC ARREST (H): ICD-10-CM

## 2022-04-13 PROCEDURE — 93797 PHYS/QHP OP CAR RHAB WO ECG: CPT | Mod: XU

## 2022-04-13 PROCEDURE — 93798 PHYS/QHP OP CAR RHAB W/ECG: CPT

## 2022-04-13 NOTE — RESULT ENCOUNTER NOTE
Thank you for getting your labs.   Your cr, has come back down to 2.13 (from 2.34) this suggests to me that this is your new normal cr (fluctuates between 2.0 and 2.4).     There is no change to your medication or plan recommended based on these results, we just needed to make sure the cr did not continue to trend in the wrong direction.    Hope you are doing well. Please feel free to reach out with comments or questions.

## 2022-04-14 ENCOUNTER — VIRTUAL VISIT (OUTPATIENT)
Dept: CARDIOLOGY | Facility: CLINIC | Age: 72
End: 2022-04-14
Attending: INTERNAL MEDICINE
Payer: MEDICARE

## 2022-04-14 DIAGNOSIS — I46.9 CARDIAC ARREST (H): Primary | ICD-10-CM

## 2022-04-14 DIAGNOSIS — I25.10 CORONARY ARTERY DISEASE INVOLVING NATIVE CORONARY ARTERY OF NATIVE HEART WITHOUT ANGINA PECTORIS: ICD-10-CM

## 2022-04-14 PROCEDURE — G0463 HOSPITAL OUTPT CLINIC VISIT: HCPCS | Mod: PN,RTG | Performed by: INTERNAL MEDICINE

## 2022-04-14 PROCEDURE — 99215 OFFICE O/P EST HI 40 MIN: CPT | Mod: 95 | Performed by: INTERNAL MEDICINE

## 2022-04-14 NOTE — LETTER
4/14/2022      RE: Chano Johnson  [address]  [facility] [address]       Dear Colleague,    Thank you for the opportunity to participate in the care of your patient, Chano Johnson, at the [facility] HEART [facility] [place] [facility]. Please see a copy of my visit note below.          [facility]  CARDIOVASCULAR MEDICINE VIDEO VISIT NOTE    Referring Provider: No ref. provider found   Primary Care Provider: Laith Limon     Patient Name: Chano Johnson   MRN: [id number]     PERTINENT CLINICAL HISTORY:   Chano Johnson is a 72 year old gentleman with a pmhx sig for afib s/p ablation, abhi, smoking, and cad s/p STEMI c/b VF arrest requiring va ecmo.  He recovered well and was discharged 3/10/2022.    He has been doing well since discharge.  He has started cardiac rehab.  He stopped using his walker 2 weeks ago.  He still has mild weakness and fatigue, but he notices improvement from day-to-day.  He denies any chest pain, palpitations, SOB, edema, orthopnea, PND, syncope or near syncope.    He has two primary questions today in reference to his groin ECMO site which remains swollen without redness.  He denies any pain or numbness in the area and he has not had any fevers.  This was evaluated during his last clinic visit 4/6 and was not concerning for infection at that time.  The second question is related to the need for repeat coronary angiogram for completion of his revascularization including the RI branch.      I have personally reviewed outside notes from his hospitalization and clinic visit with Dr. Chavez.     PAST MEDICAL HISTORY:   No past medical history on file.     PAST SURGICAL HISTORY:     Past Surgical History:   Procedure Laterality Date     ATRIAL ABLATION SURGERY       CV ARTERIAL LINE PLACEMENT N/A 2/17/2022    Procedure: Arterial Line Placement;  Surgeon: Larry Cintron MD;  Location:  HEART CARDIAC CATH LAB     CV CORONARY  ANGIOGRAM N/A 2/17/2022    Procedure: Coronary Angiogram;  Surgeon: Larry Cintron MD;  Location:  HEART CARDIAC CATH LAB     CV EXTRACORPERAL MEMBRANE OXYGENATION N/A 2/17/2022    Procedure: Extracorporeal Membrance Oxygenation;  Surgeon: Larry Cintron MD;  Location: [facility] CARDIAC CATH LAB     CV INTRA AORTIC BALLOON N/A 2/17/2022    Procedure: Intra Aortic Balloon Pump Insertion;  Surgeon: Larry Cintron MD;  Location: [facility] CARDIAC CATH LAB     CV PCI ANGIOPLASTY N/A 2/17/2022    Procedure: Percutaneous Transluminal Angioplasty;  Surgeon: Larry Cintron MD;  Location:  HEART CARDIAC CATH LAB     CV THERAPEUTIC HYPOTHERMIA N/A 2/17/2022    Procedure: Therapeutic Hypothermia;  Surgeon: Larry Cintron MD;  Location: [facility] CARDIAC CATH LAB     IR UPPER EXTREMITY ANGIOGRAM LEFT  02/20/2022     MIDLINE DOUBLE LUMEN PLACEMENT Left 02/28/2022    Left brachial vein medial 0.59cm.Blood return on all ports midline okay to use.     REMOVE EXTRACORPORAL MEMBRANE OXYGENATOR ADULT N/A 02/22/2022    Procedure: EXTRACORPOREAL MEMBRANE OXYGENATION CANNULA REMOVAL, INTRAOPERATIVE TRANSESOPHAGEAL ECHOCARDIOGRAM PER ANESTHESIA.;  Surgeon: Rod Banuelos MD;  Location:  OR        CURRENT MEDICATIONS:     Current Outpatient Medications   Medication Sig Dispense Refill     allopurinol (ZYLOPRIM) 100 MG tablet Take 100 mg by mouth 2 times daily       amiodarone (PACERONE) 200 MG tablet Take 1 tablet (200 mg) by mouth daily 30 tablet 11     aspirin 81 MG EC tablet Take 81 mg by mouth daily       hydrALAZINE (APRESOLINE) 25 MG tablet Take 1 tablet (25 mg) by mouth in the morning and 1 tablet (25 mg) at noon and 1 tablet (25 mg) in the evening. 270 tablet 3     isosorbide mononitrate (IMDUR) 30 MG 24 hr tablet Take 1 tablet (30 mg) by mouth daily 90 tablet 3     metoprolol succinate ER (TOPROL-XL) 50 MG 24 hr tablet Take 3 tablets (150 mg) by mouth in the morning. 270 tablet 3     multivitamin,  "therapeutic (THERA-VIT) TABS tablet Take 1 tablet by mouth in the morning. 90 tablet 3     polyethylene glycol (MIRALAX) 17 GM/Dose powder Take 17 g by mouth daily 510 g 1     rosuvastatin (CRESTOR) 20 MG tablet Take 1 tablet (20 mg) by mouth every evening 90 tablet 3     ticagrelor (BRILINTA) 90 MG tablet 1 tablet (90 mg) by Oral or Feeding Tube route in the morning and 1 tablet (90 mg) in the evening. 90 tablet 3        ALLERGIES:     Allergies   Allergen Reactions     Chlorpheniramine-Phenylpropan [A.R.M.] Other (See Comments)     Watery eyes, sneezing     Seroquel [Quetiapine] Other (See Comments)     Per Meeta (Wife), would like this medication to be listed as an allergy due to patient becoming combative and delirious after taking it.      Percocet [Oxycodone-Acetaminophen] Rash     \"felt like skin was crawling off\"-pt's S.O.          PHYSICAL EXAMINATION:   No vitals were collected as this was a video visit.  There is no height or weight on file to calculate BMI.  Wt Readings from Last 2 Encounters:   04/06/22 93.6 kg (206 lb 6.4 oz)   03/11/22 89.9 kg (198 lb 3.1 oz)     Constitutional: no acute distress, pleasant and cooperative, appears overall well.  Cardiovascular: extremities with no edema or cyanosis  Respiratory: no audible wheezes or coughing; no accessory muscle use  Gastrointestinal: non distended  Neurologic: AOx3     The rest of a comprehensive physical examination is deferred due to PHE (public health emergency) video visit restrictions     LABORATORY DATA:   I have reviewed the labs below.    LIPID RESULTS:  Recent Labs   Lab Test 03/03/22  0645 02/22/22  0330 06/30/17  1355   CHOL 118  --  173   HDL 32*  --  42   LDL 66  --  116   TRIG 98 150* 76        LIVER ENZYME RESULTS:  Recent Labs   Lab Test 04/12/22  1247 04/06/22  0805   AST 24 21   ALT 36 47       CBC RESULTS:  Recent Labs   Lab Test 04/06/22  0805 03/11/22  0525   WBC 5.3 8.2   HGB 8.1* 8.5*   HCT 26.6* 27.5*    254       BMP " RESULTS:  Recent Labs   Lab Test 04/12/22  1247 04/06/22  0805    142   POTASSIUM 4.1 4.0   CHLORIDE 109* 111*   CO2 21* 25   ANIONGAP 11 6    124*   BUN 16 21   CR 2.13* 2.34*   PEDRITO 9.0 8.6     INR RESULTS:  Recent Labs   Lab Test 02/24/22  0946 02/24/22  0331   INR 1.17* 1.12          PROCEDURES & FURTHER ASSESSMENTS:   I have reviewed the test results below.    ECHO: 3/7/2022  Limited TTE for LV function.  Left ventricular function is decreased. The ejection fraction is 45-50%  (mildly reduced). There is a pattern of wall motion abnormalities that is  consistent with a prior LAD culprit infarction.    CARDIAC CATH:  2/17/2022    VF cardiac arrest with ongoing cardiogenic shock    Two vessel coronary artery disease with occlusion of the midLAD and severe stenosis of the large ramus    Successful PCI of the mid LAD with a LETY    Successful cannulation for peripheral VA ECMO with a 17 Fr arterial and 25 Fr venous cannula    Successful insertion of a distal perfusion cannula    Successful insertion of a 50cc IABP    Successful insertion of a thermogard cooling catheter    Successful insertion of a R radial arterial line    I personally reviewed the images from the coronary angiogram above.     CLINICAL IMPRESSION:   Chano Johnson is a 72 year old gentleman with a pmhx sig for afib s/p ablation, abhi, smoking, and cad s/p STEMI c/b VF arrest requiring va ecmo.  He recovered well and was discharged 3/10/2022.    Coronary artery disease: causing refractory VF arrest; found to have 2v disease with revascularization of the acutely occluded mLAD.  Plan for staged PCI of the RI.  --Proceed with staged PCI of the RI; repeat BMP at time of cath  --ASA and ticagrelor  --Repeat TTE in 6/2022  --Discussed upcoming urology appt - ok to proceed with surgery with ongoing DAPT.  Can NOT hold DAPT until 2/2023 unless emergent procedures are necessary.    ECMO Cannulation Site: likely lymphocele but will continue to  monitor.  Not impacting ambulation.  No signs of infection.  If still present when he returns from his trip, will consult CVTS who decannulated the patient from ECMO.    Follow-up: as scheduled    Thank you for allowing us to take part in the care of this very pleasant patient.  Please do not hesitate to call if any further questions or concerns arise.    I spent 50 min today reviewing the medical record, meeting with the patient, and completing this note.    Larry Cintron MD, PhD  Interventional/Critical Care Cardiology  [phone]    April 14, 2022      CC  Patient Care Team:  Laith Limon MD as PCP - General (Family Practice)  Larry Cintron MD as MD (Cardiovascular Disease)  Mirlande Mckenzie, RN as Specialty Care Coordinator (Cardiology)  Malik Henriquez MD as MD (Cardiovascular Disease)  CHELSIE Parker MD as MD (Plastic Surgery)  Griffin Chavez MD as MD (Cardiovascular Disease)        6}

## 2022-04-14 NOTE — NURSING NOTE
Chief Complaint   Patient presents with     Follow Up     Pt reviewed medications during echeck in, he states he has some swelling around wound and it is also uncomfortable feeling, he states he thought the swelling would be gone by now     Patient denies any changes since echeck-in regarding medication and allergies and states all information entered during echeck-in remains accurate.    Madi Hendricks, VF/CMA

## 2022-04-14 NOTE — PROGRESS NOTES
Chano is a 72 year old who is being evaluated via a billable video visit.      How would you like to obtain your AVS? MyChart  If the video visit is dropped, the invitation should be resent by: [phone]  Will anyone else be joining your video visit?   pts wife  Madi Hendricks, VF/CMA          [facility]  CARDIOVASCULAR MEDICINE VIDEO VISIT NOTE    Referring Provider: No ref. provider found   Primary Care Provider: Laith Limon     Patient Name: Chano Johnson   MRN: [id number]     PERTINENT CLINICAL HISTORY:   Chano Johnson is a 72 year old gentleman with a pmhx sig for afib s/p ablation, abhi, smoking, and cad s/p STEMI c/b VF arrest requiring va ecmo.  He recovered well and was discharged 3/10/2022.    He has been doing well since discharge.  He has started cardiac rehab.  He stopped using his walker 2 weeks ago.  He still has mild weakness and fatigue, but he notices improvement from day-to-day.  He denies any chest pain, palpitations, SOB, edema, orthopnea, PND, syncope or near syncope.    He has two primary questions today in reference to his groin ECMO site which remains swollen without redness.  He denies any pain or numbness in the area and he has not had any fevers.  This was evaluated during his last clinic visit 4/6 and was not concerning for infection at that time.  The second question is related to the need for repeat coronary angiogram for completion of his revascularization including the RI branch.      I have personally reviewed outside notes from his hospitalization and clinic visit with Dr. Chavez.     PAST MEDICAL HISTORY:   No past medical history on file.     PAST SURGICAL HISTORY:     Past Surgical History:   Procedure Laterality Date     ATRIAL ABLATION SURGERY       CV ARTERIAL LINE PLACEMENT N/A 2/17/2022    Procedure: Arterial Line Placement;  Surgeon: Larry Cintron MD;  Location:  HEART CARDIAC CATH LAB     CV CORONARY ANGIOGRAM N/A 2/17/2022    Procedure:  Coronary Angiogram;  Surgeon: Larry Cintron MD;  Location:  HEART CARDIAC CATH LAB     CV EXTRACORPERAL MEMBRANE OXYGENATION N/A 2/17/2022    Procedure: Extracorporeal Membrance Oxygenation;  Surgeon: Larry Cintron MD;  Location:  HEART CARDIAC CATH LAB     CV INTRA AORTIC BALLOON N/A 2/17/2022    Procedure: Intra Aortic Balloon Pump Insertion;  Surgeon: Larry Cintron MD;  Location: [facility] CARDIAC CATH LAB     CV PCI ANGIOPLASTY N/A 2/17/2022    Procedure: Percutaneous Transluminal Angioplasty;  Surgeon: Larry Cintron MD;  Location:  HEART CARDIAC CATH LAB     CV THERAPEUTIC HYPOTHERMIA N/A 2/17/2022    Procedure: Therapeutic Hypothermia;  Surgeon: Larry Cintron MD;  Location: [facility] CARDIAC CATH LAB     IR UPPER EXTREMITY ANGIOGRAM LEFT  02/20/2022     MIDLINE DOUBLE LUMEN PLACEMENT Left 02/28/2022    Left brachial vein medial 0.59cm.Blood return on all ports midline okay to use.     REMOVE EXTRACORPORAL MEMBRANE OXYGENATOR ADULT N/A 02/22/2022    Procedure: EXTRACORPOREAL MEMBRANE OXYGENATION CANNULA REMOVAL, INTRAOPERATIVE TRANSESOPHAGEAL ECHOCARDIOGRAM PER ANESTHESIA.;  Surgeon: Rod Banuelos MD;  Location:  OR        CURRENT MEDICATIONS:     Current Outpatient Medications   Medication Sig Dispense Refill     allopurinol (ZYLOPRIM) 100 MG tablet Take 100 mg by mouth 2 times daily       amiodarone (PACERONE) 200 MG tablet Take 1 tablet (200 mg) by mouth daily 30 tablet 11     aspirin 81 MG EC tablet Take 81 mg by mouth daily       hydrALAZINE (APRESOLINE) 25 MG tablet Take 1 tablet (25 mg) by mouth in the morning and 1 tablet (25 mg) at noon and 1 tablet (25 mg) in the evening. 270 tablet 3     isosorbide mononitrate (IMDUR) 30 MG 24 hr tablet Take 1 tablet (30 mg) by mouth daily 90 tablet 3     metoprolol succinate ER (TOPROL-XL) 50 MG 24 hr tablet Take 3 tablets (150 mg) by mouth in the morning. 270 tablet 3     multivitamin, therapeutic (THERA-VIT) TABS tablet Take 1  "tablet by mouth in the morning. 90 tablet 3     polyethylene glycol (MIRALAX) 17 GM/Dose powder Take 17 g by mouth daily 510 g 1     rosuvastatin (CRESTOR) 20 MG tablet Take 1 tablet (20 mg) by mouth every evening 90 tablet 3     ticagrelor (BRILINTA) 90 MG tablet 1 tablet (90 mg) by Oral or Feeding Tube route in the morning and 1 tablet (90 mg) in the evening. 90 tablet 3        ALLERGIES:     Allergies   Allergen Reactions     Chlorpheniramine-Phenylpropan [A.R.M.] Other (See Comments)     Watery eyes, sneezing     Seroquel [Quetiapine] Other (See Comments)     Per Meeta (Wife), would like this medication to be listed as an allergy due to patient becoming combative and delirious after taking it.      Percocet [Oxycodone-Acetaminophen] Rash     \"felt like skin was crawling off\"-pt's S.O.          PHYSICAL EXAMINATION:   No vitals were collected as this was a video visit.  There is no height or weight on file to calculate BMI.  Wt Readings from Last 2 Encounters:   04/06/22 93.6 kg (206 lb 6.4 oz)   03/11/22 89.9 kg (198 lb 3.1 oz)     Constitutional: no acute distress, pleasant and cooperative, appears overall well.  Cardiovascular: extremities with no edema or cyanosis  Respiratory: no audible wheezes or coughing; no accessory muscle use  Gastrointestinal: non distended  Neurologic: AOx3     The rest of a comprehensive physical examination is deferred due to PHE (public health emergency) video visit restrictions     LABORATORY DATA:   I have reviewed the labs below.    LIPID RESULTS:  Recent Labs   Lab Test 03/03/22  0645 02/22/22  0330 06/30/17  1355   CHOL 118  --  173   HDL 32*  --  42   LDL 66  --  116   TRIG 98 150* 76        LIVER ENZYME RESULTS:  Recent Labs   Lab Test 04/12/22  1247 04/06/22  0805   AST 24 21   ALT 36 47       CBC RESULTS:  Recent Labs   Lab Test 04/06/22  0805 03/11/22  0525   WBC 5.3 8.2   HGB 8.1* 8.5*   HCT 26.6* 27.5*    254       BMP RESULTS:  Recent Labs   Lab Test " 04/12/22  1247 04/06/22  0805    142   POTASSIUM 4.1 4.0   CHLORIDE 109* 111*   CO2 21* 25   ANIONGAP 11 6    124*   BUN 16 21   CR 2.13* 2.34*   PEDRITO 9.0 8.6     INR RESULTS:  Recent Labs   Lab Test 02/24/22  0946 02/24/22  0331   INR 1.17* 1.12          PROCEDURES & FURTHER ASSESSMENTS:   I have reviewed the test results below.    ECHO: 3/7/2022  Limited TTE for LV function.  Left ventricular function is decreased. The ejection fraction is 45-50%  (mildly reduced). There is a pattern of wall motion abnormalities that is  consistent with a prior LAD culprit infarction.    CARDIAC CATH:  2/17/2022    VF cardiac arrest with ongoing cardiogenic shock    Two vessel coronary artery disease with occlusion of the midLAD and severe stenosis of the large ramus    Successful PCI of the mid LAD with a LETY    Successful cannulation for peripheral VA ECMO with a 17 Fr arterial and 25 Fr venous cannula    Successful insertion of a distal perfusion cannula    Successful insertion of a 50cc IABP    Successful insertion of a thermogard cooling catheter    Successful insertion of a R radial arterial line    I personally reviewed the images from the coronary angiogram above.     CLINICAL IMPRESSION:   Chano Johnson is a 72 year old gentleman with a pmhx sig for afib s/p ablation, abhi, smoking, and cad s/p STEMI c/b VF arrest requiring va ecmo.  He recovered well and was discharged 3/10/2022.    Coronary artery disease: causing refractory VF arrest; found to have 2v disease with revascularization of the acutely occluded mLAD.  Plan for staged PCI of the RI.  --Proceed with staged PCI of the RI; repeat BMP at time of cath  --ASA and ticagrelor  --Repeat TTE in 6/2022  --Discussed upcoming urology appt - ok to proceed with surgery with ongoing DAPT.  Can NOT hold DAPT until 2/2023 unless emergent procedures are necessary.    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  Not impacting ambulation.   No signs of infection.  If still present when he returns from his trip, will consult CVTS who decannulated the patient from ECMO.    Follow-up: as scheduled    Thank you for allowing us to take part in the care of this very pleasant patient.  Please do not hesitate to call if any further questions or concerns arise.    I spent 50 min today reviewing the medical record, meeting with the patient, and completing this note.    Larry Cintron MD, PhD  Interventional/Critical Care Cardiology  [phone]    April 14, 2022      CC  Patient Care Team:  Laith Limon MD as PCP - General (Family Practice)  Larry Cintron MD as MD (Cardiovascular Disease)  Mirlande Mckenzie, RN as Specialty Care Coordinator (Cardiology)  Malik Henriquez MD as MD (Cardiovascular Disease)  CHELSIE Parker MD as MD (Plastic Surgery)  Griffin Chavez MD as MD (Cardiovascular Disease)        6}    Video-Visit Details    Video Start Time: 1:45 PM    Video End Time:2:19 PM    Originating Location (pt. Location): Home    Distant Location (provider location):  [facility]     Platform used for Video Visit: [employer]

## 2022-04-15 ENCOUNTER — MYC MEDICAL ADVICE (OUTPATIENT)
Dept: CARDIOLOGY | Facility: CLINIC | Age: 72
End: 2022-04-15
Payer: MEDICARE

## 2022-04-15 ENCOUNTER — HOSPITAL ENCOUNTER (OUTPATIENT)
Dept: CARDIAC REHAB | Facility: CLINIC | Age: 72
Discharge: HOME | End: 2022-04-15
Attending: INTERNAL MEDICINE
Payer: MEDICARE

## 2022-04-15 PROCEDURE — 93798 PHYS/QHP OP CAR RHAB W/ECG: CPT

## 2022-04-15 RX ORDER — POTASSIUM CHLORIDE 1500 MG/1
40 TABLET, EXTENDED RELEASE ORAL
Status: CANCELLED | OUTPATIENT
Start: 2022-04-15

## 2022-04-15 RX ORDER — POTASSIUM CHLORIDE 1500 MG/1
20 TABLET, EXTENDED RELEASE ORAL
Status: CANCELLED | OUTPATIENT
Start: 2022-04-15

## 2022-04-15 RX ORDER — SODIUM CHLORIDE 9 MG/ML
INJECTION, SOLUTION INTRAVENOUS CONTINUOUS
Status: CANCELLED | OUTPATIENT
Start: 2022-04-15

## 2022-04-15 RX ORDER — ASPIRIN 81 MG/1
243 TABLET, CHEWABLE ORAL ONCE
Status: CANCELLED | OUTPATIENT
Start: 2022-04-15

## 2022-04-15 RX ORDER — ASPIRIN 325 MG
325 TABLET ORAL ONCE
Status: CANCELLED | OUTPATIENT
Start: 2022-04-15 | End: 2022-04-15

## 2022-04-15 NOTE — PATIENT INSTRUCTIONS
You have been scheduled for a coronary angiogram with a percutaneous intervention  on Friday Apirl 22.  Please arrive at the [facility] Waiting Room at  12:30    [facility], [place]  [address] [place] [address]      You will be contacted by our COVID team 3-4 days prior to the procedure for testing. This is done regardless of your vaccination status.     Due to COVID only one visitor is allowed per patient.          Please do not eat or drink anything after midnight. You should take all your morning medications as prescribed with sips of water.         When you arrive you will be escorted back to the pre procedure area called 2A. Here they will insert an IV, draw labs, and obtain a short medical history. Please bring an updated list of your current medications.     A physician will come and talk with you about the procedure and obtain consent.     A nurse from the Cardiac Catheterization Lab will then escort you to the procedure area. You will be receiving sedation during the procedure so you will need someone to drive you to and from the hospital.     After the procedure you will recover for a short period (2 - 6 hours) on 6B. You will be discharged with instructions for post procedure care.  However, depending on what the angiogram shows you may have to have stents placed and this might require an overnight stay. We ask that you bring a small bag of necessities for your comfort if you would need to stay overnight. DO NOT BRING ANY VALUABLES!      Onelia Mckenzie RN, BSN, CVN  Interventional Cardiology Coordinator  [phone]

## 2022-04-19 ENCOUNTER — HOSPITAL ENCOUNTER (OUTPATIENT)
Dept: CARDIAC REHAB | Facility: CLINIC | Age: 72
Discharge: HOME | End: 2022-04-19
Attending: INTERNAL MEDICINE
Payer: MEDICARE

## 2022-04-19 PROCEDURE — 93797 PHYS/QHP OP CAR RHAB WO ECG: CPT | Mod: XU

## 2022-04-19 PROCEDURE — 93798 PHYS/QHP OP CAR RHAB W/ECG: CPT

## 2022-04-22 ENCOUNTER — HOSPITAL ENCOUNTER (OUTPATIENT)
Facility: CLINIC | Age: 72
Discharge: HOME | End: 2022-04-22
Attending: INTERNAL MEDICINE | Admitting: INTERNAL MEDICINE
Payer: MEDICARE

## 2022-04-22 ENCOUNTER — APPOINTMENT (OUTPATIENT)
Dept: MEDSURG UNIT | Facility: CLINIC | Age: 72
End: 2022-04-22
Attending: INTERNAL MEDICINE
Payer: MEDICARE

## 2022-04-22 ENCOUNTER — APPOINTMENT (OUTPATIENT)
Dept: LAB | Facility: CLINIC | Age: 72
End: 2022-04-22
Attending: INTERNAL MEDICINE
Payer: MEDICARE

## 2022-04-22 VITALS
HEART RATE: 56 BPM | WEIGHT: 198.6 LBS | RESPIRATION RATE: 16 BRPM | TEMPERATURE: 98.3 F | OXYGEN SATURATION: 98 % | SYSTOLIC BLOOD PRESSURE: 119 MMHG | DIASTOLIC BLOOD PRESSURE: 76 MMHG | BODY MASS INDEX: 26.9 KG/M2 | HEIGHT: 72 IN

## 2022-04-22 DIAGNOSIS — I46.9 CARDIAC ARREST (H): ICD-10-CM

## 2022-04-22 DIAGNOSIS — I25.10 CORONARY ARTERY DISEASE INVOLVING NATIVE CORONARY ARTERY OF NATIVE HEART WITHOUT ANGINA PECTORIS: ICD-10-CM

## 2022-04-22 PROBLEM — Z98.890 STATUS POST CORONARY ANGIOGRAM: Status: ACTIVE | Noted: 2022-04-22

## 2022-04-22 LAB
ACT BLD: 267 SECONDS (ref 74–150)
ANION GAP SERPL CALCULATED.3IONS-SCNC: 6 MMOL/L (ref 3–14)
APTT PPP: 28 SECONDS (ref 22–38)
BUN SERPL-MCNC: 20 MG/DL (ref 7–30)
CALCIUM SERPL-MCNC: 8.8 MG/DL (ref 8.5–10.1)
CHLORIDE BLD-SCNC: 111 MMOL/L (ref 94–109)
CO2 SERPL-SCNC: 25 MMOL/L (ref 20–32)
CREAT SERPL-MCNC: 1.97 MG/DL (ref 0.66–1.25)
ERYTHROCYTE [DISTWIDTH] IN BLOOD BY AUTOMATED COUNT: 18.3 % (ref 10–15)
GFR SERPL CREATININE-BSD FRML MDRD: 35 ML/MIN/1.73M2
GLUCOSE BLD-MCNC: 112 MG/DL (ref 70–99)
HCT VFR BLD AUTO: 28.7 % (ref 40–53)
HGB BLD-MCNC: 8.8 G/DL (ref 13.3–17.7)
INR PPP: 1.15 (ref 0.85–1.15)
MCH RBC QN AUTO: 32.7 PG (ref 26.5–33)
MCHC RBC AUTO-ENTMCNC: 30.7 G/DL (ref 31.5–36.5)
MCV RBC AUTO: 107 FL (ref 78–100)
PLATELET # BLD AUTO: 84 10E3/UL (ref 150–450)
POTASSIUM BLD-SCNC: 4.7 MMOL/L (ref 3.4–5.3)
RBC # BLD AUTO: 2.69 10E6/UL (ref 4.4–5.9)
SODIUM SERPL-SCNC: 142 MMOL/L (ref 133–144)
WBC # BLD AUTO: 5.6 10E3/UL (ref 4–11)

## 2022-04-22 PROCEDURE — 92928 PRQ TCAT PLMT NTRAC ST 1 LES: CPT | Mod: RI | Performed by: INTERNAL MEDICINE

## 2022-04-22 PROCEDURE — 93010 ELECTROCARDIOGRAM REPORT: CPT | Mod: 76 | Performed by: INTERNAL MEDICINE

## 2022-04-22 PROCEDURE — 258N000003 HC RX IP 258 OP 636: Performed by: INTERNAL MEDICINE

## 2022-04-22 PROCEDURE — 82947 ASSAY GLUCOSE BLOOD QUANT: CPT | Performed by: INTERNAL MEDICINE

## 2022-04-22 PROCEDURE — 99152 MOD SED SAME PHYS/QHP 5/>YRS: CPT | Performed by: INTERNAL MEDICINE

## 2022-04-22 PROCEDURE — C9600 PERC DRUG-EL COR STENT SING: HCPCS | Mod: RI | Performed by: INTERNAL MEDICINE

## 2022-04-22 PROCEDURE — 272N000002 HC OR SUPPLY OTHER OPNP: Performed by: INTERNAL MEDICINE

## 2022-04-22 PROCEDURE — 272N000001 HC OR GENERAL SUPPLY STERILE: Performed by: INTERNAL MEDICINE

## 2022-04-22 PROCEDURE — 250N000011 HC RX IP 250 OP 636: Performed by: INTERNAL MEDICINE

## 2022-04-22 PROCEDURE — 85730 THROMBOPLASTIN TIME PARTIAL: CPT | Performed by: INTERNAL MEDICINE

## 2022-04-22 PROCEDURE — 999N000134 HC STATISTIC PP CARE STAGE 3

## 2022-04-22 PROCEDURE — 99152 MOD SED SAME PHYS/QHP 5/>YRS: CPT | Mod: GC | Performed by: INTERNAL MEDICINE

## 2022-04-22 PROCEDURE — C1874 STENT, COATED/COV W/DEL SYS: HCPCS | Performed by: INTERNAL MEDICINE

## 2022-04-22 PROCEDURE — 999N000054 HC STATISTIC EKG NON-CHARGEABLE

## 2022-04-22 PROCEDURE — C1887 CATHETER, GUIDING: HCPCS | Performed by: INTERNAL MEDICINE

## 2022-04-22 PROCEDURE — C1725 CATH, TRANSLUMIN NON-LASER: HCPCS | Performed by: INTERNAL MEDICINE

## 2022-04-22 PROCEDURE — 93005 ELECTROCARDIOGRAM TRACING: CPT

## 2022-04-22 PROCEDURE — C1769 GUIDE WIRE: HCPCS | Performed by: INTERNAL MEDICINE

## 2022-04-22 PROCEDURE — 85027 COMPLETE CBC AUTOMATED: CPT | Performed by: INTERNAL MEDICINE

## 2022-04-22 PROCEDURE — C1894 INTRO/SHEATH, NON-LASER: HCPCS | Performed by: INTERNAL MEDICINE

## 2022-04-22 PROCEDURE — 36415 COLL VENOUS BLD VENIPUNCTURE: CPT | Performed by: INTERNAL MEDICINE

## 2022-04-22 PROCEDURE — 85610 PROTHROMBIN TIME: CPT | Performed by: INTERNAL MEDICINE

## 2022-04-22 PROCEDURE — 85347 COAGULATION TIME ACTIVATED: CPT

## 2022-04-22 PROCEDURE — 250N000013 HC RX MED GY IP 250 OP 250 PS 637: Performed by: INTERNAL MEDICINE

## 2022-04-22 PROCEDURE — 99153 MOD SED SAME PHYS/QHP EA: CPT | Performed by: INTERNAL MEDICINE

## 2022-04-22 PROCEDURE — 250N000009 HC RX 250: Performed by: INTERNAL MEDICINE

## 2022-04-22 PROCEDURE — 999N000142 HC STATISTIC PROCEDURE PREP ONLY

## 2022-04-22 DEVICE — STENT CORONARY DES SYNERGY XD MR US 2.50X20MM H7493941820250: Type: IMPLANTABLE DEVICE | Status: FUNCTIONAL

## 2022-04-22 RX ORDER — HEPARIN SODIUM 1000 [USP'U]/ML
INJECTION, SOLUTION INTRAVENOUS; SUBCUTANEOUS
Status: DISCONTINUED | OUTPATIENT
Start: 2022-04-22 | End: 2022-04-22 | Stop reason: HOSPADM

## 2022-04-22 RX ORDER — ASPIRIN 81 MG/1
81 TABLET, CHEWABLE ORAL DAILY
Qty: 30 TABLET | Refills: 3 | COMMUNITY
Start: 2022-04-22 | End: 2024-01-27

## 2022-04-22 RX ORDER — SODIUM CHLORIDE 9 MG/ML
INJECTION, SOLUTION INTRAVENOUS CONTINUOUS
Status: DISCONTINUED | OUTPATIENT
Start: 2022-04-22 | End: 2022-04-22 | Stop reason: HOSPADM

## 2022-04-22 RX ORDER — ONDANSETRON 2 MG/ML
4 INJECTION INTRAMUSCULAR; INTRAVENOUS EVERY 6 HOURS PRN
Status: DISCONTINUED | OUTPATIENT
Start: 2022-04-22 | End: 2022-04-22 | Stop reason: HOSPADM

## 2022-04-22 RX ORDER — NITROGLYCERIN 5 MG/ML
VIAL (ML) INTRAVENOUS
Status: DISCONTINUED | OUTPATIENT
Start: 2022-04-22 | End: 2022-04-22 | Stop reason: HOSPADM

## 2022-04-22 RX ORDER — NALOXONE HYDROCHLORIDE 0.4 MG/ML
0.4 INJECTION, SOLUTION INTRAMUSCULAR; INTRAVENOUS; SUBCUTANEOUS
Status: DISCONTINUED | OUTPATIENT
Start: 2022-04-22 | End: 2022-04-22 | Stop reason: HOSPADM

## 2022-04-22 RX ORDER — ASPIRIN 81 MG/1
243 TABLET, CHEWABLE ORAL ONCE
Status: COMPLETED | OUTPATIENT
Start: 2022-04-22 | End: 2022-04-22

## 2022-04-22 RX ORDER — POTASSIUM CHLORIDE 750 MG/1
40 TABLET, EXTENDED RELEASE ORAL
Status: DISCONTINUED | OUTPATIENT
Start: 2022-04-22 | End: 2022-04-22 | Stop reason: HOSPADM

## 2022-04-22 RX ORDER — ASPIRIN 81 MG/1
81 TABLET ORAL DAILY
Status: DISCONTINUED | OUTPATIENT
Start: 2022-04-23 | End: 2022-04-22 | Stop reason: HOSPADM

## 2022-04-22 RX ORDER — FLUMAZENIL 0.1 MG/ML
0.2 INJECTION, SOLUTION INTRAVENOUS
Status: DISCONTINUED | OUTPATIENT
Start: 2022-04-22 | End: 2022-04-22 | Stop reason: HOSPADM

## 2022-04-22 RX ORDER — IOPAMIDOL 755 MG/ML
INJECTION, SOLUTION INTRAVASCULAR
Status: DISCONTINUED | OUTPATIENT
Start: 2022-04-22 | End: 2022-04-22 | Stop reason: HOSPADM

## 2022-04-22 RX ORDER — NICARDIPINE HYDROCHLORIDE 2.5 MG/ML
INJECTION INTRAVENOUS
Status: DISCONTINUED | OUTPATIENT
Start: 2022-04-22 | End: 2022-04-22 | Stop reason: HOSPADM

## 2022-04-22 RX ORDER — METOPROLOL TARTRATE 1 MG/ML
5 INJECTION, SOLUTION INTRAVENOUS
Status: DISCONTINUED | OUTPATIENT
Start: 2022-04-22 | End: 2022-04-22 | Stop reason: HOSPADM

## 2022-04-22 RX ORDER — NALOXONE HYDROCHLORIDE 0.4 MG/ML
0.2 INJECTION, SOLUTION INTRAMUSCULAR; INTRAVENOUS; SUBCUTANEOUS
Status: DISCONTINUED | OUTPATIENT
Start: 2022-04-22 | End: 2022-04-22 | Stop reason: HOSPADM

## 2022-04-22 RX ORDER — HYDRALAZINE HYDROCHLORIDE 20 MG/ML
10 INJECTION INTRAMUSCULAR; INTRAVENOUS EVERY 4 HOURS PRN
Status: DISCONTINUED | OUTPATIENT
Start: 2022-04-22 | End: 2022-04-22 | Stop reason: HOSPADM

## 2022-04-22 RX ORDER — ONDANSETRON 4 MG/1
4 TABLET, ORALLY DISINTEGRATING ORAL EVERY 6 HOURS PRN
Status: DISCONTINUED | OUTPATIENT
Start: 2022-04-22 | End: 2022-04-22 | Stop reason: HOSPADM

## 2022-04-22 RX ORDER — FENTANYL CITRATE 50 UG/ML
INJECTION, SOLUTION INTRAMUSCULAR; INTRAVENOUS
Status: DISCONTINUED | OUTPATIENT
Start: 2022-04-22 | End: 2022-04-22 | Stop reason: HOSPADM

## 2022-04-22 RX ORDER — POTASSIUM CHLORIDE 750 MG/1
20 TABLET, EXTENDED RELEASE ORAL
Status: DISCONTINUED | OUTPATIENT
Start: 2022-04-22 | End: 2022-04-22 | Stop reason: HOSPADM

## 2022-04-22 RX ORDER — ATROPINE SULFATE 0.1 MG/ML
0.5 INJECTION INTRAVENOUS
Status: DISCONTINUED | OUTPATIENT
Start: 2022-04-22 | End: 2022-04-22 | Stop reason: HOSPADM

## 2022-04-22 RX ORDER — NITROGLYCERIN 0.4 MG/1
0.4 TABLET SUBLINGUAL EVERY 5 MIN PRN
Status: DISCONTINUED | OUTPATIENT
Start: 2022-04-22 | End: 2022-04-22 | Stop reason: HOSPADM

## 2022-04-22 RX ORDER — ASPIRIN 325 MG
325 TABLET ORAL ONCE
Status: COMPLETED | OUTPATIENT
Start: 2022-04-22 | End: 2022-04-22

## 2022-04-22 RX ORDER — ASPIRIN 81 MG/1
81 TABLET, CHEWABLE ORAL ONCE
Status: DISCONTINUED | OUTPATIENT
Start: 2022-04-22 | End: 2022-04-22

## 2022-04-22 RX ORDER — FENTANYL CITRATE 50 UG/ML
25 INJECTION, SOLUTION INTRAMUSCULAR; INTRAVENOUS
Status: DISCONTINUED | OUTPATIENT
Start: 2022-04-22 | End: 2022-04-22 | Stop reason: HOSPADM

## 2022-04-22 RX ADMIN — ASPIRIN 81 MG 243 MG: 81 TABLET ORAL at 13:30

## 2022-04-22 RX ADMIN — SODIUM CHLORIDE 500 ML: 9 INJECTION, SOLUTION INTRAVENOUS at 13:13

## 2022-04-22 NOTE — PROGRESS NOTES
Left TR band is fully deflated and removed. Site is soft and flat to palpation with no bleeding or hematoma. Bandage applied and board reapplied. Patient instructed to let RN know if site becomes painful, tight, swollen, or he notices bleeding. Patient is alert and oriented and able to make needs known; wife is at bedside. Will continue to monitor.

## 2022-04-22 NOTE — Clinical Note
The first balloon was inserted into the ramus.Max pressure = 12 juan. Total duration = 10 seconds.     Max pressure = 12 juan. Total duration = 10 seconds.    Balloon reinflated a second time: Max pressure = 12 juan. Total duration = 10 seconds.

## 2022-04-22 NOTE — DISCHARGE INSTRUCTIONS
Going Home after Coronary Angioplasty or Stent Placement  For 24 hours:        Have an adult stay with you for 24 hours.        Relax and take it easy.        Drink plenty of fluids.        You may eat your normal diet, unless your doctor tells you otherwise.        Do NOT make any important or legal decisions.        Do NOT drive or operate machines at home or at work.        Do NOT drink alcohol.      Do NOT smoke.     Medicines:        If you have begun Plavix (clopidogrel), Effient (prasugrel), or Brilinta (ticagrelor), do not stop taking it until you talk to your heart doctor (cardiologist).        If you are on metformin (Glucophage), do not restart it until you have blood tests (within 2 to 3 days after discharge). When your doctor tells you it is safe, you may restart the metformin.        If you have stopped any other medicines, check with your nurse or provider about when to restart them.    Care of wrist or arm site:        It is normal to have soreness at the puncture site and mild tingling in your hand for up to 3 days.        Remove the Band-Aid after 24 hours. If there is minor oozing, apply another Band-aid and remove it after 12 hours.         Do NOT take a bath, or use a hot tub or pool for the next 48 hours. You may shower.         It is normal to have a small bruise.  There should not be a lump at the site.        Do not scrub the site.        Do not use lotion or powder near the puncture site for 3 days.        For 2 days, do not use your hand or arm to support your weight (such as rising from a chair) or bend your wrist (such as lifting a garage door).        For 2 days, do not lift more than 5 pounds or exercise your arm (tennis, golf or bowling).    If you start bleeding from the site in your arm: Sit down and press firmly on the site with your fingers for 10 minutes. Call your doctor as soon as you can.    Call 911 right away if you have bleeding that is heavy or does not stop.     Call your  doctor if:        You have a large or growing hard lump around the site.        The site is red, swollen, hot or tender.        Blood or fluid is draining from the site.        You have chills or a fever greater than 101 F (38 C).        Your leg or arm turns bluish, feels numb or cool.        You have hives, a rash or unusual itching.     [facility] [place]:   [phone] (7 days a week)      Cardiology Fellow on call (24 hours per day) at [facility]:   [phone] (ask for Cardiology Fellow on call)

## 2022-04-22 NOTE — Clinical Note
dry, intact, no bleeding and no hematoma. LRA 6Fr, removed, TR band applied, 12cc air instilled, armboard.

## 2022-04-22 NOTE — PROGRESS NOTES
Pt prepped for coronary angiogram. Plan of care discussed. No active complaints. No further needs at this time.

## 2022-04-22 NOTE — Clinical Note
The first balloon was inserted into the ramus.Max pressure = 12 juan. Total duration = 10 seconds.     Max pressure = 12 juan. Total duration = 10 seconds.    Balloon reinflated a second time: Max pressure = 12 juan. Total duration = 10 seconds.  Balloon reinflated a third time: Max pressure = 12 juan. Total duration = 10 seconds.

## 2022-04-22 NOTE — PROGRESS NOTES
Cardiology Cath Lab Service      Chano Johnson MRN# [id number]   YOB: 1950 Age: 72 year old       Assessment and plan:     Chano Johnson is a 72 year old male with PMHx including afib s/p ablation, abhi, smoking, and cad s/p STEMI c/b VF arrest requiring VA ecmo. Has known coronary disease causing refractory VF arrest; found to have 2v disease with revascularization of the acutely occluded mLAD. He was referred by Dr. Cintron for angiogram and planned staged PCI of the RI.    Contrast allergy: None  Anticoagulation: No  Aspirin/antiplatelet: Yes--ASA and Brilinta, both taken this morning. Has ongoing anemia, hemoglobin 8.8 today improved from prior.   Renal concerns: Yes--SCr 1.07 today   Pt NPO at least 6 hours: Yes  Does patient have : Yes    Pre procedure labs reviewed as below.     CONSENT:    We discussed recommendations for angiogram, and reviewed risks and benefits. These include but are not limited to: heart attack, stroke, infection, hematoma/vascular complications, use of stents, and cardiac arrhythmias. We also discussed the risk of potential dye nephropathy, which is elevated given renal concerns. The patient voices his/her understanding and wishes to proceed. Verbal and written consent obtained.    No contraindications identified, will move forward with planned procedure.   Patient plans to discharge home with his wife after post procedure orders have been met.      Physical Examination:  Vitals: There were no vitals taken for this visit.  BMI= There is no height or weight on file to calculate BMI.    Wt Readings from Last 4 Encounters:   04/06/22 93.6 kg (206 lb 6.4 oz)   03/11/22 89.9 kg (198 lb 3.1 oz)   02/17/22 99.8 kg (220 lb)       GEN:  In general, this is a well nourished male in no acute distress on room air.  Patient accompanied by his wife.  C/V:  Regular rate and rhythm, no murmur, rub or gallop. No S3 or RV heave.   RESP: Respirations are unlabored. No use of  accessory muscles. Clear to auscultation bilaterally without wheezing, rales, or rhonchi.  NEURO: Alert and oriented, cooperative. Gait not formally assessed. No obvious focal deficits.     No past medical history on file.    Past Surgical History:   Procedure Laterality Date     ATRIAL ABLATION SURGERY       CV ARTERIAL LINE PLACEMENT N/A 2/17/2022    Procedure: Arterial Line Placement;  Surgeon: Larry Cintron MD;  Location: [facility] CARDIAC CATH LAB     CV CORONARY ANGIOGRAM N/A 2/17/2022    Procedure: Coronary Angiogram;  Surgeon: Larry Cintron MD;  Location: [facility] CARDIAC CATH LAB     CV EXTRACORPERAL MEMBRANE OXYGENATION N/A 2/17/2022    Procedure: Extracorporeal Membrance Oxygenation;  Surgeon: Larry Cintron MD;  Location: [facility] CARDIAC CATH LAB     CV INTRA AORTIC BALLOON N/A 2/17/2022    Procedure: Intra Aortic Balloon Pump Insertion;  Surgeon: Larry Cintron MD;  Location: [facility] CARDIAC CATH LAB     CV PCI ANGIOPLASTY N/A 2/17/2022    Procedure: Percutaneous Transluminal Angioplasty;  Surgeon: Larry Cintron MD;  Location: [facility] CARDIAC CATH LAB     CV THERAPEUTIC HYPOTHERMIA N/A 2/17/2022    Procedure: Therapeutic Hypothermia;  Surgeon: Larry Cintron MD;  Location: [facility] CARDIAC CATH LAB     IR UPPER EXTREMITY ANGIOGRAM LEFT  02/20/2022     MIDLINE DOUBLE LUMEN PLACEMENT Left 02/28/2022    Left brachial vein medial 0.59cm.Blood return on all ports midline okay to use.     REMOVE EXTRACORPORAL MEMBRANE OXYGENATOR ADULT N/A 02/22/2022    Procedure: EXTRACORPOREAL MEMBRANE OXYGENATION CANNULA REMOVAL, INTRAOPERATIVE TRANSESOPHAGEAL ECHOCARDIOGRAM PER ANESTHESIA.;  Surgeon: Rod Banuelos MD;  Location:  OR       No current facility-administered medications on file prior to encounter.  allopurinol (ZYLOPRIM) 100 MG tablet, Take 100 mg by mouth 2 times daily  amiodarone (PACERONE) 200 MG tablet, Take 1 tablet (200 mg) by mouth daily  aspirin 81 MG EC tablet, Take 81  "mg by mouth daily  hydrALAZINE (APRESOLINE) 25 MG tablet, Take 1 tablet (25 mg) by mouth in the morning and 1 tablet (25 mg) at noon and 1 tablet (25 mg) in the evening.  isosorbide mononitrate (IMDUR) 30 MG 24 hr tablet, Take 1 tablet (30 mg) by mouth daily  metoprolol succinate ER (TOPROL-XL) 50 MG 24 hr tablet, Take 3 tablets (150 mg) by mouth in the morning.  multivitamin, therapeutic (THERA-VIT) TABS tablet, Take 1 tablet by mouth in the morning.  polyethylene glycol (MIRALAX) 17 GM/Dose powder, Take 17 g by mouth daily  rosuvastatin (CRESTOR) 20 MG tablet, Take 1 tablet (20 mg) by mouth every evening  ticagrelor (BRILINTA) 90 MG tablet, 1 tablet (90 mg) by Oral or Feeding Tube route in the morning and 1 tablet (90 mg) in the evening.        Social History     Tobacco Use     Smoking status: Never Smoker     Smokeless tobacco: Never Used   Substance Use Topics     Alcohol use: Yes     Alcohol/week: 2.0 standard drinks       Allergies   Allergen Reactions     Chlorpheniramine-Phenylpropan [A.R.M.] Other (See Comments)     Watery eyes, sneezing     Seroquel [Quetiapine] Other (See Comments)     Per Meeta (Wife), would like this medication to be listed as an allergy due to patient becoming combative and delirious after taking it.      Percocet [Oxycodone-Acetaminophen] Rash     \"felt like skin was crawling off\"-pt's S.O.          Recent Lab Results:   Latest Reference Range & Units 04/22/22 11:42   Sodium 133 - 144 mmol/L 142 (P)   Potassium 3.4 - 5.3 mmol/L 4.7 (P)   Chloride 94 - 109 mmol/L 111 (H) (P)   Carbon Dioxide 20 - 32 mmol/L 25 (P)   Urea Nitrogen 7 - 30 mg/dL 20 (P)   Creatinine 0.66 - 1.25 mg/dL 1.97 (H) (P)   GFR Estimate >60 mL/min/1.73m2 35 (L) (P)   Calcium 8.5 - 10.1 mg/dL 8.8 (P)   Anion Gap 3 - 14 mmol/L 6 (P)   Glucose  See Comment (P)   WBC 4.0 - 11.0 10e3/uL 5.6   Hemoglobin 13.3 - 17.7 g/dL 8.8 (L)   Hematocrit 40.0 - 53.0 % 28.7 (L)   Platelet Count 150 - 450 10e3/uL 84 (L)   RBC Count " 4.40 - 5.90 10e6/uL 2.69 (L)   MCV 78 - 100 fL 107 (H)   MCH 26.5 - 33.0 pg 32.7   MCHC 31.5 - 36.5 g/dL 30.7 (L)   RDW 10.0 - 15.0 % 18.3 (H)   (H): Data is abnormally high  (L): Data is abnormally low  (P): Preliminary      Other pertinent testing:  Echo 3/7/22  Interpretation Summary  Limited TTE for LV function.  Left ventricular function is decreased. The ejection fraction is 45-50%  (mildly reduced). There is a pattern of wall motion abnormalities that is  consistent with a prior LAD culprit infarction.    EK22        Ena Menard PA-C  [facility]  Pager [phone]

## 2022-04-22 NOTE — PROGRESS NOTES
Patient arrived to , [address], via litter from the cardiac cath lab s/p CORs procedure with stent placement. Patient is alert and oriented x 4 and able to make needs known. Patient denies pain. Left wrist has TR band in place with 12cc of air; will begin deflation at 1650. Patient's wife is at bedside.

## 2022-04-22 NOTE — PROGRESS NOTES
Patient has met all goals for discharge: tolerated a regular diet, ambulated, voided without issue, vital signs remain stable. Left wrist remains soft and flat to palpation with no bleeding or hematoma. Patient and wife verbalized understanding of all discharge instructions, PIV removed. Patient getting dressed. Patient will go to main entrance via wheelchair with all belongings and with staff assist for discharge home with wife.

## 2022-04-23 LAB
ATRIAL RATE - MUSE: 57 BPM
ATRIAL RATE - MUSE: 58 BPM
DIASTOLIC BLOOD PRESSURE - MUSE: NORMAL MMHG
DIASTOLIC BLOOD PRESSURE - MUSE: NORMAL MMHG
INTERPRETATION ECG - MUSE: NORMAL
INTERPRETATION ECG - MUSE: NORMAL
P AXIS - MUSE: 10 DEGREES
P AXIS - MUSE: 66 DEGREES
PR INTERVAL - MUSE: 154 MS
PR INTERVAL - MUSE: 178 MS
QRS DURATION - MUSE: 88 MS
QRS DURATION - MUSE: 92 MS
QT - MUSE: 422 MS
QT - MUSE: 474 MS
QTC - MUSE: 414 MS
QTC - MUSE: 461 MS
R AXIS - MUSE: 59 DEGREES
R AXIS - MUSE: 72 DEGREES
SYSTOLIC BLOOD PRESSURE - MUSE: NORMAL MMHG
SYSTOLIC BLOOD PRESSURE - MUSE: NORMAL MMHG
T AXIS - MUSE: 80 DEGREES
T AXIS - MUSE: 84 DEGREES
VENTRICULAR RATE- MUSE: 57 BPM
VENTRICULAR RATE- MUSE: 58 BPM

## 2022-04-26 ENCOUNTER — TELEPHONE (OUTPATIENT)
Dept: CARDIOLOGY | Facility: CLINIC | Age: 72
End: 2022-04-26
Payer: MEDICARE

## 2022-04-26 NOTE — TELEPHONE ENCOUNTER
Called patient post PCI on 4/22/22, LETY to ramus. Right radial perc site is flat and dry. Will follow up with Dr. Cintron on May 9 prior to them leaving on vacation

## 2022-04-29 ENCOUNTER — HOSPITAL ENCOUNTER (OUTPATIENT)
Dept: CARDIAC REHAB | Facility: CLINIC | Age: 72
Discharge: HOME | End: 2022-04-29
Attending: INTERNAL MEDICINE
Payer: MEDICARE

## 2022-04-29 DIAGNOSIS — I25.10 CORONARY ARTERY DISEASE INVOLVING NATIVE CORONARY ARTERY OF NATIVE HEART WITHOUT ANGINA PECTORIS: ICD-10-CM

## 2022-04-29 PROCEDURE — 93798 PHYS/QHP OP CAR RHAB W/ECG: CPT

## 2022-04-30 ENCOUNTER — MYC MEDICAL ADVICE (OUTPATIENT)
Dept: CARDIOLOGY | Facility: CLINIC | Age: 72
End: 2022-04-30
Payer: MEDICARE

## 2022-04-30 DIAGNOSIS — I21.3 ST ELEVATION MYOCARDIAL INFARCTION (STEMI), UNSPECIFIED ARTERY (H): ICD-10-CM

## 2022-05-02 NOTE — PROGRESS NOTES
Cardiology Clinic Note         Date of Service (when I saw the patient): 05/9/22    ASSESSMENT:   Chano Johnson is a 72 year old gentleman with a pmhx sig for afib s/p ablation, abhi, smoking, and cad s/p STEMI c/b VF arrest requiring va ecmo s/p mLAD PCI and then staged procedure of RI.         RECOMMEND:  Coronary artery disease: causing refractory VF arrest; found to have 2v disease with revascularization of the acutely occluded mLAD.  He had staged PCI of the RI.  LVEF 45-50%.  --ASA and ticagrelor  --Repeat TTE in 6/2022  --Discussed upcoming urology appt - ok to proceed with surgery with ongoing DAPT.  Can NOT hold DAPT until 2/2023 unless emergent procedures are necessary.  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  Not impacting ambulation.  No signs of infection.  If still present when he returns from his trip, will consult CVTS who decannulated the patient from ECMO.    F/U: 3 months      Jagdish Germain MD    History of Present Illness   Chano Johnson is a 72 year old gentleman with a pmhx sig for afib s/p ablation, abhi, smoking, and cad s/p STEMI c/b VF arrest requiring va ecmo.  He recovered well and was discharged 3/10/2022.  He had staged procedure to RI on 4/17/2022 with DESx1. v     He has been doing well since discharge.  He has started cardiac rehab.  He stopped using his walker 2 weeks ago.  He still has mild weakness and fatigue, but he notices improvement from day-to-day.  He denies any chest pain, palpitations, SOB, edema, orthopnea, PND, syncope or near syncope.     He has two primary questions today in reference to his groin ECMO site which remains swollen without redness.  He denies any pain or numbness in the area and he has not had any fevers.  This was evaluated during his last clinic visit 4/6 and was not concerning for infection at that time.       Past Medical  History    I have reviewed this patient's medical history and updated it with pertinent information if needed.   Past Medical History:   Diagnosis Date     A-fib (H)      CAD (coronary artery disease)      Cardiac arrest (H)      H/O extracorporeal membrane oxygenation treatment      Heart disease      Hypertension      STEMI (ST elevation myocardial infarction) (H)        Past Surgical History   I have reviewed this patient's surgical history and updated it with pertinent information if needed.  Past Surgical History:   Procedure Laterality Date     ATRIAL ABLATION SURGERY       CERVICAL SPINE SURGERY       CV ARTERIAL LINE PLACEMENT N/A 02/17/2022    Procedure: Arterial Line Placement;  Surgeon: Larry Cintron MD;  Location:  [facility] C[facility] CATH LAB     CV CORONARY ANGIOGRAM N/A 02/17/2022    Procedure: Coronary Angiogram;  Surgeon: Larry Cintron MD;  [facility]     CV CORONARY ANGIOGRAM N/A 4/22/2022    Procedure: Coronary Angiogram;  Surgeon: Larry Cintron MD;  [facility]     CV EXTRACORPERAL MEMBRANE OXYGENATION N/A 02/17/2022    Procedure: Extracorporeal Membrance Oxygenation;  Surgeon: Larry Cintron MD;  Location:  [facility] CARDIAC CATH LAB     CV INTRA AORTIC BALLOON N/A 02/17/2022    Procedure: Intra Aortic Balloon Pump Insertion;  Surgeon: Larry Cintron MD;  [facility]     CV PCI N/A 4/22/2022    Procedure: Percutaneous Coronary Intervention;  Surgeon: Larry Cintron MD;  Location:  [facility] CARDI[facility] CATH LAB     CV PCI ANGIOPLASTY N/A 02/17/2022    Procedure: Percutaneous Transluminal Angioplasty;  Surgeon: Larry Cintron MD;  Location:  [facility] CARDIAC CATH LAB     CV THERAPEUTIC HYPOTHERMIA N/A 02/17/2022    Procedure: Therapeutic Hypothermia;  Surgeon: Larry Cintron MD;  Location:  [facility] CARDIAC CATH LAB     IR UPPER EXTREMITY ANGIOGRAM LEFT  02/20/2022     MIDLINE DOUBLE LUMEN PLACEMENT Left 02/28/2022     "Left brachial vein medial 0.59cm.Blood return on all ports midline okay to use.     REMOVE EXTRACORPORAL MEMBRANE OXYGENATOR ADULT N/A 02/22/2022    Procedure: EXTRACORPOREAL MEMBRANE OXYGENATION CANNULA REMOVAL, INTRAOPERATIVE TRANSESOPHAGEAL ECHOCARDIOGRAM PER ANESTHESIA.;  Surgeon: Rod Banuelos MD;  Location: [facility]       Prior to Admission Medications   Cannot display prior to admission medications because the patient has not been admitted in this contact.     Allergies   Allergies   Allergen Reactions     Chlorpheniramine-Phenylpropan [A.R.M.] Other (See Comments)     Watery eyes, sneezing     Seroquel [Quetiapine] Other (See Comments)     Per Meeta (Wife), would like this medication to be listed as an allergy due to patient becoming combative and delirious after taking it.      Percocet [Oxycodone-Acetaminophen] Rash     \"felt like skin was crawling off\"-pt's S.O.        Social History   I have reviewed this patient's social history and updated it with pertinent information if needed. Chano Johnson  reports that he has never smoked. He has never used smokeless tobacco. He reports current alcohol use of about 2.0 standard drinks of alcohol per week. He reports that he does not use drugs.    Family History   I have reviewed this patient's family history and updated it with pertinent information if needed.   No family history on file.    Review of Systems   The 10 point Review of Systems is negative other than noted in the HPI or here.     Physical Exam         Pulse: 55            Vital Signs with Ranges  Pulse:  [55] 55  BP: (99)/(60) 99/60  SpO2:  [98 %] 98 %  0 lbs 0 oz    GEN: NAD, pleasant  HEENT: no icterus  CV: RRR, normal s1/s2, no murmurs/rubs/s3/s4, no heave.   CHEST: CTAB  ABD: soft, NT/ND, NABS  : no flank/suprapubic tenderness  NEURO: AA&Ox3, fluent/appropriate, motor grossly nonfocal  PSYCH: cooperative, affect appropriate    Data   Coronary angiogram 4/22/2022    --Two vessel " coronary artery disease involving the LAD and ramus branch.  The LAD was stented previously.  The ramus branch was not treated previously.  --Successful PCI of the ramus with a 2.5x20mm LETY dilated to 3.25 mm.    TTE 3/7/2022  Limited TTE for LV function.  Left ventricular function is decreased. The ejection fraction is 45-50%  (mildly reduced). There is a pattern of wall motion abnormalities that is  consistent with a prior LAD culprit infarction.    ECMO 2/17/2022    VF cardiac arrest with ongoing cardiogenic shock    Two vessel coronary artery disease with occlusion of the midLAD and severe stenosis of the large ramus    Successful PCI of the mid LAD with a LETY    Successful cannulation for peripheral VA ECMO with a 17 Fr arterial and 25 Fr venous cannula    Successful insertion of a distal perfusion cannula    Successful insertion of a 50cc IABP    Successful insertion of a thermogard cooling catheter    Successful insertion of a R radial arterial line      ATTENDING ATTESTATION:   I personally examined and evaluated this patient on May 9, 2022.  I have personally reviewed today's vital signs, medications, all labs, and all imaging/cardiac studies described above. I have reviewed and edited, as necessary, the history, review of systems, physical examination, and assessment and plan.  I discussed the patient with Dr. Germain and agree with the assessment and plan of care as documented in the note above.  I personally spent 40 min today reviewing the medical record, meeting with the patient, and completing this note.  Thank you for allowing us to take part in the care of this very pleasant patient.  Please do not hesitate to call if any further questions or concerns arise.    Larry Cintron MD, PhD  Interventional/Critical Care Cardiology  [phone]    May 9, 2022

## 2022-05-03 ENCOUNTER — HOSPITAL ENCOUNTER (OUTPATIENT)
Dept: CARDIAC REHAB | Facility: CLINIC | Age: 72
Discharge: HOME | End: 2022-05-03
Attending: INTERNAL MEDICINE
Payer: MEDICARE

## 2022-05-03 PROCEDURE — 93798 PHYS/QHP OP CAR RHAB W/ECG: CPT

## 2022-05-06 ENCOUNTER — HOSPITAL ENCOUNTER (OUTPATIENT)
Dept: CARDIAC REHAB | Facility: CLINIC | Age: 72
Discharge: HOME | End: 2022-05-06
Attending: INTERNAL MEDICINE
Payer: MEDICARE

## 2022-05-06 PROCEDURE — 93798 PHYS/QHP OP CAR RHAB W/ECG: CPT

## 2022-05-09 ENCOUNTER — OFFICE VISIT (OUTPATIENT)
Dept: CARDIOLOGY | Facility: CLINIC | Age: 72
End: 2022-05-09
Attending: INTERNAL MEDICINE
Payer: MEDICARE

## 2022-05-09 VITALS
SYSTOLIC BLOOD PRESSURE: 99 MMHG | DIASTOLIC BLOOD PRESSURE: 60 MMHG | BODY MASS INDEX: 28.33 KG/M2 | WEIGHT: 206 LBS | OXYGEN SATURATION: 98 % | HEART RATE: 55 BPM

## 2022-05-09 DIAGNOSIS — I46.9 CARDIAC ARREST (H): Primary | ICD-10-CM

## 2022-05-09 DIAGNOSIS — I25.10 CORONARY ARTERY DISEASE INVOLVING NATIVE CORONARY ARTERY OF NATIVE HEART WITHOUT ANGINA PECTORIS: ICD-10-CM

## 2022-05-09 PROCEDURE — G0463 HOSPITAL OUTPT CLINIC VISIT: HCPCS

## 2022-05-09 PROCEDURE — 99215 OFFICE O/P EST HI 40 MIN: CPT | Mod: GC | Performed by: INTERNAL MEDICINE

## 2022-05-09 ASSESSMENT — PAIN SCALES - GENERAL: PAINLEVEL: NO PAIN (0)

## 2022-05-09 NOTE — LETTER
5/9/2022      RE: Chano Johnson  [address]  [place] [facility] [address]       Dear Colleague,    Thank you for the opportunity to participate in the care of your patient, Chano Johnson, at the [facility] [place] [facility]. Please see a copy of my visit note below.    Cardiology Clinic Note         Date of Service (when I saw the patient): 05/9/22    ASSESSMENT:   Chano Johnson is a 72 year old gentleman with a pmhx sig for afib s/p ablation, abhi, smoking, and cad s/p STEMI c/b VF arrest requiring va ecmo s/p mLAD PCI and then staged procedure of RI.         RECOMMEND:  Coronary artery disease: causing refractory VF arrest; found to have 2v disease with revascularization of the acutely occluded mLAD.  He had staged PCI of the RI.  LVEF 45-50%.  --ASA and ticagrelor  --Repeat TTE in 6/2022  --Discussed upcoming urology appt - ok to proceed with surgery with ongoing DAPT.  Can NOT hold DAPT until 2/2023 unless emergent procedures are necessary.  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  Not impacting ambulation.  No signs of infection.  If still present when he returns from his trip, will consult CVTS who decannulated the patient from ECMO.    F/U: 3 months      Jagdish Germain MD    History of Present Illness   Chano Johnson is a 72 year old gentleman with a pmhx sig for afib s/p ablation, abhi, smoking, and cad s/p STEMI c/b VF arrest requiring va ecmo.  He recovered well and was discharged 3/10/2022.  He had staged procedure to RI on 4/17/2022 with DESx1. v     He has been doing well since discharge.  He has started cardiac rehab.  He stopped using his walker 2 weeks ago.  He still has mild weakness and fatigue, but he notices improvement from day-to-day.  He denies any chest pain, palpitations, SOB, edema, orthopnea, PND,  syncope or near syncope.     He has two primary questions today in reference to his groin ECMO site which remains swollen without redness.  He denies any pain or numbness in the area and he has not had any fevers.  This was evaluated during his last clinic visit 4/6 and was not concerning for infection at that time.       Past Medical History    I have reviewed this patient's medical history and updated it with pertinent information if needed.   Past Medical History:   Diagnosis Date     A-fib (H)      CAD (coronary artery disease)      Cardiac arrest (H)      H/O extracorporeal membrane oxygenation treatment      Heart disease      Hypertension      STEMI (ST elevation myocardial infarction) (H)        Past Surgical History   I have reviewed this patient's surgical history and updated it with pertinent information if needed.  Past Surgical History:   Procedure Laterality Date     ATRIAL ABLATION SURGERY       CERVICAL SPINE SURGERY       CV ARTERIAL LINE PLACEMENT N/A 02/17/2022    Procedure: Arterial Line Placement;  Surgeon: Larry Cintron MD;  Location: [facility] CARDIAC CATH LAB     CV CORONARY ANGIOGRAM N/A 02/17/2022    Procedure: Coronary Angiogram;  Surgeon: Larry Cintron MD;  Location: [facility] CARDIAC CATH LAB     CV CORONARY ANGIOGRAM N/A 4/22/2022    Procedure: Coronary Angiogram;  Surgeon: Larry Cintron MD;  Location: [facility] CARDIAC CATH LAB     CV EXTRACORPERAL MEMBRANE OXYGENATION N/A 02/17/2022    Procedure: Extracorporeal Membrance Oxygenation;  Surgeon: Larry Cintron MD;  Location: [facility] CARDIAC CATH LAB     CV INTRA AORTIC BALLOON N/A 02/17/2022    Procedure: Intra Aortic Balloon Pump Insertion;  Surgeon: Larry Cintron MD;  Location: [facility] CARDIAC CATH LAB     CV PCI N/A 4/22/2022    Procedure: Percutaneous Coronary Intervention;  Surgeon: Larry Cintron MD;  Location: [facility] CARDIAC CATH LAB     CV PCI ANGIOPLASTY N/A 02/17/2022    Procedure: Percutaneous Transluminal  "Angioplasty;  Surgeon: Larry Cintron MD;  Location:  HEART CARDIAC CATH LAB     CV THERAPEUTIC HYPOTHERMIA N/A 02/17/2022    Procedure: Therapeutic Hypothermia;  Surgeon: Larry Cintron MD;  Location:  HEART CARDIAC CATH LAB     IR UPPER EXTREMITY ANGIOGRAM LEFT  02/20/2022     MIDLINE DOUBLE LUMEN PLACEMENT Left 02/28/2022    Left brachial vein medial 0.59cm.Blood return on all ports midline okay to use.     REMOVE EXTRACORPORAL MEMBRANE OXYGENATOR ADULT N/A 02/22/2022    Procedure: EXTRACORPOREAL MEMBRANE OXYGENATION CANNULA REMOVAL, INTRAOPERATIVE TRANSESOPHAGEAL ECHOCARDIOGRAM PER ANESTHESIA.;  Surgeon: Rod Banuelos MD;  Location: [facility]       Prior to Admission Medications   Cannot display prior to admission medications because the patient has not been admitted in this contact.     Allergies   Allergies   Allergen Reactions     Chlorpheniramine-Phenylpropan [A.R.M.] Other (See Comments)     Watery eyes, sneezing     Seroquel [Quetiapine] Other (See Comments)     Per Meeta (Wife), would like this medication to be listed as an allergy due to patient becoming combative and delirious after taking it.      Percocet [Oxycodone-Acetaminophen] Rash     \"felt like skin was crawling off\"-pt's S.O.        Social History   I have reviewed this patient's social history and updated it with pertinent information if needed. Chano Johnson  reports that he has never smoked. He has never used smokeless tobacco. He reports current alcohol use of about 2.0 standard drinks of alcohol per week. He reports that he does not use drugs.    Family History   I have reviewed this patient's family history and updated it with pertinent information if needed.   No family history on file.    Review of Systems   The 10 point Review of Systems is negative other than noted in the HPI or here.     Physical Exam         Pulse: 55            Vital Signs with Ranges  Pulse:  [55] 55  BP: (99)/(60) 99/60  SpO2:  [98 %] 98 %  0 " lbs 0 oz    GEN: NAD, pleasant  HEENT: no icterus  CV: RRR, normal s1/s2, no murmurs/rubs/s3/s4, no heave.   CHEST: CTAB  ABD: soft, NT/ND, NABS  : no flank/suprapubic tenderness  NEURO: AA&Ox3, fluent/appropriate, motor grossly nonfocal  PSYCH: cooperative, affect appropriate    Data   Coronary angiogram 4/22/2022    --Two vessel coronary artery disease involving the LAD and ramus branch.  The LAD was stented previously.  The ramus branch was not treated previously.  --Successful PCI of the ramus with a 2.5x20mm LETY dilated to 3.25 mm.    TTE 3/7/2022  Limited TTE for LV function.  Left ventricular function is decreased. The ejection fraction is 45-50%  (mildly reduced). There is a pattern of wall motion abnormalities that is  consistent with a prior LAD culprit infarction.    ECMO 2/17/2022    VF cardiac arrest with ongoing cardiogenic shock    Two vessel coronary artery disease with occlusion of the midLAD and severe stenosis of the large ramus    Successful PCI of the mid LAD with a LETY    Successful cannulation for peripheral VA ECMO with a 17 Fr arterial and 25 Fr venous cannula    Successful insertion of a distal perfusion cannula    Successful insertion of a 50cc IABP    Successful insertion of a thermogard cooling catheter    Successful insertion of a R radial arterial line      ATTENDING ATTESTATION:   I personally examined and evaluated this patient on May 9, 2022.  I have personally reviewed today's vital signs, medications, all labs, and all imaging/cardiac studies described above. I have reviewed and edited, as necessary, the history, review of systems, physical examination, and assessment and plan.  I discussed the patient with Dr. Germain and agree with the assessment and plan of care as documented in the note above.  I personally spent 40 min today reviewing the medical record, meeting with the patient, and completing this note.  Thank you for allowing us to take part in the care of this very  pleasant patient.  Please do not hesitate to call if any further questions or concerns arise.    Larry Cintron MD, PhD  Interventional/Critical Care Cardiology  [phone]    May 9, 2022        Please do not hesitate to contact me if you have any questions/concerns.     Sincerely,     Larry Cintron MD

## 2022-05-09 NOTE — NURSING NOTE
Chief Complaint   Patient presents with     Follow Up     Follow up cardiovascular disease     Vitals were taken and medications reconciled.    Patrice Fuller, EMT  2:18 PM

## 2022-05-09 NOTE — PATIENT INSTRUCTIONS
Patient Instructions:  It was a pleasure to see you in the cardiology clinic today.      If you have any questions, call  Onelia Mckenzie RN, at [phone].   [facility].  To schedule an appointment or to leave a message for your Care Team Press #1  If you are a physician calling for another physician Press #2  For Billing Press #3  For Medical Records Press #4  We are encouraging the use of [employer] to communicate with your HealthCare Provider    Note the new medications: none  Stop the following medications: Imdur and Hydralazine    The results from today include: none  Please follow up with Dr. Cintron in August.    Will make a referral to CVTS for lymphocele.  Log the blood pressures while in [place] and enjoy the cruise      If you have an urgent need after hours (8:00 am to 4:30 pm) please call [phone] and ask for the cardiology fellow on call.

## 2022-05-10 ENCOUNTER — HOSPITAL ENCOUNTER (OUTPATIENT)
Dept: CARDIAC REHAB | Facility: CLINIC | Age: 72
Discharge: HOME | End: 2022-05-10
Attending: INTERNAL MEDICINE
Payer: MEDICARE

## 2022-05-10 PROCEDURE — 93798 PHYS/QHP OP CAR RHAB W/ECG: CPT

## 2022-05-11 ENCOUNTER — HOSPITAL ENCOUNTER (OUTPATIENT)
Dept: CARDIAC REHAB | Facility: CLINIC | Age: 72
Discharge: HOME | End: 2022-05-11
Attending: INTERNAL MEDICINE
Payer: MEDICARE

## 2022-05-27 ENCOUNTER — HOSPITAL ENCOUNTER (OUTPATIENT)
Dept: CARDIAC REHAB | Facility: CLINIC | Age: 72
Discharge: HOME | End: 2022-05-27
Attending: INTERNAL MEDICINE
Payer: MEDICARE

## 2022-05-27 PROCEDURE — 93798 PHYS/QHP OP CAR RHAB W/ECG: CPT

## 2022-05-31 ENCOUNTER — HOSPITAL ENCOUNTER (OUTPATIENT)
Dept: CARDIAC REHAB | Facility: CLINIC | Age: 72
Discharge: HOME | End: 2022-05-31
Attending: INTERNAL MEDICINE
Payer: MEDICARE

## 2022-05-31 DIAGNOSIS — I25.10 CORONARY ARTERY DISEASE INVOLVING NATIVE CORONARY ARTERY OF NATIVE HEART WITHOUT ANGINA PECTORIS: ICD-10-CM

## 2022-05-31 DIAGNOSIS — I46.9 CARDIAC ARREST (H): Primary | ICD-10-CM

## 2022-05-31 PROCEDURE — 93798 PHYS/QHP OP CAR RHAB W/ECG: CPT

## 2022-06-02 ENCOUNTER — HOSPITAL ENCOUNTER (OUTPATIENT)
Dept: CARDIAC REHAB | Facility: CLINIC | Age: 72
Discharge: HOME | End: 2022-06-02
Attending: INTERNAL MEDICINE
Payer: MEDICARE

## 2022-06-02 PROCEDURE — 93798 PHYS/QHP OP CAR RHAB W/ECG: CPT

## 2022-06-07 ENCOUNTER — HOSPITAL ENCOUNTER (OUTPATIENT)
Dept: CARDIAC REHAB | Facility: CLINIC | Age: 72
Discharge: HOME | End: 2022-06-07
Attending: INTERNAL MEDICINE
Payer: MEDICARE

## 2022-06-07 PROCEDURE — 93797 PHYS/QHP OP CAR RHAB WO ECG: CPT

## 2022-06-07 PROCEDURE — 93798 PHYS/QHP OP CAR RHAB W/ECG: CPT

## 2022-06-09 ENCOUNTER — OFFICE VISIT (OUTPATIENT)
Dept: CARDIOLOGY | Facility: CLINIC | Age: 72
End: 2022-06-09
Attending: INTERNAL MEDICINE
Payer: MEDICARE

## 2022-06-09 ENCOUNTER — HOSPITAL ENCOUNTER (OUTPATIENT)
Dept: CARDIAC REHAB | Facility: CLINIC | Age: 72
Discharge: HOME | End: 2022-06-09
Attending: INTERNAL MEDICINE
Payer: MEDICARE

## 2022-06-09 VITALS
DIASTOLIC BLOOD PRESSURE: 56 MMHG | SYSTOLIC BLOOD PRESSURE: 97 MMHG | BODY MASS INDEX: 26.82 KG/M2 | OXYGEN SATURATION: 99 % | HEART RATE: 53 BPM | WEIGHT: 195 LBS

## 2022-06-09 DIAGNOSIS — I89.8 LYMPHOCELE AFTER SURGICAL PROCEDURE: Primary | ICD-10-CM

## 2022-06-09 DIAGNOSIS — I25.10 CORONARY ARTERY DISEASE INVOLVING NATIVE CORONARY ARTERY OF NATIVE HEART WITHOUT ANGINA PECTORIS: ICD-10-CM

## 2022-06-09 DIAGNOSIS — I46.9 CARDIAC ARREST (H): ICD-10-CM

## 2022-06-09 DIAGNOSIS — T81.89XA LYMPHOCELE AFTER SURGICAL PROCEDURE: Primary | ICD-10-CM

## 2022-06-09 PROCEDURE — 93798 PHYS/QHP OP CAR RHAB W/ECG: CPT

## 2022-06-09 PROCEDURE — G0463 HOSPITAL OUTPT CLINIC VISIT: HCPCS

## 2022-06-09 PROCEDURE — 99207 PR NO BILLABLE SERVICE THIS VISIT: CPT | Performed by: SURGERY

## 2022-06-09 ASSESSMENT — PAIN SCALES - GENERAL: PAINLEVEL: NO PAIN (0)

## 2022-06-09 NOTE — NURSING NOTE
Chief Complaint   Patient presents with     New Patient     May 9, 2022 - reason for visit: S/P arrest/ECMO, S/P Afib ablation & Watchman procedures. S/P PCI of Ramus     Vitals were taken and medications reconciled.    Patrice Fuller, EMT  2:20 PM

## 2022-06-09 NOTE — LETTER
6/9/2022      RE: Chano Johnson  [address]  [place] [address]       Dear Colleague,    Thank you for the opportunity to participate in the care of your patient, Chano Johnson, at the [facility]. Please see a copy of my visit note below.    Cardiac surgery    Mr. Johnson developed a groin lymphocele after femoral ECMO decannulation. He was referred to me for evaluation but by the time I saw him in clinic (today) it has resolved. We had a nice visit discussing his difficult illness and miraculous recovery. If he has any recurrent signs or symptoms of a groin lymphocele or infection, he will ask to be referred back to see me.    Rod Banuelos MD

## 2022-06-14 ENCOUNTER — HOSPITAL ENCOUNTER (OUTPATIENT)
Dept: CARDIAC REHAB | Facility: CLINIC | Age: 72
Discharge: HOME | End: 2022-06-14
Attending: INTERNAL MEDICINE
Payer: MEDICARE

## 2022-06-14 PROCEDURE — 93798 PHYS/QHP OP CAR RHAB W/ECG: CPT

## 2022-06-16 ENCOUNTER — HOSPITAL ENCOUNTER (OUTPATIENT)
Dept: CARDIAC REHAB | Facility: CLINIC | Age: 72
Discharge: HOME | End: 2022-06-16
Attending: INTERNAL MEDICINE
Payer: MEDICARE

## 2022-06-16 PROCEDURE — 93798 PHYS/QHP OP CAR RHAB W/ECG: CPT

## 2022-06-16 NOTE — PROGRESS NOTES
Cardiac surgery    Mr. Johnson developed a groin lymphocele after femoral ECMO decannulation. He was referred to me for evaluation but by the time I saw him in clinic (today) it has resolved. We had a nice visit discussing his difficult illness and miraculous recovery. If he has any recurrent signs or symptoms of a groin lymphocele or infection, he will ask to be referred back to see me.    Rod Banuelos MD

## 2022-06-21 ENCOUNTER — HOSPITAL ENCOUNTER (OUTPATIENT)
Dept: CARDIAC REHAB | Facility: CLINIC | Age: 72
Discharge: HOME | End: 2022-06-21
Attending: INTERNAL MEDICINE
Payer: MEDICARE

## 2022-06-21 PROCEDURE — 93798 PHYS/QHP OP CAR RHAB W/ECG: CPT

## 2022-06-23 ENCOUNTER — HOSPITAL ENCOUNTER (OUTPATIENT)
Dept: CARDIAC REHAB | Facility: CLINIC | Age: 72
Discharge: HOME | End: 2022-06-23
Attending: INTERNAL MEDICINE
Payer: MEDICARE

## 2022-06-23 PROCEDURE — 93798 PHYS/QHP OP CAR RHAB W/ECG: CPT

## 2022-06-30 ENCOUNTER — HOSPITAL ENCOUNTER (OUTPATIENT)
Dept: CARDIAC REHAB | Facility: CLINIC | Age: 72
Discharge: HOME | End: 2022-06-30
Attending: INTERNAL MEDICINE
Payer: MEDICARE

## 2022-06-30 PROCEDURE — 93798 PHYS/QHP OP CAR RHAB W/ECG: CPT

## 2022-07-05 ENCOUNTER — HOSPITAL ENCOUNTER (OUTPATIENT)
Dept: CARDIAC REHAB | Facility: CLINIC | Age: 72
Discharge: HOME | End: 2022-07-05
Attending: INTERNAL MEDICINE
Payer: MEDICARE

## 2022-07-05 PROCEDURE — 93797 PHYS/QHP OP CAR RHAB WO ECG: CPT | Mod: 59

## 2022-07-05 PROCEDURE — 93798 PHYS/QHP OP CAR RHAB W/ECG: CPT

## 2022-07-07 ENCOUNTER — HOSPITAL ENCOUNTER (OUTPATIENT)
Dept: CARDIAC REHAB | Facility: CLINIC | Age: 72
Discharge: HOME | End: 2022-07-07
Attending: INTERNAL MEDICINE
Payer: MEDICARE

## 2022-07-07 PROCEDURE — 93798 PHYS/QHP OP CAR RHAB W/ECG: CPT

## 2022-07-11 ENCOUNTER — MYC MEDICAL ADVICE (OUTPATIENT)
Dept: FAMILY MEDICINE | Facility: CLINIC | Age: 72
End: 2022-07-11

## 2022-07-11 PROBLEM — Z95.9 CARDIAC DEVICE IN SITU: Status: ACTIVE | Noted: 2020-10-21

## 2022-07-11 PROBLEM — M54.50 CHRONIC MIDLINE LOW BACK PAIN WITHOUT SCIATICA: Status: ACTIVE | Noted: 2018-04-18

## 2022-07-11 PROBLEM — I42.8 OTHER CARDIOMYOPATHIES (H): Status: ACTIVE | Noted: 2019-04-02

## 2022-07-11 PROBLEM — N52.9 ED (ERECTILE DYSFUNCTION) OF ORGANIC ORIGIN: Status: ACTIVE | Noted: 2018-02-16

## 2022-07-11 PROBLEM — I21.02 ST ELEVATION MYOCARDIAL INFARCTION INVOLVING LEFT ANTERIOR DESCENDING (LAD) CORONARY ARTERY (H): Status: ACTIVE | Noted: 2022-03-18

## 2022-07-11 PROBLEM — Z87.39 HISTORY OF GOUT: Status: ACTIVE | Noted: 2018-02-16

## 2022-07-11 PROBLEM — G89.29 CHRONIC MIDLINE LOW BACK PAIN WITHOUT SCIATICA: Status: ACTIVE | Noted: 2018-04-18

## 2022-07-11 ASSESSMENT — ENCOUNTER SYMPTOMS
SHORTNESS OF BREATH: 0
COUGH: 0
EYE PAIN: 0
ABDOMINAL PAIN: 0
CONSTIPATION: 0
HEMATOCHEZIA: 0
WEAKNESS: 1
HEARTBURN: 0
DIZZINESS: 0
FEVER: 0
NAUSEA: 0
DYSURIA: 0
FREQUENCY: 0
JOINT SWELLING: 0
HEMATURIA: 0
CHILLS: 0
NERVOUS/ANXIOUS: 0
HEADACHES: 0
ARTHRALGIAS: 0
PALPITATIONS: 0
DIARRHEA: 0
SORE THROAT: 0
PARESTHESIAS: 0
MYALGIAS: 0

## 2022-07-11 ASSESSMENT — ACTIVITIES OF DAILY LIVING (ADL): CURRENT_FUNCTION: NO ASSISTANCE NEEDED

## 2022-07-11 NOTE — PROGRESS NOTES
"    SUBJECTIVE:   Chano Johnson is a 72 year old male who presents for Preventive Visit.      Patient has been advised of split billing requirements and indicates understanding: Yes  Are you in the first 12 months of your Medicare coverage?  No    Healthy Habits:     In general, how would you rate your overall health?  Fair    Frequency of exercise:  2-3 days/week    Duration of exercise:  15-30 minutes    Do you usually eat at least 4 servings of fruit and vegetables a day, include whole grains    & fiber and avoid regularly eating high fat or \"junk\" foods?  No    Taking medications regularly:  Yes    Medication side effects:  Muscle aches and Other    Ability to successfully perform activities of daily living:  No assistance needed    Home Safety:  No safety concerns identified    Hearing Impairment:  Difficulty following a conversation in a noisy restaurant or crowded room    In the past 6 months, have you been bothered by leaking of urine?  No    In general, how would you rate your overall mental or emotional health?  Good      PHQ-2 Total Score: 0    Additional concerns today:  Yes    Do you feel safe in your environment? Yes    Have you ever done Advance Care Planning? (For example, a Health Directive, POLST, or a discussion with a medical provider or your loved ones about your wishes): Yes, patient states has an Advance Care Planning document and will bring a copy to the clinic.       Fall risk  Fallen 2 or more times in the past year?: Yes  Any fall with injury in the past year?: No    Cognitive Screening   1) Repeat 3 items (Leader, Season, Table)    2) Clock draw: NORMAL  3) 3 item recall: Recalls 3 objects  Results: 3 items recalled: COGNITIVE IMPAIRMENT LESS LIKELY    Mini-CogTM Copyright CORBIN Gonzalez. Licensed by the author for use in [facility]; reprinted with permission (tari@.[facility]). All rights reserved.      Do you have sleep apnea, excessive snoring or daytime drowsiness?: " yes    Reviewed and updated as needed this visit by clinical staff   Tobacco  Allergies  Meds   Med Hx  Surg Hx  Fam Hx  Soc Hx          Reviewed and updated as needed this visit by Provider                   Social History     Tobacco Use     Smoking status: Never Smoker     Smokeless tobacco: Never Used   Substance Use Topics     Alcohol use: Yes     Alcohol/week: 2.0 standard drinks     If you drink alcohol do you typically have >3 drinks per day or >7 drinks per week? No    Alcohol Use 7/12/2022   Prescreen: >3 drinks/day or >7 drinks/week? -   Prescreen: >3 drinks/day or >7 drinks/week? 3-4 drinks a week    No flowsheet data found.      Current providers sharing in care for this patient include:   Patient Care Team:  Laith Limon MD as PCP - General (Family Practice)  Larry Cintron MD as MD (Cardiovascular Disease)  Mirlande Mckenzie RN as Specialty Care Coordinator (Cardiology)  Malik Henriquez MD as MD (Cardiovascular Disease)  CHELSIE Parker MD as MD (Plastic Surgery)  Griffin Chavez MD as MD (Cardiovascular Disease)  Larry Cintron MD as Assigned Heart and Vascular Provider    The following health maintenance items are reviewed in Epic and correct as of today:  Health Maintenance Due   Topic Date Due     MICROALBUMIN  Never done     PARATHYROID  Never done     ANNUAL REVIEW OF HM ORDERS  Never done     COLORECTAL CANCER SCREENING  Never done     Lab work is in process  Labs reviewed in EPIC  BP Readings from Last 3 Encounters:   07/12/22 108/62   06/09/22 97/56   05/09/22 99/60    Wt Readings from Last 3 Encounters:   07/12/22 91.2 kg (201 lb 1.6 oz)   06/09/22 88.5 kg (195 lb)   05/09/22 93.4 kg (206 lb)                  Patient Active Problem List   Diagnosis     Chest pain     Syncope     Gastroesophageal reflux disease without esophagitis     Hypokalemia     Chronic atrial fibrillation (H)     Cardiac arrest (H)     Acute kidney failure with tubular necrosis (H)      Coronary artery disease involving native coronary artery of native heart without angina pectoris     S/P drug eluting coronary stent placement     Atrial flutter (H)     Presence of Watchman left atrial appendage closure device     Chronic midline low back pain without sciatica     ED (erectile dysfunction) of organic origin     History of gout     Sleep apnea     Other cardiomyopathies (H)     ST elevation myocardial infarction involving left anterior descending (LAD) coronary artery (H)     Subclinical hypothyroidism     Chronic kidney disease, stage 3b (H)     Past Surgical History:   Procedure Laterality Date     ATRIAL ABLATION SURGERY       CERVICAL SPINE SURGERY       CV ARTERIAL LINE PLACEMENT N/A 02/17/2022    Procedure: Arterial Line Placement;  Surgeon: Larry Cintron MD;  Location:  HEART CARDIAC CATH LAB     CV CORONARY ANGIOGRAM N/A 02/17/2022    Procedure: Coronary Angiogram;  Surgeon: Larry Cintron MD;  Location:  HEART CARDIAC CATH LAB     CV CORONARY ANGIOGRAM N/A 4/22/2022    Procedure: Coronary Angiogram;  Surgeon: Larry Cintron MD;  Location:  HEART CARDIAC CATH LAB     CV EXTRACORPERAL MEMBRANE OXYGENATION N/A 02/17/2022    Procedure: Extracorporeal Membrance Oxygenation;  Surgeon: Larry Cintron MD;  Location:  HEART CARDIAC CATH LAB     CV INTRA AORTIC BALLOON N/A 02/17/2022    Procedure: Intra Aortic Balloon Pump Insertion;  Surgeon: Larry Cintron MD;  Location:  HEART CARDIAC CATH LAB     CV PCI N/A 4/22/2022    Procedure: Percutaneous Coronary Intervention;  Surgeon: Larry Cintron MD;  Location:  HEART CARDIAC CATH LAB     CV PCI ANGIOPLASTY N/A 02/17/2022    Procedure: Percutaneous Transluminal Angioplasty;  Surgeon: Larry Cintron MD;  Location:  HEART CARDIAC CATH LAB     CV THERAPEUTIC HYPOTHERMIA N/A 02/17/2022    Procedure: Therapeutic Hypothermia;  Surgeon: Larry Cintron MD;  Location:  HEART CARDIAC CATH LAB     IR UPPER EXTREMITY ANGIOGRAM LEFT  " 02/20/2022     MIDLINE DOUBLE LUMEN PLACEMENT Left 02/28/2022    Left brachial vein medial 0.59cm.Blood return on all ports midline okay to use.     REMOVE EXTRACORPORAL MEMBRANE OXYGENATOR ADULT N/A 02/22/2022    Procedure: EXTRACORPOREAL MEMBRANE OXYGENATION CANNULA REMOVAL, INTRAOPERATIVE TRANSESOPHAGEAL ECHOCARDIOGRAM PER ANESTHESIA.;  Surgeon: Rod Banuelos MD;  Location:  OR       Social History     Tobacco Use     Smoking status: Never Smoker     Smokeless tobacco: Never Used   Substance Use Topics     Alcohol use: Yes     Alcohol/week: 2.0 standard drinks     History reviewed. No pertinent family history.      Current Outpatient Medications   Medication Sig Dispense Refill     allopurinol (ZYLOPRIM) 100 MG tablet Take 100 mg by mouth 2 times daily       aspirin (ASA) 81 MG chewable tablet Take 1 tablet (81 mg) by mouth daily Starting tomorrow. 30 tablet 3     metoprolol succinate ER (TOPROL-XL) 50 MG 24 hr tablet Take 3 tablets (150 mg) by mouth in the morning. 270 tablet 3     multivitamin, therapeutic (THERA-VIT) TABS tablet Take 1 tablet by mouth in the morning. 90 tablet 3     polyethylene glycol (MIRALAX) 17 GM/Dose powder Take 17 g by mouth daily 510 g 1     rosuvastatin (CRESTOR) 20 MG tablet Take 1 tablet (20 mg) by mouth every evening 90 tablet 3     ticagrelor (BRILINTA) 90 MG tablet Take 1 tablet (90 mg) by mouth 2 times daily 180 tablet 3     Allergies   Allergen Reactions     Chlorpheniramine-Phenylpropan [A.R.M.] Other (See Comments)     Watery eyes, sneezing     Seroquel [Quetiapine] Other (See Comments)     Per Meeta (Wife), would like this medication to be listed as an allergy due to patient becoming combative and delirious after taking it.      Percocet [Oxycodone-Acetaminophen] Rash     \"felt like skin was crawling off\"-pt's S.O.      Recent Labs   Lab Test 07/12/22  1432 04/22/22  1142 04/12/22  1247 04/06/22  0805 03/04/22  0627 03/03/22  0645 02/22/22  0944 " 02/22/22  0330 02/17/22  1531 04/27/20  0630 06/30/17  1355 05/12/16  0758   LDL  --   --   --   --   --  66  --   --   --   --  116 125   HDL  --   --   --   --   --  32*  --   --   --   --  42 43   TRIG  --   --   --   --   --  98  --  150*  --   --  76 140   ALT 31  --  36 47   < > 85*   < > 44   < > 24  --   --    CR 1.56* 1.97* 2.13* 2.34*   < > 2.77*   < > 3.68*   < > 1.03  --   --    GFRESTIMATED 46* 35* 32* 28*   < > 23*   < > 17*   < > >60  --   --    GFRESTBLACK  --   --   --   --   --   --   --   --   --  >60  --   --    POTASSIUM 4.6 4.7 4.1 4.0   < > 3.8   < > 4.5   < > 4.2  --   --    TSH 6.59*  --   --   --   --   --   --   --   --   --   --   --     < > = values in this interval not displayed.      Pneumonia Vaccine:For adults 65 years or older who do not have an immunocompromising condition, cerebrospinal fluid leak, or cochlear implant and want to receive PPSV23 ONLY: Administer 1 dose of PPSV23. Anyone who received any doses of PPSV23 before age 65 should receive 1 final dose of the vaccine at age 65 or older. Administer this last dose at least 5 years after the prior PPSV23 dose.        Review of Systems   Constitutional: Negative for chills and fever.   HENT: Negative for congestion, ear pain, hearing loss and sore throat.    Eyes: Negative for pain and visual disturbance.   Respiratory: Negative for cough and shortness of breath.    Cardiovascular: Positive for peripheral edema. Negative for chest pain and palpitations.   Gastrointestinal: Negative for abdominal pain, constipation, diarrhea, heartburn, hematochezia and nausea.   Genitourinary: Positive for impotence and urgency. Negative for dysuria, frequency, genital sores, hematuria and penile discharge.   Musculoskeletal: Negative for arthralgias, joint swelling and myalgias.   Skin: Negative for rash.   Neurological: Positive for weakness. Negative for dizziness, headaches and paresthesias.   Psychiatric/Behavioral: Negative for mood  changes. The patient is not nervous/anxious.        OBJECTIVE:   /62 (BP Location: Right arm, Patient Position: Sitting, Cuff Size: Adult Regular)   Pulse 60   Temp 97.4  F (36.3  C) (Temporal)   Resp 16   Ht 1.829 m (6')   Wt 91.2 kg (201 lb 1.6 oz)   BMI 27.27 kg/m   Estimated body mass index is 27.27 kg/m  as calculated from the following:    Height as of this encounter: 1.829 m (6').    Weight as of this encounter: 91.2 kg (201 lb 1.6 oz).  Physical Exam  GENERAL: healthy, alert and no distress  EYES: Eyes grossly normal to inspection, PERRL and conjunctivae and sclerae normal  HENT: ear canals and TM's normal, nose and mouth without ulcers or lesions  (+) chipped teeth and scar tissue on tongue.   NECK: no adenopathy, no asymmetry, masses, or scars and thyroid normal to palpation  RESP: lungs clear to auscultation - no rales, rhonchi or wheezes  CV: regular rate and rhythm, normal S1 S2, no S3 or S4, no murmur, click or rub, no peripheral edema and peripheral pulses strong  ABDOMEN: soft, nontender, no hepatosplenomegaly, no masses and bowel sounds normal  MS: no gross musculoskeletal defects noted, no edema  SKIN: no suspicious lesions or rashes  Left ECMO cannulation site: well healed without any obvious lymphocele or drainage noted. (+) scar tissue present.   NEURO: Normal strength and tone, mentation intact and speech normal  PSYCH: mentation appears normal, affect normal/bright        ASSESSMENT / PLAN:       ICD-10-CM    1. Encounter for Medicare annual wellness exam  Z00.00 Comprehensive metabolic panel (BMP + Alb, Alk Phos, ALT, AST, Total. Bili, TP)     TSH with free T4 reflex     Uric acid     PSA, screen   2. Encounter for immunization  Z23 COVID-19,PF,MODERNA (18+ YRS BOOSTER .25ML)   3. Anemia of chronic disease  D63.8 Comprehensive metabolic panel (BMP + Alb, Alk Phos, ALT, AST, Total. Bili, TP)     CBC with platelets     Comprehensive metabolic panel (BMP + Alb, Alk Phos, ALT, AST,  Total. Bili, TP)     CBC with platelets   4. 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 Encounter for screening for malignant neoplasm of prostate   Z12.5 PSA, screen     PSA, screen   6. ATN (acute tubular necrosis) (H)  N17.0 Vitamin D Deficiency   7. History of cardiac arrest  Z86.74    8. Routine screening for STI (sexually transmitted infection)  Z11.3 HIV Antigen Antibody Combo     Hepatitis C antibody     Chlamydia & Gonorrhea by PCR, [facility]   9. Chronic kidney disease, stage 3b (H)  N18.32    10. Long term (current) use of aspirin   Z79.82 Vitamin D Deficiency   11. Chronic atrial fibrillation (H)  I48.20    12. Presence of Watchman left atrial appendage closure device  Z95.818    13. S/P drug eluting coronary stent placement  Z95.5    14. History of ST elevation myocardial infarction (STEMI)  I25.2    15. Chronic systolic heart failure (H)  I50.22          Patient is a 72-year-old male with PMH of A. Fib s/p ablation + watchmans procedures, ARSENIO, GERD and ED, hx of cardiac arrest ( 2/2022) with V. Fib , anterior STEMI with occlusion of LAD s/p LETY x 2, hx of ATN 2/2 cardiogenic + hemorrhagic shock, HFrEF, ICM, who presents today to establish care.     Looking for a new provider as his last one is [place] of state. Is anxious about overall health due to recent illness and hospitalization. Wants to get 'everything checked out'.     Home life: lives with wife   Occupation:[employer]   Safety concerns:no  Diet/exercise:sense of tast is off post shocking of the tongue, bit tongue due CPR  Is in cardiac rehab and doing well   Family history of cancers: mom had kidney cancer   Eye exam/dental exam: UTD  Alcohol use: drinks a beer every few days   Tobacco use/marijuana use/drug use: no   Mental health:stable   Vaccines:COVID ordered   Blood work:ordered. Wanted to get Uric acid, Vit D and STI testing done in addition to  baseline bloodwork.   Colon cancer screening: per patient report: negative, 1 year ago  PSA elevated in the past  But no hx of prostate cancer. Will recheck PSA today     IADLs/ADLs intact:yes  Driving:yes   Memory:yes, getting better post discharge     History of V. fib cardiac arrest  Cardiogenic/hemorrhagic shock  Anterior STEMI s/p LETY x2  Chronic HFrEF  ICM  In February, 2022-patient was noticing significant difficulty breathing and chest pain after climbing a set of stairs.  His wife took him to the hospital at [facility].  Shortly arriving at the hospital, he was found to be in cardiac arrest.CPR was started and an 8 shocks were administered due to refractory V. fib.  They do not have a Cath Lab onsite at [facility] and he was transferred to the [place].  During the angiogram, patient was found to have occlusion of the main LAD and had LETY times days.  Was hemodynamically unstable during the angiogram 2/2 hypotension and ectopy- VA ECMO was placed.  Initial echo 2/18/2022 showed EF of 10 to 15%.  P patient was intubated and sent to the ICU 2/17 and extubated 2/24.  At the time of discharge, patient's EF was found to have improved to 40 to 50% with persistent akinesis of the anteroseptal and apical segments.  He is currently in cardiac rehab and follows with cardiology closely.  Feels stable from a cardiac standpoint and denies any SOB, OVERTON, fatigue or chest pain  Plan:  - DAPT: Aspirin (lifelong) and Brilinta (minimum of 12 months)  - Already on a high dose statin  - On Guideline directed medical therapy  - Is euvolemic  - per cardiology -  Can NOT hold DAPT until 2/2023 unless emergent procedures are necessary.  - cards recently discontinued hydralazine and imdur      History of A. fib s/p ablation and watchman device  - [id number] score 3, no anticoagulation necessary with Watchman device in place  - rate control with Toprol  mg daily  -Continue to monitor clinically    Ayanna on CPAP - stable.  Continue CPAP machine     ATN 2/2 cardiac arrest   Kidney fxn not back to baseline but slowly improving. Paitnet is hopeful it will continue to improve. Will recheck BMP today.     # Left groin wound 2/2 ECMO decannulation   Sutures removed on 3/8/22.  and declined wound Vac at the time of discharge. Has been healing. Recently developed a lymphocele that seems resolved on exam today. Continue to monitor          Patient has been advised of split billing requirements and indicates understanding: Yes    COUNSELING:  Reviewed preventive health counseling, as reflected in patient instructions    Estimated body mass index is 27.27 kg/m  as calculated from the following:    Height as of this encounter: 1.829 m (6').    Weight as of this encounter: 91.2 kg (201 lb 1.6 oz).    Weight management plan: Discussed healthy diet and exercise guidelines    He reports that he has never smoked. He has never used smokeless tobacco.      Appropriate preventive services were discussed with this patient, including applicable screening as appropriate for cardiovascular disease, diabetes, osteopenia/osteoporosis, and glaucoma.  As appropriate for age/gender, discussed screening for colorectal cancer, prostate cancer, breast cancer, and cervical cancer. Checklist reviewing preventive services available has been given to the patient.    Reviewed patients plan of care and provided an AVS. The Basic Care Plan (routine screening as documented in Health Maintenance) for Chano meets the Care Plan requirement. This Care Plan has been established and reviewed with the Patient.    Suzanne Bloom DO  [facility]    Identified Health Risks:    The patient was provided with written information regarding signs of hearing loss.

## 2022-07-12 ENCOUNTER — HOSPITAL ENCOUNTER (OUTPATIENT)
Dept: CARDIAC REHAB | Facility: CLINIC | Age: 72
Discharge: HOME | End: 2022-07-12
Attending: INTERNAL MEDICINE
Payer: MEDICARE

## 2022-07-12 ENCOUNTER — OFFICE VISIT (OUTPATIENT)
Dept: FAMILY MEDICINE | Facility: CLINIC | Age: 72
End: 2022-07-12
Payer: MEDICARE

## 2022-07-12 VITALS
HEIGHT: 72 IN | RESPIRATION RATE: 16 BRPM | TEMPERATURE: 97.4 F | WEIGHT: 201.1 LBS | DIASTOLIC BLOOD PRESSURE: 62 MMHG | BODY MASS INDEX: 27.24 KG/M2 | SYSTOLIC BLOOD PRESSURE: 108 MMHG | HEART RATE: 60 BPM

## 2022-07-12 DIAGNOSIS — Z79.82 LONG TERM (CURRENT) USE OF ASPIRIN: ICD-10-CM

## 2022-07-12 DIAGNOSIS — Z12.5 ENCOUNTER FOR SCREENING FOR MALIGNANT NEOPLASM OF PROSTATE: ICD-10-CM

## 2022-07-12 DIAGNOSIS — D63.8 ANEMIA OF CHRONIC DISEASE: ICD-10-CM

## 2022-07-12 DIAGNOSIS — Z23 ENCOUNTER FOR IMMUNIZATION: ICD-10-CM

## 2022-07-12 DIAGNOSIS — Z95.5 S/P DRUG ELUTING CORONARY STENT PLACEMENT: ICD-10-CM

## 2022-07-12 DIAGNOSIS — I50.22 CHRONIC SYSTOLIC HEART FAILURE (H): ICD-10-CM

## 2022-07-12 DIAGNOSIS — I48.20 CHRONIC ATRIAL FIBRILLATION (H): ICD-10-CM

## 2022-07-12 DIAGNOSIS — Z95.818 PRESENCE OF WATCHMAN LEFT ATRIAL APPENDAGE CLOSURE DEVICE: ICD-10-CM

## 2022-07-12 DIAGNOSIS — Z11.3 ROUTINE SCREENING FOR STI (SEXUALLY TRANSMITTED INFECTION): ICD-10-CM

## 2022-07-12 DIAGNOSIS — I25.2 HISTORY OF ST ELEVATION MYOCARDIAL INFARCTION (STEMI): ICD-10-CM

## 2022-07-12 DIAGNOSIS — Z86.74 HISTORY OF CARDIAC ARREST: ICD-10-CM

## 2022-07-12 DIAGNOSIS — N17.0 ATN (ACUTE TUBULAR NECROSIS) (H): ICD-10-CM

## 2022-07-12 DIAGNOSIS — N18.32 CHRONIC KIDNEY DISEASE, STAGE 3B (H): ICD-10-CM

## 2022-07-12 DIAGNOSIS — M1A.9XX0 CHRONIC GOUT INVOLVING TOE OF LEFT FOOT WITHOUT TOPHUS, UNSPECIFIED CAUSE: ICD-10-CM

## 2022-07-12 DIAGNOSIS — Z00.00 ENCOUNTER FOR MEDICARE ANNUAL WELLNESS EXAM: Primary | ICD-10-CM

## 2022-07-12 LAB
ALBUMIN SERPL BCG-MCNC: 4.7 G/DL (ref 3.5–5.2)
ALP SERPL-CCNC: 52 U/L (ref 40–129)
ALT SERPL W P-5'-P-CCNC: 31 U/L (ref 10–50)
ANION GAP SERPL CALCULATED.3IONS-SCNC: 8 MMOL/L (ref 7–15)
AST SERPL W P-5'-P-CCNC: 37 U/L (ref 10–50)
BILIRUB SERPL-MCNC: 0.6 MG/DL
BUN SERPL-MCNC: 20 MG/DL (ref 8–23)
CALCIUM SERPL-MCNC: 9.7 MG/DL (ref 8.8–10.2)
CHLORIDE SERPL-SCNC: 103 MMOL/L (ref 98–107)
CREAT SERPL-MCNC: 1.56 MG/DL (ref 0.67–1.17)
DEPRECATED HCO3 PLAS-SCNC: 27 MMOL/L (ref 22–29)
ERYTHROCYTE [DISTWIDTH] IN BLOOD BY AUTOMATED COUNT: 13 % (ref 10–15)
GFR SERPL CREATININE-BSD FRML MDRD: 46 ML/MIN/1.73M2
GLUCOSE SERPL-MCNC: 103 MG/DL (ref 70–99)
HCT VFR BLD AUTO: 34.4 % (ref 40–53)
HGB BLD-MCNC: 10.9 G/DL (ref 13.3–17.7)
MCH RBC QN AUTO: 32.7 PG (ref 26.5–33)
MCHC RBC AUTO-ENTMCNC: 31.7 G/DL (ref 31.5–36.5)
MCV RBC AUTO: 103 FL (ref 78–100)
PLATELET # BLD AUTO: 139 10E3/UL (ref 150–450)
POTASSIUM SERPL-SCNC: 4.6 MMOL/L (ref 3.4–5.3)
PROT SERPL-MCNC: 7.2 G/DL (ref 6.4–8.3)
PSA SERPL-MCNC: 2.81 NG/ML (ref 0–6.5)
RBC # BLD AUTO: 3.33 10E6/UL (ref 4.4–5.9)
SODIUM SERPL-SCNC: 138 MMOL/L (ref 136–145)
T4 FREE SERPL-MCNC: 1.16 NG/DL (ref 0.9–1.7)
TSH SERPL DL<=0.005 MIU/L-ACNC: 6.59 UIU/ML (ref 0.3–4.2)
URATE SERPL-MCNC: 3.8 MG/DL (ref 3.4–7)
WBC # BLD AUTO: 5.6 10E3/UL (ref 4–11)

## 2022-07-12 PROCEDURE — 36415 COLL VENOUS BLD VENIPUNCTURE: CPT | Performed by: STUDENT IN AN ORGANIZED HEALTH CARE EDUCATION/TRAINING PROGRAM

## 2022-07-12 PROCEDURE — 82306 VITAMIN D 25 HYDROXY: CPT | Performed by: STUDENT IN AN ORGANIZED HEALTH CARE EDUCATION/TRAINING PROGRAM

## 2022-07-12 PROCEDURE — 85027 COMPLETE CBC AUTOMATED: CPT | Performed by: STUDENT IN AN ORGANIZED HEALTH CARE EDUCATION/TRAINING PROGRAM

## 2022-07-12 PROCEDURE — 86803 HEPATITIS C AB TEST: CPT | Performed by: STUDENT IN AN ORGANIZED HEALTH CARE EDUCATION/TRAINING PROGRAM

## 2022-07-12 PROCEDURE — 87389 HIV-1 AG W/HIV-1&-2 AB AG IA: CPT | Performed by: STUDENT IN AN ORGANIZED HEALTH CARE EDUCATION/TRAINING PROGRAM

## 2022-07-12 PROCEDURE — 84443 ASSAY THYROID STIM HORMONE: CPT | Performed by: STUDENT IN AN ORGANIZED HEALTH CARE EDUCATION/TRAINING PROGRAM

## 2022-07-12 PROCEDURE — 84439 ASSAY OF FREE THYROXINE: CPT | Performed by: STUDENT IN AN ORGANIZED HEALTH CARE EDUCATION/TRAINING PROGRAM

## 2022-07-12 PROCEDURE — 93798 PHYS/QHP OP CAR RHAB W/ECG: CPT

## 2022-07-12 PROCEDURE — 91306 COVID-19,PF,MODERNA (18+ YRS BOOSTER .25ML): CPT | Performed by: STUDENT IN AN ORGANIZED HEALTH CARE EDUCATION/TRAINING PROGRAM

## 2022-07-12 PROCEDURE — 99387 INIT PM E/M NEW PAT 65+ YRS: CPT | Mod: 25 | Performed by: STUDENT IN AN ORGANIZED HEALTH CARE EDUCATION/TRAINING PROGRAM

## 2022-07-12 PROCEDURE — 80053 COMPREHEN METABOLIC PANEL: CPT | Performed by: STUDENT IN AN ORGANIZED HEALTH CARE EDUCATION/TRAINING PROGRAM

## 2022-07-12 PROCEDURE — G0103 PSA SCREENING: HCPCS | Performed by: STUDENT IN AN ORGANIZED HEALTH CARE EDUCATION/TRAINING PROGRAM

## 2022-07-12 PROCEDURE — 0064A COVID-19,PF,MODERNA (18+ YRS BOOSTER .25ML): CPT | Performed by: STUDENT IN AN ORGANIZED HEALTH CARE EDUCATION/TRAINING PROGRAM

## 2022-07-12 PROCEDURE — 84550 ASSAY OF BLOOD/URIC ACID: CPT | Performed by: STUDENT IN AN ORGANIZED HEALTH CARE EDUCATION/TRAINING PROGRAM

## 2022-07-12 ASSESSMENT — PAIN SCALES - GENERAL: PAINLEVEL: NO PAIN (0)

## 2022-07-12 NOTE — PATIENT INSTRUCTIONS
Patient Education   Personalized Prevention Plan  You are due for the preventive services outlined below.  Your care team is available to assist you in scheduling these services.  If you have already completed any of these items, please share that information with your care team to update in your medical record.  Health Maintenance Due   Topic Date Due     ANNUAL REVIEW OF HM ORDERS  Never done     Colorectal Cancer Screening  Never done     COVID-19 Vaccine (4 - Booster for Moderna series) 03/13/2022       Understanding USDA MyPlate  The USDA has guidelines to help you make healthy food choices. These are called MyPlate. MyPlate shows the food groups that make up healthy meals using the image of a place setting. Before you eat, think about the healthiest choices for what to put on your plate or in your cup or bowl. To learn more about building a healthy plate, visit www.Job1001plate.gov.    The food groups    Fruits. Any fruit or 100% fruit juice counts as part of the Fruit Group. Fruits may be fresh, canned, frozen, or dried, and may be whole, cut-up, or pureed. Make 1/2 of your plate fruits and vegetables.    Vegetables. Any vegetable or 100% vegetable juice counts as a member of the Vegetable Group. Vegetables may be fresh, frozen, canned, or dried. They can be served raw or cooked and may be whole, cut-up, or mashed. Make 1/2 of your plate fruits and vegetables.    Grains. All foods made from grains are part of the Grains Group. These include wheat, rice, oats, cornmeal, and barley. Grains are often used to make foods such as bread, pasta, oatmeal, cereal, tortillas, and grits. Grains should be no more than 1/4 of your plate. At least half of your grains should be whole grains.    Protein. This group includes meat, poultry, seafood, beans and peas, eggs, processed soy products (such as tofu), nuts (including nut butters), and seeds. Make protein choices no more than 1/4 of your plate. Meat and poultry  choices should be lean or low fat.    Dairy. The Dairy Group includes all fluid milk products and foods made from milk that contain calcium, such as yogurt and cheese. (Foods that have little calcium, such as cream, butter, and cream cheese, are not part of this group.) Most dairy choices should be low-fat or fat-free.    Oils. Oils aren't a food group, but they do contain essential nutrients. However it's important to watch your intake of oils. These are fats that are liquid at room temperature. They include canola, corn, olive, soybean, vegetable, and sunflower oil. Foods that are mainly oil include mayonnaise, certain salad dressings, and soft margarines. You likely already get your daily oil allowance from the foods you eat.  Things to limit  Eating healthy also means limiting these things in your diet:       Salt (sodium). Many processed foods have a lot of sodium. To keep sodium intake down, eat fresh vegetables, meats, poultry, and seafood when possible. Purchase low-sodium, reduced-sodium, or no-salt-added food products at the store. And don't add salt to your meals at home. Instead, season them with herbs and spices such as dill, oregano, cumin, and paprika. Or try adding flavor with lemon or lime zest and juice.    Saturated fat. Saturated fats are most often found in animal products such as beef, pork, and chicken. They are often solid at room temperature, such as butter. To reduce your saturated fat intake, choose leaner cuts of meat and poultry. And try healthier cooking methods such as grilling, broiling, roasting, or baking. For a simple lower-fat swap, use plain nonfat yogurt instead of mayonnaise when making potato salad or macaroni salad.    Added sugars. These are sugars added to foods. They are in foods such as ice cream, candy, soda, fruit drinks, sports drinks, energy drinks, cookies, pastries, jams, and syrups. Cut down on added sugars by sharing sweet treats with a family member or friend.  You can also choose fruit for dessert, and drink water or other unsweetened beverages.     [employer]Well last reviewed this educational content on 6/1/2020 2000-2021 [employer]. All rights reserved. This information is not intended as a substitute for professional medical care. Always follow your healthcare professional's instructions.          Signs of Hearing Loss      Hearing much better with one ear can be a sign of hearing loss.   Hearing loss is a problem shared by many people. In fact, it is one of the most common health problems, particularly as people age. Most people age 65 and older have some hearing loss. By age 80, almost everyone does. Hearing loss often occurs slowly over the years. So you may not realize your hearing has gotten worse.  Have your hearing checked  Call your healthcare provider if you:    Have to strain to hear normal conversation    Have to watch other people s faces very carefully to follow what they re saying    Need to ask people to repeat what they ve said    Often misunderstand what people are saying    Turn the volume of the television or radio up so high that others complain    Feel that people are mumbling when they re talking to you    Find that the effort to hear leaves you feeling tired and irritated    Notice, when using the phone, that you hear better with one ear than the other  [employer] last reviewed this educational content on 1/1/2020 2000-2021 [employer]. All rights reserved. This information is not intended as a substitute for professional medical care. Always follow your healthcare professional's instructions.

## 2022-07-13 ENCOUNTER — MYC MEDICAL ADVICE (OUTPATIENT)
Dept: FAMILY MEDICINE | Facility: CLINIC | Age: 72
End: 2022-07-13

## 2022-07-13 DIAGNOSIS — E03.8 SUBCLINICAL HYPOTHYROIDISM: Primary | ICD-10-CM

## 2022-07-14 ENCOUNTER — HOSPITAL ENCOUNTER (OUTPATIENT)
Dept: CARDIAC REHAB | Facility: CLINIC | Age: 72
Discharge: HOME | End: 2022-07-14
Attending: INTERNAL MEDICINE
Payer: MEDICARE

## 2022-07-14 PROBLEM — N18.32 CHRONIC KIDNEY DISEASE, STAGE 3B (H): Status: ACTIVE | Noted: 2022-07-14

## 2022-07-14 PROBLEM — Z95.818 PRESENCE OF WATCHMAN LEFT ATRIAL APPENDAGE CLOSURE DEVICE: Status: ACTIVE | Noted: 2020-10-21

## 2022-07-14 PROBLEM — Z95.5 S/P DRUG ELUTING CORONARY STENT PLACEMENT: Status: ACTIVE | Noted: 2022-04-22

## 2022-07-14 LAB
DEPRECATED CALCIDIOL+CALCIFEROL SERPL-MC: 50 UG/L (ref 20–75)
HCV AB SERPL QL IA: NONREACTIVE

## 2022-07-14 PROCEDURE — 93798 PHYS/QHP OP CAR RHAB W/ECG: CPT

## 2022-07-15 LAB — HIV 1+2 AB+HIV1 P24 AG SERPL QL IA: NONREACTIVE

## 2022-07-19 ENCOUNTER — HOSPITAL ENCOUNTER (OUTPATIENT)
Dept: CARDIAC REHAB | Facility: CLINIC | Age: 72
Discharge: HOME | End: 2022-07-19
Attending: INTERNAL MEDICINE
Payer: MEDICARE

## 2022-07-19 PROCEDURE — 93798 PHYS/QHP OP CAR RHAB W/ECG: CPT

## 2022-07-21 ENCOUNTER — HOSPITAL ENCOUNTER (OUTPATIENT)
Dept: CARDIAC REHAB | Facility: CLINIC | Age: 72
Discharge: HOME | End: 2022-07-21
Attending: INTERNAL MEDICINE
Payer: MEDICARE

## 2022-07-21 PROCEDURE — 93798 PHYS/QHP OP CAR RHAB W/ECG: CPT

## 2022-07-26 ENCOUNTER — HOSPITAL ENCOUNTER (OUTPATIENT)
Dept: CARDIAC REHAB | Facility: CLINIC | Age: 72
Discharge: HOME | End: 2022-07-26
Attending: INTERNAL MEDICINE
Payer: MEDICARE

## 2022-07-26 PROCEDURE — 93798 PHYS/QHP OP CAR RHAB W/ECG: CPT

## 2022-07-28 ENCOUNTER — HOSPITAL ENCOUNTER (OUTPATIENT)
Dept: CARDIAC REHAB | Facility: CLINIC | Age: 72
Discharge: HOME | End: 2022-07-28
Attending: INTERNAL MEDICINE
Payer: MEDICARE

## 2022-07-28 PROCEDURE — 93798 PHYS/QHP OP CAR RHAB W/ECG: CPT

## 2022-07-28 PROCEDURE — 93797 PHYS/QHP OP CAR RHAB WO ECG: CPT

## 2022-08-22 ENCOUNTER — OFFICE VISIT (OUTPATIENT)
Dept: CARDIOLOGY | Facility: CLINIC | Age: 72
End: 2022-08-22
Attending: INTERNAL MEDICINE
Payer: MEDICARE

## 2022-08-22 VITALS
SYSTOLIC BLOOD PRESSURE: 108 MMHG | DIASTOLIC BLOOD PRESSURE: 65 MMHG | HEART RATE: 63 BPM | OXYGEN SATURATION: 96 % | BODY MASS INDEX: 28.14 KG/M2 | WEIGHT: 207.5 LBS

## 2022-08-22 DIAGNOSIS — R07.9 CHEST PAIN, UNSPECIFIED TYPE: Primary | ICD-10-CM

## 2022-08-22 DIAGNOSIS — I46.9 CARDIAC ARREST (H): ICD-10-CM

## 2022-08-22 DIAGNOSIS — I48.20 CHRONIC ATRIAL FIBRILLATION (H): ICD-10-CM

## 2022-08-22 DIAGNOSIS — I25.10 CORONARY ARTERY DISEASE INVOLVING NATIVE CORONARY ARTERY OF NATIVE HEART WITHOUT ANGINA PECTORIS: ICD-10-CM

## 2022-08-22 PROCEDURE — G0463 HOSPITAL OUTPT CLINIC VISIT: HCPCS

## 2022-08-22 PROCEDURE — 99214 OFFICE O/P EST MOD 30 MIN: CPT | Performed by: INTERNAL MEDICINE

## 2022-08-22 ASSESSMENT — PAIN SCALES - GENERAL: PAINLEVEL: NO PAIN (0)

## 2022-08-22 NOTE — LETTER
8/22/2022      RE: Chano Johnson  [address]  [facility] [address]       Dear Colleague,    Thank you for the opportunity to participate in the care of your patient, Chano Johnson, at the [facility] [place] [facility]. Please see a copy of my visit note below.    [facility]  CARDIOVASCULAR MEDICINE CLINIC NOTE    Referring Provider: Larry Cintron   Primary Care Provider: Laith Limon     Patient Name: Chano Johnson   MRN: [id number]     PERTINENT CLINICAL HISTORY:   Chano Johnson is a 72 year old gentleman with a pmhx sig for afib s/p ablation, abhi, smoking, and cad s/p STEMI c/b VF arrest requiring va ecmo.  He recovered well and was discharged 3/10/2022.  He had staged procedure to RI on 4/17/2022 with DESx1.     He has been doing well since discharge.  He completed cardiac rehab.  He recently travelled to [place] and was active without any issues.  He had a lymphocele at his ECMO cannulation site but it has resolved since his last clinic visit.  He denies any chest pain, palpitations, SOB, edema, orthopnea, PND, syncope or near syncope.     PAST MEDICAL HISTORY:     Past Medical History:   Diagnosis Date     A-fib (H)      CAD (coronary artery disease)      Cardiac arrest (H)      H/O extracorporeal membrane oxygenation treatment      Heart disease      Hypertension      STEMI (ST elevation myocardial infarction) (H)         PAST SURGICAL HISTORY:     Past Surgical History:   Procedure Laterality Date     ATRIAL ABLATION SURGERY       CERVICAL SPINE SURGERY       CV ARTERIAL LINE PLACEMENT N/A 02/17/2022    Procedure: Arterial Line Placement;  Surgeon: Larry Cintron MD;  Location: [facility] CARDIAC CATH LAB     CV CORONARY ANGIOGRAM N/A 02/17/2022    Procedure: Coronary Angiogram;  Surgeon: Larry Cintron MD;  Location: [facility] CARDIAC CATH LAB     CV CORONARY ANGIOGRAM N/A 4/22/2022    Procedure: Coronary Angiogram;   Surgeon: Larry Cintron MD;  Location: [facility] CARDIAC CATH LAB     CV EXTRACORPERAL MEMBRANE OXYGENATION N/A 02/17/2022    Procedure: Extracorporeal Membrance Oxygenation;  Surgeon: Larry Cintron MD;  Location: [facility] CARDIAC CATH LAB     CV INTRA AORTIC BALLOON N/A 02/17/2022    Procedure: Intra Aortic Balloon Pump Insertion;  Surgeon: Larry Cintron MD;  Location: [facility] CARDIAC CATH LAB     CV PCI N/A 4/22/2022    Procedure: Percutaneous Coronary Intervention;  Surgeon: Larry Cintron MD;  Location: [facility] CARDIAC CATH LAB     CV PCI ANGIOPLASTY N/A 02/17/2022    Procedure: Percutaneous Transluminal Angioplasty;  Surgeon: Larry Cintron MD;  Location: [facility] CARDIAC CATH LAB     CV THERAPEUTIC HYPOTHERMIA N/A 02/17/2022    Procedure: Therapeutic Hypothermia;  Surgeon: Larry Cintron MD;  Location: [facility] CARDIAC CATH LAB     IR UPPER EXTREMITY ANGIOGRAM LEFT  02/20/2022     MIDLINE DOUBLE LUMEN PLACEMENT Left 02/28/2022    Left brachial vein medial 0.59cm.Blood return on all ports midline okay to use.     REMOVE EXTRACORPORAL MEMBRANE OXYGENATOR ADULT N/A 02/22/2022    Procedure: EXTRACORPOREAL MEMBRANE OXYGENATION CANNULA REMOVAL, INTRAOPERATIVE TRANSESOPHAGEAL ECHOCARDIOGRAM PER ANESTHESIA.;  Surgeon: Rod Banuelos MD;  Location:  OR        CURRENT MEDICATIONS:     Current Outpatient Medications   Medication Sig Dispense Refill     allopurinol (ZYLOPRIM) 100 MG tablet Take 100 mg by mouth 2 times daily       aspirin (ASA) 81 MG chewable tablet Take 1 tablet (81 mg) by mouth daily Starting tomorrow. 30 tablet 3     metoprolol succinate ER (TOPROL-XL) 50 MG 24 hr tablet Take 3 tablets (150 mg) by mouth in the morning. 270 tablet 3     multivitamin, therapeutic (THERA-VIT) TABS tablet Take 1 tablet by mouth in the morning. 90 tablet 3     polyethylene glycol (MIRALAX) 17 GM/Dose powder Take 17 g by mouth daily 510 g 1     rosuvastatin (CRESTOR) 20 MG tablet Take 1 tablet (20  "mg) by mouth every evening 90 tablet 3     ticagrelor (BRILINTA) 90 MG tablet Take 1 tablet (90 mg) by mouth 2 times daily 180 tablet 3        ALLERGIES:     Allergies   Allergen Reactions     Chlorpheniramine-Phenylpropan [A.R.M.] Other (See Comments)     Watery eyes, sneezing     Seroquel [Quetiapine] Other (See Comments)     Per Meeta (Wife), would like this medication to be listed as an allergy due to patient becoming combative and delirious after taking it.      Percocet [Oxycodone-Acetaminophen] Rash     \"felt like skin was crawling off\"-pt's S.O.          PHYSICAL EXAMINATION:   /65 (BP Location: Right arm, Patient Position: Chair, Cuff Size: Adult Large)   Pulse 63   Wt 94.1 kg (207 lb 8 oz)   SpO2 96%   BMI 28.14 kg/m    Body mass index is 28.14 kg/m .  Wt Readings from Last 2 Encounters:   08/22/22 94.1 kg (207 lb 8 oz)   07/12/22 91.2 kg (201 lb 1.6 oz)     Constitutional: no acute distress, pleasant and cooperative, appears overall well.  Cardiovascular: RRR nl S1S2, JVP not elevated, extremities with no edema or cyanosis  Respiratory: clear to auscultation and percussion bilaterally anterior and posterior  Gastrointestinal: soft, nontender, non distended, no hepatosplenomegaly or masses  Neurologic: AOx3     LABORATORY DATA:   I have reviewed the labs below.    LIPID RESULTS:  Recent Labs   Lab Test 03/03/22  0645 02/22/22  0330 06/30/17  1355   CHOL 118  --  173   HDL 32*  --  42   LDL 66  --  116   TRIG 98 150* 76        LIVER ENZYME RESULTS:  Recent Labs   Lab Test 07/12/22  1432 04/12/22  1247   AST 37 24   ALT 31 36       CBC RESULTS:  Recent Labs   Lab Test 07/12/22  1432 04/22/22  1142   WBC 5.6 5.6   HGB 10.9* 8.8*   HCT 34.4* 28.7*   * 84*       BMP RESULTS:  Recent Labs   Lab Test 07/12/22  1432 04/22/22  1142    142   POTASSIUM 4.6 4.7   CHLORIDE 103 111*   CO2 27 25   ANIONGAP 8 6   * 112*   BUN 20.0 20   CR 1.56* 1.97*   PEDRITO 9.7 8.8       INR RESULTS:  Recent " Labs   Lab Test 04/22/22  1142 02/24/22  0946   INR 1.15 1.17*        PROCEDURES & FURTHER ASSESSMENTS:   I have reviewed the test results below.    Coronary angiogram 4/22/2022  --Two vessel coronary artery disease involving the LAD and ramus branch.  The LAD was stented previously.  The ramus branch was not treated previously.  --Successful PCI of the ramus with a 2.5x20mm LETY dilated to 3.25 mm.     TTE 3/7/2022  Limited TTE for LV function.  Left ventricular function is decreased. The ejection fraction is 45-50%  (mildly reduced). There is a pattern of wall motion abnormalities that is  consistent with a prior LAD culprit infarction.     ECMO 2/17/2022    VF cardiac arrest with ongoing cardiogenic shock    Two vessel coronary artery disease with occlusion of the midLAD and severe stenosis of the large ramus    Successful PCI of the mid LAD with a LETY    Successful cannulation for peripheral VA ECMO with a 17 Fr arterial and 25 Fr venous cannula    Successful insertion of a distal perfusion cannula    Successful insertion of a 50cc IABP    Successful insertion of a thermogard cooling catheter    Successful insertion of a R radial arterial line     CLINICAL IMPRESSION:   Chano Johnson is a 72 year old gentleman with a pmhx sig for afib s/p ablation, abhi, smoking, and cad s/p STEMI c/b VF arrest requiring va ecmo s/p mLAD PCI and then staged procedure of RI.       Coronary artery disease: causing refractory VF arrest; found to have 2v disease with revascularization of the acutely occluded mLAD.  He had staged PCI of the RI.  LVEF 45-50%.  --ASA and ticagrelor  --Toprol XL 150mg Qday  --rosuvastatin 20mg Qday  --Repeat TTE in 8/2022  --Coronary CTA in 2/2023 to follow-up CAD  --sildenafil PRN for sexual activity     ECMO Cannulation Site: likely lymphocele but this has now improved.    --continue to monitor     F/U: 6 months    Thank you for allowing us to take part in the care of this very pleasant patient.   Please do not hesitate to call if any further questions or concerns arise.    I spent 30 min today reviewing the medical record, meeting with the patient, and completing this note.    Larry Cintron MD, PhD  Interventional/Critical Care Cardiology  [phone]    August 22, 2022      CC  Patient Care Team:  Laith Limon MD as PCP - General (Family Practice)  Larry Cintron MD as MD (Cardiovascular Disease)  Mirlande Mckenzie RN as Specialty Care Coordinator (Cardiology)  Malik Henriquez MD as MD (Cardiovascular Disease)  CHELSIE Parker MD as MD (Plastic Surgery)  Griffin Chavez MD as MD (Cardiovascular Disease)  Larry Cintron MD as Assigned Heart and Vascular Provider  Suzanne Bloom DO as Assigned PCP

## 2022-08-22 NOTE — PATIENT INSTRUCTIONS
Patient Instructions:  It was a pleasure to see you in the cardiology clinic today.      If you have any questions, call  Onelia Mckenzie RN, at [phone].   [facility]iology Clin[facility].  To schedule an appointment or to leave a message for your Care Team Press #1  If you are a physician calling for another physician Press #2  For Billing Press #3  For Medical Records Press #4  We are encouraging the use of [employer] to communicate with your HealthCare Provider    Note the new medications: none  Stop the following medications: none    The results from today include: none  Please follow up with Dr. Cintron in six months with an ECHO and coronary CTA with a fasting lab      If you have an urgent need after hours (8:00 am to 4:30 pm) please call [phone] and ask for the cardiology fellow on call.

## 2022-08-22 NOTE — NURSING NOTE
Chief Complaint   Patient presents with     Follow Up     3 month follow up, S/P arrest/ECMO, S/P Afib ablation & Watchman procedures. S/P PCI of Ramus     Vitals were taken and medications reconciled.    Patrice Fuller, EMT  1:01 PM

## 2022-08-29 ENCOUNTER — E-VISIT (OUTPATIENT)
Dept: URGENT CARE | Facility: CLINIC | Age: 72
End: 2022-08-29
Payer: MEDICARE

## 2022-08-29 DIAGNOSIS — J02.9 SORE THROAT: ICD-10-CM

## 2022-08-29 DIAGNOSIS — Z20.822 SUSPECTED COVID-19 VIRUS INFECTION: ICD-10-CM

## 2022-08-29 PROCEDURE — 99207 PR NO BILLABLE SERVICE THIS VISIT: CPT | Performed by: PHYSICIAN ASSISTANT

## 2022-08-29 NOTE — PATIENT INSTRUCTIONS
Dear Chano,    Your symptoms show that you may have coronavirus (COVID-19).     Because you also reported sore throat, I would like to also test you for Strep Throat to determine if we need to treat you for that as well.    What should I do?  We would like to test you for COVID-19 virus and Strep Throat. I have placed orders for these tests.   To schedule: go to your [facility] home page and scroll down to the section that says  You have an appointment that needs to be scheduled  and click the large green button that says  Schedule Now  and follow the steps to find the next available openings. It is important that when you are asked what the reason for your appointment is that you mention you need BOTH COVID and Strep tests.    If you are unable to complete these [facility] scheduling steps, please call [phone] to schedule your testing.     These guidelines are for isolating before returning to work, school or .     For employers, schools and day cares: This is an official notice for this person s medical guidelines for returning in-person.     For health care sites: The CDC gives different isolation and quarantine guidelines for healthcare sites, please check with these sites before arriving.     How do I self-isolate?  You isolate when you have symptoms of COVID or a test shows you have COVID, even if you don t have symptoms.     If you DO have symptoms:  o Stay home and away from others  - For at least 5 days after your symptoms started, AND   - You are fever free for 24 hours (with no medicine that reduces fever), AND  - Your other symptoms are better.  o Wear a mask for 10 full days any time you are around others.    If you DON T have symptoms:  o Stay at home and away from others for at least 5 days after your positive test.  o Wear a mask for 10 full days any time you are around others.    How can I take care of myself?  Over the counter medications may help with your symptoms such as runny or stuffy  nose, cough, chills, or fever. Talk to your care team about your options.   Some people are at high risk of severe illness (for example, you have a weak immune system, you re 65 years or older, or you have certain medical problems). If your risk is high and your symptoms started in the last 5 to 7 days, we strongly recommend for you to get COVID treatment as soon as possible. Paxlovid, Molnupiravir and the monoclonal antibody treatments are proven safe and effective, make you feel better faster, and prevent hospitalization and death.       To schedule an appointment to discuss COVID treatment, request an appointment on [employer] (select  COVID-19 Treatment ) or call [facility] ([phone]), press 7.    Get lots of rest. Drink extra fluids (unless a doctor has told you not to)    Take Tylenol (acetaminophen) or ibuprofen for fever or pain. If you have liver or kidney problems, ask your family doctor if it's okay to take Tylenol or ibuprofen    Take over the counter medications for your symptoms, as directed by your doctor. You may also talk to your pharmacist.      If you have other health problems (like cancer, heart failure, an organ transplant or severe kidney disease): Call your specialty clinic if you don't feel better in the next 2 days.    Know when to call 911. Emergency warning signs include:  o Trouble breathing or shortness of breath  o Pain or pressure in the chest that doesn't go away  o Feeling confused like you haven't felt before, or not being able to wake up  o Bluish-colored lips or face    Where can I get more information?    [facility] - About COVID-19: [email or web address]     CDC - What to Do If You're Sick: https://www.cdc.gov/coronavirus/2019-ncov/if-you-are-sick/index.html     CDC - Quarantine & Isolation: https://www.cdc.gov/coronavirus/2019-ncov/your-health/quarantine-isolation.html     [facility] clinical trials (COVID-19 research studies):  clinicalaffairs.[facility]-clinical-trials    Below are the COVID-19 hotlines at the [place] [facility] ([facility]). Interpreters are available.  o For health questions: Call 847-645-9104 or 1-626.474.6957 (7 a.m. to 7 p.m.)  o For questions about schools and childcare: Call 186-609-8899 or 1-431.291.4556 (7 a.m. to 7 p.m.)

## 2022-08-30 ENCOUNTER — LAB (OUTPATIENT)
Dept: FAMILY MEDICINE | Facility: CLINIC | Age: 72
End: 2022-08-30
Attending: PHYSICIAN ASSISTANT
Payer: MEDICARE

## 2022-08-30 DIAGNOSIS — J02.9 SORE THROAT: ICD-10-CM

## 2022-08-30 DIAGNOSIS — Z20.822 SUSPECTED COVID-19 VIRUS INFECTION: ICD-10-CM

## 2022-08-30 LAB
DEPRECATED S PYO AG THROAT QL EIA: NEGATIVE
GROUP A STREP BY PCR: NOT DETECTED
SARS-COV-2 RNA RESP QL NAA+PROBE: NEGATIVE

## 2022-08-30 PROCEDURE — U0005 INFEC AGEN DETEC AMPLI PROBE: HCPCS

## 2022-08-30 PROCEDURE — U0003 INFECTIOUS AGENT DETECTION BY NUCLEIC ACID (DNA OR RNA); SEVERE ACUTE RESPIRATORY SYNDROME CORONAVIRUS 2 (SARS-COV-2) (CORONAVIRUS DISEASE [COVID-19]), AMPLIFIED PROBE TECHNIQUE, MAKING USE OF HIGH THROUGHPUT TECHNOLOGIES AS DESCRIBED BY CMS-2020-01-R: HCPCS

## 2022-08-30 PROCEDURE — 87651 STREP A DNA AMP PROBE: CPT

## 2022-09-05 NOTE — PROGRESS NOTES
[facility]  CARDIOVASCULAR MEDICINE CLINIC NOTE    Referring Provider: Larry Cintron   Primary Care Provider: Laith Limon     Patient Name: Chano Johnson   MRN: [id number]     PERTINENT CLINICAL HISTORY:   Chano Johnson is a 72 year old gentleman with a pmhx sig for afib s/p ablation, abhi, smoking, and cad s/p STEMI c/b VF arrest requiring va ecmo.  He recovered well and was discharged 3/10/2022.  He had staged procedure to RI on 4/17/2022 with DESx1.     He has been doing well since discharge.  He completed cardiac rehab.  He recently travelled to [place] and was active without any issues.  He had a lymphocele at his ECMO cannulation site but it has resolved since his last clinic visit.  He denies any chest pain, palpitations, SOB, edema, orthopnea, PND, syncope or near syncope.     PAST MEDICAL HISTORY:     Past Medical History:   Diagnosis Date     A-fib (H)      CAD (coronary artery disease)      Cardiac arrest (H)      H/O extracorporeal membrane oxygenation treatment      Heart disease      Hypertension      STEMI (ST elevation myocardial infarction) (H)         PAST SURGICAL HISTORY:     Past Surgical History:   Procedure Laterality Date     ATRIAL ABLATION SURGERY       CERVICAL SPINE SURGERY       CV ARTERIAL LINE PLACEMENT N/A 02/17/2022    Procedure: Arterial Line Placement;  Surgeon: Larry Cintron MD;  Location: [facility] CARDIAC CATH LAB     CV CORONARY ANGIOGRAM N/A 02/17/2022    Procedure: Coronary Angiogram;  Surgeon: Larry Cintron MD;  Location: [facility] CARDIAC CATH LAB     CV CORONARY ANGIOGRAM N/A 4/22/2022    Procedure: Coronary Angiogram;  Surgeon: Larry Cintron MD;  Location: [facility] CARDIAC CATH LAB     CV EXTRACORPERAL MEMBRANE OXYGENATION N/A 02/17/2022    Procedure: Extracorporeal Membrance Oxygenation;  Surgeon: Larry Cintron MD;  Location: [facility] CARDIAC CATH LAB     CV INTRA AORTIC BALLOON N/A 02/17/2022    Procedure: Intra Aortic Balloon Pump Insertion;   Surgeon: Larry Cintrno MD;  Location:  HEART CARDIAC CATH LAB     CV PCI N/A 4/22/2022    Procedure: Percutaneous Coronary Intervention;  Surgeon: Larry Cintron MD;  Location: [facility] CARDIAC CATH LAB     CV PCI ANGIOPLASTY N/A 02/17/2022    Procedure: Percutaneous Transluminal Angioplasty;  Surgeon: Larry Cintron MD;  Location: [facility] CARDIAC CATH LAB     CV THERAPEUTIC HYPOTHERMIA N/A 02/17/2022    Procedure: Therapeutic Hypothermia;  Surgeon: Larry Cintron MD;  Location: [facility] CARDIAC CATH LAB     IR UPPER EXTREMITY ANGIOGRAM LEFT  02/20/2022     MIDLINE DOUBLE LUMEN PLACEMENT Left 02/28/2022    Left brachial vein medial 0.59cm.Blood return on all ports midline okay to use.     REMOVE EXTRACORPORAL MEMBRANE OXYGENATOR ADULT N/A 02/22/2022    Procedure: EXTRACORPOREAL MEMBRANE OXYGENATION CANNULA REMOVAL, INTRAOPERATIVE TRANSESOPHAGEAL ECHOCARDIOGRAM PER ANESTHESIA.;  Surgeon: Rod Banuelos MD;  Location:  OR        CURRENT MEDICATIONS:     Current Outpatient Medications   Medication Sig Dispense Refill     allopurinol (ZYLOPRIM) 100 MG tablet Take 100 mg by mouth 2 times daily       aspirin (ASA) 81 MG chewable tablet Take 1 tablet (81 mg) by mouth daily Starting tomorrow. 30 tablet 3     metoprolol succinate ER (TOPROL-XL) 50 MG 24 hr tablet Take 3 tablets (150 mg) by mouth in the morning. 270 tablet 3     multivitamin, therapeutic (THERA-VIT) TABS tablet Take 1 tablet by mouth in the morning. 90 tablet 3     polyethylene glycol (MIRALAX) 17 GM/Dose powder Take 17 g by mouth daily 510 g 1     rosuvastatin (CRESTOR) 20 MG tablet Take 1 tablet (20 mg) by mouth every evening 90 tablet 3     ticagrelor (BRILINTA) 90 MG tablet Take 1 tablet (90 mg) by mouth 2 times daily 180 tablet 3        ALLERGIES:     Allergies   Allergen Reactions     Chlorpheniramine-Phenylpropan [A.R.M.] Other (See Comments)     Watery eyes, sneezing     Seroquel [Quetiapine] Other (See Comments)     Per  "Meeta (Wife), would like this medication to be listed as an allergy due to patient becoming combative and delirious after taking it.      Percocet [Oxycodone-Acetaminophen] Rash     \"felt like skin was crawling off\"-pt's S.O.          PHYSICAL EXAMINATION:   /65 (BP Location: Right arm, Patient Position: Chair, Cuff Size: Adult Large)   Pulse 63   Wt 94.1 kg (207 lb 8 oz)   SpO2 96%   BMI 28.14 kg/m    Body mass index is 28.14 kg/m .  Wt Readings from Last 2 Encounters:   08/22/22 94.1 kg (207 lb 8 oz)   07/12/22 91.2 kg (201 lb 1.6 oz)     Constitutional: no acute distress, pleasant and cooperative, appears overall well.  Cardiovascular: RRR nl S1S2, JVP not elevated, extremities with no edema or cyanosis  Respiratory: clear to auscultation and percussion bilaterally anterior and posterior  Gastrointestinal: soft, nontender, non distended, no hepatosplenomegaly or masses  Neurologic: AOx3     LABORATORY DATA:   I have reviewed the labs below.    LIPID RESULTS:  Recent Labs   Lab Test 03/03/22  0645 02/22/22  0330 06/30/17  1355   CHOL 118  --  173   HDL 32*  --  42   LDL 66  --  116   TRIG 98 150* 76        LIVER ENZYME RESULTS:  Recent Labs   Lab Test 07/12/22  1432 04/12/22  1247   AST 37 24   ALT 31 36       CBC RESULTS:  Recent Labs   Lab Test 07/12/22  1432 04/22/22  1142   WBC 5.6 5.6   HGB 10.9* 8.8*   HCT 34.4* 28.7*   * 84*       BMP RESULTS:  Recent Labs   Lab Test 07/12/22  1432 04/22/22  1142    142   POTASSIUM 4.6 4.7   CHLORIDE 103 111*   CO2 27 25   ANIONGAP 8 6   * 112*   BUN 20.0 20   CR 1.56* 1.97*   PEDRITO 9.7 8.8       INR RESULTS:  Recent Labs   Lab Test 04/22/22  1142 02/24/22  0946   INR 1.15 1.17*        PROCEDURES & FURTHER ASSESSMENTS:   I have reviewed the test results below.    Coronary angiogram 4/22/2022  --Two vessel coronary artery disease involving the LAD and ramus branch.  The LAD was stented previously.  The ramus branch was not treated " previously.  --Successful PCI of the ramus with a 2.5x20mm LETY dilated to 3.25 mm.     TTE 3/7/2022  Limited TTE for LV function.  Left ventricular function is decreased. The ejection fraction is 45-50%  (mildly reduced). There is a pattern of wall motion abnormalities that is  consistent with a prior LAD culprit infarction.     ECMO 2/17/2022    VF cardiac arrest with ongoing cardiogenic shock    Two vessel coronary artery disease with occlusion of the midLAD and severe stenosis of the large ramus    Successful PCI of the mid LAD with a LETY    Successful cannulation for peripheral VA ECMO with a 17 Fr arterial and 25 Fr venous cannula    Successful insertion of a distal perfusion cannula    Successful insertion of a 50cc IABP    Successful insertion of a thermogard cooling catheter    Successful insertion of a R radial arterial line     CLINICAL IMPRESSION:   Chano Johnson is a 72 year old gentleman with a pmhx sig for afib s/p ablation, abhi, smoking, and cad s/p STEMI c/b VF arrest requiring va ecmo s/p mLAD PCI and then staged procedure of RI.       Coronary artery disease: causing refractory VF arrest; found to have 2v disease with revascularization of the acutely occluded mLAD.  He had staged PCI of the RI.  LVEF 45-50%.  --ASA and ticagrelor  --Toprol XL 150mg Qday  --rosuvastatin 20mg Qday  --Repeat TTE in 8/2022  --Coronary CTA in 2/2023 to follow-up CAD  --sildenafil PRN for sexual activity     ECMO Cannulation Site: likely lymphocele but this has now improved.    --continue to monitor     F/U: 6 months    Thank you for allowing us to take part in the care of this very pleasant patient.  Please do not hesitate to call if any further questions or concerns arise.    I spent 30 min today reviewing the medical record, meeting with the patient, and completing this note.    Larry Cintron MD, PhD  Interventional/Critical Care Cardiology  [phone]    August 22, 2022      CC  Patient Care Team:  Laith Limon,  MD as PCP - General (Family Practice)  Larry Cintron MD as MD (Cardiovascular Disease)  Mirlande Mckenzie RN as Specialty Care Coordinator (Cardiology)  Malik Henriquez MD as MD (Cardiovascular Disease)  CHELSIE Parker MD as MD (Plastic Surgery)  Griffin Chavez MD as MD (Cardiovascular Disease)  Larry Cintron MD as Assigned Heart and Vascular Provider  Suzanne Bloom DO as Assigned PCP  LARRY CINTRON

## 2022-09-15 ENCOUNTER — LAB (OUTPATIENT)
Dept: LAB | Facility: CLINIC | Age: 72
End: 2022-09-15
Payer: MEDICARE

## 2022-09-15 DIAGNOSIS — R07.9 CHEST PAIN, UNSPECIFIED TYPE: ICD-10-CM

## 2022-09-15 DIAGNOSIS — I25.10 CORONARY ARTERY DISEASE INVOLVING NATIVE CORONARY ARTERY OF NATIVE HEART WITHOUT ANGINA PECTORIS: ICD-10-CM

## 2022-09-15 DIAGNOSIS — Z11.3 ROUTINE SCREENING FOR STI (SEXUALLY TRANSMITTED INFECTION): ICD-10-CM

## 2022-09-15 DIAGNOSIS — E03.8 SUBCLINICAL HYPOTHYROIDISM: ICD-10-CM

## 2022-09-15 DIAGNOSIS — I46.9 CARDIAC ARREST (H): ICD-10-CM

## 2022-09-15 DIAGNOSIS — I48.20 CHRONIC ATRIAL FIBRILLATION (H): ICD-10-CM

## 2022-09-15 LAB
ANION GAP SERPL CALCULATED.3IONS-SCNC: 7 MMOL/L (ref 7–15)
BUN SERPL-MCNC: 20.1 MG/DL (ref 8–23)
CALCIUM SERPL-MCNC: 9.7 MG/DL (ref 8.8–10.2)
CHLORIDE SERPL-SCNC: 105 MMOL/L (ref 98–107)
CHOLEST SERPL-MCNC: 100 MG/DL
CREAT SERPL-MCNC: 1.75 MG/DL (ref 0.67–1.17)
DEPRECATED HCO3 PLAS-SCNC: 28 MMOL/L (ref 22–29)
GFR SERPL CREATININE-BSD FRML MDRD: 40 ML/MIN/1.73M2
GLUCOSE SERPL-MCNC: 96 MG/DL (ref 70–99)
HDLC SERPL-MCNC: 53 MG/DL
LDLC SERPL CALC-MCNC: 33 MG/DL
NONHDLC SERPL-MCNC: 47 MG/DL
POTASSIUM SERPL-SCNC: 4.9 MMOL/L (ref 3.4–5.3)
SODIUM SERPL-SCNC: 140 MMOL/L (ref 136–145)
T4 FREE SERPL-MCNC: 1.13 NG/DL (ref 0.9–1.7)
TRIGL SERPL-MCNC: 71 MG/DL
TSH SERPL DL<=0.005 MIU/L-ACNC: 5.83 UIU/ML (ref 0.3–4.2)

## 2022-09-15 PROCEDURE — 87591 N.GONORRHOEAE DNA AMP PROB: CPT

## 2022-09-15 PROCEDURE — 87491 CHLMYD TRACH DNA AMP PROBE: CPT

## 2022-09-15 PROCEDURE — 84439 ASSAY OF FREE THYROXINE: CPT

## 2022-09-15 PROCEDURE — 36415 COLL VENOUS BLD VENIPUNCTURE: CPT

## 2022-09-15 PROCEDURE — 80048 BASIC METABOLIC PNL TOTAL CA: CPT

## 2022-09-15 PROCEDURE — 80061 LIPID PANEL: CPT

## 2022-09-15 PROCEDURE — 84443 ASSAY THYROID STIM HORMONE: CPT

## 2022-09-16 LAB
C TRACH DNA SPEC QL PROBE+SIG AMP: NEGATIVE
N GONORRHOEA DNA SPEC QL NAA+PROBE: NEGATIVE

## 2022-09-25 ENCOUNTER — HEALTH MAINTENANCE LETTER (OUTPATIENT)
Age: 72
End: 2022-09-25

## 2022-10-02 ENCOUNTER — MYC MEDICAL ADVICE (OUTPATIENT)
Dept: CARDIOLOGY | Facility: CLINIC | Age: 72
End: 2022-10-02

## 2022-10-03 ENCOUNTER — HOSPITAL ENCOUNTER (EMERGENCY)
Facility: CLINIC | Age: 72
Discharge: HOME | End: 2022-10-03
Attending: EMERGENCY MEDICINE | Admitting: EMERGENCY MEDICINE
Payer: MEDICARE

## 2022-10-03 ENCOUNTER — APPOINTMENT (OUTPATIENT)
Dept: CT IMAGING | Facility: CLINIC | Age: 72
End: 2022-10-03
Attending: EMERGENCY MEDICINE
Payer: MEDICARE

## 2022-10-03 VITALS
TEMPERATURE: 97.6 F | HEIGHT: 72 IN | RESPIRATION RATE: 18 BRPM | OXYGEN SATURATION: 99 % | BODY MASS INDEX: 28.58 KG/M2 | DIASTOLIC BLOOD PRESSURE: 61 MMHG | SYSTOLIC BLOOD PRESSURE: 114 MMHG | WEIGHT: 211 LBS | HEART RATE: 61 BPM

## 2022-10-03 DIAGNOSIS — M94.0 COSTOCHONDRITIS: ICD-10-CM

## 2022-10-03 LAB
ALBUMIN SERPL-MCNC: 3.9 G/DL (ref 3.5–5)
ALP SERPL-CCNC: 53 U/L (ref 45–120)
ALT SERPL W P-5'-P-CCNC: 17 U/L (ref 0–45)
ANION GAP SERPL CALCULATED.3IONS-SCNC: 8 MMOL/L (ref 5–18)
AST SERPL W P-5'-P-CCNC: 19 U/L (ref 0–40)
ATRIAL RATE - MUSE: 60 BPM
ATRIAL RATE - MUSE: 62 BPM
BASOPHILS # BLD AUTO: 0 10E3/UL (ref 0–0.2)
BASOPHILS NFR BLD AUTO: 0 %
BILIRUB SERPL-MCNC: 0.5 MG/DL (ref 0–1)
BUN SERPL-MCNC: 22 MG/DL (ref 8–28)
CALCIUM SERPL-MCNC: 9.2 MG/DL (ref 8.5–10.5)
CHLORIDE BLD-SCNC: 107 MMOL/L (ref 98–107)
CO2 SERPL-SCNC: 24 MMOL/L (ref 22–31)
CREAT SERPL-MCNC: 1.65 MG/DL (ref 0.7–1.3)
D DIMER PPP FEU-MCNC: 0.81 UG/ML FEU (ref 0–0.5)
DIASTOLIC BLOOD PRESSURE - MUSE: 63 MMHG
DIASTOLIC BLOOD PRESSURE - MUSE: 78 MMHG
EOSINOPHIL # BLD AUTO: 0.2 10E3/UL (ref 0–0.7)
EOSINOPHIL NFR BLD AUTO: 3 %
ERYTHROCYTE [DISTWIDTH] IN BLOOD BY AUTOMATED COUNT: 13.2 % (ref 10–15)
GFR SERPL CREATININE-BSD FRML MDRD: 43 ML/MIN/1.73M2
GLUCOSE BLD-MCNC: 106 MG/DL (ref 70–125)
HCT VFR BLD AUTO: 33.9 % (ref 40–53)
HGB BLD-MCNC: 10.8 G/DL (ref 13.3–17.7)
HOLD SPECIMEN: NORMAL
IMM GRANULOCYTES # BLD: 0 10E3/UL
IMM GRANULOCYTES NFR BLD: 0 %
INR PPP: 1.01 (ref 0.85–1.15)
INTERPRETATION ECG - MUSE: NORMAL
INTERPRETATION ECG - MUSE: NORMAL
LIPASE SERPL-CCNC: 38 U/L (ref 0–52)
LYMPHOCYTES # BLD AUTO: 2.7 10E3/UL (ref 0.8–5.3)
LYMPHOCYTES NFR BLD AUTO: 46 %
MCH RBC QN AUTO: 32.3 PG (ref 26.5–33)
MCHC RBC AUTO-ENTMCNC: 31.9 G/DL (ref 31.5–36.5)
MCV RBC AUTO: 102 FL (ref 78–100)
MONOCYTES # BLD AUTO: 0.4 10E3/UL (ref 0–1.3)
MONOCYTES NFR BLD AUTO: 7 %
NEUTROPHILS # BLD AUTO: 2.7 10E3/UL (ref 1.6–8.3)
NEUTROPHILS NFR BLD AUTO: 44 %
NRBC # BLD AUTO: 0 10E3/UL
NRBC BLD AUTO-RTO: 0 /100
P AXIS - MUSE: 80 DEGREES
P AXIS - MUSE: 83 DEGREES
PLATELET # BLD AUTO: 134 10E3/UL (ref 150–450)
POTASSIUM BLD-SCNC: 4.2 MMOL/L (ref 3.5–5)
PR INTERVAL - MUSE: 178 MS
PR INTERVAL - MUSE: 180 MS
PROT SERPL-MCNC: 6.9 G/DL (ref 6–8)
QRS DURATION - MUSE: 90 MS
QRS DURATION - MUSE: 92 MS
QT - MUSE: 444 MS
QT - MUSE: 450 MS
QTC - MUSE: 450 MS
QTC - MUSE: 450 MS
R AXIS - MUSE: 86 DEGREES
R AXIS - MUSE: 87 DEGREES
RBC # BLD AUTO: 3.34 10E6/UL (ref 4.4–5.9)
SODIUM SERPL-SCNC: 139 MMOL/L (ref 136–145)
SYSTOLIC BLOOD PRESSURE - MUSE: 125 MMHG
SYSTOLIC BLOOD PRESSURE - MUSE: 130 MMHG
T AXIS - MUSE: 86 DEGREES
T AXIS - MUSE: 92 DEGREES
TROPONIN I SERPL-MCNC: 0.02 NG/ML (ref 0–0.29)
TROPONIN I SERPL-MCNC: 0.03 NG/ML (ref 0–0.29)
VENTRICULAR RATE- MUSE: 60 BPM
VENTRICULAR RATE- MUSE: 62 BPM
WBC # BLD AUTO: 6 10E3/UL (ref 4–11)

## 2022-10-03 PROCEDURE — 250N000011 HC RX IP 250 OP 636: Performed by: EMERGENCY MEDICINE

## 2022-10-03 PROCEDURE — G1010 CDSM STANSON: HCPCS

## 2022-10-03 PROCEDURE — 36415 COLL VENOUS BLD VENIPUNCTURE: CPT | Performed by: EMERGENCY MEDICINE

## 2022-10-03 PROCEDURE — 96375 TX/PRO/DX INJ NEW DRUG ADDON: CPT

## 2022-10-03 PROCEDURE — 83690 ASSAY OF LIPASE: CPT | Performed by: EMERGENCY MEDICINE

## 2022-10-03 PROCEDURE — 84484 ASSAY OF TROPONIN QUANT: CPT | Performed by: EMERGENCY MEDICINE

## 2022-10-03 PROCEDURE — 85610 PROTHROMBIN TIME: CPT | Performed by: EMERGENCY MEDICINE

## 2022-10-03 PROCEDURE — 85379 FIBRIN DEGRADATION QUANT: CPT | Performed by: EMERGENCY MEDICINE

## 2022-10-03 PROCEDURE — 85025 COMPLETE CBC W/AUTO DIFF WBC: CPT | Performed by: EMERGENCY MEDICINE

## 2022-10-03 PROCEDURE — 80053 COMPREHEN METABOLIC PANEL: CPT | Performed by: EMERGENCY MEDICINE

## 2022-10-03 PROCEDURE — 93005 ELECTROCARDIOGRAM TRACING: CPT | Performed by: EMERGENCY MEDICINE

## 2022-10-03 PROCEDURE — 99285 EMERGENCY DEPT VISIT HI MDM: CPT | Mod: 25

## 2022-10-03 PROCEDURE — 96374 THER/PROPH/DIAG INJ IV PUSH: CPT | Mod: 59

## 2022-10-03 RX ORDER — FENTANYL CITRATE 50 UG/ML
50 INJECTION, SOLUTION INTRAMUSCULAR; INTRAVENOUS ONCE
Status: COMPLETED | OUTPATIENT
Start: 2022-10-03 | End: 2022-10-03

## 2022-10-03 RX ORDER — ONDANSETRON 2 MG/ML
4 INJECTION INTRAMUSCULAR; INTRAVENOUS ONCE
Status: COMPLETED | OUTPATIENT
Start: 2022-10-03 | End: 2022-10-03

## 2022-10-03 RX ORDER — IOPAMIDOL 755 MG/ML
80 INJECTION, SOLUTION INTRAVASCULAR ONCE
Status: COMPLETED | OUTPATIENT
Start: 2022-10-03 | End: 2022-10-03

## 2022-10-03 RX ORDER — TRAMADOL HYDROCHLORIDE 50 MG/1
50-100 TABLET ORAL EVERY 6 HOURS PRN
Qty: 18 TABLET | Refills: 0 | Status: SHIPPED | OUTPATIENT
Start: 2022-10-03 | End: 2022-10-06

## 2022-10-03 RX ADMIN — ONDANSETRON 4 MG: 2 INJECTION INTRAMUSCULAR; INTRAVENOUS at 04:33

## 2022-10-03 RX ADMIN — IOPAMIDOL 80 ML: 755 INJECTION, SOLUTION INTRAVENOUS at 03:59

## 2022-10-03 RX ADMIN — FENTANYL CITRATE 50 MCG: 50 INJECTION, SOLUTION INTRAMUSCULAR; INTRAVENOUS at 04:35

## 2022-10-03 ASSESSMENT — ACTIVITIES OF DAILY LIVING (ADL)
ADLS_ACUITY_SCORE: 35
ADLS_ACUITY_SCORE: 35

## 2022-10-03 NOTE — DISCHARGE INSTRUCTIONS
Ice to your chest for 10 to 15 minutes every 1-2 hours for the next 1 to 2 days  No lifting more than 10 pounds or strenuous activity for the next week  Return to the emergency department for worsening problems or concerns

## 2022-10-03 NOTE — ED PROVIDER NOTES
EMERGENCY DEPARTMENT ENCOUnter      NAME: Chano Johnson  AGE: 72 year old male  YOB: 1950  MRN: [id number]  EVALUATION DATE & TIME: 10/3/2022  2:27 AM    PCP: Laith Limon    ED PROVIDER: Kathia Muñoz MD      Chief Complaint   Patient presents with     Rib Pain         FINAL IMPRESSION:  1. Costochondritis          ED COURSE & MEDICAL DECISION MAKING:      In summary, the patient is a 72-year-old male that presents to the emergency department with right sided and central chest pain thought secondary to costochondritis from working out at the gym.  He has no evidence of ACS, will embolism, or pneumonia.    2:34 AM I met with the patient to gather history and to perform my initial exam. We discussed plans for the ED course, including diagnostic testing and treatment. PPE: N95 mask.    4:03 AM I rechecked and updated the patient. His pain is intermittently returning so he is requesting pain medication. Fentanyl 50 mcg IV was administered for pain.  Zofran 4 mg IV was administered for nausea.  Reevaluation reveals that his pain is improved in the emergency department.  At the conclusion of the encounter I discussed the results of all of the tests and the disposition. The questions were answered. The patient or family acknowledged understanding and was agreeable with the care plan.         MEDICATIONS GIVEN IN THE EMERGENCY:  Medications   iopamidol (ISOVUE-370) solution 80 mL (80 mLs Intravenous Given 10/3/22 5914)   fentaNYL (PF) (SUBLIMAZE) injection 50 mcg (50 mcg Intravenous Given 10/3/22 9776)   ondansetron (ZOFRAN) injection 4 mg (4 mg Intravenous Given 10/3/22 5068)       NEW PRESCRIPTIONS STARTED AT TODAY'S ER VISIT  Discharge Medication List as of 10/3/2022  6:14 AM      START taking these medications    Details   traMADol (ULTRAM) 50 MG tablet Take 1-2 tablets ( mg) by mouth every 6 hours as needed for moderate pain or severe pain, Disp-18 tablet, R-0, Local Print                 =================================================================    HPI        Chano Johnson is a 72 year old male with a pertinent history of ST elevation myocardial infarct involving LAD, cardiac arrest, chronic atrial fibrillation, CAD, hyperlipidemia, GERD, SAHIL with tubular necrosis, stage 3 CKD, and sleep apnea who presents to this ED by walk in accompanied by his wife for evaluation of rib pain.    Patient reports pain in his rib cage on the right side and center that has been present for 2 weeks, but worsened today. The sharp pain woke him up from sleep this morning, prompting his ED visit. His pain is worse when he moves. He has not seen a doctor for his pain yet. Of note, the patient finished his heart attack cardiac rehab on 7/28/22, and has since been trying to return to his previous work out routine by lifting weights. He otherwise denies any new lifting or exercising. He denies any shortness of breath, cough, leg pain, fever, chills, or diaphoresis. Denies any recent falls or injuries. Denies having any other medical problems. Patient is on Brilinta following his heart attack. Patient has allergies to Seroquel and Percocet. Denies smoking. Drinks about 1 beer per day. No other complaints or concerns expressed at this time.       REVIEW OF SYSTEMS     Constitutional:  Denies fever, diaphoresis, or chills  HENT:  Denies sore throat   Respiratory:  Denies cough or shortness of breath   Cardiovascular:  Denies chest pain or palpitations  GI:  Denies abdominal pain, nausea, or vomiting  Musculoskeletal: Positive for rib pain (right and mid). Denies any new extremity pain, leg pain, recent falls or injuries.  Skin:  Denies rash   Neurologic:  Denies headache, focal weakness or sensory changes    All other systems reviewed and are negative      PAST MEDICAL HISTORY:  Past Medical History:   Diagnosis Date     A-fib (H)      CAD (coronary artery disease)      Cardiac arrest (H)      H/O extracorporeal  membrane oxygenation treatment      Heart disease      Hypertension      STEMI (ST elevation myocardial infarction) (H)        PAST SURGICAL HISTORY:  Past Surgical History:   Procedure Laterality Date     ATRIAL ABLATION SURGERY       CERVICAL SPINE SURGERY       CV ARTERIAL LINE PLACEMENT N/A 02/17/2022    Procedure: Arterial Line Placement;  Surgeon: Larry Cintron MD;  Location: [facility] CARDIAC CATH LAB     CV CORONARY ANGIOGRAM N/A 02/17/2022    Procedure: Coronary Angiogram;  Surgeon: Larry Cintron MD;  Location: [facility] CARDIAC CATH LAB     CV CORONARY ANGIOGRAM N/A 4/22/2022    Procedure: Coronary Angiogram;  Surgeon: Larry Cintron MD;  Location: [facility] CARDIAC CATH LAB     CV EXTRACORPERAL MEMBRANE OXYGENATION N/A 02/17/2022    Procedure: Extracorporeal Membrance Oxygenation;  Surgeon: Larry Cintron MD;  Location: [facility] CARDIAC CATH LAB     CV INTRA AORTIC BALLOON N/A 02/17/2022    Procedure: Intra Aortic Balloon Pump Insertion;  Surgeon: Larry Cintron MD;  Location: [facility] CARDIAC CATH LAB     CV PCI N/A 4/22/2022    Procedure: Percutaneous Coronary Intervention;  Surgeon: Larry Cintron MD;  Location: [facility] CARDIAC CATH LAB     CV PCI ANGIOPLASTY N/A 02/17/2022    Procedure: Percutaneous Transluminal Angioplasty;  Surgeon: Larry Cintron MD;  Location: [facility] CARDIAC CATH LAB     CV THERAPEUTIC HYPOTHERMIA N/A 02/17/2022    Procedure: Therapeutic Hypothermia;  Surgeon: Larry Cintron MD;  Location: [facility] CARDIAC CATH LAB     IR UPPER EXTREMITY ANGIOGRAM LEFT  02/20/2022     MIDLINE DOUBLE LUMEN PLACEMENT Left 02/28/2022    Left brachial vein medial 0.59cm.Blood return on all ports midline okay to use.     REMOVE EXTRACORPORAL MEMBRANE OXYGENATOR ADULT N/A 02/22/2022    Procedure: EXTRACORPOREAL MEMBRANE OXYGENATION CANNULA REMOVAL, INTRAOPERATIVE TRANSESOPHAGEAL ECHOCARDIOGRAM PER ANESTHESIA.;  Surgeon: Rod Banuelos MD;  Location:  OR           [facility]  "MEDICATIONS:    traMADol (ULTRAM) 50 MG tablet  allopurinol (ZYLOPRIM) 100 MG tablet  aspirin (ASA) 81 MG chewable tablet  metoprolol succinate ER (TOPROL-XL) 50 MG 24 hr tablet  multivitamin, therapeutic (THERA-VIT) TABS tablet  polyethylene glycol (MIRALAX) 17 GM/Dose powder  rosuvastatin (CRESTOR) 20 MG tablet  ticagrelor (BRILINTA) 90 MG tablet        ALLERGIES:  Allergies   Allergen Reactions     Chlorpheniramine-Phenylpropan [A.R.M.] Other (See Comments)     Watery eyes, sneezing     Seroquel [Quetiapine] Other (See Comments)     Per Meeta (Wife), would like this medication to be listed as an allergy due to patient becoming combative and delirious after taking it.      Percocet [Oxycodone-Acetaminophen] Rash     \"felt like skin was crawling off\"-pt's S.O.        FAMILY HISTORY:  History reviewed. No pertinent family history.    SOCIAL HISTORY:   Social History     Socioeconomic History     Marital status:      Spouse name: None     Number of children: None     Years of education: None     Highest education level: None   Tobacco Use     Smoking status: Never Smoker     Smokeless tobacco: Never Used   Substance and Sexual Activity     Alcohol use: Yes     Alcohol/week: 2.0 standard drinks     Drug use: No       VITALS:  /61   Pulse 61   Temp 97.6  F (36.4  C) (Oral)   Resp 18   Ht 1.829 m (6')   Wt 95.7 kg (211 lb)   SpO2 99%   BMI 28.62 kg/m      PHYSICAL EXAM    Constitutional:  Well developed, Well nourished,  HENT:  Normocephalic, Atraumatic, Bilateral external ears normal, Oropharynx moist, Nose normal.   Neck:  Normal range of motion, No meningismus, No stridor.   Eyes:  EOMI, Conjunctiva normal, No discharge.   Respiratory:  Normal breath sounds, No respiratory distress, No wheezing, chest tenderness noted over right anterior lateral chest wall and costochondral border on the right.   Cardiovascular:  Normal heart rate, Normal rhythm, No murmurs  GI:  Soft, No tenderness, No guarding, " No CVA tenderness.   Musculoskeletal:  Neurovascularly intact distally, No edema, No tenderness, No cyanosis, Good range of motion in all major joints.   Integument:  Warm, Dry, No erythema, No rash.   Lymphatic:  No lymphadenopathy noted.   Neurologic:  Alert & oriented x 3, Normal motor function,No focal deficits noted.   Psychiatric:  Affect normal, Judgment normal, Mood normal.      LAB:  All pertinent labs reviewed and interpreted.  Results for orders placed or performed during the hospital encounter of 10/03/22   CT Chest Pulmonary Embolism w Contrast    Impression    IMPRESSION:  1.  Negative for pulmonary embolism.    2.  Thoracic aorta is not opacified well enough to evaluate for dissection. Mild ectasia ascending thoracic aorta measuring 4.1 cm. Descending thoracic aorta normal in caliber.    3.  Mild atelectasis in the lungs. No evidence for acute pneumonitis.    4.  There is some subtle smooth interlobular septal thickening in the lungs which may reflect some mild fluid overload. No pleural effusions.    5.  Mild splenic enlargement.                Result Value Ref Range    INR 1.01 0.85 - 1.15   Comprehensive metabolic panel   Result Value Ref Range    Sodium 139 136 - 145 mmol/L    Potassium 4.2 3.5 - 5.0 mmol/L    Chloride 107 98 - 107 mmol/L    Carbon Dioxide (CO2) 24 22 - 31 mmol/L    Anion Gap 8 5 - 18 mmol/L    Urea Nitrogen 22 8 - 28 mg/dL    Creatinine 1.65 (H) 0.70 - 1.30 mg/dL    Calcium 9.2 8.5 - 10.5 mg/dL    Glucose 106 70 - 125 mg/dL    Alkaline Phosphatase 53 45 - 120 U/L    AST 19 0 - 40 U/L    ALT 17 0 - 45 U/L    Protein Total 6.9 6.0 - 8.0 g/dL    Albumin 3.9 3.5 - 5.0 g/dL    Bilirubin Total 0.5 0.0 - 1.0 mg/dL    GFR Estimate 43 (L) >60 mL/min/1.73m2   Result Value Ref Range    Troponin I 0.03 0.00 - 0.29 ng/mL   Result Value Ref Range    Lipase 38 0 - 52 U/L   Extra Blue Top Tube   Result Value Ref Range    Hold Specimen JIC    Extra Red Top Tube   Result Value Ref Range    Hold  Specimen JIC    Extra Green Top (Lithium Heparin) Tube   Result Value Ref Range    Hold Specimen JIC    Extra Purple Top Tube   Result Value Ref Range    Hold Specimen JIC    CBC with platelets and differential   Result Value Ref Range    WBC Count 6.0 4.0 - 11.0 10e3/uL    RBC Count 3.34 (L) 4.40 - 5.90 10e6/uL    Hemoglobin 10.8 (L) 13.3 - 17.7 g/dL    Hematocrit 33.9 (L) 40.0 - 53.0 %     (H) 78 - 100 fL    MCH 32.3 26.5 - 33.0 pg    MCHC 31.9 31.5 - 36.5 g/dL    RDW 13.2 10.0 - 15.0 %    Platelet Count 134 (L) 150 - 450 10e3/uL    % Neutrophils 44 %    % Lymphocytes 46 %    % Monocytes 7 %    % Eosinophils 3 %    % Basophils 0 %    % Immature Granulocytes 0 %    NRBCs per 100 WBC 0 <1 /100    Absolute Neutrophils 2.7 1.6 - 8.3 10e3/uL    Absolute Lymphocytes 2.7 0.8 - 5.3 10e3/uL    Absolute Monocytes 0.4 0.0 - 1.3 10e3/uL    Absolute Eosinophils 0.2 0.0 - 0.7 10e3/uL    Absolute Basophils 0.0 0.0 - 0.2 10e3/uL    Absolute Immature Granulocytes 0.0 <=0.4 10e3/uL    Absolute NRBCs 0.0 10e3/uL   D dimer quantitative   Result Value Ref Range    D-Dimer Quantitative 0.81 (H) 0.00 - 0.50 ug/mL FEU   Result Value Ref Range    Troponin I 0.02 0.00 - 0.29 ng/mL   ECG 12-LEAD WITH MUSE (LHE)   Result Value Ref Range    Systolic Blood Pressure 130 mmHg    Diastolic Blood Pressure 78 mmHg    Ventricular Rate 62 BPM    Atrial Rate 62 BPM    NY Interval 180 ms    QRS Duration 90 ms     ms    QTc 450 ms    P Axis 83 degrees    R AXIS 87 degrees    T Axis 86 degrees    Interpretation ECG       Sinus rhythm  Low voltage QRS  Possible Anterolateral infarct (cited on or before 17-FEB-2022)  Abnormal ECG  When compared with ECG of 22-APR-2022 15:27,  No significant change was found  Confirmed by SEE ED PROVIDER NOTE FOR, ECG INTERPRETATION ([id number]),  Sera Darling ([id number]) on 10/3/2022 2:59:53 AM     ECG 12-LEAD WITH MUSE (LHE)   Result Value Ref Range    Systolic Blood Pressure 125 mmHg    Diastolic Blood Pressure  63 mmHg    Ventricular Rate 60 BPM    Atrial Rate 60 BPM    CA Interval 178 ms    QRS Duration 92 ms     ms    QTc 450 ms    P Axis 80 degrees    R AXIS 86 degrees    T Axis 92 degrees    Interpretation ECG       Sinus rhythm  Low voltage QRS  Possible Anterolateral infarct (cited on or before 2022)  Abnormal ECG  When compared with ECG of 03-OCT-2022 02:34,  No significant change was found  Confirmed by SEE ED PROVIDER NOTE FOR, ECG INTERPRETATION ([id number]),  ASHLEY ANAYA ([id number]) on 10/3/2022 8:01:40 AM         RADIOLOGY:  I have independently reviewed and interpreted the above imaging, pending the final radiology read.  CT Chest Pulmonary Embolism w Contrast   Final Result   IMPRESSION:   1.  Negative for pulmonary embolism.      2.  Thoracic aorta is not opacified well enough to evaluate for dissection. Mild ectasia ascending thoracic aorta measuring 4.1 cm. Descending thoracic aorta normal in caliber.      3.  Mild atelectasis in the lungs. No evidence for acute pneumonitis.      4.  There is some subtle smooth interlobular septal thickening in the lungs which may reflect some mild fluid overload. No pleural effusions.      5.  Mild splenic enlargement.                           EK-rate is 62, sinus, there is no ST segment elevation or depression appreciated.  EKG is unchanged from 2022  0559-rate is 60, sinus, there is no ST segment elevation or depression appreciated.  EKG is unchanged from earlier EKG.  I have independently reviewed and interpreted this EKG          I, Lainey Eisenberg, am serving as a scribe to document services personally performed by Dr. Muñoz based on my observation and the provider's statements to me. I, Kathia Muñoz MD attest that Lainey Eisenberg is acting in a scribe capacity, has observed my performance of the services and has documented them in accordance with my direction.    Kathia Muñoz MD  Emergency Medicine  [facility] EMERGENCY ROOM  [address] [address]  [phone]  Dept: [phone]     Kathia Muñoz MD  10/04/22 0626

## 2022-10-03 NOTE — ED TRIAGE NOTES
"Sternal chest pains x \" a few days\". Worsened on 10/2/22. Then awoke from sleep this am with right sided chest pains. Denies shortness of breath. Pain is sharp. Hx of MI in 2/2022. Stents placed       Triage Assessment     Row Name 10/03/22 0231       Triage Assessment (Adult)    Airway WDL WDL       Respiratory WDL    Respiratory WDL WDL       Skin Circulation/Temperature WDL    Skin Circulation/Temperature WDL WDL       Cardiac WDL    Cardiac WDL X;chest pain       Chest Pain Assessment    Chest Pain Location anterior chest, right              "

## 2022-10-11 ENCOUNTER — OFFICE VISIT (OUTPATIENT)
Dept: FAMILY MEDICINE | Facility: CLINIC | Age: 72
End: 2022-10-11
Payer: MEDICARE

## 2022-10-11 VITALS
HEART RATE: 58 BPM | RESPIRATION RATE: 24 BRPM | WEIGHT: 217.1 LBS | DIASTOLIC BLOOD PRESSURE: 60 MMHG | BODY MASS INDEX: 29.44 KG/M2 | SYSTOLIC BLOOD PRESSURE: 112 MMHG | OXYGEN SATURATION: 97 % | TEMPERATURE: 97.9 F

## 2022-10-11 DIAGNOSIS — M94.0 COSTOCHONDRITIS: Primary | ICD-10-CM

## 2022-10-11 PROCEDURE — 90662 IIV NO PRSV INCREASED AG IM: CPT | Performed by: STUDENT IN AN ORGANIZED HEALTH CARE EDUCATION/TRAINING PROGRAM

## 2022-10-11 PROCEDURE — 99214 OFFICE O/P EST MOD 30 MIN: CPT | Mod: 25 | Performed by: STUDENT IN AN ORGANIZED HEALTH CARE EDUCATION/TRAINING PROGRAM

## 2022-10-11 PROCEDURE — G0008 ADMIN INFLUENZA VIRUS VAC: HCPCS | Performed by: STUDENT IN AN ORGANIZED HEALTH CARE EDUCATION/TRAINING PROGRAM

## 2022-10-11 ASSESSMENT — PAIN SCALES - GENERAL: PAINLEVEL: NO PAIN (0)

## 2022-10-11 NOTE — PROGRESS NOTES
ER Follow-up Visit      Assessment and Plan     72-year-old male with past with a history of STEMI in February 2022 severe patient went into V. fib and was dead on the table for approximately half an hour by patient report, placed on ECMO and had anoxic brain and kidney injury.  He recently went to the ER for chest pain as he is gotten back into working out which she did frequently before his heart attack.  They determined this was likely costochondritis versus undefined musculoskeletal pain.  They gave him some fentanyl and this improved his symptoms.  He has not had any chest pain since.  He has no concerns today.  We did briefly discussed that he should establish with another primary care provider and he will consider this.  He would like his flu shot today so we will give.  He refused COVID booster.    1. Costochondritis    Options for treatment and follow-up care were reviewed with the patient engaged in the decision making process and verbalized understanding of the options discussed and agreed with the final plan.      Reji Ritchie MD           [facility]       ER Follow-up Visit:    ER: [facility]    Date of Visit: 10/3/22    Reason(s) for Presentation:   In summary, the patient is a 72-year-old male that presents to the emergency department with right sided and central chest pain thought secondary to costochondritis from working out at the gym.  He has no evidence of ACS, will embolism, or pneumonia.     2:34 AM I met with the patient to gather history and to perform my initial exam. We discussed plans for the ED course, including diagnostic testing and treatment. PPE: N95 mask.    4:03 AM I rechecked and updated the patient. His pain is intermittently returning so he is requesting pain medication. Fentanyl 50 mcg IV was administered for pain.  Zofran 4 mg IV was administered for nausea.  Reevaluation reveals that his pain is improved in the emergency department.  At the conclusion of the encounter I  discussed the results of all of the tests and the disposition. The questions were answered. The patient or family acknowledged understanding and was agreeable with the care plan.     Diagnostic Tests: troponin normal;    Summary of ER visit copied below/Treatments Recieved:     Concerns today:     Chief Complaint   Patient presents with     Hospital F/U     No concerns. Flu shot       He is doing well after. No chest pain.     Discharge Diagnoses:  # VF cardiac arrest   # Cardiogenic/hemorrhagic shock, resovled  # Anterior STEMI s/p LETY x2  # Chronic HFrEF with improved EF   # Ischemic cardiomyopathy   # Possible anoxic brain injury post cardiac arrest=  # Delirium; resolved  # Agitation; resolved  # Paroxysmal atrial fibrillation  # s/p Watchman implantation   # Acute hypoxic respiratory failure, resolved   # Pleural effusion, improved  # Left hemothorax, resolved  # ARSENIO  # Severe malnutrition in the context of acute illness  # Dysphagia   # loose stools 2/2 TF, resolved  # Constipation  # Transaminitis, in setting of shock, cardiac arrest, improved.   # SAHIL, likely ATN in setting of cardiac arrest, stable   # Hypernatremia, resolved   # Hypermagnesemia, Mg 2.5  # Hyperchloremia, Cl 113  # Uremia, improving, BUN 72  # Anemia due to acute blood loss, stable  # Left hemothorax  # Left groin wound 2/2 ECMO decannulation   # Pressure wound of buttock   # Pressure wound of left tongue 2/2 ET  # Generalized weakness  # Deconditioning      Brief HPI:  Chano Johnson is a 71 year old male with a history of atrial fibrillation  s/p ablation, GERD, ARSENIO, and erectile dysfunction who initially presented to [facility] ED on 2/17/22 with chest pain and EKG evidence of anterior STEMI. In the ED, patient went into ventricular fibrillation. CPR was initiated. Total of 8  shocks administered due to refractory VF. Amiodarone 450 mg and epinephrine x4 administered. Intubated. ROSC achieved at  1405. Transferred directly to [facility] cath  lab where he was found to have occlusion of the LAD. During angiogram he was unstable with significant hypotension and ectopy, which prompted placement on VA ECMO. PCI was performed with patient on ECMO with LETY x2 to LAD. Course complicated by hemorrhage shock and left hemothorax requiring chest tube placement, SAHIL, and delirium.               Review of Systems:     Complete review of systems is negative except as noted in the HPI            Physical Exam:   /60 (BP Location: Right arm, Patient Position: Sitting, Cuff Size: Adult Regular)   Pulse 58   Temp 97.9  F (36.6  C) (Temporal)   Resp 24   Wt 98.5 kg (217 lb 1.6 oz)   SpO2 97%   BMI 29.44 kg/m      General appearance: Alert, cooperative, no distress, appears stated age  Head: Normocephalic, atraumatic, without obvious abnormality  Eyes: Pupils equal round, reactive.  Conjunctiva clear.  Nose: Nares normal, no drainage.  Throat: Lips, mucosa, tongue normal mucosa pink and moist  Neck: Supple, symmetric, trachea midline  Lungs: Clear to auscultation bilaterally, no wheezing or crackles present.  Respirations unlabored  Heart: Regular rate and rhythm, normal S1 and S2, no murmur, rub or gallop.

## 2022-11-30 ENCOUNTER — OFFICE VISIT (OUTPATIENT)
Dept: FAMILY MEDICINE | Facility: CLINIC | Age: 72
End: 2022-11-30
Payer: MEDICARE

## 2022-11-30 VITALS
WEIGHT: 222.5 LBS | TEMPERATURE: 98.2 F | RESPIRATION RATE: 22 BRPM | BODY MASS INDEX: 30.18 KG/M2 | HEART RATE: 68 BPM | SYSTOLIC BLOOD PRESSURE: 98 MMHG | OXYGEN SATURATION: 96 % | DIASTOLIC BLOOD PRESSURE: 48 MMHG

## 2022-11-30 DIAGNOSIS — N18.32 CHRONIC KIDNEY DISEASE, STAGE 3B (H): ICD-10-CM

## 2022-11-30 DIAGNOSIS — M25.522 PAIN IN JOINT INVOLVING UPPER ARM, LEFT: Primary | ICD-10-CM

## 2022-11-30 PROCEDURE — 99213 OFFICE O/P EST LOW 20 MIN: CPT | Performed by: FAMILY MEDICINE

## 2022-11-30 ASSESSMENT — PAIN SCALES - GENERAL: PAINLEVEL: MODERATE PAIN (4)

## 2022-11-30 NOTE — PROGRESS NOTES
Assessment & Plan     Chronic kidney disease, stage 3b (H)  I reviewed his chart and his episode of V. fib and subsequent hospitalization and stents and left ventricular failure and kidney failure however his creatinine and GFR are all improving and he is exercising regularly at [employer]    Pain in joint involving upper arm, left  Patient has postinjection pain between the deltoid and the bicep on the left lateral arm where the material was injected it is improving I expect another 3 to 4 months and the area will be less sensitive                   No follow-ups on file.    Siva Timmons MD  [facility]    Meryl Madden is a 72 year old, presenting for the following health issues:  Musculoskeletal Problem (Left arm pain since last covid shot 7/2022)      HPI patient is experiencing some pain in his left lateral inferior dull foot toyed area just above the bicep area where a deep COVID injection was given last July.  Patient is concerned that occasionally aches when he turns over in bed range of motion of arm is stated as being normal no swelling no mass felt.  We did explain that this probably was a injection given in a muscular cutaneous plane between the deltoid and the biceps and sometimes this does set up a connective tissue inflammation which is slow to resolve time is the essential medication and this setting and patient was reassured.  I also reviewed his cardiovascular status and he plans on returning to the [facility] and seeing Dr. Duarte for primary care.  We will follow along with cardiology should he choose our clinic to give his ongoing care.          Review of Systems   Constitutional, HEENT, cardiovascular, pulmonary, gi and gu systems are negative, except as otherwise noted.      Objective    BP 98/48 (BP Location: Left arm, Patient Position: Sitting, Cuff Size: Adult Regular)   Pulse 68   Temp 98.2  F (36.8  C) (Oral)   Resp 22   Wt 100.9 kg (222 lb 8 oz)    SpO2 96%   BMI 30.18 kg/m    Body mass index is 30.18 kg/m .  Physical Exam   GENERAL: healthy, alert and no distress  NECK: no adenopathy, no asymmetry, masses, or scars and thyroid normal to palpation  RESP: lungs clear to auscultation - no rales, rhonchi or wheezes  CV: regular rate and rhythm, normal S1 S2, no S3 or S4, no murmur, click or rub, no peripheral edema and peripheral pulses strong  ABDOMEN: soft, nontender, no hepatosplenomegaly, no masses and bowel sounds normal  MS: no gross musculoskeletal defects noted, no edema

## 2023-01-19 NOTE — PLAN OF CARE
ICU End of Shift Summary. See flowsheets for vital signs and detailed assessment.    Changes this shift: Amiodarone drip discontinued this shift, switched to oral. VSS. Afebrile. Pleasantly confused, wife at bedside and is a great help with cares. Sitter at bedside for forgetfulness and intermittent confusion.     Plan: Continue plan of care, adjust as needed.        Problem: Plan of Care - These are the overarching goals to be used throughout the patient stay.    Goal: Plan of Care Review/Shift Note  Outcome: Ongoing, Progressing     Problem: Plan of Care - These are the overarching goals to be used throughout the patient stay.    Goal: Patient-Specific Goal (Individualized)  Outcome: Ongoing, Progressing     Problem: Plan of Care - These are the overarching goals to be used throughout the patient stay.    Goal: Absence of Hospital-Acquired Illness or Injury  Outcome: Ongoing, Progressing      Infliximab Counseling:  I discussed with the patient the risks of infliximab including but not limited to myelosuppression, immunosuppression, autoimmune hepatitis, demyelinating diseases, lymphoma, and serious infections.  The patient understands that monitoring is required including a PPD at baseline and must alert us or the primary physician if symptoms of infection or other concerning signs are noted.

## 2023-01-30 ENCOUNTER — HEALTH MAINTENANCE LETTER (OUTPATIENT)
Age: 73
End: 2023-01-30

## 2023-02-14 DIAGNOSIS — I46.9 CARDIAC ARREST (H): Primary | ICD-10-CM

## 2023-02-14 DIAGNOSIS — I25.10 CORONARY ARTERY DISEASE INVOLVING NATIVE CORONARY ARTERY OF NATIVE HEART WITHOUT ANGINA PECTORIS: ICD-10-CM

## 2023-02-20 ENCOUNTER — HOSPITAL ENCOUNTER (OUTPATIENT)
Dept: CT IMAGING | Facility: CLINIC | Age: 73
Discharge: HOME | End: 2023-02-20
Attending: INTERNAL MEDICINE
Payer: MEDICARE

## 2023-02-20 ENCOUNTER — OFFICE VISIT (OUTPATIENT)
Dept: CARDIOLOGY | Facility: CLINIC | Age: 73
End: 2023-02-20
Attending: INTERNAL MEDICINE
Payer: MEDICARE

## 2023-02-20 ENCOUNTER — LAB (OUTPATIENT)
Dept: LAB | Facility: CLINIC | Age: 73
End: 2023-02-20
Attending: INTERNAL MEDICINE
Payer: MEDICARE

## 2023-02-20 ENCOUNTER — HOSPITAL ENCOUNTER (OUTPATIENT)
Dept: CARDIOLOGY | Facility: CLINIC | Age: 73
Discharge: HOME | End: 2023-02-20
Attending: INTERNAL MEDICINE
Payer: MEDICARE

## 2023-02-20 VITALS — HEART RATE: 58 BPM | SYSTOLIC BLOOD PRESSURE: 115 MMHG | DIASTOLIC BLOOD PRESSURE: 63 MMHG

## 2023-02-20 VITALS
OXYGEN SATURATION: 98 % | HEART RATE: 56 BPM | BODY MASS INDEX: 31.6 KG/M2 | HEIGHT: 71 IN | SYSTOLIC BLOOD PRESSURE: 117 MMHG | DIASTOLIC BLOOD PRESSURE: 73 MMHG | WEIGHT: 225.7 LBS

## 2023-02-20 DIAGNOSIS — I46.9 CARDIAC ARREST (H): ICD-10-CM

## 2023-02-20 DIAGNOSIS — I25.10 CORONARY ARTERY DISEASE INVOLVING NATIVE CORONARY ARTERY OF NATIVE HEART WITHOUT ANGINA PECTORIS: ICD-10-CM

## 2023-02-20 DIAGNOSIS — I48.20 CHRONIC ATRIAL FIBRILLATION (H): ICD-10-CM

## 2023-02-20 DIAGNOSIS — T82.49XD: ICD-10-CM

## 2023-02-20 DIAGNOSIS — R07.9 CHEST PAIN, UNSPECIFIED TYPE: ICD-10-CM

## 2023-02-20 DIAGNOSIS — R07.9 CHEST PAIN: ICD-10-CM

## 2023-02-20 DIAGNOSIS — I97.648 POSTPROCEDURAL SEROMA OF A CIRCULATORY SYSTEM ORGAN OR STRUCTURE FOLLOWING OTHER CIRCULATORY SYSTEM PROCEDURE: Primary | ICD-10-CM

## 2023-02-20 DIAGNOSIS — R22.42 LOCALIZED SWELLING, MASS AND LUMP, LEFT LOWER LIMB: ICD-10-CM

## 2023-02-20 LAB
ANION GAP SERPL CALCULATED.3IONS-SCNC: 13 MMOL/L (ref 7–15)
BUN SERPL-MCNC: 25.3 MG/DL (ref 8–23)
CALCIUM SERPL-MCNC: 9.4 MG/DL (ref 8.8–10.2)
CHLORIDE SERPL-SCNC: 108 MMOL/L (ref 98–107)
CHOLEST SERPL-MCNC: 97 MG/DL
CREAT SERPL-MCNC: 1.7 MG/DL (ref 0.67–1.17)
DEPRECATED HCO3 PLAS-SCNC: 21 MMOL/L (ref 22–29)
ERYTHROCYTE [DISTWIDTH] IN BLOOD BY AUTOMATED COUNT: 13 % (ref 10–15)
GFR SERPL CREATININE-BSD FRML MDRD: 42 ML/MIN/1.73M2
GLUCOSE SERPL-MCNC: 95 MG/DL (ref 70–99)
HCT VFR BLD AUTO: 39.7 % (ref 40–53)
HDLC SERPL-MCNC: 53 MG/DL
HGB BLD-MCNC: 12.6 G/DL (ref 13.3–17.7)
LDLC SERPL CALC-MCNC: 31 MG/DL
LVEF ECHO: NORMAL
MCH RBC QN AUTO: 32.6 PG (ref 26.5–33)
MCHC RBC AUTO-ENTMCNC: 31.7 G/DL (ref 31.5–36.5)
MCV RBC AUTO: 103 FL (ref 78–100)
NONHDLC SERPL-MCNC: 44 MG/DL
PLATELET # BLD AUTO: 135 10E3/UL (ref 150–450)
POTASSIUM SERPL-SCNC: 4.4 MMOL/L (ref 3.4–5.3)
RBC # BLD AUTO: 3.86 10E6/UL (ref 4.4–5.9)
SODIUM SERPL-SCNC: 142 MMOL/L (ref 136–145)
TRIGL SERPL-MCNC: 67 MG/DL
WBC # BLD AUTO: 6.3 10E3/UL (ref 4–11)

## 2023-02-20 PROCEDURE — 250N000013 HC RX MED GY IP 250 OP 250 PS 637: Performed by: STUDENT IN AN ORGANIZED HEALTH CARE EDUCATION/TRAINING PROGRAM

## 2023-02-20 PROCEDURE — 85027 COMPLETE CBC AUTOMATED: CPT

## 2023-02-20 PROCEDURE — 75574 CT ANGIO HRT W/3D IMAGE: CPT | Mod: 26 | Performed by: STUDENT IN AN ORGANIZED HEALTH CARE EDUCATION/TRAINING PROGRAM

## 2023-02-20 PROCEDURE — 36415 COLL VENOUS BLD VENIPUNCTURE: CPT

## 2023-02-20 PROCEDURE — G0463 HOSPITAL OUTPT CLINIC VISIT: HCPCS | Mod: 25

## 2023-02-20 PROCEDURE — 99215 OFFICE O/P EST HI 40 MIN: CPT | Mod: 25

## 2023-02-20 PROCEDURE — 250N000011 HC RX IP 250 OP 636: Performed by: INTERNAL MEDICINE

## 2023-02-20 PROCEDURE — 82310 ASSAY OF CALCIUM: CPT

## 2023-02-20 PROCEDURE — 93306 TTE W/DOPPLER COMPLETE: CPT | Mod: 26 | Performed by: INTERNAL MEDICINE

## 2023-02-20 PROCEDURE — 80061 LIPID PANEL: CPT

## 2023-02-20 PROCEDURE — G1010 CDSM STANSON: HCPCS | Performed by: STUDENT IN AN ORGANIZED HEALTH CARE EDUCATION/TRAINING PROGRAM

## 2023-02-20 PROCEDURE — 75574 CT ANGIO HRT W/3D IMAGE: CPT | Mod: ME

## 2023-02-20 PROCEDURE — 93306 TTE W/DOPPLER COMPLETE: CPT

## 2023-02-20 RX ORDER — METOPROLOL TARTRATE 1 MG/ML
5-15 INJECTION, SOLUTION INTRAVENOUS
Status: DISCONTINUED | OUTPATIENT
Start: 2023-02-20 | End: 2023-02-21 | Stop reason: HOSPADM

## 2023-02-20 RX ORDER — METHYLPREDNISOLONE SODIUM SUCCINATE 125 MG/2ML
125 INJECTION, POWDER, LYOPHILIZED, FOR SOLUTION INTRAMUSCULAR; INTRAVENOUS
Status: DISCONTINUED | OUTPATIENT
Start: 2023-02-20 | End: 2023-02-21 | Stop reason: HOSPADM

## 2023-02-20 RX ORDER — IVABRADINE 5 MG/1
5-15 TABLET, FILM COATED ORAL
Status: DISCONTINUED | OUTPATIENT
Start: 2023-02-20 | End: 2023-02-21 | Stop reason: HOSPADM

## 2023-02-20 RX ORDER — ONDANSETRON 2 MG/ML
4 INJECTION INTRAMUSCULAR; INTRAVENOUS
Status: DISCONTINUED | OUTPATIENT
Start: 2023-02-20 | End: 2023-02-21 | Stop reason: HOSPADM

## 2023-02-20 RX ORDER — NITROGLYCERIN 0.4 MG/1
.4-.8 TABLET SUBLINGUAL
Status: DISCONTINUED | OUTPATIENT
Start: 2023-02-20 | End: 2023-02-21 | Stop reason: HOSPADM

## 2023-02-20 RX ORDER — IOPAMIDOL 755 MG/ML
120 INJECTION, SOLUTION INTRAVASCULAR ONCE
Status: COMPLETED | OUTPATIENT
Start: 2023-02-20 | End: 2023-02-20

## 2023-02-20 RX ORDER — METOPROLOL TARTRATE 25 MG/1
25-100 TABLET, FILM COATED ORAL
Status: COMPLETED | OUTPATIENT
Start: 2023-02-20 | End: 2023-02-20

## 2023-02-20 RX ORDER — ACYCLOVIR 200 MG/1
0-1 CAPSULE ORAL
Status: DISCONTINUED | OUTPATIENT
Start: 2023-02-20 | End: 2023-02-21 | Stop reason: HOSPADM

## 2023-02-20 RX ORDER — DIPHENHYDRAMINE HYDROCHLORIDE 50 MG/ML
25-50 INJECTION INTRAMUSCULAR; INTRAVENOUS
Status: DISCONTINUED | OUTPATIENT
Start: 2023-02-20 | End: 2023-02-21 | Stop reason: HOSPADM

## 2023-02-20 RX ORDER — DIPHENHYDRAMINE HCL 25 MG
25 CAPSULE ORAL
Status: DISCONTINUED | OUTPATIENT
Start: 2023-02-20 | End: 2023-02-21 | Stop reason: HOSPADM

## 2023-02-20 RX ADMIN — METOPROLOL TARTRATE 25 MG: 25 TABLET, FILM COATED ORAL at 08:00

## 2023-02-20 RX ADMIN — IOPAMIDOL 120 ML: 755 INJECTION, SOLUTION INTRAVENOUS at 09:05

## 2023-02-20 RX ADMIN — NITROGLYCERIN 0.8 MG: 0.4 TABLET SUBLINGUAL at 09:10

## 2023-02-20 ASSESSMENT — PAIN SCALES - GENERAL: PAINLEVEL: NO PAIN (0)

## 2023-02-20 NOTE — PROGRESS NOTES
[facility]  CARDIOVASCULAR MEDICINE CLINIC NOTE    Referring Provider: Larry Cintron   Primary Care Provider: No Ref-Primary, Physician     Patient Name: Chano Johnson   MRN: [id number]     PERTINENT CLINICAL HISTORY:   Chano Johnson is a 72 year old gentleman with a pmhx sig for afib s/p ablation, abhi, smoking, and cad s/p STEMI c/b VF arrest requiring va ecmo.  He recovered well and was discharged 3/10/2022.  He had staged procedure to RI on 4/17/2022 with DESx1.     He has been doing well since his last visit.  He has been very happy about his 1 year anniversary with good outcomes.  He completed cardiac rehab.  He had a lymphocele at his ECMO cannulation site but it has resolved.  He now notes a swelling proximal to the prior lymphocele site.  He denies any chest pain, palpitations, SOB, edema, orthopnea, PND, syncope or near syncope.     PAST MEDICAL HISTORY:     Past Medical History:   Diagnosis Date     A-fib (H)      CAD (coronary artery disease)      Cardiac arrest (H)      H/O extracorporeal membrane oxygenation treatment      Heart disease      Hypertension      STEMI (ST elevation myocardial infarction) (H)         PAST SURGICAL HISTORY:     Past Surgical History:   Procedure Laterality Date     ATRIAL ABLATION SURGERY       CERVICAL SPINE SURGERY       CV ARTERIAL LINE PLACEMENT N/A 02/17/2022    Procedure: Arterial Line Placement;  Surgeon: Larry Cintron MD;  Location: [facility] CARDIAC CATH LAB     CV CORONARY ANGIOGRAM N/A 02/17/2022    Procedure: Coronary Angiogram;  Surgeon: Larry Cintron MD;  Location: [facility] CARDIAC CATH LAB     CV CORONARY ANGIOGRAM N/A 4/22/2022    Procedure: Coronary Angiogram;  Surgeon: Larry Cintron MD;  Location: [facility] CARDIAC CATH LAB     CV EXTRACORPERAL MEMBRANE OXYGENATION N/A 02/17/2022    Procedure: Extracorporeal Membrance Oxygenation;  Surgeon: Larry Cintron MD;  Location:  HEART CARDIAC CATH LAB     CV INTRA AORTIC BALLOON N/A  02/17/2022    Procedure: Intra Aortic Balloon Pump Insertion;  Surgeon: Larry Cintron MD;  Location:  HEART CARDIAC CATH LAB     CV PCI N/A 4/22/2022    Procedure: Percutaneous Coronary Intervention;  Surgeon: Larry Citnron MD;  Location: [facility] CARDIAC CATH LAB     CV PCI ANGIOPLASTY N/A 02/17/2022    Procedure: Percutaneous Transluminal Angioplasty;  Surgeon: Larry Cintron MD;  Location: [facility] CARDIAC CATH LAB     CV THERAPEUTIC HYPOTHERMIA N/A 02/17/2022    Procedure: Therapeutic Hypothermia;  Surgeon: Larry Cintron MD;  Location: [facility] CARDIAC CATH LAB     IR UPPER EXTREMITY ANGIOGRAM LEFT  02/20/2022     MIDLINE DOUBLE LUMEN PLACEMENT Left 02/28/2022    Left brachial vein medial 0.59cm.Blood return on all ports midline okay to use.     REMOVE EXTRACORPORAL MEMBRANE OXYGENATOR ADULT N/A 02/22/2022    Procedure: EXTRACORPOREAL MEMBRANE OXYGENATION CANNULA REMOVAL, INTRAOPERATIVE TRANSESOPHAGEAL ECHOCARDIOGRAM PER ANESTHESIA.;  Surgeon: Rod Banuelos MD;  Location:  OR        CURRENT MEDICATIONS:     Current Outpatient Medications   Medication Sig Dispense Refill     allopurinol (ZYLOPRIM) 100 MG tablet Take 100 mg by mouth 2 times daily       aspirin (ASA) 81 MG chewable tablet Take 1 tablet (81 mg) by mouth daily Starting tomorrow. 30 tablet 3     metoprolol succinate ER (TOPROL-XL) 50 MG 24 hr tablet Take 3 tablets (150 mg) by mouth in the morning. 270 tablet 3     multivitamin, therapeutic (THERA-VIT) TABS tablet Take 1 tablet by mouth in the morning. 90 tablet 3     polyethylene glycol (MIRALAX) 17 GM/Dose powder Take 17 g by mouth daily 510 g 1     rosuvastatin (CRESTOR) 20 MG tablet Take 1 tablet (20 mg) by mouth every evening 90 tablet 3     ticagrelor (BRILINTA) 90 MG tablet Take 1 tablet (90 mg) by mouth 2 times daily 180 tablet 3        ALLERGIES:     Allergies   Allergen Reactions     Chlorpheniramine-Phenylpropan [A.R.M.] Other (See Comments)     Watery eyes,  "sneezing     Seroquel [Quetiapine] Other (See Comments)     Per Meeta (Wife), would like this medication to be listed as an allergy due to patient becoming combative and delirious after taking it.      Percocet [Oxycodone-Acetaminophen] Rash     \"felt like skin was crawling off\"-pt's S.O.          PHYSICAL EXAMINATION:   /73 (BP Location: Left arm, Patient Position: Chair, Cuff Size: Adult Large)   Pulse 56   Ht 1.814 m (5' 11.42\")   Wt 102.4 kg (225 lb 11.2 oz)   SpO2 98%   BMI 31.11 kg/m    Body mass index is 31.11 kg/m .  Wt Readings from Last 2 Encounters:   02/20/23 102.4 kg (225 lb 11.2 oz)   11/30/22 100.9 kg (222 lb 8 oz)     Constitutional: no acute distress, pleasant and cooperative, appears overall well.  Cardiovascular: RRR nl S1S2, JVP not elevated, extremities with no edema or cyanosis  Respiratory: clear to auscultation and percussion bilaterally anterior and posterior  Gastrointestinal: soft, nontender, non distended, no hepatosplenomegaly or masses  Neurologic: AOx3     LABORATORY DATA:   I have reviewed the labs below.    LIPID RESULTS:  Recent Labs   Lab Test 02/20/23  0732 09/15/22  0950   CHOL 97 100   HDL 53 53   LDL 31 33   TRIG 67 71        LIVER ENZYME RESULTS:  Recent Labs   Lab Test 10/03/22  0253 07/12/22  1432   AST 19 37   ALT 17 31       CBC RESULTS:  Recent Labs   Lab Test 02/20/23  0732 10/03/22  0253   WBC 6.3 6.0   HGB 12.6* 10.8*   HCT 39.7* 33.9*   * 134*       BMP RESULTS:  Recent Labs   Lab Test 02/20/23  0732 10/03/22  0253    139   POTASSIUM 4.4 4.2   CHLORIDE 108* 107   CO2 21* 24   ANIONGAP 13 8   GLC 95 106   BUN 25.3* 22   CR 1.70* 1.65*   PEDRITO 9.4 9.2     INR RESULTS:  Recent Labs   Lab Test 10/03/22  0253 04/22/22  1142   INR 1.01 1.15          PROCEDURES & FURTHER ASSESSMENTS:   I have reviewed the test results below.    Coronary angiogram 4/22/2022  --Two vessel coronary artery disease involving the LAD and ramus branch.  The LAD was stented " previously.  The ramus branch was not treated previously.  --Successful PCI of the ramus with a 2.5x20mm LETY dilated to 3.25 mm.    Coronary CTA: 2/20/2023  1.  Status post prior PCI to the left anterior descending and ramus intermedius arteries.   2.  Both the LAD and RI stents are widely patent.  3.  Mild, non-obstructive disease in other territories without any high-grade stenoses.   4.  Please review the separate Radiology report for incidental noncardiac findings.    TTE 3/7/2022  Limited TTE for LV function.  Left ventricular function is decreased. The ejection fraction is 45-50%  (mildly reduced). There is a pattern of wall motion abnormalities that is  consistent with a prior LAD culprit infarction.    TTE 2/20/2023  Left ventricular function is decreased. The ejection fraction is 40-45%  (mildly reduced). Hypokinesis involving all apical segments as well as the mid  anterior/anteroseptal wall.  Global right ventricular function is normal.  Mild tricuspid insufficiency is present.  Pulmonary artery systolic pressure is normal.  No pericardial effusion is present.  Aortic root and ascending aorta measure 4.1 cm (index 1.9 cm/m2). Normal for BSA.  This study was compared with the study from 3/7/2022 .  No significant changes noted.     ECMO 2/17/2022    VF cardiac arrest with ongoing cardiogenic shock    Two vessel coronary artery disease with occlusion of the midLAD and severe stenosis of the large ramus    Successful PCI of the mid LAD with a LETY    Successful cannulation for peripheral VA ECMO with a 17 Fr arterial and 25 Fr venous cannula    Successful insertion of a distal perfusion cannula    Successful insertion of a 50cc IABP    Successful insertion of a thermogard cooling catheter    Successful insertion of a R radial arterial line     CLINICAL IMPRESSION:   Chano Johnson is a 72 year old gentleman with a pmhx sig for afib s/p ablation, abhi, smoking, and cad s/p STEMI c/b VF arrest requiring va  ecmo s/p mLAD PCI and then staged procedure of RI.       Coronary artery disease: causing refractory VF arrest; found to have 2v disease with revascularization of the acutely occluded mLAD.  He had staged PCI of the RI.  LVEF 45-50%.  Coronary CTA showing stable patent stents.  --ASA and ticagrelor - plan for 3 years of DAPT  --Toprol XL 150mg Qday  --rosuvastatin 20mg Qday  --sildenafil PRN for sexual activity     ECMO Cannulation Site: likely lymphocele but this has now improved.    --L groin ultrasound to evaluate the new swelling    Follow-up: 1 year    Thank you for allowing us to take part in the care of this very pleasant patient.  Please do not hesitate to call if any further questions or concerns arise.    I spent 45 min today reviewing the medical record, meeting with the patient, and completing this note.    Larry Cintron MD, PhD  Interventional/Critical Care Cardiology  [phone]    February 20, 2023      CC  Patient Care Team:  No Ref-Primary, Physician as PCP - General  Larry Cintron MD as MD (Cardiovascular Disease)  Mirlande Mckenzie, RN as Specialty Care Coordinator (Cardiology)  Malik Henriquez MD as MD (Cardiovascular Disease)  CHELSIE Parker MD as MD (Plastic Surgery)  Griffin Chavez MD as MD (Cardiovascular Disease)  Larry Cintron MD as Assigned Heart and Vascular Provider  Suzanne Bloom DO as Assigned PCP  LARRY CINTRON

## 2023-02-20 NOTE — NURSING NOTE
Chief Complaint   Patient presents with     New Patient      Echo/CTA/labs completed before visit     Vitals were taken and medications reconciled.    Tone Cisneros, RIP  12:27 PM

## 2023-02-20 NOTE — LETTER
2/20/2023      RE: Chano Johnson  [address]  [place] [facility] [place] [address]       Dear Colleague,    Thank you for the opportunity to participate in the care of your patient, Chano Johnson, at the [facility]ART [facility] [place] [facility]. Please see a copy of my visit note below.    [facility]  CARDIOVASCULAR MEDICINE CLINIC NOTE    Referring Provider: Larry Cintron   Primary Care Provider: No Ref-Primary, Physician     Patient Name: Chano Johnson   MRN: [id number]     PERTINENT CLINICAL HISTORY:   Chano Johnson is a 72 year old gentleman with a pmhx sig for afib s/p ablation, abhi, smoking, and cad s/p STEMI c/b VF arrest requiring va ecmo.  He recovered well and was discharged 3/10/2022.  He had staged procedure to RI on 4/17/2022 with DESx1.     He has been doing well since his last visit.  He has been very happy about his 1 year anniversary with good outcomes.  He completed cardiac rehab.  He had a lymphocele at his ECMO cannulation site but it has resolved.  He now notes a swelling proximal to the prior lymphocele site.  He denies any chest pain, palpitations, SOB, edema, orthopnea, PND, syncope or near syncope.     PAST MEDICAL HISTORY:     Past Medical History:   Diagnosis Date     A-fib (H)      CAD (coronary artery disease)      Cardiac arrest (H)      H/O extracorporeal membrane oxygenation treatment      Heart disease      Hypertension      STEMI (ST elevation myocardial infarction) (H)         PAST SURGICAL HISTORY:     Past Surgical History:   Procedure Laterality Date     ATRIAL ABLATION SURGERY       CERVICAL SPINE SURGERY       CV ARTERIAL LINE PLACEMENT N/A 02/17/2022    Procedure: Arterial Line Placement;  Surgeon: Larry Cintron MD;  Location: [facility] CARDIAC CATH LAB     CV CORONARY ANGIOGRAM N/A 02/17/2022    Procedure: Coronary Angiogram;  Surgeon: Larry Cintron MD;  Location: [facility] CARDIAC CATH LAB     CV CORONARY  ANGIOGRAM N/A 4/22/2022    Procedure: Coronary Angiogram;  Surgeon: Larry Cintron MD;  Location: [facility] CARDIAC CATH LAB     CV EXTRACORPERAL MEMBRANE OXYGENATION N/A 02/17/2022    Procedure: Extracorporeal Membrance Oxygenation;  Surgeon: Larry Cintron MD;  Location: [facility] CARDIAC CATH LAB     CV INTRA AORTIC BALLOON N/A 02/17/2022    Procedure: Intra Aortic Balloon Pump Insertion;  Surgeon: Larry Cintron MD;  Location: [facility] CARDIAC CATH LAB     CV PCI N/A 4/22/2022    Procedure: Percutaneous Coronary Intervention;  Surgeon: Larry Cintron MD;  Location: [facility] CARDIAC CATH LAB     CV PCI ANGIOPLASTY N/A 02/17/2022    Procedure: Percutaneous Transluminal Angioplasty;  Surgeon: Larry Cintron MD;  Location: [facility] CARDIAC CATH LAB     CV THERAPEUTIC HYPOTHERMIA N/A 02/17/2022    Procedure: Therapeutic Hypothermia;  Surgeon: Larry Cintron MD;  Location: [facility] CARDIAC CATH LAB     IR UPPER EXTREMITY ANGIOGRAM LEFT  02/20/2022     MIDLINE DOUBLE LUMEN PLACEMENT Left 02/28/2022    Left brachial vein medial 0.59cm.Blood return on all ports midline okay to use.     REMOVE EXTRACORPORAL MEMBRANE OXYGENATOR ADULT N/A 02/22/2022    Procedure: EXTRACORPOREAL MEMBRANE OXYGENATION CANNULA REMOVAL, INTRAOPERATIVE TRANSESOPHAGEAL ECHOCARDIOGRAM PER ANESTHESIA.;  Surgeon: Rod Banuelos MD;  Location:  OR        CURRENT MEDICATIONS:     Current Outpatient Medications   Medication Sig Dispense Refill     allopurinol (ZYLOPRIM) 100 MG tablet Take 100 mg by mouth 2 times daily       aspirin (ASA) 81 MG chewable tablet Take 1 tablet (81 mg) by mouth daily Starting tomorrow. 30 tablet 3     metoprolol succinate ER (TOPROL-XL) 50 MG 24 hr tablet Take 3 tablets (150 mg) by mouth in the morning. 270 tablet 3     multivitamin, therapeutic (THERA-VIT) TABS tablet Take 1 tablet by mouth in the morning. 90 tablet 3     polyethylene glycol (MIRALAX) 17 GM/Dose powder Take 17 g by mouth daily 510 g  "1     rosuvastatin (CRESTOR) 20 MG tablet Take 1 tablet (20 mg) by mouth every evening 90 tablet 3     ticagrelor (BRILINTA) 90 MG tablet Take 1 tablet (90 mg) by mouth 2 times daily 180 tablet 3        ALLERGIES:     Allergies   Allergen Reactions     Chlorpheniramine-Phenylpropan [A.R.M.] Other (See Comments)     Watery eyes, sneezing     Seroquel [Quetiapine] Other (See Comments)     Per Meeta (Wife), would like this medication to be listed as an allergy due to patient becoming combative and delirious after taking it.      Percocet [Oxycodone-Acetaminophen] Rash     \"felt like skin was crawling off\"-pt's S.O.          PHYSICAL EXAMINATION:   /73 (BP Location: Left arm, Patient Position: Chair, Cuff Size: Adult Large)   Pulse 56   Ht 1.814 m (5' 11.42\")   Wt 102.4 kg (225 lb 11.2 oz)   SpO2 98%   BMI 31.11 kg/m    Body mass index is 31.11 kg/m .  Wt Readings from Last 2 Encounters:   02/20/23 102.4 kg (225 lb 11.2 oz)   11/30/22 100.9 kg (222 lb 8 oz)     Constitutional: no acute distress, pleasant and cooperative, appears overall well.  Cardiovascular: RRR nl S1S2, JVP not elevated, extremities with no edema or cyanosis  Respiratory: clear to auscultation and percussion bilaterally anterior and posterior  Gastrointestinal: soft, nontender, non distended, no hepatosplenomegaly or masses  Neurologic: AOx3     LABORATORY DATA:   I have reviewed the labs below.    LIPID RESULTS:  Recent Labs   Lab Test 02/20/23  0732 09/15/22  0950   CHOL 97 100   HDL 53 53   LDL 31 33   TRIG 67 71        LIVER ENZYME RESULTS:  Recent Labs   Lab Test 10/03/22  0253 07/12/22  1432   AST 19 37   ALT 17 31       CBC RESULTS:  Recent Labs   Lab Test 02/20/23 0732 10/03/22  0253   WBC 6.3 6.0   HGB 12.6* 10.8*   HCT 39.7* 33.9*   * 134*       BMP RESULTS:  Recent Labs   Lab Test 02/20/23  0732 10/03/22  0253    139   POTASSIUM 4.4 4.2   CHLORIDE 108* 107   CO2 21* 24   ANIONGAP 13 8   GLC 95 106   BUN 25.3* 22 "   CR 1.70* 1.65*   PEDRITO 9.4 9.2     INR RESULTS:  Recent Labs   Lab Test 10/03/22  0253 04/22/22  1142   INR 1.01 1.15          PROCEDURES & FURTHER ASSESSMENTS:   I have reviewed the test results below.    Coronary angiogram 4/22/2022  --Two vessel coronary artery disease involving the LAD and ramus branch.  The LAD was stented previously.  The ramus branch was not treated previously.  --Successful PCI of the ramus with a 2.5x20mm LETY dilated to 3.25 mm.    Coronary CTA: 2/20/2023  1.  Status post prior PCI to the left anterior descending and ramus intermedius arteries.   2.  Both the LAD and RI stents are widely patent.  3.  Mild, non-obstructive disease in other territories without any high-grade stenoses.   4.  Please review the separate Radiology report for incidental noncardiac findings.    TTE 3/7/2022  Limited TTE for LV function.  Left ventricular function is decreased. The ejection fraction is 45-50%  (mildly reduced). There is a pattern of wall motion abnormalities that is  consistent with a prior LAD culprit infarction.    TTE 2/20/2023  Left ventricular function is decreased. The ejection fraction is 40-45%  (mildly reduced). Hypokinesis involving all apical segments as well as the mid  anterior/anteroseptal wall.  Global right ventricular function is normal.  Mild tricuspid insufficiency is present.  Pulmonary artery systolic pressure is normal.  No pericardial effusion is present.  Aortic root and ascending aorta measure 4.1 cm (index 1.9 cm/m2). Normal for BSA.  This study was compared with the study from 3/7/2022 .  No significant changes noted.     ECMO 2/17/2022    VF cardiac arrest with ongoing cardiogenic shock    Two vessel coronary artery disease with occlusion of the midLAD and severe stenosis of the large ramus    Successful PCI of the mid LAD with a LETY    Successful cannulation for peripheral VA ECMO with a 17 Fr arterial and 25 Fr venous cannula    Successful insertion of a distal  perfusion cannula    Successful insertion of a 50cc IABP    Successful insertion of a thermogard cooling catheter    Successful insertion of a R radial arterial line     CLINICAL IMPRESSION:   Chano Johnson is a 72 year old gentleman with a pmhx sig for afib s/p ablation, abhi, smoking, and cad s/p STEMI c/b VF arrest requiring va ecmo s/p mLAD PCI and then staged procedure of RI.       Coronary artery disease: causing refractory VF arrest; found to have 2v disease with revascularization of the acutely occluded mLAD.  He had staged PCI of the RI.  LVEF 45-50%.  Coronary CTA showing stable patent stents.  --ASA and ticagrelor - plan for 3 years of DAPT  --Toprol XL 150mg Qday  --rosuvastatin 20mg Qday  --sildenafil PRN for sexual activity     ECMO Cannulation Site: likely lymphocele but this has now improved.    --L groin ultrasound to evaluate the new swelling    Follow-up: 1 year    Thank you for allowing us to take part in the care of this very pleasant patient.  Please do not hesitate to call if any further questions or concerns arise.    I spent 45 min today reviewing the medical record, meeting with the patient, and completing this note.    Larry Cintron MD, PhD  Interventional/Critical Care Cardiology  [phone]    February 20, 2023    CC  Patient Care Team:  No Ref-Primary, Physician as PCP - General  Larry Cintron MD as MD (Cardiovascular Disease)  Mirlande Mckenzie RN as Specialty Care Coordinator (Cardiology)  Malik Henriquez MD as MD (Cardiovascular Disease)  CHELSIE Parker MD as MD (Plastic Surgery)  Griffin Chavez MD as MD (Cardiovascular Disease)  Suzanne Bloom DO as Assigned PCP

## 2023-02-20 NOTE — PATIENT INSTRUCTIONS
Critical Care Cardiology Survivor Clinic                                                                                   You were seen today in the Critical Care Cardiology Survivor Clinic at the [facility]:       Dr. Griffin Cintron        Your visit summary and instructions are as follows:    Pelvic ultrasound of seroma   Continue to work toward the recommendation of 150 minutes of moderate intensity exercise/week and maintain a healthy lifestyle (avoid illicit drugs, smoking and moderate alcohol consumption)    Return to Critical Care Cardiology Survivor Clinic in one year     -Support group: This is not required but can be helpful to connect to other survivors.   Website: [place] [facility]     - Driving: state law to refrain from driving at least three months from cardiac arrest, CDL licences do not allow ICD.     -What is Health Psychology?  Health Psychology is a specialty that helps people cope with the stress and anxiety that often occurs with illness, emotional and psychological issues, and preparation for medical treatment including surgical procedures.    Please contact our psychologists directly with questions or to make an appointment.    Jaylin Jean, Ph.D.,                 [phone]  Shelby Urban, Ph.D.                      [phone]  Kellen Jones, Ph.D.,                    [phone]          James Cha, Ph.D., [facility],   [phone]       Thank you for your visit today!     Please MyChart message me or call Onelia Mckenzie RN or Ladonna Antonio RN if you have any questions or concerns.      During Business Hours:  [phone], option # 1 (University) then option # 4 (medical questions) and ask to speak with my nurse.     After hours, weekends or holidays:   [phone], Option #4  Ask to speak to the On-Call Cardiologist. Inform them you are a heart failure patient at the [facility].

## 2023-02-20 NOTE — PROGRESS NOTES
Pt arrived for Coronary CT angiogram. Test, meds and side effects reviewed with pt. Resting HR  58 bpm. Given 25 mg PO Metoprolol per verbal order. Administered 0.8 mg SL nitro on CTA table per order. CTA completed. Patient tolerated procedure well and denies symptoms of allergic reaction. Post monitoring completed and VSS. D/C instructions reviewed with pt whom verbalized understanding of need to increase PO fluids today. Transferred to care of [facility] for TTE.    Onelia Avelar RN

## 2023-02-21 ENCOUNTER — MYC MEDICAL ADVICE (OUTPATIENT)
Dept: CARDIOLOGY | Facility: CLINIC | Age: 73
End: 2023-02-21
Payer: MEDICARE

## 2023-03-01 ENCOUNTER — ANCILLARY PROCEDURE (OUTPATIENT)
Dept: ULTRASOUND IMAGING | Facility: CLINIC | Age: 73
End: 2023-03-01
Attending: INTERNAL MEDICINE
Payer: MEDICARE

## 2023-03-01 DIAGNOSIS — R22.42 LOCALIZED SWELLING, MASS AND LUMP, LEFT LOWER LIMB: ICD-10-CM

## 2023-03-01 DIAGNOSIS — I46.9 CARDIAC ARREST (H): ICD-10-CM

## 2023-03-01 DIAGNOSIS — I97.648 POSTPROCEDURAL SEROMA OF A CIRCULATORY SYSTEM ORGAN OR STRUCTURE FOLLOWING OTHER CIRCULATORY SYSTEM PROCEDURE: ICD-10-CM

## 2023-03-01 DIAGNOSIS — I48.20 CHRONIC ATRIAL FIBRILLATION (H): ICD-10-CM

## 2023-03-01 DIAGNOSIS — R07.9 CHEST PAIN, UNSPECIFIED TYPE: ICD-10-CM

## 2023-03-01 DIAGNOSIS — I25.10 CORONARY ARTERY DISEASE INVOLVING NATIVE CORONARY ARTERY OF NATIVE HEART WITHOUT ANGINA PECTORIS: ICD-10-CM

## 2023-03-01 DIAGNOSIS — T82.49XD: ICD-10-CM

## 2023-03-01 PROCEDURE — 76882 US LMTD JT/FCL EVL NVASC XTR: CPT | Mod: LT | Performed by: RADIOLOGY

## 2023-03-02 ENCOUNTER — MYC MEDICAL ADVICE (OUTPATIENT)
Dept: CARDIOLOGY | Facility: CLINIC | Age: 73
End: 2023-03-02
Payer: MEDICARE

## 2023-03-22 DIAGNOSIS — E78.5 HYPERLIPIDEMIA, UNSPECIFIED HYPERLIPIDEMIA TYPE: ICD-10-CM

## 2023-03-22 DIAGNOSIS — K59.00 CONSTIPATION, UNSPECIFIED CONSTIPATION TYPE: ICD-10-CM

## 2023-03-22 DIAGNOSIS — I21.3 ST ELEVATION MYOCARDIAL INFARCTION (STEMI), UNSPECIFIED ARTERY (H): ICD-10-CM

## 2023-03-22 DIAGNOSIS — I46.9 CARDIAC ARREST (H): ICD-10-CM

## 2023-03-22 RX ORDER — METOPROLOL SUCCINATE 50 MG/1
150 TABLET, EXTENDED RELEASE ORAL DAILY
Qty: 270 TABLET | Refills: 3 | Status: SHIPPED | OUTPATIENT
Start: 2023-03-22 | End: 2024-03-25

## 2023-03-22 RX ORDER — MULTIVITAMIN,THERAPEUTIC
1 TABLET ORAL DAILY
Qty: 90 TABLET | Refills: 3 | Status: SHIPPED | OUTPATIENT
Start: 2023-03-22 | End: 2024-04-01

## 2023-03-22 RX ORDER — POLYETHYLENE GLYCOL 3350 17 G/17G
17 POWDER, FOR SOLUTION ORAL DAILY
Qty: 510 G | Refills: 1 | Status: SHIPPED | OUTPATIENT
Start: 2023-03-22 | End: 2023-06-13

## 2023-03-22 RX ORDER — ROSUVASTATIN CALCIUM 20 MG/1
20 TABLET, COATED ORAL EVERY EVENING
Qty: 90 TABLET | Refills: 3 | Status: SHIPPED | OUTPATIENT
Start: 2023-03-22 | End: 2024-03-25

## 2023-03-22 RX ORDER — ALLOPURINOL 100 MG/1
100 TABLET ORAL 2 TIMES DAILY
Qty: 180 TABLET | Refills: 3 | Status: SHIPPED | OUTPATIENT
Start: 2023-03-22 | End: 2024-03-25

## 2023-03-23 RX ORDER — MULTIVITAMIN
TABLET ORAL
Qty: 90 TABLET | Refills: 0 | OUTPATIENT
Start: 2023-03-23

## 2023-03-23 RX ORDER — METOPROLOL SUCCINATE 50 MG/1
150 TABLET, EXTENDED RELEASE ORAL DAILY
Qty: 270 TABLET | Refills: 0 | OUTPATIENT
Start: 2023-03-23

## 2023-03-23 RX ORDER — TICAGRELOR 90 MG/1
TABLET ORAL
Qty: 180 TABLET | Refills: 0 | OUTPATIENT
Start: 2023-03-23

## 2023-03-23 RX ORDER — ROSUVASTATIN CALCIUM 20 MG/1
20 TABLET, COATED ORAL EVERY EVENING
Qty: 90 TABLET | Refills: 0 | OUTPATIENT
Start: 2023-03-23

## 2023-06-08 DIAGNOSIS — K59.00 CONSTIPATION, UNSPECIFIED CONSTIPATION TYPE: ICD-10-CM

## 2023-06-12 NOTE — TELEPHONE ENCOUNTER
MIRALAX 17GM   Last Written Prescription Date:  3/22/23  Last Fill Quantity: 510,   # refills: 1  Last Office Visit : 2/20/23  Future Office visit:  NONE  Routing refill request to provider for review/approval because:  Drug not on the  refill protocol

## 2023-06-13 DIAGNOSIS — K59.00 CONSTIPATION, UNSPECIFIED CONSTIPATION TYPE: ICD-10-CM

## 2023-06-13 RX ORDER — POLYETHYLENE GLYCOL 3350 17 G/17G
17 POWDER, FOR SOLUTION ORAL DAILY
Qty: 510 G | Refills: 11 | Status: SHIPPED | OUTPATIENT
Start: 2023-06-13 | End: 2024-06-28

## 2023-06-13 RX ORDER — POLYETHYLENE GLYCOL 3350 17 G/17G
POWDER, FOR SOLUTION ORAL
Qty: 510 G | Refills: 0 | OUTPATIENT
Start: 2023-06-13

## 2023-07-31 ENCOUNTER — OFFICE VISIT (OUTPATIENT)
Dept: FAMILY MEDICINE | Facility: CLINIC | Age: 73
End: 2023-07-31
Payer: MEDICARE

## 2023-07-31 ENCOUNTER — MYC MEDICAL ADVICE (OUTPATIENT)
Dept: CARDIOLOGY | Facility: CLINIC | Age: 73
End: 2023-07-31

## 2023-07-31 VITALS
WEIGHT: 224 LBS | BODY MASS INDEX: 31.36 KG/M2 | RESPIRATION RATE: 12 BRPM | TEMPERATURE: 98 F | DIASTOLIC BLOOD PRESSURE: 74 MMHG | HEIGHT: 71 IN | OXYGEN SATURATION: 99 % | SYSTOLIC BLOOD PRESSURE: 122 MMHG | HEART RATE: 56 BPM

## 2023-07-31 DIAGNOSIS — I25.10 CORONARY ARTERY DISEASE INVOLVING NATIVE CORONARY ARTERY OF NATIVE HEART WITHOUT ANGINA PECTORIS: ICD-10-CM

## 2023-07-31 DIAGNOSIS — Z01.818 PRE-OP EXAM: Primary | ICD-10-CM

## 2023-07-31 DIAGNOSIS — I48.20 CHRONIC ATRIAL FIBRILLATION (H): Primary | ICD-10-CM

## 2023-07-31 DIAGNOSIS — Z95.818 PRESENCE OF WATCHMAN LEFT ATRIAL APPENDAGE CLOSURE DEVICE: ICD-10-CM

## 2023-07-31 DIAGNOSIS — N18.32 CHRONIC KIDNEY DISEASE, STAGE 3B (H): ICD-10-CM

## 2023-07-31 DIAGNOSIS — I46.9 CARDIAC ARREST (H): ICD-10-CM

## 2023-07-31 DIAGNOSIS — Z87.39 HISTORY OF GOUT: ICD-10-CM

## 2023-07-31 DIAGNOSIS — Z95.5 S/P DRUG ELUTING CORONARY STENT PLACEMENT: ICD-10-CM

## 2023-07-31 DIAGNOSIS — G47.33 OBSTRUCTIVE SLEEP APNEA SYNDROME: ICD-10-CM

## 2023-07-31 DIAGNOSIS — H25.9 AGE-RELATED CATARACT OF BOTH EYES, UNSPECIFIED AGE-RELATED CATARACT TYPE: ICD-10-CM

## 2023-07-31 PROCEDURE — 99214 OFFICE O/P EST MOD 30 MIN: CPT | Performed by: FAMILY MEDICINE

## 2023-07-31 ASSESSMENT — PAIN SCALES - GENERAL: PAINLEVEL: NO PAIN (0)

## 2023-07-31 NOTE — PROGRESS NOTES
[facility]  [address] [facility] [address]  [facility] [address]  Phone: [phone]  Fax: [phone]  Primary Provider: No Ref-Primary, Physician  Pre-op Performing Provider: RENETTA ALVARADO      PREOPERATIVE EVALUATION:  Today's date: 7/31/2023    Chano Johnson is a 73 year old male who presents for a preoperative evaluation.      7/31/2023     8:34 AM   Additional Questions   Roomed by Veda       Surgical Information:  Surgery/Procedure: Eye cataract   Surgery Location: [facility]  Surgeon:  Dr Berkowitz  Surgery Date: 8/18/23  Time of Surgery:   Where patient plans to recover: At home with family  Fax number for surgical facility: [phone]    Assessment & Plan     The proposed surgical procedure is considered LOW risk.    Pre-op exam  Risk factors for planned procedure include history of sleep apnea and coronary artery disease status post cardiac arrest approximately 18 months ago.  He is compliant with use of CPAP machine.  Currently asymptomatic from a cardiac standpoint.  Medical conditions are stable.  He is stable on current medications.  Okay to proceed with cataract surgery    Age-related cataract of both eyes, unspecified age-related cataract type  Okay to proceed with cataract surgery    Chronic kidney disease, stage 3b (H)  Stable.  Continue to monitor    Coronary artery disease involving native coronary artery of native heart without angina pectoris  Asymptomatic.  Continue current medication.  No change in regimen prior to procedure.  Followed by cardiology    History of gout  Stable on allopurinol.  No acute flareup.  No change in regimen prior to procedure           - No identified additional risk factors other than previously addressed    Antiplatelet or Anticoagulation Medication Instructions:   - Bleeding risk is low for this procedure (e.g. dental, skin, cataract).    Additional Medication Instructions:  Patient is to take all scheduled medications on the day of  surgery    RECOMMENDATION:  APPROVAL GIVEN to proceed with proposed procedure, without further diagnostic evaluation.            Subjective       HPI related to upcoming procedure:    He has bilateral cataracts causing vision impairment.  Scheduled for cataract surgery.    Reviewed his health history.  He has history of coronary artery disease and suffered a cardiac arrest about a year and a half ago.  He is followed by cardiology.  As result of his cardiac arrest he did suffer some kidney injury and now has underlying stage III chronic kidney disease.  He has history of gout.  He has h/o afib s/p ablation. He has sleep apnea and uses a CPAP machine.  Current medical conditions are stable.  Reviewed and updated his medications and allergies.  Review of systems is assessed and is otherwise negative.  He has no other concerns or questions today.        7/25/2023     9:34 AM   Preop Questions   1. Have you ever had a heart attack or stroke? YES -   2. Have you ever had surgery on your heart or blood vessels, such as a stent placement, a coronary artery bypass, or surgery on an artery in your head, neck, heart, or legs? YES -    3. Do you have chest pain with activity? No   4. Do you have a history of  heart failure? YES -    5. Do you currently have a cold, bronchitis or symptoms of other infection? No   6. Do you have a cough, shortness of breath, or wheezing? No   7. Do you or anyone in your family have previous history of blood clots? No   8. Do you or does anyone in your family have a serious bleeding problem such as prolonged bleeding following surgeries or cuts? No   9. Have you ever had problems with anemia or been told to take iron pills? No   10. Have you had any abnormal blood loss such as black, tarry or bloody stools? No   11. Have you ever had a blood transfusion? YES -   11a. Have you ever had a transfusion reaction? No   12. Are you willing to have a blood transfusion if it is medically needed before,  during, or after your surgery? Yes   13. Have you or any of your relatives ever had problems with anesthesia? No   14. Do you have sleep apnea, excessive snoring or daytime drowsiness? YES -   14a. Do you have a CPAP machine? Yes   15. Do you have any artifical heart valves or other implanted medical devices like a pacemaker, defibrillator, or continuous glucose monitor? No   16. Do you have artificial joints? No   17. Are you allergic to latex? No       Health Care Directive:  Patient has a Health Care Directive on file      Preoperative Review of :   reviewed - no record of controlled substances prescribed.          Review of Systems  Constitutional, neuro, ENT, endocrine, pulmonary, cardiac, gastrointestinal, genitourinary, musculoskeletal, integument and psychiatric systems are negative, except as otherwise noted.    Patient Active Problem List    Diagnosis Date Noted    Chronic kidney disease, stage 3b (H) 07/14/2022     Priority: Medium    Subclinical hypothyroidism 07/13/2022     Priority: Medium    S/P drug eluting coronary stent placement 04/22/2022     Priority: Medium    Coronary artery disease involving native coronary artery of native heart without angina pectoris 04/14/2022     Priority: Medium    ST elevation myocardial infarction involving left anterior descending (LAD) coronary artery (H) 03/18/2022     Priority: Medium     Formatting of this note might be different from the original.  2/17/2022      Acute kidney failure with tubular necrosis (H) 02/22/2022     Priority: Medium    Cardiac arrest (H) 02/17/2022     Priority: Medium    Presence of Watchman left atrial appendage closure device 10/21/2020     Priority: Medium    Other cardiomyopathies (H) 04/02/2019     Priority: Medium    Chronic midline low back pain without sciatica 04/18/2018     Priority: Medium    ED (erectile dysfunction) of organic origin 02/16/2018     Priority: Medium    History of gout 02/16/2018     Priority: Medium     Chest pain 07/07/2017     Priority: Medium    Syncope 07/07/2017     Priority: Medium    Gastroesophageal reflux disease without esophagitis      Priority: Medium    Hypokalemia      Priority: Medium    Chronic atrial fibrillation (H)      Priority: Medium    Atrial flutter (H) 01/27/2012     Priority: Medium    Sleep apnea 01/27/2012     Priority: Medium      Past Medical History:   Diagnosis Date    A-fib (H)     CAD (coronary artery disease)     Cardiac arrest (H)     H/O extracorporeal membrane oxygenation treatment     Heart disease     Hypertension     STEMI (ST elevation myocardial infarction) (H)      Past Surgical History:   Procedure Laterality Date    ATRIAL ABLATION SURGERY      CERVICAL SPINE SURGERY      CV ARTERIAL LINE PLACEMENT N/A 02/17/2022    Procedure: Arterial Line Placement;  Surgeon: Larry Cintron MD;  Location:  [facility]ARDI[facility] CATH LAB    CV CORONARY ANGIOGRAM N/A 02/17/2022    Procedure: Coronary Angiogram;  Surgeon: Larry Cintron MD;  Location:  HE[facility] CARDI[facility] CATH LAB    CV CORONARY ANGIOGRAM N/A 4/22/2022    Procedure: Coronary Angiogram;  Surgeon: Larry Cintron MD;  [facility]    CV EXTRACORPERAL MEMBRANE OXYGENATION N/A 02/17/2022    Procedure: Extracorporeal Membrance Oxygenation;  Surgeon: Larry Cintron MD;  [facility]    CV INTRA AORTIC BALLOON N/A 02/17/2022    Procedure: Intra Aortic Balloon Pump Insertion;  Surgeon: Larry Cintron MD;  [facility]    CV PCI N/A 4/22/2022    Procedure: Percutaneous Coronary Intervention;  Surgeon: Larry Cintron MD;  [facility]    CV PCI ANGIOPLASTY N/A 02/17/2022    Procedure: Percutaneous Transluminal Angioplasty;  Surgeon: Larry Cintron MD;  Location:  HE[facility] CARDI[facility] CATH LAB    CV THERAPEUTIC HYPOTHERMIA N/A 02/17/2022    Procedure: Therapeutic Hypothermia;  Surgeon: Larry Cintron MD;  Location:  HEART CARDIAC CATH LAB  "   IR UPPER EXTREMITY ANGIOGRAM LEFT  02/20/2022    MIDLINE DOUBLE LUMEN PLACEMENT Left 02/28/2022    Left brachial vein medial 0.59cm.Blood return on all ports midline okay to use.    REMOVE EXTRACORPORAL MEMBRANE OXYGENATOR ADULT N/A 02/22/2022    Procedure: EXTRACORPOREAL MEMBRANE OXYGENATION CANNULA REMOVAL, INTRAOPERATIVE TRANSESOPHAGEAL ECHOCARDIOGRAM PER ANESTHESIA.;  Surgeon: Rod Banuelos MD;  [facility]     Current Outpatient Medications   Medication Sig Dispense Refill    allopurinol (ZYLOPRIM) 100 MG tablet Take 1 tablet (100 mg) by mouth 2 times daily 180 tablet 3    aspirin (ASA) 81 MG chewable tablet Take 1 tablet (81 mg) by mouth daily Starting tomorrow. 30 tablet 3    metoprolol succinate ER (TOPROL XL) 50 MG 24 hr tablet Take 3 tablets (150 mg) by mouth daily 270 tablet 3    multivitamin, therapeutic (THERA-VIT) TABS tablet Take 1 tablet by mouth daily 90 tablet 3    polyethylene glycol (MIRALAX) 17 GM/Dose powder Take 17 g by mouth daily 510 g 11    rosuvastatin (CRESTOR) 20 MG tablet Take 1 tablet (20 mg) by mouth every evening 90 tablet 3    ticagrelor (BRILINTA) 90 MG tablet Take 1 tablet (90 mg) by mouth 2 times daily 180 tablet 3       Allergies   Allergen Reactions    Chlorpheniramine-Phenylpropan [A.R.M.] Other (See Comments)     Watery eyes, sneezing    Seroquel [Quetiapine] Other (See Comments)     Per Meeta (Wife), would like this medication to be listed as an allergy due to patient becoming combative and delirious after taking it.     Percocet [Oxycodone-Acetaminophen] Rash     \"felt like skin was crawling off\"-pt's S.O.         Social History     Tobacco Use    Smoking status: Never     Passive exposure: Never    Smokeless tobacco: Never   Substance Use Topics    Alcohol use: Yes     Alcohol/week: 2.0 standard drinks of alcohol       History   Drug Use No         Objective     /74   Pulse 56   Temp 98  F (36.7  C)   Resp 12   Ht 1.791 m (5' 10.5\")   Wt " 101.6 kg (224 lb)   SpO2 99%   BMI 31.69 kg/m      Physical Exam    GENERAL APPEARANCE: healthy, alert and no distress     EYES: EOMI,  PERRL     HENT: ear canals and TM's normal and nose and mouth without ulcers or lesions     RESP: lungs clear to auscultation - no rales, rhonchi or wheezes     CV: regular rates and rhythm, normal S1 S2, no S3 or S4 and no murmur, click or rub     MS: extremities normal- no gross deformities noted, no evidence of inflammation in joints, FROM in all extremities.     SKIN: no suspicious lesions or rashes     NEURO: Normal strength and tone, sensory exam grossly normal, mentation intact and speech normal     PSYCH: mentation appears normal. and affect normal/bright    Recent Labs   Lab Test 02/20/23  0732 10/03/22  0253 07/12/22  1432 04/22/22  1142   HGB 12.6* 10.8*   < > 8.8*   * 134*   < > 84*   INR  --  1.01  --  1.15    139   < > 142   POTASSIUM 4.4 4.2   < > 4.7   CR 1.70* 1.65*   < > 1.97*    < > = values in this interval not displayed.        Diagnostics:  No labs were ordered during this visit.   No EKG required for low risk surgery (cataract, skin procedure, breast biopsy, etc).    Revised Cardiac Risk Index (RCRI):  The patient has the following serious cardiovascular risks for perioperative complications:   - Coronary Artery Disease (MI, positive stress test, angina, Qs on EKG) = 1 point     RCRI Interpretation: 1 point: Class II (low risk - 0.9% complication rate)         Signed Electronically by: Radha Ledesma MD  Copy of this evaluation report is provided to requesting physician.

## 2023-08-09 DIAGNOSIS — I46.9 CARDIAC ARREST (H): ICD-10-CM

## 2023-08-09 DIAGNOSIS — E78.5 HYPERLIPIDEMIA, UNSPECIFIED HYPERLIPIDEMIA TYPE: ICD-10-CM

## 2023-08-15 RX ORDER — MULTIVITAMIN
1 TABLET ORAL DAILY
Qty: 90 TABLET | Refills: 0 | OUTPATIENT
Start: 2023-08-15

## 2023-08-15 RX ORDER — ROSUVASTATIN CALCIUM 20 MG/1
20 TABLET, COATED ORAL EVERY EVENING
Qty: 90 TABLET | Refills: 0 | OUTPATIENT
Start: 2023-08-15

## 2023-10-22 PROBLEM — I46.9 CARDIAC ARREST (H): Status: RESOLVED | Noted: 2022-02-17 | Resolved: 2023-10-22

## 2023-10-22 PROBLEM — R07.9 CHEST PAIN: Status: RESOLVED | Noted: 2017-07-07 | Resolved: 2023-10-22

## 2023-10-22 PROBLEM — K57.30 DIVERTICULAR DISEASE OF LARGE INTESTINE: Status: ACTIVE | Noted: 2020-06-24

## 2023-10-22 PROBLEM — E78.5 HYPERLIPIDEMIA: Status: ACTIVE | Noted: 2022-10-25

## 2023-10-22 PROBLEM — N17.0 ACUTE KIDNEY FAILURE WITH TUBULAR NECROSIS (H): Status: RESOLVED | Noted: 2022-02-22 | Resolved: 2023-10-22

## 2023-10-25 ENCOUNTER — OFFICE VISIT (OUTPATIENT)
Dept: FAMILY MEDICINE | Facility: CLINIC | Age: 73
End: 2023-10-25
Payer: MEDICARE

## 2023-10-25 VITALS
RESPIRATION RATE: 14 BRPM | HEIGHT: 70 IN | DIASTOLIC BLOOD PRESSURE: 66 MMHG | OXYGEN SATURATION: 99 % | HEART RATE: 65 BPM | TEMPERATURE: 97.7 F | BODY MASS INDEX: 32.41 KG/M2 | SYSTOLIC BLOOD PRESSURE: 114 MMHG | WEIGHT: 226.4 LBS

## 2023-10-25 DIAGNOSIS — E03.8 SUBCLINICAL HYPOTHYROIDISM: ICD-10-CM

## 2023-10-25 DIAGNOSIS — I48.92 ATRIAL FLUTTER, UNSPECIFIED TYPE (H): ICD-10-CM

## 2023-10-25 DIAGNOSIS — I48.20 CHRONIC ATRIAL FIBRILLATION (H): ICD-10-CM

## 2023-10-25 DIAGNOSIS — Z11.3 SCREEN FOR STD (SEXUALLY TRANSMITTED DISEASE): ICD-10-CM

## 2023-10-25 DIAGNOSIS — Z00.00 ENCOUNTER FOR MEDICARE ANNUAL WELLNESS EXAM: Primary | ICD-10-CM

## 2023-10-25 DIAGNOSIS — I50.22 CHRONIC SYSTOLIC HEART FAILURE (H): ICD-10-CM

## 2023-10-25 DIAGNOSIS — Z78.9 HEPATITIS B VACCINATION STATUS UNKNOWN: ICD-10-CM

## 2023-10-25 DIAGNOSIS — Z76.89 ESTABLISHING CARE WITH NEW DOCTOR, ENCOUNTER FOR: ICD-10-CM

## 2023-10-25 DIAGNOSIS — N18.32 CHRONIC KIDNEY DISEASE, STAGE 3B (H): ICD-10-CM

## 2023-10-25 DIAGNOSIS — I42.8 OTHER CARDIOMYOPATHIES (H): ICD-10-CM

## 2023-10-25 DIAGNOSIS — I25.10 CORONARY ARTERY DISEASE INVOLVING NATIVE CORONARY ARTERY OF NATIVE HEART WITHOUT ANGINA PECTORIS: ICD-10-CM

## 2023-10-25 DIAGNOSIS — Z87.39 HISTORY OF GOUT: ICD-10-CM

## 2023-10-25 DIAGNOSIS — Z95.818 PRESENCE OF WATCHMAN LEFT ATRIAL APPENDAGE CLOSURE DEVICE: ICD-10-CM

## 2023-10-25 DIAGNOSIS — Z13.1 DIABETES MELLITUS SCREENING: ICD-10-CM

## 2023-10-25 DIAGNOSIS — E78.2 MIXED HYPERLIPIDEMIA: ICD-10-CM

## 2023-10-25 DIAGNOSIS — Z23 NEED FOR TDAP VACCINATION: ICD-10-CM

## 2023-10-25 DIAGNOSIS — Z95.5 S/P DRUG ELUTING CORONARY STENT PLACEMENT: ICD-10-CM

## 2023-10-25 DIAGNOSIS — Z23 ENCOUNTER FOR VACCINATION: ICD-10-CM

## 2023-10-25 DIAGNOSIS — L90.5 ABNORMAL SCARRING OF SKIN: ICD-10-CM

## 2023-10-25 DIAGNOSIS — R97.20 HIGH PROSTATE SPECIFIC ANTIGEN (PSA): ICD-10-CM

## 2023-10-25 PROBLEM — R55 SYNCOPE: Status: RESOLVED | Noted: 2017-07-07 | Resolved: 2023-10-25

## 2023-10-25 PROBLEM — G89.29 CHRONIC MIDLINE LOW BACK PAIN WITHOUT SCIATICA: Status: RESOLVED | Noted: 2018-04-18 | Resolved: 2023-10-25

## 2023-10-25 PROBLEM — M54.50 CHRONIC MIDLINE LOW BACK PAIN WITHOUT SCIATICA: Status: RESOLVED | Noted: 2018-04-18 | Resolved: 2023-10-25

## 2023-10-25 LAB
CREAT UR-MCNC: 88.3 MG/DL
ERYTHROCYTE [DISTWIDTH] IN BLOOD BY AUTOMATED COUNT: 12.4 % (ref 10–15)
HBA1C MFR BLD: 5.5 % (ref 0–5.6)
HCT VFR BLD AUTO: 40.3 % (ref 40–53)
HGB BLD-MCNC: 13.1 G/DL (ref 13.3–17.7)
MCH RBC QN AUTO: 32.7 PG (ref 26.5–33)
MCHC RBC AUTO-ENTMCNC: 32.5 G/DL (ref 31.5–36.5)
MCV RBC AUTO: 101 FL (ref 78–100)
MICROALBUMIN UR-MCNC: 21.8 MG/L
MICROALBUMIN/CREAT UR: 24.69 MG/G CR (ref 0–17)
PLATELET # BLD AUTO: 146 10E3/UL (ref 150–450)
PTH-INTACT SERPL-MCNC: 39 PG/ML (ref 15–65)
RBC # BLD AUTO: 4.01 10E6/UL (ref 4.4–5.9)
WBC # BLD AUTO: 6.4 10E3/UL (ref 4–11)

## 2023-10-25 PROCEDURE — G0008 ADMIN INFLUENZA VIRUS VAC: HCPCS | Performed by: FAMILY MEDICINE

## 2023-10-25 PROCEDURE — 83970 ASSAY OF PARATHORMONE: CPT | Performed by: FAMILY MEDICINE

## 2023-10-25 PROCEDURE — 84443 ASSAY THYROID STIM HORMONE: CPT | Performed by: FAMILY MEDICINE

## 2023-10-25 PROCEDURE — 80048 BASIC METABOLIC PNL TOTAL CA: CPT | Performed by: FAMILY MEDICINE

## 2023-10-25 PROCEDURE — 86706 HEP B SURFACE ANTIBODY: CPT | Performed by: FAMILY MEDICINE

## 2023-10-25 PROCEDURE — 83036 HEMOGLOBIN GLYCOSYLATED A1C: CPT | Performed by: FAMILY MEDICINE

## 2023-10-25 PROCEDURE — 82043 UR ALBUMIN QUANTITATIVE: CPT | Performed by: FAMILY MEDICINE

## 2023-10-25 PROCEDURE — 90662 IIV NO PRSV INCREASED AG IM: CPT | Performed by: FAMILY MEDICINE

## 2023-10-25 PROCEDURE — 82570 ASSAY OF URINE CREATININE: CPT | Performed by: FAMILY MEDICINE

## 2023-10-25 PROCEDURE — 87389 HIV-1 AG W/HIV-1&-2 AB AG IA: CPT | Performed by: FAMILY MEDICINE

## 2023-10-25 PROCEDURE — 86803 HEPATITIS C AB TEST: CPT | Performed by: FAMILY MEDICINE

## 2023-10-25 PROCEDURE — 99213 OFFICE O/P EST LOW 20 MIN: CPT | Mod: 25 | Performed by: FAMILY MEDICINE

## 2023-10-25 PROCEDURE — 84439 ASSAY OF FREE THYROXINE: CPT | Performed by: FAMILY MEDICINE

## 2023-10-25 PROCEDURE — 36415 COLL VENOUS BLD VENIPUNCTURE: CPT | Performed by: FAMILY MEDICINE

## 2023-10-25 PROCEDURE — 85027 COMPLETE CBC AUTOMATED: CPT | Performed by: FAMILY MEDICINE

## 2023-10-25 PROCEDURE — 87340 HEPATITIS B SURFACE AG IA: CPT | Performed by: FAMILY MEDICINE

## 2023-10-25 PROCEDURE — 84460 ALANINE AMINO (ALT) (SGPT): CPT | Performed by: FAMILY MEDICINE

## 2023-10-25 PROCEDURE — G0103 PSA SCREENING: HCPCS | Performed by: FAMILY MEDICINE

## 2023-10-25 PROCEDURE — 84550 ASSAY OF BLOOD/URIC ACID: CPT | Performed by: FAMILY MEDICINE

## 2023-10-25 PROCEDURE — G0439 PPPS, SUBSEQ VISIT: HCPCS | Performed by: FAMILY MEDICINE

## 2023-10-25 PROCEDURE — 86780 TREPONEMA PALLIDUM: CPT | Performed by: FAMILY MEDICINE

## 2023-10-25 RX ORDER — RESPIRATORY SYNCYTIAL VIRUS VACCINE 120MCG/0.5
0.5 KIT INTRAMUSCULAR ONCE
Qty: 1 EACH | Refills: 0 | Status: CANCELLED | OUTPATIENT
Start: 2023-10-25 | End: 2023-10-25

## 2023-10-25 ASSESSMENT — ENCOUNTER SYMPTOMS
PARESTHESIAS: 0
PALPITATIONS: 0
HEARTBURN: 0
DIZZINESS: 0
MYALGIAS: 0
CHILLS: 0
CONSTIPATION: 0
ARTHRALGIAS: 0
DIARRHEA: 0
HEMATOCHEZIA: 0
WEAKNESS: 0
NERVOUS/ANXIOUS: 0
NAUSEA: 0
HEADACHES: 0
HEMATURIA: 0
FREQUENCY: 0
DYSURIA: 0
COUGH: 0
EYE PAIN: 0
FEVER: 0
JOINT SWELLING: 0
SORE THROAT: 0
SHORTNESS OF BREATH: 0
ABDOMINAL PAIN: 0

## 2023-10-25 ASSESSMENT — PAIN SCALES - GENERAL: PAINLEVEL: NO PAIN (0)

## 2023-10-25 ASSESSMENT — ACTIVITIES OF DAILY LIVING (ADL): CURRENT_FUNCTION: NO ASSISTANCE NEEDED

## 2023-10-25 NOTE — PROGRESS NOTES
"SUBJECTIVE:   Husam is a 73 year old who presents for Preventive Visit.      10/25/2023     2:15 PM   Additional Questions   Roomed by Jney     Are you in the first 12 months of your Medicare coverage?  No    Healthy Habits:     In general, how would you rate your overall health?  Good    Frequency of exercise:  4-5 days/week    Duration of exercise:  30-45 minutes    Do you usually eat at least 4 servings of fruit and vegetables a day, include whole grains    & fiber and avoid regularly eating high fat or \"junk\" foods?  No    Taking medications regularly:  Yes    Medication side effects:  None    Ability to successfully perform activities of daily living:  No assistance needed    Home Safety:  No safety concerns identified    Hearing Impairment:  Difficulty following a conversation in a noisy restaurant or crowded room and difficulty understanding soft or whispered speech    In the past 6 months, have you been bothered by leaking of urine?  No    In general, how would you rate your overall mental or emotional health?  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 (For example, a Health Directive, POLST, or a discussion with a " medical provider or your loved ones about your wishes): Yes, advance care planning is on file.    Fall risk  Fallen 2 or more times in the past year?: No  Any fall with injury in the past year?: No    Cognitive Screening   1) Repeat 3 items (Leader, Season, Table)    2) Clock draw: NORMAL  3) 3 item recall: Recalls 2 objects   Results: NORMAL clock, 1-2 items recalled: COGNITIVE IMPAIRMENT LESS LIKELY    Mini-CogTM Copyright CORBIN Gonzalez. Licensed by the author for use in [facility]; reprinted with permission (tari@[facility]). All rights reserved.      Do you have sleep apnea, excessive snoring or daytime drowsiness? : yes    Reviewed and updated as needed this visit by clinical staff   Tobacco  Allergies  Meds  Problems  Med Hx  Surg Hx  Fam Hx        Reviewed and updated as needed this visit by Provider   Tobacco  Allergies  Meds  Problems  Med Hx  Surg Hx  Fam Hx         Social History     Tobacco Use    Smoking status: Never     Passive exposure: Never    Smokeless tobacco: Never   Substance Use Topics    Alcohol use: Yes     Alcohol/week: 2.0 standard drinks of alcohol         10/25/2023     1:18 PM   Alcohol Use   Prescreen: >3 drinks/day or >7 drinks/week? No     Do you have a current opioid prescription? No  Do you use any other controlled substances or medications that are not prescribed by a provider? None    Current providers sharing in care for this patient include:   Patient Care Team:  Liyah Cooley MD as PCP - General (Family Medicine)  Larry Cintron MD as MD (Cardiovascular Disease)  Mirlande Mckenzie, RN as Specialty Care Coordinator (Cardiology)  Malik Henriquez MD as MD (Cardiovascular Disease)  CHELSIE Parker MD as MD (Plastic Surgery)  Griffin Chavez MD as MD (Cardiovascular Disease)  Larry Cintron MD as Assigned Heart and Vascular Provider  Suzanne Bloom DO as Assigned PCP  Ever Roberts MD as Physician (Plastic Surgery)    The  following health maintenance items are reviewed in Epic and correct as of today:  Health Maintenance   Topic Date Due    HF ACTION PLAN  Never done    URINE DRUG SCREEN  Never done    HEPATITIS B IMMUNIZATION (1 of 3 - Risk Dialysis 4-dose series) Never done    RSV VACCINE 60+ (1 - 1-dose 60+ series) Never done    DTAP/TDAP/TD IMMUNIZATION (2 - Td or Tdap) 12/04/2022    COVID-19 Vaccine (5 - 2023-24 season) 09/01/2023    LIPID  02/20/2024    BMP  04/25/2024    MICROALBUMIN  04/25/2024    MEDICARE ANNUAL WELLNESS VISIT  10/25/2024    ALT  10/25/2024    ANNUAL REVIEW OF HM ORDERS  10/25/2024    FALL RISK ASSESSMENT  10/25/2024    CBC  10/25/2024    HEMOGLOBIN  10/25/2024    ADVANCE CARE PLANNING  10/25/2028    COLORECTAL CANCER SCREENING  06/01/2030    PARATHYROID  Completed    PHOSPHORUS  Completed    TSH W/FREE T4 REFLEX  Completed    HEPATITIS C SCREENING  Completed    PHQ-2 (once per calendar year)  Completed    INFLUENZA VACCINE  Completed    Pneumococcal Vaccine: 65+ Years  Completed    URINALYSIS  Completed    ALK PHOS  Completed    ZOSTER IMMUNIZATION  Completed    AORTIC ANEURYSM SCREENING (SYSTEM ASSIGNED)  Completed    IPV IMMUNIZATION  Aged Out    HPV IMMUNIZATION  Aged Out    MENINGITIS IMMUNIZATION  Aged Out     Review of Systems   Constitutional:  Negative for chills and fever.   HENT:  Negative for congestion, ear pain, hearing loss and sore throat.    Eyes:  Negative for pain and visual disturbance.   Respiratory:  Negative for cough and shortness of breath.    Cardiovascular:  Negative for chest pain, palpitations and peripheral edema.   Gastrointestinal:  Negative for abdominal pain, constipation, diarrhea, heartburn, hematochezia and nausea.   Genitourinary:  Positive for impotence. Negative for dysuria, frequency, genital sores, hematuria, penile discharge and urgency.   Musculoskeletal:  Negative for arthralgias, joint swelling and myalgias.   Skin:  Negative for rash.   Neurological:  Negative  "for dizziness, weakness, headaches and paresthesias.   Psychiatric/Behavioral:  Negative for mood changes. The patient is not nervous/anxious.      OBJECTIVE:   /66 (BP Location: Left arm, Patient Position: Sitting, Cuff Size: Adult Large)   Pulse 65   Temp 97.7  F (36.5  C) (Temporal)   Resp 14   Ht 1.778 m (5' 10\")   Wt 102.7 kg (226 lb 6.4 oz)   SpO2 99%   BMI 32.49 kg/m   Estimated body mass index is 32.49 kg/m  as calculated from the following:    Height as of this encounter: 1.778 m (5' 10\").    Weight as of this encounter: 102.7 kg (226 lb 6.4 oz).  Physical Exam  GENERAL: healthy, alert and no distress  EYES: Eyes grossly normal to inspection, PERRL and conjunctivae and sclerae normal  HENT: ear canals and TM's normal  RESP: lungs clear to auscultation - no rales, rhonchi or wheezes  CV: regular rate and rhythm, normal S1 S2, no S3 or S4, no murmur, click or rub, no peripheral edema and peripheral pulses strong  ABDOMEN: asymmetry of fat/skin likely related to scarring from prior ECMO site, do not appreciate any masses or other concerns in this area  MS: no gross musculoskeletal defects noted, no edema  SKIN: no suspicious lesions or rashes  NEURO: Normal strength and tone, mentation intact and speech normal  PSYCH: mentation appears normal, affect normal/bright    ASSESSMENT / PLAN:   Husam was seen today for physical.    Diagnoses and all orders for this visit:    Encounter for Medicare annual wellness exam  AWV completed today. Vaccines as below. Labs as below. Up to date with colon cancer screening.    Establishing care with new doctor, encounter for  Establishing care today, all past medical history reviewed and updated in EMR.    Mixed hyperlipidemia  History of hyperlipidemia and cardiac disease as below, on statin therapy, will check labs today.  -     ALT; Future  -     Basic metabolic panel  (Ca, Cl, CO2, Creat, Gluc, K, Na, BUN); Future    Atrial flutter, unspecified type (H)  Chronic " atrial fibrillation (H)  Presence of Watchman left atrial appendage closure device  Chronic systolic heart failure (H)  Other cardiomyopathies (H)  S/P drug eluting coronary stent placement  Coronary artery disease involving native coronary artery of native heart without angina pectoris  Cardiac history as above.  Follows with cardiology.  No changes made today.    Subclinical hypothyroidism  History of subclinical hypothyroidism, will check levels today.  -     Basic metabolic panel  (Ca, Cl, CO2, Creat, Gluc, K, Na, BUN); Future  -     TSH with free T4 reflex; Future  -     levothyroxine (SYNTHROID/LEVOTHROID) 25 MCG tablet; Take 1 tablet (25 mcg) by mouth daily  -     TSH with free T4 reflex; Future    Chronic kidney disease, stage 3b (H)  History of CKD, check labs as below.  -     Albumin Random Urine Quantitative with Creat Ratio; Future  -     Parathyroid Hormone Intact; Future  -     CBC with platelets; Future    History of gout  History of gout, on allopurinol, will check uric acid level today.  -     Uric acid; Future    High prostate specific antigen (PSA)  Patient with a history of high PSA, follows with urology, request to have PSA done with labs today and he will forward result to his urology office.  -     PSA, screen; Future    Diabetes mellitus screening  Given age, will screen for diabetes today.  -     Hemoglobin A1c; Future    Hepatitis B vaccination status unknown  Hepatitis B vaccine status unknown, check titer today.  -     Hepatitis B Surface Antibody; Future    Need for Tdap vaccination  Due for Tdap, prescription sent to pharmacy based on Medicare requirements.  -     Tdap, tetanus-diptheria-acell pertussis, (BOOSTRIX) 5-2.5-18.5 LF-MCG/0.5 DEANA injection; Inject 0.5 mLs into the muscle once for 1 dose    Encounter for vaccination  Due for flu, administered today.  -     INFLUENZA VACCINE 65+ (FLUZONE HD)  -     HEPATITIS B, ADULT 20+ (ENGERIX-B/RECOMBIVAX HB); Future    Screen for STD  "(sexually transmitted disease)  Patient request STD screening today, labs as below.  -     HIV Antigen Antibody Combo; Future  -     Treponema Abs w Reflex to RPR and Titer; Future  -     Hepatitis C antibody; Future  -     Hepatitis B surface antigen; Future    Abnormal scarring of skin  Abnormal scarring of skin from prior ECMO site resulting in asymmetry/abnormality of abdominal appearance, referral placed to plastic surgery to discuss potential treatment options.  -     Adult Plastic Surgery  Referral; Future    Other orders  -     REVIEW OF HEALTH MAINTENANCE PROTOCOL ORDERS  -     PRIMARY CARE FOLLOW-UP SCHEDULING; Future      COUNSELING:  Reviewed preventive health counseling, as reflected in patient instructions       Regular exercise       Healthy diet/nutrition       Vision screening       Hearing screening       Dental care       Fall risk prevention    BMI:   Estimated body mass index is 32.49 kg/m  as calculated from the following:    Height as of this encounter: 1.778 m (5' 10\").    Weight as of this encounter: 102.7 kg (226 lb 6.4 oz).   Weight management plan: Discussed healthy diet and exercise guidelines    He reports that he has never smoked. He has never been exposed to tobacco smoke. He has never used smokeless tobacco.    Appropriate preventive services were discussed with this patient, including applicable screening as appropriate for fall prevention, nutrition, physical activity, Tobacco-use cessation, weight loss and cognition.  Checklist reviewing preventive services available has been given to the patient.    Reviewed patients plan of care and provided an AVS. The Basic Care Plan (routine screening as documented in Health Maintenance) for Chano meets the Care Plan requirement. This Care Plan has been established and reviewed with the Patient.      Liyah Cooley MD  [facility]    "

## 2023-10-25 NOTE — PATIENT INSTRUCTIONS
Patient Education   Personalized Prevention Plan  You are due for the preventive services outlined below.  Your care team is available to assist you in scheduling these services.  If you have already completed any of these items, please share that information with your care team to update in your medical record.  Health Maintenance Due   Topic Date Due     Heart Failure Action Plan  Never done     Kidney Microalbumin Urine Test  Never done     Parathyroid Lab  Never done     URINE DRUG SCREEN  Never done     ANNUAL REVIEW OF HM ORDERS  Never done     Hepatitis B Vaccine (1 of 3 - Risk Dialysis 4-dose series) Never done     RSV VACCINE 60+ (1 - 1-dose 60+ series) Never done     Diptheria Tetanus Pertussis (DTAP/TDAP/TD) Vaccine (2 - Td or Tdap) 12/04/2022     Basic Metabolic Panel  08/20/2023     Flu Vaccine (1) 09/01/2023     COVID-19 Vaccine (5 - 2023-24 season) 09/01/2023     Liver Monitoring Lab  10/03/2023     Preventive Health Recommendations  See your health care provider every year to  Review health changes.   Discuss preventive care.    Review your medicines if your doctor has prescribed any.  Talk with your health care provider about whether you should have a test to screen for prostate cancer (PSA).  Every 3 years, have a diabetes test (fasting glucose). If you are at risk for diabetes, you should have this test more often.  Every 5 years, have a cholesterol test. Have this test more often if you are at risk for high cholesterol or heart disease.   Every 10 years, have a colonoscopy. Or, have a yearly FIT test (stool test). These exams will check for colon cancer.  Talk to with your health care provider about screening for Abdominal Aortic Aneurysm if you have a family history of AAA or have a history of smoking.    Shots:   Get a flu shot each year.   Get a tetanus shot every 10 years.   Talk to your doctor about your pneumonia vaccines. There are now two you should receive - Pneumovax (PPSV 23) and  Prevnar (PCV 13).  Talk to your pharmacist about a shingles vaccine.   Talk to your doctor about the hepatitis B vaccine.    Nutrition:   Eat at least 5 servings of fruits and vegetables each day.   Eat whole-grain bread, whole-wheat pasta and brown rice instead of white grains and rice.   Get adequate Calcium and Vitamin D.     Lifestyle  Exercise for at least 150 minutes a week (30 minutes a day, 5 days a week). This will help you control your weight and prevent disease.   Limit alcohol to one drink per day.   No smoking.   Wear sunscreen to prevent skin cancer.   See your dentist every six months for an exam and cleaning.   See your eye doctor every 1 to 2 years to screen for conditions such as glaucoma, macular degeneration and cataracts.    Personalized Prevention Plan  You are due for the preventive services outlined below.  Your care team is available to assist you in scheduling these services.  If you have already completed any of these items, please share that information with your care team to update in your medical record.  Health Maintenance   Topic Date Due     HF ACTION PLAN  Never done     MICROALBUMIN  Never done     PARATHYROID  Never done     URINE DRUG SCREEN  Never done     ANNUAL REVIEW OF HM ORDERS  Never done     HEPATITIS B IMMUNIZATION (1 of 3 - Risk Dialysis 4-dose series) Never done     RSV VACCINE 60+ (1 - 1-dose 60+ series) Never done     DTAP/TDAP/TD IMMUNIZATION (2 - Td or Tdap) 12/04/2022     BMP  08/20/2023     INFLUENZA VACCINE (1) 09/01/2023     COVID-19 Vaccine (5 - 2023-24 season) 09/01/2023     ALT  10/03/2023     LIPID  02/20/2024     CBC  02/20/2024     HEMOGLOBIN  02/20/2024     MEDICARE ANNUAL WELLNESS VISIT  10/25/2024     FALL RISK ASSESSMENT  10/25/2024     ADVANCE CARE PLANNING  10/25/2028     COLORECTAL CANCER SCREENING  06/01/2030     PHOSPHORUS  Completed     TSH W/FREE T4 REFLEX  Completed     HEPATITIS C SCREENING  Completed     PHQ-2 (once per calendar year)   Completed     Pneumococcal Vaccine: 65+ Years  Completed     URINALYSIS  Completed     ALK PHOS  Completed     ZOSTER IMMUNIZATION  Completed     AORTIC ANEURYSM SCREENING (SYSTEM ASSIGNED)  Completed     IPV IMMUNIZATION  Aged Out     HPV IMMUNIZATION  Aged Out     MENINGITIS IMMUNIZATION  Aged Out       Learning About Dietary Guidelines  What are the Dietary Guidelines for Americans?     Dietary Guidelines for Americans provide tips for eating well and staying healthy. This helps reduce the risk for long-term (chronic) diseases.  These guidelines recommend that you:  Eat and drink the right amount for you. The U.[place] government's food guide is called MyPlate. It can help you make your own well-balanced eating plan.  Try to balance your eating with your activity. This helps you stay at a healthy weight.  Drink alcohol in moderation, if at all.  Limit foods high in salt, saturated fat, trans fat, and added sugar.  These guidelines are from the U.S. Department of Agriculture and the U.S. Department of Health and Human Services. They are updated every 5 years.  What is MyPlate?  MyPlate is the [place] [employer]'s food guide. It can help you make your own well-balanced eating plan. A balanced eating plan means that you eat enough, but not too much, and that your food gives you the nutrients you need to stay healthy.  MyPlate focuses on eating plenty of whole grains, fruits, and vegetables, and on limiting fat and sugar. It is available online at www.ChooseMyPlate.gov.  How can you get started?  If you're trying to eat healthier, you can slowly change your eating habits over time. You don't have to make big changes all at once. Start by adding one or two healthy foods to your meals each day.  Grains  Choose whole-grain breads and cereals and whole-wheat pasta and whole-grain crackers.  Vegetables  Eat a variety of vegetables every day. They have lots of nutrients and are part of a heart-healthy diet.  Fruits  Eat a variety  "of fruits every day. Fruits contain lots of nutrients. Choose fresh fruit instead of fruit juice.  Protein foods  Choose fish and lean poultry more often. Eat red meat and fried meats less often. Dried beans, tofu, and nuts are also good sources of protein.  Dairy  Choose low-fat or fat-free products from this food group. If you have problems digesting milk, try eating cheese or yogurt instead.  Fats and oils  Limit fats and oils if you're trying to cut calories. Choose healthy fats when you cook. These include canola oil and olive oil.  Where can you learn more?  Go to https://www.[employer].net/patiented  Enter D676 in the search box to learn more about \"Learning About Dietary Guidelines.\"  Current as of: March 1, 2023               Content Version: 13.7    7488-6343 [employer].   Care instructions adapted under license by your healthcare professional. If you have questions about a medical condition or this instruction, always ask your healthcare professional. [employer] disclaims any warranty or liability for your use of this information.      Hearing Loss: Care Instructions  Overview     Hearing loss is a sudden or slow decrease in how well you hear. It can range from slight to profound. Permanent hearing loss can occur with aging. It also can happen when you are exposed long-term to loud noise. Examples include listening to loud music, riding motorcycles, or being around other loud machines.  Hearing loss can affect your work and home life. It can make you feel lonely or depressed. You may feel that you have lost your independence. But hearing aids and other devices can help you hear better and feel connected to others.  Follow-up care is a key part of your treatment and safety. Be sure to make and go to all appointments, and call your doctor if you are having problems. It's also a good idea to know your test results and keep a list of the medicines you take.  How can you care for " yourself at home?  Avoid loud noises whenever possible. This helps keep your hearing from getting worse.  Always wear hearing protection around loud noises.  Wear a hearing aid as directed.  A professional can help you pick a hearing aid that will work best for you.  You can also get hearing aids over the counter for mild to moderate hearing loss.  Have hearing tests as your doctor suggests. They can show whether your hearing has changed. Your hearing aid may need to be adjusted.  Use other devices as needed. These may include:  Telephone amplifiers and hearing aids that can connect to a television, stereo, radio, or microphone.  Devices that use lights or vibrations. These alert you to the doorbell, a ringing telephone, or a baby monitor.  Television closed-captioning. This shows the words at the bottom of the screen. Most new TVs can do this.  TTY (text telephone). This lets you type messages back and forth on the telephone instead of talking or listening. These devices are also called TDD. When messages are typed on the keyboard, they are sent over the phone line to a receiving TTY. The message is shown on a monitor.  Use text messaging, social media, and email if it is hard for you to communicate by telephone.  Try to learn a listening technique called speechreading. It is not lipreading. You pay attention to people's gestures, expressions, posture, and tone of voice. These clues can help you understand what a person is saying. Face the person you are talking to, and have them face you. Make sure the lighting is good. You need to see the other person's face clearly.  Think about counseling if you need help to adjust to your hearing loss.  When should you call for help?  Watch closely for changes in your health, and be sure to contact your doctor if:    You think your hearing is getting worse.     You have new symptoms, such as dizziness or nausea.   Where can you learn more?  Go to  "https://www.[employer].net/patiented  Enter R798 in the search box to learn more about \"Hearing Loss: Care Instructions.\"  Current as of: March 1, 2023               Content Version: 13.7 2006-2023 [employer].   Care instructions adapted under license by your healthcare professional. If you have questions about a medical condition or this instruction, always ask your healthcare professional. [employer], [employer] disclaims any warranty or liability for your use of this information.         "

## 2023-10-26 LAB
ALT SERPL W P-5'-P-CCNC: 52 U/L (ref 0–70)
ANION GAP SERPL CALCULATED.3IONS-SCNC: 11 MMOL/L (ref 7–15)
BUN SERPL-MCNC: 19.9 MG/DL (ref 8–23)
CALCIUM SERPL-MCNC: 9.7 MG/DL (ref 8.8–10.2)
CHLORIDE SERPL-SCNC: 103 MMOL/L (ref 98–107)
CREAT SERPL-MCNC: 1.5 MG/DL (ref 0.67–1.17)
DEPRECATED HCO3 PLAS-SCNC: 26 MMOL/L (ref 22–29)
EGFRCR SERPLBLD CKD-EPI 2021: 48 ML/MIN/1.73M2
GLUCOSE SERPL-MCNC: 95 MG/DL (ref 70–99)
HBV SURFACE AB SERPL IA-ACNC: 0.54 M[IU]/ML
HBV SURFACE AB SERPL IA-ACNC: NONREACTIVE M[IU]/ML
HBV SURFACE AG SERPL QL IA: NONREACTIVE
HCV AB SERPL QL IA: NONREACTIVE
HIV 1+2 AB+HIV1 P24 AG SERPL QL IA: NONREACTIVE
POTASSIUM SERPL-SCNC: 4.6 MMOL/L (ref 3.4–5.3)
PSA SERPL DL<=0.01 NG/ML-MCNC: 4.68 NG/ML (ref 0–6.5)
SODIUM SERPL-SCNC: 140 MMOL/L (ref 135–145)
T PALLIDUM AB SER QL: NONREACTIVE
T4 FREE SERPL-MCNC: 1.3 NG/DL (ref 0.9–1.7)
TSH SERPL DL<=0.005 MIU/L-ACNC: 5.44 UIU/ML (ref 0.3–4.2)
URATE SERPL-MCNC: 4.6 MG/DL (ref 3.4–7)

## 2023-10-26 RX ORDER — LEVOTHYROXINE SODIUM 25 UG/1
25 TABLET ORAL DAILY
Qty: 90 TABLET | Refills: 1 | Status: SHIPPED | OUTPATIENT
Start: 2023-10-26 | End: 2024-04-18

## 2023-10-28 ENCOUNTER — MYC MEDICAL ADVICE (OUTPATIENT)
Dept: FAMILY MEDICINE | Facility: CLINIC | Age: 73
End: 2023-10-28
Payer: MEDICARE

## 2023-11-03 ENCOUNTER — TRANSFERRED RECORDS (OUTPATIENT)
Dept: HEALTH INFORMATION MANAGEMENT | Facility: CLINIC | Age: 73
End: 2023-11-03
Payer: MEDICARE

## 2023-11-20 NOTE — TELEPHONE ENCOUNTER
I called and scheduled patients pre op visit before surgery on 01/25/2024. Patient is scheduled for Jan 11 th at 9:40 am with Dr. Cooley.

## 2024-01-02 NOTE — PROGRESS NOTES
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    /62   Pulse 66   Temp 97.6  F (36.4  C) (Oral)   Resp 18   Wt 235 lb 8 oz (106.8 kg)   BMI 33.79 kg/m      The provider has been notified that the patient has no concerns.     Questions patient would like addressed today are: N/A.    Refills are needed: No    Has homecare services and agency name:  No

## 2024-01-02 NOTE — PATIENT INSTRUCTIONS
Kash Madden,    Thank you for entrusting your care with us today. After your visit today with MD Yomi Howe this is the plan that was discussed at your appointment.    We will get you scheduled for an ultrasound of the left groin to recheck that area. Your last ultrasound was done in March of 2023. You can call [phone] to get this scheduled.     Dr. Howe will send you a [employer] message with your results.        I am including additional information on these things and our contact information if you have any questions or concerns.   Please do not hesitate to reach out to us if you felt we did not answer your questions or you are unsure of the treatment plan after your visit today. Our number is [phone].Thank you for trusting us with your care.         Again thank you for your time.

## 2024-01-03 ENCOUNTER — HOSPITAL ENCOUNTER (OUTPATIENT)
Dept: ULTRASOUND IMAGING | Facility: CLINIC | Age: 74
Discharge: HOME | End: 2024-01-03
Attending: HOSPITALIST
Payer: MEDICARE

## 2024-01-03 ENCOUNTER — OFFICE VISIT (OUTPATIENT)
Dept: VASCULAR SURGERY | Facility: CLINIC | Age: 74
End: 2024-01-03
Attending: HOSPITALIST
Payer: MEDICARE

## 2024-01-03 VITALS
TEMPERATURE: 97.6 F | WEIGHT: 235.5 LBS | HEART RATE: 66 BPM | DIASTOLIC BLOOD PRESSURE: 62 MMHG | SYSTOLIC BLOOD PRESSURE: 112 MMHG | RESPIRATION RATE: 18 BRPM | BODY MASS INDEX: 33.79 KG/M2

## 2024-01-03 DIAGNOSIS — R19.09 GROIN SWELLING: Primary | ICD-10-CM

## 2024-01-03 DIAGNOSIS — M79.89 OTHER SPECIFIED SOFT TISSUE DISORDERS: ICD-10-CM

## 2024-01-03 DIAGNOSIS — R19.09 GROIN SWELLING: ICD-10-CM

## 2024-01-03 PROCEDURE — 76882 US LMTD JT/FCL EVL NVASC XTR: CPT | Mod: LT

## 2024-01-03 PROCEDURE — G0463 HOSPITAL OUTPT CLINIC VISIT: HCPCS | Performed by: HOSPITALIST

## 2024-01-03 PROCEDURE — 99203 OFFICE O/P NEW LOW 30 MIN: CPT | Performed by: HOSPITALIST

## 2024-01-03 ASSESSMENT — PAIN SCALES - GENERAL: PAINLEVEL: NO PAIN (0)

## 2024-01-03 NOTE — PROGRESS NOTES
VASCULAR MEDICINE CONSULT NOTE          LOCATION:  [facility]       Date of Service: 1/3/2024      Primary Care Provider: Liyah Cooley  Referring provider;  No ref. provider found      Reason for the visit/chief complaint:   Left groin bulge, history of ECMO March 2022      HPI:  Chano Johnson is a pleasant 73 year old male who presents to our Vascular Medicine clinic accompanied by his wife Angelia with the above mentioned complaint.    Mr. Johnson has medical history significant for fibrillation status post left atrial appendage closure device and STEMI with V-fib arrest requiring VA ECMO February 2022 status post staged stenting procedure initially to the mid LAD culprit lesion with LETY x 2 February 2022 then to the ramus intermedius April 2022 with LETY.    After his V-fib arrest, patient complained of bulging that corresponds with his left groin VA ECMO incision that was painless however, big enough to cause friction and rubbing while walking.  It wa sounds like his lesion s felt to be consistent with lymphocele and this fortunately gradually decreased in size until it completely resolved spontaneously within 6 months.  He however noticed a persistent area of bulging on the left groin as well but slightly above his incision more so in the lower abdomen.  This area is very small, painless, does not cause any friction or rubbing while walking or with activities and has remained stable with no growing or decreasing in size.  He however remains self-conscious about the bulging area.  He first requested plastic surgery evaluation thinking that he might get liposuction for that area however, he was referred to our clinic to be evaluated first and rule out any vascular complications.        REVIEW OF SYSTEMS:    A 12 point ROS was reviewed and is negative except what is mentioned in HPI.       Past medical history, surgical history, medications, family history, social history and  allergies were reviewed. Pertinent points mentioned under HPI.        OBJECTIVE:    Vital signs:  /62   Pulse 66   Temp 97.6  F (36.4  C) (Oral)   Resp 18   Wt 235 lb 8 oz (106.8 kg)   BMI 33.79 kg/m    Wt Readings from Last 1 Encounters:   01/03/24 235 lb 8 oz (106.8 kg)     Body mass index is 33.79 kg/m .    Physical exam:  General appearance: Pleasant male in no apparent distress.    Abdomen, extremities: Soft, nontender, nondistended. No pulsatile mass.  No bruits.  Left groin area with very subtle area of bulging when compared to the right more evident with standing.  There is no tenderness over the area, no fluid collection or fluctuating mass that can be felt.  Palpable femoral pulse 2/2 with no bruit.  No lower extremity swelling.  Normal pulse exam on the left lower extremity with palpable popliteal, DP and PT 2/2.  Skin: No redness, warmth, no discoloration, normal temperature.  No wounds.  Neurological: Alert, awake and oriented       DIAGNOSTIC STUDIES:   Labs and diagnostics reviewed including outside records. Pertinent points are mentioned under HPI and assessment and plan sections.          ASSESSMENT AND PLAN:    Concern for left groin bulging  History of STEMI with V-fib arrest status post VA ECMO February 2022  CAD status post LETY-mid LAD times 2 February 2022 LETY-RI April 2022  History of left groin lymphocele Kollam resolved spontaneously    The patient's area of interest is very subtle.  I was unable to palpate any mass or discrete collection.  Presentation is not consistent with lymphocele.  He probably had a lymphocele back in February 2022 that self absorbed conservatively within 6 months based on history.  No evidence of any vascular injury sequela.   We reviewed his nonvascular ultrasound that was done to that area back in March 2023 that showed no sonographic changes corresponding to the area of patient's concern with what appears to be loss of normal subcutaneous fat  echogenicity deep to patient's tach more related to scarring measuring 3.5 x 1.6 x 4.5 cm with no increased vascularity.  Discussed that we could obtain another ultrasound now that it has been 10 months from the previous ultrasound and assess if there has been any change.  This is specifically given the patient's concern about the appearance.  Overall however, I discussed with the patient that this area does not correspond with any concerning diagnoses and I do not expect any need for specific treatment.      Recommendations:  Obtain nonvascular left lower extremity/groin ultrasound to evaluate the area of patient's concern.  If no concerning finding, discussed with the patient expectant treatment.  If he remains significantly concerned about the appearance, could certainly see plastic surgery to specifically answer his question if liposuction can be pursued.  Will be in touch with the patient once his ultrasound is completed.      Was a pleasure meeting with Mr. Johnson and his wife in our clinic today.      Yomi Howe MD  Vascular Medicine  January 3, 2024

## 2024-01-07 PROBLEM — N40.1 BENIGN PROSTATIC HYPERPLASIA WITH URINARY OBSTRUCTION: Status: ACTIVE | Noted: 2023-11-03

## 2024-01-07 PROBLEM — N13.8 BENIGN PROSTATIC HYPERPLASIA WITH URINARY OBSTRUCTION: Status: ACTIVE | Noted: 2023-11-03

## 2024-01-11 ENCOUNTER — TRANSFERRED RECORDS (OUTPATIENT)
Dept: FAMILY MEDICINE | Facility: CLINIC | Age: 74
End: 2024-01-11

## 2024-01-11 ENCOUNTER — OFFICE VISIT (OUTPATIENT)
Dept: FAMILY MEDICINE | Facility: CLINIC | Age: 74
End: 2024-01-11
Payer: MEDICARE

## 2024-01-11 VITALS
RESPIRATION RATE: 18 BRPM | HEIGHT: 70 IN | DIASTOLIC BLOOD PRESSURE: 70 MMHG | WEIGHT: 236.4 LBS | HEART RATE: 63 BPM | SYSTOLIC BLOOD PRESSURE: 120 MMHG | OXYGEN SATURATION: 99 % | TEMPERATURE: 97.4 F | BODY MASS INDEX: 33.84 KG/M2

## 2024-01-11 DIAGNOSIS — Z95.818 PRESENCE OF WATCHMAN LEFT ATRIAL APPENDAGE CLOSURE DEVICE: ICD-10-CM

## 2024-01-11 DIAGNOSIS — G47.30 SLEEP APNEA, UNSPECIFIED TYPE: ICD-10-CM

## 2024-01-11 DIAGNOSIS — I48.92 ATRIAL FLUTTER, UNSPECIFIED TYPE (H): ICD-10-CM

## 2024-01-11 DIAGNOSIS — I48.20 CHRONIC ATRIAL FIBRILLATION (H): ICD-10-CM

## 2024-01-11 DIAGNOSIS — E03.8 SUBCLINICAL HYPOTHYROIDISM: ICD-10-CM

## 2024-01-11 DIAGNOSIS — I42.8 OTHER CARDIOMYOPATHIES (H): ICD-10-CM

## 2024-01-11 DIAGNOSIS — E78.2 MIXED HYPERLIPIDEMIA: ICD-10-CM

## 2024-01-11 DIAGNOSIS — Z01.818 PREOP EXAMINATION: Primary | ICD-10-CM

## 2024-01-11 DIAGNOSIS — N18.32 CHRONIC KIDNEY DISEASE, STAGE 3B (H): ICD-10-CM

## 2024-01-11 DIAGNOSIS — I25.10 CORONARY ARTERY DISEASE INVOLVING NATIVE CORONARY ARTERY OF NATIVE HEART WITHOUT ANGINA PECTORIS: ICD-10-CM

## 2024-01-11 DIAGNOSIS — N52.01 ERECTILE DYSFUNCTION DUE TO ARTERIAL INSUFFICIENCY: ICD-10-CM

## 2024-01-11 DIAGNOSIS — I50.22 CHRONIC SYSTOLIC HEART FAILURE (H): ICD-10-CM

## 2024-01-11 LAB
ERYTHROCYTE [DISTWIDTH] IN BLOOD BY AUTOMATED COUNT: 12.6 % (ref 10–15)
HCT VFR BLD AUTO: 38.8 % (ref 40–53)
HGB BLD-MCNC: 12.7 G/DL (ref 13.3–17.7)
MCH RBC QN AUTO: 32.7 PG (ref 26.5–33)
MCHC RBC AUTO-ENTMCNC: 32.7 G/DL (ref 31.5–36.5)
MCV RBC AUTO: 100 FL (ref 78–100)
PLATELET # BLD AUTO: 137 10E3/UL (ref 150–450)
RBC # BLD AUTO: 3.88 10E6/UL (ref 4.4–5.9)
WBC # BLD AUTO: 5.4 10E3/UL (ref 4–11)

## 2024-01-11 PROCEDURE — 84443 ASSAY THYROID STIM HORMONE: CPT | Performed by: FAMILY MEDICINE

## 2024-01-11 PROCEDURE — 93010 ELECTROCARDIOGRAM REPORT: CPT | Mod: OFF | Performed by: INTERNAL MEDICINE

## 2024-01-11 PROCEDURE — 93005 ELECTROCARDIOGRAM TRACING: CPT | Performed by: FAMILY MEDICINE

## 2024-01-11 PROCEDURE — 85027 COMPLETE CBC AUTOMATED: CPT | Performed by: FAMILY MEDICINE

## 2024-01-11 PROCEDURE — 80048 BASIC METABOLIC PNL TOTAL CA: CPT | Performed by: FAMILY MEDICINE

## 2024-01-11 PROCEDURE — 99214 OFFICE O/P EST MOD 30 MIN: CPT | Mod: 25 | Performed by: FAMILY MEDICINE

## 2024-01-11 PROCEDURE — 36415 COLL VENOUS BLD VENIPUNCTURE: CPT | Performed by: FAMILY MEDICINE

## 2024-01-11 RX ORDER — RESPIRATORY SYNCYTIAL VIRUS VACCINE 120MCG/0.5
0.5 KIT INTRAMUSCULAR ONCE
Qty: 1 EACH | Refills: 0 | Status: CANCELLED | OUTPATIENT
Start: 2024-01-11 | End: 2024-01-11

## 2024-01-11 RX ORDER — OFLOXACIN 3 MG/ML
SOLUTION/ DROPS OPHTHALMIC
COMMUNITY
End: 2024-01-23

## 2024-01-11 RX ORDER — KETOROLAC TROMETHAMINE 5 MG/ML
SOLUTION OPHTHALMIC
COMMUNITY
End: 2024-01-23

## 2024-01-11 RX ORDER — PREDNISOLONE ACETATE 10 MG/ML
SUSPENSION/ DROPS OPHTHALMIC
COMMUNITY
End: 2024-01-23

## 2024-01-11 ASSESSMENT — PAIN SCALES - GENERAL: PAINLEVEL: NO PAIN (0)

## 2024-01-11 NOTE — PATIENT INSTRUCTIONS
Medication plan for surgery  Continue: levothyroxine, rosuvastatin, metoprolol, aspirin  Hold: miralax, multivitamin, allopurinol  Stop ticagrelor 7 days before surgery and restart after surgery (per Dr Cintron cardiology)    Preparing for Your Surgery  Getting started  A nurse will call you to review your health history and instructions. They will give you an arrival time based on your scheduled surgery time. Please be ready to share:  Your doctor's clinic name and phone number  Your medical, surgical, and anesthesia history  A list of allergies and sensitivities  A list of medicines, including herbal treatments and over-the-counter drugs  Whether the patient has a legal guardian (ask how to send us the papers in advance)  Please tell us if you're pregnant--or if there's any chance you might be pregnant. Some surgeries may injure a fetus (unborn baby), so they require a pregnancy test. Surgeries that are safe for a fetus don't always need a test, and you can choose whether to have one.   If you have a child who's having surgery, please ask for a copy of Preparing for Your Child's Surgery.    Preparing for surgery  Within 10 to 30 days of surgery: Have a pre-op exam (sometimes called an H&P, or History and Physical). This can be done at a clinic or pre-operative center.  If you're having a , you may not need this exam. Talk to your care team.  At your pre-op exam, talk to your care team about all medicines you take. If you need to stop any medicines before surgery, ask when to start taking them again.  We do this for your safety. Many medicines can make you bleed too much during surgery. Some change how well surgery (anesthesia) drugs work.  Call your insurance company to let them know you're having surgery. (If you don't have insurance, call [phone].)  Call your clinic if there's any change in your health. This includes signs of a cold or flu (sore throat, runny nose, cough, rash, fever). It also  includes a scrape or scratch near the surgery site.  If you have questions on the day of surgery, call your hospital or surgery center.  Eating and drinking guidelines  For your safety: Unless your surgeon tells you otherwise, follow the guidelines below.  Eat and drink as usual until 8 hours before you arrive for surgery. After that, no food or milk.  Drink clear liquids until 2 hours before you arrive. These are liquids you can see through, like water, Gatorade, and Propel Water. They also include plain black coffee and tea (no cream or milk), candy, and breath mints. You can spit out gum when you arrive.  If you drink alcohol: Stop drinking it the night before surgery.  If your care team tells you to take medicine on the morning of surgery, it's okay to take it with a sip of water.  Preventing infection  Shower or bathe the night before and morning of your surgery. Follow the instructions your clinic gave you. (If no instructions, use regular soap.)  Don't shave or clip hair near your surgery site. We'll remove the hair if needed.  Don't smoke or vape the morning of surgery. You may chew nicotine gum up to 2 hours before surgery. A nicotine patch is okay.  Note: Some surgeries require you to completely quit smoking and nicotine. Check with your surgeon.  Your care team will make every effort to keep you safe from infection. We will:  Clean our hands often with soap and water (or an alcohol-based hand rub).  Clean the skin at your surgery site with a special soap that kills germs.  Give you a special gown to keep you warm. (Cold raises the risk of infection.)  Wear special hair covers, masks, gowns and gloves during surgery.  Give antibiotic medicine, if prescribed. Not all surgeries need antibiotics.  What to bring on the day of surgery  Photo ID and insurance card  Copy of your health care directive, if you have one  Glasses and hearing aids (bring cases)  You can't wear contacts during surgery  Inhaler and eye  drops, if you use them (tell us about these when you arrive)  CPAP machine or breathing device, if you use them  A few personal items, if spending the night  If you have . . .  A pacemaker, ICD (cardiac defibrillator) or other implant: Bring the ID card.  An implanted stimulator: Bring the remote control.  A legal guardian: Bring a copy of the certified (court-stamped) guardianship papers.  Please remove any jewelry, including body piercings. Leave jewelry and other valuables at home.  If you're going home the day of surgery  You must have a responsible adult drive you home. They should stay with you overnight as well.  If you don't have someone to stay with you, and you aren't safe to go home alone, we may keep you overnight. Insurance often won't pay for this.  After surgery  If it's hard to control your pain or you need more pain medicine, please call your surgeon's office.  Questions?   If you have any questions for your care team, list them here: _________________________________________________________________________________________________________________________________________________________________________ ____________________________________ ____________________________________ ____________________________________  For informational purposes only. Not to replace the advice of your health care provider. Copyright   2003, 2019 [employer] [facility]. All rights reserved. Clinically reviewed by Suad Novoa MD. [employer] [id number] - REV 12/22.

## 2024-01-11 NOTE — PROGRESS NOTES
[facility]  [address]  [facility] [address]  Phone: [phone]  Fax: [phone]  Primary Provider: Liyah Cooley  Pre-op Performing Provider: LIYAH COOLEY    PREOPERATIVE EVALUATION:  Today's date: 1/11/2024    Husam is a 73 year old, presenting for the following:  Pre-Op Exam (01/25/2024)        1/11/2024     8:44 AM   Additional Questions   Roomed by Cely Soto   Accompanied by Self     Surgical Information:  Surgery/Procedure: Insertion of inflation of semi rigid penile prothesis  Surgery Location: [facility] OR  Surgeon: Dr. Acevedo  Surgery Date: 01/24/2024  Time of Surgery: 12:45 pm  Where patient plans to recover: At home with family  Fax number for surgical facility: Note does not need to be faxed, will be available electronically in Epic.    Assessment & Plan     The proposed surgical procedure is considered INTERMEDIATE risk.    Preop examination  Erectile dysfunction due to arterial insufficiency  Preop exam completed today for upcoming penile prosthesis insertion. Labs as below - CBC returned with mild, stable anemia and mild, stable thrombocytopenia - both levels are safe/appropriate for surgery.  Medication plan as below. EKG as below, also inserted most recent ECHO and CT angiogram results.     Atrial flutter, unspecified type (H)  Chronic atrial fibrillation (H)  Chronic systolic heart failure (H)  Coronary artery disease involving native coronary artery of native heart without angina pectoris  Other cardiomyopathies (H)  Presence of Watchman left atrial appendage closure device  Hx a fib/flutter s/p watchman device; hx CAD/MI s/p stent placement on DAPT. Labs as below. EKG as below, also inserted most recent ECHO and CT angiogram results. Discussed medication plan with pt's cardiologist who recommends stopping ticagrelor but continuing aspirin. Pt is euvolemic on today's exam and vitals wnl.   - Basic metabolic panel  (Ca, Cl, CO2, Creat,  Gluc, K, Na, BUN)  - CBC with platelets  - EKG 12-lead, tracing only    Sleep apnea, unspecified type  Hx sleep apnea, on CPAP.    Mixed hyperlipidemia  Hx HLD, on statin.     Subclinical hypothyroidism  Hx hypothyroidism, on synthroid, will check TSH today.  - TSH with free T4 reflex    Chronic kidney disease, stage 3b (H)  Hx CKD3b, will check labs preoperatively.   - Basic metabolic panel  (Ca, Cl, CO2, Creat, Gluc, K, Na, BUN)  - CBC with platelets       - No identified additional risk factors other than previously addressed    Medication Instructions:  Continue: levothyroxine, rosuvastatin, metoprolol, aspirin  Hold: miralax, multivitamin, allopurinol  Stop ticagrelor 7 days before surgery and restart after surgery (per Dr Cintron cardiology)    RECOMMENDATION:  APPROVAL GIVEN to proceed with proposed procedure, without further diagnostic evaluation.      Subjective     HPI related to upcoming procedure: hx ED - planning prosthesis insertion  Given hx MI s/p stents and on DAPT, sent message to cardiologist regarding preoperative medication plan - recommendation made to hold ticagrelor but continue aspirin        1/11/2024     8:44 AM   Preop Questions   1. Have you ever had a heart attack or stroke? YES - 2022 s/p stents, currently on DAPT, med plan discussed with cardiology as above   2. Have you ever had surgery on your heart or blood vessels, such as a stent placement, a coronary artery bypass, or surgery on an artery in your head, neck, heart, or legs? YES - see above   3. Do you have chest pain with activity? No   4. Do you have a history of  heart failure? YES - last ECHO EF 40-45%, euvolemic today   5. Do you currently have a cold, bronchitis or symptoms of other infection? No   6. Do you have a cough, shortness of breath, or wheezing? No   7. Do you or anyone in your family have previous history of blood clots? No   8. Do you or does anyone in your family have a serious bleeding problem such as prolonged  bleeding following surgeries or cuts? No   9. Have you ever had problems with anemia or been told to take iron pills? No   10. Have you had any abnormal blood loss such as black, tarry or bloody stools? No   11. Have you ever had a blood transfusion? YES - related to heart attack/hospitalization   11a. Have you ever had a transfusion reaction? No   12. Are you willing to have a blood transfusion if it is medically needed before, during, or after your surgery? Yes   13. Have you or any of your relatives ever had problems with anesthesia? No   14. Do you have sleep apnea, excessive snoring or daytime drowsiness? No   15. Do you have any artifical heart valves or other implanted medical devices like a pacemaker, defibrillator, or continuous glucose monitor? No   16. Do you have artificial joints? No   17. Are you allergic to latex? No       Health Care Directive:  Patient has a Health Care Directive on file    Preoperative Review of :   reviewed - no record of controlled substances prescribed.    Review of Systems  Constitutional, neuro, ENT, endocrine, pulmonary, cardiac, gastrointestinal, genitourinary, musculoskeletal, integument and psychiatric systems are negative, except as otherwise noted.    Patient Active Problem List    Diagnosis Date Noted    Benign prostatic hyperplasia with urinary obstruction 11/03/2023     Priority: Medium    Chronic systolic heart failure (H) 10/25/2023     Priority: Medium    Hyperlipidemia 10/25/2022     Priority: Medium    Chronic kidney disease, stage 3b (H) 07/14/2022     Priority: Medium    Subclinical hypothyroidism 07/13/2022     Priority: Medium    S/P drug eluting coronary stent placement 04/22/2022     Priority: Medium    Coronary artery disease involving native coronary artery of native heart without angina pectoris 04/14/2022     Priority: Medium    ST elevation myocardial infarction involving left anterior descending (LAD) coronary artery (H) 03/18/2022     Priority:  Medium     Formatting of this note might be different from the original.  2/17/2022      Presence of Watchman left atrial appendage closure device 10/21/2020     Priority: Medium    Diverticular disease of large intestine 06/24/2020     Priority: Medium    Other cardiomyopathies (H) 04/02/2019     Priority: Medium    ED (erectile dysfunction) of organic origin 02/16/2018     Priority: Medium    History of gout 02/16/2018     Priority: Medium    Chronic atrial fibrillation (H)      Priority: Medium    High prostate specific antigen (PSA) 11/19/2015     Priority: Medium     R97.2 : Raised prostate specific antigen      Atrial flutter (H) 01/27/2012     Priority: Medium    Sleep apnea 01/27/2012     Priority: Medium     On CPAP        Past Medical History:   Diagnosis Date    A-fib (H)     CAD (coronary artery disease)     Cardiac arrest (H)     H/O extracorporeal membrane oxygenation treatment     Hypertension     STEMI (ST elevation myocardial infarction) (H)      Past Surgical History:   Procedure Laterality Date    ATRIAL ABLATION SURGERY      CERVICAL SPINE SURGERY      CV ARTERIAL LINE PLACEMENT N/A 02/17/2022    Procedure: Arterial Line Placement;  Surgeon: Larry Cintron MD;  Location: [facility] CARDIAC CATH LAB    CV CORONARY ANGIOGRAM N/A 02/17/2022    Procedure: Coronary Angiogram;  Surgeon: Larry Cintron MD;  Location:  [facility] CARDIAC CATH LAB    CV CORONARY ANGIOGRAM N/A 4/22/2022    Procedure: Coronary Angiogram;  Surgeon: Larry Cintron MD;  Location: [facility] CARDIAC CATH LAB    CV EXTRACORPERAL MEMBRANE OXYGENATION N/A 02/17/2022    Procedure: Extracorporeal Membrance Oxygenation;  Surgeon: Larry Cintron MD;  Location: [facility] CARDIAC CATH LAB    CV INTRA AORTIC BALLOON N/A 02/17/2022    Procedure: Intra Aortic Balloon Pump Insertion;  Surgeon: Larry Cintron MD;  Location: [facility] CARDIAC CATH LAB    CV PCI N/A 4/22/2022    Procedure: Percutaneous Coronary Intervention;  Surgeon: Abdulaziz  Larry HAYDEN MD;  Location: [facility] CARDIAC CATH LAB    CV PCI ANGIOPLASTY N/A 02/17/2022    Procedure: Percutaneous Transluminal Angioplasty;  Surgeon: Larry Cintron MD;  Location: [facility] CARDIAC CATH LAB    CV THERAPEUTIC HYPOTHERMIA N/A 02/17/2022    Procedure: Therapeutic Hypothermia;  Surgeon: Larry Cintron MD;  Location: [facility] CARDIAC CATH LAB    IR UPPER EXTREMITY ANGIOGRAM LEFT  02/20/2022    MIDLINE DOUBLE LUMEN PLACEMENT Left 02/28/2022    Left brachial vein medial 0.59cm.Blood return on all ports midline okay to use.    REMOVE EXTRACORPORAL MEMBRANE OXYGENATOR ADULT N/A 02/22/2022    Procedure: EXTRACORPOREAL MEMBRANE OXYGENATION CANNULA REMOVAL, INTRAOPERATIVE TRANSESOPHAGEAL ECHOCARDIOGRAM PER ANESTHESIA.;  Surgeon: Rod Banuelos MD;  Location:  OR     Current Outpatient Medications   Medication Sig Dispense Refill    allopurinol (ZYLOPRIM) 100 MG tablet Take 1 tablet (100 mg) by mouth 2 times daily 180 tablet 3    aspirin (ASA) 81 MG chewable tablet Take 1 tablet (81 mg) by mouth daily Starting tomorrow. 30 tablet 3    ketorolac (ACULAR) 0.5 % ophthalmic solution Start 1 day prior to surgery, Place 1 drop into left eye 4 times daily and continue until empty. Do NOT exceed 4 weeks.*      levothyroxine (SYNTHROID/LEVOTHROID) 25 MCG tablet Take 1 tablet (25 mcg) by mouth daily 90 tablet 1    metoprolol succinate ER (TOPROL XL) 50 MG 24 hr tablet Take 3 tablets (150 mg) by mouth daily 270 tablet 3    multivitamin, therapeutic (THERA-VIT) TABS tablet Take 1 tablet by mouth daily 90 tablet 3    ofloxacin (OCUFLOX) 0.3 % ophthalmic solution Start 1 day prior to surgery, Place 1 drop into left eye 4 times daily and continue until empty. Do NOT exceed 4 weeks.*      polyethylene glycol (MIRALAX) 17 GM/Dose powder Take 17 g by mouth daily 510 g 11    prednisoLONE acetate (PRED FORTE) 1 % ophthalmic suspension Start 1 day prior to surgery, Place 1 drop into left eye 4 times daily and  "continue until bottle is empty. Do NOT exceed 4 weeks*      rosuvastatin (CRESTOR) 20 MG tablet Take 1 tablet (20 mg) by mouth every evening 90 tablet 3    ticagrelor (BRILINTA) 90 MG tablet Take 1 tablet (90 mg) by mouth 2 times daily 180 tablet 3       Allergies   Allergen Reactions    Chlorpheniramine-Phenylpropan [A.R.M.] Other (See Comments)     Watery eyes, sneezing    Seroquel [Quetiapine] Other (See Comments)     Per Meeta (Wife), would like this medication to be listed as an allergy due to patient becoming combative and delirious after taking it.     Percocet [Oxycodone-Acetaminophen] Rash     \"felt like skin was crawling off\"-pt's S.O.         Social History     Tobacco Use    Smoking status: Never     Passive exposure: Never    Smokeless tobacco: Never   Substance Use Topics    Alcohol use: Yes     Alcohol/week: 2.0 standard drinks of alcohol     Family History   Problem Relation Age of Onset    Kidney Cancer Mother         s/p surgery    Kidney failure Mother     Leukemia Father 41        chemical exposure    No Known Problems Sister     No Known Problems Sister     No Known Problems Sister      History   Drug Use No         Objective     /70   Pulse 63   Temp 97.4  F (36.3  C) (Temporal)   Resp 18   Ht 1.778 m (5' 10\")   Wt 107.2 kg (236 lb 6.4 oz)   SpO2 99%   BMI 33.92 kg/m      Physical Exam    GENERAL APPEARANCE: healthy, alert and no distress     EYES: EOMI,  PERRL     HENT: ear canals and TM's normal and nose and mouth without ulcers or lesions     NECK: no adenopathy, no asymmetry, masses, or scars and thyroid normal to palpation     RESP: lungs clear to auscultation - no rales, rhonchi or wheezes     CV: regular rates and rhythm, normal S1 S2, no S3 or S4 and no murmur, click or rub     ABDOMEN:  soft, nontender, no HSM or masses and bowel sounds normal     MS: extremities normal- no gross deformities noted, no evidence of inflammation in joints, FROM in all extremities.     SKIN: " no suspicious lesions or rashes     NEURO: Normal strength and tone, sensory exam grossly normal, mentation intact and speech normal     PSYCH: mentation appears normal. and affect normal/bright     LYMPHATICS: No cervical adenopathy    Recent Labs   Lab Test 10/25/23  1552 02/20/23  0732 10/03/22  0253 07/12/22  1432 04/22/22  1142   HGB 13.1* 12.6* 10.8*   < > 8.8*   * 135* 134*   < > 84*   INR  --   --  1.01  --  1.15    142 139   < > 142   POTASSIUM 4.6 4.4 4.2   < > 4.7   CR 1.50* 1.70* 1.65*   < > 1.97*   A1C 5.5  --   --   --   --     < > = values in this interval not displayed.      Diagnostics:   01/11/24 09:40   Sodium 141   Potassium 4.1   Chloride 105   Carbon Dioxide (CO2) 25   Urea Nitrogen 18.6   Creatinine 1.50 (H)   GFR Estimate 48 (L)   Calcium 9.6   Anion Gap 11   Glucose 93   TSH 3.39      01/11/24 09:40   WBC 5.4   Hemoglobin 12.7 (L)   Hematocrit 38.8 (L)   Platelet Count 137 (L)   RBC Count 3.88 (L)      MCH 32.7   MCHC 32.7   RDW 12.6     EKG: NSR with rate 65 bpm, occasional PVCs, nml intervals, nml axis, inverted T waves in aVR, aVL, V1 and V2, no acute ST changes, Q waves in lateral leads - when compared to prior EKG from Oct 2022, T waves now fully present in V1 and V2    ECHO 2/20/23  Interpretation Summary  Left ventricular function is decreased. The ejection fraction is 40-45%  (mildly reduced). Hypokinesis involving all apical segments as well as the mid  anterior/anteroseptal wall.  Global right ventricular function is normal.  Mild tricuspid insufficiency is present.  Pulmonary artery systolic pressure is normal.  No pericardial effusion is present.  Aortic root and ascending aorta measure 4.1 cm (index 1.9 cm/m2). Normal for  BSA.  This study was compared with the study from 3/7/2022 .  No significant changes noted.    CT angiogram 2/20/23  IMPRESSION:  1.  Status post prior PCI to the left anterior descending and ramus  intermedius arteries.   2.  Both the LAD  and RI stents are widely patent.  3.  Mild, non-obstructive disease in other territories without any  high-grade stenoses.   4.  Please review the separate Radiology report for incidental  noncardiac findings.    Revised Cardiac Risk Index (RCRI):  The patient has the following serious cardiovascular risks for perioperative complications:   - Coronary Artery Disease (MI, positive stress test, angina, Qs on EKG) = 1 point   - Congestive Heart Failure (pulmonary edema, PND, s3 jone, CXR with pulmonary congestion, basilar rales) = 1 point     RCRI Interpretation: 2 points: Class III (moderate risk - 6.6% complication rate)     Estimated Functional Capacity: Performs 4 METS exercise without symptoms (e.g., light housework, stairs, 4 mph walk, 7 mph bike, slow step dance)    Signed Electronically by: Liyah Cooley MD  Copy of this evaluation report is provided to requesting physician.

## 2024-01-11 NOTE — H&P (VIEW-ONLY)
[facility]  [address]  [facility] [address]  Phone: [phone]  Fax: [phone]  Primary Provider: Liyah Cooley  Pre-op Performing Provider: LIYAH COOLEY    PREOPERATIVE EVALUATION:  Today's date: 1/11/2024    Husam is a 73 year old, presenting for the following:  Pre-Op Exam (01/25/2024)        1/11/2024     8:44 AM   Additional Questions   Roomed by Cely Soto   Accompanied by Self     Surgical Information:  Surgery/Procedure: Insertion of inflation of semi rigid penile prothesis  Surgery Location: [facility] OR  Surgeon: Dr. Acevedo  Surgery Date: 01/24/2024  Time of Surgery: 12:45 pm  Where patient plans to recover: At home with family  Fax number for surgical facility: Note does not need to be faxed, will be available electronically in Epic.    Assessment & Plan     The proposed surgical procedure is considered INTERMEDIATE risk.    Preop examination  Erectile dysfunction due to arterial insufficiency  Preop exam completed today for upcoming penile prosthesis insertion. Labs as below - CBC returned with mild, stable anemia and mild, stable thrombocytopenia - both levels are safe/appropriate for surgery.  Medication plan as below. EKG as below, also inserted most recent ECHO and CT angiogram results.     Atrial flutter, unspecified type (H)  Chronic atrial fibrillation (H)  Chronic systolic heart failure (H)  Coronary artery disease involving native coronary artery of native heart without angina pectoris  Other cardiomyopathies (H)  Presence of Watchman left atrial appendage closure device  Hx a fib/flutter s/p watchman device; hx CAD/MI s/p stent placement on DAPT. Labs as below. EKG as below, also inserted most recent ECHO and CT angiogram results. Discussed medication plan with pt's cardiologist who recommends stopping ticagrelor but continuing aspirin. Pt is euvolemic on today's exam and vitals wnl.   - Basic metabolic panel  (Ca, Cl, CO2, Creat,  Gluc, K, Na, BUN)  - CBC with platelets  - EKG 12-lead, tracing only    Sleep apnea, unspecified type  Hx sleep apnea, on CPAP.    Mixed hyperlipidemia  Hx HLD, on statin.     Subclinical hypothyroidism  Hx hypothyroidism, on synthroid, will check TSH today.  - TSH with free T4 reflex    Chronic kidney disease, stage 3b (H)  Hx CKD3b, will check labs preoperatively.   - Basic metabolic panel  (Ca, Cl, CO2, Creat, Gluc, K, Na, BUN)  - CBC with platelets       - No identified additional risk factors other than previously addressed    Medication Instructions:  Continue: levothyroxine, rosuvastatin, metoprolol, aspirin  Hold: miralax, multivitamin, allopurinol  Stop ticagrelor 7 days before surgery and restart after surgery (per Dr Cintron cardiology)    RECOMMENDATION:  APPROVAL GIVEN to proceed with proposed procedure, without further diagnostic evaluation.      Subjective     HPI related to upcoming procedure: hx ED - planning prosthesis insertion  Given hx MI s/p stents and on DAPT, sent message to cardiologist regarding preoperative medication plan - recommendation made to hold ticagrelor but continue aspirin        1/11/2024     8:44 AM   Preop Questions   1. Have you ever had a heart attack or stroke? YES - 2022 s/p stents, currently on DAPT, med plan discussed with cardiology as above   2. Have you ever had surgery on your heart or blood vessels, such as a stent placement, a coronary artery bypass, or surgery on an artery in your head, neck, heart, or legs? YES - see above   3. Do you have chest pain with activity? No   4. Do you have a history of  heart failure? YES - last ECHO EF 40-45%, euvolemic today   5. Do you currently have a cold, bronchitis or symptoms of other infection? No   6. Do you have a cough, shortness of breath, or wheezing? No   7. Do you or anyone in your family have previous history of blood clots? No   8. Do you or does anyone in your family have a serious bleeding problem such as prolonged  bleeding following surgeries or cuts? No   9. Have you ever had problems with anemia or been told to take iron pills? No   10. Have you had any abnormal blood loss such as black, tarry or bloody stools? No   11. Have you ever had a blood transfusion? YES - related to heart attack/hospitalization   11a. Have you ever had a transfusion reaction? No   12. Are you willing to have a blood transfusion if it is medically needed before, during, or after your surgery? Yes   13. Have you or any of your relatives ever had problems with anesthesia? No   14. Do you have sleep apnea, excessive snoring or daytime drowsiness? No   15. Do you have any artifical heart valves or other implanted medical devices like a pacemaker, defibrillator, or continuous glucose monitor? No   16. Do you have artificial joints? No   17. Are you allergic to latex? No       Health Care Directive:  Patient has a Health Care Directive on file    Preoperative Review of :   reviewed - no record of controlled substances prescribed.    Review of Systems  Constitutional, neuro, ENT, endocrine, pulmonary, cardiac, gastrointestinal, genitourinary, musculoskeletal, integument and psychiatric systems are negative, except as otherwise noted.    Patient Active Problem List    Diagnosis Date Noted    Benign prostatic hyperplasia with urinary obstruction 11/03/2023     Priority: Medium    Chronic systolic heart failure (H) 10/25/2023     Priority: Medium    Hyperlipidemia 10/25/2022     Priority: Medium    Chronic kidney disease, stage 3b (H) 07/14/2022     Priority: Medium    Subclinical hypothyroidism 07/13/2022     Priority: Medium    S/P drug eluting coronary stent placement 04/22/2022     Priority: Medium    Coronary artery disease involving native coronary artery of native heart without angina pectoris 04/14/2022     Priority: Medium    ST elevation myocardial infarction involving left anterior descending (LAD) coronary artery (H) 03/18/2022     Priority:  Medium     Formatting of this note might be different from the original.  2/17/2022      Presence of Watchman left atrial appendage closure device 10/21/2020     Priority: Medium    Diverticular disease of large intestine 06/24/2020     Priority: Medium    Other cardiomyopathies (H) 04/02/2019     Priority: Medium    ED (erectile dysfunction) of organic origin 02/16/2018     Priority: Medium    History of gout 02/16/2018     Priority: Medium    Chronic atrial fibrillation (H)      Priority: Medium    High prostate specific antigen (PSA) 11/19/2015     Priority: Medium     R97.2 : Raised prostate specific antigen      Atrial flutter (H) 01/27/2012     Priority: Medium    Sleep apnea 01/27/2012     Priority: Medium     On CPAP        Past Medical History:   Diagnosis Date    A-fib (H)     CAD (coronary artery disease)     Cardiac arrest (H)     H/O extracorporeal membrane oxygenation treatment     Hypertension     STEMI (ST elevation myocardial infarction) (H)      Past Surgical History:   Procedure Laterality Date    ATRIAL ABLATION SURGERY      CERVICAL SPINE SURGERY      CV ARTERIAL LINE PLACEMENT N/A 02/17/2022    Procedure: Arterial Line Placement;  Surgeon: Larry Cintron MD;  Location: [facility] CARDIAC CATH LAB    CV CORONARY ANGIOGRAM N/A 02/17/2022    Procedure: Coronary Angiogram;  Surgeon: Larry Cintron MD;  Location:  HEART CARDIAC CATH LAB    CV CORONARY ANGIOGRAM N/A 4/22/2022    Procedure: Coronary Angiogram;  Surgeon: Larry Cintron MD;  Location: [facility] CARDIAC CATH LAB    CV EXTRACORPERAL MEMBRANE OXYGENATION N/A 02/17/2022    Procedure: Extracorporeal Membrance Oxygenation;  Surgeon: Larry Cintron MD;  Location: [facility] CARDIAC CATH LAB    CV INTRA AORTIC BALLOON N/A 02/17/2022    Procedure: Intra Aortic Balloon Pump Insertion;  Surgeon: Larry Cintron MD;  Location: [facility] CARDIAC CATH LAB    CV PCI N/A 4/22/2022    Procedure: Percutaneous Coronary Intervention;  Surgeon: Abdulaziz  Larry HAYDEN MD;  Location: [facility] CARDIAC CATH LAB    CV PCI ANGIOPLASTY N/A 02/17/2022    Procedure: Percutaneous Transluminal Angioplasty;  Surgeon: Larry Cintron MD;  Location: [facility] CARDIAC CATH LAB    CV THERAPEUTIC HYPOTHERMIA N/A 02/17/2022    Procedure: Therapeutic Hypothermia;  Surgeon: Larry Cintron MD;  Location: [facility] CARDIAC CATH LAB    IR UPPER EXTREMITY ANGIOGRAM LEFT  02/20/2022    MIDLINE DOUBLE LUMEN PLACEMENT Left 02/28/2022    Left brachial vein medial 0.59cm.Blood return on all ports midline okay to use.    REMOVE EXTRACORPORAL MEMBRANE OXYGENATOR ADULT N/A 02/22/2022    Procedure: EXTRACORPOREAL MEMBRANE OXYGENATION CANNULA REMOVAL, INTRAOPERATIVE TRANSESOPHAGEAL ECHOCARDIOGRAM PER ANESTHESIA.;  Surgeon: Rod Bnauelos MD;  Location:  OR     Current Outpatient Medications   Medication Sig Dispense Refill    allopurinol (ZYLOPRIM) 100 MG tablet Take 1 tablet (100 mg) by mouth 2 times daily 180 tablet 3    aspirin (ASA) 81 MG chewable tablet Take 1 tablet (81 mg) by mouth daily Starting tomorrow. 30 tablet 3    ketorolac (ACULAR) 0.5 % ophthalmic solution Start 1 day prior to surgery, Place 1 drop into left eye 4 times daily and continue until empty. Do NOT exceed 4 weeks.*      levothyroxine (SYNTHROID/LEVOTHROID) 25 MCG tablet Take 1 tablet (25 mcg) by mouth daily 90 tablet 1    metoprolol succinate ER (TOPROL XL) 50 MG 24 hr tablet Take 3 tablets (150 mg) by mouth daily 270 tablet 3    multivitamin, therapeutic (THERA-VIT) TABS tablet Take 1 tablet by mouth daily 90 tablet 3    ofloxacin (OCUFLOX) 0.3 % ophthalmic solution Start 1 day prior to surgery, Place 1 drop into left eye 4 times daily and continue until empty. Do NOT exceed 4 weeks.*      polyethylene glycol (MIRALAX) 17 GM/Dose powder Take 17 g by mouth daily 510 g 11    prednisoLONE acetate (PRED FORTE) 1 % ophthalmic suspension Start 1 day prior to surgery, Place 1 drop into left eye 4 times daily and  "continue until bottle is empty. Do NOT exceed 4 weeks*      rosuvastatin (CRESTOR) 20 MG tablet Take 1 tablet (20 mg) by mouth every evening 90 tablet 3    ticagrelor (BRILINTA) 90 MG tablet Take 1 tablet (90 mg) by mouth 2 times daily 180 tablet 3       Allergies   Allergen Reactions    Chlorpheniramine-Phenylpropan [A.R.M.] Other (See Comments)     Watery eyes, sneezing    Seroquel [Quetiapine] Other (See Comments)     Per Meeta (Wife), would like this medication to be listed as an allergy due to patient becoming combative and delirious after taking it.     Percocet [Oxycodone-Acetaminophen] Rash     \"felt like skin was crawling off\"-pt's S.O.         Social History     Tobacco Use    Smoking status: Never     Passive exposure: Never    Smokeless tobacco: Never   Substance Use Topics    Alcohol use: Yes     Alcohol/week: 2.0 standard drinks of alcohol     Family History   Problem Relation Age of Onset    Kidney Cancer Mother         s/p surgery    Kidney failure Mother     Leukemia Father 41        chemical exposure    No Known Problems Sister     No Known Problems Sister     No Known Problems Sister      History   Drug Use No         Objective     /70   Pulse 63   Temp 97.4  F (36.3  C) (Temporal)   Resp 18   Ht 1.778 m (5' 10\")   Wt 107.2 kg (236 lb 6.4 oz)   SpO2 99%   BMI 33.92 kg/m      Physical Exam    GENERAL APPEARANCE: healthy, alert and no distress     EYES: EOMI,  PERRL     HENT: ear canals and TM's normal and nose and mouth without ulcers or lesions     NECK: no adenopathy, no asymmetry, masses, or scars and thyroid normal to palpation     RESP: lungs clear to auscultation - no rales, rhonchi or wheezes     CV: regular rates and rhythm, normal S1 S2, no S3 or S4 and no murmur, click or rub     ABDOMEN:  soft, nontender, no HSM or masses and bowel sounds normal     MS: extremities normal- no gross deformities noted, no evidence of inflammation in joints, FROM in all extremities.     SKIN: " no suspicious lesions or rashes     NEURO: Normal strength and tone, sensory exam grossly normal, mentation intact and speech normal     PSYCH: mentation appears normal. and affect normal/bright     LYMPHATICS: No cervical adenopathy    Recent Labs   Lab Test 10/25/23  1552 02/20/23  0732 10/03/22  0253 07/12/22  1432 04/22/22  1142   HGB 13.1* 12.6* 10.8*   < > 8.8*   * 135* 134*   < > 84*   INR  --   --  1.01  --  1.15    142 139   < > 142   POTASSIUM 4.6 4.4 4.2   < > 4.7   CR 1.50* 1.70* 1.65*   < > 1.97*   A1C 5.5  --   --   --   --     < > = values in this interval not displayed.      Diagnostics:   01/11/24 09:40   Sodium 141   Potassium 4.1   Chloride 105   Carbon Dioxide (CO2) 25   Urea Nitrogen 18.6   Creatinine 1.50 (H)   GFR Estimate 48 (L)   Calcium 9.6   Anion Gap 11   Glucose 93   TSH 3.39      01/11/24 09:40   WBC 5.4   Hemoglobin 12.7 (L)   Hematocrit 38.8 (L)   Platelet Count 137 (L)   RBC Count 3.88 (L)      MCH 32.7   MCHC 32.7   RDW 12.6     EKG: NSR with rate 65 bpm, occasional PVCs, nml intervals, nml axis, inverted T waves in aVR, aVL, V1 and V2, no acute ST changes, Q waves in lateral leads - when compared to prior EKG from Oct 2022, T waves now fully present in V1 and V2    ECHO 2/20/23  Interpretation Summary  Left ventricular function is decreased. The ejection fraction is 40-45%  (mildly reduced). Hypokinesis involving all apical segments as well as the mid  anterior/anteroseptal wall.  Global right ventricular function is normal.  Mild tricuspid insufficiency is present.  Pulmonary artery systolic pressure is normal.  No pericardial effusion is present.  Aortic root and ascending aorta measure 4.1 cm (index 1.9 cm/m2). Normal for  BSA.  This study was compared with the study from 3/7/2022 .  No significant changes noted.    CT angiogram 2/20/23  IMPRESSION:  1.  Status post prior PCI to the left anterior descending and ramus  intermedius arteries.   2.  Both the LAD  and RI stents are widely patent.  3.  Mild, non-obstructive disease in other territories without any  high-grade stenoses.   4.  Please review the separate Radiology report for incidental  noncardiac findings.    Revised Cardiac Risk Index (RCRI):  The patient has the following serious cardiovascular risks for perioperative complications:   - Coronary Artery Disease (MI, positive stress test, angina, Qs on EKG) = 1 point   - Congestive Heart Failure (pulmonary edema, PND, s3 jone, CXR with pulmonary congestion, basilar rales) = 1 point     RCRI Interpretation: 2 points: Class III (moderate risk - 6.6% complication rate)     Estimated Functional Capacity: Performs 4 METS exercise without symptoms (e.g., light housework, stairs, 4 mph walk, 7 mph bike, slow step dance)    Signed Electronically by: Liyah Cooley MD  Copy of this evaluation report is provided to requesting physician.

## 2024-01-11 NOTE — Clinical Note
RAGHAV - preop for surgery on 1/25 with you - mild anemia/thrombocytopenia, cardiology recommended pt continue aspirin given hx stents (but he can hold ticagrelor) - please reach out to pt if you have concerns. Thanks!

## 2024-01-12 LAB
ANION GAP SERPL CALCULATED.3IONS-SCNC: 11 MMOL/L (ref 7–15)
BUN SERPL-MCNC: 18.6 MG/DL (ref 8–23)
CALCIUM SERPL-MCNC: 9.6 MG/DL (ref 8.8–10.2)
CHLORIDE SERPL-SCNC: 105 MMOL/L (ref 98–107)
CREAT SERPL-MCNC: 1.5 MG/DL (ref 0.67–1.17)
DEPRECATED HCO3 PLAS-SCNC: 25 MMOL/L (ref 22–29)
EGFRCR SERPLBLD CKD-EPI 2021: 48 ML/MIN/1.73M2
GLUCOSE SERPL-MCNC: 93 MG/DL (ref 70–99)
POTASSIUM SERPL-SCNC: 4.1 MMOL/L (ref 3.4–5.3)
SODIUM SERPL-SCNC: 141 MMOL/L (ref 135–145)
TSH SERPL DL<=0.005 MIU/L-ACNC: 3.39 UIU/ML (ref 0.3–4.2)

## 2024-01-14 LAB
ATRIAL RATE - MUSE: 65 BPM
DIASTOLIC BLOOD PRESSURE - MUSE: NORMAL MMHG
INTERPRETATION ECG - MUSE: NORMAL
P AXIS - MUSE: 93 DEGREES
PR INTERVAL - MUSE: 178 MS
QRS DURATION - MUSE: 94 MS
QT - MUSE: 426 MS
QTC - MUSE: 443 MS
R AXIS - MUSE: 65 DEGREES
SYSTOLIC BLOOD PRESSURE - MUSE: NORMAL MMHG
T AXIS - MUSE: 91 DEGREES
VENTRICULAR RATE- MUSE: 65 BPM

## 2024-01-25 ENCOUNTER — ANESTHESIA (OUTPATIENT)
Dept: SURGERY | Facility: CLINIC | Age: 74
End: 2024-01-25
Payer: MEDICARE

## 2024-01-25 ENCOUNTER — HOSPITAL ENCOUNTER (OUTPATIENT)
Facility: CLINIC | Age: 74
Discharge: HOME | End: 2024-01-25
Attending: UROLOGY | Admitting: UROLOGY
Payer: MEDICARE

## 2024-01-25 ENCOUNTER — ANESTHESIA EVENT (OUTPATIENT)
Dept: SURGERY | Facility: CLINIC | Age: 74
End: 2024-01-25
Payer: MEDICARE

## 2024-01-25 VITALS
RESPIRATION RATE: 18 BRPM | DIASTOLIC BLOOD PRESSURE: 62 MMHG | TEMPERATURE: 97.7 F | HEIGHT: 69 IN | OXYGEN SATURATION: 99 % | WEIGHT: 225 LBS | SYSTOLIC BLOOD PRESSURE: 127 MMHG | BODY MASS INDEX: 33.33 KG/M2 | HEART RATE: 73 BPM

## 2024-01-25 DIAGNOSIS — N52.9 ED (ERECTILE DYSFUNCTION) OF ORGANIC ORIGIN: Primary | ICD-10-CM

## 2024-01-25 PROCEDURE — 258N000003 HC RX IP 258 OP 636: Performed by: NURSE PRACTITIONER

## 2024-01-25 PROCEDURE — 250N000011 HC RX IP 250 OP 636: Performed by: NURSE ANESTHETIST, CERTIFIED REGISTERED

## 2024-01-25 PROCEDURE — 250N000025 HC SEVOFLURANE, PER MIN: Performed by: UROLOGY

## 2024-01-25 PROCEDURE — 250N000009 HC RX 250: Performed by: NURSE ANESTHETIST, CERTIFIED REGISTERED

## 2024-01-25 PROCEDURE — 250N000011 HC RX IP 250 OP 636: Performed by: NURSE PRACTITIONER

## 2024-01-25 PROCEDURE — 272N000001 HC OR GENERAL SUPPLY STERILE: Performed by: UROLOGY

## 2024-01-25 PROCEDURE — 250N000011 HC RX IP 250 OP 636: Performed by: ANESTHESIOLOGY

## 2024-01-25 PROCEDURE — 250N000009 HC RX 250: Performed by: UROLOGY

## 2024-01-25 PROCEDURE — 710N000012 HC RECOVERY PHASE 2, PER MINUTE: Performed by: UROLOGY

## 2024-01-25 PROCEDURE — C2622 PROSTHESIS, PENILE, NON-INF: HCPCS | Performed by: UROLOGY

## 2024-01-25 PROCEDURE — 999N000141 HC STATISTIC PRE-PROCEDURE NURSING ASSESSMENT: Performed by: UROLOGY

## 2024-01-25 PROCEDURE — 258N000003 HC RX IP 258 OP 636: Performed by: ANESTHESIOLOGY

## 2024-01-25 PROCEDURE — 710N000010 HC RECOVERY PHASE 1, LEVEL 2, PER MIN: Performed by: UROLOGY

## 2024-01-25 PROCEDURE — 250N000011 HC RX IP 250 OP 636: Performed by: UROLOGY

## 2024-01-25 PROCEDURE — 370N000017 HC ANESTHESIA TECHNICAL FEE, PER MIN: Performed by: UROLOGY

## 2024-01-25 PROCEDURE — 360N000076 HC SURGERY LEVEL 3, PER MIN: Performed by: UROLOGY

## 2024-01-25 DEVICE — IMPLANTABLE DEVICE: Type: IMPLANTABLE DEVICE | Site: PENIS | Status: FUNCTIONAL

## 2024-01-25 RX ORDER — SULFAMETHOXAZOLE/TRIMETHOPRIM 800-160 MG
1 TABLET ORAL 2 TIMES DAILY
Qty: 10 TABLET | Refills: 0 | Status: SHIPPED | OUTPATIENT
Start: 2024-01-25 | End: 2024-01-30

## 2024-01-25 RX ORDER — FENTANYL CITRATE 50 UG/ML
50 INJECTION, SOLUTION INTRAMUSCULAR; INTRAVENOUS EVERY 5 MIN PRN
Status: DISCONTINUED | OUTPATIENT
Start: 2024-01-25 | End: 2024-01-25 | Stop reason: HOSPADM

## 2024-01-25 RX ORDER — ACETAMINOPHEN 325 MG/1
650 TABLET ORAL
Status: CANCELLED | OUTPATIENT
Start: 2024-01-25

## 2024-01-25 RX ORDER — ONDANSETRON 2 MG/ML
4 INJECTION INTRAMUSCULAR; INTRAVENOUS EVERY 30 MIN PRN
Status: CANCELLED | OUTPATIENT
Start: 2024-01-25

## 2024-01-25 RX ORDER — HYDROMORPHONE HCL IN WATER/PF 6 MG/30 ML
0.4 PATIENT CONTROLLED ANALGESIA SYRINGE INTRAVENOUS EVERY 5 MIN PRN
Status: DISCONTINUED | OUTPATIENT
Start: 2024-01-25 | End: 2024-01-25 | Stop reason: HOSPADM

## 2024-01-25 RX ORDER — GINSENG 100 MG
CAPSULE ORAL PRN
Status: DISCONTINUED | OUTPATIENT
Start: 2024-01-25 | End: 2024-01-25 | Stop reason: HOSPADM

## 2024-01-25 RX ORDER — FENTANYL CITRATE 50 UG/ML
INJECTION, SOLUTION INTRAMUSCULAR; INTRAVENOUS PRN
Status: DISCONTINUED | OUTPATIENT
Start: 2024-01-25 | End: 2024-01-25

## 2024-01-25 RX ORDER — HYDROMORPHONE HCL IN WATER/PF 6 MG/30 ML
0.2 PATIENT CONTROLLED ANALGESIA SYRINGE INTRAVENOUS EVERY 5 MIN PRN
Status: DISCONTINUED | OUTPATIENT
Start: 2024-01-25 | End: 2024-01-25 | Stop reason: HOSPADM

## 2024-01-25 RX ORDER — LIDOCAINE HYDROCHLORIDE 10 MG/ML
INJECTION, SOLUTION INFILTRATION; PERINEURAL PRN
Status: DISCONTINUED | OUTPATIENT
Start: 2024-01-25 | End: 2024-01-25

## 2024-01-25 RX ORDER — KETAMINE HYDROCHLORIDE 10 MG/ML
INJECTION INTRAMUSCULAR; INTRAVENOUS PRN
Status: DISCONTINUED | OUTPATIENT
Start: 2024-01-25 | End: 2024-01-25

## 2024-01-25 RX ORDER — ONDANSETRON 4 MG/1
4 TABLET, ORALLY DISINTEGRATING ORAL EVERY 30 MIN PRN
Status: DISCONTINUED | OUTPATIENT
Start: 2024-01-25 | End: 2024-01-25 | Stop reason: HOSPADM

## 2024-01-25 RX ORDER — DEXAMETHASONE SODIUM PHOSPHATE 10 MG/ML
INJECTION, SOLUTION INTRAMUSCULAR; INTRAVENOUS PRN
Status: DISCONTINUED | OUTPATIENT
Start: 2024-01-25 | End: 2024-01-25

## 2024-01-25 RX ORDER — SODIUM CHLORIDE, SODIUM LACTATE, POTASSIUM CHLORIDE, CALCIUM CHLORIDE 600; 310; 30; 20 MG/100ML; MG/100ML; MG/100ML; MG/100ML
INJECTION, SOLUTION INTRAVENOUS CONTINUOUS
Status: DISCONTINUED | OUTPATIENT
Start: 2024-01-25 | End: 2024-01-25 | Stop reason: HOSPADM

## 2024-01-25 RX ORDER — CEFAZOLIN SODIUM/WATER 2 G/20 ML
2 SYRINGE (ML) INTRAVENOUS SEE ADMIN INSTRUCTIONS
Status: DISCONTINUED | OUTPATIENT
Start: 2024-01-25 | End: 2024-01-25 | Stop reason: HOSPADM

## 2024-01-25 RX ORDER — ONDANSETRON 2 MG/ML
4 INJECTION INTRAMUSCULAR; INTRAVENOUS EVERY 30 MIN PRN
Status: DISCONTINUED | OUTPATIENT
Start: 2024-01-25 | End: 2024-01-25 | Stop reason: HOSPADM

## 2024-01-25 RX ORDER — PROPOFOL 10 MG/ML
INJECTION, EMULSION INTRAVENOUS PRN
Status: DISCONTINUED | OUTPATIENT
Start: 2024-01-25 | End: 2024-01-25

## 2024-01-25 RX ORDER — KETOROLAC TROMETHAMINE 30 MG/ML
INJECTION, SOLUTION INTRAMUSCULAR; INTRAVENOUS PRN
Status: DISCONTINUED | OUTPATIENT
Start: 2024-01-25 | End: 2024-01-25

## 2024-01-25 RX ORDER — LIDOCAINE 40 MG/G
CREAM TOPICAL
Status: DISCONTINUED | OUTPATIENT
Start: 2024-01-25 | End: 2024-01-25 | Stop reason: HOSPADM

## 2024-01-25 RX ORDER — CEFAZOLIN SODIUM/WATER 2 G/20 ML
2 SYRINGE (ML) INTRAVENOUS
Status: DISCONTINUED | OUTPATIENT
Start: 2024-01-25 | End: 2024-01-25 | Stop reason: HOSPADM

## 2024-01-25 RX ORDER — ONDANSETRON 4 MG/1
4 TABLET, ORALLY DISINTEGRATING ORAL EVERY 30 MIN PRN
Status: CANCELLED | OUTPATIENT
Start: 2024-01-25

## 2024-01-25 RX ORDER — FENTANYL CITRATE 50 UG/ML
25 INJECTION, SOLUTION INTRAMUSCULAR; INTRAVENOUS EVERY 5 MIN PRN
Status: DISCONTINUED | OUTPATIENT
Start: 2024-01-25 | End: 2024-01-25 | Stop reason: HOSPADM

## 2024-01-25 RX ORDER — ONDANSETRON 2 MG/ML
INJECTION INTRAMUSCULAR; INTRAVENOUS PRN
Status: DISCONTINUED | OUTPATIENT
Start: 2024-01-25 | End: 2024-01-25

## 2024-01-25 RX ADMIN — LIDOCAINE HYDROCHLORIDE 4 ML: 10 INJECTION, SOLUTION INFILTRATION; PERINEURAL at 09:09

## 2024-01-25 RX ADMIN — KETAMINE HYDROCHLORIDE 25 MG: 10 INJECTION INTRAMUSCULAR; INTRAVENOUS at 09:12

## 2024-01-25 RX ADMIN — GENTAMICIN SULFATE 440 MG: 40 INJECTION, SOLUTION INTRAMUSCULAR; INTRAVENOUS at 08:38

## 2024-01-25 RX ADMIN — SODIUM CHLORIDE, POTASSIUM CHLORIDE, SODIUM LACTATE AND CALCIUM CHLORIDE 1000 ML: 600; 310; 30; 20 INJECTION, SOLUTION INTRAVENOUS at 08:03

## 2024-01-25 RX ADMIN — FENTANYL CITRATE 50 MCG: 50 INJECTION, SOLUTION INTRAMUSCULAR; INTRAVENOUS at 11:22

## 2024-01-25 RX ADMIN — FENTANYL CITRATE 100 MCG: 50 INJECTION INTRAMUSCULAR; INTRAVENOUS at 09:03

## 2024-01-25 RX ADMIN — DEXAMETHASONE SODIUM PHOSPHATE 10 MG: 10 INJECTION, SOLUTION INTRAMUSCULAR; INTRAVENOUS at 10:30

## 2024-01-25 RX ADMIN — PROPOFOL 20 MG: 10 INJECTION, EMULSION INTRAVENOUS at 09:12

## 2024-01-25 RX ADMIN — FENTANYL CITRATE 50 MCG: 50 INJECTION, SOLUTION INTRAMUSCULAR; INTRAVENOUS at 11:14

## 2024-01-25 RX ADMIN — PROPOFOL 180 MG: 10 INJECTION, EMULSION INTRAVENOUS at 09:09

## 2024-01-25 RX ADMIN — KETOROLAC TROMETHAMINE 30 MG: 30 INJECTION, SOLUTION INTRAMUSCULAR at 10:29

## 2024-01-25 RX ADMIN — MIDAZOLAM 2 MG: 1 INJECTION INTRAMUSCULAR; INTRAVENOUS at 09:03

## 2024-01-25 RX ADMIN — ONDANSETRON 4 MG: 2 INJECTION INTRAMUSCULAR; INTRAVENOUS at 10:29

## 2024-01-25 RX ADMIN — KETAMINE HYDROCHLORIDE 25 MG: 10 INJECTION INTRAMUSCULAR; INTRAVENOUS at 09:03

## 2024-01-25 ASSESSMENT — ACTIVITIES OF DAILY LIVING (ADL)
ADLS_ACUITY_SCORE: 18
ADLS_ACUITY_SCORE: 18

## 2024-01-25 ASSESSMENT — ENCOUNTER SYMPTOMS: DYSRHYTHMIAS: 1

## 2024-01-25 NOTE — ANESTHESIA CARE TRANSFER NOTE
Patient: Chano Johnson    Procedure: Procedure(s):  INSERTION OF INFLATION OF SEMI RIGID PENILE PROSTHESIS       Diagnosis: Erectile dysfunction due to arterial insufficiency [N52.01]  Diagnosis Additional Information: No value filed.    Anesthesia Type:   General     Note:    Oropharynx: oropharynx clear of all foreign objects and spontaneously breathing  Level of Consciousness: awake  Oxygen Supplementation: face mask  Level of Supplemental Oxygen (L/min / FiO2): 6  Independent Airway: airway patency satisfactory and stable  Dentition: dentition unchanged      Patient transferred to: PACU    Handoff Report: Identifed the Patient, Identified the Reponsible Provider, Reviewed the pertinent medical history, Discussed the surgical course, Reviewed Intra-OP anesthesia mangement and issues during anesthesia, Set expectations for post-procedure period and Allowed opportunity for questions and acknowledgement of understanding    Vitals:  Vitals Value Taken Time   BP     Temp     Pulse 77 01/25/24 1047   Resp     SpO2 98 % 01/25/24 1047   Vitals shown include unfiled device data.    Electronically Signed By: BRANDON Raygoza CRNA  January 25, 2024  10:49 AM

## 2024-01-25 NOTE — ANESTHESIA POSTPROCEDURE EVALUATION
Patient: Chano Johnson    Procedure: Procedure(s):  INSERTION OF INFLATION OF SEMI RIGID PENILE PROSTHESIS       Anesthesia Type:  General    Note:  Disposition: Outpatient   Postop Pain Control: Uneventful            Sign Out: Well controlled pain   PONV: No   Neuro/Psych: Uneventful            Sign Out: Acceptable/Baseline neuro status   Airway/Respiratory: Uneventful            Sign Out: Acceptable/Baseline resp. status   CV/Hemodynamics: Uneventful            Sign Out: Acceptable CV status; No obvious hypovolemia; No obvious fluid overload   Other NRE: NONE   DID A NON-ROUTINE EVENT OCCUR? No       Last vitals:  Vitals Value Taken Time   /62 01/25/24 1120   Temp 36.2  C (97.2  F) 01/25/24 1049   Pulse 72 01/25/24 1122   Resp 22 01/25/24 1122   SpO2 97 % 01/25/24 1122   Vitals shown include unfiled device data.    Electronically Signed By: Devin Eisenberg MD  January 25, 2024  11:24 AM

## 2024-01-25 NOTE — INTERVAL H&P NOTE
"I have reviewed the surgical (or preoperative) H&P that is linked to this encounter, and examined the patient. There are no significant changes    Bryan Acevedo MD      Clinical Conditions Present on Arrival:  Clinically Significant Risk Factors Present on Admission                 # Drug Induced Platelet Defect: home medication list includes an antiplatelet medication  # Obesity: Estimated body mass index is 33.23 kg/m  as calculated from the following:    Height as of this encounter: 1.753 m (5' 9\").    Weight as of this encounter: 102.1 kg (225 lb).       "

## 2024-01-25 NOTE — OP NOTE
Date of surgery: 1/25/2024  Location:[facility]      Surgeon: Bryan Acevedo MD      Anesthesia: General    Preoperative diagnosis: Erectile dysfunction organic failing vacuum device medications and injections.  Electing definitive management with penile prosthesis.    Postoperative diagnosis: Same    Procedure: Semirigid penile prosthesis.    Operative indication: Chano Johnson is a 73 year old male that erectile dysfunction secondary to cardiovascular symptoms.  He has failed injections back in device and medications and is looking for definitive management with a penile prosthesis.  He has chosen a semirigid device.    Operative findings: Intact dry 11 mm diameter device is chosen length 19-1/2 on the left and 19 on the right    Operative procedure: The patient was brought to the operating room.  General anesthesia.  Supine position.  Sterile conditions.    16 Azeri Tapia catheter is placed.       Use a subcoronal approach for the procedure.  Bilaterally subcoronal incisions are made carried down to the corporal tissue and 2-0 Vicryl stay sutures were placed on either side.  Corporotomies are made in either corpora and dilation proceeds without complication but on both sides at the site of prior injection at the mid shaft of the penis there is resistance to dilation consistent with fibrosis in this area.  Once we began our initial dilation this area proceeds and were able to dilate to a 12 mm diameter easily on both sides.  Measurements are performed.  20-1/2 total measurement on the left with 20 measurement on the right.  Given the size and requirements of the device a 11 mm diameter device is chosen and on the left side cut to 19-1/2 with a 0 rear-tip and on the right side a 19 with 0 rear-tip.  These were placed easily and fit very well with positioning within the glans penis at the mid glans penis.  There is good flexibility of the device.    Closure of the incisions using layered Vicryl corporal  and subcutaneous tissue.  4 Monocryl was used at the skin.  Dressing with bacitracin and forward sleep gauze dressing.    Transferred recovery stable    Drains: 16 Wolof hopper     Specimens: none     Estimated blood loss: 15 ml     Complications: none    Bryan Acevedo MD

## 2024-01-25 NOTE — ANESTHESIA PREPROCEDURE EVALUATION
Anesthesia Pre-Procedure Evaluation    Patient: Chano Johnson   MRN: [id number] : 1950        Procedure : Procedure(s):  INSERTION OF INFLATION OF SEMI RIGID PENILE PROSTHESIS          Past Medical History:   Diagnosis Date     A-fib (H)      Antiplatelet or antithrombotic long-term use      Arrhythmia      CAD (coronary artery disease)      Cardiac arrest (H)      H/O extracorporeal membrane oxygenation treatment      Hypertension      Irregular heart beat      Sleep apnea      STEMI (ST elevation myocardial infarction) (H)      Stented coronary artery      Thyroid disease       Past Surgical History:   Procedure Laterality Date     ATRIAL ABLATION SURGERY       CARDIAC SURGERY       CERVICAL SPINE SURGERY       CV ARTERIAL LINE PLACEMENT N/A 2022    Procedure: Arterial Line Placement;  Surgeon: Larry Cintron MD;  Location: [facility] CARDIAC CATH LAB     CV CORONARY ANGIOGRAM N/A 2022    Procedure: Coronary Angiogram;  Surgeon: Larry Cintron MD;  Location: [facility] CARDIAC CATH LAB     CV CORONARY ANGIOGRAM N/A 2022    Procedure: Coronary Angiogram;  Surgeon: Larry Cintron MD;  Location: [facility] CARDIAC CATH LAB     CV EXTRACORPERAL MEMBRANE OXYGENATION N/A 2022    Procedure: Extracorporeal Membrance Oxygenation;  Surgeon: Larry Cintron MD;  Location: [facility] CARDIAC CATH LAB     CV INTRA AORTIC BALLOON N/A 2022    Procedure: Intra Aortic Balloon Pump Insertion;  Surgeon: Larry Cintron MD;  Location: [facility] CARDIAC CATH LAB     CV PCI N/A 2022    Procedure: Percutaneous Coronary Intervention;  Surgeon: Larry Cintron MD;  Location: [facility] CARDIAC CATH LAB     CV PCI ANGIOPLASTY N/A 2022    Procedure: Percutaneous Transluminal Angioplasty;  Surgeon: Larry Cintron MD;  Location: [facility] CARDIAC CATH LAB     CV THERAPEUTIC HYPOTHERMIA N/A 2022    Procedure: Therapeutic Hypothermia;  Surgeon: Larry Cintron MD;  Location: [facility]  "CARDIAC CATH LAB     HEAD & NECK SURGERY       IR UPPER EXTREMITY ANGIOGRAM LEFT  02/20/2022     MIDLINE DOUBLE LUMEN PLACEMENT Left 02/28/2022    Left brachial vein medial 0.59cm.Blood return on all ports midline okay to use.     REMOVE EXTRACORPORAL MEMBRANE OXYGENATOR ADULT N/A 02/22/2022    Procedure: EXTRACORPOREAL MEMBRANE OXYGENATION CANNULA REMOVAL, INTRAOPERATIVE TRANSESOPHAGEAL ECHOCARDIOGRAM PER ANESTHESIA.;  Surgeon: Rod Banuelos MD;  Location: [facility] OR      Allergies   Allergen Reactions     Chlorpheniramine-Phenylpropan [A.R.M.] Other (See Comments)     Watery eyes, sneezing     Seroquel [Quetiapine] Other (See Comments)     Per Meeta (Wife), would like this medication to be listed as an allergy due to patient becoming combative and delirious after taking it.      Percocet [Oxycodone-Acetaminophen] Rash     \"felt like skin was crawling off\"-pt's S.O.       Social History     Tobacco Use     Smoking status: Never     Passive exposure: Never     Smokeless tobacco: Never   Substance Use Topics     Alcohol use: Yes     Alcohol/week: 2.0 standard drinks of alcohol      Wt Readings from Last 1 Encounters:   01/25/24 102.1 kg (225 lb)        Anesthesia Evaluation   Pt has had prior anesthetic.     History of anesthetic complications       ROS/MED HX  ENT/Pulmonary:     (+) sleep apnea, uses CPAP,                                      Neurologic:  - neg neurologic ROS     Cardiovascular:     (+)  hypertension- -  CAD - past MI - stent-   Taking blood thinners Pt has received instructions:  CHF  Last EF: 40-45% date: 2/23               dysrhythmias, a-fib, Irregular Heartbeat/Palpitations,         (-) angina and angina   METS/Exercise Tolerance:     Hematologic:     (+)      anemia,          Musculoskeletal:  - neg musculoskeletal ROS     GI/Hepatic:  - neg GI/hepatic ROS  (-) GERD   Renal/Genitourinary:     (+) renal disease, type: CRI,            Endo:     (+)          thyroid problem,          " "  Psychiatric/Substance Use:  - neg psychiatric ROS     Infectious Disease:  - neg infectious disease ROS     Malignancy:  - neg malignancy ROS     Other:          Physical Exam    Airway        Mallampati: III   TM distance: > 3 FB   Neck ROM: full   Mouth opening: > 3 cm    Respiratory Devices and Support         Dental       (+) Minor Abnormalities - some fillings, tiny chips      Cardiovascular   cardiovascular exam normal       Rhythm and rate: regular and normal     Pulmonary   pulmonary exam normal        breath sounds clear to auscultation       OUTSIDE LABS:  CBC:   Lab Results   Component Value Date    WBC 5.4 01/11/2024    WBC 6.4 10/25/2023    HGB 12.7 (L) 01/11/2024    HGB 13.1 (L) 10/25/2023    HCT 38.8 (L) 01/11/2024    HCT 40.3 10/25/2023     (L) 01/11/2024     (L) 10/25/2023     BMP:   Lab Results   Component Value Date     01/11/2024     10/25/2023    POTASSIUM 4.1 01/11/2024    POTASSIUM 4.6 10/25/2023    CHLORIDE 105 01/11/2024    CHLORIDE 103 10/25/2023    CO2 25 01/11/2024    CO2 26 10/25/2023    BUN 18.6 01/11/2024    BUN 19.9 10/25/2023    CR 1.50 (H) 01/11/2024    CR 1.50 (H) 10/25/2023    GLC 93 01/11/2024    GLC 95 10/25/2023     COAGS:   Lab Results   Component Value Date    PTT 28 04/22/2022    INR 1.01 10/03/2022    FIBR 543 (H) 02/25/2022     POC: No results found for: \"BGM\", \"HCG\", \"HCGS\"  HEPATIC:   Lab Results   Component Value Date    ALBUMIN 3.9 10/03/2022    PROTTOTAL 6.9 10/03/2022    ALT 52 10/25/2023    AST 19 10/03/2022    ALKPHOS 53 10/03/2022    BILITOTAL 0.5 10/03/2022     OTHER:   Lab Results   Component Value Date    PH 7.48 (H) 03/01/2022    LACT 1.9 02/25/2022    A1C 5.5 10/25/2023    PEDRITO 9.6 01/11/2024    PHOS 3.6 03/09/2022    MAG 2.5 (H) 03/09/2022    LIPASE 38 10/03/2022    TSH 3.39 01/11/2024    T4 1.30 10/25/2023    CRP 69.0 (H) 03/03/2022    SED 82 (H) 03/03/2022       Anesthesia Plan    ASA Status:  3       Anesthesia Type: General.    " " - Airway: LMA   Induction: Intravenous, Propofol.   Maintenance: Balanced.        Consents    Anesthesia Plan(s) and associated risks, benefits, and realistic alternatives discussed. Questions answered and patient/representative(s) expressed understanding.     - Discussed:     - Discussed with:  Patient            Postoperative Care    Pain management: IV analgesics, Oral pain medications.   PONV prophylaxis: Ondansetron (or other 5HT-3), Dexamethasone or Solumedrol     Comments:             Devin Eisenberg MD    I have reviewed the pertinent notes and labs in the chart from the past 30 days and (re)examined the patient.  Any updates or changes from those notes are reflected in this note.             # Drug Induced Platelet Defect: home medication list includes an antiplatelet medication  # Obesity: Estimated body mass index is 33.23 kg/m  as calculated from the following:    Height as of this encounter: 1.753 m (5' 9\").    Weight as of this encounter: 102.1 kg (225 lb).      "

## 2024-01-25 NOTE — ANESTHESIA PROCEDURE NOTES
Airway       Patient location during procedure: OR       Procedure Start/Stop Times: 1/25/2024 9:17 AM and 1/25/2024 9:18 AM  Staff -        CRNA: Jaylin Méndez APRN CRNA       Performed By: CRNA  Consent for Airway        Urgency: elective  Indications and Patient Condition       Indications for airway management: anuja-procedural and airway protection        Final Airway Details       Final airway type: supraglottic airway    Supraglottic Airway Details        Type: LMA       Brand: Ambu AuraGain       LMA size: 5    Post intubation assessment        Placement verified by: capnometry, equal breath sounds and chest rise        Number of attempts at approach: 1       Number of other approaches attempted: 0       Secured with: commercial tube ham and tape       Ease of procedure: easy       Dentition: Intact and Unchanged    Medication(s) Administered   Medication Administration Time: 1/25/2024 9:17 AM

## 2024-01-27 ENCOUNTER — HOSPITAL ENCOUNTER (OUTPATIENT)
Facility: CLINIC | Age: 74
Setting detail: OBSERVATION
Discharge: HOME | End: 2024-01-27
Attending: EMERGENCY MEDICINE | Admitting: INTERNAL MEDICINE
Payer: MEDICARE

## 2024-01-27 VITALS
RESPIRATION RATE: 18 BRPM | HEART RATE: 74 BPM | OXYGEN SATURATION: 99 % | SYSTOLIC BLOOD PRESSURE: 130 MMHG | HEIGHT: 70 IN | WEIGHT: 228 LBS | BODY MASS INDEX: 32.64 KG/M2 | DIASTOLIC BLOOD PRESSURE: 66 MMHG | TEMPERATURE: 98.4 F

## 2024-01-27 DIAGNOSIS — N39.0 URINARY TRACT INFECTION WITH HEMATURIA, SITE UNSPECIFIED: ICD-10-CM

## 2024-01-27 DIAGNOSIS — R31.9 URINARY TRACT INFECTION WITH HEMATURIA, SITE UNSPECIFIED: ICD-10-CM

## 2024-01-27 DIAGNOSIS — R31.0 GROSS HEMATURIA: ICD-10-CM

## 2024-01-27 DIAGNOSIS — R33.9 URINARY RETENTION: ICD-10-CM

## 2024-01-27 DIAGNOSIS — I50.22 CHRONIC SYSTOLIC HEART FAILURE (H): Primary | ICD-10-CM

## 2024-01-27 DIAGNOSIS — N17.9 ACUTE KIDNEY INJURY (H): ICD-10-CM

## 2024-01-27 LAB
ALBUMIN SERPL BCG-MCNC: 4 G/DL (ref 3.5–5.2)
ALBUMIN UR-MCNC: 100 MG/DL
ALP SERPL-CCNC: 34 U/L (ref 40–150)
ALT SERPL W P-5'-P-CCNC: 22 U/L (ref 0–70)
ANION GAP SERPL CALCULATED.3IONS-SCNC: 11 MMOL/L (ref 7–15)
ANION GAP SERPL CALCULATED.3IONS-SCNC: 6 MMOL/L (ref 7–15)
APPEARANCE UR: ABNORMAL
APTT PPP: 24 SECONDS (ref 22–38)
AST SERPL W P-5'-P-CCNC: 22 U/L (ref 0–45)
BASOPHILS # BLD AUTO: 0 10E3/UL (ref 0–0.2)
BASOPHILS # BLD AUTO: 0 10E3/UL (ref 0–0.2)
BASOPHILS NFR BLD AUTO: 0 %
BASOPHILS NFR BLD AUTO: 0 %
BILIRUB SERPL-MCNC: 0.4 MG/DL
BILIRUB UR QL STRIP: NEGATIVE
BUN SERPL-MCNC: 25 MG/DL (ref 8–23)
BUN SERPL-MCNC: 28.5 MG/DL (ref 8–23)
CALCIUM SERPL-MCNC: 8.7 MG/DL (ref 8.8–10.2)
CALCIUM SERPL-MCNC: 9.8 MG/DL (ref 8.8–10.2)
CHLORIDE SERPL-SCNC: 104 MMOL/L (ref 98–107)
CHLORIDE SERPL-SCNC: 108 MMOL/L (ref 98–107)
COLOR UR AUTO: ABNORMAL
CREAT SERPL-MCNC: 1.79 MG/DL (ref 0.67–1.17)
CREAT SERPL-MCNC: 1.92 MG/DL (ref 0.67–1.17)
DEPRECATED HCO3 PLAS-SCNC: 26 MMOL/L (ref 22–29)
DEPRECATED HCO3 PLAS-SCNC: 27 MMOL/L (ref 22–29)
EGFRCR SERPLBLD CKD-EPI 2021: 36 ML/MIN/1.73M2
EGFRCR SERPLBLD CKD-EPI 2021: 39 ML/MIN/1.73M2
EOSINOPHIL # BLD AUTO: 0 10E3/UL (ref 0–0.7)
EOSINOPHIL # BLD AUTO: 0 10E3/UL (ref 0–0.7)
EOSINOPHIL NFR BLD AUTO: 0 %
EOSINOPHIL NFR BLD AUTO: 0 %
ERYTHROCYTE [DISTWIDTH] IN BLOOD BY AUTOMATED COUNT: 12.7 % (ref 10–15)
ERYTHROCYTE [DISTWIDTH] IN BLOOD BY AUTOMATED COUNT: 12.7 % (ref 10–15)
GLUCOSE SERPL-MCNC: 105 MG/DL (ref 70–99)
GLUCOSE SERPL-MCNC: 129 MG/DL (ref 70–99)
GLUCOSE UR STRIP-MCNC: NEGATIVE MG/DL
HCT VFR BLD AUTO: 34.5 % (ref 40–53)
HCT VFR BLD AUTO: 37.1 % (ref 40–53)
HGB BLD-MCNC: 11.1 G/DL (ref 13.3–17.7)
HGB BLD-MCNC: 12.1 G/DL (ref 13.3–17.7)
HGB UR QL STRIP: ABNORMAL
IMM GRANULOCYTES # BLD: 0 10E3/UL
IMM GRANULOCYTES # BLD: 0.1 10E3/UL
IMM GRANULOCYTES NFR BLD: 0 %
IMM GRANULOCYTES NFR BLD: 1 %
INR PPP: 1.04 (ref 0.85–1.15)
KETONES UR STRIP-MCNC: NEGATIVE MG/DL
LEUKOCYTE ESTERASE UR QL STRIP: ABNORMAL
LYMPHOCYTES # BLD AUTO: 1.5 10E3/UL (ref 0.8–5.3)
LYMPHOCYTES # BLD AUTO: 1.8 10E3/UL (ref 0.8–5.3)
LYMPHOCYTES NFR BLD AUTO: 12 %
LYMPHOCYTES NFR BLD AUTO: 19 %
MAGNESIUM SERPL-MCNC: 2 MG/DL (ref 1.7–2.3)
MCH RBC QN AUTO: 31.9 PG (ref 26.5–33)
MCH RBC QN AUTO: 32.4 PG (ref 26.5–33)
MCHC RBC AUTO-ENTMCNC: 32.2 G/DL (ref 31.5–36.5)
MCHC RBC AUTO-ENTMCNC: 32.6 G/DL (ref 31.5–36.5)
MCV RBC AUTO: 100 FL (ref 78–100)
MCV RBC AUTO: 99 FL (ref 78–100)
MONOCYTES # BLD AUTO: 0.8 10E3/UL (ref 0–1.3)
MONOCYTES # BLD AUTO: 0.8 10E3/UL (ref 0–1.3)
MONOCYTES NFR BLD AUTO: 7 %
MONOCYTES NFR BLD AUTO: 9 %
NEUTROPHILS # BLD AUTO: 6.8 10E3/UL (ref 1.6–8.3)
NEUTROPHILS # BLD AUTO: 9.5 10E3/UL (ref 1.6–8.3)
NEUTROPHILS NFR BLD AUTO: 72 %
NEUTROPHILS NFR BLD AUTO: 80 %
NITRATE UR QL: NEGATIVE
NRBC # BLD AUTO: 0 10E3/UL
NRBC # BLD AUTO: 0 10E3/UL
NRBC BLD AUTO-RTO: 0 /100
NRBC BLD AUTO-RTO: 0 /100
NT-PROBNP SERPL-MCNC: 1975 PG/ML (ref 0–900)
PH UR STRIP: 6 [PH] (ref 5–7)
PLATELET # BLD AUTO: 136 10E3/UL (ref 150–450)
PLATELET # BLD AUTO: 159 10E3/UL (ref 150–450)
POTASSIUM SERPL-SCNC: 4.6 MMOL/L (ref 3.4–5.3)
POTASSIUM SERPL-SCNC: 4.7 MMOL/L (ref 3.4–5.3)
PROCALCITONIN SERPL IA-MCNC: 0.05 NG/ML
PROT SERPL-MCNC: 6 G/DL (ref 6.4–8.3)
RBC # BLD AUTO: 3.48 10E6/UL (ref 4.4–5.9)
RBC # BLD AUTO: 3.73 10E6/UL (ref 4.4–5.9)
RBC URINE: >182 /HPF
SARS-COV-2 RNA RESP QL NAA+PROBE: NEGATIVE
SODIUM SERPL-SCNC: 141 MMOL/L (ref 135–145)
SODIUM SERPL-SCNC: 141 MMOL/L (ref 135–145)
SP GR UR STRIP: 1.02 (ref 1–1.03)
UROBILINOGEN UR STRIP-MCNC: <2 MG/DL
WBC # BLD AUTO: 11.8 10E3/UL (ref 4–11)
WBC # BLD AUTO: 9.5 10E3/UL (ref 4–11)
WBC URINE: >182 /HPF

## 2024-01-27 PROCEDURE — 258N000003 HC RX IP 258 OP 636: Performed by: INTERNAL MEDICINE

## 2024-01-27 PROCEDURE — 250N000011 HC RX IP 250 OP 636: Performed by: EMERGENCY MEDICINE

## 2024-01-27 PROCEDURE — 80048 BASIC METABOLIC PNL TOTAL CA: CPT | Performed by: EMERGENCY MEDICINE

## 2024-01-27 PROCEDURE — 250N000009 HC RX 250: Performed by: EMERGENCY MEDICINE

## 2024-01-27 PROCEDURE — 85025 COMPLETE CBC W/AUTO DIFF WBC: CPT | Performed by: EMERGENCY MEDICINE

## 2024-01-27 PROCEDURE — 258N000003 HC RX IP 258 OP 636: Performed by: EMERGENCY MEDICINE

## 2024-01-27 PROCEDURE — 99285 EMERGENCY DEPT VISIT HI MDM: CPT | Mod: 25

## 2024-01-27 PROCEDURE — 99235 HOSP IP/OBS SAME DATE MOD 70: CPT | Mod: AI | Performed by: INTERNAL MEDICINE

## 2024-01-27 PROCEDURE — 96361 HYDRATE IV INFUSION ADD-ON: CPT | Mod: XU

## 2024-01-27 PROCEDURE — 250N000011 HC RX IP 250 OP 636: Performed by: INTERNAL MEDICINE

## 2024-01-27 PROCEDURE — 85610 PROTHROMBIN TIME: CPT | Performed by: EMERGENCY MEDICINE

## 2024-01-27 PROCEDURE — 36415 COLL VENOUS BLD VENIPUNCTURE: CPT | Performed by: EMERGENCY MEDICINE

## 2024-01-27 PROCEDURE — 84145 PROCALCITONIN (PCT): CPT | Performed by: INTERNAL MEDICINE

## 2024-01-27 PROCEDURE — 81001 URINALYSIS AUTO W/SCOPE: CPT | Performed by: EMERGENCY MEDICINE

## 2024-01-27 PROCEDURE — 258N000003 HC RX IP 258 OP 636: Performed by: FAMILY MEDICINE

## 2024-01-27 PROCEDURE — 83735 ASSAY OF MAGNESIUM: CPT | Performed by: INTERNAL MEDICINE

## 2024-01-27 PROCEDURE — 85730 THROMBOPLASTIN TIME PARTIAL: CPT | Performed by: EMERGENCY MEDICINE

## 2024-01-27 PROCEDURE — G0378 HOSPITAL OBSERVATION PER HR: HCPCS

## 2024-01-27 PROCEDURE — 87635 SARS-COV-2 COVID-19 AMP PRB: CPT | Performed by: EMERGENCY MEDICINE

## 2024-01-27 PROCEDURE — 83880 ASSAY OF NATRIURETIC PEPTIDE: CPT | Performed by: INTERNAL MEDICINE

## 2024-01-27 PROCEDURE — 96365 THER/PROPH/DIAG IV INF INIT: CPT | Mod: XU

## 2024-01-27 PROCEDURE — 96366 THER/PROPH/DIAG IV INF ADDON: CPT | Mod: XU

## 2024-01-27 PROCEDURE — 99207 PR APP CREDIT; MD BILLING SHARED VISIT: CPT | Performed by: FAMILY MEDICINE

## 2024-01-27 PROCEDURE — 87086 URINE CULTURE/COLONY COUNT: CPT | Performed by: EMERGENCY MEDICINE

## 2024-01-27 PROCEDURE — 51702 INSERT TEMP BLADDER CATH: CPT

## 2024-01-27 PROCEDURE — 36415 COLL VENOUS BLD VENIPUNCTURE: CPT | Performed by: INTERNAL MEDICINE

## 2024-01-27 PROCEDURE — 85025 COMPLETE CBC W/AUTO DIFF WBC: CPT | Mod: 91 | Performed by: INTERNAL MEDICINE

## 2024-01-27 RX ORDER — PIPERACILLIN SODIUM, TAZOBACTAM SODIUM 3; .375 G/15ML; G/15ML
3.38 INJECTION, POWDER, LYOPHILIZED, FOR SOLUTION INTRAVENOUS EVERY 8 HOURS
Status: DISCONTINUED | OUTPATIENT
Start: 2024-01-27 | End: 2024-01-27 | Stop reason: HOSPADM

## 2024-01-27 RX ORDER — ASPIRIN 81 MG/1
81 TABLET, CHEWABLE ORAL DAILY
Start: 2024-01-29 | End: 2024-03-25

## 2024-01-27 RX ORDER — AMOXICILLIN 250 MG
2 CAPSULE ORAL 2 TIMES DAILY PRN
Status: DISCONTINUED | OUTPATIENT
Start: 2024-01-27 | End: 2024-01-27 | Stop reason: HOSPADM

## 2024-01-27 RX ORDER — AMOXICILLIN 250 MG
1 CAPSULE ORAL 2 TIMES DAILY PRN
Status: DISCONTINUED | OUTPATIENT
Start: 2024-01-27 | End: 2024-01-27 | Stop reason: HOSPADM

## 2024-01-27 RX ORDER — ONDANSETRON 2 MG/ML
4 INJECTION INTRAMUSCULAR; INTRAVENOUS EVERY 6 HOURS PRN
Status: DISCONTINUED | OUTPATIENT
Start: 2024-01-27 | End: 2024-01-27 | Stop reason: HOSPADM

## 2024-01-27 RX ORDER — LIDOCAINE HYDROCHLORIDE 20 MG/ML
10 JELLY TOPICAL ONCE
Status: COMPLETED | OUTPATIENT
Start: 2024-01-27 | End: 2024-01-27

## 2024-01-27 RX ORDER — PIPERACILLIN SODIUM, TAZOBACTAM SODIUM 3; .375 G/15ML; G/15ML
3.38 INJECTION, POWDER, LYOPHILIZED, FOR SOLUTION INTRAVENOUS ONCE
Status: COMPLETED | OUTPATIENT
Start: 2024-01-27 | End: 2024-01-27

## 2024-01-27 RX ORDER — SODIUM CHLORIDE 9 MG/ML
INJECTION, SOLUTION INTRAVENOUS CONTINUOUS
Status: DISCONTINUED | OUTPATIENT
Start: 2024-01-27 | End: 2024-01-27

## 2024-01-27 RX ORDER — ONDANSETRON 4 MG/1
4 TABLET, ORALLY DISINTEGRATING ORAL EVERY 6 HOURS PRN
Status: DISCONTINUED | OUTPATIENT
Start: 2024-01-27 | End: 2024-01-27 | Stop reason: HOSPADM

## 2024-01-27 RX ADMIN — PIPERACILLIN AND TAZOBACTAM 3.38 G: 3; .375 INJECTION, POWDER, FOR SOLUTION INTRAVENOUS at 10:12

## 2024-01-27 RX ADMIN — SODIUM CHLORIDE 500 ML: 9 INJECTION, SOLUTION INTRAVENOUS at 03:43

## 2024-01-27 RX ADMIN — LIDOCAINE HYDROCHLORIDE 10 ML: 20 JELLY TOPICAL at 01:49

## 2024-01-27 RX ADMIN — SODIUM CHLORIDE: 9 INJECTION, SOLUTION INTRAVENOUS at 06:06

## 2024-01-27 RX ADMIN — PIPERACILLIN AND TAZOBACTAM 3.38 G: 3; .375 INJECTION, POWDER, FOR SOLUTION INTRAVENOUS at 03:44

## 2024-01-27 RX ADMIN — SODIUM CHLORIDE 500 ML: 9 INJECTION, SOLUTION INTRAVENOUS at 13:03

## 2024-01-27 ASSESSMENT — ACTIVITIES OF DAILY LIVING (ADL)
ADLS_ACUITY_SCORE: 33
ADLS_ACUITY_SCORE: 35

## 2024-01-27 NOTE — PROGRESS NOTES
Care Management Discharge Note    Discharge Date: 01/27/2024       Discharge Disposition: Home    Discharge Services:  (OP Urology follow up)    Discharge DME:  (hopper catheter)    Additional Information:  Chart reviewed.   Discharged home with wife per bedside RN prior to CM meeting with patient. OP Urology follow up.     [facility] referral sent per protocol.     Minda Hunter RN

## 2024-01-27 NOTE — PROGRESS NOTES
Pharmacist Admission Medication History    Admission medication history is complete. The information provided in this note is only as accurate as the sources available at the time of the update.    Information Source(s): Patient, Family member, and CareEverywhere/SureScripts via in-person    Pertinent Information: Patient had surgery on 1/25, resumed medications post op, including aspirin and ticagrelor. Since having blood in urine, told by Urology to hold ticagrelor until Mon 1/29. Is on 5 day course of Bactrim, has had 3 doses (of 10 doses) to date.     Changes made to PTA medication list:  Added: None  Deleted: None  Changed: None    Allergies reviewed with patient and updates made in EHR: yes    Medication History Completed By: [facility] 1/27/2024 8:09 AM    PTA Med List   Medication Sig Note Last Dose    allopurinol (ZYLOPRIM) 100 MG tablet Take 1 tablet (100 mg) by mouth 2 times daily  1/26/2024 at pm    aspirin (ASA) 81 MG chewable tablet Take 1 tablet (81 mg) by mouth daily Starting tomorrow.  1/26/2024 at am    levothyroxine (SYNTHROID/LEVOTHROID) 25 MCG tablet Take 1 tablet (25 mcg) by mouth daily  1/26/2024 at am    metoprolol succinate ER (TOPROL XL) 50 MG 24 hr tablet Take 3 tablets (150 mg) by mouth daily  1/26/2024 at am    multivitamin, therapeutic (THERA-VIT) TABS tablet Take 1 tablet by mouth daily  1/26/2024 at am    polyethylene glycol (MIRALAX) 17 GM/Dose powder Take 17 g by mouth daily  1/26/2024 at am    rosuvastatin (CRESTOR) 20 MG tablet Take 1 tablet (20 mg) by mouth every evening  1/26/2024 at pm    sulfamethoxazole-trimethoprim (BACTRIM DS) 800-160 MG tablet Take 1 tablet by mouth 2 times daily for 5 days  1/26/2024 at pm (dose 3 of 10)    ticagrelor (BRILINTA) 90 MG tablet Take 1 tablet (90 mg) by mouth 2 times daily 1/27/2024: Resumed post op, took a dose 1/25 pm and 1/26 pm, experiencing blood in urine. Urology notified - advised patient to hold until Mon 1/29.  1/26/2024 at  am - hold until Mon 1/29

## 2024-01-27 NOTE — ED NOTES
1330- Bladder irrigation is done per MD orders and no clots noted at this time. Urine color is clear to light pink.

## 2024-01-27 NOTE — DISCHARGE SUMMARY
"[facility] MEDICINE  DISCHARGE SUMMARY     Primary Care Physician: Liyah Cooley  Admission Date: 1/27/2024   Discharge Provider: Jake Monroe MD Discharge Date: 1/27/2024    Diet:   Active Diet and Nourishment Order   Procedures    Regular Diet Adult    Diet     Code Status: Full Code   Activity: DCACTIVITY: Activity as tolerated  Hopper catheter per protocols      Condition at Discharge: Stable     REASON FOR PRESENTATION(See Admission Note for Details)   Ability to urinate and significant hematuria    PRINCIPAL & ACTIVE DISCHARGE DIAGNOSES     Gross hematuria  Acute kidney injury (H24)  Urinary retention  Urinary tract infection with hematuria, site unspecified  Status post penile prosthesis  Coronary artery disease  Atrial fibrillation status post ablation  Watchman device in situ  Obstructive sleep apnea  Chronic systolic heart failure  Subclinical hypothyroidism  Hyperlipidemia      Clinically Significant Risk Factors Present on Admission                # Drug Induced Platelet Defect: home medication list includes an antiplatelet medication        # Obesity: Estimated body mass index is 32.71 kg/m  as calculated from the following:    Height as of this encounter: 1.778 m (5' 10\").    Weight as of this encounter: 103.4 kg (228 lb).              PENDING LABS     Unresulted Labs Ordered in the Past 30 Days of this Admission       Date and Time Order Name Status Description    1/27/2024  2:16 AM Urine Culture In process           PROCEDURES ( this hospitalization only)      Hopper catheter placement and subsequent irrigation    RECOMMENDATIONS TO OUTPATIENT PROVIDER FOR F/U VISIT   Follow-up Appointments     Follow-up and recommended labs and tests       Follow up with [facility] in 3-4 days for hopper removal and voiding   trial.              DISPOSITION     Home    SUMMARY OF HOSPITAL COURSE:      73-year-old male PMH significant for CAD, A-fib status post ablation, " Watchman device, ARSENIO, chronic systolic heart failure, hypothyroidism and hyperlipidemia.  Underwent penile prosthesis on 1/25/2023 by Dr. Acevedo.  Discharged with Tapia catheter and Bactrim.  1/27 Tapia catheter was removed by the wife and afterwards had significant bleeding with clots and subsequent inability to urinate.  Presented to the emergency room.  Creatinine was elevated.  Urology was consulted and Tapia catheter was replaced.  Given IV Zosyn.  Admitted.    Patient was monitored overnight and did well.  Hemoglobin remained relatively stable.  Patient was able to have irrigation done with the Tapia without difficulty.  Hematuria has slowly been decreasing.  Cleared by urology for discharge.  Patient was otherwise clinically stable.  Tapia catheter to remain in place until follow-up with urology in 1 week.  He will hold his aspirin for the next 2 days.    Medically otherwise stable.      Discharge Medications with Med changes:     Current Discharge Medication List        CONTINUE these medications which have CHANGED    Details   aspirin (ASA) 81 MG chewable tablet Take 1 tablet (81 mg) by mouth daily Starting tomorrow.    Associated Diagnoses: Chronic systolic heart failure (H)           CONTINUE these medications which have NOT CHANGED    Details   allopurinol (ZYLOPRIM) 100 MG tablet Take 1 tablet (100 mg) by mouth 2 times daily  Qty: 180 tablet, Refills: 3    Associated Diagnoses: ST elevation myocardial infarction (STEMI), unspecified artery (H); Cardiac arrest (H); Hyperlipidemia, unspecified hyperlipidemia type; Constipation, unspecified constipation type      levothyroxine (SYNTHROID/LEVOTHROID) 25 MCG tablet Take 1 tablet (25 mcg) by mouth daily  Qty: 90 tablet, Refills: 1    Associated Diagnoses: Subclinical hypothyroidism      metoprolol succinate ER (TOPROL XL) 50 MG 24 hr tablet Take 3 tablets (150 mg) by mouth daily  Qty: 270 tablet, Refills: 3    Associated Diagnoses: Cardiac arrest (H)       multivitamin, therapeutic (THERA-VIT) TABS tablet Take 1 tablet by mouth daily  Qty: 90 tablet, Refills: 3    Associated Diagnoses: Cardiac arrest (H)      polyethylene glycol (MIRALAX) 17 GM/Dose powder Take 17 g by mouth daily  Qty: 510 g, Refills: 11    Associated Diagnoses: Constipation, unspecified constipation type      rosuvastatin (CRESTOR) 20 MG tablet Take 1 tablet (20 mg) by mouth every evening  Qty: 90 tablet, Refills: 3    Associated Diagnoses: Hyperlipidemia, unspecified hyperlipidemia type      sulfamethoxazole-trimethoprim (BACTRIM DS) 800-160 MG tablet Take 1 tablet by mouth 2 times daily for 5 days  Qty: 10 tablet, Refills: 0    Associated Diagnoses: ED (erectile dysfunction) of organic origin      ticagrelor (BRILINTA) 90 MG tablet Take 1 tablet (90 mg) by mouth 2 times daily  Qty: 180 tablet, Refills: 3    Associated Diagnoses: ST elevation myocardial infarction (STEMI), unspecified artery (H)               Rationale for medication changes:      Aspirin to be held for 2 days due to hematuria      Consults     UROLOGY IP CONSULT      Immunizations given this encounter     Most Recent Immunizations   Administered Date(s) Administered    COVID-19 Monovalent 18+ (Moderna) 11/13/2021    COVID-19 Monovalent Booster 18+ (Moderna) 07/12/2022    Flu 65+ Years 10/08/2019    Influenza (H1N1) 01/06/2010    Influenza (IIV3) PF 08/30/2012    Influenza Vaccine 65+ (FLUAD) 10/13/2021    Influenza Vaccine 65+ (Fluzone HD) 10/25/2023    Influenza Vaccine >6 months,quad, PF 01/18/2017    Influenza Vaccine, 6+MO IM (QUADRIVALENT W/PRESERVATIVES) 11/08/2017    Pneumo Conj 13-V (2010&after) 09/20/2018    Pneumococcal 23 valent 12/06/2013    TDAP (Adacel,Boostrix) 12/04/2012    Td (Adult), Adsorbed 07/28/1998    Zoster recombinant adjuvanted (SHINGRIX) 06/10/2019    Zoster vaccine, live 01/06/2010           Anticoagulation Information      Recent INR results:   Recent Labs   Lab 01/27/24  0245   INR 1.04     Warfarin  "doses (if applicable) or name of other anticoagulant: Aspirin held for 2 days      SIGNIFICANT IMAGING FINDINGS     No results found for this visit on 01/27/24.    SIGNIFICANT LABORATORY FINDINGS     Most Recent 3 CBC's:  Recent Labs   Lab Test 01/27/24  0747 01/27/24  0245 01/11/24  0940   WBC 9.5 11.8* 5.4   HGB 11.1* 12.1* 12.7*   MCV 99 100 100   * 159 137*     Most Recent 3 BMP's:  Recent Labs   Lab Test 01/27/24  0747 01/27/24  0245 01/11/24  0940    141 141   POTASSIUM 4.6 4.7 4.1   CHLORIDE 108* 104 105   CO2 27 26 25   BUN 25.0* 28.5* 18.6   CR 1.79* 1.92* 1.50*   ANIONGAP 6* 11 11   PEDRITO 8.7* 9.8 9.6   * 129* 93     Most Recent 2 LFT's:  Recent Labs   Lab Test 01/27/24  0747 10/25/23  1552 10/03/22  0253   AST 22  --  19   ALT 22 52 17   ALKPHOS 34*  --  53   BILITOTAL 0.4  --  0.5     Most Recent 3 INR's:  Recent Labs   Lab Test 01/27/24  0245 10/03/22  0253 04/22/22  1142   INR 1.04 1.01 1.15     No results found for: \"CTROPT\"   7-Day Micro Results       Collected Updated Procedure Result Status      01/27/2024 0247 01/27/2024 0330 Asymptomatic COVID-19 Virus (Coronavirus) by PCR Nasopharyngeal [[id number]]    Swab from Nasopharyngeal    Final result Component Value   SARS CoV2 PCR Negative   NEGATIVE: SARS-CoV-2 (COVID-19) RNA not detected, presumed negative.            01/27/2024 0158 01/27/2024 0216 Urine Culture [[id number]]   Urine, Hopper Catheter    In process Component Value   No component results                         Discharge Orders        Reason for your hospital stay    You had a hopper catheter placed for urinary retention.     Follow-up and recommended labs and tests     Follow up with [facility] in 3-4 days for hopper removal and voiding trial.     Activity    Your activity upon discharge: Having a hopper catheter does not limit your activity, however, it is recommended you do not drive with a hopper catheter in place.     Tubes and drains    You are going home with " the following tubes or drains: Hopper catheter    The nurse will show you how to manually hand irrigate if this is needed and provide you with the necessary supplies.     CATHETER CARE   You are being discharged with a hopper catheter. You may choose to alternate between a leg (day) bag and large (night) bag. Drain urine from the bag when it is about 2/3rds full. Use plain soap and water to wash urethral/groin area daily. Males - you may apply a small amount of Bacitracin or Neosporin ointment to the tip of the penis three times a day for comfort (decreases friction).     ACTIVITY   Having a hopper catheter does not limit your activities, however, it is recommended you do not drive with a hopper catheter in place.     Seek medial evaluation if:  - You are feeling chilled or feverish, temperature >100.5.    - You develop thick cherry colored urine, clots or hopper catheter is not draining well.   - You develop purulent drainage from the urethra.     Activity    Your activity upon discharge: activity as tolerated     When to contact your care team    Call urology if you have any of the following: temperature greater than 100.5, increased drainage, increased swelling, increased pain, or worsening bleeding.     Tubes and drains    You are going home with the following tubes or drains: hopper catheter.  Tube cares per hospital or home care instructions     Diet    Follow this diet upon discharge: Orders Placed This Encounter      Regular Diet Adult       Examination   Physical Exam   Temp:  [98.3  F (36.8  C)-98.4  F (36.9  C)] 98.4  F (36.9  C)  Pulse:  [66-81] 66  Resp:  [18-20] 18  BP: (124-138)/(66-69) 124/66  SpO2:  [98 %-99 %] 99 %  Wt Readings from Last 4 Encounters:   01/27/24 103.4 kg (228 lb)   01/25/24 102.1 kg (225 lb)   01/11/24 107.2 kg (236 lb 6.4 oz)   01/03/24 106.8 kg (235 lb 8 oz)       Constitutional: awake, alert, cooperative, no apparent distress, and appears stated age  Eyes: Lids and lashes normal,  pupils equal, round and reactive to light, extra ocular muscles intact, sclera clear, conjunctiva normal  ENT: Normocephalic, without obvious abnormality, atraumatic, sinuses nontender on palpation, external ears without lesions, oral pharynx with moist mucous membranes, tonsils without erythema or exudates, gums normal and good dentition.  Respiratory: No increased work of breathing, good air exchange, clear to auscultation bilaterally, no crackles or wheezing  Cardiovascular: Normal apical impulse, regular rate and rhythm, normal S1 and S2, no S3 or S4, and no murmur noted  GI: No scars, normal bowel sounds, soft, non-distended, non-tender, no masses palpated, no hepatosplenomegally  Skin: normal skin color, texture, turgor, no redness, warmth, or swelling, and no rashes  Musculoskeletal: There is no redness, warmth, or swelling of the joints.  Full range of motion noted.  Motor strength is 5 out of 5 all extremities bilaterally.  Tone is normal. no lower extremity pitting edema present  Neurologic: Cranial nerves II-XII are grossly intact. Sensory:  Sensory intact  Neuropsychiatric: General: normal, calm, and normal eye contact Level of consciousness: alert / normal Affect: normal Orientation: oriented to self, place, time and situation Memory and insight: normal, memory for past and recent events intact, and thought process normal      Please see EMR for more detailed significant labs, imaging, consultant notes etc.    IJake MD, personally saw the patient today and spent less than or equal to 30 minutes discharging this patient.    Jake Monroe MD  [facility]    CC:Liyah Cooley

## 2024-01-27 NOTE — ED NOTES
Irrigated bladder with 1500 NS. Pink tinged fluid noted at end of irrigation. Small amount of very small clots noted

## 2024-01-27 NOTE — H&P
[facility] MEDICINE ADMISSION HISTORY AND PHYSICAL       Assessment & Plan      Present on Admission (POA)    1. Gross hematuria and acute urinary retention, requiring re-insertion of hopper. Recent Semirigid penile prosthesis insertion for ED     Hopper was initially removed at home by wife, and was not completely deflated.     - IVF, NPO, urology ref  - CBI if manual irrigation not working per Urology  - hopper inserted in ED     2. Acute on chronic renal failure likely related to urinary retention and/or ATN from UTI  - IVF  - antibiotics      Chronic Medical Conditions    1. CAD, STEMI with history of VF arrest, required ECMO   - Per records --- ASA and ticagrelor - plan for 3 years of DAPT  - Last dose of Brillinta was yesterday  - Will defer to AM doc to curb side cardiology about holding thinners    2. Chronic Afib s/p ablation - Presence of Watchman left atrial appendage closure device  3. ARSENIO on CPAP  4. CHF EF of 41%  5. Mixed hyperlipidemia  6. Subclinical hypothyroidism      VTE prophylaxis --  SCDs, if appropriate, add SQH  Diet -- NPO  Code Status -- Full,   Barriers to discharge -- Admitting/Acute medical condition/s  Discharge Disposition and goals --  Unable to determine at this point, pending progress/treatment response.     PPE - I was wearing PPE including but not limited to - N95 mask, Gloves, and/or Safety glasses.  Admission Status -- Observation    Care plan is based on available information and patient's condition at encounter. Care plan was discussed and agreed to proceed by the patient/family member. Made aware that care plan may change.     I recommend to review/revise history/care plan for information/results not available during my encounter. All or some home medication/s were not resumed or was modified on admission due to safety concerns. Will have rounding AM doc  review safety to resume held/modified home medications.     Availability -- If need to coordinate  care after night shift  -- Contact assigned AM rounding Hospitalist.     75 minutes spent by me on the date of service doing chart review, history, exam, diagnostic test results interpretation, care coordination with RN, RT and/or Pharm, & further activities per the note.      Eric Yu MD, MPH, FACP, [facility]  Internal Medicine - Hospitalist        Chief Complaint Abdomnal pain      HISTORY     - Patient was seen in ED - 8  - Recent Penile Prosthesis placement.  - Took catheter out yesterday, reported unable to deflate balloon - catheter out and balloon still partially inflated.   - Developed gross hematuria and clots. Also has lower abdominal pain. No fevers  - On Brillanta and ASA, and he resumed after prosthesis placement, but had to stop Brillinta dose last night, after talking to Urology given hematuria.     - ED course/treatments/referral - Urology ref, did not recommend CBC. UA suspicious for UTI.      - ROS --- No headache. No dizziness. No weakness. No CP or SOB. No palpitations. No abdominal pain. No nausea or vomiting.  No bleeding symptoms. No weight loss. Rest of 12 point ROS was reviewed and negative.       Past Medical History     Past Medical History:   Diagnosis Date    A-fib (H)     Antiplatelet or antithrombotic long-term use     Arrhythmia     CAD (coronary artery disease)     Cardiac arrest (H)     H/O extracorporeal membrane oxygenation treatment     Hypertension     Irregular heart beat     Sleep apnea     STEMI (ST elevation myocardial infarction) (H)     Stented coronary artery     Thyroid disease          Surgical History     Past Surgical History:   Procedure Laterality Date    ATRIAL ABLATION SURGERY      CARDIAC SURGERY      CERVICAL SPINE SURGERY      CV ARTERIAL LINE PLACEMENT N/A 02/17/2022    Procedure: Arterial Line Placement;  Surgeon: Larry Cintron MD;  Location:  [facility] CARDIAC CATH LAB    CV CORONARY ANGIOGRAM N/A 02/17/2022    Procedure: Coronary Angiogram;  Surgeon:  Larry Cintron MD;  Location: [facility] CARDIAC CATH LAB    CV CORONARY ANGIOGRAM N/A 04/22/2022    Procedure: Coronary Angiogram;  Surgeon: Larry Cintron MD;  Location: [facility] CARDIAC CATH LAB    CV EXTRACORPERAL MEMBRANE OXYGENATION N/A 02/17/2022    Procedure: Extracorporeal Membrance Oxygenation;  Surgeon: Larry Cintron MD;  Location: [facility] CARDIAC CATH LAB    CV INTRA AORTIC BALLOON N/A 02/17/2022    Procedure: Intra Aortic Balloon Pump Insertion;  Surgeon: Larry Cintron MD;  Location: [facility] CARDIAC CATH LAB    CV PCI N/A 04/22/2022    Procedure: Percutaneous Coronary Intervention;  Surgeon: Larry Cintron MD;  Location: [facility] CARDIAC CATH LAB    CV PCI ANGIOPLASTY N/A 02/17/2022    Procedure: Percutaneous Transluminal Angioplasty;  Surgeon: Larry Cintron MD;  Location: [facility] CARDIAC CATH LAB    CV THERAPEUTIC HYPOTHERMIA N/A 02/17/2022    Procedure: Therapeutic Hypothermia;  Surgeon: Larry Cintron MD;  Location: [facility] CARDIAC CATH LAB    HEAD & NECK SURGERY      IMPLANT PROSTHESIS PENIS RIGID N/A 1/25/2024    Procedure: INSERTION OF INFLATION OF SEMI RIGID PENILE PROSTHESIS;  Surgeon: Bryan Acevedo MD;  Location: [facility] Main OR    IR UPPER EXTREMITY ANGIOGRAM LEFT  02/20/2022    MIDLINE DOUBLE LUMEN PLACEMENT Left 02/28/2022    Left brachial vein medial 0.59cm.Blood return on all ports midline okay to use.    REMOVE EXTRACORPORAL MEMBRANE OXYGENATOR ADULT N/A 02/22/2022    Procedure: EXTRACORPOREAL MEMBRANE OXYGENATION CANNULA REMOVAL, INTRAOPERATIVE TRANSESOPHAGEAL ECHOCARDIOGRAM PER ANESTHESIA.;  Surgeon: Rod Banuelos MD;  Location:  OR        Family History      Family History   Problem Relation Age of Onset    Kidney Cancer Mother         s/p surgery    Kidney failure Mother     Leukemia Father 41        chemical exposure    No Known Problems Sister     No Known Problems Sister     No Known Problems Sister          Social History      .  Social  History     Socioeconomic History    Marital status:      Spouse name: Not on file    Number of children: Not on file    Years of education: Not on file    Highest education level: Not on file   Occupational History    Not on file   Tobacco Use    Smoking status: Never     Passive exposure: Never    Smokeless tobacco: Never   Vaping Use    Vaping Use: Never used   Substance and Sexual Activity    Alcohol use: Yes     Alcohol/week: 2.0 standard drinks of alcohol    Drug use: No    Sexual activity: Not on file   Other Topics Concern    Not on file   Social History Narrative    Not on file     Social Determinants of Health     Financial Resource Strain: Low Risk  (1/11/2024)    Financial Resource Strain     Within the past 12 months, have you or your family members you live with been unable to get utilities (heat, electricity) when it was really needed?: No   Food Insecurity: Low Risk  (1/11/2024)    Food Insecurity     Within the past 12 months, did you worry that your food would run out before you got money to buy more?: No     Within the past 12 months, did the food you bought just not last and you didn t have money to get more?: No   Transportation Needs: Low Risk  (1/11/2024)    Transportation Needs     Within the past 12 months, has lack of transportation kept you from medical appointments, getting your medicines, non-medical meetings or appointments, work, or from getting things that you need?: No   Physical Activity: Not on file   Stress: Not on file   Social Connections: Not on file   Interpersonal Safety: Low Risk  (10/25/2023)    Interpersonal Safety     Do you feel physically and emotionally safe where you currently live?: Yes     Within the past 12 months, have you been hit, slapped, kicked or otherwise physically hurt by someone?: No     Within the past 12 months, have you been humiliated or emotionally abused in other ways by your partner or ex-partner?: No   Housing Stability: Low Risk   "(1/11/2024)    Housing Stability     Do you have housing? : Yes     Are you worried about losing your housing?: No          Allergies        Allergies   Allergen Reactions    Chlorpheniramine-Phenylpropan [A.R.M.] Other (See Comments)     Watery eyes, sneezing    Seroquel [Quetiapine] Other (See Comments)     Per Meeta (Wife), would like this medication to be listed as an allergy due to patient becoming combative and delirious after taking it.     Percocet [Oxycodone-Acetaminophen] Rash     \"felt like skin was crawling off\"-pt's S.O.          Prior to Admission Medications      No current facility-administered medications on file prior to encounter.  allopurinol (ZYLOPRIM) 100 MG tablet, Take 1 tablet (100 mg) by mouth 2 times daily  aspirin (ASA) 81 MG chewable tablet, Take 1 tablet (81 mg) by mouth daily Starting tomorrow.  levothyroxine (SYNTHROID/LEVOTHROID) 25 MCG tablet, Take 1 tablet (25 mcg) by mouth daily  metoprolol succinate ER (TOPROL XL) 50 MG 24 hr tablet, Take 3 tablets (150 mg) by mouth daily  multivitamin, therapeutic (THERA-VIT) TABS tablet, Take 1 tablet by mouth daily  polyethylene glycol (MIRALAX) 17 GM/Dose powder, Take 17 g by mouth daily  rosuvastatin (CRESTOR) 20 MG tablet, Take 1 tablet (20 mg) by mouth every evening  sulfamethoxazole-trimethoprim (BACTRIM DS) 800-160 MG tablet, Take 1 tablet by mouth 2 times daily for 5 days  ticagrelor (BRILINTA) 90 MG tablet, Take 1 tablet (90 mg) by mouth 2 times daily            Review of Systems     A 12 point comprehensive review of systems was negative except as noted above in HPI.    PHYSICAL EXAMINATION       Vitals      Vitals: /69   Pulse 81   Temp 98.3  F (36.8  C) (Oral)   Resp 20   Ht 1.778 m (5' 10\")   Wt 103.4 kg (228 lb)   SpO2 98%   BMI 32.71 kg/m    BMI= Body mass index is 32.71 kg/m .      Examination     General Appearance:  Alert, cooperative, no distress  Head:    Normocephalic, without obvious abnormality, " atraumatic  EENT:  PERRL, conjunctiva/corneas clear, EOM's intact.   Neck:   Supple, symmetrical, trachea midline, no adenopathy; no NVE  Back:  Symmetric, no curvature, no CVA tenderness  Chest/Lungs: Clear to auscultation bilaterally, respirations unlabored, No tenderness or deformity. No abdominal breathing or use of accessory muscles.   Heart:    Regular rate and rhythm, S1 and S2 normal, no murmur, rub   or gallop  Abdomen: Soft, non-tender, bowel sounds active all four quadrants, not peritoneal on palpation. Not distended. Penis swollen, and also bruises on his scrotum, non tender   Extremities:  bilateral leg swelling   Skin:  Skin color, texture, turgor normal, no rashes or lesion  Neurologic:  Awake and alert, no lateralizing or localizing signs         Pertinent Lab     Results for orders placed or performed during the hospital encounter of 01/27/24   UA with Microscopic reflex to Culture    Specimen: Urine, Tapia Catheter   Result Value Ref Range    Color Urine Red (A) Colorless, Straw, Light Yellow, Yellow    Appearance Urine Cloudy (A) Clear    Glucose Urine Negative Negative mg/dL    Bilirubin Urine Negative Negative    Ketones Urine Negative Negative mg/dL    Specific Gravity Urine 1.019 1.001 - 1.030    Blood Urine >1.0 mg/dL (A) Negative    pH Urine 6.0 5.0 - 7.0    Protein Albumin Urine 100 (A) Negative mg/dL    Urobilinogen Urine <2.0 <2.0 mg/dL    Nitrite Urine Negative Negative    Leukocyte Esterase Urine 75 Argelia/uL (A) Negative    RBC Urine >182 (H) <=2 /HPF    WBC Urine >182 (H) <=5 /HPF   Result Value Ref Range    INR 1.04 0.85 - 1.15   Basic metabolic panel   Result Value Ref Range    Sodium 141 135 - 145 mmol/L    Potassium 4.7 3.4 - 5.3 mmol/L    Chloride 104 98 - 107 mmol/L    Carbon Dioxide (CO2) 26 22 - 29 mmol/L    Anion Gap 11 7 - 15 mmol/L    Urea Nitrogen 28.5 (H) 8.0 - 23.0 mg/dL    Creatinine 1.92 (H) 0.67 - 1.17 mg/dL    GFR Estimate 36 (L) >60 mL/min/1.73m2    Calcium 9.8 8.8 -  10.2 mg/dL    Glucose 129 (H) 70 - 99 mg/dL   Result Value Ref Range    aPTT 24 22 - 38 Seconds   CBC with platelets and differential   Result Value Ref Range    WBC Count 11.8 (H) 4.0 - 11.0 10e3/uL    RBC Count 3.73 (L) 4.40 - 5.90 10e6/uL    Hemoglobin 12.1 (L) 13.3 - 17.7 g/dL    Hematocrit 37.1 (L) 40.0 - 53.0 %     78 - 100 fL    MCH 32.4 26.5 - 33.0 pg    MCHC 32.6 31.5 - 36.5 g/dL    RDW 12.7 10.0 - 15.0 %    Platelet Count 159 150 - 450 10e3/uL    % Neutrophils 80 %    % Lymphocytes 12 %    % Monocytes 7 %    % Eosinophils 0 %    % Basophils 0 %    % Immature Granulocytes 1 %    NRBCs per 100 WBC 0 <1 /100    Absolute Neutrophils 9.5 (H) 1.6 - 8.3 10e3/uL    Absolute Lymphocytes 1.5 0.8 - 5.3 10e3/uL    Absolute Monocytes 0.8 0.0 - 1.3 10e3/uL    Absolute Eosinophils 0.0 0.0 - 0.7 10e3/uL    Absolute Basophils 0.0 0.0 - 0.2 10e3/uL    Absolute Immature Granulocytes 0.1 <=0.4 10e3/uL    Absolute NRBCs 0.0 10e3/uL           Pertinent Radiology

## 2024-01-27 NOTE — ED PROVIDER NOTES
EMERGENCY DEPARTMENT ENCOUnter      NAME: Chano Johnson  AGE: 73 year old male  YOB: 1950  MRN: [id number]  EVALUATION DATE & TIME: No admission date for patient encounter.    PCP: Liyah Cooley    ED PROVIDER: Kathia Muñoz MD      Chief Complaint   Patient presents with    Post-op Problem    Urinary Retention         FINAL IMPRESSION:  1. Gross hematuria    2. Urinary retention    3. Urinary tract infection with hematuria, site unspecified    4. Acute kidney injury (H24)          ED COURSE & MEDICAL DECISION MAKING:      In summary, the patient is a 73-year-old anticoagulated male that is 2 days status post placement of semirigid penile prosthesis.  His Tapia catheter was removed at home yesterday and was likely traumatic since the balloon was not fully deflated.  After the catheter removal he had a significant amount of bleeding and then developed urinary retention causing him to come to the emergency department several hours later.  Tapia catheter placement in the emergency department revealed grossly bloody urine with approximately 1 L of urine initially out after Tapia placement.  Urologic consultation recommended observation in the hospital and manual irrigation should the Tapia catheter become obstructed.  Will admit to the hospital for further care and evaluation.    0142- Per nursing report, patient's bladder scan showed 757 ml.  A Uro-Jet was used prior to Tapia catheter placement  0148- I met with the patient, obtained history, performed an initial exam, and discussed options and plan for diagnostics and treatment here in the ED. Approximately 1 L of grossly bloody urine was initially drained.  Patient had complete relief of his abdominal pain with this intervention.  Normal saline 500 mL bolus was administered.  Zosyn 3.375 g IV was administered for initiation of antibiotic therapy for possible urinary tract infection  0207- I spoke with Dr. Kimble, urology.  She  recommends observation in the hospital.  She recommends manual irrigation should his Tapia catheter become obstructed.  They will see the patient later today in the hospital.  0326- I discussed case with Dr. Yu, hospitalist, who accepts the patient for admission.     Medical Decision Making  Obtained supplemental history:Supplemental history obtained?: Documented in chart  Reviewed external records: External records reviewed?: Documented in chart and Inpatient Record: Admission on 1/25/24  Care impacted by chronic illness:Chronic Kidney Disease, Heart Disease, and Other: BPH  Care significantly affected by social determinants of health:Access to Medical Care  Did you consider but not order tests?: Work up considered but not performed and documented in chart, if applicable  Did you interpret images independently?: Independent interpretation of ECG and images noted in documentation, when applicable.  Consultation discussion with other provider:Did you involve another provider (consultant, , pharmacy, etc.)?: I discussed the care with another health care provider, see documentation for details.  Admit.      At the conclusion of the encounter I discussed the results of all of the tests and the disposition. The questions were answered. The patient or family acknowledged understanding and was agreeable with the care plan.         MEDICATIONS GIVEN IN THE EMERGENCY:  Medications   piperacillin-tazobactam (ZOSYN) 3.375 g vial to attach to  mL bag (has no administration in time range)   sodium chloride 0.9% BOLUS 500 mL (has no administration in time range)   lidocaine (XYLOCAINE) 2 % external gel 10 mL (10 mLs Urethral $Given 1/27/24 6025)       NEW PRESCRIPTIONS STARTED AT TODAY'S ER VISIT  New Prescriptions    No medications on file          =================================================================    HPI        Chano Johnson is a 73 year old male with a pertinent history of CHF, CAD,  hyperlipidemia, chronic atrial fibrillation, STEMI, BPH, CKD, who presents to this ED via walk-in for evaluation of urinary retention.    Patient reports he had a penile implant placed two days ago and had a hopper catheter placed afterwards. Patient removed hopper yesterday morning. Wife notes that when removing the hopper, she was unable to fully deflate the balloon. After removing hopper, patient reports difficulties urinating as well as bleeding with clots from hopper site. Patient reports associating abdominal pain, which he describes as 7-8/10. Otherwise, he reports a history of heart attack and is on Brilinta. He mentions that blood thinner dose two days ago at night and yesterday morning. None since then. He reports multiple allergies to medications. Patient does not smoke or drink. No other complaints at this time.    Per chart review, patient underwent insertion of inflation of semi rigid penile prosthesis on 1/25/24 for erectile dysfunction. Hopper catheter placed.     REVIEW OF SYSTEMS     Constitutional:  Denies fever or chills  HENT:  Denies sore throat   Respiratory:  Denies cough or shortness of breath   Cardiovascular:  Denies chest pain or palpitations  GI:  Denies nausea, or vomiting. Positive for abdominal pain  : Positive for urinary retention and hematuria  Musculoskeletal:  Denies any new extremity pain   Skin:  Denies rash   Neurologic:  Denies headache, focal weakness or sensory changes    All other systems reviewed and are negative      PAST MEDICAL HISTORY:  Past Medical History:   Diagnosis Date    A-fib (H)     Antiplatelet or antithrombotic long-term use     Arrhythmia     CAD (coronary artery disease)     Cardiac arrest (H)     H/O extracorporeal membrane oxygenation treatment     Hypertension     Irregular heart beat     Sleep apnea     STEMI (ST elevation myocardial infarction) (H)     Stented coronary artery     Thyroid disease        PAST SURGICAL HISTORY:  Past Surgical History:    Procedure Laterality Date    ATRIAL ABLATION SURGERY      CARDIAC SURGERY      CERVICAL SPINE SURGERY      CV ARTERIAL LINE PLACEMENT N/A 02/17/2022    Procedure: Arterial Line Placement;  Surgeon: Larry Cintron MD;  [facility]    CV CORONARY ANGIOGRAM N/A 02/17/2022    Procedure: Coronary Angiogram;  Surgeon: Larry Cintron MD;  [facility]    CV CORONARY ANGIOGRAM N/A 04/22/2022    Procedure: Coronary Angiogram;  Surgeon: Larry Cintron MD;  [facility]    CV EXTRACORPERAL MEMBRANE OXYGENATION N/A 02/17/2022    Procedure: Extracorporeal Membrance Oxygenation;  Surgeon: Larry Cintron MD;  [facility]    CV INTRA AORTIC BALLOON N/A 02/17/2022    Procedure: Intra Aortic Balloon Pump Insertion;  Surgeon: Larry Cintron MD;  Location:  [facility]    CV PCI N/A 04/22/2022    Procedure: Percutaneous Coronary Intervention;  Surgeon: Larry Cintron MD;  Location: [facility] CARDIAC CATH LAB    CV PCI ANGIOPLASTY N/A 02/17/2022    Procedure: Percutaneous Transluminal Angioplasty;  Surgeon: Larry Cintron MD;  Location:  [facility]ARD[facility]    CV THERAPEUTIC HYPOTHERMIA N/A 02/17/2022    Procedure: Therapeutic Hypothermia;  Surgeon: Larry Cintron MD;  Location: [facility] CARDIAC CATH LAB    HEAD & NECK SURGERY      IMPLANT PROSTHESIS PENIS RIGID N/A 1/25/2024    Procedure: INSERTION OF INFLATION OF SEMI RIGID PENILE PROSTHESIS;  Surgeon: Bryan Acevedo MD;  Location: [facility] Main OR    IR UPPER EXTREMITY ANGIOGRAM LEFT  02/20/2022    MIDLINE DOUBLE LUMEN PLACEMENT Left 02/28/2022    Left brachial vein medial 0.59cm.Blood return on all ports midline okay to use.    REMOVE EXTRACORPORAL MEMBRANE OXYGENATOR ADULT N/A 02/22/2022    Procedure: EXTRACORPOREAL MEMBRANE OXYGENATION CANNULA REMOVAL, INTRAOPERATIVE TRANSESOPHAGEAL ECHOCARDIOGRAM PER ANESTHESIA.;  Surgeon: Rod Banuelos  "MD Siva;  Location:  OR           CURRENT MEDICATIONS:    allopurinol (ZYLOPRIM) 100 MG tablet  aspirin (ASA) 81 MG chewable tablet  levothyroxine (SYNTHROID/LEVOTHROID) 25 MCG tablet  metoprolol succinate ER (TOPROL XL) 50 MG 24 hr tablet  multivitamin, therapeutic (THERA-VIT) TABS tablet  polyethylene glycol (MIRALAX) 17 GM/Dose powder  rosuvastatin (CRESTOR) 20 MG tablet  sulfamethoxazole-trimethoprim (BACTRIM DS) 800-160 MG tablet  ticagrelor (BRILINTA) 90 MG tablet        ALLERGIES:  Allergies   Allergen Reactions    Chlorpheniramine-Phenylpropan [A.R.M.] Other (See Comments)     Watery eyes, sneezing    Seroquel [Quetiapine] Other (See Comments)     Per Meeta (Wife), would like this medication to be listed as an allergy due to patient becoming combative and delirious after taking it.     Percocet [Oxycodone-Acetaminophen] Rash     \"felt like skin was crawling off\"-pt's S.O.        FAMILY HISTORY:  Family History   Problem Relation Age of Onset    Kidney Cancer Mother         s/p surgery    Kidney failure Mother     Leukemia Father 41        chemical exposure    No Known Problems Sister     No Known Problems Sister     No Known Problems Sister        SOCIAL HISTORY:   Social History     Socioeconomic History    Marital status:      Spouse name: None    Number of children: None    Years of education: None    Highest education level: None   Tobacco Use    Smoking status: Never     Passive exposure: Never    Smokeless tobacco: Never   Vaping Use    Vaping Use: Never used   Substance and Sexual Activity    Alcohol use: Yes     Alcohol/week: 2.0 standard drinks of alcohol    Drug use: No     Social Determinants of Health     Financial Resource Strain: Low Risk  (1/11/2024)    Financial Resource Strain     Within the past 12 months, have you or your family members you live with been unable to get utilities (heat, electricity) when it was really needed?: No   Food Insecurity: Low Risk  (1/11/2024)    Food " "Insecurity     Within the past 12 months, did you worry that your food would run out before you got money to buy more?: No     Within the past 12 months, did the food you bought just not last and you didn t have money to get more?: No   Transportation Needs: Low Risk  (1/11/2024)    Transportation Needs     Within the past 12 months, has lack of transportation kept you from medical appointments, getting your medicines, non-medical meetings or appointments, work, or from getting things that you need?: No   Interpersonal Safety: Low Risk  (10/25/2023)    Interpersonal Safety     Do you feel physically and emotionally safe where you currently live?: Yes     Within the past 12 months, have you been hit, slapped, kicked or otherwise physically hurt by someone?: No     Within the past 12 months, have you been humiliated or emotionally abused in other ways by your partner or ex-partner?: No   Housing Stability: Low Risk  (1/11/2024)    Housing Stability     Do you have housing? : Yes     Are you worried about losing your housing?: No       VITALS:  Patient Vitals for the past 24 hrs:   BP Temp Temp src Pulse Resp SpO2 Height Weight   01/27/24 0201 138/69 98.3  F (36.8  C) Oral 81 20 98 % 1.778 m (5' 10\") 103.4 kg (228 lb)       PHYSICAL EXAM    Constitutional:  Well developed, Well nourished,  HENT:  Normocephalic, Atraumatic, Bilateral external ears normal, Oropharynx moist, Nose normal.   Neck:  Normal range of motion, No meningismus, No stridor.   Eyes:  EOMI, Conjunctiva normal, No discharge.   Respiratory:  Normal breath sounds, No respiratory distress, No wheezing, No chest tenderness.   Cardiovascular:  Normal heart rate, Normal rhythm, No murmurs  GI:  Soft, mild suprapubic tenderness, No guarding, No CVA tenderness.   Musculoskeletal:  Neurovascularly intact distally, No edema, No tenderness, No cyanosis, Good range of motion in all major joints. No tenderness to palpation or major deformities noted.   Integument: "  Warm, Dry, No erythema, No rash.   Lymphatic:  No lymphadenopathy noted.   Neurologic:  Alert & oriented, Normal motor function,  No focal deficits noted.   Psychiatric:  Affect normal, Judgment normal, Mood normal.   -penis and surrounding soft tissue is ecchymotic and mildly edematous     LAB:  All pertinent labs reviewed and interpreted.  Results for orders placed or performed during the hospital encounter of 01/27/24   UA with Microscopic reflex to Culture    Specimen: Urine, Tapia Catheter   Result Value Ref Range    Color Urine Red (A) Colorless, Straw, Light Yellow, Yellow    Appearance Urine Cloudy (A) Clear    Glucose Urine Negative Negative mg/dL    Bilirubin Urine Negative Negative    Ketones Urine Negative Negative mg/dL    Specific Gravity Urine 1.019 1.001 - 1.030    Blood Urine >1.0 mg/dL (A) Negative    pH Urine 6.0 5.0 - 7.0    Protein Albumin Urine 100 (A) Negative mg/dL    Urobilinogen Urine <2.0 <2.0 mg/dL    Nitrite Urine Negative Negative    Leukocyte Esterase Urine 75 Argelia/uL (A) Negative    RBC Urine >182 (H) <=2 /HPF    WBC Urine >182 (H) <=5 /HPF   Result Value Ref Range    INR 1.04 0.85 - 1.15   Basic metabolic panel   Result Value Ref Range    Sodium 141 135 - 145 mmol/L    Potassium 4.7 3.4 - 5.3 mmol/L    Chloride 104 98 - 107 mmol/L    Carbon Dioxide (CO2) 26 22 - 29 mmol/L    Anion Gap 11 7 - 15 mmol/L    Urea Nitrogen 28.5 (H) 8.0 - 23.0 mg/dL    Creatinine 1.92 (H) 0.67 - 1.17 mg/dL    GFR Estimate 36 (L) >60 mL/min/1.73m2    Calcium 9.8 8.8 - 10.2 mg/dL    Glucose 129 (H) 70 - 99 mg/dL   Asymptomatic COVID-19 Virus (Coronavirus) by PCR Nasopharyngeal    Specimen: Nasopharyngeal; Swab   Result Value Ref Range    SARS CoV2 PCR Negative Negative   Result Value Ref Range    aPTT 24 22 - 38 Seconds   CBC with platelets and differential   Result Value Ref Range    WBC Count 11.8 (H) 4.0 - 11.0 10e3/uL    RBC Count 3.73 (L) 4.40 - 5.90 10e6/uL    Hemoglobin 12.1 (L) 13.3 - 17.7 g/dL     Hematocrit 37.1 (L) 40.0 - 53.0 %     78 - 100 fL    MCH 32.4 26.5 - 33.0 pg    MCHC 32.6 31.5 - 36.5 g/dL    RDW 12.7 10.0 - 15.0 %    Platelet Count 159 150 - 450 10e3/uL    % Neutrophils 80 %    % Lymphocytes 12 %    % Monocytes 7 %    % Eosinophils 0 %    % Basophils 0 %    % Immature Granulocytes 1 %    NRBCs per 100 WBC 0 <1 /100    Absolute Neutrophils 9.5 (H) 1.6 - 8.3 10e3/uL    Absolute Lymphocytes 1.5 0.8 - 5.3 10e3/uL    Absolute Monocytes 0.8 0.0 - 1.3 10e3/uL    Absolute Eosinophils 0.0 0.0 - 0.7 10e3/uL    Absolute Basophils 0.0 0.0 - 0.2 10e3/uL    Absolute Immature Granulocytes 0.1 <=0.4 10e3/uL    Absolute NRBCs 0.0 10e3/uL                 I, Elza Schwartz, am serving as a scribe to document services personally performed by Dr. Muñoz based on my observation and the provider's statements to me. I, Kathia Muñoz MD attest that Elza Schwartz is acting in a scribe capacity, has observed my performance of the services and has documented them in accordance with my direction.    Kathia Muñoz MD  Emergency Medicine  [facility] EMERGENCY ROOM  [address] [facility] [id number] [phone]  Dept: [phone]     Kathia Muñoz MD  01/27/24 0332

## 2024-01-27 NOTE — ED TRIAGE NOTES
Pt comes in with C/O of urinary retention. Pt hasn't voided since 1100 on Friday after hopper catheter removal. Pt had surgery on Thursday. Wife removed the hopper but wasn't able to completely deflate the balloon. Pt complaining of clots and bloody urine when he has the urgency to go but cannot fully urinate on his own.    Triage Assessment (Adult)       Row Name 01/27/24 0142          Triage Assessment    Airway WDL WDL        Respiratory WDL    Respiratory WDL WDL        Skin Circulation/Temperature WDL    Skin Circulation/Temperature WDL WDL        Cardiac WDL    Cardiac WDL WDL        Peripheral/Neurovascular WDL    Peripheral Neurovascular WDL WDL        Cognitive/Neuro/Behavioral WDL    Cognitive/Neuro/Behavioral WDL WDL

## 2024-01-27 NOTE — CONSULTS
[facility] CONSULT     Type of Consult: observation   Place of Service:  [facility]  Reason for Consultation: Gross hematuria  Consult Requested by: Dr. Monroe    History of Present Illness:    Chano Johnson is a 73 year old male that is admitted for Acute kidney injury (H24). Urology has been consulted due to blood in the urine. History obtained through patient,  and chart review.     Patient reports that he first noted the blood in the urine yesterday after wife removed hopper with balloon still partially inflated. He is s/p penile prosthesis 1/25 per Dr. Vidal. He spoke with his primary urologist Dr. Acevedo who recommended to hold blood thinner and continue antibiotics and monitor. He was advised to go to the ER if he developed urinary retention. He presented to the ED last night/early this morning as he was not able to void well on his own and was passing significant blood clots. Hopper was placed in the ED.    Past Medical history  Past Medical History:   Diagnosis Date    A-fib (H)     Antiplatelet or antithrombotic long-term use     Arrhythmia     CAD (coronary artery disease)     Cardiac arrest (H)     H/O extracorporeal membrane oxygenation treatment     Hypertension     Irregular heart beat     Sleep apnea     STEMI (ST elevation myocardial infarction) (H)     Stented coronary artery     Thyroid disease        Past Surgical history  Past Surgical History:   Procedure Laterality Date    ATRIAL ABLATION SURGERY      CARDIAC SURGERY      CERVICAL SPINE SURGERY      CV ARTERIAL LINE PLACEMENT N/A 02/17/2022    Procedure: Arterial Line Placement;  Surgeon: Larry Cintron MD;  Location: [facility] CARDIAC CATH LAB    CV CORONARY ANGIOGRAM N/A 02/17/2022    Procedure: Coronary Angiogram;  Surgeon: Larry Cintron MD;  Location: [facility] CARDIAC CATH LAB    CV CORONARY ANGIOGRAM N/A 04/22/2022    Procedure: Coronary Angiogram;  Surgeon: Larry Cintron MD;  Location: [facility] CARDIAC CATH LAB     CV EXTRACORPERAL MEMBRANE OXYGENATION N/A 02/17/2022    Procedure: Extracorporeal Membrance Oxygenation;  Surgeon: Larry Cintron MD;  Location:  [facility]    CV INTRA AORTIC BALLOON N/A 02/17/2022    Procedure: Intra Aortic Balloon Pump Insertion;  Surgeon: Larry Cintron MD;  Location: [facility] CARDIAC CATH LAB    CV PCI N/A 04/22/2022    Procedure: Percutaneous Coronary Intervention;  Surgeon: Larry Cintron MD;  Location: [facility] CARDIAC CATH LAB    CV PCI ANGIOPLASTY N/A 02/17/2022    Procedure: Percutaneous Transluminal Angioplasty;  Surgeon: Larry Cintron MD;  Location: [facility] CARDIAC CATH LAB    CV THERAPEUTIC HYPOTHERMIA N/A 02/17/2022    Procedure: Therapeutic Hypothermia;  Surgeon: Larry Cintron MD;  Location: [facility] CARDIAC CATH LAB    HEAD & NECK SURGERY      IMPLANT PROSTHESIS PENIS RIGID N/A 1/25/2024    Procedure: INSERTION OF INFLATION OF SEMI RIGID PENILE PROSTHESIS;  Surgeon: Bryan Acevedo MD;  Location: [facility] Main OR    IR UPPER EXTREMITY ANGIOGRAM LEFT  02/20/2022    MIDLINE DOUBLE LUMEN PLACEMENT Left 02/28/2022    Left brachial vein medial 0.59cm.Blood return on all ports midline okay to use.    REMOVE EXTRACORPORAL MEMBRANE OXYGENATOR ADULT N/A 02/22/2022    Procedure: EXTRACORPOREAL MEMBRANE OXYGENATION CANNULA REMOVAL, INTRAOPERATIVE TRANSESOPHAGEAL ECHOCARDIOGRAM PER ANESTHESIA.;  Surgeon: Rod Banuelos MD;  Location:  OR       Social History  Social History     Tobacco Use    Smoking status: Never     Passive exposure: Never    Smokeless tobacco: Never   Vaping Use    Vaping Use: Never used   Substance Use Topics    Alcohol use: Yes     Alcohol/week: 2.0 standard drinks of alcohol    Drug use: No       Medications  Current Facility-Administered Medications   Medication    ondansetron (ZOFRAN ODT) ODT tab 4 mg    Or    ondansetron (ZOFRAN) injection 4 mg    piperacillin-tazobactam (ZOSYN) 3.375 g vial to attach to  mL bag     "senna-docusate (SENOKOT-S/PERICOLACE) 8.6-50 MG per tablet 1 tablet    Or    senna-docusate (SENOKOT-S/PERICOLACE) 8.6-50 MG per tablet 2 tablet    sodium chloride 0.9 % infusion     Current Outpatient Medications   Medication    allopurinol (ZYLOPRIM) 100 MG tablet    aspirin (ASA) 81 MG chewable tablet    levothyroxine (SYNTHROID/LEVOTHROID) 25 MCG tablet    metoprolol succinate ER (TOPROL XL) 50 MG 24 hr tablet    multivitamin, therapeutic (THERA-VIT) TABS tablet    polyethylene glycol (MIRALAX) 17 GM/Dose powder    rosuvastatin (CRESTOR) 20 MG tablet    sulfamethoxazole-trimethoprim (BACTRIM DS) 800-160 MG tablet    ticagrelor (BRILINTA) 90 MG tablet       Allergies  Allergies   Allergen Reactions    Chlorpheniramine-Phenylpropan [A.R.M.] Other (See Comments)     Watery eyes, sneezing    Seroquel [Quetiapine] Other (See Comments)     Per Meeta (Wife), would like this medication to be listed as an allergy due to patient becoming combative and delirious after taking it.     Percocet [Oxycodone-Acetaminophen] Rash     \"felt like skin was crawling off\"-pt's S.O.        ROS:   12 point review of systems was taken and is negative aside from what is noted above in the HPI.     Physical Exam:  /66   Pulse 66   Temp 98.4  F (36.9  C) (Oral)   Resp 18   Ht 1.778 m (5' 10\")   Wt 103.4 kg (228 lb)   SpO2 99%   BMI 32.71 kg/m    General: NAD, alert, cooperative  Head: normocephalic, without abnormality / atraumatic   Abdomen: soft, non tender,  non distended. non suprapubic fullness or tenderness. no CVA tenderness   Geniturinary: circumducted, semi firm penile prosthesis, 16 fr hopper in place draining clear blood tinged urine without clot or sediment. Manual hand irrigation done with about 240 ml with 2 small clot return and urine clearing. Blood clots from around hopper catheter at urethral meatus. Non tender penis and scrotum   Extremities: no calf edema or tenderness  Skin: no rashes or " lesions  Musculoskeletal: moves all extremities equally  Psychological: alert and oriented, answers questions appropriately      Labs:   WBC Count   Date Value Ref Range Status   01/27/2024 9.5 4.0 - 11.0 10e3/uL Final     Hemoglobin   Date Value Ref Range Status   01/27/2024 11.1 (L) 13.3 - 17.7 g/dL Final   ]  Creatinine   Date Value Ref Range Status   01/27/2024 1.79 (H) 0.67 - 1.17 mg/dL Final     INR/Prothrombin Time    Assessment/Plan: Chano Johnson is being seen by [facility] for gross hematuria.     - Hematuria from traumatic hopper removal and in setting of anticoagulation. Will take time for bleeding to stop and heal.   - Most bleeding seems to be from distal urethra verse more prostatic as minimal blood clots with manual hand irrigation. Urine draining well from hopper catheter. Minimal clot return with manual hand irrigation.   - Continue to hold blood thinners until at least 1/29.   - Continue Bactrim antibiotics as prescribed.   - Continue to observe patient over the next 2-4 hours and hand irrigate at least once with 120 ml.   - If urine continues to drain well without issues, okay to discharge home with hopper catheter and irrigation supplies (in case they are needed).   - Will arrange TOV in clinic Tuesday or Wednesday of next week. Email sent now.   - Reasonable to observe overnight if bleeding worsens or catheter draining issues.     This case was discussed with:  Dr. Bryan Acevedo yesterday prior to patient presenting to the ED and Dr. Chavez today.    Thank you for consulting [facility] regarding this patient's care. Please contact us with questions or concerns.       Jaylin Kimble PA-C  [facility]  [phone]

## 2024-01-28 LAB — BACTERIA UR CULT: NO GROWTH

## 2024-01-29 ENCOUNTER — PATIENT OUTREACH (OUTPATIENT)
Dept: CARE COORDINATION | Facility: CLINIC | Age: 74
End: 2024-01-29
Payer: MEDICARE

## 2024-01-29 NOTE — PROGRESS NOTES
Clinic Care Coordination Contact  [facility]: Post-Discharge Note  SITUATION                                                      Admission:    Admission Date: 01/27/24   Reason for Admission: blood in urine, urinary retention  Discharge:   Discharge Date: 01/27/24  Discharge Diagnosis: 1.  Gross hematuria   2.  Urinary retention   3.  Urinary tract infection with hematuria, site unspecified   4.  Acute kidney injury (H24)    BACKGROUND                                                      Per hospital discharge summary and inpatient provider notes:  73-year-old anticoagulated male that is 2 days status post placement of semirigid penile prosthesis.  His Tapia catheter was removed at home yesterday and was likely traumatic since the balloon was not fully deflated.  After the catheter removal he had a significant amount of bleeding and then developed urinary retention causing him to come to the emergency department several hours later.  Tapia catheter placement in the emergency department revealed grossly bloody urine with approximately 1 L of urine initially out after Tapia placement.  Urologic consultation recommended observation in the hospital and manual irrigation should the Taipa catheter become obstructed.  Will admit to the hospital for further care and evaluation.     0142- Per nursing report, patient's bladder scan showed 757 ml.  A Uro-Jet was used prior to Tapia catheter placement  0148- I met with the patient, obtained history, performed an initial exam, and discussed options and plan for diagnostics and treatment here in the ED. Approximately 1 L of grossly bloody urine was initially drained.  Patient had complete relief of his abdominal pain with this intervention.  Normal saline 500 mL bolus was administered.  Zosyn 3.375 g IV was administered for initiation of antibiotic therapy for possible urinary tract infection  0207- I spoke with Dr. Kimble, urology.  She recommends observation in the  [facility].  She recommends manual irrigation should his Tapia catheter become obstructed.  They will see the patient later today in the hospital.  0326- I discussed case with Dr. Yu, hospitalist, who accepts the patient for admission.     ASSESSMENT           Discharge Assessment  How are you doing now that you are home?: ok  How are your symptoms? (Red Flag symptoms escalate to triage hotline per guidelines): Improved  Do you feel your condition is stable enough to be safe at home until your provider visit?: Yes  Does the patient have their discharge instructions? : Yes  Does the patient have questions regarding their discharge instructions? : No  Were you started on any new medications or were there changes to any of your previous medications? : Yes  Does the patient have all of their medications?: Yes  Do you have questions regarding any of your medications? : No  Do you have all of your needed medical supplies or equipment (DME)?  (i.e. oxygen tank, CPAP, cane, etc.): Yes  Discharge follow-up appointment scheduled within 14 calendar days? : Yes  Discharge Follow Up Appointment Date: 01/30/24 (Urologist)  Discharge Follow Up Appointment Scheduled with?: Specialty Care Provider         Post-op (Clinicians Only)  Did the patient have surgery or a procedure: No  Fever: No  Chills: No  Eating & Drinking: eating and drinking without complaints/concerns  PO Intake: regular diet  Bowel Function: normal  Date of last BM: 01/29/24  Urinary Status: indwelling urinary catheter    Care Management       Care Mgmt General Assessment      CCC RN spoke with patient today for follow up after recent hospitalization. Patient stated he was doing ok. He reported he was not happy about having a catheter, but will meet with urology tomorrow to determine if it can be taken out. Patient reported he taking all of his medications as directed. He stated he did not know if it was necessary to see his PCP for a follow up at his time.  Patient said he will know more after his appointment with the urologist tomorrow, and will contact the Clinic to schedule with his PCP if he finds it necessary. No other needs or concerns.                    PLAN                                                      Outpatient Plan: Home    Future Appointments   Date Time Provider Department Center   2/5/2024  7:00 AM UU LAB GOLD WAITING [facility]OP[facility]   2/5/2024  8:00 AM [id number] UUCT [facility]   2/5/2024 10:30 AM [id number] UCV[facility]   2/5/2024 12:30 PM  CRITICAL CARE [facility]   10/31/2024 10:20 AM Liyah Cooley MD [facility]         For any urgent concerns, please contact our 24 hour nurse triage line: [phone] ([phone])         David J Myhre, RN

## 2024-01-30 ENCOUNTER — HOSPITAL ENCOUNTER (EMERGENCY)
Facility: HOSPITAL | Age: 74
Discharge: HOME | End: 2024-01-30
Attending: STUDENT IN AN ORGANIZED HEALTH CARE EDUCATION/TRAINING PROGRAM | Admitting: STUDENT IN AN ORGANIZED HEALTH CARE EDUCATION/TRAINING PROGRAM
Payer: MEDICARE

## 2024-01-30 ENCOUNTER — TRANSFERRED RECORDS (OUTPATIENT)
Dept: HEALTH INFORMATION MANAGEMENT | Facility: CLINIC | Age: 74
End: 2024-01-30
Payer: MEDICARE

## 2024-01-30 VITALS
RESPIRATION RATE: 18 BRPM | OXYGEN SATURATION: 100 % | SYSTOLIC BLOOD PRESSURE: 138 MMHG | HEIGHT: 71 IN | WEIGHT: 224.8 LBS | BODY MASS INDEX: 31.47 KG/M2 | HEART RATE: 68 BPM | DIASTOLIC BLOOD PRESSURE: 82 MMHG | TEMPERATURE: 97.9 F

## 2024-01-30 DIAGNOSIS — T83.9XXA FOLEY CATHETER PROBLEM, INITIAL ENCOUNTER (H): ICD-10-CM

## 2024-01-30 PROCEDURE — 51798 US URINE CAPACITY MEASURE: CPT

## 2024-01-30 PROCEDURE — 99284 EMERGENCY DEPT VISIT MOD MDM: CPT | Mod: 25

## 2024-01-30 ASSESSMENT — ACTIVITIES OF DAILY LIVING (ADL): ADLS_ACUITY_SCORE: 35

## 2024-01-31 NOTE — ED NOTES
Expected Patient Referral to ED  7:00 PM    Referring Clinic/Provider:  [facility]    Reason for referral/Clinical facts:  72 yo with history of penile prosthesis placed 5 days ago.  Tried to take out catheter without deflating the baloon and admitted to 's.  Patient now with bladder spasm.  Urologist would like to see patient when he arrives.  Will need room.    Will need a catheter tipped syringe, some sterile fluid and difficult catheter cart.     Please bladder scan on arrival.    Recommendations provided:  Send to ED for further evaluation    Caller was informed that this institution does possess the capabilities and/or resources to provide for patient and should be transferred to our facility.    Discussed that if direct admit is sought and any hurdles are encountered, this ED would be happy to see the patient and evaluate.    Informed caller that recommendations provided are recommendations based only on the facts provided and that they responsible to accept or reject the advice, or to seek a formal in person consultation as needed and that this ED will see/treat patient should they arrive.      Shahbaz Berman MD  [facility] EMERGENCY DEPARTMENT  [address] [place] [id number] [phone]       Shahbaz Berman MD  01/30/24 8121

## 2024-01-31 NOTE — ED PROVIDER NOTES
EMERGENCY DEPARTMENT ENCOUNTER      NAME: Chano Johnson  AGE: 73 year old male  YOB: 1950  MRN: [id number]  EVALUATION DATE & TIME: No admission date for patient encounter.    PCP: Liyah Cooley    ED PROVIDER: Shahbaz Berman M.D.      Chief Complaint   Patient presents with    Catheter Problem         FINAL IMPRESSION:  1. Tapia catheter problem, initial encounter (H24)          ED COURSE & MEDICAL DECISION MAKING:    Pertinent Labs & Imaging studies reviewed. (See chart for details)  73 year old male presents to the Emergency Department for evaluation of a Tapia catheter problem.  I had heard from Dr. Wadsworth the urologist prior to the patient's arrival.  He had asked that we have the difficult catheter kit available at bedside.  I saw the patient initially there was no signs of trauma or infection at the catheter site.  There was some urine in the bladder.  Bedside bladder scan showed around 280 cc of urine.  We suspect catheter malfunction.  No signs of systemic illness.  Do not believe that significant blood work or urine were warranted although I did consider this I think it is unlikely the patient has any significant derangements.    Dr. Miramontes was able to address the catheter and felt the patient was ready for discharge.    At the conclusion of the encounter I discussed the results of all of the tests and the disposition. The questions were answered. The patient or family acknowledged understanding and was agreeable with the care plan.        8:10 PM I met with the patient for the initial interview and physical examination. Discussed plan for treatment and workup in the ED.   8:26 PM I spoke with Dr. Miramontes, [facility], about the patient.      9:26 PM We discussed the plan for discharge and the patient is agreeable. Reviewed supportive cares, symptomatic treatment, outpatient follow up, and reasons to return to the Emergency Department. All questions and concerns were addressed.  Patient to be discharged by ED RN.        Medical Decision Making  Obtained supplemental history:Supplemental history obtained?: No  Reviewed external records: External records reviewed?: Documented in chart and Outpatient Record: [facility] 1/30/24, [facility] 1/27/24 & 1/25/24  Care impacted by chronic illness:Chronic Kidney Disease, Heart Disease, Hyperlipidemia, and Other: STEMI, atrial fibrillation s/p Watchman, s/p stent, UTI, urinary retention  Care significantly affected by social determinants of health:N/A  Did you consider but not order tests?: Work up considered but not performed and documented in chart, if applicable  Did you interpret images independently?: Independent interpretation of ECG and images noted in documentation, when applicable.  Consultation discussion with other provider:Did you involve another provider (consultant, , pharmacy, etc.)?: I discussed the care with another health care provider, see documentation for details.  Discharge. No recommendations on prescription strength medication(s). See documentation for any additional details.    This patient involved a high degree of complexity in medical decision making, as significant risks were present and assessed. Recent encounters & results in medical record reviewed by me.     All workup (i.e. any EKG/labs/imaging as per charting below) reviewed and independently interpreted by me. See respective sections for details.       minutes of critical care time     MEDICATIONS GIVEN IN THE EMERGENCY:  Medications - No data to display    NEW PRESCRIPTIONS STARTED AT TODAY'S ER VISIT  Discharge Medication List as of 1/30/2024  9:23 PM             =================================================================    HPI    Patient information was obtained from: patient         Chano Johnson is a 73 year old male with a pertinent history of CKD3, UTI, urinary retention, CHF CAD s/p stent placement, hyperlipidemia, STEMI, atrial  fibrillation s/p watchman who presents for evaluation of catheter problem.     Per chart review:   1/30/24: Patient was seen at [facility] for retention evaluation following catheter placed on his penile implant placement on 1/25. Catheter was left in. Patient was sent to [facility] ED for further evaluation of catheter issue because urine started coming out around catheter.  1/27/24: Patient was seen at [facility] ER for gross hematuria and urinary retention. Tapia had been removed forcefully due to balloon not being fully deflated. Had bleeding and retention. Tapia placed and revealed 1L grossly bloody urine. UA showed UTI and patient was given IV Zosyn. Irrigation was done with Tapia without difficulty and hematuria was decreased. Urology cleared for discharge. Follow-up with urology in 1 week and hold asprin for the next 2 days.   1/25/24: Patient was seen at [facility] ad a inflation of semi rigid penile prosthesis for erectile dysfunction.     Patient reports he was seen by urology today who decided to leave catheter in for awhile longer. Patient and wife left but ~230 PM he felt something warm on his leg, checked the leg bag he was wearing and did not see any urine in it. They changed his bag, but still no urine output. Patient had urinated around the catheter 4-5x today. Denies blood from penis, hematuria, fever.     REVIEW OF SYSTEMS   See HPI    PAST MEDICAL HISTORY:  Past Medical History:   Diagnosis Date    A-fib (H)     Antiplatelet or antithrombotic long-term use     Arrhythmia     CAD (coronary artery disease)     Cardiac arrest (H)     H/O extracorporeal membrane oxygenation treatment     Hypertension     Irregular heart beat     Sleep apnea     STEMI (ST elevation myocardial infarction) (H)     Stented coronary artery     Thyroid disease        PAST SURGICAL HISTORY:  Past Surgical History:   Procedure Laterality Date    ATRIAL ABLATION SURGERY       CARDIAC SURGERY      CERVICAL SPINE SURGERY      CV ARTERIAL LINE PLACEMENT N/A 02/17/2022    Procedure: Arterial Line Placement;  Surgeon: Larry Cintron MD;  Location:  [facility] CARD[facility] CATH LAB    CV CORONARY ANGIOGRAM N/A 02/17/2022    Procedure: Coronary Angiogram;  Surgeon: Larry Cintron MD;  Location: [facility] CARDIAC CATH LAB    CV CORONARY ANGIOGRAM N/A 04/22/2022    Procedure: Coronary Angiogram;  Surgeon: Larry Cintron MD;  Location: [facility] CARDIAC CATH LAB    CV EXTRACORPERAL MEMBRANE OXYGENATION N/A 02/17/2022    Procedure: Extracorporeal Membrance Oxygenation;  Surgeon: Larry Cintron MD;  Location: [facility] CARDIAC CATH LAB    CV INTRA AORTIC BALLOON N/A 02/17/2022    Procedure: Intra Aortic Balloon Pump Insertion;  Surgeon: Larry Cintron MD;  Location: [facility] CARDIAC CATH LAB    CV PCI N/A 04/22/2022    Procedure: Percutaneous Coronary Intervention;  Surgeon: Larry Cintron MD;  Location: [facility] CARDIAC CATH LAB    CV PCI ANGIOPLASTY N/A 02/17/2022    Procedure: Percutaneous Transluminal Angioplasty;  Surgeon: Larry Cintron MD;  Location: [facility] CARDIAC CATH LAB    CV THERAPEUTIC HYPOTHERMIA N/A 02/17/2022    Procedure: Therapeutic Hypothermia;  Surgeon: Larry Cintron MD;  Location: [facility] CARDIAC CATH LAB    HEAD & NECK SURGERY      IMPLANT PROSTHESIS PENIS RIGID N/A 1/25/2024    Procedure: INSERTION OF INFLATION OF SEMI RIGID PENILE PROSTHESIS;  Surgeon: Bryan Acevedo MD;  Location: [facility] OR    IR UPPER EXTREMITY ANGIOGRAM LEFT  02/20/2022    MIDLINE DOUBLE LUMEN PLACEMENT Left 02/28/2022    Left brachial vein medial 0.59cm.Blood return on all ports midline okay to use.    REMOVE EXTRACORPORAL MEMBRANE OXYGENATOR ADULT N/A 02/22/2022    Procedure: EXTRACORPOREAL MEMBRANE OXYGENATION CANNULA REMOVAL, INTRAOPERATIVE TRANSESOPHAGEAL ECHOCARDIOGRAM PER ANESTHESIA.;  Surgeon: Rod Banuelos MD;  Location:  OR           CURRENT MEDICATIONS:   "  allopurinol (ZYLOPRIM) 100 MG tablet  aspirin (ASA) 81 MG chewable tablet  levothyroxine (SYNTHROID/LEVOTHROID) 25 MCG tablet  losartan (COZAAR) 25 MG tablet  metoprolol succinate ER (TOPROL XL) 50 MG 24 hr tablet  multivitamin, therapeutic (THERA-VIT) TABS tablet  polyethylene glycol (MIRALAX) 17 GM/Dose powder  rosuvastatin (CRESTOR) 20 MG tablet  ticagrelor (BRILINTA) 90 MG tablet        ALLERGIES:  Allergies   Allergen Reactions    Chlorpheniramine-Phenylpropan [A.R.M.] Other (See Comments)     Watery eyes, sneezing    Seroquel [Quetiapine] Other (See Comments)     Per Meeta (Wife), would like this medication to be listed as an allergy due to patient becoming combative and delirious after taking it.     Percocet [Oxycodone-Acetaminophen] Rash     \"felt like skin was crawling off\"-pt's S.O.        FAMILY HISTORY:  Family History   Problem Relation Age of Onset    Kidney Cancer Mother         s/p surgery    Kidney failure Mother     Leukemia Father 41        chemical exposure    No Known Problems Sister     No Known Problems Sister     No Known Problems Sister        SOCIAL HISTORY:   Social History     Socioeconomic History    Marital status:    Tobacco Use    Smoking status: Never     Passive exposure: Never    Smokeless tobacco: Never   Vaping Use    Vaping Use: Never used   Substance and Sexual Activity    Alcohol use: Yes     Alcohol/week: 2.0 standard drinks of alcohol    Drug use: No     Social Determinants of Health     Financial Resource Strain: Low Risk  (1/11/2024)    Financial Resource Strain     Within the past 12 months, have you or your family members you live with been unable to get utilities (heat, electricity) when it was really needed?: No   Food Insecurity: Low Risk  (1/11/2024)    Food Insecurity     Within the past 12 months, did you worry that your food would run out before you got money to buy more?: No     Within the past 12 months, did the food you bought just not last and you " "didn t have money to get more?: No   Transportation Needs: Low Risk  (1/11/2024)    Transportation Needs     Within the past 12 months, has lack of transportation kept you from medical appointments, getting your medicines, non-medical meetings or appointments, work, or from getting things that you need?: No   Interpersonal Safety: Low Risk  (10/25/2023)    Interpersonal Safety     Do you feel physically and emotionally safe where you currently live?: Yes     Within the past 12 months, have you been hit, slapped, kicked or otherwise physically hurt by someone?: No     Within the past 12 months, have you been humiliated or emotionally abused in other ways by your partner or ex-partner?: No   Housing Stability: Low Risk  (1/11/2024)    Housing Stability     Do you have housing? : Yes     Are you worried about losing your housing?: No       PHYSICAL EXAM    VITAL SIGNS: /82   Pulse 68   Temp 97.9  F (36.6  C) (Oral)   Resp 18   Ht 1.803 m (5' 11\")   Wt 102 kg (224 lb 12.8 oz)   SpO2 100%   BMI 31.35 kg/m    Constitutional:  Well developed, well nourished,    EYES: Conjunctivae clear, no discharge  HENT: Atraumatic, normocephalic, bilateral external ears normal.  Oropharynx moist. Nose normal.   Neck: Normal ROM , Supple   Respiratory:  No respiratory distress, normal nonlabored respirations.   Cardiovascular:  Distal perfusion appears intact  : Mild bladder distension. No blood. Catheter in place.   Musculoskeletal:  No edema appreciated, No cyanosis, No clubbing. Good range of motion in all major joints.   Integument:  Warm, Dry, No erythema, No rash.   Neurologic:  Alert and oriented. No focal deficits noted.  Ambulatory  Psychiatric:  Affect normal         LAB:  All pertinent labs reviewed and interpreted.  Labs Ordered and Resulted from Time of ED Arrival to Time of ED Departure - No data to display    RADIOLOGY:  Reviewed all pertinent imaging. Please see official radiology report.  No orders to " display       PROCEDURES:   N/A       I, Latrice Pulido, am serving as a scribe to document services personally performed by Dr. Shahbaz Berman based on my observation and the provider's statements to me. I, Shahbaz Berman MD attest that Latrice Pulido is acting in a scribe capacity, has observed my performance of the services and has documented them in accordance with my direction.    Shahbaz Berman M.D.  Emergency Medicine  [facility] EMERGENCY DEPARTMENT  [address] [place] [id number]  [phone]  Dept: [phone]       Shahbaz Berman MD  02/06/24 4382

## 2024-01-31 NOTE — ED NOTES
Bed: JNED-10  Expected date: 1/30/24  Expected time: 12:00 AM  Means of arrival: Walked  Comments:

## 2024-01-31 NOTE — ED TRIAGE NOTES
Amb to triage with hopper bag in his hand.  Pt states the bag is no longer collecting urine and it is leaking around cath.  Denies pain, no blood.  Cath placed 0200 4 days ago. Spoke with dr. Miramontes and told he would meet them in ED.  The hopper started not draining about 1400 today.     Triage Assessment (Adult)       Row Name 01/30/24 1911          Triage Assessment    Airway WDL WDL        Respiratory WDL    Respiratory WDL WDL        Skin Circulation/Temperature WDL    Skin Circulation/Temperature WDL WDL        Cardiac WDL    Cardiac WDL WDL        Peripheral/Neurovascular WDL    Peripheral Neurovascular WDL WDL        Cognitive/Neuro/Behavioral WDL    Cognitive/Neuro/Behavioral WDL WDL

## 2024-01-31 NOTE — CONSULTS
Urology Consult    Name: Chano Johnson    MRN: [id number]   YOB: 1950               Chief Complaint:   Catheter malfunction    History is obtained from the patient and chart review          History of Present Illness:   Chano Johnson is a 73 year old male with PMH of atrial fibrillation, arrhythmia, coronary artery disease, prior STEMI cardiac arrest, hypertension, sleep apnea, and thyroid disease and urologic history of ED s/p semirigid penile prosthesis insertion on 1/25/2024 with Dr. Bryan Acevedo at [facility].  He had a Tapia catheter placed at the time of the procedure and this was kept in place upon discharge.  He was readmitted on 1/27/2024 after traumatic Tapia catheter removal at home resulting in gross hematuria.  He was evaluated by the urology service during his readmission to [facility].  He required CBI briefly as well as manual irrigation but this resolved and he was discharged home on 1/27/2024.    He did go to [place] [facility] earlier today with plans for Tapia catheter removal.  Due to his recent trauma they favor keeping the catheter in for a longer period of time.    He called the urologist on-call this evening due to concerns for Tapia catheter malfunction as he has bypassing urine around his catheter and no urine draining into the bag over the course of several hours.  He was advised to present to the ER.    He came to [facility] ER.  He is afebrile with mild hypertension but otherwise stable vital signs.  He is bladder scanned for 230 mL.          Past Medical History:     Past Medical History:   Diagnosis Date    A-fib (H)     Antiplatelet or antithrombotic long-term use     Arrhythmia     CAD (coronary artery disease)     Cardiac arrest (H)     H/O extracorporeal membrane oxygenation treatment     Hypertension     Irregular heart beat     Sleep apnea     STEMI (ST elevation myocardial infarction) (H)     Stented coronary artery     Thyroid disease             Past  Surgical History:     Past Surgical History:   Procedure Laterality Date    ATRIAL ABLATION SURGERY      CARDIAC SURGERY      CERVICAL SPINE SURGERY      CV ARTERIAL LINE PLACEMENT N/A 02/17/2022    Procedure: Arterial Line Placement;  Surgeon: Larry Cintron MD;  Location:  HEART CARDIAC CATH LAB    CV CORONARY ANGIOGRAM N/A 02/17/2022    Procedure: Coronary Angiogram;  Surgeon: Larry Cintron MD;  Location: [facility] CARDIAC CATH LAB    CV CORONARY ANGIOGRAM N/A 04/22/2022    Procedure: Coronary Angiogram;  Surgeon: Larry Cintron MD;  Location:  HEART CARDIAC CATH LAB    CV EXTRACORPERAL MEMBRANE OXYGENATION N/A 02/17/2022    Procedure: Extracorporeal Membrance Oxygenation;  Surgeon: Larry Cintron MD;  Location:  HEART CARDIAC CATH LAB    CV INTRA AORTIC BALLOON N/A 02/17/2022    Procedure: Intra Aortic Balloon Pump Insertion;  Surgeon: Larry Cintron MD;  Location:  HEART CARDIAC CATH LAB    CV PCI N/A 04/22/2022    Procedure: Percutaneous Coronary Intervention;  Surgeon: Larry Cintron MD;  Location: [facility] CARDIAC CATH LAB    CV PCI ANGIOPLASTY N/A 02/17/2022    Procedure: Percutaneous Transluminal Angioplasty;  Surgeon: Larry Cintron MD;  Location:  HEART CARDIAC CATH LAB    CV THERAPEUTIC HYPOTHERMIA N/A 02/17/2022    Procedure: Therapeutic Hypothermia;  Surgeon: aLrry Cintron MD;  Location: [facility] CARDIAC CATH LAB    HEAD & NECK SURGERY      IMPLANT PROSTHESIS PENIS RIGID N/A 1/25/2024    Procedure: INSERTION OF INFLATION OF SEMI RIGID PENILE PROSTHESIS;  Surgeon: Bryan Acevedo MD;  Location: [facility] OR    IR UPPER EXTREMITY ANGIOGRAM LEFT  02/20/2022    MIDLINE DOUBLE LUMEN PLACEMENT Left 02/28/2022    Left brachial vein medial 0.59cm.Blood return on all ports midline okay to use.    REMOVE EXTRACORPORAL MEMBRANE OXYGENATOR ADULT N/A 02/22/2022    Procedure: EXTRACORPOREAL MEMBRANE OXYGENATION CANNULA REMOVAL, INTRAOPERATIVE TRANSESOPHAGEAL ECHOCARDIOGRAM PER  "ANESTHESIA.;  Surgeon: Rod Banuelos MD;  Location:  OR            Social History:     Social History     Tobacco Use    Smoking status: Never     Passive exposure: Never    Smokeless tobacco: Never   Substance Use Topics    Alcohol use: Yes     Alcohol/week: 2.0 standard drinks of alcohol            Family History:     Family History   Problem Relation Age of Onset    Kidney Cancer Mother         s/p surgery    Kidney failure Mother     Leukemia Father 41        chemical exposure    No Known Problems Sister     No Known Problems Sister     No Known Problems Sister             Allergies:     Allergies   Allergen Reactions    Chlorpheniramine-Phenylpropan [A.R.M.] Other (See Comments)     Watery eyes, sneezing    Seroquel [Quetiapine] Other (See Comments)     Per Meeta (Wife), would like this medication to be listed as an allergy due to patient becoming combative and delirious after taking it.     Percocet [Oxycodone-Acetaminophen] Rash     \"felt like skin was crawling off\"-pt's S.O.             Medications:     No current facility-administered medications for this encounter.     Current Outpatient Medications   Medication Sig    allopurinol (ZYLOPRIM) 100 MG tablet Take 1 tablet (100 mg) by mouth 2 times daily    aspirin (ASA) 81 MG chewable tablet Take 1 tablet (81 mg) by mouth daily Starting tomorrow.    levothyroxine (SYNTHROID/LEVOTHROID) 25 MCG tablet Take 1 tablet (25 mcg) by mouth daily    metoprolol succinate ER (TOPROL XL) 50 MG 24 hr tablet Take 3 tablets (150 mg) by mouth daily    multivitamin, therapeutic (THERA-VIT) TABS tablet Take 1 tablet by mouth daily    polyethylene glycol (MIRALAX) 17 GM/Dose powder Take 17 g by mouth daily    rosuvastatin (CRESTOR) 20 MG tablet Take 1 tablet (20 mg) by mouth every evening    sulfamethoxazole-trimethoprim (BACTRIM DS) 800-160 MG tablet Take 1 tablet by mouth 2 times daily for 5 days    ticagrelor (BRILINTA) 90 MG tablet Take 1 tablet (90 mg) by " mouth 2 times daily             Review of Systems:    ROS: 10 point ROS neg other than the symptoms noted above in the HPI           Physical Exam:   VS:  T: 97.9    HR: 80    BP: 146/86    RR: 18   GEN:  AOx3.  NAD.    CV: Warm and well-perfused  LUNGS: Non-labored breathing on room air  ABD:  Soft.  NT.  ND.  No rebound or guarding.  No masses.  : Penile incision healing well with sutures still in place.  16 Italian Tapia catheter in place with bypassed urine around.  Unable to flush catheter as it appears obstructed.  EXT:  No bilateral lower extremity edema.  SKIN:  Warm.  Dry.  No rashes.  NEURO:  CN grossly intact.            Data:   All laboratory data reviewed:    Recent Labs   Lab 01/27/24  0747 01/27/24  0245   WBC 9.5 11.8*   HGB 11.1* 12.1*   * 159       Recent Labs   Lab 01/27/24  0747 01/27/24  0245    141   POTASSIUM 4.6 4.7   CHLORIDE 108* 104   CO2 27 26   BUN 25.0* 28.5*   CR 1.79* 1.92*   * 129*   PEDRITO 8.7* 9.8   MAG  --  2.0       Recent Labs   Lab 01/27/24  0158   COLOR Red*   APPEARANCE Cloudy*   URINEGLC Negative   URINEBILI Negative   URINEKETONE Negative   SG 1.019   URINEPH 6.0   PROTEIN 100*   NITRITE Negative   LEUKEST 75 Argelia/uL*   RBCU >182*   WBCU >182*       All pertinent imaging reviewed:  No pertinent           Impression and Plan:   Impression:   Chano Johnson is a 73 year old male with urologic hx of recent semirigid penile prosthesis implantation on 1/25/2024 with postoperative course complicated by traumatic Tapia catheter removal requiring Tapia catheter reinsertion is now presented to the ER with concerns for catheter malfunction.    His history is consistent with either bladder spasms or catheter malfunction.  Upon examination I am unable to flush the catheter consistent with an obstructed catheter.  Catheter was therefore removed.  At this point I Bladder Scanned the patient for over 300 to 350 mL of urine.  He was instructed to drink several cups of  water.  I reevaluated patient within the hour and he was able to spontaneously urinate approximately 250 mL of urine.  He was bladder scanned again for between 60 and 150 mL.  His urine was red-tinged but there was abnormal clot present within the urination.  He denies any difficulty or straining to urinate.      Plan:  -Okay to discharge home without Tapia catheter as patient is currently spontaneously voiding with adequately low PVRs  -I instructed patient to stay well-hydrated to keep urine as clear as possible.  I am not concerned for active bleeding based on the appearance of his urine today in the presence of old clot.  -Strict return precautions for urinary retention were explicitly discussed.    Jared Miramontes MD  [facility]  p) [phone]

## 2024-02-04 NOTE — PROGRESS NOTES
[facility]  CARDIOVASCULAR MEDICINE CLINIC NOTE    Referring Provider: Larry Cintron   Primary Care Provider: No Ref-Primary, Physician     Patient Name: Chano Johnson   MRN: [id number]     PERTINENT CLINICAL HISTORY:   Chano Johnson is a 72 year old gentleman with a pmhx sig for afib s/p ablation, abhi, smoking, and cad s/p STEMI c/b VF arrest requiring va ecmo.  He recovered well and was discharged 3/10/2022.  He had staged procedure to RI on 4/17/2022 with DESx1. He was last seen in clinic 2/20/23 and was doing well at that time.      Today, the patient presents with his wife today. He feels well overall. He denies any chest pain, pressure, SOB. Able to exercise without limitation, but some decreased stamina when doing stairs with groceries. Uses CPAP. No lightheadedness, dizziness. Denies memory or mood issues. Able to take all cardiac meds without issue.     Chart reviewed; the patient has been in and out of the hospital since January of this year due to hematuria and Tapia catheter issues.      PAST MEDICAL HISTORY:     Past Medical History:   Diagnosis Date    A-fib (H)     Antiplatelet or antithrombotic long-term use     Arrhythmia     CAD (coronary artery disease)     Cardiac arrest (H)     H/O extracorporeal membrane oxygenation treatment     Hypertension     Irregular heart beat     Sleep apnea     STEMI (ST elevation myocardial infarction) (H)     Stented coronary artery     Thyroid disease       PAST SURGICAL HISTORY:     Past Surgical History:   Procedure Laterality Date    ATRIAL ABLATION SURGERY      CARDIAC SURGERY      CERVICAL SPINE SURGERY      CV ARTERIAL LINE PLACEMENT N/A 02/17/2022    Procedure: Arterial Line Placement;  Surgeon: Larry Cintron MD;  Location: [facility] CARDIAC CATH LAB    CV CORONARY ANGIOGRAM N/A 02/17/2022    Procedure: Coronary Angiogram;  Surgeon: Larry Cintron MD;  Location: [facility] CARDIAC CATH LAB    CV CORONARY ANGIOGRAM N/A 04/22/2022     Procedure: Coronary Angiogram;  Surgeon: Larry Cintron MD;  Location:  HEART CARDIAC CATH LAB    CV EXTRACORPERAL MEMBRANE OXYGENATION N/A 02/17/2022    Procedure: Extracorporeal Membrance Oxygenation;  Surgeon: Larry Cintron MD;  Location: [facility] CARDIAC CATH LAB    CV INTRA AORTIC BALLOON N/A 02/17/2022    Procedure: Intra Aortic Balloon Pump Insertion;  Surgeon: Larry Cintron MD;  Location: [facility] CARDIAC CATH LAB    CV PCI N/A 04/22/2022    Procedure: Percutaneous Coronary Intervention;  Surgeon: Larry Cintron MD;  Location: [facility] CARDIAC CATH LAB    CV PCI ANGIOPLASTY N/A 02/17/2022    Procedure: Percutaneous Transluminal Angioplasty;  Surgeon: Larry Cintron MD;  Location: [facility] CARDIAC CATH LAB    CV THERAPEUTIC HYPOTHERMIA N/A 02/17/2022    Procedure: Therapeutic Hypothermia;  Surgeon: Larry Cintron MD;  Location: [facility] CARDIAC CATH LAB    HEAD & NECK SURGERY      IMPLANT PROSTHESIS PENIS RIGID N/A 1/25/2024    Procedure: INSERTION OF INFLATION OF SEMI RIGID PENILE PROSTHESIS;  Surgeon: Bryan Acevedo MD;  Location: [facility] OR    IR UPPER EXTREMITY ANGIOGRAM LEFT  02/20/2022    MIDLINE DOUBLE LUMEN PLACEMENT Left 02/28/2022    Left brachial vein medial 0.59cm.Blood return on all ports midline okay to use.    REMOVE EXTRACORPORAL MEMBRANE OXYGENATOR ADULT N/A 02/22/2022    Procedure: EXTRACORPOREAL MEMBRANE OXYGENATION CANNULA REMOVAL, INTRAOPERATIVE TRANSESOPHAGEAL ECHOCARDIOGRAM PER ANESTHESIA.;  Surgeon: Rod Banuelos MD;  Location:  OR        CURRENT MEDICATIONS:     Current Outpatient Medications   Medication Sig Dispense Refill    allopurinol (ZYLOPRIM) 100 MG tablet Take 1 tablet (100 mg) by mouth 2 times daily 180 tablet 3    aspirin (ASA) 81 MG chewable tablet Take 1 tablet (81 mg) by mouth daily Starting tomorrow.      levothyroxine (SYNTHROID/LEVOTHROID) 25 MCG tablet Take 1 tablet (25 mcg) by mouth daily 90 tablet 1    metoprolol succinate  "ER (TOPROL XL) 50 MG 24 hr tablet Take 3 tablets (150 mg) by mouth daily 270 tablet 3    multivitamin, therapeutic (THERA-VIT) TABS tablet Take 1 tablet by mouth daily 90 tablet 3    polyethylene glycol (MIRALAX) 17 GM/Dose powder Take 17 g by mouth daily 510 g 11    rosuvastatin (CRESTOR) 20 MG tablet Take 1 tablet (20 mg) by mouth every evening 90 tablet 3    ticagrelor (BRILINTA) 90 MG tablet Take 1 tablet (90 mg) by mouth 2 times daily 180 tablet 3      ALLERGIES:     Allergies   Allergen Reactions    Chlorpheniramine-Phenylpropan [A.R.M.] Other (See Comments)     Watery eyes, sneezing    Seroquel [Quetiapine] Other (See Comments)     Per Meeta (Wife), would like this medication to be listed as an allergy due to patient becoming combative and delirious after taking it.     Percocet [Oxycodone-Acetaminophen] Rash     \"felt like skin was crawling off\"-pt's S.O.        PHYSICAL EXAMINATION:   /72 (BP Location: Right arm, Patient Position: Sitting, Cuff Size: Adult Regular)   Pulse 66   Wt 104.6 kg (230 lb 8 oz)   SpO2 98%   BMI 32.15 kg/m    Body mass index is 32.15 kg/m .  Wt Readings from Last 2 Encounters:   02/05/24 104.6 kg (230 lb 8 oz)   01/30/24 102 kg (224 lb 12.8 oz)     Constitutional: no acute distress, pleasant and cooperative, appears overall well.  Cardiovascular: RRR nl S1S2, JVP not elevated, extremities with no edema or cyanosis  Respiratory: clear to auscultation and percussion bilaterally anterior and posterior  Gastrointestinal: soft, nontender, non distended, no hepatosplenomegaly or masses  Back/Flank: Mild reproducible discomfort on the R flank   Neurologic: AOx3     LABORATORY DATA:   I have reviewed the labs below.    LIPID RESULTS:  Recent Labs   Lab Test 02/20/23  0732 09/15/22  0950   CHOL 97 100   HDL 53 53   LDL 31 33   TRIG 67 71        LIVER ENZYME RESULTS:  Recent Labs   Lab Test 10/03/22  0253 07/12/22  1432   AST 19 37   ALT 17 31       CBC RESULTS:  Recent Labs   Lab " Test 02/20/23  0732 10/03/22  0253   WBC 6.3 6.0   HGB 12.6* 10.8*   HCT 39.7* 33.9*   * 134*       BMP RESULTS:  Recent Labs   Lab Test 02/20/23  0732 10/03/22  0253    139   POTASSIUM 4.4 4.2   CHLORIDE 108* 107   CO2 21* 24   ANIONGAP 13 8   GLC 95 106   BUN 25.3* 22   CR 1.70* 1.65*   PEDRITO 9.4 9.2     INR RESULTS:  Recent Labs   Lab Test 10/03/22  0253 04/22/22  1142   INR 1.01 1.15          PROCEDURES & FURTHER ASSESSMENTS:   I have reviewed the test results below.    Coronary angiogram 4/22/2022  --Two vessel coronary artery disease involving the LAD and ramus branch.  The LAD was stented previously.  The ramus branch was not treated previously.  --Successful PCI of the ramus with a 2.5x20mm LETY dilated to 3.25 mm.    Coronary CTA: 2/20/2023  1.  Status post prior PCI to the left anterior descending and ramus intermedius arteries.   2.  Both the LAD and RI stents are widely patent.  3.  Mild, non-obstructive disease in other territories without any high-grade stenoses.   4.  Please review the separate Radiology report for incidental noncardiac findings.    Coronary CTA 2/5/24:  IMPRESSION:  1.  Widely patent stents in the left anterior descending and ramus  intermedius arteries.  2.  Mild, non-obstructive disease in other coronary arteries. No  change from prior CTA 2/2023.  3.  Left atrial appendage occlusion device (Watchman) is well seated  without any peridevice thrombus or leak.   4.  Mildly dilated aortic root and ascending aorta measuring 4.1 cm.  5.  LV apical myocardial infarction, no LV thrombus.  6.  Please review the separate Radiology report for incidental  noncardiac findings.    TTE 3/7/2022  Limited TTE for LV function.  Left ventricular function is decreased. The ejection fraction is 45-50%  (mildly reduced). There is a pattern of wall motion abnormalities that is  consistent with a prior LAD culprit infarction.    TTE 2/20/2023  Left ventricular function is decreased. The ejection  fraction is 40-45%  (mildly reduced). Hypokinesis involving all apical segments as well as the mid  anterior/anteroseptal wall.  Global right ventricular function is normal.  Mild tricuspid insufficiency is present.  Pulmonary artery systolic pressure is normal.  No pericardial effusion is present.  Aortic root and ascending aorta measure 4.1 cm (index 1.9 cm/m2). Normal for BSA.  This study was compared with the study from 3/7/2022 .  No significant changes noted.    TTE 2/5/24:  Interpretation Summary  Left ventricular function is decreased. The ejection fraction is 40-45%  (mildly reduced). There is a pattern of wall motion abnormalities consistent  with a LAD culprit infarction.  Global right ventricular function is normal. The right ventricle is normal  size.  No significant valvular abnormalities.  IVC diameter <2.1 cm collapsing >50% with sniff suggests a normal RA pressure  of 3 mmHg.  This study was compared with the study from 02/20/2023. No significant changes  noted.     ECMO 2/17/2022  VF cardiac arrest with ongoing cardiogenic shock  Two vessel coronary artery disease with occlusion of the midLAD and severe stenosis of the large ramus  Successful PCI of the mid LAD with a LETY  Successful cannulation for peripheral VA ECMO with a 17 Fr arterial and 25 Fr venous cannula  Successful insertion of a distal perfusion cannula  Successful insertion of a 50cc IABP  Successful insertion of a thermogard cooling catheter  Successful insertion of a R radial arterial line     CLINICAL IMPRESSION:   Chano Johnson is a 72 year old gentleman with a pmhx sig for afib s/p ablation, abhi, smoking, and cad s/p STEMI c/b VF arrest requiring VA ECMO s/p mLAD PCI and then staged procedure of RI.       Coronary artery disease: causing refractory VF arrest; found to have 2v disease with revascularization of the acutely occluded mLAD. He had staged PCI of the RI a saray later. TTE today with 40-45%, normal RV function. Coronary  CTA showing stable patent stents. Denies anginal symptoms.   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  US of the cannulation site in 2022 demonstrated patent vascular flow.   -- CTM    Follow-up: 1 year    Thank you for allowing us to take part in the care of this very pleasant patient.  Please do not hesitate to call if any further questions or concerns arise.    Seen and discussed with Dr. Cintron.    Raymond Kathleen, PGY-5  Cardiovascular Disease Fellow    CC  Patient Care Team:  Liyah Cooley MD as PCP - General (Family Medicine)  Larry Cintron MD as MD (Cardiovascular Disease)  Mirlande Mckenzie, RN as Specialty Care Coordinator (Cardiology)  Mailk Henriquez MD as MD (Cardiovascular Disease)  CHELSIE Parker MD as MD (Plastic Surgery)  Griffin Chavez MD as MD (Cardiovascular Disease)  Ever Roberts MD as Physician (Plastic Surgery)  Liyah Cooley MD as Assigned PCP  Larry Cintron MD as Assigned Heart and Vascular Provider  LARRY CINTRON

## 2024-02-05 ENCOUNTER — HOSPITAL ENCOUNTER (OUTPATIENT)
Dept: CT IMAGING | Facility: CLINIC | Age: 74
Discharge: HOME | End: 2024-02-05
Attending: INTERNAL MEDICINE
Payer: MEDICARE

## 2024-02-05 ENCOUNTER — LAB (OUTPATIENT)
Dept: LAB | Facility: CLINIC | Age: 74
End: 2024-02-05
Attending: INTERNAL MEDICINE
Payer: MEDICARE

## 2024-02-05 ENCOUNTER — OFFICE VISIT (OUTPATIENT)
Dept: CARDIOLOGY | Facility: CLINIC | Age: 74
End: 2024-02-05
Attending: INTERNAL MEDICINE
Payer: MEDICARE

## 2024-02-05 ENCOUNTER — HOSPITAL ENCOUNTER (OUTPATIENT)
Dept: CARDIOLOGY | Facility: CLINIC | Age: 74
Discharge: HOME | End: 2024-02-05
Attending: INTERNAL MEDICINE
Payer: MEDICARE

## 2024-02-05 VITALS
BODY MASS INDEX: 32.15 KG/M2 | DIASTOLIC BLOOD PRESSURE: 72 MMHG | WEIGHT: 230.5 LBS | SYSTOLIC BLOOD PRESSURE: 130 MMHG | HEART RATE: 66 BPM | OXYGEN SATURATION: 98 %

## 2024-02-05 VITALS — DIASTOLIC BLOOD PRESSURE: 65 MMHG | HEART RATE: 65 BPM | RESPIRATION RATE: 18 BRPM | SYSTOLIC BLOOD PRESSURE: 119 MMHG

## 2024-02-05 DIAGNOSIS — R07.9 CHEST PAIN, UNSPECIFIED TYPE: ICD-10-CM

## 2024-02-05 DIAGNOSIS — T82.49XD: ICD-10-CM

## 2024-02-05 DIAGNOSIS — I25.10 CORONARY ARTERY DISEASE INVOLVING NATIVE CORONARY ARTERY OF NATIVE HEART WITHOUT ANGINA PECTORIS: ICD-10-CM

## 2024-02-05 DIAGNOSIS — I48.20 CHRONIC ATRIAL FIBRILLATION (H): ICD-10-CM

## 2024-02-05 DIAGNOSIS — I46.9 CARDIAC ARREST (H): ICD-10-CM

## 2024-02-05 DIAGNOSIS — Z95.818 PRESENCE OF WATCHMAN LEFT ATRIAL APPENDAGE CLOSURE DEVICE: ICD-10-CM

## 2024-02-05 DIAGNOSIS — Z95.5 S/P DRUG ELUTING CORONARY STENT PLACEMENT: ICD-10-CM

## 2024-02-05 DIAGNOSIS — I97.648 POSTPROCEDURAL SEROMA OF A CIRCULATORY SYSTEM ORGAN OR STRUCTURE FOLLOWING OTHER CIRCULATORY SYSTEM PROCEDURE: ICD-10-CM

## 2024-02-05 LAB
ANION GAP SERPL CALCULATED.3IONS-SCNC: 7 MMOL/L (ref 7–15)
BUN SERPL-MCNC: 20.2 MG/DL (ref 8–23)
CALCIUM SERPL-MCNC: 9.6 MG/DL (ref 8.8–10.2)
CHLORIDE SERPL-SCNC: 109 MMOL/L (ref 98–107)
CHOLEST SERPL-MCNC: 92 MG/DL
CREAT SERPL-MCNC: 1.56 MG/DL (ref 0.67–1.17)
DEPRECATED HCO3 PLAS-SCNC: 23 MMOL/L (ref 22–29)
EGFRCR SERPLBLD CKD-EPI 2021: 46 ML/MIN/1.73M2
ERYTHROCYTE [DISTWIDTH] IN BLOOD BY AUTOMATED COUNT: 12.2 % (ref 10–15)
FASTING STATUS PATIENT QL REPORTED: YES
GLUCOSE SERPL-MCNC: 109 MG/DL (ref 70–99)
HCT VFR BLD AUTO: 40.4 % (ref 40–53)
HDLC SERPL-MCNC: 50 MG/DL
HGB BLD-MCNC: 12.9 G/DL (ref 13.3–17.7)
LDLC SERPL CALC-MCNC: 27 MG/DL
LVEF ECHO: NORMAL
MCH RBC QN AUTO: 32.3 PG (ref 26.5–33)
MCHC RBC AUTO-ENTMCNC: 31.9 G/DL (ref 31.5–36.5)
MCV RBC AUTO: 101 FL (ref 78–100)
NONHDLC SERPL-MCNC: 42 MG/DL
PLATELET # BLD AUTO: 168 10E3/UL (ref 150–450)
POTASSIUM SERPL-SCNC: 4.3 MMOL/L (ref 3.4–5.3)
RBC # BLD AUTO: 3.99 10E6/UL (ref 4.4–5.9)
SODIUM SERPL-SCNC: 139 MMOL/L (ref 135–145)
TRIGL SERPL-MCNC: 74 MG/DL
WBC # BLD AUTO: 8.7 10E3/UL (ref 4–11)

## 2024-02-05 PROCEDURE — 36415 COLL VENOUS BLD VENIPUNCTURE: CPT

## 2024-02-05 PROCEDURE — 75574 CT ANGIO HRT W/3D IMAGE: CPT | Mod: 26 | Performed by: INTERNAL MEDICINE

## 2024-02-05 PROCEDURE — 80061 LIPID PANEL: CPT

## 2024-02-05 PROCEDURE — 99215 OFFICE O/P EST HI 40 MIN: CPT | Mod: 25 | Performed by: INTERNAL MEDICINE

## 2024-02-05 PROCEDURE — 250N000011 HC RX IP 250 OP 636: Performed by: INTERNAL MEDICINE

## 2024-02-05 PROCEDURE — 75574 CT ANGIO HRT W/3D IMAGE: CPT | Mod: MF

## 2024-02-05 PROCEDURE — G1010 CDSM STANSON: HCPCS | Mod: GC | Performed by: INTERNAL MEDICINE

## 2024-02-05 PROCEDURE — G0463 HOSPITAL OUTPT CLINIC VISIT: HCPCS | Mod: 25 | Performed by: INTERNAL MEDICINE

## 2024-02-05 PROCEDURE — 80048 BASIC METABOLIC PNL TOTAL CA: CPT

## 2024-02-05 PROCEDURE — 93306 TTE W/DOPPLER COMPLETE: CPT

## 2024-02-05 PROCEDURE — 250N000013 HC RX MED GY IP 250 OP 250 PS 637: Performed by: INTERNAL MEDICINE

## 2024-02-05 PROCEDURE — 93306 TTE W/DOPPLER COMPLETE: CPT | Mod: 26 | Performed by: STUDENT IN AN ORGANIZED HEALTH CARE EDUCATION/TRAINING PROGRAM

## 2024-02-05 PROCEDURE — 85027 COMPLETE CBC AUTOMATED: CPT

## 2024-02-05 RX ORDER — DIPHENHYDRAMINE HYDROCHLORIDE 50 MG/ML
25-50 INJECTION INTRAMUSCULAR; INTRAVENOUS
Status: DISCONTINUED | OUTPATIENT
Start: 2024-02-05 | End: 2024-02-06 | Stop reason: HOSPADM

## 2024-02-05 RX ORDER — LOSARTAN POTASSIUM 25 MG/1
25 TABLET ORAL DAILY
Qty: 90 TABLET | Refills: 1 | Status: SHIPPED | OUTPATIENT
Start: 2024-02-05 | End: 2024-03-25

## 2024-02-05 RX ORDER — ONDANSETRON 2 MG/ML
4 INJECTION INTRAMUSCULAR; INTRAVENOUS
Status: DISCONTINUED | OUTPATIENT
Start: 2024-02-05 | End: 2024-02-06 | Stop reason: HOSPADM

## 2024-02-05 RX ORDER — METOPROLOL TARTRATE 25 MG/1
25-100 TABLET, FILM COATED ORAL
Status: COMPLETED | OUTPATIENT
Start: 2024-02-05 | End: 2024-02-05

## 2024-02-05 RX ORDER — DILTIAZEM HYDROCHLORIDE 120 MG/1
120 TABLET, FILM COATED ORAL
Status: DISCONTINUED | OUTPATIENT
Start: 2024-02-05 | End: 2024-02-06 | Stop reason: HOSPADM

## 2024-02-05 RX ORDER — METHYLPREDNISOLONE SODIUM SUCCINATE 125 MG/2ML
125 INJECTION, POWDER, LYOPHILIZED, FOR SOLUTION INTRAMUSCULAR; INTRAVENOUS
Status: DISCONTINUED | OUTPATIENT
Start: 2024-02-05 | End: 2024-02-06 | Stop reason: HOSPADM

## 2024-02-05 RX ORDER — METOPROLOL TARTRATE 1 MG/ML
5-15 INJECTION, SOLUTION INTRAVENOUS
Status: DISCONTINUED | OUTPATIENT
Start: 2024-02-05 | End: 2024-02-06 | Stop reason: HOSPADM

## 2024-02-05 RX ORDER — IOPAMIDOL 755 MG/ML
120 INJECTION, SOLUTION INTRAVASCULAR ONCE
Status: COMPLETED | OUTPATIENT
Start: 2024-02-05 | End: 2024-02-05

## 2024-02-05 RX ORDER — NITROGLYCERIN 0.4 MG/1
.4-.8 TABLET SUBLINGUAL
Status: DISCONTINUED | OUTPATIENT
Start: 2024-02-05 | End: 2024-02-06 | Stop reason: HOSPADM

## 2024-02-05 RX ORDER — DIPHENHYDRAMINE HCL 25 MG
25 CAPSULE ORAL
Status: DISCONTINUED | OUTPATIENT
Start: 2024-02-05 | End: 2024-02-06 | Stop reason: HOSPADM

## 2024-02-05 RX ORDER — DILTIAZEM HYDROCHLORIDE 5 MG/ML
10-15 INJECTION INTRAVENOUS
Status: DISCONTINUED | OUTPATIENT
Start: 2024-02-05 | End: 2024-02-06 | Stop reason: HOSPADM

## 2024-02-05 RX ORDER — ACYCLOVIR 200 MG/1
0-1 CAPSULE ORAL
Status: DISCONTINUED | OUTPATIENT
Start: 2024-02-05 | End: 2024-02-06 | Stop reason: HOSPADM

## 2024-02-05 RX ORDER — IVABRADINE 5 MG/1
5-15 TABLET, FILM COATED ORAL
Status: COMPLETED | OUTPATIENT
Start: 2024-02-05 | End: 2024-02-05

## 2024-02-05 RX ADMIN — NITROGLYCERIN 0.8 MG: 0.4 TABLET SUBLINGUAL at 08:55

## 2024-02-05 RX ADMIN — IVABRADINE 10 MG: 5 TABLET, FILM COATED ORAL at 07:47

## 2024-02-05 RX ADMIN — METOPROLOL TARTRATE 50 MG: 50 TABLET, FILM COATED ORAL at 07:46

## 2024-02-05 RX ADMIN — IOPAMIDOL 120 ML: 755 INJECTION, SOLUTION INTRAVENOUS at 08:53

## 2024-02-05 ASSESSMENT — PAIN SCALES - GENERAL: PAINLEVEL: NO PAIN (0)

## 2024-02-05 NOTE — LETTER
2/5/2024      RE: Chano Johnson  [address] [place]  [facility] [address]       Dear Colleague,    Thank you for the opportunity to participate in the care of your patient, Chano Johnson, at the [facility] [place] at [facility]. Please see a copy of my visit note below.    [facility]  CARDIOVASCULAR MEDICINE CLINIC NOTE    Referring Provider: Larry Cintron   Primary Care Provider: No Ref-Primary, Physician     Patient Name: Chano Johnson   MRN: [id number]     PERTINENT CLINICAL HISTORY:   Chano Johnson is a 72 year old gentleman with a pmhx sig for afib s/p ablation, abhi, smoking, and cad s/p STEMI c/b VF arrest requiring va ecmo.  He recovered well and was discharged 3/10/2022.  He had staged procedure to RI on 4/17/2022 with DESx1. He was last seen in clinic 2/20/23 and was doing well at that time.      Today, the patient presents with his wife today. He feels well overall. He denies any chest pain, pressure, SOB. Able to exercise without limitation, but some decreased stamina when doing stairs with groceries. Uses CPAP. No lightheadedness, dizziness. Denies memory or mood issues. Able to take all cardiac meds without issue.     Chart reviewed; the patient has been in and out of the hospital since January of this year due to hematuria and Tapia catheter issues.      PAST MEDICAL HISTORY:     Past Medical History:   Diagnosis Date     A-fib (H)      Antiplatelet or antithrombotic long-term use      Arrhythmia      CAD (coronary artery disease)      Cardiac arrest (H)      H/O extracorporeal membrane oxygenation treatment      Hypertension      Irregular heart beat      Sleep apnea      STEMI (ST elevation myocardial infarction) (H)      Stented coronary artery      Thyroid disease       PAST SURGICAL HISTORY:     Past Surgical History:   Procedure Laterality Date     ATRIAL ABLATION SURGERY       CARDIAC SURGERY       CERVICAL SPINE  SURGERY       CV ARTERIAL LINE PLACEMENT N/A 02/17/2022    Procedure: Arterial Line Placement;  Surgeon: Larry Cintron MD;  Location: [facility] CARDIAC CATH LAB     CV CORONARY ANGIOGRAM N/A 02/17/2022    Procedure: Coronary Angiogram;  Surgeon: Larry Cintron MD;  Location: [facility] CARDIAC CATH LAB     CV CORONARY ANGIOGRAM N/A 04/22/2022    Procedure: Coronary Angiogram;  Surgeon: Larry Cintron MD;  Location: [facility] CARDIAC CATH LAB     CV EXTRACORPERAL MEMBRANE OXYGENATION N/A 02/17/2022    Procedure: Extracorporeal Membrance Oxygenation;  Surgeon: Larry Cintron MD;  Location: [facility] CARDIAC CATH LAB     CV INTRA AORTIC BALLOON N/A 02/17/2022    Procedure: Intra Aortic Balloon Pump Insertion;  Surgeon: Larry iCntron MD;  Location: [facility] CARDIAC CATH LAB     CV PCI N/A 04/22/2022    Procedure: Percutaneous Coronary Intervention;  Surgeon: Larry Cintron MD;  Location: [facility] CARDIAC CATH LAB     CV PCI ANGIOPLASTY N/A 02/17/2022    Procedure: Percutaneous Transluminal Angioplasty;  Surgeon: Larry Cintron MD;  Location: [facility] CARDIAC CATH LAB     CV THERAPEUTIC HYPOTHERMIA N/A 02/17/2022    Procedure: Therapeutic Hypothermia;  Surgeon: Larry Cintron MD;  Location: [facility] CARDIAC CATH LAB     HEAD & NECK SURGERY       IMPLANT PROSTHESIS PENIS RIGID N/A 1/25/2024    Procedure: INSERTION OF INFLATION OF SEMI RIGID PENILE PROSTHESIS;  Surgeon: Bryan Acevedo MD;  Location: [facility] OR     IR UPPER EXTREMITY ANGIOGRAM LEFT  02/20/2022     MIDLINE DOUBLE LUMEN PLACEMENT Left 02/28/2022    Left brachial vein medial 0.59cm.Blood return on all ports midline okay to use.     REMOVE EXTRACORPORAL MEMBRANE OXYGENATOR ADULT N/A 02/22/2022    Procedure: EXTRACORPOREAL MEMBRANE OXYGENATION CANNULA REMOVAL, INTRAOPERATIVE TRANSESOPHAGEAL ECHOCARDIOGRAM PER ANESTHESIA.;  Surgeon: Rod Banuelos MD;  Location:  OR        CURRENT MEDICATIONS:     Current Outpatient  "Medications   Medication Sig Dispense Refill     allopurinol (ZYLOPRIM) 100 MG tablet Take 1 tablet (100 mg) by mouth 2 times daily 180 tablet 3     aspirin (ASA) 81 MG chewable tablet Take 1 tablet (81 mg) by mouth daily Starting tomorrow.       levothyroxine (SYNTHROID/LEVOTHROID) 25 MCG tablet Take 1 tablet (25 mcg) by mouth daily 90 tablet 1     metoprolol succinate ER (TOPROL XL) 50 MG 24 hr tablet Take 3 tablets (150 mg) by mouth daily 270 tablet 3     multivitamin, therapeutic (THERA-VIT) TABS tablet Take 1 tablet by mouth daily 90 tablet 3     polyethylene glycol (MIRALAX) 17 GM/Dose powder Take 17 g by mouth daily 510 g 11     rosuvastatin (CRESTOR) 20 MG tablet Take 1 tablet (20 mg) by mouth every evening 90 tablet 3     ticagrelor (BRILINTA) 90 MG tablet Take 1 tablet (90 mg) by mouth 2 times daily 180 tablet 3      ALLERGIES:     Allergies   Allergen Reactions     Chlorpheniramine-Phenylpropan [A.R.M.] Other (See Comments)     Watery eyes, sneezing     Seroquel [Quetiapine] Other (See Comments)     Per Meeta (Wife), would like this medication to be listed as an allergy due to patient becoming combative and delirious after taking it.      Percocet [Oxycodone-Acetaminophen] Rash     \"felt like skin was crawling off\"-pt's S.O.        PHYSICAL EXAMINATION:   /72 (BP Location: Right arm, Patient Position: Sitting, Cuff Size: Adult Regular)   Pulse 66   Wt 104.6 kg (230 lb 8 oz)   SpO2 98%   BMI 32.15 kg/m    Body mass index is 32.15 kg/m .  Wt Readings from Last 2 Encounters:   02/05/24 104.6 kg (230 lb 8 oz)   01/30/24 102 kg (224 lb 12.8 oz)     Constitutional: no acute distress, pleasant and cooperative, appears overall well.  Cardiovascular: RRR nl S1S2, JVP not elevated, extremities with no edema or cyanosis  Respiratory: clear to auscultation and percussion bilaterally anterior and posterior  Gastrointestinal: soft, nontender, non distended, no hepatosplenomegaly or masses  Back/Flank: Mild " reproducible discomfort on the R flank   Neurologic: AOx3     LABORATORY DATA:   I have reviewed the labs below.    LIPID RESULTS:  Recent Labs   Lab Test 02/20/23  0732 09/15/22  0950   CHOL 97 100   HDL 53 53   LDL 31 33   TRIG 67 71        LIVER ENZYME RESULTS:  Recent Labs   Lab Test 10/03/22  0253 07/12/22  1432   AST 19 37   ALT 17 31       CBC RESULTS:  Recent Labs   Lab Test 02/20/23  0732 10/03/22  0253   WBC 6.3 6.0   HGB 12.6* 10.8*   HCT 39.7* 33.9*   * 134*       BMP RESULTS:  Recent Labs   Lab Test 02/20/23  0732 10/03/22  0253    139   POTASSIUM 4.4 4.2   CHLORIDE 108* 107   CO2 21* 24   ANIONGAP 13 8   GLC 95 106   BUN 25.3* 22   CR 1.70* 1.65*   PEDRITO 9.4 9.2     INR RESULTS:  Recent Labs   Lab Test 10/03/22  0253 04/22/22  1142   INR 1.01 1.15          PROCEDURES & FURTHER ASSESSMENTS:   I have reviewed the test results below.    Coronary angiogram 4/22/2022  --Two vessel coronary artery disease involving the LAD and ramus branch.  The LAD was stented previously.  The ramus branch was not treated previously.  --Successful PCI of the ramus with a 2.5x20mm LETY dilated to 3.25 mm.    Coronary CTA: 2/20/2023  1.  Status post prior PCI to the left anterior descending and ramus intermedius arteries.   2.  Both the LAD and RI stents are widely patent.  3.  Mild, non-obstructive disease in other territories without any high-grade stenoses.   4.  Please review the separate Radiology report for incidental noncardiac findings.    Coronary CTA 2/5/24:  IMPRESSION:  1.  Widely patent stents in the left anterior descending and ramus  intermedius arteries.  2.  Mild, non-obstructive disease in other coronary arteries. No  change from prior CTA 2/2023.  3.  Left atrial appendage occlusion device (Watchman) is well seated  without any peridevice thrombus or leak.   4.  Mildly dilated aortic root and ascending aorta measuring 4.1 cm.  5.  LV apical myocardial infarction, no LV thrombus.  6.  Please review  the separate Radiology report for incidental  noncardiac findings.    TTE 3/7/2022  Limited TTE for LV function.  Left ventricular function is decreased. The ejection fraction is 45-50%  (mildly reduced). There is a pattern of wall motion abnormalities that is  consistent with a prior LAD culprit infarction.    TTE 2/20/2023  Left ventricular function is decreased. The ejection fraction is 40-45%  (mildly reduced). Hypokinesis involving all apical segments as well as the mid  anterior/anteroseptal wall.  Global right ventricular function is normal.  Mild tricuspid insufficiency is present.  Pulmonary artery systolic pressure is normal.  No pericardial effusion is present.  Aortic root and ascending aorta measure 4.1 cm (index 1.9 cm/m2). Normal for BSA.  This study was compared with the study from 3/7/2022 .  No significant changes noted.    TTE 2/5/24:  Interpretation Summary  Left ventricular function is decreased. The ejection fraction is 40-45%  (mildly reduced). There is a pattern of wall motion abnormalities consistent  with a LAD culprit infarction.  Global right ventricular function is normal. The right ventricle is normal  size.  No significant valvular abnormalities.  IVC diameter <2.1 cm collapsing >50% with sniff suggests a normal RA pressure  of 3 mmHg.  This study was compared with the study from 02/20/2023. No significant changes  noted.     ECMO 2/17/2022  VF cardiac arrest with ongoing cardiogenic shock  Two vessel coronary artery disease with occlusion of the midLAD and severe stenosis of the large ramus  Successful PCI of the mid LAD with a LETY  Successful cannulation for peripheral VA ECMO with a 17 Fr arterial and 25 Fr venous cannula  Successful insertion of a distal perfusion cannula  Successful insertion of a 50cc IABP  Successful insertion of a thermogard cooling catheter  Successful insertion of a R radial arterial line     CLINICAL IMPRESSION:   Chano Johnson is a 72 year old gentleman  with a pmhx sig for afib s/p ablation, abhi, smoking, and cad s/p STEMI c/b VF arrest requiring VA ECMO s/p mLAD PCI and then staged procedure of RI.       Coronary artery disease: causing refractory VF arrest; found to have 2v disease with revascularization of the acutely occluded mLAD. He had staged PCI of the RI a saray later. TTE today with 40-45%, normal RV function. Coronary CTA showing stable patent stents. Denies anginal symptoms.   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  US of the cannulation site in 2022 demonstrated patent vascular flow.   -- CTM    Follow-up: 1 year    Thank you for allowing us to take part in the care of this very pleasant patient.  Please do not hesitate to call if any further questions or concerns arise.    Seen and discussed with Dr. Cintron.    Raymond Kathleen, PGY-5  Cardiovascular Disease Fellow    CC  Patient Care Team:  Liyah Cooley MD as PCP - General (Family Medicine)  Larry Cintron MD as MD (Cardiovascular Disease)  Mirlande Mckenzie, RN as Specialty Care Coordinator (Cardiology)  Malik Henriquez MD as MD (Cardiovascular Disease)  CHELSIE Parker MD as MD (Plastic Surgery)  Griffin Chavez MD as MD (Cardiovascular Disease)  Ever Roberts MD as Physician (Plastic Surgery)  Liyah Cooley MD as Assigned PCP  Larry Cintron MD as Assigned Heart and Vascular Provider  LARRY CINTRON      Attestation signed by Larry Cintron MD at 2/11/2024  9:44 PM:  ATTENDING ATTESTATION:   I personally examined and evaluated this patient on February 5, 2024.  I have personally reviewed today's vital signs, medications, all labs, and all imaging/cardiac studies described above. I have reviewed and edited, as necessary, the history,  review of systems, physical examination, and assessment and plan.  I discussed the patient with Dr. Chino and agree with the assessment and plan of care as documented in the note above.  I personally spent 40 min today reviewing the medical record, meeting with the patient, and completing this note.    Thank you for allowing us to take part in the care of this very pleasant patient.  Please do not hesitate to call if any further questions or concerns arise.    Larry Cintron MD, PhD  Interventional/Critical Care Cardiology  [phone]    February 5, 2024       Please do not hesitate to contact me if you have any questions/concerns.     Sincerely,     Larry Cintron MD

## 2024-02-05 NOTE — PROGRESS NOTES
Pt arrived for Coronary CT angiogram. Test, meds and side effects reviewed with pt. Resting HR 70-72 bpm. Given 50 mg PO Metoprolol + 10 mg PO Ivabradine per verbal order. Administered 0.8 mg SL nitro on CTA table per order. CTA completed. Patient tolerated procedure well and denies symptoms of allergic reaction. Post monitoring completed and VSS. D/C instructions reviewed with pt whom verbalized understanding of need to increase PO fluids today. D/C to gold waiting room accompanied by staff to await his echo appt.  IV left in place.

## 2024-02-05 NOTE — PATIENT INSTRUCTIONS
Critical Care Cardiology Survivor Clinic                                                                You were seen today in the Critical Care Cardiology Survivor Clinic at the [facility]:       Dr. Griffin Cintron        Your visit summary and instructions are as follows:      Please start losartan 25mg daily- let us know if you have any dizziness or lightheadedness    Continue to work toward the recommendation of 150 minutes of moderate intensity exercise/week and maintain a healthy lifestyle (avoid illicit drugs, smoking and moderate alcohol consumption)    Return to Critical Care Cardiology Survivor Clinic with device check prior in 1 year     -Support group: This is not required but can be helpful to connect to other survivors.   Website: [place] [facility]     - Driving: state law to refrain from driving at least three months from cardiac arrest, CDL licences do not allow ICD.     -What is Health Psychology?  Health Psychology is a specialty that helps people cope with the stress and anxiety that often occurs with illness, emotional and psychological issues, and preparation for medical treatment including surgical procedures.    Please contact our psychologists directly with questions or to make an appointment.    Jaylin Jean, Ph.D.,                 [phone]  Shelby Urban, Ph.D.                      [phone]  Kellen Jones, Ph.D.,                    [phone]          James Cha, Ph.D., [facility],   [phone]       Thank you for your visit today!     Please MyChart message me or call Onelia Mckenzie RN or Ladonna Antonio RN if you have any questions or concerns.      During Business Hours:  [phone], option # 1 (University) then option # 4 (medical questions) and ask to speak with my nurse.     After hours, weekends or holidays:   [phone], Option #4  Ask to speak to the On-Call Cardiologist. Inform them you  are a heart failure patient at the [facility].

## 2024-02-05 NOTE — NURSING NOTE
Chief Complaint   Patient presents with    Follow Up     Return critical care        Vitals were taken, medications reconciled.    Tiffany Trevino CMA  12:22 PM

## 2024-02-20 ENCOUNTER — TRANSFERRED RECORDS (OUTPATIENT)
Dept: HEALTH INFORMATION MANAGEMENT | Facility: CLINIC | Age: 74
End: 2024-02-20
Payer: MEDICARE

## 2024-03-25 ENCOUNTER — MYC REFILL (OUTPATIENT)
Dept: FAMILY MEDICINE | Facility: CLINIC | Age: 74
End: 2024-03-25
Payer: MEDICARE

## 2024-03-25 ENCOUNTER — MYC MEDICAL ADVICE (OUTPATIENT)
Dept: CARDIOLOGY | Facility: CLINIC | Age: 74
End: 2024-03-25
Payer: MEDICARE

## 2024-03-25 DIAGNOSIS — E03.8 SUBCLINICAL HYPOTHYROIDISM: ICD-10-CM

## 2024-03-25 DIAGNOSIS — I25.10 CORONARY ARTERY DISEASE INVOLVING NATIVE CORONARY ARTERY OF NATIVE HEART WITHOUT ANGINA PECTORIS: ICD-10-CM

## 2024-03-25 DIAGNOSIS — I46.9 CARDIAC ARREST (H): ICD-10-CM

## 2024-03-25 DIAGNOSIS — I50.22 CHRONIC SYSTOLIC HEART FAILURE (H): ICD-10-CM

## 2024-03-25 DIAGNOSIS — E78.5 HYPERLIPIDEMIA, UNSPECIFIED HYPERLIPIDEMIA TYPE: ICD-10-CM

## 2024-03-25 DIAGNOSIS — I21.3 ST ELEVATION MYOCARDIAL INFARCTION (STEMI), UNSPECIFIED ARTERY (H): ICD-10-CM

## 2024-03-25 DIAGNOSIS — K59.00 CONSTIPATION, UNSPECIFIED CONSTIPATION TYPE: ICD-10-CM

## 2024-03-25 RX ORDER — LOSARTAN POTASSIUM 25 MG/1
25 TABLET ORAL DAILY
Qty: 90 TABLET | Refills: 3 | Status: SHIPPED | OUTPATIENT
Start: 2024-03-25

## 2024-03-25 RX ORDER — METOPROLOL SUCCINATE 50 MG/1
150 TABLET, EXTENDED RELEASE ORAL DAILY
Qty: 270 TABLET | Refills: 3 | Status: SHIPPED | OUTPATIENT
Start: 2024-03-25

## 2024-03-25 RX ORDER — ASPIRIN 81 MG/1
81 TABLET, CHEWABLE ORAL DAILY
Qty: 90 TABLET | Refills: 3 | Status: SHIPPED | OUTPATIENT
Start: 2024-03-25

## 2024-03-25 RX ORDER — LEVOTHYROXINE SODIUM 25 UG/1
25 TABLET ORAL DAILY
Qty: 90 TABLET | Refills: 1 | OUTPATIENT
Start: 2024-03-25

## 2024-03-25 RX ORDER — ALLOPURINOL 100 MG/1
100 TABLET ORAL 2 TIMES DAILY
Qty: 180 TABLET | Refills: 3 | Status: SHIPPED | OUTPATIENT
Start: 2024-03-25

## 2024-03-25 RX ORDER — ROSUVASTATIN CALCIUM 20 MG/1
20 TABLET, COATED ORAL EVERY EVENING
Qty: 90 TABLET | Refills: 3 | Status: SHIPPED | OUTPATIENT
Start: 2024-03-25

## 2024-03-27 DIAGNOSIS — I46.9 CARDIAC ARREST (H): ICD-10-CM

## 2024-04-01 RX ORDER — MULTIVITAMIN WITH FOLIC ACID 400 MCG
1 TABLET ORAL DAILY
Qty: 90 TABLET | Refills: 3 | Status: SHIPPED | OUTPATIENT
Start: 2024-04-01

## 2024-04-01 NOTE — TELEPHONE ENCOUNTER
Routing refill request to provider for review/approval because:  Failed-Medication indicated for associated diagnosis    Pending Prescriptions:                       Disp   Refills    Multiple Vitamin (DAILY-RUFUS MULTIVITAMIN)*90 tab*0        Sig: Take 1 tablet by mouth daily    Last Written Prescription Date:  3/22/23  Last Fill Quantity: 90,  # refills: 3   Last office visitwith prescribing provider: 2/5/2024   Future Office Visit:   Appointments in Next Year      Oct 31, 2024 10:20 AM  (Arrive by 10:00 AM)  MEDICARE ANNUAL WELLNESS VISIT with Liyah Cooley MD  [facility] [phone]            CORDELL Cho, RN  [facility]

## 2024-04-18 ENCOUNTER — MYC MEDICAL ADVICE (OUTPATIENT)
Dept: FAMILY MEDICINE | Facility: CLINIC | Age: 74
End: 2024-04-18
Payer: MEDICARE

## 2024-04-18 DIAGNOSIS — E03.8 SUBCLINICAL HYPOTHYROIDISM: ICD-10-CM

## 2024-04-18 RX ORDER — LEVOTHYROXINE SODIUM 25 UG/1
25 TABLET ORAL DAILY
Qty: 90 TABLET | Refills: 3 | Status: SHIPPED | OUTPATIENT
Start: 2024-04-18

## 2024-04-18 NOTE — TELEPHONE ENCOUNTER
"Per OV notes form 10/25/23:  \"Subclinical hypothyroidism  History of subclinical hypothyroidism, will check levels today.  -     Basic metabolic panel  (Ca, Cl, CO2, Creat, Gluc, K, Na, BUN); Future  -     TSH with free T4 reflex; Future  -     levothyroxine (SYNTHROID/LEVOTHROID) 25 MCG tablet; Take 1 tablet (25 mcg) by mouth daily\"    Per lab result notes from 10/25/23:  \"Your thyroid function is slightly abnormal - and has been for the past year. You are not currently taking thyroid medication but I think it would be reasonable to start a low dose. I will send this to your pharmacy now.\"    Will route to PCP to confirm that pt is to take levothyroxine ongoing.    KAMILLE ChoN, RN  [facility]             "

## 2024-06-24 DIAGNOSIS — K59.00 CONSTIPATION, UNSPECIFIED CONSTIPATION TYPE: ICD-10-CM

## 2024-06-27 ENCOUNTER — MYC MEDICAL ADVICE (OUTPATIENT)
Dept: CARDIOLOGY | Facility: CLINIC | Age: 74
End: 2024-06-27
Payer: MEDICARE

## 2024-06-27 ENCOUNTER — MYC REFILL (OUTPATIENT)
Dept: CARDIOLOGY | Facility: CLINIC | Age: 74
End: 2024-06-27
Payer: MEDICARE

## 2024-06-27 DIAGNOSIS — K59.00 CONSTIPATION, UNSPECIFIED CONSTIPATION TYPE: ICD-10-CM

## 2024-06-28 RX ORDER — POLYETHYLENE GLYCOL 3350 17 G/17G
17 POWDER, FOR SOLUTION ORAL DAILY
Qty: 510 G | Refills: 11 | Status: SHIPPED | OUTPATIENT
Start: 2024-06-28

## 2024-06-30 RX ORDER — POLYETHYLENE GLYCOL 3350 17 G/17G
17 POWDER, FOR SOLUTION ORAL DAILY
Start: 2024-06-30

## 2024-06-30 NOTE — TELEPHONE ENCOUNTER
polyethylene glycol (MIRALAX) 17 GM/Dose powder   Disp-510 g, R-11   Start: 06/28/2024     Refused.  Addressed in a different encounter.

## 2024-07-03 RX ORDER — POLYETHYLENE GLYCOL 3350 17 G/17G
POWDER, FOR SOLUTION ORAL
Qty: 510 G | Refills: 0 | OUTPATIENT
Start: 2024-07-03

## 2024-07-03 NOTE — TELEPHONE ENCOUNTER
polyethylene glycol (MIRALAX) 17 GM/Dose powder 510 g 11 6/28/2024       Should have refills on file. Pharmacy sent message. Rx refill denied    Dasia Colin RN  P Red Flag Triage/MRT

## 2024-08-19 NOTE — PROGRESS NOTES
ECLS Cannulation Note:    Date on: 2/17/2022  Time on: 1602  Cannulating Physician: Abdulaziz    Arterial Cannula: 17 Fr. In the Left Femoral Artery      Venous Cannula: 25 Fr. In the Left Femoral Vein      ECMO components include Quadrox Oxygenator- Lot # [id number]  Circuit Lot Number: [id number]  Console Serial Number: [id number]    Cannulation was performed in the Cath Lab, placement was verified by fluoroscopy, cannula position is good.      Aurelio Vogel, RT  ECMO Specialist  2/17/2022 6:07 PM     Discontinue Regimen: fluorouracil 5 % topical cream Bid on the forehead Detail Level: Zone Plan: Discontinue fluorouracil on the AA on the forehead. We are initiating treatment on the AA on the cheeks. RTC in 3 months for a follow up to recheck the cheek areas with potential of liquid nitrogen treatment in next visit. Render In Strict Bullet Format?: No Initiate Treatment: Fluorouracil 5%- Apply sparingly to AA on the right cheek BID x 2 weeks .

## 2024-08-27 ENCOUNTER — TRANSFERRED RECORDS (OUTPATIENT)
Dept: HEALTH INFORMATION MANAGEMENT | Facility: CLINIC | Age: 74
End: 2024-08-27
Payer: MEDICARE

## 2024-09-19 ENCOUNTER — TELEPHONE (OUTPATIENT)
Dept: CARDIOLOGY | Facility: CLINIC | Age: 74
End: 2024-09-19
Payer: MEDICARE

## 2024-09-19 DIAGNOSIS — Z95.5 S/P DRUG ELUTING CORONARY STENT PLACEMENT: ICD-10-CM

## 2024-09-19 DIAGNOSIS — I25.10 CORONARY ARTERY DISEASE INVOLVING NATIVE CORONARY ARTERY OF NATIVE HEART WITHOUT ANGINA PECTORIS: ICD-10-CM

## 2024-09-19 DIAGNOSIS — I48.20 CHRONIC ATRIAL FIBRILLATION (H): Primary | ICD-10-CM

## 2024-09-19 DIAGNOSIS — E78.2 MIXED HYPERLIPIDEMIA: ICD-10-CM

## 2024-10-25 ENCOUNTER — TRANSFERRED RECORDS (OUTPATIENT)
Dept: HEALTH INFORMATION MANAGEMENT | Facility: CLINIC | Age: 74
End: 2024-10-25
Payer: MEDICARE

## 2024-11-06 ENCOUNTER — OFFICE VISIT (OUTPATIENT)
Dept: FAMILY MEDICINE | Facility: CLINIC | Age: 74
End: 2024-11-06
Payer: COMMERCIAL

## 2024-11-06 VITALS
BODY MASS INDEX: 17.78 KG/M2 | SYSTOLIC BLOOD PRESSURE: 110 MMHG | DIASTOLIC BLOOD PRESSURE: 50 MMHG | WEIGHT: 127 LBS | HEIGHT: 71 IN | RESPIRATION RATE: 20 BRPM | TEMPERATURE: 97.4 F | OXYGEN SATURATION: 98 % | HEART RATE: 65 BPM

## 2024-11-06 DIAGNOSIS — N18.32 CHRONIC KIDNEY DISEASE, STAGE 3B (H): ICD-10-CM

## 2024-11-06 DIAGNOSIS — Z01.818 PREOP GENERAL PHYSICAL EXAM: Primary | ICD-10-CM

## 2024-11-06 DIAGNOSIS — I50.22 CHRONIC SYSTOLIC HEART FAILURE (H): ICD-10-CM

## 2024-11-06 DIAGNOSIS — I25.10 CORONARY ARTERY DISEASE INVOLVING NATIVE CORONARY ARTERY OF NATIVE HEART WITHOUT ANGINA PECTORIS: ICD-10-CM

## 2024-11-06 DIAGNOSIS — Z95.818 PRESENCE OF WATCHMAN LEFT ATRIAL APPENDAGE CLOSURE DEVICE: ICD-10-CM

## 2024-11-06 DIAGNOSIS — I48.20 CHRONIC ATRIAL FIBRILLATION (H): ICD-10-CM

## 2024-11-06 DIAGNOSIS — E03.8 SUBCLINICAL HYPOTHYROIDISM: ICD-10-CM

## 2024-11-06 PROBLEM — R31.9 URINARY TRACT INFECTION WITH HEMATURIA, SITE UNSPECIFIED: Status: RESOLVED | Noted: 2024-01-27 | Resolved: 2024-11-06

## 2024-11-06 PROBLEM — N39.0 URINARY TRACT INFECTION WITH HEMATURIA, SITE UNSPECIFIED: Status: RESOLVED | Noted: 2024-01-27 | Resolved: 2024-11-06

## 2024-11-06 LAB
ERYTHROCYTE [DISTWIDTH] IN BLOOD BY AUTOMATED COUNT: 12.8 % (ref 10–15)
HCT VFR BLD AUTO: 38.5 % (ref 40–53)
HGB BLD-MCNC: 12.6 G/DL (ref 13.3–17.7)
MCH RBC QN AUTO: 33.2 PG (ref 26.5–33)
MCHC RBC AUTO-ENTMCNC: 32.7 G/DL (ref 31.5–36.5)
MCV RBC AUTO: 102 FL (ref 78–100)
PLATELET # BLD AUTO: 139 10E3/UL (ref 150–450)
RBC # BLD AUTO: 3.79 10E6/UL (ref 4.4–5.9)
WBC # BLD AUTO: 6.4 10E3/UL (ref 4–11)

## 2024-11-06 PROCEDURE — 99214 OFFICE O/P EST MOD 30 MIN: CPT | Performed by: STUDENT IN AN ORGANIZED HEALTH CARE EDUCATION/TRAINING PROGRAM

## 2024-11-06 PROCEDURE — 36415 COLL VENOUS BLD VENIPUNCTURE: CPT | Performed by: STUDENT IN AN ORGANIZED HEALTH CARE EDUCATION/TRAINING PROGRAM

## 2024-11-06 PROCEDURE — 80048 BASIC METABOLIC PNL TOTAL CA: CPT | Performed by: STUDENT IN AN ORGANIZED HEALTH CARE EDUCATION/TRAINING PROGRAM

## 2024-11-06 PROCEDURE — 93005 ELECTROCARDIOGRAM TRACING: CPT | Performed by: STUDENT IN AN ORGANIZED HEALTH CARE EDUCATION/TRAINING PROGRAM

## 2024-11-06 PROCEDURE — 85027 COMPLETE CBC AUTOMATED: CPT | Performed by: STUDENT IN AN ORGANIZED HEALTH CARE EDUCATION/TRAINING PROGRAM

## 2024-11-06 ASSESSMENT — PAIN SCALES - GENERAL: PAINLEVEL_OUTOF10: NO PAIN (0)

## 2024-11-06 NOTE — PROGRESS NOTES
[facility]  [address]  [facility] [address]  Phone: [phone]  Fax: [phone]  Primary Provider: Liyah Cooley  Pre-op Performing Provider: GARY PENN          11/1/2024   Surgical Information   What procedure is being done? Removal of deposit in left upper arm.    Facility or Hospital where procedure/surgery will be performed: [facility] in [facility]    Who is doing the procedure / surgery? Devin Howell    Date of surgery / procedure: Nov 8    Time of surgery / procedure: ?    Where do you plan to recover after surgery? at home with family        Patient-reported       Fax number: NA    Assessment & Plan   74-year-old male with past medical history of coronary artery disease, CHF, atrial fibrillation surgical post watchman, CKD stage IIIb, hypothyroidism who presents for preop physical to have lipoma removed from his left deltoid.  He had a STEMI with V-fib arrest requiring ECMO and 2022.  Surgical post stenting.  He is recommended to be on dual antiplatelet for 3 years.  Had a previous preop this last winter where his Brilinta was held and continued aspirin.  I recommended this again.  He has not had any new chest symptoms.  Repeated his EKG which looked similar to previous.  Will obtain blood work as below.  Last echocardiogram last winter was similar to previous.  As long as no significant changes in his BMP and CBC he is approved to proceed.    1. Preop general physical exam (Primary)  - CBC with platelets; Future  - Basic metabolic panel  (Ca, Cl, CO2, Creat, Gluc, K, Na, BUN); Future  - EKG 12-lead, tracing only  - CBC with platelets  - Basic metabolic panel  (Ca, Cl, CO2, Creat, Gluc, K, Na, BUN)    2. Coronary artery disease involving native coronary artery of native heart without angina pectoris    3. Chronic systolic heart failure (H)    4. Chronic atrial fibrillation (H)    5. Presence of Watchman left atrial appendage closure  device    6. Chronic kidney disease, stage 3b (H)    7. Subclinical hypothyroidism    The proposed surgical procedure is considered LOW risk.    Antiplatelet or Anticoagulation Medication Instructions  Hold brillinta now and restart after procedure.  Continue aspirin as normal    Additional Medication Instructions  Hold everything except aspirin, losartan, metoprolol . Continue as normal after.     RECOMMENDATION:  Approval given to proceed with proposed procedure pending review of diagnostic evaluation.    Subjective     HPI related to upcoming procedure: Left shoulder osteoarthritis          11/1/2024    10:25 AM   Preop Questions   Have you ever had a heart attack or stroke? Yes   Have you ever had surgery on your heart or blood vessels, such as a stent placement, a coronary artery bypass, or surgery on an artery in your head, neck, heart, or legs? Yes   Do you have chest pain with activity? No   Do you have a history of heart failure? Yes   Do you currently have a cold, bronchitis or symptoms of other infection? No   Do you have a cough, shortness of breath, or wheezing? No   Do you or anyone in your family have previous history of blood clots? No   Do you or does anyone in your family have a serious bleeding problem such as prolonged bleeding following surgeries or cuts? No   Have you ever had problems with anemia or been told to take iron pills? No   Have you had any abnormal blood loss such as black, tarry or bloody stools? No   Have you ever had a blood transfusion? Yes   Have you ever had a transfusion reaction? No   Are you willing to have a blood transfusion if it is medically needed before, during, or after your surgery? Yes   Have you or any of your relatives ever had problems with anesthesia? No   Do you have sleep apnea, excessive snoring or daytime drowsiness? Yes   Do you have a CPAP machine? Yes   Do you have any artifical heart valves or other implanted medical devices like a pacemaker,  defibrillator, or continuous glucose monitor? No   Do you have artificial joints? No   Are you allergic to latex? No       METS >4? YES    Caprini score- NA    Greater than 65? YES    Health Care Directive:  Patient does not have a Health Care Directive or Living Will: Full COde    Preoperative Review of :   reviewed - no record of controlled substances prescribed.    Echo 2/24:  Interpretation Summary  Left ventricular function is decreased. The ejection fraction is 40-45%  (mildly reduced). There is a pattern of wall motion abnormalities consistent  with a LAD culprit infarction.  Global right ventricular function is normal. The right ventricle is normal  size.  No significant valvular abnormalities.  IVC diameter <2.1 cm collapsing >50% with sniff suggests a normal RA pressure  of 3 mmHg.  This study was compared with the study from 02/20/2023. No significant changes  noted.    Last cardiology note 2/24 copied below:  Chano Johnson is a 72 year old gentleman with a pmhx sig for afib s/p ablation, abhi, smoking, and cad s/p STEMI c/b VF arrest requiring [facility] ECMO s/p mLAD PCI and then staged procedure of RI.       Coronary artery disease: causing refractory VF arrest; found to have 2v disease with revascularization of the acutely occluded mLAD. He had staged PCI of the RI a saray later. TTE today with 40-45%, normal RV function. Coronary CTA showing stable patent stents. Denies anginal symptoms.   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  US of the cannulation site in 2022 demonstrated patent vascular flow.   -- CTM     Follow-up: 1 year    Status of Chronic Conditions:  See problem list for active medical problems.  Problems all longstanding and stable, except as noted/documented.  See ROS for  pertinent symptoms related to these conditions.    Review of Systems  Complete ROS is negative except as noted in HPI    Patient Active Problem List    Diagnosis Date Noted    Urinary retention 01/27/2024     Priority: Medium    Gross hematuria 01/27/2024     Priority: Medium    Acute kidney injury (H) 01/27/2024     Priority: Medium    Urinary tract infection with hematuria, site unspecified 01/27/2024     Priority: Medium    Benign prostatic hyperplasia with urinary obstruction 11/03/2023     Priority: Medium    Chronic systolic heart failure (H) 10/25/2023     Priority: Medium    Hyperlipidemia 10/25/2022     Priority: Medium    Chronic kidney disease, stage 3b (H) 07/14/2022     Priority: Medium    Subclinical hypothyroidism 07/13/2022     Priority: Medium    S/P drug eluting coronary stent placement 04/22/2022     Priority: Medium    Coronary artery disease involving native coronary artery of native heart without angina pectoris 04/14/2022     Priority: Medium    ST elevation myocardial infarction involving left anterior descending (LAD) coronary artery (H) 03/18/2022     Priority: Medium     Formatting of this note might be different from the original.  2/17/2022      Presence of Watchman left atrial appendage closure device 10/21/2020     Priority: Medium    Diverticular disease of large intestine 06/24/2020     Priority: Medium    Other cardiomyopathies (H) 04/02/2019     Priority: Medium    ED (erectile dysfunction) of organic origin 02/16/2018     Priority: Medium    History of gout 02/16/2018     Priority: Medium    Chronic atrial fibrillation (H)      Priority: Medium    High prostate specific antigen (PSA) 11/19/2015     Priority: Medium     R97.2 : Raised prostate specific antigen      Atrial flutter (H) 01/27/2012     Priority: Medium    Sleep apnea 01/27/2012     Priority: Medium     On CPAP           Past Surgical History:   Procedure Laterality Date    ATRIAL ABLATION SURGERY      CARDIAC SURGERY       CERVICAL SPINE SURGERY      CV ARTERIAL LINE PLACEMENT N/A 02/17/2022    Procedure: Arterial Line Placement;  Surgeon: Larry Cintron MD;  Location:  [facility]ARD[facility] CATH [facility]    CV CORONARY ANGIOGRAM N/A 02/17/2022    Procedure: Coronary Angiogram;  Surgeon: Larry Cintron MD;  Location:  [facility] C[facility]    CV CORONARY ANGIOGRAM N/A 04/22/2022    Procedure: Coronary Angiogram;  Surgeon: Larry Cintron MD;  Location:  [facility] C[facility]    CV EXTRACORPERAL MEMBRANE OXYGENATION N/A 02/17/2022    Procedure: Extracorporeal Membrance Oxygenation;  Surgeon: Larry Cintron MD;  Location:  HEART CARD[facility] CATH LAB    CV INTRA AORTIC BALLOON N/A 02/17/2022    Procedure: Intra Aortic Balloon Pump Insertion;  Surgeon: Larry Cintron MD;  Location: [facility] CARDIAC CATH LAB    CV PCI N/A 04/22/2022    Procedure: Percutaneous Coronary Intervention;  Surgeon: Larry Cintron MD;  Location: [facility] CARDIAC CATH LAB    CV PCI ANGIOPLASTY N/A 02/17/2022    Procedure: Percutaneous Transluminal Angioplasty;  Surgeon: Larry Cintron MD;  Location: [facility] CARDIAC CATH LAB    CV THERAPEUTIC HYPOTHERMIA N/A 02/17/2022    Procedure: Therapeutic Hypothermia;  Surgeon: Larry Cintron MD;  Location: [facility] CARDIAC CATH LAB    HEAD & NECK SURGERY      IMPLANT PROSTHESIS PENIS RIGID N/A 1/25/2024    Procedure: INSERTION OF INFLATION OF SEMI RIGID PENILE PROSTHESIS;  Surgeon: Bryan Acevedo MD;  Location: [facility] OR    IR UPPER EXTREMITY ANGIOGRAM LEFT  02/20/2022    MIDLINE DOUBLE LUMEN PLACEMENT Left 02/28/2022    Left brachial vein medial 0.59cm.Blood return on all ports midline okay to use.    REMOVE EXTRACORPORAL MEMBRANE OXYGENATOR ADULT N/A 02/22/2022    Procedure: EXTRACORPOREAL MEMBRANE OXYGENATION CANNULA REMOVAL, INTRAOPERATIVE TRANSESOPHAGEAL ECHOCARDIOGRAM PER ANESTHESIA.;  Surgeon: Rod Banuelos MD;  Location: [facility] OR       Current Outpatient Medications   Medication Sig  "Dispense Refill    allopurinol (ZYLOPRIM) 100 MG tablet Take 1 tablet (100 mg) by mouth 2 times daily 180 tablet 3    aspirin (ASA) 81 MG chewable tablet Take 1 tablet (81 mg) by mouth daily Starting tomorrow. 90 tablet 3    levothyroxine (SYNTHROID/LEVOTHROID) 25 MCG tablet Take 1 tablet (25 mcg) by mouth daily 90 tablet 3    losartan (COZAAR) 25 MG tablet Take 1 tablet (25 mg) by mouth daily 90 tablet 3    metoprolol succinate ER (TOPROL XL) 50 MG 24 hr tablet Take 3 tablets (150 mg) by mouth daily 270 tablet 3    Multiple Vitamin (DAILY-RUFUS MULTIVITAMIN) TABS Take 1 tablet by mouth daily 90 tablet 3    polyethylene glycol (MIRALAX) 17 GM/Dose powder Take 17 g by mouth daily 510 g 11    rosuvastatin (CRESTOR) 20 MG tablet Take 1 tablet (20 mg) by mouth every evening 90 tablet 3    ticagrelor (BRILINTA) 90 MG tablet Take 1 tablet (90 mg) by mouth 2 times daily 180 tablet 3     No current facility-administered medications for this visit.          Allergies   Allergen Reactions    Chlorpheniramine-Phenylpropan [A.R.M.] Other (See Comments)     Watery eyes, sneezing    Seroquel [Quetiapine] Other (See Comments)     Per Meeta (Wife), would like this medication to be listed as an allergy due to patient becoming combative and delirious after taking it.     Percocet [Oxycodone-Acetaminophen] Rash     \"felt like skin was crawling off\"-pt's S.O.         Social History     Socioeconomic History    Marital status:      Spouse name: Not on file    Number of children: Not on file    Years of education: Not on file    Highest education level: Not on file   Occupational History    Not on file   Tobacco Use    Smoking status: Never     Passive exposure: Never    Smokeless tobacco: Never   Vaping Use    Vaping status: Never Used   Substance and Sexual Activity    Alcohol use: Yes     Alcohol/week: 2.0 standard drinks of alcohol    Drug use: No    Sexual activity: Not on file   Other Topics Concern    Not on file   Social " History Narrative    Not on file     Social Drivers of Health     Financial Resource Strain: Low Risk  (1/11/2024)    Financial Resource Strain     Within the past 12 months, have you or your family members you live with been unable to get utilities (heat, electricity) when it was really needed?: No   Food Insecurity: Low Risk  (1/11/2024)    Food Insecurity     Within the past 12 months, did you worry that your food would run out before you got money to buy more?: No     Within the past 12 months, did the food you bought just not last and you didn t have money to get more?: No   Transportation Needs: Low Risk  (1/11/2024)    Transportation Needs     Within the past 12 months, has lack of transportation kept you from medical appointments, getting your medicines, non-medical meetings or appointments, work, or from getting things that you need?: No   Physical Activity: Not on file   Stress: Not on file   Social Connections: Unknown (9/1/2023)    Received from [facility], [facility]    Social Connections     Frequency of Communication with Friends and Family: Not on file   Interpersonal Safety: Low Risk  (11/6/2024)    Interpersonal Safety     Do you feel physically and emotionally safe where you currently live?: Yes     Within the past 12 months, have you been hit, slapped, kicked or otherwise physically hurt by someone?: No     Within the past 12 months, have you been humiliated or emotionally abused in other ways by your partner or ex-partner?: No   Housing Stability: Low Risk  (1/11/2024)    Housing Stability     Do you have housing? : Yes     Are you worried about losing your housing?: No       Family History   Problem Relation Age of Onset    Kidney Cancer Mother         s/p surgery    Kidney failure Mother     Leukemia Father 41        chemical exposure    No Known Problems Sister     No Known Problems Sister     No Known Problems Sister         "  Objective   /50   Pulse 65   Temp 97.4  F (36.3  C)   Resp 20   Ht 1.804 m (5' 11.04\")   Wt 57.6 kg (127 lb)   SpO2 98%   BMI 17.69 kg/m      General appearance: Alert, cooperative, no distress, appears stated age  Head: Normocephalic, atraumatic, without obvious abnormality  Eyes: Pupils equal round, reactive.  Conjunctiva clear.  Nose: Nares normal, no drainage.  Throat: Lips, mucosa, tongue normal mucosa pink and moist  Neck: Supple, symmetric, trachea midline  Lungs: Clear to auscultation bilaterally, no wheezing or crackles present.  Respirations unlabored  Heart: Regular rate and rhythm, normal S1 and S2, no murmur, rub or gallop.    Diagnostics:  Labs pending at this time.  Results will be reviewed when available.   EKG required for known coronary heart disease and not completed in the last 90 days.     Revised Cardiac Risk Index (RCRI):  The patient has the following serious cardiovascular risks for perioperative complications:   - Coronary Artery Disease (MI, positive stress test, angina, Qs on EKG) = 1 point   - Congestive Heart Failure (pulmonary edema, PND, s3 jone, CXR with pulmonary congestion, basilar rales) = 1 point     RCRI Interpretation: 2 points: Class III (moderate risk - 6.6% complication rate)     Estimated Functional Capacity: Performs 4 METS exercise without symptoms (e.g., light housework, stairs, 4 mph walk, 7 mph bike, slow step dance)     Signed Electronically by: Reji Duarte MD  Copy of this evaluation report is provided to requesting physician.}    "

## 2024-11-07 LAB
ANION GAP SERPL CALCULATED.3IONS-SCNC: 11 MMOL/L (ref 7–15)
ATRIAL RATE - MUSE: 65 BPM
BUN SERPL-MCNC: 22.6 MG/DL (ref 8–23)
CALCIUM SERPL-MCNC: 9.4 MG/DL (ref 8.8–10.4)
CHLORIDE SERPL-SCNC: 107 MMOL/L (ref 98–107)
CREAT SERPL-MCNC: 1.45 MG/DL (ref 0.67–1.17)
DIASTOLIC BLOOD PRESSURE - MUSE: NORMAL MMHG
EGFRCR SERPLBLD CKD-EPI 2021: 50 ML/MIN/1.73M2
GLUCOSE SERPL-MCNC: 89 MG/DL (ref 70–99)
HCO3 SERPL-SCNC: 24 MMOL/L (ref 22–29)
INTERPRETATION ECG - MUSE: NORMAL
P AXIS - MUSE: 71 DEGREES
POTASSIUM SERPL-SCNC: 4.8 MMOL/L (ref 3.4–5.3)
PR INTERVAL - MUSE: 180 MS
QRS DURATION - MUSE: 88 MS
QT - MUSE: 410 MS
QTC - MUSE: 426 MS
R AXIS - MUSE: 48 DEGREES
SODIUM SERPL-SCNC: 142 MMOL/L (ref 135–145)
SYSTOLIC BLOOD PRESSURE - MUSE: NORMAL MMHG
T AXIS - MUSE: 76 DEGREES
VENTRICULAR RATE- MUSE: 65 BPM

## 2024-11-07 PROCEDURE — 93010 ELECTROCARDIOGRAM REPORT: CPT | Performed by: INTERNAL MEDICINE

## 2024-11-07 NOTE — RESULT ENCOUNTER NOTE
Chano Johnson  Your results from your recent clinic visit show:  Your Kidney function looks similar to previous  Your anemia is also similar to previous    If you have more questions please call the clinic at [phone] or send me a [employer] message    Dr. Reji Ritchie

## 2024-11-08 ENCOUNTER — LAB REQUISITION (OUTPATIENT)
Dept: LAB | Facility: CLINIC | Age: 74
End: 2024-11-08
Payer: MEDICARE

## 2024-11-08 DIAGNOSIS — R22.32 LOCALIZED SWELLING, MASS AND LUMP, LEFT UPPER LIMB: ICD-10-CM

## 2024-11-08 PROCEDURE — 88304 TISSUE EXAM BY PATHOLOGIST: CPT | Mod: TC,ORL | Performed by: ORTHOPAEDIC SURGERY

## 2024-11-12 LAB
PATH REPORT.COMMENTS IMP SPEC: NORMAL
PATH REPORT.COMMENTS IMP SPEC: NORMAL
PATH REPORT.FINAL DX SPEC: NORMAL
PATH REPORT.GROSS SPEC: NORMAL
PATH REPORT.MICROSCOPIC SPEC OTHER STN: NORMAL
PATH REPORT.MICROSCOPIC SPEC OTHER STN: NORMAL
PATH REPORT.RELEVANT HX SPEC: NORMAL
PHOTO IMAGE: NORMAL

## 2024-11-12 PROCEDURE — 88304 TISSUE EXAM BY PATHOLOGIST: CPT | Mod: 26 | Performed by: PATHOLOGY

## 2024-11-14 SDOH — HEALTH STABILITY: PHYSICAL HEALTH: ON AVERAGE, HOW MANY MINUTES DO YOU ENGAGE IN EXERCISE AT THIS LEVEL?: 120 MIN

## 2024-11-14 SDOH — HEALTH STABILITY: PHYSICAL HEALTH: ON AVERAGE, HOW MANY DAYS PER WEEK DO YOU ENGAGE IN MODERATE TO STRENUOUS EXERCISE (LIKE A BRISK WALK)?: 3 DAYS

## 2024-11-14 ASSESSMENT — SOCIAL DETERMINANTS OF HEALTH (SDOH): HOW OFTEN DO YOU GET TOGETHER WITH FRIENDS OR RELATIVES?: ONCE A WEEK

## 2024-11-18 PROBLEM — N17.9 ACUTE KIDNEY INJURY (H): Status: RESOLVED | Noted: 2024-01-27 | Resolved: 2024-11-18

## 2024-11-19 ENCOUNTER — OFFICE VISIT (OUTPATIENT)
Dept: FAMILY MEDICINE | Facility: CLINIC | Age: 74
End: 2024-11-19
Attending: FAMILY MEDICINE
Payer: COMMERCIAL

## 2024-11-19 VITALS
SYSTOLIC BLOOD PRESSURE: 110 MMHG | BODY MASS INDEX: 32.34 KG/M2 | HEIGHT: 71 IN | WEIGHT: 231 LBS | RESPIRATION RATE: 20 BRPM | OXYGEN SATURATION: 99 % | DIASTOLIC BLOOD PRESSURE: 60 MMHG | HEART RATE: 62 BPM | TEMPERATURE: 97.4 F

## 2024-11-19 DIAGNOSIS — L90.5 ABNORMAL SCARRING OF SKIN: ICD-10-CM

## 2024-11-19 DIAGNOSIS — E78.2 MIXED HYPERLIPIDEMIA: ICD-10-CM

## 2024-11-19 DIAGNOSIS — Z87.39 HISTORY OF GOUT: ICD-10-CM

## 2024-11-19 DIAGNOSIS — N18.32 CHRONIC KIDNEY DISEASE, STAGE 3B (H): ICD-10-CM

## 2024-11-19 DIAGNOSIS — Z13.1 DIABETES MELLITUS SCREENING: ICD-10-CM

## 2024-11-19 DIAGNOSIS — R97.20 HIGH PROSTATE SPECIFIC ANTIGEN (PSA): ICD-10-CM

## 2024-11-19 DIAGNOSIS — Z12.5 ENCOUNTER FOR SCREENING FOR MALIGNANT NEOPLASM OF PROSTATE: ICD-10-CM

## 2024-11-19 DIAGNOSIS — Z00.00 ENCOUNTER FOR MEDICARE ANNUAL WELLNESS EXAM: Primary | ICD-10-CM

## 2024-11-19 DIAGNOSIS — E03.8 SUBCLINICAL HYPOTHYROIDISM: ICD-10-CM

## 2024-11-19 LAB
ANION GAP SERPL CALCULATED.3IONS-SCNC: 13 MMOL/L (ref 7–15)
BUN SERPL-MCNC: 20.9 MG/DL (ref 8–23)
CALCIUM SERPL-MCNC: 9.6 MG/DL (ref 8.8–10.4)
CHLORIDE SERPL-SCNC: 107 MMOL/L (ref 98–107)
CHOLEST SERPL-MCNC: 92 MG/DL
CREAT SERPL-MCNC: 1.46 MG/DL (ref 0.67–1.17)
CREAT UR-MCNC: 93.9 MG/DL
EGFRCR SERPLBLD CKD-EPI 2021: 50 ML/MIN/1.73M2
ERYTHROCYTE [DISTWIDTH] IN BLOOD BY AUTOMATED COUNT: 12.3 % (ref 10–15)
EST. AVERAGE GLUCOSE BLD GHB EST-MCNC: 120 MG/DL
FASTING STATUS PATIENT QL REPORTED: YES
FASTING STATUS PATIENT QL REPORTED: YES
GLUCOSE SERPL-MCNC: 97 MG/DL (ref 70–99)
HBA1C MFR BLD: 5.8 % (ref 0–5.6)
HCO3 SERPL-SCNC: 23 MMOL/L (ref 22–29)
HCT VFR BLD AUTO: 38.5 % (ref 40–53)
HDLC SERPL-MCNC: 48 MG/DL
HGB BLD-MCNC: 12.6 G/DL (ref 13.3–17.7)
LDLC SERPL CALC-MCNC: 33 MG/DL
MCH RBC QN AUTO: 33.3 PG (ref 26.5–33)
MCHC RBC AUTO-ENTMCNC: 32.7 G/DL (ref 31.5–36.5)
MCV RBC AUTO: 102 FL (ref 78–100)
MICROALBUMIN UR-MCNC: <12 MG/L
MICROALBUMIN/CREAT UR: NORMAL MG/G{CREAT}
NONHDLC SERPL-MCNC: 44 MG/DL
PLATELET # BLD AUTO: 125 10E3/UL (ref 150–450)
POTASSIUM SERPL-SCNC: 4.7 MMOL/L (ref 3.4–5.3)
PSA SERPL DL<=0.01 NG/ML-MCNC: 3.76 NG/ML (ref 0–6.5)
RBC # BLD AUTO: 3.78 10E6/UL (ref 4.4–5.9)
SODIUM SERPL-SCNC: 143 MMOL/L (ref 135–145)
TRIGL SERPL-MCNC: 57 MG/DL
TSH SERPL DL<=0.005 MIU/L-ACNC: 3.06 UIU/ML (ref 0.3–4.2)
URATE SERPL-MCNC: 4.8 MG/DL (ref 3.4–7)
VIT D+METAB SERPL-MCNC: 26 NG/ML (ref 20–50)
WBC # BLD AUTO: 6.3 10E3/UL (ref 4–11)

## 2024-11-19 ASSESSMENT — PAIN SCALES - GENERAL: PAINLEVEL_OUTOF10: NO PAIN (0)

## 2024-11-19 NOTE — PROGRESS NOTES
"Preventive Care Visit  [facility]  Liyah Cooley MD, Family Medicine  Nov 19, 2024    SUBJECTIVE:   Husam is a 74 year old, presenting for the following:  Physical (Flu Shot)        11/19/2024     9:55 AM   Additional Questions   Roomed by Cely Soto   Accompanied by Self     Healthy Habits:     Hearing Impairment:  No    Had lipoma removed 1.5 wks ago with orthopedics - had been complaining of discomfort for like 2 years so happy to get it removed - seemed better right away but now feeling sore - has follow-up on Friday    Seeing cardiologist yearly, next in Feb - currently on DAPT  Seeing urologist     Frustrated by appearance of swelling in L pelvic area after stent/ECMO and abnormal scar in this area    Annual Wellness Visit  Patient has been advised of split billing requirements and indicates understanding: Yes     Are you in the first 12 months of your Medicare Part B coverage?  No    Physical Health:  In general, how would you rate your overall physical health? good  Outside of work, how many days during the week do you exercise?2-3 days/week  Outside of work, approximately how many minutes a day do you exercise?30-45 minutes  If you drink alcohol do you typically have >3 drinks per day or >7 drinks per week? No  Do you usually eat at least 4 servings of fruit and vegetables a day, include whole grains & fiber and avoid regularly eating high fat or \"junk\" foods? Yes  Do you have any problems taking medications regularly? No  Do you have any side effects from medications? not applicable  Needs assistance for the following daily activities: no assistance needed  Which of the following safety concerns are present in your home?  none identified   Hearing impairment: No hearing concerns  In the past 6 months, have you been bothered by leaking of urine? no    Mental Health:  In general, how would you rate your overall mental or emotional health? good    Today's PHQ-2 Score:       " 2024     8:45 AM   PHQ-2 (  [employer])   Q1: Little interest or pleasure in doing things 0    Q2: Feeling down, depressed or hopeless 0    PHQ-2 Score 0   Q1: Little interest or pleasure in doing things Not at all   Q2: Feeling down, depressed or hopeless Not at all   PHQ-2 Score 0       Patient-reported       Do you feel safe in your environment? Yes    Have you ever done Advance Care Planning? (For example, a Health Directive, POLST, or a discussion with a medical provider or your loved ones about your wishes)? Yes, advance care planning is on file.    Fall risk:  Fallen 2 or more times in the past year?: (Patient-Rptd) No    Cognitive Screenin) Repeat 3 items (Leader, Season, Table)    2) Clock draw: NORMAL  3) 3 item recall: Recalls 3 objects  Results: NORMAL clock, 1-2 items recalled: COGNITIVE IMPAIRMENT LESS LIKELY    Mini-CogTM Copyright S Lisa. Licensed by the author for use in [facility]; reprinted with permission (soob@[facility]). All rights reserved.      Do you have sleep apnea, excessive snoring or daytime drowsiness? : no    Social History     Tobacco Use    Smoking status: Never     Passive exposure: Never    Smokeless tobacco: Never   Substance Use Topics    Alcohol use: Yes     Alcohol/week: 2.0 standard drinks of alcohol         2024     9:48 AM   Alcohol Use   Prescreen: >3 drinks/day or >7 drinks/week? No        Patient-reported     Do you have a current opioid prescription? No  Do you use any other controlled substances or medications that are not prescribed by a provider? None    Current providers sharing in care for this patient include:   Patient Care Team:  Liyah Cooley MD as PCP - General (Family Medicine)  Larry Cintron MD as MD (Cardiovascular Disease)  Mirlande Mckenzie RN as Specialty Care Coordinator (Cardiology)  Malik Henriquez MD as MD (Cardiovascular Disease)  CHELSIE Parker MD as MD (Plastic Surgery)  Griffin Chavez  MD Hans as MD (Cardiovascular Disease)  Ever Roberts MD as Physician (Plastic Surgery)  Liyah Cooley MD as Assigned PCP  Larry Cintron MD as Assigned Heart and Vascular Provider    The following health maintenance items are reviewed in Epic and correct as of today:  Health Maintenance   Topic Date Due    HF ACTION PLAN  Never done    URINE DRUG SCREEN  Never done    HEPATITIS B IMMUNIZATION (1 of 3 - Risk Dialysis 4-dose series) Never done    RSV VACCINE (1 - 1-dose 75+ series) Never done    MICROALBUMIN  04/25/2024    INFLUENZA VACCINE (1) 09/01/2024    COVID-19 Vaccine (5 - 2024-25 season) 12/31/2024 (Originally 9/1/2024)    ALT  01/27/2025    LIPID  02/05/2025    BMP  05/06/2025    CBC  11/06/2025    HEMOGLOBIN  11/06/2025    MEDICARE ANNUAL WELLNESS VISIT  11/19/2025    ANNUAL REVIEW OF HM ORDERS  11/19/2025    FALL RISK ASSESSMENT  11/19/2025    GLUCOSE  11/06/2027    ADVANCE CARE PLANNING  11/19/2029    DTAP/TDAP/TD IMMUNIZATION (3 - Td or Tdap) 10/28/2033    PARATHYROID  Completed    PHOSPHORUS  Completed    TSH W/FREE T4 REFLEX  Completed    HEPATITIS C SCREENING  Completed    PHQ-2 (once per calendar year)  Completed    Pneumococcal Vaccine: 65+ Years  Completed    URINALYSIS  Completed    ALK PHOS  Completed    ZOSTER IMMUNIZATION  Completed    HPV IMMUNIZATION  Aged Out    MENINGITIS IMMUNIZATION  Aged Out    RSV MONOCLONAL ANTIBODY  Aged Out    COLORECTAL CANCER SCREENING  Discontinued     Social History     Tobacco Use    Smoking status: Never     Passive exposure: Never    Smokeless tobacco: Never   Substance Use Topics    Alcohol use: Yes     Alcohol/week: 2.0 standard drinks of alcohol         11/14/2024     9:48 AM   Alcohol Use   Prescreen: >3 drinks/day or >7 drinks/week? No        Patient-reported     Last PSA:   Prostate Specific Antigen Screen   Date Value Ref Range Status   10/25/2023 4.68 0.00 - 6.50 ng/mL Final     Reviewed and updated as needed this visit by clinical  "staff   Tobacco  Allergies  Meds  Problems  Med Hx  Surg Hx  Fam Hx        Reviewed and updated as needed this visit by Provider   Tobacco  Allergies  Meds  Problems  Med Hx  Surg Hx  Fam Hx          Review of Systems    Review of Systems  Constitutional, neuro, ENT, endocrine, pulmonary, cardiac, gastrointestinal, genitourinary, musculoskeletal, integument and psychiatric systems are negative, except as otherwise noted.    OBJECTIVE:   /60   Pulse 62   Temp 97.4  F (36.3  C) (Temporal)   Resp 20   Ht 1.803 m (5' 11\")   Wt 104.8 kg (231 lb)   SpO2 99%   BMI 32.22 kg/m     Estimated body mass index is 32.22 kg/m  as calculated from the following:    Height as of this encounter: 1.803 m (5' 11\").    Weight as of this encounter: 104.8 kg (231 lb).  Physical Exam  GENERAL: alert and no distress  EYES: Eyes grossly normal to inspection, PERRL and conjunctivae and sclerae normal  HENT: ear canals and TM's normal, nose and mouth without ulcers or lesions  NECK: no adenopathy, no asymmetry, masses, or scars  RESP: lungs clear to auscultation - no rales, rhonchi or wheezes  CV: regular rate and rhythm, normal S1 S2, no S3 or S4, no murmur, click or rub, no peripheral edema  ABDOMEN: soft, nontender, no hepatosplenomegaly, no masses and bowel sounds normal - asymmetric swelling in L lower pelvic region with abnormal scar/puckering of skin  MS: no gross musculoskeletal defects noted, no edema  SKIN: no suspicious lesions or rashes  NEURO: Normal strength and tone, mentation intact and speech normal  PSYCH: mentation appears normal, affect normal/bright    ASSESSMENT/PLAN:   Encounter for Medicare annual wellness exam  Annual wellness visit completed today.  Labs as below.  Flu shot given today.    Subclinical hypothyroidism  History of hypothyroidism, currently taking Synthroid, recheck levels today.  - TSH with free T4 reflex    Mixed hyperlipidemia  History of hyperlipidemia, currently taking " "rosuvastatin, recheck levels today.  - Lipid panel reflex to direct LDL Fasting    Chronic kidney disease, stage 3b (H)  History of CKD, labs as below.  - Albumin Random Urine Quantitative with Creat Ratio  - Basic metabolic panel  (Ca, Cl, CO2, Creat, Gluc, K, Na, BUN)  - CBC with platelets  - Vitamin D deficiency screening    History of gout  History of gout, patient is currently taking allopurinol 200 mg daily, recheck uric acid level today.  - Uric acid    Diabetes mellitus screening  Given age and weight, will check A1c level today.  - Hemoglobin A1c    High prostate specific antigen (PSA)  Encounter for screening for malignant neoplasm of prostate  Patient has a history of elevated PSA, is following with urology, requests PSA checked today so that he does not have to get a lab draw at urology.  - PSA, screen    Abnormal scarring of skin  Patient with asymmetric swelling of the left lower pelvis and abnormal scarring from prior stent/ECMO, patient is frustrated by the appearance of this and is interested in potential fix, referral placed to plastic surgery.  - Adult Plastic Surgery  Referral      Counseling  Reviewed preventive health counseling, as reflected in patient instructions    BMI  Estimated body mass index is 32.22 kg/m  as calculated from the following:    Height as of this encounter: 1.803 m (5' 11\").    Weight as of this encounter: 104.8 kg (231 lb).   Weight management plan: Discussed healthy diet and exercise guidelines      He reports that he has never smoked. He has never been exposed to tobacco smoke. He has never used smokeless tobacco.    Signed Electronically by: Liyah Cooley MD  "

## 2024-11-19 NOTE — PATIENT INSTRUCTIONS
Patient Education   Preventive Care Advice   This is general advice given by our system to help you stay healthy. However, your care team may have specific advice just for you. Please talk to your care team about your preventive care needs.  Nutrition  Eat 5 or more servings of fruits and vegetables each day.  Try wheat bread, brown rice and whole grain pasta (instead of white bread, rice, and pasta).  Get enough calcium and vitamin D. Check the label on foods and aim for 100% of the RDA (recommended daily allowance).  Lifestyle  Exercise at least 150 minutes each week  (30 minutes a day, 5 days a week).  Do muscle strengthening activities 2 days a week. These help control your weight and prevent disease.  No smoking.  Wear sunscreen to prevent skin cancer.  Have a dental exam and cleaning every 6 months.  Yearly exams  See your health care team every year to talk about:  Any changes in your health.  Any medicines your care team has prescribed.  Preventive care, family planning, and ways to prevent chronic diseases.  Shots (vaccines)   HPV shots (up to age 26), if you've never had them before.  Hepatitis B shots (up to age 59), if you've never had them before.  COVID-19 shot: Get this shot when it's due.  Flu shot: Get a flu shot every year.  Tetanus shot: Get a tetanus shot every 10 years.  Pneumococcal, hepatitis A, and RSV shots: Ask your care team if you need these based on your risk.  Shingles shot (for age 50 and up)  General health tests  Diabetes screening:  Starting at age 35, Get screened for diabetes at least every 3 years.  If you are younger than age 35, ask your care team if you should be screened for diabetes.  Cholesterol test: At age 39, start having a cholesterol test every 5 years, or more often if advised.  Bone density scan (DEXA): At age 50, ask your care team if you should have this scan for osteoporosis (brittle bones).  Hepatitis C: Get tested at least once in your life.  STIs (sexually  transmitted infections)  Before age 24: Ask your care team if you should be screened for STIs.  After age 24: Get screened for STIs if you're at risk. You are at risk for STIs (including HIV) if:  You are sexually active with more than one person.  You don't use condoms every time.  You or a partner was diagnosed with a sexually transmitted infection.  If you are at risk for HIV, ask about PrEP medicine to prevent HIV.  Get tested for HIV at least once in your life, whether you are at risk for HIV or not.  Cancer screening tests  Cervical cancer screening: If you have a cervix, begin getting regular cervical cancer screening tests starting at age 21.  Breast cancer scan (mammogram): If you've ever had breasts, begin having regular mammograms starting at age 40. This is a scan to check for breast cancer.  Colon cancer screening: It is important to start screening for colon cancer at age 45.  Have a colonoscopy test every 10 years (or more often if you're at risk) Or, ask your provider about stool tests like a FIT test every year or Cologuard test every 3 years.  To learn more about your testing options, visit:   .  For help making a decision, visit:   https://bit.ly/pq38740.  Prostate cancer screening test: If you have a prostate, ask your care team if a prostate cancer screening test (PSA) at age 55 is right for you.  Lung cancer screening: If you are a current or former smoker ages 50 to 80, ask your care team if ongoing lung cancer screenings are right for you.  For informational purposes only. Not to replace the advice of your health care provider. Copyright   2023 [facility] [employer]. All rights reserved. Clinically reviewed by the [facility]. globalscholar.com 171418 - REV 01/24.  Hearing Loss: Care Instructions  Overview     Hearing loss is a sudden or slow decrease in how well you hear. It can range from slight to profound. Permanent hearing loss can occur with aging. It also can  happen when you are exposed long-term to loud noise. Examples include listening to loud music, riding motorcycles, or being around other loud machines.  Hearing loss can affect your work and home life. It can make you feel lonely or depressed. You may feel that you have lost your independence. But hearing aids and other devices can help you hear better and feel connected to others.  Follow-up care is a key part of your treatment and safety. Be sure to make and go to all appointments, and call your doctor if you are having problems. It's also a good idea to know your test results and keep a list of the medicines you take.  How can you care for yourself at home?  Avoid loud noises whenever possible. This helps keep your hearing from getting worse.  Always wear hearing protection around loud noises.  Wear a hearing aid as directed.  A professional can help you pick a hearing aid that will work best for you.  You can also get hearing aids over the counter for mild to moderate hearing loss.  Have hearing tests as your doctor suggests. They can show whether your hearing has changed. Your hearing aid may need to be adjusted.  Use other devices as needed. These may include:  Telephone amplifiers and hearing aids that can connect to a television, stereo, radio, or microphone.  Devices that use lights or vibrations. These alert you to the doorbell, a ringing telephone, or a baby monitor.  Television closed-captioning. This shows the words at the bottom of the screen. Most new TVs can do this.  TTY (text telephone). This lets you type messages back and forth on the telephone instead of talking or listening. These devices are also called TDD. When messages are typed on the keyboard, they are sent over the phone line to a receiving TTY. The message is shown on a monitor.  Use text messaging, social media, and email if it is hard for you to communicate by telephone.  Try to learn a listening technique called speechreading. It is  "not lipreading. You pay attention to people's gestures, expressions, posture, and tone of voice. These clues can help you understand what a person is saying. Face the person you are talking to, and have them face you. Make sure the lighting is good. You need to see the other person's face clearly.  Think about counseling if you need help to adjust to your hearing loss.  When should you call for help?  Watch closely for changes in your health, and be sure to contact your doctor if:    You think your hearing is getting worse.     You have new symptoms, such as dizziness or nausea.   Where can you learn more?  Go to https://www.[employer]alo.net/patiented  Enter R798 in the search box to learn more about \"Hearing Loss: Care Instructions.\"  Current as of: September 27, 2023  Content Version: 14.2    2024 [employer].   Care instructions adapted under license by your healthcare professional. If you have questions about a medical condition or this instruction, always ask your healthcare professional. [employer] disclaims any warranty or liability for your use of this information.    Learning About Stress  What is stress?     Stress is your body's response to a hard situation. Your body can have a physical, emotional, or mental response. Stress is a fact of life for most people, and it affects everyone differently. What causes stress for you may not be stressful for someone else.  A lot of things can cause stress. You may feel stress when you go on a job interview, take a test, or run a race. This kind of short-term stress is normal and even useful. It can help you if you need to work hard or react quickly. For example, stress can help you finish an important job on time.  Long-term stress is caused by ongoing stressful situations or events. Examples of long-term stress include long-term health problems, ongoing problems at work, or conflicts in your family. Long-term stress can harm your health.  How " does stress affect your health?  When you are stressed, your body responds as though you are in danger. It makes hormones that speed up your heart, make you breathe faster, and give you a burst of energy. This is called the fight-or-flight stress response. If the stress is over quickly, your body goes back to normal and no harm is done.  But if stress happens too often or lasts too long, it can have bad effects. Long-term stress can make you more likely to get sick, and it can make symptoms of some diseases worse. If you tense up when you are stressed, you may develop neck, shoulder, or low back pain. Stress is linked to high blood pressure and heart disease.  Stress also harms your emotional health. It can make you romero, tense, or depressed. Your relationships may suffer, and you may not do well at work or school.  What can you do to manage stress?  You can try these things to help manage stress:   Do something active. Exercise or activity can help reduce stress. Walking is a great way to get started. Even everyday activities such as housecleaning or yard work can help.  Try yoga or vazquez chi. These techniques combine exercise and meditation. You may need some training at first to learn them.  Do something you enjoy. For example, listen to music or go to a movie. Practice your hobby or do volunteer work.  Meditate. This can help you relax, because you are not worrying about what happened before or what may happen in the future.  Do guided imagery. Imagine yourself in any setting that helps you feel calm. You can use online videos, books, or a teacher to guide you.  Do breathing exercises. For example:  From a standing position, bend forward from the waist with your knees slightly bent. Let your arms dangle close to the floor.  Breathe in slowly and deeply as you return to a standing position. Roll up slowly and lift your head last.  Hold your breath for just a few seconds in the standing position.  Breathe out  "slowly and bend forward from the waist.  Let your feelings out. Talk, laugh, cry, and express anger when you need to. Talking with supportive friends or family, a counselor, or a deepak leader about your feelings is a healthy way to relieve stress. Avoid discussing your feelings with people who make you feel worse.  Write. It may help to write about things that are bothering you. This helps you find out how much stress you feel and what is causing it. When you know this, you can find better ways to cope.  What can you do to prevent stress?  You might try some of these things to help prevent stress:  Manage your time. This helps you find time to do the things you want and need to do.  Get enough sleep. Your body recovers from the stresses of the day while you are sleeping.  Get support. Your family, friends, and community can make a difference in how you experience stress.  Limit your news feed. Avoid or limit time on social media or news that may make you feel stressed.  Do something active. Exercise or activity can help reduce stress. Walking is a great way to get started.  Where can you learn more?  Go to https://www.[employer].net/patiented  Enter N032 in the search box to learn more about \"Learning About Stress.\"  Current as of: October 24, 2023  Content Version: 14.2 2024 [facility] [employer].   Care instructions adapted under license by your healthcare professional. If you have questions about a medical condition or this instruction, always ask your healthcare professional. [employer] disclaims any warranty or liability for your use of this information.       "

## 2024-11-22 ENCOUNTER — TRANSFERRED RECORDS (OUTPATIENT)
Dept: HEALTH INFORMATION MANAGEMENT | Facility: CLINIC | Age: 74
End: 2024-11-22

## 2024-12-10 ENCOUNTER — OFFICE VISIT (OUTPATIENT)
Dept: SURGERY | Facility: CLINIC | Age: 74
End: 2024-12-10
Attending: FAMILY MEDICINE
Payer: MEDICARE

## 2024-12-10 VITALS
HEIGHT: 71 IN | SYSTOLIC BLOOD PRESSURE: 143 MMHG | WEIGHT: 230 LBS | DIASTOLIC BLOOD PRESSURE: 78 MMHG | BODY MASS INDEX: 32.2 KG/M2 | RESPIRATION RATE: 18 BRPM | HEART RATE: 71 BPM

## 2024-12-10 DIAGNOSIS — E66.811 CLASS 1 OBESITY DUE TO EXCESS CALORIES WITH BODY MASS INDEX (BMI) OF 32.0 TO 32.9 IN ADULT, UNSPECIFIED WHETHER SERIOUS COMORBIDITY PRESENT: ICD-10-CM

## 2024-12-10 DIAGNOSIS — I48.20 CHRONIC ATRIAL FIBRILLATION (H): ICD-10-CM

## 2024-12-10 DIAGNOSIS — E66.09 CLASS 1 OBESITY DUE TO EXCESS CALORIES WITH BODY MASS INDEX (BMI) OF 32.0 TO 32.9 IN ADULT, UNSPECIFIED WHETHER SERIOUS COMORBIDITY PRESENT: ICD-10-CM

## 2024-12-10 DIAGNOSIS — Z95.5 S/P DRUG ELUTING CORONARY STENT PLACEMENT: ICD-10-CM

## 2024-12-10 DIAGNOSIS — L90.5 ABNORMAL SCARRING OF SKIN: Primary | ICD-10-CM

## 2024-12-10 PROCEDURE — 99202 OFFICE O/P NEW SF 15 MIN: CPT | Performed by: SURGERY

## 2024-12-10 PROCEDURE — G2211 COMPLEX E/M VISIT ADD ON: HCPCS | Performed by: SURGERY

## 2024-12-10 NOTE — PROGRESS NOTES
Assessment & Plan   Problem List Items Addressed This Visit          Circulatory    Chronic atrial fibrillation (H)       Other    S/P drug eluting coronary stent placement     Other Visit Diagnoses       Abnormal scarring of skin    -  Primary    Class 1 obesity due to excess calories with body mass index (BMI) of 32.0 to 32.9 in adult, unspecified whether serious comorbidity present               74-year-old male with chronic scarring from the previous ECMO cannulation of the left groin  -I discussed with the patient and his significant other that the fatty bulge above the scarring is normal.  I do not recommend any surgical intervention for this area unless patient is quite concerned about it.  If that is truly the case this is purely cosmetic and I would recommend a plastic surgeon for scar revision and likely fat grafting.  -There is no signs of hernias, chronic pain, infection to this area.  Thus no surgical intervention is needed.  -All of his questions were answered.    Face to Face/patient Contact total time: 10 minutes  Pre Charting time: 5 minutes; Post charting time, communication and other activities: 10 minutes;   Total time:  25 minutes           No follow-ups on file.      Meryl Weiner is a 74 year old, presenting for the following health issues:  Groin Swelling    Left inguinal bulge  Hx of cardiac arrest in 2023; was on EMCO for a while  Cannulation was to bilateral groin; but the left groin never healed completely  The incision healed but pt noted swelling within that area ever since  Swelling improved from how it was but never completely went away  No pain.  No discharge; no signs of infection.    The bulge does not improve with supine positioning and cannot be reduced.                 Review of Systems  Constitutional, neuro, ENT, endocrine, pulmonary, cardiac, gastrointestinal, genitourinary, musculoskeletal, integument and psychiatric systems are negative, except as otherwise noted.     "  Objective    BP (!) 143/78   Pulse 71   Resp 18   Ht 1.803 m (5' 11\")   Wt 104.3 kg (230 lb)   BMI 32.08 kg/m    Body mass index is 32.08 kg/m .  Physical Exam  Abdominal:                    Signed Electronically by: Alonzo Radford MD    "

## 2024-12-10 NOTE — LETTER
12/10/2024      Chano Johnson  [address]  [facility] [address]      Dear Colleague,    Thank you for referring your patient, Chano Johnson, to the [facility]. Please see a copy of my visit note below.      Assessment & Plan  Problem List Items Addressed This Visit          Circulatory    Chronic atrial fibrillation (H)       Other    S/P drug eluting coronary stent placement     Other Visit Diagnoses       Abnormal scarring of skin    -  Primary    Class 1 obesity due to excess calories with body mass index (BMI) of 32.0 to 32.9 in adult, unspecified whether serious comorbidity present               74-year-old male with chronic scarring from the previous ECMO cannulation of the left groin  -I discussed with the patient and his significant other that the fatty bulge above the scarring is normal.  I do not recommend any surgical intervention for this area unless patient is quite concerned about it.  If that is truly the case this is purely cosmetic and I would recommend a plastic surgeon for scar revision and likely fat grafting.  -There is no signs of hernias, chronic pain, infection to this area.  Thus no surgical intervention is needed.  -All of his questions were answered.    Face to Face/patient Contact total time: 10 minutes  Pre Charting time: 5 minutes; Post charting time, communication and other activities: 10 minutes;   Total time:  25 minutes           No follow-ups on file.      Subjective  Husam is a 74 year old, presenting for the following health issues:  Groin Swelling    Left inguinal bulge  Hx of cardiac arrest in 2023; was on EMCO for a while  Cannulation was to bilateral groin; but the left groin never healed completely  The incision healed but pt noted swelling within that area ever since  Swelling improved from how it was but never completely went away  No pain.  No discharge; no signs of infection.    The bulge does not improve with supine positioning and cannot be  "reduced.                 Review of Systems  Constitutional, neuro, ENT, endocrine, pulmonary, cardiac, gastrointestinal, genitourinary, musculoskeletal, integument and psychiatric systems are negative, except as otherwise noted.      Objective   BP (!) 143/78   Pulse 71   Resp 18   Ht 1.803 m (5' 11\")   Wt 104.3 kg (230 lb)   BMI 32.08 kg/m    Body mass index is 32.08 kg/m .  Physical Exam  Abdominal:                    Signed Electronically by: Alonzo Radford MD        Again, thank you for allowing me to participate in the care of your patient.        Sincerely,        Alonzo Radford MD  "

## 2024-12-28 ENCOUNTER — TRANSFERRED RECORDS (OUTPATIENT)
Dept: HEALTH INFORMATION MANAGEMENT | Facility: CLINIC | Age: 74
End: 2024-12-28

## 2025-01-16 ENCOUNTER — TRANSFERRED RECORDS (OUTPATIENT)
Dept: HEALTH INFORMATION MANAGEMENT | Facility: CLINIC | Age: 75
End: 2025-01-16
Payer: MEDICARE

## 2025-02-03 ENCOUNTER — ANCILLARY PROCEDURE (OUTPATIENT)
Dept: CARDIOLOGY | Facility: CLINIC | Age: 75
End: 2025-02-03
Attending: INTERNAL MEDICINE
Payer: MEDICARE

## 2025-02-03 ENCOUNTER — OFFICE VISIT (OUTPATIENT)
Dept: CARDIOLOGY | Facility: CLINIC | Age: 75
End: 2025-02-03
Payer: MEDICARE

## 2025-02-03 ENCOUNTER — LAB (OUTPATIENT)
Dept: LAB | Facility: CLINIC | Age: 75
End: 2025-02-03
Payer: MEDICARE

## 2025-02-03 VITALS
BODY MASS INDEX: 32.92 KG/M2 | WEIGHT: 236 LBS | OXYGEN SATURATION: 98 % | SYSTOLIC BLOOD PRESSURE: 127 MMHG | DIASTOLIC BLOOD PRESSURE: 80 MMHG | HEART RATE: 57 BPM

## 2025-02-03 DIAGNOSIS — Z95.5 S/P DRUG ELUTING CORONARY STENT PLACEMENT: ICD-10-CM

## 2025-02-03 DIAGNOSIS — I46.9 CARDIAC ARREST (H): ICD-10-CM

## 2025-02-03 DIAGNOSIS — I25.10 CORONARY ARTERY DISEASE INVOLVING NATIVE CORONARY ARTERY OF NATIVE HEART WITHOUT ANGINA PECTORIS: ICD-10-CM

## 2025-02-03 DIAGNOSIS — E78.2 MIXED HYPERLIPIDEMIA: ICD-10-CM

## 2025-02-03 DIAGNOSIS — I48.20 CHRONIC ATRIAL FIBRILLATION (H): ICD-10-CM

## 2025-02-03 LAB
ANION GAP SERPL CALCULATED.3IONS-SCNC: 9 MMOL/L (ref 7–15)
BUN SERPL-MCNC: 25.2 MG/DL (ref 8–23)
CALCIUM SERPL-MCNC: 9.7 MG/DL (ref 8.8–10.4)
CHLORIDE SERPL-SCNC: 109 MMOL/L (ref 98–107)
CHOLEST SERPL-MCNC: 88 MG/DL
CREAT SERPL-MCNC: 1.44 MG/DL (ref 0.67–1.17)
EGFRCR SERPLBLD CKD-EPI 2021: 50 ML/MIN/1.73M2
ERYTHROCYTE [DISTWIDTH] IN BLOOD BY AUTOMATED COUNT: 12 % (ref 10–15)
FASTING STATUS PATIENT QL REPORTED: NO
FASTING STATUS PATIENT QL REPORTED: NO
GLUCOSE SERPL-MCNC: 87 MG/DL (ref 70–99)
HCO3 SERPL-SCNC: 25 MMOL/L (ref 22–29)
HCT VFR BLD AUTO: 38.9 % (ref 40–53)
HDLC SERPL-MCNC: 48 MG/DL
HGB BLD-MCNC: 12.8 G/DL (ref 13.3–17.7)
LDLC SERPL CALC-MCNC: 30 MG/DL
LVEF ECHO: NORMAL
MCH RBC QN AUTO: 32.8 PG (ref 26.5–33)
MCHC RBC AUTO-ENTMCNC: 32.9 G/DL (ref 31.5–36.5)
MCV RBC AUTO: 100 FL (ref 78–100)
NONHDLC SERPL-MCNC: 40 MG/DL
PLATELET # BLD AUTO: 124 10E3/UL (ref 150–450)
POTASSIUM SERPL-SCNC: 4.7 MMOL/L (ref 3.4–5.3)
RBC # BLD AUTO: 3.9 10E6/UL (ref 4.4–5.9)
SODIUM SERPL-SCNC: 143 MMOL/L (ref 135–145)
TRIGL SERPL-MCNC: 51 MG/DL
WBC # BLD AUTO: 6.4 10E3/UL (ref 4–11)

## 2025-02-03 PROCEDURE — 80061 LIPID PANEL: CPT | Performed by: PATHOLOGY

## 2025-02-03 PROCEDURE — 36415 COLL VENOUS BLD VENIPUNCTURE: CPT | Performed by: PATHOLOGY

## 2025-02-03 PROCEDURE — 93306 TTE W/DOPPLER COMPLETE: CPT | Performed by: STUDENT IN AN ORGANIZED HEALTH CARE EDUCATION/TRAINING PROGRAM

## 2025-02-03 PROCEDURE — 80048 BASIC METABOLIC PNL TOTAL CA: CPT | Performed by: PATHOLOGY

## 2025-02-03 PROCEDURE — 85027 COMPLETE CBC AUTOMATED: CPT | Performed by: PATHOLOGY

## 2025-02-03 PROCEDURE — G0463 HOSPITAL OUTPT CLINIC VISIT: HCPCS

## 2025-02-03 RX ADMIN — Medication 6 ML: at 10:33

## 2025-02-03 ASSESSMENT — PAIN SCALES - GENERAL: PAINLEVEL_OUTOF10: NO PAIN (0)

## 2025-02-03 NOTE — PATIENT INSTRUCTIONS
Critical Care Cardiology Survivor Clinic                                                                                                            You were seen today in the Critical Care Cardiology Survivor Clinic at the [facility]:       Dr. Andrea Elliott Dr. Alejandra Gutierrez Bernal Dr. Jason Bartos Dr. Ilhwan Yeo       Your visit summary and instructions are as follows:    Discontinue the brilinta today  Okay for surgery, will fax note over.    Continue to work toward the recommendation of 150 minutes of moderate intensity exercise/week and maintain a healthy lifestyle (avoid illicit drugs, smoking and moderate alcohol consumption)    Return to Critical Care Cardiology Survivor Clinic with device check prior (if applicable) in one year with labs     - Driving: state law to refrain from driving at least three months from cardiac arrest, CDL licences do not allow ICD.     -What is Health Psychology?  Health Psychology is a specialty that helps people cope with the stress and anxiety that often occurs with illness, emotional and psychological issues, and preparation for medical treatment including surgical procedures.    Please contact our psychologists directly with questions or to make an appointment.    Reji Tejada, Ph.D.    [phone]  Jaylin Jean, Ph.D.,                 [phone]  Shelby Urban, Ph.D.                      [phone]  Kellen Jones, Ph.D.,                    [phone]          James Cha, Ph.D., [facility],   [phone]       Thank you for your visit today!     Please MyChart message me or call Onelia Mckenzie RN or Ladonna Antonio RN if you have any questions or concerns.      During Business Hours:  [phone], option # 1 (University) then option # 4 (medical questions) and ask to speak with my nurse.     After hours, weekends or holidays:   [phone], Option #4  Ask to speak to the On-Call Cardiologist. Inform them you are a heart failure patient at  [facility].

## 2025-02-03 NOTE — NURSING NOTE
Chief Complaint   Patient presents with    Follow Up     RETURN CRITICA     Vitals were taken and medications reconciled.    Tanner Rodriguez, EMT  12:29 PM

## 2025-02-03 NOTE — PROGRESS NOTES
[facility]  CARDIOVASCULAR MEDICINE CLINIC NOTE    Referring Provider: Larry Cintron   Primary Care Provider: No Ref-Primary, Physician     Patient Name: Chano Johnson   MRN: [id number]     PERTINENT CLINICAL HISTORY:   Chano Johnson is a 72 year old gentleman with a pmhx sig for afib s/p ablation, abhi, smoking, and cad s/p STEMI c/b VF arrest requiring va ecmo.  He recovered well and was discharged 3/10/2022.  He had staged procedure to RI on 4/17/2022 with DESx1. He was last seen in clinic 2/20/23 and was doing well at that time.      Today, the patient presents with his wife today. He feels well overall. He denies any chest pain, pressure, SOB. Able to exercise without limitation, but some decreased stamina when doing stairs with groceries. Uses CPAP. No lightheadedness, dizziness. Denies memory or mood issues. Able to take all cardiac meds without issue.     Per previous chart review; the patient has been in and out of the hospital since January of this year due to hematuria and Tapia catheter issues.    Today, patient states he is doing well but reports intermittent spontaneous epistaxis past 3 days (similar episodes when he previously had Watchman to stop Pradaxa).  He is compliant with medications.  Labs 2/3/2025: LDL 30, Cr 1.4 stable, H/H, thrombocytopenia stable.   Activity: walks on a treadmill 3 times a week (1 mile at a time). Upper limb activity is limited by left shoulder pain for which patient is being evaluated for surgery.    Perioperative cardiovascular evaluation.  METS > 4.  Repeat TTE 2/3/2025 reviewed.  Details of the surgery: unknown whether it is arthroscopic or not.   Type of anesthesia for the surgery: unknown.      PAST MEDICAL HISTORY:     Past Medical History:   Diagnosis Date    A-fib (H)     Antiplatelet or antithrombotic long-term use     Arrhythmia     CAD (coronary artery disease)     Cardiac arrest (H)     H/O extracorporeal membrane oxygenation treatment      Hypertension     Irregular heart beat     Sleep apnea     STEMI (ST elevation myocardial infarction) (H)     Stented coronary artery     Thyroid disease       PAST SURGICAL HISTORY:     Past Surgical History:   Procedure Laterality Date    ATRIAL ABLATION SURGERY      CARDIAC SURGERY      CERVICAL SPINE SURGERY      CV ARTERIAL LINE PLACEMENT N/A 02/17/2022    Procedure: Arterial Line Placement;  Surgeon: Larry Cintron MD;  Location:  HEART CARDIAC CATH LAB    CV CORONARY ANGIOGRAM N/A 02/17/2022    Procedure: Coronary Angiogram;  Surgeon: Larry Cintron MD;  Location:  HEART CARDIAC CATH LAB    CV CORONARY ANGIOGRAM N/A 04/22/2022    Procedure: Coronary Angiogram;  Surgeon: Larry Cintron MD;  Location:  HEART CARDIAC CATH LAB    CV EXTRACORPERAL MEMBRANE OXYGENATION N/A 02/17/2022    Procedure: Extracorporeal Membrance Oxygenation;  Surgeon: Larry Cintron MD;  Location:  HEART CARDIAC CATH LAB    CV INTRA AORTIC BALLOON N/A 02/17/2022    Procedure: Intra Aortic Balloon Pump Insertion;  Surgeon: Larry Cintron MD;  Location: [facility] CARDIAC CATH LAB    CV PCI N/A 04/22/2022    Procedure: Percutaneous Coronary Intervention;  Surgeon: Larry Cintron MD;  Location: [facility] CARDIAC CATH LAB    CV PCI ANGIOPLASTY N/A 02/17/2022    Procedure: Percutaneous Transluminal Angioplasty;  Surgeon: Larry Cintron MD;  Location: [facility] CARDIAC CATH LAB    CV THERAPEUTIC HYPOTHERMIA N/A 02/17/2022    Procedure: Therapeutic Hypothermia;  Surgeon: Larry Cintron MD;  Location: [facility] CARDIAC CATH LAB    HEAD & NECK SURGERY      IMPLANT PROSTHESIS PENIS RIGID N/A 1/25/2024    Procedure: INSERTION OF INFLATION OF SEMI RIGID PENILE PROSTHESIS;  Surgeon: Bryan Acevedo MD;  Location: [facility] Main OR    IR UPPER EXTREMITY ANGIOGRAM LEFT  02/20/2022    MIDLINE DOUBLE LUMEN PLACEMENT Left 02/28/2022    Left brachial vein medial 0.59cm.Blood return on all ports midline okay to use.    REMOVE  "EXTRACORPORAL MEMBRANE OXYGENATOR ADULT N/A 02/22/2022    Procedure: EXTRACORPOREAL MEMBRANE OXYGENATION CANNULA REMOVAL, INTRAOPERATIVE TRANSESOPHAGEAL ECHOCARDIOGRAM PER ANESTHESIA.;  Surgeon: Rod Banuelos MD;  Location:  OR        CURRENT MEDICATIONS:     Current Outpatient Medications   Medication Sig Dispense Refill    allopurinol (ZYLOPRIM) 100 MG tablet Take 1 tablet (100 mg) by mouth 2 times daily 180 tablet 3    aspirin (ASA) 81 MG chewable tablet Take 1 tablet (81 mg) by mouth daily Starting tomorrow. 90 tablet 3    levothyroxine (SYNTHROID/LEVOTHROID) 25 MCG tablet Take 1 tablet (25 mcg) by mouth daily 90 tablet 3    losartan (COZAAR) 25 MG tablet Take 1 tablet (25 mg) by mouth daily 90 tablet 3    metoprolol succinate ER (TOPROL XL) 50 MG 24 hr tablet Take 3 tablets (150 mg) by mouth daily 270 tablet 3    Multiple Vitamin (DAILY-RUFUS MULTIVITAMIN) TABS Take 1 tablet by mouth daily 90 tablet 3    polyethylene glycol (MIRALAX) 17 GM/Dose powder Take 17 g by mouth daily 510 g 11    rosuvastatin (CRESTOR) 20 MG tablet Take 1 tablet (20 mg) by mouth every evening 90 tablet 3    ticagrelor (BRILINTA) 90 MG tablet Take 1 tablet (90 mg) by mouth 2 times daily 180 tablet 3      ALLERGIES:     Allergies   Allergen Reactions    Chlorpheniramine-Phenylpropan [A.R.M.] Other (See Comments)     Watery eyes, sneezing    Seroquel [Quetiapine] Other (See Comments)     Per Meeta (Wife), would like this medication to be listed as an allergy due to patient becoming combative and delirious after taking it.     Percocet [Oxycodone-Acetaminophen] Rash     \"felt like skin was crawling off\"-pt's S.O.        PHYSICAL EXAMINATION:   /80 (BP Location: Right arm, Patient Position: Chair, Cuff Size: Adult Large)   Pulse 57   Wt 107 kg (236 lb)   SpO2 98%   BMI 32.92 kg/m    Body mass index is 32.92 kg/m .  Wt Readings from Last 2 Encounters:   02/03/25 107 kg (236 lb)   12/10/24 104.3 kg (230 lb) "     Constitutional: no acute distress, pleasant and cooperative, appears overall well.  Cardiovascular: RRR nl S1S2, JVP not elevated, extremities with no edema or cyanosis  Respiratory: clear to auscultation and percussion bilaterally anterior and posterior  Gastrointestinal: soft, nontender, non distended, no hepatosplenomegaly or masses  Back/Flank: Mild reproducible discomfort on the R flank   Neurologic: AOx3     LABORATORY DATA:   I have reviewed the labs below.    LIPID RESULTS:  Recent Labs   Lab Test 02/20/23  0732 09/15/22  0950   CHOL 97 100   HDL 53 53   LDL 31 33   TRIG 67 71        LIVER ENZYME RESULTS:  Recent Labs   Lab Test 10/03/22  0253 07/12/22  1432   AST 19 37   ALT 17 31       CBC RESULTS:  Recent Labs   Lab Test 02/20/23  0732 10/03/22  0253   WBC 6.3 6.0   HGB 12.6* 10.8*   HCT 39.7* 33.9*   * 134*       BMP RESULTS:  Recent Labs   Lab Test 02/20/23  0732 10/03/22  0253    139   POTASSIUM 4.4 4.2   CHLORIDE 108* 107   CO2 21* 24   ANIONGAP 13 8   GLC 95 106   BUN 25.3* 22   CR 1.70* 1.65*   PEDRITO 9.4 9.2     INR RESULTS:  Recent Labs   Lab Test 10/03/22  0253 04/22/22  1142   INR 1.01 1.15          PROCEDURES & FURTHER ASSESSMENTS:   I have reviewed the test results below.    Coronary angiogram 4/22/2022  --Two vessel coronary artery disease involving the LAD and ramus branch.  The LAD was stented previously.  The ramus branch was not treated previously.  --Successful PCI of the ramus with a 2.5x20mm LETY dilated to 3.25 mm.    Coronary CTA: 2/20/2023  1.  Status post prior PCI to the left anterior descending and ramus intermedius arteries.   2.  Both the LAD and RI stents are widely patent.  3.  Mild, non-obstructive disease in other territories without any high-grade stenoses.   4.  Please review the separate Radiology report for incidental noncardiac findings.    Coronary CTA 2/5/24:  IMPRESSION:  1.  Widely patent stents in the left anterior descending and ramus  intermedius  arteries.  2.  Mild, non-obstructive disease in other coronary arteries. No  change from prior CTA 2/2023.  3.  Left atrial appendage occlusion device (Watchman) is well seated  without any peridevice thrombus or leak.   4.  Mildly dilated aortic root and ascending aorta measuring 4.1 cm.  5.  LV apical myocardial infarction, no LV thrombus.  6.  Please review the separate Radiology report for incidental  noncardiac findings.    TTE 3/7/2022  Limited TTE for LV function.  Left ventricular function is decreased. The ejection fraction is 45-50%  (mildly reduced). There is a pattern of wall motion abnormalities that is  consistent with a prior LAD culprit infarction.    TTE 2/20/2023  Left ventricular function is decreased. The ejection fraction is 40-45%  (mildly reduced). Hypokinesis involving all apical segments as well as the mid  anterior/anteroseptal wall.  Global right ventricular function is normal.  Mild tricuspid insufficiency is present.  Pulmonary artery systolic pressure is normal.  No pericardial effusion is present.  Aortic root and ascending aorta measure 4.1 cm (index 1.9 cm/m2). Normal for BSA.  This study was compared with the study from 3/7/2022 .  No significant changes noted.    TTE 2/5/24:  Interpretation Summary  Left ventricular function is decreased. The ejection fraction is 40-45%  (mildly reduced). There is a pattern of wall motion abnormalities consistent  with a LAD culprit infarction.  Global right ventricular function is normal. The right ventricle is normal  size.  No significant valvular abnormalities.  IVC diameter <2.1 cm collapsing >50% with sniff suggests a normal RA pressure  of 3 mmHg.  This study was compared with the study from 02/20/2023. No significant changes  noted.     TTE 2/3/2025:  Left ventricular function is decreased. The ejection fraction is 40-45%  (mildly reduced). Grade I or early diastolic dysfunction. There is a pattern  of wall motion abnormalities consistent  with a LAD culprit infarction.  Global right ventricular function is normal. The right ventricle is normal  size.  No significant valvular abnormalities present.  IVC diameter <2.1 cm collapsing >50% with sniff suggests a normal RA pressure  of 3 mmHg.  This study was compared with the study from 02/05/2024. No significant changes  noted.    ECMO 2/17/2022  VF cardiac arrest with ongoing cardiogenic shock  Two vessel coronary artery disease with occlusion of the midLAD and severe stenosis of the large ramus  Successful PCI of the mid LAD with a LETY  Successful cannulation for peripheral VA ECMO with a 17 Fr arterial and 25 Fr venous cannula  Successful insertion of a distal perfusion cannula  Successful insertion of a 50cc IABP  Successful insertion of a thermogard cooling catheter  Successful insertion of a R radial arterial line     CLINICAL IMPRESSION:   Chano Johnson is a 72 year old gentleman with a pmhx sig for afib s/p ablation, abhi, smoking, and cad s/p STEMI c/b VF arrest requiring VA ECMO s/p mLAD PCI and then staged procedure of RI in 4/2022.       Coronary artery disease: causing refractory VF arrest; found to have 2v disease with revascularization of the acutely occluded mLAD. He had staged PCI of the RI a saray later. TTE today with 40-45%, normal RV function. Coronary CTA 2/5/2024 showing stable patent stents. Denies anginal symptoms.   -- continue Losartan 25 mg daily  -- continue ASA 81mg po daily indefinitely. Discontinue ticagrelor (s/p 3 years of DAPT as of today) given his bleeding, baseline thrombocytopenia, and duration of DAPT completed.    -- Continue Toprol XL 150mg Qday, Rosuvastatin 20mg Qday     ECMO Cannulation Site: likely lymphocele but this has now improved.  US of the cannulation site in 2022 demonstrated patent vascular flow.   -- seen at surgery clinic and no surgical intervention recommended.    Perioperative evaluation for the left shoulder surgery:  -Recommendations: Patient  does not need further cardiovascular evaluation for the surgery. There if no contraindication for the musculoskeletal surgery.  -Perioperative management of the medications: patient can continue all the cardiovascular medications perioperative, including Aspirin 81mg po daily, Toprol 150mg po daily, Rosuvastatin 20mg po at bedtime, but hold Losartan in the morning of the surgery. If needed, ASA 81mg po daily can be held perioperatively but it should be ideally continue throughout the surgery.    Follow-up: 1 year    Thank you for allowing us to take part in the care of this very pleasant patient.  Please do not hesitate to call if any further questions or concerns arise.    Ilhwan Yeo, MD  Interventional and critical care cardiology    CC  Patient Care Team:  Liyah Cooley MD as PCP - General (Family Medicine)  Larry Cintron MD as MD (Cardiovascular Disease)  Mirlande Mckenzie RN as Specialty Care Coordinator (Cardiology)  Malik Henriquez MD as MD (Cardiovascular Disease)  CHELSIE Parker MD as MD (Plastic Surgery)  Griffin Chavez MD as MD (Cardiovascular Disease)  Ever Roberts MD as Physician (Plastic Surgery)  Liyah Cooley MD as Assigned PCP  Larry Cintron MD as Assigned Heart and Vascular Provider  Alonzo Radford MD as MD (Surgery)  Alonzo Radford MD as Assigned Surgical Provider  LARRY CINTRON

## 2025-02-11 ENCOUNTER — OFFICE VISIT (OUTPATIENT)
Dept: FAMILY MEDICINE | Facility: CLINIC | Age: 75
End: 2025-02-11
Payer: MEDICARE

## 2025-02-11 VITALS
WEIGHT: 236.7 LBS | TEMPERATURE: 98.1 F | HEART RATE: 62 BPM | SYSTOLIC BLOOD PRESSURE: 128 MMHG | OXYGEN SATURATION: 98 % | DIASTOLIC BLOOD PRESSURE: 70 MMHG | RESPIRATION RATE: 20 BRPM | HEIGHT: 71 IN | BODY MASS INDEX: 33.14 KG/M2

## 2025-02-11 DIAGNOSIS — Z86.79 S/P ABLATION OF ATRIAL FIBRILLATION: ICD-10-CM

## 2025-02-11 DIAGNOSIS — M25.512 CHRONIC LEFT SHOULDER PAIN: ICD-10-CM

## 2025-02-11 DIAGNOSIS — G47.30 SLEEP APNEA, UNSPECIFIED TYPE: ICD-10-CM

## 2025-02-11 DIAGNOSIS — N40.1 BENIGN PROSTATIC HYPERPLASIA WITH URINARY OBSTRUCTION: ICD-10-CM

## 2025-02-11 DIAGNOSIS — N13.8 BENIGN PROSTATIC HYPERPLASIA WITH URINARY OBSTRUCTION: ICD-10-CM

## 2025-02-11 DIAGNOSIS — Z95.5 S/P DRUG ELUTING CORONARY STENT PLACEMENT: ICD-10-CM

## 2025-02-11 DIAGNOSIS — Z95.818 PRESENCE OF WATCHMAN LEFT ATRIAL APPENDAGE CLOSURE DEVICE: ICD-10-CM

## 2025-02-11 DIAGNOSIS — G89.29 CHRONIC LEFT SHOULDER PAIN: ICD-10-CM

## 2025-02-11 DIAGNOSIS — Z01.818 PRE-OP EXAMINATION: Primary | ICD-10-CM

## 2025-02-11 DIAGNOSIS — Z87.39 HISTORY OF GOUT: ICD-10-CM

## 2025-02-11 DIAGNOSIS — E78.2 MIXED HYPERLIPIDEMIA: ICD-10-CM

## 2025-02-11 DIAGNOSIS — N18.32 CHRONIC KIDNEY DISEASE, STAGE 3B (H): ICD-10-CM

## 2025-02-11 DIAGNOSIS — I50.22 CHRONIC SYSTOLIC HEART FAILURE (H): ICD-10-CM

## 2025-02-11 DIAGNOSIS — I25.10 CORONARY ARTERY DISEASE INVOLVING NATIVE CORONARY ARTERY OF NATIVE HEART WITHOUT ANGINA PECTORIS: ICD-10-CM

## 2025-02-11 DIAGNOSIS — Z98.890 S/P ABLATION OF ATRIAL FIBRILLATION: ICD-10-CM

## 2025-02-11 DIAGNOSIS — E03.8 SUBCLINICAL HYPOTHYROIDISM: ICD-10-CM

## 2025-02-11 LAB
ATRIAL RATE - MUSE: 67 BPM
DIASTOLIC BLOOD PRESSURE - MUSE: NORMAL MMHG
INTERPRETATION ECG - MUSE: NORMAL
P AXIS - MUSE: 79 DEGREES
PR INTERVAL - MUSE: 180 MS
QRS DURATION - MUSE: 90 MS
QT - MUSE: 408 MS
QTC - MUSE: 431 MS
R AXIS - MUSE: 78 DEGREES
SYSTOLIC BLOOD PRESSURE - MUSE: NORMAL MMHG
T AXIS - MUSE: 88 DEGREES
VENTRICULAR RATE- MUSE: 67 BPM

## 2025-02-11 PROCEDURE — G2211 COMPLEX E/M VISIT ADD ON: HCPCS

## 2025-02-11 PROCEDURE — 93010 ELECTROCARDIOGRAM REPORT: CPT | Mod: OFF | Performed by: INTERNAL MEDICINE

## 2025-02-11 PROCEDURE — 93005 ELECTROCARDIOGRAM TRACING: CPT

## 2025-02-11 PROCEDURE — 99214 OFFICE O/P EST MOD 30 MIN: CPT

## 2025-02-11 ASSESSMENT — PAIN SCALES - GENERAL: PAINLEVEL_OUTOF10: MODERATE PAIN (5)

## 2025-02-11 NOTE — H&P (VIEW-ONLY)
Preoperative Evaluation  [facility]  [address]  [facility] [address]  Phone: [phone]  Fax: [phone]  Primary Provider: Liyah Cooley MD  Pre-op Performing Provider: BRANDON Marshall CNP  Feb 11, 2025 2/11/2025   Surgical Information   What procedure is being done? Left shoulder surgery.   Facility or Hospital where procedure/surgery will be performed: [facility] in [place]   Who is doing the procedure / surgery? Devin Shipley   Date of surgery / procedure: 2/19/2025   Time of surgery / procedure: 1:30   Where do you plan to recover after surgery? at home with family     Fax number for surgical facility: [phone]    Assessment & Plan     The proposed surgical procedure is considered INTERMEDIATE risk.    Pre-op examination  Chronic left shoulder pain  Scheduled 2/19/2025 for total left reverse shoulder arthroplasty with Dr. Devin Shipley.  No prior complications with sedation or anesthesia.  Allergies reviewed.  Evaluated by cardiology 2/3/2025 and was cleared from cardiovascular standpoint with no further cardiac evaluation.  2/3/2025 potassium 4.7, creatinine 1.44, GFR 50, hemoglobin 12.8, hematocrit 38.9, platelet count 124.  Medication hold times reviewed.  EKG: NSR, with atrial complexes, low voltage QRS- no significant change from previous.  RCRI Interpretation: 2 points: Class III (moderate risk - 6.6% complication rate).  Estimated Functional Capacity: Performs 4 METS exercise without symptoms (e.g., light housework, stairs, 4 mph walk, 7 mph bike, slow step dance).  - EKG 12-lead, tracing only    Coronary artery disease involving native coronary artery of native heart without angina pectoris  S/P drug eluting coronary stent placement  Asymptomatic; denies chest pain/discomfort/pressure, lightheadedness, dizziness, syncope, palpitations.  CAD s/p STEMI refractory VF arrest requiring ECMO 2/2022.  4/2022 s/p PCI stent placement x2.  2/5/2024  coronary CTA patent stents in LAD and RI, mild non-obstructive disease in other coronary arteries (no change), mildly dilated aortic root and ascending aorta measuring 4.1 cm.    2/5/2024 CTA showed stable patent stents.     Mixed hyperlipidemia  2/3/2025 LDL 30.  Takes rosuvastatin 20 mg daily.    Chronic systolic heart failure (H)  Asymptomatic: denies SOB, difficulty, peripheral edema, weight gain.   2/3/2025 ECHO: left ventricular function is decreased. The ejection fraction is 40-45% (mildly reduced). Grade I or early diastolic dysfunction. There is a pattern of wall motion abnormalities consistent with a LAD culprit infarction. Global right ventricular function is normal. The right ventricle is normal size. No significant valvular abnormalities present.     S/P ablation of atrial fibrillation  Presence of Watchman left atrial appendage closure device  History of ablation at [facility].  Watchmen placed at [facility]- maybe 4-5 years ago.  Takes aspirin 81 mg daily and metoprolol succinate  mg daily.  Discontinued ticagrelor s/p 3 years of DAPT.    11/6/2024 EKG: SR with PAC present.     Hypertension  Blood pressure in clinic 128/70.  Takes losartan 25 mg daily.     Chronic kidney disease, stage 3b (H)  2/3/2025 creatinine 1.44, GFR 50.     Benign prostatic hyperplasia with urinary obstruction  11/2024 PSA 3.76.    Subclinical hypothyroidism  11/2024 TSH 3.06.  Takes levothyroxine 25 mg daily.     Sleep apnea, unspecified type  Uses CPAP nightly.      History of gout  11/2024 uric acid 4.8.  Takes allopurinol 100 mg BID.      The longitudinal plan of care for the diagnosis(es)/condition(s) as documented were addressed during this visit. Due to the added complexity in care, I will continue to support Husam in the subsequent management and with ongoing continuity of care.      - No identified additional risk factors other than previously addressed    Antiplatelet or Anticoagulation Medication Instructions   -  aspirin: Patient is at increased risk of thrombosis (e.g. stenting within the last year), continue aspirin without modification. Consider consultation with cardiology.     Additional Medication Instructions   - Herbal medications and vitamins: DO NOT TAKE 14 days prior to surgery.   - ACE/ARB/ARNI (lisinopril, enalapril, losartan, valsartan, olmesartan, sacubritril/valsartan) : DO NOT TAKE on day of surgery (minimum 11 hours for general anesthesia).   - Beta Blockers (atenolol, metoprolol, propranolol) : Continue taking on the day of surgery.   - Statins (atorvastatin, simvastatin, pravastatin) : Continue taking on the day of surgery.    - naproxen (Aleve, Naprosyn): DO NOT TAKE 4 days before surgery.    - levothyroxine and allopurinol: Continue on day of surgery     Recommendation  Approval given to proceed with proposed procedure, without further diagnostic evaluation.    Meryl   Husam is a 74 year old, presenting for the following:  Pre-Op Exam        2/11/2025     1:33 PM   Additional Questions   Roomed by ERIC Pedro related to upcoming procedure:     Patient is a 74-year-old male presenting for preop exam.  Medical history significant for CAD s/p stent placement, hyperlipidemia, heart failure, ablation for atrial fibrillation, CKD, BPH, hypothyroidism, ARSENIO, gout.  Scheduled 2/19/2025 for total left reverse shoulder arthroplasty with Dr. Devin Shipley.  No prior complications with sedation or anesthesia.  Evaluated by cardiology 2/3/2025 and was cleared from cardiovascular standpoint with no further cardiac evaluation.          2/11/2025   Pre-Op Questionnaire   Have you ever had a heart attack or stroke? (!) YES. CAD s/p STEMI refractory VF arrest requiring ECMO 2/2022.  4/2022 s/p PCI stent placement x2.  2/5/2024 coronary CTA patent stents in LAD and RI, mild non-obstructive disease in other coronary arteries (no change), mildly dilated aortic root and ascending aorta measuring 4.1 cm.      No  personal history of stroke.    Have you ever had surgery on your heart or blood vessels, such as a stent placement, a coronary artery bypass, or surgery on an artery in your head, neck, heart, or legs? (!) YES. 2/5/2024 CTA showed stable patent stents.     Watchmen placed at [facility]-maybe 4-5 years ago.    Do you have chest pain with activity? No   Do you have a history of heart failure? (!) YES. 2/3/2025 ECHO  Left ventricular function is decreased. The ejection fraction is 40-45% (mildly reduced). Grade I or early diastolic dysfunction. There is a pattern of wall motion abnormalities consistent with a LAD culprit infarction.  Global right ventricular function is normal. The right ventricle is normal size. No significant valvular abnormalities present.   Do you currently have a cold, bronchitis or symptoms of other infection? No   Do you have a cough, shortness of breath, or wheezing? No   Do you or anyone in your family have previous history of blood clots? No   Do you or does anyone in your family have a serious bleeding problem such as prolonged bleeding following surgeries or cuts? No   Have you ever had problems with anemia or been told to take iron pills? No   Have you had any abnormal blood loss such as black, tarry or bloody stools? No   Have you ever had a blood transfusion? (!) YES- during [place] 2022.    Have you ever had a transfusion reaction? No   Are you willing to have a blood transfusion if it is medically needed before, during, or after your surgery? Yes   Have you or any of your relatives ever had problems with anesthesia? No   Do you have sleep apnea, excessive snoring or daytime drowsiness? (!) YES. ARSENIO   Do you have a CPAP machine? Yes. Uses CPAP nightly.    Do you have any artifical heart valves or other implanted medical devices like a pacemaker, defibrillator, or continuous glucose monitor? No   Do you have artificial joints? (!) YES. S/p neck fusion.    Are you allergic to latex? No      Health Care Directive  Patient has a Health Care Directive on file    Preoperative Review of    reviewed - no record of controlled substances prescribed.      Patient Active Problem List    Diagnosis Date Noted    Urinary retention 01/27/2024     Priority: Medium    Gross hematuria 01/27/2024     Priority: Medium    Benign prostatic hyperplasia with urinary obstruction 11/03/2023     Priority: Medium    Chronic systolic heart failure (H) 10/25/2023     Priority: Medium    Hyperlipidemia 10/25/2022     Priority: Medium    Chronic kidney disease, stage 3b (H) 07/14/2022     Priority: Medium    Subclinical hypothyroidism 07/13/2022     Priority: Medium    S/P drug eluting coronary stent placement 04/22/2022     Priority: Medium    Coronary artery disease involving native coronary artery of native heart without angina pectoris 04/14/2022     Priority: Medium    ST elevation myocardial infarction involving left anterior descending (LAD) coronary artery (H) 03/18/2022     Priority: Medium     Formatting of this note might be different from the original.  2/17/2022      Presence of Watchman left atrial appendage closure device 10/21/2020     Priority: Medium    Diverticular disease of large intestine 06/24/2020     Priority: Medium    Other cardiomyopathies (H) 04/02/2019     Priority: Medium    ED (erectile dysfunction) of organic origin 02/16/2018     Priority: Medium    History of gout 02/16/2018     Priority: Medium    Chronic atrial fibrillation (H)      Priority: Medium    High prostate specific antigen (PSA) 11/19/2015     Priority: Medium     R97.2 : Raised prostate specific antigen      Atrial flutter (H) 01/27/2012     Priority: Medium    Sleep apnea 01/27/2012     Priority: Medium     On CPAP        Past Medical History:   Diagnosis Date    A-fib (H)     Antiplatelet or antithrombotic long-term use     Arrhythmia     CAD (coronary artery disease)     Cardiac arrest (H)     H/O extracorporeal membrane  oxygenation treatment     Hypertension     Irregular heart beat     Sleep apnea     STEMI (ST elevation myocardial infarction) (H)     Stented coronary artery     Thyroid disease      Past Surgical History:   Procedure Laterality Date    ATRIAL ABLATION SURGERY      CARDIAC SURGERY      CERVICAL SPINE SURGERY      CV ARTERIAL LINE PLACEMENT N/A 02/17/2022    Procedure: Arterial Line Placement;  Surgeon: Larry Cintron MD;  Location: [facility] CARDIAC CATH LAB    CV CORONARY ANGIOGRAM N/A 02/17/2022    Procedure: Coronary Angiogram;  Surgeon: Larry Cintron MD;  Location: [facility] CARDIAC CATH LAB    CV CORONARY ANGIOGRAM N/A 04/22/2022    Procedure: Coronary Angiogram;  Surgeon: Larry Cintron MD;  Location: [facility] CARDIAC CATH LAB    CV EXTRACORPERAL MEMBRANE OXYGENATION N/A 02/17/2022    Procedure: Extracorporeal Membrance Oxygenation;  Surgeon: Larry Cintron MD;  Location: [facility] CARDIAC CATH LAB    CV INTRA AORTIC BALLOON N/A 02/17/2022    Procedure: Intra Aortic Balloon Pump Insertion;  Surgeon: Larry Cintron MD;  Location: [facility] CARDIAC CATH LAB    CV PCI N/A 04/22/2022    Procedure: Percutaneous Coronary Intervention;  Surgeon: Larry Cintron MD;  Location: [facility] CARDIAC CATH LAB    CV PCI ANGIOPLASTY N/A 02/17/2022    Procedure: Percutaneous Transluminal Angioplasty;  Surgeon: Larry Cintron MD;  Location: [facility] CARDIAC CATH LAB    CV THERAPEUTIC HYPOTHERMIA N/A 02/17/2022    Procedure: Therapeutic Hypothermia;  Surgeon: Larry Cintron MD;  Location: [facility] CARDIAC CATH LAB    HEAD & NECK SURGERY      IMPLANT PROSTHESIS PENIS RIGID N/A 1/25/2024    Procedure: INSERTION OF INFLATION OF SEMI RIGID PENILE PROSTHESIS;  Surgeon: Bryan Acevedo MD;  Location: [facility] Main OR    IR UPPER EXTREMITY ANGIOGRAM LEFT  02/20/2022    MIDLINE DOUBLE LUMEN PLACEMENT Left 02/28/2022    Left brachial vein medial 0.59cm.Blood return on all ports midline okay to use.    REMOVE  "EXTRACORPORAL MEMBRANE OXYGENATOR ADULT N/A 02/22/2022    Procedure: EXTRACORPOREAL MEMBRANE OXYGENATION CANNULA REMOVAL, INTRAOPERATIVE TRANSESOPHAGEAL ECHOCARDIOGRAM PER ANESTHESIA.;  Surgeon: Rod Banuelos MD;  Location: [facility] OR     Current Outpatient Medications   Medication Sig Dispense Refill    allopurinol (ZYLOPRIM) 100 MG tablet Take 1 tablet (100 mg) by mouth 2 times daily 180 tablet 3    aspirin (ASA) 81 MG chewable tablet Take 1 tablet (81 mg) by mouth daily Starting tomorrow. 90 tablet 3    levothyroxine (SYNTHROID/LEVOTHROID) 25 MCG tablet Take 1 tablet (25 mcg) by mouth daily 90 tablet 3    losartan (COZAAR) 25 MG tablet Take 1 tablet (25 mg) by mouth daily 90 tablet 3    metoprolol succinate ER (TOPROL XL) 50 MG 24 hr tablet Take 3 tablets (150 mg) by mouth daily 270 tablet 3    Multiple Vitamin (DAILY-RUFUS MULTIVITAMIN) TABS Take 1 tablet by mouth daily 90 tablet 3    polyethylene glycol (MIRALAX) 17 GM/Dose powder Take 17 g by mouth daily 510 g 11    rosuvastatin (CRESTOR) 20 MG tablet Take 1 tablet (20 mg) by mouth every evening 90 tablet 3       Allergies   Allergen Reactions    Chlorpheniramine-Phenylpropan [A.R.M.] Other (See Comments)     Watery eyes, sneezing    Seroquel [Quetiapine] Other (See Comments)     Per Meeta (Wife), would like this medication to be listed as an allergy due to patient becoming combative and delirious after taking it.     Percocet [Oxycodone-Acetaminophen] Rash     \"felt like skin was crawling off\"-pt's S.O.         Social History     Tobacco Use    Smoking status: Never     Passive exposure: Never    Smokeless tobacco: Never   Substance Use Topics    Alcohol use: Yes     Alcohol/week: 2.0 standard drinks of alcohol     Family History   Problem Relation Age of Onset    Kidney Cancer Mother         s/p surgery    Kidney failure Mother     Leukemia Father 41        chemical exposure    No Known Problems Sister     No Known Problems Sister     No Known " "Problems Sister      History   Drug Use No     Review of Systems  Review of systems negative otherwise known HPI.    Objective    /70 (BP Location: Left arm, Patient Position: Sitting, Cuff Size: Adult Regular)   Pulse 62   Temp 98.1  F (36.7  C) (Temporal)   Resp 20   Ht 1.796 m (5' 10.71\")   Wt 107.4 kg (236 lb 11.2 oz)   SpO2 98%   BMI 33.29 kg/m     Estimated body mass index is 33.29 kg/m  as calculated from the following:    Height as of this encounter: 1.796 m (5' 10.71\").    Weight as of this encounter: 107.4 kg (236 lb 11.2 oz).  Physical Exam  General: Alert, oriented, no acute distress.    Head: Normocephalic and atraumatic.   Eyes: Conjunctiva and sclera clear. MAX.   Ears: External ear nontender. TMs intact and clear. Grossly normal hearing.   Oropharynx: Dentition intact. Oral and posterior pharynx pink and moist.     Neck: Supple, no masses or nodes. No adenopathy.    Respiratory: Lungs clear, unlabored. No rales, rhonchi, or wheezes.    Cardiovascular: Regular rate and rhythm, S1 and S2. No murmurs. No peripheral edema.     Gastrointestinal: Normoactive bowel sounds. Soft, nontender.  Musculoskeletal: No joint tenderness, deformity, or swelling.     Skin: No suspicious lesions, rashes, or abnormalities.    Neurologic: No gross motor or sensory deficit. Mentation intact and speech normal.     Psychiatric: Appropriate affect.     Recent Labs   Lab Test 02/03/25  1053 11/19/24  1053   HGB 12.8* 12.6*   * 125*    143   POTASSIUM 4.7 4.7   CR 1.44* 1.46*   A1C  --  5.8*      Diagnostics  Recent Results (from the past week)   EKG 12-lead, tracing only    Collection Time: 02/11/25  2:40 PM   Result Value Ref Range    Systolic Blood Pressure  mmHg    Diastolic Blood Pressure  mmHg    Ventricular Rate 67 BPM    Atrial Rate 67 BPM    ID Interval 180 ms    QRS Duration 90 ms     ms    QTc 431 ms    P Axis 79 degrees    R AXIS 78 degrees    T Axis 88 degrees    Interpretation ECG  "      Sinus rhythm with Premature atrial complexes  Low voltage QRS  Cannot exclude old anteroseptal MI  Abnormal ECG  When compared with ECG of 06-Nov-2024 13:26,  No significant change was found  Confirmed by BUSTER ROMERO MD LOC:[facility] ([id number]) on 2/11/2025 3:36:29 PM        EKG required for known coronary heart disease, significant arrhythmia, and structural heart disease and not completed in the last 90 days.   EKG: appears normal, NSR, with atrial complex, low voltage QRS- no significant change from previous    Revised Cardiac Risk Index (RCRI)  The patient has the following serious cardiovascular risks for perioperative complications:   - Coronary Artery Disease (MI, positive stress test, angina, Qs on EKG) = 1 point   - Congestive Heart Failure (pulmonary edema, PND, s3 jone, CXR with pulmonary congestion, basilar rales) = 1 point     RCRI Interpretation: 2 points: Class III (moderate risk - 6.6% complication rate)     Estimated Functional Capacity: Performs 4 METS exercise without symptoms (e.g., light housework, stairs, 4 mph walk, 7 mph bike, slow step dance)         Signed Electronically by: BRANDON Marshall CNP  A copy of this evaluation report is provided to the requesting physician.

## 2025-02-11 NOTE — PROGRESS NOTES
Preoperative Evaluation  [facility]  [address]  [facility] [address]  Phone: [phone]  Fax: [phone]  Primary Provider: Liyah Cooley MD  Pre-op Performing Provider: BRANDON Marshall CNP  Feb 11, 2025 2/11/2025   Surgical Information   What procedure is being done? Left shoulder surgery.   Facility or Hospital where procedure/surgery will be performed: [facility] [place]   Who is doing the procedure / surgery? Devin Shipley   Date of surgery / procedure: 2/19/2025   Time of surgery / procedure: 1:30   Where do you plan to recover after surgery? at home with family     Fax number for surgical facility: [phone]    Assessment & Plan     The proposed surgical procedure is considered INTERMEDIATE risk.    Pre-op examination  Chronic left shoulder pain  Scheduled 2/19/2025 for total left reverse shoulder arthroplasty with Dr. Devin Shipley.  No prior complications with sedation or anesthesia.  Allergies reviewed.  Evaluated by cardiology 2/3/2025 and was cleared from cardiovascular standpoint with no further cardiac evaluation.  2/3/2025 potassium 4.7, creatinine 1.44, GFR 50, hemoglobin 12.8, hematocrit 38.9, platelet count 124.  Medication hold times reviewed.  EKG: NSR, with atrial complexes, low voltage QRS- no significant change from previous.  RCRI Interpretation: 2 points: Class III (moderate risk - 6.6% complication rate).  Estimated Functional Capacity: Performs 4 METS exercise without symptoms (e.g., light housework, stairs, 4 mph walk, 7 mph bike, slow step dance).  - EKG 12-lead, tracing only    Coronary artery disease involving native coronary artery of native heart without angina pectoris  S/P drug eluting coronary stent placement  Asymptomatic; denies chest pain/discomfort/pressure, lightheadedness, dizziness, syncope, palpitations.  CAD s/p STEMI refractory VF arrest requiring ECMO 2/2022.  4/2022 s/p PCI stent placement x2.  2/5/2024  coronary CTA patent stents in LAD and RI, mild non-obstructive disease in other coronary arteries (no change), mildly dilated aortic root and ascending aorta measuring 4.1 cm.    2/5/2024 CTA showed stable patent stents.     Mixed hyperlipidemia  2/3/2025 LDL 30.  Takes rosuvastatin 20 mg daily.    Chronic systolic heart failure (H)  Asymptomatic: denies SOB, difficulty, peripheral edema, weight gain.   2/3/2025 ECHO: left ventricular function is decreased. The ejection fraction is 40-45% (mildly reduced). Grade I or early diastolic dysfunction. There is a pattern of wall motion abnormalities consistent with a LAD culprit infarction. Global right ventricular function is normal. The right ventricle is normal size. No significant valvular abnormalities present.     S/P ablation of atrial fibrillation  Presence of Watchman left atrial appendage closure device  History of ablation at [facility].  Watchmen placed at [facility]- maybe 4-5 years ago.  Takes aspirin 81 mg daily and metoprolol succinate  mg daily.  Discontinued ticagrelor s/p 3 years of DAPT.    11/6/2024 EKG: SR with PAC present.     Hypertension  Blood pressure in clinic 128/70.  Takes losartan 25 mg daily.     Chronic kidney disease, stage 3b (H)  2/3/2025 creatinine 1.44, GFR 50.     Benign prostatic hyperplasia with urinary obstruction  11/2024 PSA 3.76.    Subclinical hypothyroidism  11/2024 TSH 3.06.  Takes levothyroxine 25 mg daily.     Sleep apnea, unspecified type  Uses CPAP nightly.      History of gout  11/2024 uric acid 4.8.  Takes allopurinol 100 mg BID.      The longitudinal plan of care for the diagnosis(es)/condition(s) as documented were addressed during this visit. Due to the added complexity in care, I will continue to support Husam in the subsequent management and with ongoing continuity of care.      - No identified additional risk factors other than previously addressed    Antiplatelet or Anticoagulation Medication Instructions   -  aspirin: Patient is at increased risk of thrombosis (e.g. stenting within the last year), continue aspirin without modification. Consider consultation with cardiology.     Additional Medication Instructions   - Herbal medications and vitamins: DO NOT TAKE 14 days prior to surgery.   - ACE/ARB/ARNI (lisinopril, enalapril, losartan, valsartan, olmesartan, sacubritril/valsartan) : DO NOT TAKE on day of surgery (minimum 11 hours for general anesthesia).   - Beta Blockers (atenolol, metoprolol, propranolol) : Continue taking on the day of surgery.   - Statins (atorvastatin, simvastatin, pravastatin) : Continue taking on the day of surgery.    - naproxen (Aleve, Naprosyn): DO NOT TAKE 4 days before surgery.    - levothyroxine and allopurinol: Continue on day of surgery     Recommendation  Approval given to proceed with proposed procedure, without further diagnostic evaluation.    Meryl   Husam is a 74 year old, presenting for the following:  Pre-Op Exam        2/11/2025     1:33 PM   Additional Questions   Roomed by ERIC Pedro related to upcoming procedure:     Patient is a 74-year-old male presenting for preop exam.  Medical history significant for CAD s/p stent placement, hyperlipidemia, heart failure, ablation for atrial fibrillation, CKD, BPH, hypothyroidism, ARSENIO, gout.  Scheduled 2/19/2025 for total left reverse shoulder arthroplasty with Dr. Devin Shipley.  No prior complications with sedation or anesthesia.  Evaluated by cardiology 2/3/2025 and was cleared from cardiovascular standpoint with no further cardiac evaluation.          2/11/2025   Pre-Op Questionnaire   Have you ever had a heart attack or stroke? (!) YES. CAD s/p STEMI refractory VF arrest requiring ECMO 2/2022.  4/2022 s/p PCI stent placement x2.  2/5/2024 coronary CTA patent stents in LAD and RI, mild non-obstructive disease in other coronary arteries (no change), mildly dilated aortic root and ascending aorta measuring 4.1 cm.      No  personal history of stroke.    Have you ever had surgery on your heart or blood vessels, such as a stent placement, a coronary artery bypass, or surgery on an artery in your head, neck, heart, or legs? (!) YES. 2/5/2024 CTA showed stable patent stents.     Watchmen placed at [facility]-maybe 4-5 years ago.    Do you have chest pain with activity? No   Do you have a history of heart failure? (!) YES. 2/3/2025 ECHO  Left ventricular function is decreased. The ejection fraction is 40-45% (mildly reduced). Grade I or early diastolic dysfunction. There is a pattern of wall motion abnormalities consistent with a LAD culprit infarction.  Global right ventricular function is normal. The right ventricle is normal size. No significant valvular abnormalities present.   Do you currently have a cold, bronchitis or symptoms of other infection? No   Do you have a cough, shortness of breath, or wheezing? No   Do you or anyone in your family have previous history of blood clots? No   Do you or does anyone in your family have a serious bleeding problem such as prolonged bleeding following surgeries or cuts? No   Have you ever had problems with anemia or been told to take iron pills? No   Have you had any abnormal blood loss such as black, tarry or bloody stools? No   Have you ever had a blood transfusion? (!) YES- during [place] 2022.    Have you ever had a transfusion reaction? No   Are you willing to have a blood transfusion if it is medically needed before, during, or after your surgery? Yes   Have you or any of your relatives ever had problems with anesthesia? No   Do you have sleep apnea, excessive snoring or daytime drowsiness? (!) YES. ARSENIO   Do you have a CPAP machine? Yes. Uses CPAP nightly.    Do you have any artifical heart valves or other implanted medical devices like a pacemaker, defibrillator, or continuous glucose monitor? No   Do you have artificial joints? (!) YES. S/p neck fusion.    Are you allergic to latex? No      Health Care Directive  Patient has a Health Care Directive on file    Preoperative Review of    reviewed - no record of controlled substances prescribed.      Patient Active Problem List    Diagnosis Date Noted    Urinary retention 01/27/2024     Priority: Medium    Gross hematuria 01/27/2024     Priority: Medium    Benign prostatic hyperplasia with urinary obstruction 11/03/2023     Priority: Medium    Chronic systolic heart failure (H) 10/25/2023     Priority: Medium    Hyperlipidemia 10/25/2022     Priority: Medium    Chronic kidney disease, stage 3b (H) 07/14/2022     Priority: Medium    Subclinical hypothyroidism 07/13/2022     Priority: Medium    S/P drug eluting coronary stent placement 04/22/2022     Priority: Medium    Coronary artery disease involving native coronary artery of native heart without angina pectoris 04/14/2022     Priority: Medium    ST elevation myocardial infarction involving left anterior descending (LAD) coronary artery (H) 03/18/2022     Priority: Medium     Formatting of this note might be different from the original.  2/17/2022      Presence of Watchman left atrial appendage closure device 10/21/2020     Priority: Medium    Diverticular disease of large intestine 06/24/2020     Priority: Medium    Other cardiomyopathies (H) 04/02/2019     Priority: Medium    ED (erectile dysfunction) of organic origin 02/16/2018     Priority: Medium    History of gout 02/16/2018     Priority: Medium    Chronic atrial fibrillation (H)      Priority: Medium    High prostate specific antigen (PSA) 11/19/2015     Priority: Medium     R97.2 : Raised prostate specific antigen      Atrial flutter (H) 01/27/2012     Priority: Medium    Sleep apnea 01/27/2012     Priority: Medium     On CPAP        Past Medical History:   Diagnosis Date    A-fib (H)     Antiplatelet or antithrombotic long-term use     Arrhythmia     CAD (coronary artery disease)     Cardiac arrest (H)     H/O extracorporeal membrane  oxygenation treatment     Hypertension     Irregular heart beat     Sleep apnea     STEMI (ST elevation myocardial infarction) (H)     Stented coronary artery     Thyroid disease      Past Surgical History:   Procedure Laterality Date    ATRIAL ABLATION SURGERY      CARDIAC SURGERY      CERVICAL SPINE SURGERY      CV ARTERIAL LINE PLACEMENT N/A 02/17/2022    Procedure: Arterial Line Placement;  Surgeon: Larry Cintron MD;  Location: [facility] CARDIAC CATH LAB    CV CORONARY ANGIOGRAM N/A 02/17/2022    Procedure: Coronary Angiogram;  Surgeon: Larry Cintron MD;  Location: [facility] CARDIAC CATH LAB    CV CORONARY ANGIOGRAM N/A 04/22/2022    Procedure: Coronary Angiogram;  Surgeon: Larry Cintron MD;  Location: [facility] CARDIAC CATH LAB    CV EXTRACORPERAL MEMBRANE OXYGENATION N/A 02/17/2022    Procedure: Extracorporeal Membrance Oxygenation;  Surgeon: Larry Cintron MD;  Location: [facility] CARDIAC CATH LAB    CV INTRA AORTIC BALLOON N/A 02/17/2022    Procedure: Intra Aortic Balloon Pump Insertion;  Surgeon: Larry Cintron MD;  Location: [facility] CARDIAC CATH LAB    CV PCI N/A 04/22/2022    Procedure: Percutaneous Coronary Intervention;  Surgeon: Larry Cintron MD;  Location: [facility] CARDIAC CATH LAB    CV PCI ANGIOPLASTY N/A 02/17/2022    Procedure: Percutaneous Transluminal Angioplasty;  Surgeon: Larry Cintron MD;  Location: [facility] CARDIAC CATH LAB    CV THERAPEUTIC HYPOTHERMIA N/A 02/17/2022    Procedure: Therapeutic Hypothermia;  Surgeon: Larry Cintron MD;  Location: [facility] CARDIAC CATH LAB    HEAD & NECK SURGERY      IMPLANT PROSTHESIS PENIS RIGID N/A 1/25/2024    Procedure: INSERTION OF INFLATION OF SEMI RIGID PENILE PROSTHESIS;  Surgeon: Bryan Acevedo MD;  Location: [facility] Main OR    IR UPPER EXTREMITY ANGIOGRAM LEFT  02/20/2022    MIDLINE DOUBLE LUMEN PLACEMENT Left 02/28/2022    Left brachial vein medial 0.59cm.Blood return on all ports midline okay to use.    REMOVE  "EXTRACORPORAL MEMBRANE OXYGENATOR ADULT N/A 02/22/2022    Procedure: EXTRACORPOREAL MEMBRANE OXYGENATION CANNULA REMOVAL, INTRAOPERATIVE TRANSESOPHAGEAL ECHOCARDIOGRAM PER ANESTHESIA.;  Surgeon: Rod Banuelos MD;  Location: [facility] OR     Current Outpatient Medications   Medication Sig Dispense Refill    allopurinol (ZYLOPRIM) 100 MG tablet Take 1 tablet (100 mg) by mouth 2 times daily 180 tablet 3    aspirin (ASA) 81 MG chewable tablet Take 1 tablet (81 mg) by mouth daily Starting tomorrow. 90 tablet 3    levothyroxine (SYNTHROID/LEVOTHROID) 25 MCG tablet Take 1 tablet (25 mcg) by mouth daily 90 tablet 3    losartan (COZAAR) 25 MG tablet Take 1 tablet (25 mg) by mouth daily 90 tablet 3    metoprolol succinate ER (TOPROL XL) 50 MG 24 hr tablet Take 3 tablets (150 mg) by mouth daily 270 tablet 3    Multiple Vitamin (DAILY-RUFUS MULTIVITAMIN) TABS Take 1 tablet by mouth daily 90 tablet 3    polyethylene glycol (MIRALAX) 17 GM/Dose powder Take 17 g by mouth daily 510 g 11    rosuvastatin (CRESTOR) 20 MG tablet Take 1 tablet (20 mg) by mouth every evening 90 tablet 3       Allergies   Allergen Reactions    Chlorpheniramine-Phenylpropan [A.R.M.] Other (See Comments)     Watery eyes, sneezing    Seroquel [Quetiapine] Other (See Comments)     Per Meeta (Wife), would like this medication to be listed as an allergy due to patient becoming combative and delirious after taking it.     Percocet [Oxycodone-Acetaminophen] Rash     \"felt like skin was crawling off\"-pt's S.O.         Social History     Tobacco Use    Smoking status: Never     Passive exposure: Never    Smokeless tobacco: Never   Substance Use Topics    Alcohol use: Yes     Alcohol/week: 2.0 standard drinks of alcohol     Family History   Problem Relation Age of Onset    Kidney Cancer Mother         s/p surgery    Kidney failure Mother     Leukemia Father 41        chemical exposure    No Known Problems Sister     No Known Problems Sister     No Known " "Problems Sister      History   Drug Use No     Review of Systems  Review of systems negative otherwise known HPI.    Objective    /70 (BP Location: Left arm, Patient Position: Sitting, Cuff Size: Adult Regular)   Pulse 62   Temp 98.1  F (36.7  C) (Temporal)   Resp 20   Ht 1.796 m (5' 10.71\")   Wt 107.4 kg (236 lb 11.2 oz)   SpO2 98%   BMI 33.29 kg/m     Estimated body mass index is 33.29 kg/m  as calculated from the following:    Height as of this encounter: 1.796 m (5' 10.71\").    Weight as of this encounter: 107.4 kg (236 lb 11.2 oz).  Physical Exam  General: Alert, oriented, no acute distress.    Head: Normocephalic and atraumatic.   Eyes: Conjunctiva and sclera clear. MAX.   Ears: External ear nontender. TMs intact and clear. Grossly normal hearing.   Oropharynx: Dentition intact. Oral and posterior pharynx pink and moist.     Neck: Supple, no masses or nodes. No adenopathy.    Respiratory: Lungs clear, unlabored. No rales, rhonchi, or wheezes.    Cardiovascular: Regular rate and rhythm, S1 and S2. No murmurs. No peripheral edema.     Gastrointestinal: Normoactive bowel sounds. Soft, nontender.  Musculoskeletal: No joint tenderness, deformity, or swelling.     Skin: No suspicious lesions, rashes, or abnormalities.    Neurologic: No gross motor or sensory deficit. Mentation intact and speech normal.     Psychiatric: Appropriate affect.     Recent Labs   Lab Test 02/03/25  1053 11/19/24  1053   HGB 12.8* 12.6*   * 125*    143   POTASSIUM 4.7 4.7   CR 1.44* 1.46*   A1C  --  5.8*      Diagnostics  Recent Results (from the past week)   EKG 12-lead, tracing only    Collection Time: 02/11/25  2:40 PM   Result Value Ref Range    Systolic Blood Pressure  mmHg    Diastolic Blood Pressure  mmHg    Ventricular Rate 67 BPM    Atrial Rate 67 BPM    NH Interval 180 ms    QRS Duration 90 ms     ms    QTc 431 ms    P Axis 79 degrees    R AXIS 78 degrees    T Axis 88 degrees    Interpretation ECG  "      Sinus rhythm with Premature atrial complexes  Low voltage QRS  Cannot exclude old anteroseptal MI  Abnormal ECG  When compared with ECG of 06-Nov-2024 13:26,  No significant change was found  Confirmed by BUSTER ROMERO MD LOC:[facility] ([id number]) on 2/11/2025 3:36:29 PM        EKG required for known coronary heart disease, significant arrhythmia, and structural heart disease and not completed in the last 90 days.   EKG: appears normal, NSR, with atrial complex, low voltage QRS- no significant change from previous    Revised Cardiac Risk Index (RCRI)  The patient has the following serious cardiovascular risks for perioperative complications:   - Coronary Artery Disease (MI, positive stress test, angina, Qs on EKG) = 1 point   - Congestive Heart Failure (pulmonary edema, PND, s3 jone, CXR with pulmonary congestion, basilar rales) = 1 point     RCRI Interpretation: 2 points: Class III (moderate risk - 6.6% complication rate)     Estimated Functional Capacity: Performs 4 METS exercise without symptoms (e.g., light housework, stairs, 4 mph walk, 7 mph bike, slow step dance)         Signed Electronically by: BRANDON Marshall CNP  A copy of this evaluation report is provided to the requesting physician.

## 2025-02-18 RX ORDER — VANCOMYCIN HYDROCHLORIDE 1 G/20ML
1 INJECTION, POWDER, LYOPHILIZED, FOR SOLUTION INTRAVENOUS ONCE
Status: CANCELLED | OUTPATIENT
Start: 2025-02-18 | End: 2025-02-18

## 2025-02-19 ENCOUNTER — HOSPITAL ENCOUNTER (OUTPATIENT)
Facility: CLINIC | Age: 75
Discharge: HOME | End: 2025-02-20
Attending: ORTHOPAEDIC SURGERY | Admitting: ORTHOPAEDIC SURGERY
Payer: MEDICARE

## 2025-02-19 ENCOUNTER — ANESTHESIA (OUTPATIENT)
Dept: SURGERY | Facility: CLINIC | Age: 75
End: 2025-02-19
Payer: MEDICARE

## 2025-02-19 ENCOUNTER — APPOINTMENT (OUTPATIENT)
Dept: RADIOLOGY | Facility: CLINIC | Age: 75
End: 2025-02-19
Attending: PHYSICIAN ASSISTANT
Payer: MEDICARE

## 2025-02-19 ENCOUNTER — ANESTHESIA EVENT (OUTPATIENT)
Dept: SURGERY | Facility: CLINIC | Age: 75
End: 2025-02-19
Payer: MEDICARE

## 2025-02-19 DIAGNOSIS — Z96.612 STATUS POST REVERSE TOTAL REPLACEMENT OF LEFT SHOULDER: Primary | ICD-10-CM

## 2025-02-19 DIAGNOSIS — R33.9 URINARY RETENTION: ICD-10-CM

## 2025-02-19 PROBLEM — Z96.619 S/P REVERSE TOTAL SHOULDER ARTHROPLASTY: Status: ACTIVE | Noted: 2025-02-19

## 2025-02-19 PROCEDURE — 258N000001 HC RX 258: Performed by: ORTHOPAEDIC SURGERY

## 2025-02-19 PROCEDURE — 710N000010 HC RECOVERY PHASE 1, LEVEL 2, PER MIN: Performed by: ORTHOPAEDIC SURGERY

## 2025-02-19 PROCEDURE — 250N000011 HC RX IP 250 OP 636: Performed by: ORTHOPAEDIC SURGERY

## 2025-02-19 PROCEDURE — 99222 1ST HOSP IP/OBS MODERATE 55: CPT | Performed by: EMERGENCY MEDICINE

## 2025-02-19 PROCEDURE — 250N000011 HC RX IP 250 OP 636: Performed by: PHYSICIAN ASSISTANT

## 2025-02-19 PROCEDURE — C1713 ANCHOR/SCREW BN/BN,TIS/BN: HCPCS | Performed by: ORTHOPAEDIC SURGERY

## 2025-02-19 PROCEDURE — 250N000009 HC RX 250: Performed by: NURSE ANESTHETIST, CERTIFIED REGISTERED

## 2025-02-19 PROCEDURE — 250N000011 HC RX IP 250 OP 636: Performed by: ANESTHESIOLOGY

## 2025-02-19 PROCEDURE — 64415 NJX AA&/STRD BRCH PLXS IMG: CPT | Mod: RT,XU

## 2025-02-19 PROCEDURE — 258N000003 HC RX IP 258 OP 636: Performed by: NURSE ANESTHETIST, CERTIFIED REGISTERED

## 2025-02-19 PROCEDURE — 258N000003 HC RX IP 258 OP 636: Performed by: ANESTHESIOLOGY

## 2025-02-19 PROCEDURE — C1776 JOINT DEVICE (IMPLANTABLE): HCPCS | Performed by: ORTHOPAEDIC SURGERY

## 2025-02-19 PROCEDURE — 250N000011 HC RX IP 250 OP 636: Performed by: NURSE ANESTHETIST, CERTIFIED REGISTERED

## 2025-02-19 PROCEDURE — 272N000001 HC OR GENERAL SUPPLY STERILE: Performed by: ORTHOPAEDIC SURGERY

## 2025-02-19 PROCEDURE — 360N000077 HC SURGERY LEVEL 4, PER MIN: Performed by: ORTHOPAEDIC SURGERY

## 2025-02-19 PROCEDURE — 258N000003 HC RX IP 258 OP 636: Performed by: PHYSICIAN ASSISTANT

## 2025-02-19 PROCEDURE — 250N000009 HC RX 250: Performed by: ANESTHESIOLOGY

## 2025-02-19 PROCEDURE — 250N000009 HC RX 250: Performed by: ORTHOPAEDIC SURGERY

## 2025-02-19 PROCEDURE — 370N000017 HC ANESTHESIA TECHNICAL FEE, PER MIN: Performed by: ORTHOPAEDIC SURGERY

## 2025-02-19 PROCEDURE — 999N000065 XR SHOULDER LEFT PORT G/E 2 VIEWS: Mod: LT

## 2025-02-19 PROCEDURE — 250N000013 HC RX MED GY IP 250 OP 250 PS 637: Performed by: PHYSICIAN ASSISTANT

## 2025-02-19 PROCEDURE — 999N000141 HC STATISTIC PRE-PROCEDURE NURSING ASSESSMENT: Performed by: ORTHOPAEDIC SURGERY

## 2025-02-19 DEVICE — IMPLANTABLE DEVICE
Type: IMPLANTABLE DEVICE | Site: SHOULDER | Status: FUNCTIONAL
Brand: TORNIER PERFORM™ HUMERAL SYSTEM

## 2025-02-19 DEVICE — SCREW CENTRAL 6.5X30MM DWJ130: Type: IMPLANTABLE DEVICE | Site: SHOULDER | Status: FUNCTIONAL

## 2025-02-19 DEVICE — SCREW PERIPHERAL 5.0X30MM DWJ330: Type: IMPLANTABLE DEVICE | Site: SHOULDER | Status: FUNCTIONAL

## 2025-02-19 DEVICE — IMPLANTABLE DEVICE
Type: IMPLANTABLE DEVICE | Site: SHOULDER | Status: FUNCTIONAL
Brand: TORNIER PERFORM™ REVERSED

## 2025-02-19 DEVICE — SCREW PERIPHERAL 5.0X34MM DWJ334: Type: IMPLANTABLE DEVICE | Site: SHOULDER | Status: FUNCTIONAL

## 2025-02-19 DEVICE — IMPLANTABLE DEVICE
Type: IMPLANTABLE DEVICE | Site: SHOULDER | Status: FUNCTIONAL
Brand: TORNIER PERFORM® REVERSED AUGMENTED GLENOID

## 2025-02-19 RX ORDER — BUPIVACAINE HYDROCHLORIDE 5 MG/ML
INJECTION, SOLUTION EPIDURAL; INTRACAUDAL
Status: COMPLETED | OUTPATIENT
Start: 2025-02-19 | End: 2025-02-19

## 2025-02-19 RX ORDER — SODIUM CHLORIDE, SODIUM LACTATE, POTASSIUM CHLORIDE, CALCIUM CHLORIDE 600; 310; 30; 20 MG/100ML; MG/100ML; MG/100ML; MG/100ML
INJECTION, SOLUTION INTRAVENOUS CONTINUOUS
Status: DISCONTINUED | OUTPATIENT
Start: 2025-02-19 | End: 2025-02-19 | Stop reason: HOSPADM

## 2025-02-19 RX ORDER — GLYCOPYRROLATE 0.2 MG/ML
INJECTION, SOLUTION INTRAMUSCULAR; INTRAVENOUS PRN
Status: DISCONTINUED | OUTPATIENT
Start: 2025-02-19 | End: 2025-02-19

## 2025-02-19 RX ORDER — LIDOCAINE 40 MG/G
CREAM TOPICAL
Status: DISCONTINUED | OUTPATIENT
Start: 2025-02-19 | End: 2025-02-19 | Stop reason: HOSPADM

## 2025-02-19 RX ORDER — FENTANYL CITRATE 50 UG/ML
25-100 INJECTION, SOLUTION INTRAMUSCULAR; INTRAVENOUS
Status: DISCONTINUED | OUTPATIENT
Start: 2025-02-19 | End: 2025-02-19 | Stop reason: HOSPADM

## 2025-02-19 RX ORDER — ONDANSETRON 4 MG/1
4 TABLET, ORALLY DISINTEGRATING ORAL EVERY 30 MIN PRN
Status: DISCONTINUED | OUTPATIENT
Start: 2025-02-19 | End: 2025-02-19 | Stop reason: HOSPADM

## 2025-02-19 RX ORDER — ONDANSETRON 2 MG/ML
4 INJECTION INTRAMUSCULAR; INTRAVENOUS EVERY 30 MIN PRN
Status: DISCONTINUED | OUTPATIENT
Start: 2025-02-19 | End: 2025-02-19 | Stop reason: HOSPADM

## 2025-02-19 RX ORDER — HYDROMORPHONE HCL IN WATER/PF 6 MG/30 ML
0.2 PATIENT CONTROLLED ANALGESIA SYRINGE INTRAVENOUS EVERY 5 MIN PRN
Status: DISCONTINUED | OUTPATIENT
Start: 2025-02-19 | End: 2025-02-19 | Stop reason: HOSPADM

## 2025-02-19 RX ORDER — SODIUM CHLORIDE, SODIUM LACTATE, POTASSIUM CHLORIDE, CALCIUM CHLORIDE 600; 310; 30; 20 MG/100ML; MG/100ML; MG/100ML; MG/100ML
INJECTION, SOLUTION INTRAVENOUS CONTINUOUS
Status: DISCONTINUED | OUTPATIENT
Start: 2025-02-19 | End: 2025-02-20 | Stop reason: HOSPADM

## 2025-02-19 RX ORDER — LIDOCAINE 40 MG/G
CREAM TOPICAL
Status: DISCONTINUED | OUTPATIENT
Start: 2025-02-19 | End: 2025-02-20 | Stop reason: HOSPADM

## 2025-02-19 RX ORDER — MULTIVITAMIN WITH IRON
1 TABLET ORAL DAILY
COMMUNITY

## 2025-02-19 RX ORDER — ROSUVASTATIN CALCIUM 10 MG/1
20 TABLET, COATED ORAL EVERY EVENING
Status: DISCONTINUED | OUTPATIENT
Start: 2025-02-19 | End: 2025-02-20 | Stop reason: HOSPADM

## 2025-02-19 RX ORDER — FENTANYL CITRATE 50 UG/ML
50 INJECTION, SOLUTION INTRAMUSCULAR; INTRAVENOUS EVERY 5 MIN PRN
Status: DISCONTINUED | OUTPATIENT
Start: 2025-02-19 | End: 2025-02-19 | Stop reason: HOSPADM

## 2025-02-19 RX ORDER — ONDANSETRON 4 MG/1
4 TABLET, ORALLY DISINTEGRATING ORAL EVERY 6 HOURS PRN
Status: DISCONTINUED | OUTPATIENT
Start: 2025-02-19 | End: 2025-02-20 | Stop reason: HOSPADM

## 2025-02-19 RX ORDER — AMOXICILLIN 250 MG
1 CAPSULE ORAL 2 TIMES DAILY
Status: DISCONTINUED | OUTPATIENT
Start: 2025-02-19 | End: 2025-02-20 | Stop reason: HOSPADM

## 2025-02-19 RX ORDER — VANCOMYCIN HYDROCHLORIDE 1 G/20ML
INJECTION, POWDER, LYOPHILIZED, FOR SOLUTION INTRAVENOUS PRN
Status: DISCONTINUED | OUTPATIENT
Start: 2025-02-19 | End: 2025-02-19 | Stop reason: HOSPADM

## 2025-02-19 RX ORDER — OXYCODONE HYDROCHLORIDE 5 MG/1
5 TABLET ORAL
Status: DISCONTINUED | OUTPATIENT
Start: 2025-02-19 | End: 2025-02-19

## 2025-02-19 RX ORDER — HYDROMORPHONE HCL IN WATER/PF 6 MG/30 ML
0.2 PATIENT CONTROLLED ANALGESIA SYRINGE INTRAVENOUS
Status: DISCONTINUED | OUTPATIENT
Start: 2025-02-19 | End: 2025-02-20 | Stop reason: HOSPADM

## 2025-02-19 RX ORDER — MULTIVIT WITH MINERALS/LUTEIN
250 TABLET ORAL DAILY
COMMUNITY

## 2025-02-19 RX ORDER — PROCHLORPERAZINE MALEATE 5 MG/1
5 TABLET ORAL EVERY 6 HOURS PRN
Status: DISCONTINUED | OUTPATIENT
Start: 2025-02-19 | End: 2025-02-20 | Stop reason: HOSPADM

## 2025-02-19 RX ORDER — TRANEXAMIC ACID 650 MG/1
1950 TABLET ORAL ONCE
Status: COMPLETED | OUTPATIENT
Start: 2025-02-19 | End: 2025-02-19

## 2025-02-19 RX ORDER — ONDANSETRON 2 MG/ML
4 INJECTION INTRAMUSCULAR; INTRAVENOUS EVERY 30 MIN PRN
Status: DISCONTINUED | OUTPATIENT
Start: 2025-02-19 | End: 2025-02-20 | Stop reason: HOSPADM

## 2025-02-19 RX ORDER — MAGNESIUM HYDROXIDE 1200 MG/15ML
LIQUID ORAL PRN
Status: DISCONTINUED | OUTPATIENT
Start: 2025-02-19 | End: 2025-02-19 | Stop reason: HOSPADM

## 2025-02-19 RX ORDER — ACETAMINOPHEN 325 MG/1
975 TABLET ORAL EVERY 8 HOURS
Status: DISCONTINUED | OUTPATIENT
Start: 2025-02-19 | End: 2025-02-20 | Stop reason: HOSPADM

## 2025-02-19 RX ORDER — LEVOTHYROXINE SODIUM 25 UG/1
25 TABLET ORAL DAILY
Status: DISCONTINUED | OUTPATIENT
Start: 2025-02-20 | End: 2025-02-20 | Stop reason: HOSPADM

## 2025-02-19 RX ORDER — NALOXONE HYDROCHLORIDE 0.4 MG/ML
0.1 INJECTION, SOLUTION INTRAMUSCULAR; INTRAVENOUS; SUBCUTANEOUS
Status: DISCONTINUED | OUTPATIENT
Start: 2025-02-19 | End: 2025-02-19 | Stop reason: HOSPADM

## 2025-02-19 RX ORDER — POLYETHYLENE GLYCOL 3350 17 G/17G
17 POWDER, FOR SOLUTION ORAL DAILY
Status: DISCONTINUED | OUTPATIENT
Start: 2025-02-19 | End: 2025-02-20 | Stop reason: HOSPADM

## 2025-02-19 RX ORDER — ASPIRIN 81 MG/1
81 TABLET ORAL 2 TIMES DAILY
Status: DISCONTINUED | OUTPATIENT
Start: 2025-02-19 | End: 2025-02-20 | Stop reason: HOSPADM

## 2025-02-19 RX ORDER — OXYCODONE HYDROCHLORIDE 5 MG/1
10 TABLET ORAL
Status: DISCONTINUED | OUTPATIENT
Start: 2025-02-19 | End: 2025-02-19

## 2025-02-19 RX ORDER — ONDANSETRON 2 MG/ML
4 INJECTION INTRAMUSCULAR; INTRAVENOUS EVERY 6 HOURS PRN
Status: DISCONTINUED | OUTPATIENT
Start: 2025-02-19 | End: 2025-02-20 | Stop reason: HOSPADM

## 2025-02-19 RX ORDER — LOSARTAN POTASSIUM 25 MG/1
25 TABLET ORAL DAILY
Status: DISCONTINUED | OUTPATIENT
Start: 2025-02-20 | End: 2025-02-20 | Stop reason: HOSPADM

## 2025-02-19 RX ORDER — ONDANSETRON 4 MG/1
4 TABLET, ORALLY DISINTEGRATING ORAL EVERY 30 MIN PRN
Status: DISCONTINUED | OUTPATIENT
Start: 2025-02-19 | End: 2025-02-20 | Stop reason: HOSPADM

## 2025-02-19 RX ORDER — PROPOFOL 10 MG/ML
INJECTION, EMULSION INTRAVENOUS PRN
Status: DISCONTINUED | OUTPATIENT
Start: 2025-02-19 | End: 2025-02-19

## 2025-02-19 RX ORDER — HYDROMORPHONE HCL IN WATER/PF 6 MG/30 ML
0.4 PATIENT CONTROLLED ANALGESIA SYRINGE INTRAVENOUS
Status: DISCONTINUED | OUTPATIENT
Start: 2025-02-19 | End: 2025-02-20 | Stop reason: HOSPADM

## 2025-02-19 RX ORDER — HYDROMORPHONE HCL IN WATER/PF 6 MG/30 ML
0.4 PATIENT CONTROLLED ANALGESIA SYRINGE INTRAVENOUS EVERY 5 MIN PRN
Status: DISCONTINUED | OUTPATIENT
Start: 2025-02-19 | End: 2025-02-19 | Stop reason: HOSPADM

## 2025-02-19 RX ORDER — DEXAMETHASONE SODIUM PHOSPHATE 10 MG/ML
INJECTION, SOLUTION INTRAMUSCULAR; INTRAVENOUS PRN
Status: DISCONTINUED | OUTPATIENT
Start: 2025-02-19 | End: 2025-02-19

## 2025-02-19 RX ORDER — ONDANSETRON 2 MG/ML
INJECTION INTRAMUSCULAR; INTRAVENOUS PRN
Status: DISCONTINUED | OUTPATIENT
Start: 2025-02-19 | End: 2025-02-19

## 2025-02-19 RX ORDER — HYDROCODONE BITARTRATE AND ACETAMINOPHEN 5; 325 MG/1; MG/1
1-2 TABLET ORAL EVERY 4 HOURS PRN
Status: DISCONTINUED | OUTPATIENT
Start: 2025-02-19 | End: 2025-02-20 | Stop reason: HOSPADM

## 2025-02-19 RX ORDER — CEFAZOLIN SODIUM/WATER 2 G/20 ML
2 SYRINGE (ML) INTRAVENOUS SEE ADMIN INSTRUCTIONS
Status: DISCONTINUED | OUTPATIENT
Start: 2025-02-19 | End: 2025-02-19 | Stop reason: HOSPADM

## 2025-02-19 RX ORDER — ALLOPURINOL 100 MG/1
100 TABLET ORAL 2 TIMES DAILY
Status: DISCONTINUED | OUTPATIENT
Start: 2025-02-19 | End: 2025-02-20 | Stop reason: HOSPADM

## 2025-02-19 RX ORDER — EPHEDRINE SULFATE 50 MG/ML
INJECTION, SOLUTION INTRAMUSCULAR; INTRAVENOUS; SUBCUTANEOUS PRN
Status: DISCONTINUED | OUTPATIENT
Start: 2025-02-19 | End: 2025-02-19

## 2025-02-19 RX ORDER — DEXAMETHASONE SODIUM PHOSPHATE 4 MG/ML
4 INJECTION, SOLUTION INTRA-ARTICULAR; INTRALESIONAL; INTRAMUSCULAR; INTRAVENOUS; SOFT TISSUE
Status: DISCONTINUED | OUTPATIENT
Start: 2025-02-19 | End: 2025-02-19 | Stop reason: HOSPADM

## 2025-02-19 RX ORDER — NALOXONE HYDROCHLORIDE 0.4 MG/ML
0.1 INJECTION, SOLUTION INTRAMUSCULAR; INTRAVENOUS; SUBCUTANEOUS
Status: DISCONTINUED | OUTPATIENT
Start: 2025-02-19 | End: 2025-02-20 | Stop reason: HOSPADM

## 2025-02-19 RX ORDER — CEFAZOLIN SODIUM/WATER 2 G/20 ML
2 SYRINGE (ML) INTRAVENOUS
Status: COMPLETED | OUTPATIENT
Start: 2025-02-19 | End: 2025-02-19

## 2025-02-19 RX ORDER — FLUMAZENIL 0.1 MG/ML
0.2 INJECTION, SOLUTION INTRAVENOUS
Status: DISCONTINUED | OUTPATIENT
Start: 2025-02-19 | End: 2025-02-19 | Stop reason: HOSPADM

## 2025-02-19 RX ORDER — BISACODYL 10 MG
10 SUPPOSITORY, RECTAL RECTAL DAILY PRN
Status: DISCONTINUED | OUTPATIENT
Start: 2025-02-22 | End: 2025-02-20 | Stop reason: HOSPADM

## 2025-02-19 RX ORDER — POLYETHYLENE GLYCOL 3350 17 G/17G
17 POWDER, FOR SOLUTION ORAL DAILY
Status: DISCONTINUED | OUTPATIENT
Start: 2025-02-20 | End: 2025-02-19

## 2025-02-19 RX ORDER — CEFAZOLIN SODIUM 2 G/100ML
2 INJECTION, SOLUTION INTRAVENOUS EVERY 8 HOURS
Status: COMPLETED | OUTPATIENT
Start: 2025-02-19 | End: 2025-02-20

## 2025-02-19 RX ORDER — PROPOFOL 10 MG/ML
INJECTION, EMULSION INTRAVENOUS CONTINUOUS PRN
Status: DISCONTINUED | OUTPATIENT
Start: 2025-02-19 | End: 2025-02-19

## 2025-02-19 RX ORDER — FENTANYL CITRATE 50 UG/ML
25 INJECTION, SOLUTION INTRAMUSCULAR; INTRAVENOUS EVERY 5 MIN PRN
Status: DISCONTINUED | OUTPATIENT
Start: 2025-02-19 | End: 2025-02-19 | Stop reason: HOSPADM

## 2025-02-19 RX ORDER — VITAMIN B COMPLEX
25 TABLET ORAL DAILY
COMMUNITY

## 2025-02-19 RX ORDER — DEXAMETHASONE SODIUM PHOSPHATE 4 MG/ML
4 INJECTION, SOLUTION INTRA-ARTICULAR; INTRALESIONAL; INTRAMUSCULAR; INTRAVENOUS; SOFT TISSUE
Status: DISCONTINUED | OUTPATIENT
Start: 2025-02-19 | End: 2025-02-19

## 2025-02-19 RX ADMIN — PROPOFOL 150 MCG/KG/MIN: 10 INJECTION, EMULSION INTRAVENOUS at 12:34

## 2025-02-19 RX ADMIN — MIDAZOLAM HYDROCHLORIDE 2 MG: 1 INJECTION, SOLUTION INTRAMUSCULAR; INTRAVENOUS at 11:33

## 2025-02-19 RX ADMIN — TRANEXAMIC ACID 1950 MG: 650 TABLET ORAL at 10:16

## 2025-02-19 RX ADMIN — Medication 5 MG: at 14:12

## 2025-02-19 RX ADMIN — PHENYLEPHRINE HYDROCHLORIDE 0.3 MCG/KG/MIN: 10 INJECTION INTRAVENOUS at 12:37

## 2025-02-19 RX ADMIN — FENTANYL CITRATE 50 MCG: 50 INJECTION INTRAMUSCULAR; INTRAVENOUS at 11:32

## 2025-02-19 RX ADMIN — ROCURONIUM BROMIDE 20 MG: 10 INJECTION, SOLUTION INTRAVENOUS at 13:30

## 2025-02-19 RX ADMIN — SENNOSIDES AND DOCUSATE SODIUM 1 TABLET: 50; 8.6 TABLET ORAL at 20:56

## 2025-02-19 RX ADMIN — FENTANYL CITRATE 50 MCG: 50 INJECTION INTRAMUSCULAR; INTRAVENOUS at 12:27

## 2025-02-19 RX ADMIN — Medication 5 MG: at 13:51

## 2025-02-19 RX ADMIN — CEFAZOLIN SODIUM 2 G: 2 INJECTION, SOLUTION INTRAVENOUS at 20:56

## 2025-02-19 RX ADMIN — ONDANSETRON 4 MG: 2 INJECTION INTRAMUSCULAR; INTRAVENOUS at 13:55

## 2025-02-19 RX ADMIN — PROPOFOL 200 MG: 10 INJECTION, EMULSION INTRAVENOUS at 12:24

## 2025-02-19 RX ADMIN — DEXAMETHASONE SODIUM PHOSPHATE 10 MG: 10 INJECTION, SOLUTION INTRAMUSCULAR; INTRAVENOUS at 12:43

## 2025-02-19 RX ADMIN — SODIUM CHLORIDE, POTASSIUM CHLORIDE, SODIUM LACTATE AND CALCIUM CHLORIDE: 600; 310; 30; 20 INJECTION, SOLUTION INTRAVENOUS at 18:55

## 2025-02-19 RX ADMIN — FENTANYL CITRATE 50 MCG: 50 INJECTION INTRAMUSCULAR; INTRAVENOUS at 12:56

## 2025-02-19 RX ADMIN — BUPIVACAINE HYDROCHLORIDE 10 ML: 5 INJECTION, SOLUTION EPIDURAL; INTRACAUDAL; PERINEURAL at 11:38

## 2025-02-19 RX ADMIN — ASPIRIN 81 MG: 81 TABLET, COATED ORAL at 20:56

## 2025-02-19 RX ADMIN — SODIUM CHLORIDE, POTASSIUM CHLORIDE, SODIUM LACTATE AND CALCIUM CHLORIDE: 600; 310; 30; 20 INJECTION, SOLUTION INTRAVENOUS at 13:54

## 2025-02-19 RX ADMIN — BUPIVACAINE 10 ML: 13.3 INJECTION, SUSPENSION, LIPOSOMAL INFILTRATION at 11:38

## 2025-02-19 RX ADMIN — GLYCOPYRROLATE 0.2 MG: 0.2 INJECTION INTRAMUSCULAR; INTRAVENOUS at 12:46

## 2025-02-19 RX ADMIN — ROCURONIUM BROMIDE 50 MG: 10 INJECTION, SOLUTION INTRAVENOUS at 12:27

## 2025-02-19 RX ADMIN — Medication 5 MG: at 14:23

## 2025-02-19 RX ADMIN — Medication 5 MG: at 13:46

## 2025-02-19 RX ADMIN — ACETAMINOPHEN 975 MG: 325 TABLET ORAL at 18:56

## 2025-02-19 RX ADMIN — Medication 5 MG: at 13:36

## 2025-02-19 RX ADMIN — Medication 200 MG: at 14:09

## 2025-02-19 RX ADMIN — Medication 2 G: at 12:22

## 2025-02-19 RX ADMIN — ROCURONIUM BROMIDE 20 MG: 10 INJECTION, SOLUTION INTRAVENOUS at 12:46

## 2025-02-19 RX ADMIN — LIDOCAINE HYDROCHLORIDE 5 ML: 10 INJECTION, SOLUTION EPIDURAL; INFILTRATION; INTRACAUDAL; PERINEURAL at 12:27

## 2025-02-19 RX ADMIN — SODIUM CHLORIDE, POTASSIUM CHLORIDE, SODIUM LACTATE AND CALCIUM CHLORIDE: 600; 310; 30; 20 INJECTION, SOLUTION INTRAVENOUS at 10:45

## 2025-02-19 RX ADMIN — PHENYLEPHRINE HYDROCHLORIDE 100 MCG: 10 INJECTION INTRAVENOUS at 12:42

## 2025-02-19 ASSESSMENT — ACTIVITIES OF DAILY LIVING (ADL)
ADLS_ACUITY_SCORE: 29
ADLS_ACUITY_SCORE: 27
ADLS_ACUITY_SCORE: 27
ADLS_ACUITY_SCORE: 25
ADLS_ACUITY_SCORE: 25
ADLS_ACUITY_SCORE: 29
ADLS_ACUITY_SCORE: 31
ADLS_ACUITY_SCORE: 29
ADLS_ACUITY_SCORE: 27
ADLS_ACUITY_SCORE: 25
ADLS_ACUITY_SCORE: 25
ADLS_ACUITY_SCORE: 29
ADLS_ACUITY_SCORE: 27
ADLS_ACUITY_SCORE: 25

## 2025-02-19 ASSESSMENT — COLUMBIA-SUICIDE SEVERITY RATING SCALE - C-SSRS
1. IN THE PAST MONTH, HAVE YOU WISHED YOU WERE DEAD OR WISHED YOU COULD GO TO SLEEP AND NOT WAKE UP?: NO
2. HAVE YOU ACTUALLY HAD ANY THOUGHTS OF KILLING YOURSELF IN THE PAST MONTH?: NO
6. HAVE YOU EVER DONE ANYTHING, STARTED TO DO ANYTHING, OR PREPARED TO DO ANYTHING TO END YOUR LIFE?: NO

## 2025-02-19 ASSESSMENT — ENCOUNTER SYMPTOMS: DYSRHYTHMIAS: 1

## 2025-02-19 NOTE — ANESTHESIA PROCEDURE NOTES
Airway       Patient location during procedure: OR       Procedure Start/Stop Times: 2/19/2025 12:32 PM  Staff -        Anesthesiologist:  Fransisco Rico MD       CRNA: Tona Chandler APRN CRNA       Other Anesthesia Staff: Saima Marmolejo       Performed By: SRNA  Consent for Airway        Urgency: elective  Indications and Patient Condition       Indications for airway management: anuja-procedural       Induction type:intravenous       Mask difficulty assessment: 1 - vent by mask    Final Airway Details       Final airway type: endotracheal airway       Successful airway: ETT - single  Endotracheal Airway Details        ETT size (mm): 8.0       Cuffed: yes       Successful intubation technique: video laryngoscopy       VL Blade Size: Glidescope 4       Grade View of Cords: 1       Adjucts: stylet       Position: Right       Measured from: lips       Secured at (cm): 24       Bite block used: None    Post intubation assessment        Placement verified by: capnometry, equal breath sounds and chest rise        Number of attempts at approach: 1       Number of other approaches attempted: 0       Secured with: tape       Ease of procedure: easy       Dentition: Intact and Unchanged    Medication(s) Administered   Medication Administration Time: 2/19/2025 12:32 PM

## 2025-02-19 NOTE — OR NURSING
Left shoulder bone fragments placed into biohazard bag and released to patient/wife per patient request. Patient release form signed by patient and MD. Patient notified specimen will be too large to be placed into formalin container and will be sent fresh.

## 2025-02-19 NOTE — OP NOTE
Operative Note    Name:  Chano Johnson  :  1950  MRN:  [id number]  Procedure Date:  2025      Preoperative Diagnosis:  Osteoarthritis of left shoulder [M19.012]    Postoperative Diagnosis:  SAME    Procedures:  1.REVERSE TOTAL SHOULDER ARTHROPLASTY, left  2.  Biceps tenodesis, left shoulder    Surgeon(s) and Assistants (if any):    Surgeon(s):   Devin Shipley MD PA-C assist:  Butch Cardenas PA-C assistance was required for patient positioning, instrumentation assistance, soft tissue retraction and patient safety.      Anesthesia:  General with Block    Drains:  None    Specimens: None    Tissue Removed, Not Sent: Humeral head    Complications:  NONE    Findings/Conclusions: Advanced grade 4 degenerative changes of the glenohumeral joint with large inferior osteophytes on the humerus  Thinning of the rotator cuff but no obvious full-thickness tearing    Estimated Blood Loss: 100 mL       Condition on discharge from OR:  Satisfactory    IMPLANTS: Tornier perform plus system with a 25+3 baseplate, 39+3 glenosphere, 3+ perform stem and a 39+0 poly    INDICATION FOR OPERATION:  Chano Johnson is a 74 year old male with a history of shoulder pain   and stiffness and he has failed nonsurgical treatment. MRI showed evidence of generally intact rotator cuff and advanced osteoarthritis of the shoulder. Recommended he undergo shoulder arthroplasty. Risks and benefits of the planned procedure as well as details of surgery were discussed with the patient. Consent was obtained.       REPORT OF OPERATION:   The patient was brought to operating room and placed supine on the operating table. An interscalene block was placed by Anesthesia preoperatively and he was given 2 gram of Ancef preoperatively. After induction of general anesthesia, he was placed in the beach-chair position on the operating room table. All bony prominences were well padded. The shoulder was prepped and draped in normal sterile fashion.    A  standard anterior deltopectoral incision was utilized. Deep retractors were placed below the clavipectoral fascia and the deltoid. Biceps was tenodesed to the superior aspect of the pectoralis tendon with #2 FiberWire. Subscapularis was released off the lesser tuberosity and tagged.    The humeral head was exposed and noted to have severe wear pattern of the anterior and mid humeral head with large anterior and inferior humeral osteophytes, which were resected. Humeral head cut was then performed using appropriate instrumentation.  Humeral protection plate was placed. The rotator cuff was inspected and noted to be intact with significant diffuse thinning of the cuff but no significantfull-thickness perforations.   The glenoid was then exposed. The labrum was excised circumferentially.   Inferior capsule release was performed, taking care to identify and protect the axillary nerve.    Next, the glenoid was drilled and reamed and prepared for glenoid implant. The glenoid baseplate was then screwed into position with the central screw and 3 peripheral screws were placed. A trial glenosphere was placed.  Happy with the trial glenosphere, I then screwed and impacted into position and the final 39+3 lateralized glenosphere.  The humerus was again exposed.  We then prepared the humerus plate first reaming the metaphysis, and then opening the canal distally with the punch.  A 3 trial stem was impacted into position and the shoulder was then reduced with a +0 trial poly.  I was pleased with the stability and tension across the shoulder and the shoulder was again dislocated.  The shoulder was copiously irrigated and then the final 3+ stem was impacted into position and the +0 poly was also placed.   A final reduction was performed, and the shoulder was copiously irrigated with saline irrigation. All instruments were removed.  1 g of vancomycin powder was placed into the joint and soft tissues.  Subscapularis was repaired back  to the lesser tuberosity with two 1.7 mm suture tapes through drill holes. The rotator interval was closed with two #2 FiberWire sutures. The incision was then closed in layers with 0 Vicryl closure of the deltopectoral split, 0 Vicryl and 2-0 Vicryl subcutaneous closure, and 3-0 Monocryl for skin. A sterile dressing was placed on the shoulder.    He was placed in a shoulder SlingShot brace and  transferred in stable, awake condition to Postanesthesia Recovery. He will be maintained in the sling for 6 weeks postoperatively, may discontinue abduction pillow at 4 week postoperative and begin PT at approximately 2-3 weeks postoperatively.       Devin Shipley MD, M.D.   Date: 2/19/2025  Time: 2:12 PM    Implants:  Implant Name Type Inv. Item Serial No.  Lot No. LRB No. Used Action   BASEPLATE LATERAL RVRS 25MM OFFS +3MM [id number] - [id number] Total Joint Component/Insert BASEPLATE LATERAL RVRS 25MM OFFS +3MM [id number] [email or web address] [facility]  Left 1 Implanted   SPHERE GLND 39MM +3MM SHLDR COCR CNN LATERALIZE [id number] - [id number] Total Joint Component/Insert SPHERE GLND 39MM +3MM SHLDR COCR CNN LATERALIZE [id number] [employer]  Left 1 Implanted   INSERT HUM 39MM PRFRM THK+0MM .75 SHLDR STRL LF [id number] - [id number] Total Joint Component/Insert INSERT HUM 39MM PRFRM THK+0MM .75 SHLDR STRL LF [id number] [employer]  Left 1 Implanted   STEM HUM PRFRM 3+ SHLDR STRL LF [id number] - [id number] Total Joint Component/Insert STEM HUM PRFRM 3+ SHLDR STRL LF [id number] [employer]  Left 1 Implanted   SCREW CENTRAL 6.5X30MM [id number] - [id number] Metallic Hardware/[place]y [place] SCREW CENTRAL 6.5X30MM [id number]  [employer]  Left 1 Implanted   SCREW PERIPHERAL 5.0X34MM [id number] - [id number] Metallic Hardware/Rocky [place] SCREW PERIPHERAL 5.0X34MM [id number]  [employer]  Left 1 Implanted   SCREW PERIPHERAL 5.0X30MM [id number] - [id number] Metallic Hardware/Rocky [place] SCREW PERIPHERAL 5.0X30MM [id number]  [facility]  Left 2  Implanted

## 2025-02-19 NOTE — ANESTHESIA PREPROCEDURE EVALUATION
Anesthesia Pre-Procedure Evaluation    Patient: Chano Johnson   MRN: [id number] : 1950        Procedure : Procedure(s):  LEFT REVERSE TOTAL SHOULDER ARTHROPLASTY          Past Medical History:   Diagnosis Date    A-fib (H)     Antiplatelet or antithrombotic long-term use     Arrhythmia     CAD (coronary artery disease)     Cardiac arrest (H)     H/O extracorporeal membrane oxygenation treatment     Hypertension     Irregular heart beat     Sleep apnea     STEMI (ST elevation myocardial infarction) (H)     Stented coronary artery     Thyroid disease       Past Surgical History:   Procedure Laterality Date    ATRIAL ABLATION SURGERY      CARDIAC SURGERY      CERVICAL SPINE SURGERY      CV ARTERIAL LINE PLACEMENT N/A 2022    Procedure: Arterial Line Placement;  Surgeon: Larry Cintron MD;  Location:  [facility]    CV CORONARY ANGIOGRAM N/A 2022    Procedure: Coronary Angiogram;  Surgeon: Larry Cintron MD;  [facility]    CV CORONARY ANGIOGRAM N/A 2022    Procedure: Coronary Angiogram;  Surgeon: Larry Cintron MD;  [facility]    CV EXTRACORPERAL MEMBRANE OXYGENATION N/A 2022    Procedure: Extracorporeal Membrance Oxygenation;  Surgeon: Larry Cintron MD;  [facility]    CV INTRA AORTIC BALLOON N/A 2022    Procedure: Intra Aortic Balloon Pump Insertion;  Surgeon: Larry Cintron MD;  [facility]    CV PCI N/A 2022    Procedure: Percutaneous Coronary Intervention;  Surgeon: Larry Cintron MD;  [facility]    CV PCI ANGIOPLASTY N/A 2022    Procedure: Percutaneous Transluminal Angioplasty;  Surgeon: Larry Cintron MD;  Location:  [facility]    CV THERAPEUTIC HYPOTHERMIA N/A 2022    Procedure: Therapeutic Hypothermia;  Surgeon: Larry Cintron MD;  Location:  [facility]    HEAD & NECK SURGERY  "     IMPLANT PROSTHESIS PENIS RIGID N/A 1/25/2024    Procedure: INSERTION OF INFLATION OF SEMI RIGID PENILE PROSTHESIS;  Surgeon: Bryan Acevedo MD;  Location: [facility] OR    IR UPPER EXTREMITY ANGIOGRAM LEFT  02/20/2022    MIDLINE DOUBLE LUMEN PLACEMENT Left 02/28/2022    Left brachial vein medial 0.59cm.Blood return on all ports midline okay to use.    REMOVE EXTRACORPORAL MEMBRANE OXYGENATOR ADULT N/A 02/22/2022    Procedure: EXTRACORPOREAL MEMBRANE OXYGENATION CANNULA REMOVAL, INTRAOPERATIVE TRANSESOPHAGEAL ECHOCARDIOGRAM PER ANESTHESIA.;  Surgeon: Rod Banuelos MD;  Location: [facility]      Allergies   Allergen Reactions    Chlorpheniramine-Phenylpropan [A.R.M.] Other (See Comments)     Watery eyes, sneezing    Seroquel [Quetiapine] Other (See Comments)     Per Meeta (Wife), would like this medication to be listed as an allergy due to patient becoming combative and delirious after taking it.     Percocet [Oxycodone-Acetaminophen] Rash     \"felt like skin was crawling off\"-pt's S.O.       Social History     Tobacco Use    Smoking status: Never     Passive exposure: Never    Smokeless tobacco: Never   Substance Use Topics    Alcohol use: Yes     Alcohol/week: 2.0 standard drinks of alcohol      Wt Readings from Last 1 Encounters:   02/19/25 104.3 kg (230 lb)        Anesthesia Evaluation   Pt has had prior anesthetic.     No history of anesthetic complications       ROS/MED HX  ENT/Pulmonary:  - neg pulmonary ROS   (+) sleep apnea, uses CPAP,                                      Neurologic:  - neg neurologic ROS     Cardiovascular: Comment: Echo (2/3/25):  Left ventricular function is decreased. The ejection fraction is 40-45%  (mildly reduced). Grade I or early diastolic dysfunction. There is a pattern  of wall motion abnormalities consistent with a LAD culprit infarction.  Global right ventricular function is normal. The right ventricle is normal  size.  No significant valvular abnormalities " present.  IVC diameter <2.1 cm collapsing >50% with sniff suggests a normal RA pressure  of 3 mmHg.  This study was compared with the study from 02/05/2024. No significant changes  noted.    S/P Watchman    (+) Dyslipidemia hypertension- -  CAD - past MI - stent-      CHF                  dysrhythmias, a-fib,          (-) pacemaker, valvular problems/murmurs, pacemaker and ICD   METS/Exercise Tolerance:     Hematologic:  - neg hematologic  ROS     Musculoskeletal:  - neg musculoskeletal ROS     GI/Hepatic:  - neg GI/hepatic ROS     Renal/Genitourinary:  - neg Renal ROS   (+) renal disease, type: CRI,            Endo:     (+)          thyroid problem, hypothyroidism,    Obesity (BMI 33),    (-) Type I DM and Type II DM   Psychiatric/Substance Use:  - neg psychiatric ROS     Infectious Disease:  - neg infectious disease ROS     Malignancy:  - neg malignancy ROS     Other:  - neg other ROS          Physical Exam    Airway  airway exam normal      Mallampati: II   TM distance: > 3 FB   Neck ROM: full   Mouth opening: > 3 cm    Respiratory Devices and Support         Dental       (+) Minor Abnormalities - some fillings, tiny chips      Cardiovascular   cardiovascular exam normal          Pulmonary   pulmonary exam normal                OUTSIDE LABS:  CBC:   Lab Results   Component Value Date    WBC 6.4 02/03/2025    WBC 6.3 11/19/2024    HGB 12.8 (L) 02/03/2025    HGB 12.6 (L) 11/19/2024    HCT 38.9 (L) 02/03/2025    HCT 38.5 (L) 11/19/2024     (L) 02/03/2025     (L) 11/19/2024     BMP:   Lab Results   Component Value Date     02/03/2025     11/19/2024    POTASSIUM 4.7 02/03/2025    POTASSIUM 4.7 11/19/2024    CHLORIDE 109 (H) 02/03/2025    CHLORIDE 107 11/19/2024    CO2 25 02/03/2025    CO2 23 11/19/2024    BUN 25.2 (H) 02/03/2025    BUN 20.9 11/19/2024    CR 1.44 (H) 02/03/2025    CR 1.46 (H) 11/19/2024    GLC 87 02/03/2025    GLC 97 11/19/2024     COAGS:   Lab Results   Component Value Date  "   PTT 24 01/27/2024    INR 1.04 01/27/2024    FIBR 543 (H) 02/25/2022     POC: No results found for: \"BGM\", \"HCG\", \"HCGS\"  HEPATIC:   Lab Results   Component Value Date    ALBUMIN 4.0 01/27/2024    PROTTOTAL 6.0 (L) 01/27/2024    ALT 22 01/27/2024    AST 22 01/27/2024    ALKPHOS 34 (L) 01/27/2024    BILITOTAL 0.4 01/27/2024     OTHER:   Lab Results   Component Value Date    PH 7.48 (H) 03/01/2022    LACT 1.9 02/25/2022    A1C 5.8 (H) 11/19/2024    PEDRITO 9.7 02/03/2025    PHOS 3.6 03/09/2022    MAG 2.0 01/27/2024    LIPASE 38 10/03/2022    TSH 3.06 11/19/2024    T4 1.30 10/25/2023    CRP 69.0 (H) 03/03/2022    SED 82 (H) 03/03/2022       Anesthesia Plan    ASA Status:  2    NPO Status:  NPO Appropriate    Anesthesia Type: General.     - Airway: ETT   Induction: Intravenous.   Maintenance: Balanced.        Consents    Anesthesia Plan(s) and associated risks, benefits, and realistic alternatives discussed. Questions answered and patient/representative(s) expressed understanding.     - Discussed:     - Discussed with:  Patient            Postoperative Care    Pain management: IV analgesics, Oral pain medications, Peripheral nerve block (Single Shot).   PONV prophylaxis: Ondansetron (or other 5HT-3), Dexamethasone or Solumedrol     Comments:    Other Comments: nterscalene nerve block in preoperative area.    Standard General Induction - ETT, lidocaine / fentanyl / propofol / rocuronium for induction. Background propofol with gas. Decadron / Zofran antiemetics. Fentanyl / Dilaudid / Precedex as needed.            Fransisco Rico MD    I have reviewed the pertinent notes and labs in the chart from the past 30 days and (re)examined the patient.  Any updates or changes from those notes are reflected in this note.    Clinically Significant Risk Factors Present on Admission                 # Drug Induced Platelet Defect: home medication list includes an antiplatelet medication   # Hypertension: Home medication list includes " "antihypertensive(s)           # Obesity: Estimated body mass index is 33 kg/m  as calculated from the following:    Height as of this encounter: 1.778 m (5' 10\").    Weight as of this encounter: 104.3 kg (230 lb).                "

## 2025-02-19 NOTE — PHARMACY-ADMISSION MEDICATION HISTORY
Pharmacist Admission Medication History    Admission medication history is complete. The information provided in this note is only as accurate as the sources available at the time of the update.    Information Source(s): Patient, Family member, and CareEverywhere/SureScripts via in-person    Pertinent Information: discussed with wife who helps manage medications.    Allergies reviewed with patient and updates made in EHR: yes    Medication History Completed By: Ann Herrera PharmRAULITO 2/19/2025 11:24 AM    PTA Med List   Medication Sig Last Dose/Taking    allopurinol (ZYLOPRIM) 100 MG tablet Take 1 tablet (100 mg) by mouth 2 times daily 2/18/2025 Evening    aspirin (ASA) 81 MG chewable tablet Take 1 tablet (81 mg) by mouth daily Starting tomorrow. 2/18/2025 Morning    levothyroxine (SYNTHROID/LEVOTHROID) 25 MCG tablet Take 1 tablet (25 mcg) by mouth daily 2/19/2025 Morning    losartan (COZAAR) 25 MG tablet Take 1 tablet (25 mg) by mouth daily 2/18/2025 Morning    metoprolol succinate ER (TOPROL XL) 50 MG 24 hr tablet Take 3 tablets (150 mg) by mouth daily 2/19/2025 Morning    Multiple Vitamin (DAILY-RUFUS MULTIVITAMIN) TABS Take 1 tablet by mouth daily 2/18/2025 Morning    polyethylene glycol (MIRALAX) 17 GM/Dose powder Take 17 g by mouth daily 2/18/2025 Morning    rosuvastatin (CRESTOR) 20 MG tablet Take 1 tablet (20 mg) by mouth every evening 2/18/2025 Bedtime    vitamin C (ASCORBIC ACID) 250 MG tablet Take 250 mg by mouth daily. 2/18/2025 Morning    vitamin C with B complex (B COMPLEX-C) tablet Take 1 tablet by mouth daily. 2/18/2025 Morning    Vitamin D3 (CHOLECALCIFEROL) 25 mcg (1000 units) tablet Take 25 mcg by mouth daily. 2/18/2025 Morning

## 2025-02-19 NOTE — CONSULTS
[facility] MEDICINE CONSULT NOTE   Physician requesting consult: Devin Shipley MD    Reason for consult: Postoperative medical management of medical co-morbidities as below    Assessment and Plan      74 year old male into [facility] on 2/20/2025 for an elective left TSAr.  Pt had general anesthesia with minimal blood loss    [facility] service was asked to evaluate patient for postoperative medical management as follows below. Please resume the home medications as reconciled and further noted below with ordered hold parameters.  Vital signs have been stable post-operatively including hemodynamically stable blood pressure and heart rate. Thank you for this consult; we will continue to follow this patient until discharge.    Problem list:    1  POD #0 left TSAr:  per [facility]   2.  Dyslipidemia: Resume Crestor tonight  3.  Hypertension, essential: Resume losartan at discharge  4.  Hypothyroidism:  resume synthroid tomorrow   5.  Gout, history of: resume allopurinol  6. CAD, history of with 3x LETY, post v fib arrest in 2022, ecmo  7.  Atrial fibrillation, history of status post ablation and Watchman  8.  HFpEF, history of :  expectant managment   9.  BPH, history of; bladder protocol; straight cath as needed   10. ARSENIO, history of: cpap per home settings   11.  Disposition:  Medically Ready for Discharge: Anticipated Tomorrow       -Reviewed the patient's preoperative H and P and updated missing elements.  -Home medication reconciliation has been reviewed.  Medications have been ordered as noted from the home list and changes are documented above     Past Medical History     Patient Active Problem List    Diagnosis Date Noted    S/p reverse total shoulder arthroplasty 02/19/2025     Priority: Medium    Urinary retention 01/27/2024     Priority: Medium    Gross hematuria 01/27/2024     Priority: Medium    Benign prostatic hyperplasia with urinary obstruction 11/03/2023     Priority: Medium     Chronic systolic heart failure (H) 10/25/2023     Priority: Medium    Hyperlipidemia 10/25/2022     Priority: Medium    Chronic kidney disease, stage 3b (H) 07/14/2022     Priority: Medium    Subclinical hypothyroidism 07/13/2022     Priority: Medium    S/P drug eluting coronary stent placement 04/22/2022     Priority: Medium    Coronary artery disease involving native coronary artery of native heart without angina pectoris 04/14/2022     Priority: Medium    ST elevation myocardial infarction involving left anterior descending (LAD) coronary artery (H) 03/18/2022     Priority: Medium     Formatting of this note might be different from the original.  2/17/2022      Presence of Watchman left atrial appendage closure device 10/21/2020     Priority: Medium    Diverticular disease of large intestine 06/24/2020     Priority: Medium    Other cardiomyopathies (H) 04/02/2019     Priority: Medium    ED (erectile dysfunction) of organic origin 02/16/2018     Priority: Medium    History of gout 02/16/2018     Priority: Medium    Chronic atrial fibrillation (H)      Priority: Medium    High prostate specific antigen (PSA) 11/19/2015     Priority: Medium     R97.2 : Raised prostate specific antigen      Atrial flutter (H) 01/27/2012     Priority: Medium    Sleep apnea 01/27/2012     Priority: Medium     On CPAP          Surgical History   He  has a past surgical history that includes Atrial Ablation Surgery; IR Upper Extremity Angiogram Left (02/20/2022); Remove Extracorporal Membrane Oxygenator Adult (N/A, 02/22/2022); PICC/Midline Placement (Left, 02/28/2022); Extracorporeal Membrance Oxygenation (N/A, 02/17/2022); Coronary Angiogram (N/A, 02/17/2022); Percutaneous Transluminal Angioplasty (N/A, 02/17/2022); Intra aortic Balloon Pump Insertion (N/A, 02/17/2022); Therapeutic Hypothermia (N/A, 02/17/2022); Arterial Line Placement (N/A, 02/17/2022); Cervical Spine Surgery; Coronary Angiogram (N/A, 04/22/2022); Percutaneous  Coronary Intervention (N/A, 04/22/2022); Cardiac surgery; Head and neck surgery; and Implant prosthesis penis rigid (N/A, 1/25/2024).     Past Surgical History:   Procedure Laterality Date    ATRIAL ABLATION SURGERY      CARDIAC SURGERY      CERVICAL SPINE SURGERY      CV ARTERIAL LINE PLACEMENT N/A 02/17/2022    Procedure: Arterial Line Placement;  Surgeon: Larry Cintron MD;  Location:  HEART CARDIAC CATH LAB    CV CORONARY ANGIOGRAM N/A 02/17/2022    Procedure: Coronary Angiogram;  Surgeon: Larry Cintron MD;  Location:  HEART CARDIAC CATH LAB    CV CORONARY ANGIOGRAM N/A 04/22/2022    Procedure: Coronary Angiogram;  Surgeon: Larry Cintron MD;  Location: [facility] CARDIAC CATH LAB    CV EXTRACORPERAL MEMBRANE OXYGENATION N/A 02/17/2022    Procedure: Extracorporeal Membrance Oxygenation;  Surgeon: Larry Cintron MD;  Location:  HEART CARDIAC CATH LAB    CV INTRA AORTIC BALLOON N/A 02/17/2022    Procedure: Intra Aortic Balloon Pump Insertion;  Surgeon: Larry Cintron MD;  Location: [facility] CARDIAC CATH LAB    CV PCI N/A 04/22/2022    Procedure: Percutaneous Coronary Intervention;  Surgeon: Larry Cintron MD;  Location:  HEART CARDIAC CATH LAB    CV PCI ANGIOPLASTY N/A 02/17/2022    Procedure: Percutaneous Transluminal Angioplasty;  Surgeon: Larry Cintron MD;  Location: [facility] CARDIAC CATH LAB    CV THERAPEUTIC HYPOTHERMIA N/A 02/17/2022    Procedure: Therapeutic Hypothermia;  Surgeon: Larry Cintron MD;  Location: [facility] CARDIAC CATH LAB    HEAD & NECK SURGERY      IMPLANT PROSTHESIS PENIS RIGID N/A 1/25/2024    Procedure: INSERTION OF INFLATION OF SEMI RIGID PENILE PROSTHESIS;  Surgeon: Bryan Acevedo MD;  Location: [facility] Main OR    IR UPPER EXTREMITY ANGIOGRAM LEFT  02/20/2022    MIDLINE DOUBLE LUMEN PLACEMENT Left 02/28/2022    Left brachial vein medial 0.59cm.Blood return on all ports midline okay to use.    REMOVE EXTRACORPORAL MEMBRANE OXYGENATOR ADULT N/A 02/22/2022     "Procedure: EXTRACORPOREAL MEMBRANE OXYGENATION CANNULA REMOVAL, INTRAOPERATIVE TRANSESOPHAGEAL ECHOCARDIOGRAM PER ANESTHESIA.;  Surgeon: Rod Banuelos MD;  Location: [facility] OR       Family History    Reviewed, and family history includes Kidney Cancer in his mother; Kidney failure in his mother; Leukemia (age of onset: 41) in his father; No Known Problems in his sister, sister, and sister.    Social History    Reviewed, and he  reports that he has never smoked. He has never been exposed to tobacco smoke. He has never used smokeless tobacco. He reports current alcohol use of about 2.0 standard drinks of alcohol per week. He reports that he does not use drugs.  Social History     Tobacco Use    Smoking status: Never     Passive exposure: Never    Smokeless tobacco: Never   Substance Use Topics    Alcohol use: Yes     Alcohol/week: 2.0 standard drinks of alcohol       Allergies     Allergies   Allergen Reactions    Chlorpheniramine-Phenylpropan [A.R.M.] Other (See Comments)     Watery eyes, sneezing    Seroquel [Quetiapine] Other (See Comments)     Per Meeta (Wife), would like this medication to be listed as an allergy due to patient becoming combative and delirious after taking it.     Percocet [Oxycodone-Acetaminophen] Rash     \"felt like skin was crawling off\"-pt's S.O.        Prior to Admission Medications      Medications Prior to Admission   Medication Sig Dispense Refill Last Dose/Taking    allopurinol (ZYLOPRIM) 100 MG tablet Take 1 tablet (100 mg) by mouth 2 times daily 180 tablet 3 2/18/2025 Evening    aspirin (ASA) 81 MG chewable tablet Take 1 tablet (81 mg) by mouth daily Starting tomorrow. 90 tablet 3 2/18/2025 Morning    levothyroxine (SYNTHROID/LEVOTHROID) 25 MCG tablet Take 1 tablet (25 mcg) by mouth daily 90 tablet 3 2/19/2025 Morning    losartan (COZAAR) 25 MG tablet Take 1 tablet (25 mg) by mouth daily 90 tablet 3 2/18/2025 Morning    metoprolol succinate ER (TOPROL XL) 50 MG 24 hr tablet " Take 3 tablets (150 mg) by mouth daily 270 tablet 3 2/19/2025 Morning    Multiple Vitamin (DAILY-RUFUS MULTIVITAMIN) TABS Take 1 tablet by mouth daily 90 tablet 3 2/18/2025 Morning    polyethylene glycol (MIRALAX) 17 GM/Dose powder Take 17 g by mouth daily 510 g 11 2/18/2025 Morning    rosuvastatin (CRESTOR) 20 MG tablet Take 1 tablet (20 mg) by mouth every evening 90 tablet 3 2/18/2025 Bedtime    vitamin C (ASCORBIC ACID) 250 MG tablet Take 250 mg by mouth daily.   2/18/2025 Morning    vitamin C with B complex (B COMPLEX-C) tablet Take 1 tablet by mouth daily.   2/18/2025 Morning    Vitamin D3 (CHOLECALCIFEROL) 25 mcg (1000 units) tablet Take 25 mcg by mouth daily.   2/18/2025 Morning       Review of Systems   A 12 point comprehensive review of systems was negative except as noted above.    OBJECTIVE         Physical Exam   Temp:  [96.8  F (36  C)-97.5  F (36.4  C)] 96.8  F (36  C)  Pulse:  [63-74] 70  Resp:  [16-23] 18  BP: (110-147)/(56-74) 115/60  SpO2:  [96 %-100 %] 96 %  Body mass index is 33 kg/m .    GENERAL:  Alert, oriented; no distress; resting    HEAD:  Normocephalic, without obvious abnormality, atraumatic   EYES:  PERRL, conjunctiva/corneas clear, no scleral icterus, EOM's intact   NOSE: Nares normal, septum midline, mucosa normal, no drainage   THROAT: Lips, mucosa, and tongue normal; teeth and gums normal, mouth moist   NECK: Supple, symmetrical, trachea midline   BACK:   Symmetric, no curvature, ROM normal   LUNGS:   Clear to auscultation bilaterally, no rales, rhonchi, or wheezing, symmetric chest rise on inhalation, respirations unlabored   CHEST WALL:  No tenderness or deformity   HEART:  Regular rate and rhythm, S1 and S2 normal, no murmur, rub, or gallop    ABDOMEN:   Soft, nonfocal   EXTREMITIES: Left shoulder in sling    SKIN: Dry to touch, no exanthems in the visualized areas   NEURO: Alert, oriented x 4, moves all four extremities freely/spontaneously   PSYCH: Cooperative, behavior is  "appropriate         Imaging Reviewed Personally By Myself    Radiology Results:   Recent Results (from the past 24 hours)   POC US Guidance Needle Placement    Narrative    Ultrasound was performed as guidance to an anesthesia procedure.  Click   \"PACS images\" hyperlink below to view any stored images.  For specific   procedure details, view procedure note authored by anesthesia.       Preoperative Labs Reviewed Personally By Myself     Thank you for this consultation.  Appreciate the opportunity to participate in the care of Chano Johnson, please feel free to contact us for any questions or concerns.    Kaitlin Rivera MD  [facility]  Phone: #[phone]   "

## 2025-02-19 NOTE — ANESTHESIA PROCEDURE NOTES
Brachial plexus Procedure Note    Pre-Procedure   Staff -        Anesthesiologist:  Fransisco Rico MD       Performed By: anesthesiologist       Location: pre-op       Procedure Start/Stop Times: 2/19/2025 11:31 AM and 2/19/2025 11:39 AM       Pre-Anesthestic Checklist: patient identified, IV checked, site marked, risks and benefits discussed, informed consent, monitors and equipment checked, pre-op evaluation, at physician/surgeon's request and post-op pain management  Timeout:       Correct Patient: Yes        Correct Procedure: Yes        Correct Site: Yes        Correct Position: Yes        Correct Laterality: Yes        Site Marked: Yes  Procedure Documentation  Procedure: Brachial plexus         Laterality: left       Patient Position: supine       Patient Prep/Sterile Barriers: sterile gloves, mask       Skin prep: Chloraprep (interscalene approach).       Needle Gauge: 20.        Needle Length (Inches): 4        Ultrasound guided       1. Ultrasound was used to identify targeted nerve, plexus, vascular marker, or fascial plane and place a needle adjacent to it in real-time.       2. Ultrasound was used to visualize the spread of anesthetic in close proximity to the above referenced structure.       3. A permanent image is entered into the patient's record.       4. The visualized anatomic structures appeared normal.       5. There were no apparent abnormal pathologic findings.    Assessment/Narrative         The placement was negative for: blood aspirated, painful injection and site bleeding       Paresthesias: Resolved.       Bolus given via needle. no blood aspirated via catheter.        Secured via.        Insertion/Infusion Method: Single Shot       Complications: none    Medication(s) Administered   Bupivacaine 0.5% PF (Infiltration) - Infiltration   10 mL - 2/19/2025 11:38:00 AM  Bupivacaine liposome (Exparel) 1.3% LA inj susp (Infiltration) - Infiltration   10 mL - 2/19/2025 11:38:00 AM  Medication  "Administration Time: 2/19/2025 11:31 AM      FOR [facility] ONLY:   Pain Team Contact information: please page the Pain Team Via [employer]. Search \"Pain\". During daytime hours, please page the attending first. At night please page the resident first.      "

## 2025-02-19 NOTE — ANESTHESIA POSTPROCEDURE EVALUATION
Patient: Chano Johnson    Procedure: Procedure(s):  LEFT REVERSE TOTAL SHOULDER ARTHROPLASTY       Anesthesia Type:  General    Note:     Postop Pain Control: Uneventful            Sign Out: Well controlled pain   PONV: No   Neuro/Psych: Uneventful            Sign Out: Acceptable/Baseline neuro status   Airway/Respiratory: Uneventful            Sign Out: Acceptable/Baseline resp. status   CV/Hemodynamics: Uneventful            Sign Out: Acceptable CV status; No obvious hypovolemia; No obvious fluid overload   Other NRE:    DID A NON-ROUTINE EVENT OCCUR? No           Last vitals:  Vitals Value Taken Time   /61 02/19/25 1513   Temp 36  C (96.8  F) 02/19/25 1441   Pulse 70 02/19/25 1520   Resp 22 02/19/25 1503   SpO2 95 % 02/19/25 1520   Vitals shown include unfiled device data.    Electronically Signed By: Fransisco Rico MD  February 19, 2025  3:21 PM

## 2025-02-19 NOTE — INTERVAL H&P NOTE
"I have reviewed the surgical (or preoperative) H&P that is linked to this encounter, and examined the patient. There are no significant changes    Clinical Conditions Present on Arrival:  Clinically Significant Risk Factors Present on Admission          # Hyperchloremia: Highest Cl = 109 mmol/L in last 30 days, will monitor as appropriate             # Drug Induced Platelet Defect: home medication list includes an antiplatelet medication      # Obesity: Estimated body mass index is 33 kg/m  as calculated from the following:    Height as of this encounter: 1.778 m (5' 10\").    Weight as of this encounter: 104.3 kg (230 lb).       "

## 2025-02-19 NOTE — ANESTHESIA CARE TRANSFER NOTE
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    Anesthesia Type:   General     Note:    Oropharynx: oropharynx clear of all foreign objects  Level of Consciousness: drowsy  Oxygen Supplementation: face mask  Level of Supplemental Oxygen (L/min / FiO2): 10  Independent Airway: airway patency satisfactory and stable  Dentition: dentition unchanged  Vital Signs Stable: post-procedure vital signs reviewed and stable  Report to RN Given: handoff report given  Patient transferred to: PACU    Handoff Report: Identifed the Patient, Identified the Reponsible Provider, Reviewed the pertinent medical history, Discussed the surgical course, Reviewed Intra-OP anesthesia mangement and issues during anesthesia, Set expectations for post-procedure period and Allowed opportunity for questions and acknowledgement of understanding      Vitals:  Vitals Value Taken Time   /56 02/19/25 1432   Temp 36.1  C (96.98  F) 02/19/25 1434   Pulse 73 02/19/25 1434   Resp 16 02/19/25 1434   SpO2 98 % 02/19/25 1434   Vitals shown include unfiled device data.    Electronically Signed By: BRANDON JORDAN CRNA  February 19, 2025  2:36 PM

## 2025-02-20 ENCOUNTER — APPOINTMENT (OUTPATIENT)
Dept: OCCUPATIONAL THERAPY | Facility: CLINIC | Age: 75
End: 2025-02-20
Attending: ORTHOPAEDIC SURGERY
Payer: MEDICARE

## 2025-02-20 VITALS
TEMPERATURE: 98.7 F | HEIGHT: 70 IN | RESPIRATION RATE: 17 BRPM | HEART RATE: 80 BPM | DIASTOLIC BLOOD PRESSURE: 58 MMHG | BODY MASS INDEX: 32.93 KG/M2 | OXYGEN SATURATION: 96 % | WEIGHT: 230 LBS | SYSTOLIC BLOOD PRESSURE: 112 MMHG

## 2025-02-20 LAB
FASTING STATUS PATIENT QL REPORTED: ABNORMAL
GLUCOSE SERPL-MCNC: 141 MG/DL (ref 70–99)
HGB BLD-MCNC: 11.6 G/DL (ref 13.3–17.7)

## 2025-02-20 PROCEDURE — 250N000011 HC RX IP 250 OP 636: Mod: JZ | Performed by: PHYSICIAN ASSISTANT

## 2025-02-20 PROCEDURE — 82947 ASSAY GLUCOSE BLOOD QUANT: CPT | Performed by: ORTHOPAEDIC SURGERY

## 2025-02-20 PROCEDURE — 99232 SBSQ HOSP IP/OBS MODERATE 35: CPT | Performed by: STUDENT IN AN ORGANIZED HEALTH CARE EDUCATION/TRAINING PROGRAM

## 2025-02-20 PROCEDURE — 250N000013 HC RX MED GY IP 250 OP 250 PS 637: Performed by: PHYSICIAN ASSISTANT

## 2025-02-20 PROCEDURE — 36415 COLL VENOUS BLD VENIPUNCTURE: CPT | Performed by: ORTHOPAEDIC SURGERY

## 2025-02-20 PROCEDURE — 85018 HEMOGLOBIN: CPT | Performed by: PHYSICIAN ASSISTANT

## 2025-02-20 PROCEDURE — 97110 THERAPEUTIC EXERCISES: CPT | Mod: GO

## 2025-02-20 PROCEDURE — 97166 OT EVAL MOD COMPLEX 45 MIN: CPT | Mod: GO

## 2025-02-20 PROCEDURE — 97535 SELF CARE MNGMENT TRAINING: CPT | Mod: GO

## 2025-02-20 PROCEDURE — 250N000013 HC RX MED GY IP 250 OP 250 PS 637: Performed by: EMERGENCY MEDICINE

## 2025-02-20 RX ORDER — HYDROCODONE BITARTRATE AND ACETAMINOPHEN 5; 325 MG/1; MG/1
1-2 TABLET ORAL EVERY 4 HOURS PRN
Qty: 30 TABLET | Refills: 0 | Status: SHIPPED | OUTPATIENT
Start: 2025-02-20

## 2025-02-20 RX ORDER — ACETAMINOPHEN 325 MG/1
975 TABLET ORAL EVERY 8 HOURS
COMMUNITY
Start: 2025-02-20

## 2025-02-20 RX ORDER — ASPIRIN 81 MG/1
81 TABLET ORAL 2 TIMES DAILY
Qty: 60 TABLET | Refills: 0 | Status: SHIPPED | OUTPATIENT
Start: 2025-02-20

## 2025-02-20 RX ADMIN — METOPROLOL SUCCINATE 150 MG: 50 TABLET, EXTENDED RELEASE ORAL at 08:44

## 2025-02-20 RX ADMIN — CEFAZOLIN SODIUM 2 G: 2 INJECTION, SOLUTION INTRAVENOUS at 03:16

## 2025-02-20 RX ADMIN — ASPIRIN 81 MG: 81 TABLET, COATED ORAL at 08:44

## 2025-02-20 RX ADMIN — LEVOTHYROXINE SODIUM 25 MCG: 0.03 TABLET ORAL at 08:48

## 2025-02-20 RX ADMIN — ALLOPURINOL 100 MG: 100 TABLET ORAL at 08:45

## 2025-02-20 RX ADMIN — ACETAMINOPHEN 975 MG: 325 TABLET ORAL at 03:17

## 2025-02-20 RX ADMIN — ACETAMINOPHEN 975 MG: 325 TABLET ORAL at 10:41

## 2025-02-20 RX ADMIN — LOSARTAN POTASSIUM 25 MG: 25 TABLET, FILM COATED ORAL at 08:45

## 2025-02-20 ASSESSMENT — ACTIVITIES OF DAILY LIVING (ADL)
ADLS_ACUITY_SCORE: 31
PREVIOUS_RESPONSIBILITIES: MEAL PREP;HOUSEKEEPING;LAUNDRY;SHOPPING;FINANCES;DRIVING
ADLS_ACUITY_SCORE: 31

## 2025-02-20 NOTE — PROVIDER NOTIFICATION
Remote provider notified about pt receiving home medications, allopurinol & rosuvastatin, administered by wife. Provider gave ok to give other medications. Educated pt and wife on home medication policy. Advised to not bring morning medications to hospital. Medications returned to [facility].

## 2025-02-20 NOTE — PROGRESS NOTES
"Orthopedic Progress Note      Assessment: 1 Day Post-Op  S/P Procedure(s):  LEFT REVERSE TOTAL SHOULDER ARTHROPLASTY @    Plan:   - Continue OT.  - Weightbearing status: NWB left Upper Extremity  - Continue sling & abduction pillow  - Anticoagulation: Aspirin 81 mg bid x30 days, in addition to SCDs, sanjeev stockings and early ambulation.  - Discharge planning: Discharge to home today pending OT eval.    Subjective:  Pain: Left shoulder pain well controlled on Tylenol and aspirin.  Nausea, Vomiting:  No  Chest pain: No  Lightheadedness, Dizziness:  No  Neuro: Block in effect    Patient is doing well on POD #1. Tolerating oral intake, pain well controlled on oral medications, voiding & ambulating. Ready for discharge. Hgb 11.6 (pre-op 12.8).    Objective:  /58 (BP Location: Right arm)   Pulse 80   Temp 98.7  F (37.1  C) (Oral)   Resp 17   Ht 1.778 m (5' 10\")   Wt 104.3 kg (230 lb)   SpO2 96%   BMI 33.00 kg/m    The patient is A&Ox3. Appears comfortable, sitting up at bedside & dressed. Wearing sling appropriately.  Sensation is intact to light touch in left upper extremity, hand & fingers.  left upper extremity motor strength 5/5 in ulnar, median and radial nerve distributions. Fires axillary. Flexes elbow. Flexes & extends wrist. Full composite fist. Opposes thumb.  Palpable left radial pulse. Hand warm with brisk cap refill in fingers.  Calves are soft and non-tender.   Mild edema & ecchymosis of left shoulder. The incision is covered. Dressing C/D/I.        Pertinent Labs   Lab Results: personally reviewed.   Lab Results   Component Value Date    INR 1.04 01/27/2024    INR 1.01 10/03/2022    INR 1.15 04/22/2022     Lab Results   Component Value Date    WBC 6.4 02/03/2025    HGB 11.6 (L) 02/20/2025    HCT 38.9 (L) 02/03/2025     02/03/2025     (L) 02/03/2025     Lab Results   Component Value Date     02/03/2025    CO2 25 02/03/2025         Report completed by:  Rubina Aparicio, " ELIANA/Dr. Shipley  [facility]    Date: 2/20/2025  Time: 7:55 AM

## 2025-02-20 NOTE — PLAN OF CARE
Problem: Pain Acute  Goal: Optimal Pain Control and Function  Outcome: Progressing   Goal Outcome Evaluation:    Patient denies pain this shift. CMS intact with slight numbness to left thumb and index finger. Using home CPAP while sleeping. Call light within reach, able to make needs known.

## 2025-02-20 NOTE — PROGRESS NOTES
02/20/25 0739   Appointment Info   Signing Clinician's Name / Credentials (OT) ROMANA Nesbitt   Student Supervision Therapy services provided with the co-signing licensed therapist guiding and directing the services, and providing the skilled judgement and assessment throughout the session   Quick Adds   Quick Adds Certification   Living Environment   People in Home spouse   Current Living Arrangements house   Home Accessibility stairs within home  (split level)   Living Environment Comments Bedroom upstairs, bathroom downstairs, but option to use upstairs BR, WIS w/GB, RTS w/vanity R.   Self-Care   Usual Activity Tolerance good   Current Activity Tolerance moderate   Equipment Currently Used at Home grab bar, toilet;grab bar, tub/shower;raised toilet seat   Activity/Exercise/Self-Care Comment Prev. independent in ADLs   Instrumental Activities of Daily Living (IADL)   Previous Responsibilities meal prep;housekeeping;laundry;shopping;finances;driving   IADL Comments Wife assists with medication management. Pt previously independent in all other IADLs.   General Information   Onset of Illness/Injury or Date of Surgery 02/19/25   Referring Physician Devin Shipley MD   Patient/Family Therapy Goal Statement (OT) To return home   Additional Occupational Profile Info/Pertinent History of Current Problem Admitted for L TSA. PMH of CAD s/p stent placement, hyperlipidemia, heart failure, ablation for atrial fibrillation, CKD, BPH, hypothyroidism, ARSENIO, gout.   Existing Precautions/Restrictions (S)  shoulder   Left Upper Extremity (Weight-bearing Status) non weight-bearing (NWB)   Cognitive Status Examination   Follows Commands WNL   Visual Perception   Visual Impairment/Limitations corrective lenses for reading   Sensory   Sensory Quick Adds left UE;other (see comments)  (Decreased sensation d/t nerve block.)   Range of Motion Comprehensive   General Range of Motion other (see comments)  (No ROM to L shoulder per MD  order)   Strength Comprehensive (MMT)   General Manual Muscle Testing (MMT) Assessment other (see comments)  (No MMT on L shoulder per MD order.)   Bed Mobility   Bed Mobility rolling right;supine-sit   Rolling Right [place] (Bed Mobility) supervision;verbal cues   Supine-Sit [place] (Bed Mobility) supervision;verbal cues   Assistive Device (Bed Mobility) bed rails   Transfers   Transfers bed-chair transfer;sit-stand transfer;toilet transfer   Transfer Comments No walking device used.   Transfer Skill: Bed to Chair/Chair to Bed   Bed-Chair [place] (Transfers) supervision;verbal cues   Sit-Stand Transfer   Sit-Stand [place] (Transfers) supervision;verbal cues   Toilet Transfer   Type (Toilet Transfer) sit-stand;stand-sit   [place] Level (Toilet Transfer) supervision;verbal cues   Balance   Balance Assessment standing dynamic balance   Standing Balance: Dynamic supervision;verbal cues   Activities of Daily Living   BADL Assessment/Intervention upper body dressing;lower body dressing;toileting   Upper Body Dressing Assessment/Training   Position (Upper Body Dressing) supported sitting   [place] Level (Upper Body Dressing) don;front opening garment;maximum assist (25% patient effort);verbal cues   Lower Body Dressing Assessment/Training   Position (Lower Body Dressing) supported sitting;unsupported standing   [place] Level (Lower Body Dressing) doff;don;pants/bottoms;shoes/slippers;socks;minimum assist (75% patient effort);verbal cues   Toileting   Position (Toileting) unsupported standing   [place] Level (Toileting) perform perineal hygiene;supervision;verbal cues   Clinical Impression   Criteria for Skilled Therapeutic Interventions Met (OT) Yes, treatment indicated   OT Diagnosis Decreased independence in ADLs and transfers d/t L TSA.   OT Problem List-Impairments impacting ADL problems related to;pain;post-surgical precautions   Assessment of Occupational Performance 3-5  Performance Deficits   Identified Performance Deficits UB dressing, LB dressing, bathing, G/H   Planned Therapy Interventions (OT) ADL retraining;progressive activity/exercise   Clinical Decision Making Complexity (OT) detailed assessment/moderate complexity   Risk & Benefits of therapy have been explained evaluation/treatment results reviewed;participants included;patient;spouse/significant other   OT Total Evaluation Time   OT Eval, Moderate Complexity Minutes (54938) 15   Therapy Certification   Medical Diagnosis L TSA   Start of Care Date 02/20/25   Certification date from 02/20/25   Certification date to 02/21/25   OT Goals   Therapy Frequency (OT) One time eval and treatment   OT Predicted Duration/Target Date for Goal Attainment 02/21/25   OT Goals Upper Body Dressing;Lower Body Dressing;Toilet Transfer/Toileting;OT Goal 1   OT: Upper Body Dressing Minimal assist;within precautions   OT: Lower Body Dressing Supervision/stand-by assist;within precautions   OT: Toilet Transfer/Toileting Modified independent;within precautions   OT: Goal 1 Pt will verbalize and demonstrate understanding of L TSA HEP to be completed 2x/day, 10x/exercise.   Interventions   Interventions Quick Adds Self-Care/Home Management;Therapeutic Procedures/Exercise   Self-Care/Home Management   Self-Care/Home Mgmt/ADL, Compensatory, Meal Prep Minutes (91420) 30   Treatment Detail/Skilled Intervention Pt in bed upon arrival. Initial evaluation completed. Pt's wife present and supportive. Educated pt on transferring out of bed to non-surgical side and pt demonstrated understanding. Pt walked to toilet w/SBA and completed pericares w/SBA. Transferred to chair and reviewed TSA handout w/pt and pt verbalized understanding. Educated on donning and doffing sling, shoulder precautions, and when to don/doff sling. Pt verbalized understanding and doffed sling w/max A. Educated on FB dressing, pt verbalized understanding. Pt dressed LB w/min A to  button pants and pull pants up on surgical side. Pt dressed UB w/max A. Pt's wife assisted with donning shirt and verbalized understanding of dressing sequence. Sling donned by therapist w/max A. Pt's wife doffed and donned sling w/VC for sequence and using pillow to support shoulder when sling is removed. Pt's wife demonstrated understanding and denied further questions. Educated on pillow placement for shoulder while seated or lying down. Completed TSA HEP - see note below. Pt verbalized understanding. Pt in chair, alarm on, call light in reach, RN aware.   Therapeutic Procedures/Exercise   Therapeutic Procedure: strength, endurance, ROM, flexibillity minutes (77979) 15   Symptoms Noted During/After Treatment increased pain;other (see comments)  (L shoulder)   Treatment Detail/Skilled Intervention Pt was educated and demonstrated knowledge of TSA exercises for L UE. Educated on using R hand to support L while nerve block is still in place and pt demonstrated understanding. Therapist educated and demonstrated codman's exercises and pt understands to only complete when full sensation is back in L UE.   OT Discharge Planning   OT Plan D/C OT, PT not needed   OT Discharge Recommendation (DC Rec) (S)  home with assist   OT Rationale for DC Rec Pt transferring w/SBA, no walking device. Will have wife at home to assist. Wife was present and both pt and spouse demonstrated and verbalized understanding of sling donning/doffing, shoulder precautions, and exercise program.   OT Brief overview of current status SBA during transfers, min A LB dressing, max A UB dressing, SBA toileting   OT Total Distance Amb During Session (feet) 20   Total Session Time   Timed Code Treatment Minutes 45   Total Session Time (sum of timed and untimed services) 60   [facility]                                                                                   OUTPATIENT OCCUPATIONAL THERAPY    PLAN OF TREATMENT FOR  OUTPATIENT REHABILITATION   Patient's Last Name, First Name, GEO EspinoChano patel YOB: 1950   Provider's Name   [facility]   Medical Record No.  [id number]     Onset Date: 02/19/25 Start of Care Date: 02/20/25     Medical Diagnosis:  L TSA               OT Diagnosis:  Decreased independence in ADLs and transfers d/t L TSA. Certification Dates:  From: 02/20/25  To: 02/21/25     See note for plan of treatment, functional goals, and certification details.    I CERTIFY THE NEED FOR THESE SERVICES FURNISHED UNDER        THIS PLAN OF TREATMENT AND WHILE UNDER MY CARE (Physician co-signature of this document indicates review and certification of the therapy plan).

## 2025-02-20 NOTE — DISCHARGE SUMMARY
"ORTHOPEDIC DISCHARGE SUMMARY       Chano Johnson,  1950, MRN [id number]    Admission Date: 2025      Admission Diagnoses: Osteoarthritis of left shoulder [M19.012]     Discharge Date:  25     Post-operative Day:  1 Day Post-Op    Reason for Admission: The patient was admitted for the following: Procedure(s):  LEFT REVERSE TOTAL SHOULDER ARTHROPLASTY    BRIEF HOSPITAL COURSE   Chano Johnson is a pleasant 74 year old male who underwent the aforementioned procedure with Dr. Shipley on . There were no intraoperative complications and the patient was transferred to the recovery room and later the orthopedic unit in stable condition. Once the patient reached the orthopedic floor our orthopedic pain protocol was implemented along with the following:    Anticoagulation Medications: ASA  Therapy: OT  Activity: NWB  Bracing: Slingshot Brace    Consultations during Admission: Hospitalist service for medical management     COMPLICATIONS/SIGNIFICANT FINDINGS    NONE    DISCHARGE INFORMATION   Condition at discharge: Good  Discharge destination: Home  Patient was seen by myself on the date of discharge.    FOLLOW UP CARE   Follow up with orthopedics in 2 weeks or sooner should the need arise. Ortho will continue to manage pain control, post op anticoagulation and incision care.     Follow up with your PCP for management of chronic medical problems and to evaluate for post op medical complications including constipation, nausea/vomiting, DVT/PE, anemia, changes in blood pressure, fevers/chills, urinary retention and atelectasis/pneumonia.     Subjective   Patient is doing well on POD #1. Pain is well controlled with oral medications. Ambulating. Tolerating oral intake.     Physical Exam   /58 (BP Location: Right arm)   Pulse 80   Temp 98.7  F (37.1  C) (Oral)   Resp 17   Ht 1.778 m (5' 10\")   Wt 104.3 kg (230 lb)   SpO2 96%   BMI 33.00 kg/m    The patient is A&Ox3. Appears comfortable, sitting " up at bedside & dressed. Wearing sling appropriately.  Sensation is intact to light touch in left upper extremity, hand & fingers.  left upper extremity motor strength 5/5 in ulnar, median and radial nerve distributions. Fires axillary. Flexes elbow. Flexes & extends wrist. Full composite fist. Opposes thumb.  Palpable left radial pulse. Hand warm with brisk cap refill in fingers.  Calves are soft and non-tender.   Mild edema & ecchymosis of left shoulder. The incision is covered. Dressing C/D/I.     Pertinent Results at Discharge     Hemoglobin   Date/Time Value Ref Range Status   02/20/2025 05:12 AM 11.6 (L) 13.3 - 17.7 g/dL Final   02/03/2025 10:53 AM 12.8 (L) 13.3 - 17.7 g/dL Final   11/19/2024 10:53 AM 12.6 (L) 13.3 - 17.7 g/dL Final     INR   Date/Time Value Ref Range Status   01/27/2024 02:45 AM 1.04 0.85 - 1.15 Final   10/03/2022 02:53 AM 1.01 0.85 - 1.15 Final   04/22/2022 11:42 AM 1.15 0.85 - 1.15 Final     Platelet Count   Date/Time Value Ref Range Status   02/03/2025 10:53  (L) 150 - 450 10e3/uL Final   11/19/2024 10:53  (L) 150 - 450 10e3/uL Final   11/06/2024 12:32  (L) 150 - 450 10e3/uL Final       Problem List   Active Problems:    S/p reverse total shoulder arthroplasty      Rubina Aparicio PA-C/Dr. Shipley  [facility]  [phone]  Date: 2/20/2025  Time: 8:40 AM

## 2025-02-20 NOTE — PROGRESS NOTES
Patient was admitted to unit following a left reverse shoulder arthoplasty with Dr. Shipley. Surgical dressing and left shoulder immobilizer in place. No shadowing on surgical dressing. Patient is VSS on RA. AxOx4. Patient has sensation and numbness to LUE. Pulses present. Patient is complaining of no pain. Wife Meeta is at bedside. Patient has a hx of sleep apnea and patients own CPAP at bedside.

## 2025-02-20 NOTE — PROGRESS NOTES
[facility]    Medicine Progress Note - Hospitalist Service    Date of Admission:  2/19/2025    Assessment & Plan    74 year old male into [facility] on 2/20/2025 for an elective left TSAr. Pt had general anesthesia with minimal blood loss     [facility] service was asked to evaluate patient for postoperative medical management as follows below. Please resume the home medications as reconciled and further noted below with ordered hold parameters.  Vital signs have been stable post-operatively including hemodynamically stable blood pressure and heart rate.     On 2/20/2025 hgb stable; pt remains hemodynamically and vitally stable and cleared from the internal medicine perspective, the final disposition is pending therapies clearance and per surgery/primary team. Medications are reconciled for discharge from our standpoint.        Problem list:     1  POD #1 left TSAr:  per [facility]   -DVT prophylaxis and pain management per primary service  -We will monitor for complications including respiratory concerns, urinary retention, ileus, skin reactions, infections, medication side effects, etc.  -Incentive spirometry   -discharge disposition per primary service    2.  Dyslipidemia: Resume Crestor     3.  Hypertension, essential: Resume losartan at discharge  -normotensive on POD1    4.  Hypothyroidism:  resume synthroid     5.  Gout, history of: resume allopurinol  -stable    6. CAD, history of with 3x LETY, post v fib arrest in 2022, ecmo  -noted, no chest pain    7.  Atrial fibrillation, history of status post ablation and Watchman  - noted, a/c per primary    8.  HFpEF, history of :  expectant managment   -stable, euvolemic, on RA no new edema    9.  BPH, history of; bladder protocol; straight cath as needed   -noted, stable    10. ARSENIO, history of: cpap per home settings     11.  Disposition:  Medically Ready for Discharge: Anticipated today          Diet: Advance Diet as Tolerated: Regular  "Diet Adult  Discharge Instruction - Regular Diet Adult    DVT Prophylaxis: Defer to primary service  Tapia Catheter: Not present  Lines: None     Cardiac Monitoring: None  Code Status: Full Code      Clinically Significant Risk Factors Present on Admission                 # Drug Induced Platelet Defect: home medication list includes an antiplatelet medication   # Hypertension: Home medication list includes antihypertensive(s)           # Obesity: Estimated body mass index is 33 kg/m  as calculated from the following:    Height as of this encounter: 1.778 m (5' 10\").    Weight as of this encounter: 104.3 kg (230 lb).              Social Drivers of Health    Stress: Stress Concern Present (11/14/2024)    [place] [employer]ational [employer] - Occupational Stress Questionnaire     Feeling of Stress : To some extent   Social Connections: Unknown (11/14/2024)    Social Connection and Isolation Panel [NHANES]     Frequency of Social Gatherings with Friends and Family: Once a week          Disposition Plan     Medically Ready for Discharge: Anticipated Today             Dontae Rajan MD  Hospitalist Service  [facility]  Securely message with [employer] (more info)  Text page via [employer] Paging/Directory   ______________________________________________________________________    Interval History   -no acute interval events  -seen in early morning, no new symptoms or shortness of breath.  -We discussed disposition per primary team  -passing gas   -updated his wife  -All questions answered      Physical Exam   Vital Signs: Temp: 98.7  F (37.1  C) Temp src: Oral BP: 112/58 Pulse: 80   Resp: 17 SpO2: 96 % O2 Device: None (Room air) Oxygen Delivery: 8 LPM  Weight: 230 lbs 0 oz    General: alert, oriented, and in no acute distress  Pulmonary: clear to auscultation bilaterally, normal respiratory effort, on room air, no rales or wheezes or evidence of accessory muscle use  CVS: regular rate and rhythm, no " murmurs, rubs, or gallops; no blatant jugular venous distention; no extremity edema and extremities are warm to the touch  GI: soft, nontender, BS+, no rebound or guarding, no conspicuous organomegaly   Neuro: nonfocal, moves all extremities of own volition  Psych: appropriate  Msk- stable - left arm in sling, stable     Medical Decision Making       40 MINUTES SPENT BY ME on the date of service doing chart review, history, exam, documentation & further activities per the note.      Data   ------------------------- PAST 24 HR DATA REVIEWED -----------------------------------------------    I have personally reviewed the following data over the past 24 hrs:    N/A  \   11.6 (L)   / N/A     N/A N/A N/A /  141 (H)   N/A N/A N/A \       Imaging results reviewed over the past 24 hrs:   Recent Results (from the past 24 hours)   XR Shoulder Left Port G/E 2 Views    Narrative    EXAM: XR SHOULDER LEFT PORT G/E 2 VIEWS  LOCATION: [facility]  DATE: 2/19/2025    INDICATION: Status post surgery  COMPARISON: 02/14/2022      Impression    IMPRESSION: Interval left shoulder arthroplasty with normal alignment and no evidence of acute hardware complication. Expected surrounding postoperative edema and gas.

## 2025-03-04 ENCOUNTER — TRANSFERRED RECORDS (OUTPATIENT)
Dept: HEALTH INFORMATION MANAGEMENT | Facility: CLINIC | Age: 75
End: 2025-03-04
Payer: MEDICARE

## 2025-03-22 DIAGNOSIS — I46.9 CARDIAC ARREST (H): ICD-10-CM

## 2025-03-22 DIAGNOSIS — K59.00 CONSTIPATION, UNSPECIFIED CONSTIPATION TYPE: ICD-10-CM

## 2025-03-22 DIAGNOSIS — E78.5 HYPERLIPIDEMIA, UNSPECIFIED HYPERLIPIDEMIA TYPE: ICD-10-CM

## 2025-03-22 DIAGNOSIS — I21.3 ST ELEVATION MYOCARDIAL INFARCTION (STEMI), UNSPECIFIED ARTERY (H): ICD-10-CM

## 2025-03-25 ENCOUNTER — MYC MEDICAL ADVICE (OUTPATIENT)
Dept: CARDIOLOGY | Facility: CLINIC | Age: 75
End: 2025-03-25
Payer: MEDICARE

## 2025-03-25 ENCOUNTER — MYC REFILL (OUTPATIENT)
Dept: CARDIOLOGY | Facility: CLINIC | Age: 75
End: 2025-03-25
Payer: MEDICARE

## 2025-03-25 DIAGNOSIS — E78.5 HYPERLIPIDEMIA, UNSPECIFIED HYPERLIPIDEMIA TYPE: ICD-10-CM

## 2025-03-25 DIAGNOSIS — I21.3 ST ELEVATION MYOCARDIAL INFARCTION (STEMI), UNSPECIFIED ARTERY (H): ICD-10-CM

## 2025-03-25 DIAGNOSIS — K59.00 CONSTIPATION, UNSPECIFIED CONSTIPATION TYPE: ICD-10-CM

## 2025-03-25 DIAGNOSIS — I25.10 CORONARY ARTERY DISEASE INVOLVING NATIVE CORONARY ARTERY OF NATIVE HEART WITHOUT ANGINA PECTORIS: ICD-10-CM

## 2025-03-25 DIAGNOSIS — I46.9 CARDIAC ARREST (H): ICD-10-CM

## 2025-03-25 DIAGNOSIS — Z96.612 STATUS POST REVERSE TOTAL REPLACEMENT OF LEFT SHOULDER: ICD-10-CM

## 2025-03-26 RX ORDER — POLYETHYLENE GLYCOL 3350 17 G/17G
17 POWDER, FOR SOLUTION ORAL DAILY
Qty: 510 G | Refills: 11 | Status: SHIPPED | OUTPATIENT
Start: 2025-03-26

## 2025-03-26 RX ORDER — LOSARTAN POTASSIUM 25 MG/1
25 TABLET ORAL DAILY
Qty: 90 TABLET | Refills: 3 | Status: SHIPPED | OUTPATIENT
Start: 2025-03-26

## 2025-03-26 RX ORDER — MULTIVITAMIN WITH FOLIC ACID 400 MCG
1 TABLET ORAL DAILY
Qty: 90 TABLET | Refills: 3 | Status: SHIPPED | OUTPATIENT
Start: 2025-03-26

## 2025-03-26 RX ORDER — ASPIRIN 81 MG/1
81 TABLET ORAL 2 TIMES DAILY
Qty: 60 TABLET | Refills: 11 | Status: SHIPPED | OUTPATIENT
Start: 2025-03-26

## 2025-03-26 RX ORDER — ROSUVASTATIN CALCIUM 20 MG/1
20 TABLET, COATED ORAL EVERY EVENING
Qty: 90 TABLET | Refills: 3 | Status: SHIPPED | OUTPATIENT
Start: 2025-03-26

## 2025-03-26 RX ORDER — METOPROLOL SUCCINATE 50 MG/1
150 TABLET, EXTENDED RELEASE ORAL DAILY
Qty: 270 TABLET | Refills: 3 | Status: SHIPPED | OUTPATIENT
Start: 2025-03-26

## 2025-03-27 RX ORDER — METOPROLOL SUCCINATE 50 MG/1
150 TABLET, EXTENDED RELEASE ORAL DAILY
Qty: 270 TABLET | Refills: 3 | OUTPATIENT
Start: 2025-03-27

## 2025-03-27 RX ORDER — POLYETHYLENE GLYCOL 3350 17 G/17G
17 POWDER, FOR SOLUTION ORAL DAILY
Qty: 510 G | Refills: 11 | OUTPATIENT
Start: 2025-03-27

## 2025-03-27 RX ORDER — LOSARTAN POTASSIUM 25 MG/1
25 TABLET ORAL DAILY
Qty: 90 TABLET | Refills: 3 | OUTPATIENT
Start: 2025-03-27

## 2025-03-27 RX ORDER — ALLOPURINOL 100 MG/1
100 TABLET ORAL 2 TIMES DAILY
Qty: 180 TABLET | Refills: 3 | OUTPATIENT
Start: 2025-03-27

## 2025-03-27 RX ORDER — MULTIVITAMIN WITH FOLIC ACID 400 MCG
1 TABLET ORAL DAILY
Qty: 90 TABLET | Refills: 3 | OUTPATIENT
Start: 2025-03-27

## 2025-03-27 RX ORDER — ROSUVASTATIN CALCIUM 20 MG/1
20 TABLET, COATED ORAL EVERY EVENING
Qty: 90 TABLET | Refills: 3 | OUTPATIENT
Start: 2025-03-27

## 2025-03-27 RX ORDER — ALLOPURINOL 100 MG/1
100 TABLET ORAL 2 TIMES DAILY
Qty: 180 TABLET | Refills: 3 | Status: SHIPPED | OUTPATIENT
Start: 2025-03-27

## 2025-03-27 NOTE — TELEPHONE ENCOUNTER
Last Written Prescription:     allopurinol (ZYLOPRIM) 100 MG tablet 180 tablet 3 3/25/2024 -- No   Sig - Route: Take 1 tablet (100 mg) by mouth 2 times daily - Oral     ----------------------  Last Visit Date: 2/3/25 [facility]  Future Visit Date: None  ----------------------         Refill decision: Medication unable to be refilled by RN due to: Medication not included in refill protocol policy  allopurinol (ZYLOPRIM) 100 MG tablet        Request from pharmacy:  Requested Prescriptions   Pending Prescriptions Disp Refills    allopurinol (ZYLOPRIM) 100 MG tablet [Pharmacy Med Name: Allopurinol Oral Tablet 100 MG] 180 tablet 0     Sig: Take 1 tablet (100 mg) by mouth 2 times daily       Gout Agents Protocol Failed - 3/26/2025  7:24 PM        Failed - ALT on file in past 12 months     Recent Labs   Lab Test 01/27/24  0747   ALT 22             Failed - Medication indicated for associated diagnosis     One of the following medications: colchicine is prescribed for one or more of the following conditions:     Amyloidosis   ulcer of mouth   Bechet's syndrome   Pericarditis   Peyronie's disease, psoriasis          Failed - Has GFR on file in past 12 months and most recent value is normal        Passed - CBC on file in past 12 months     Recent Labs   Lab Test 02/20/25  0512 02/03/25  1053   WBC  --  6.4   RBC  --  3.90*   HGB 11.6* 12.8*   HCT  --  38.9*   PLT  --  124*                 Passed - Has Uric Acid on file in past 12 months and value is less than 6     Recent Labs   Lab Test 11/19/24  1053   URIC 4.8     If level is 6mg/dL or greater, ok to refill one time and refer to provider.           Passed - Medication is active on med list and the sig matches. RN to manually verify dose and sig if red X/fail.     If the protocol passes (green check), you do not need to verify med dose and sig.    A prescription matches if they are the same clinical intention.    For Example: once daily and every morning are the same.    The  protocol can not identify upper and lower case letters as matching and will fail.     For Example: Take 1 tablet (50 mg) by mouth daily     TAKE 1 TABLET (50 MG) BY MOUTH DAILY    For all fails (red x), verify dose and sig.    If the refill does match what is on file, the RN can still proceed to approve the refill request.       If they do not match, route to the appropriate provider.             Passed - Recent (12 mo) or future (90 days) visit within the authorizing provider's specialty     The patient must have completed an in-person or virtual visit within the past 12 months or has a future visit scheduled within the next 90 days with the authorizing provider s specialty.  Urgent care and e-visits do not qualify as an office visit for this protocol.          Passed - Patient is age 18 or older     Refill protocol is for patient's aged 18 years and older

## 2025-04-01 ENCOUNTER — TRANSFERRED RECORDS (OUTPATIENT)
Dept: HEALTH INFORMATION MANAGEMENT | Facility: CLINIC | Age: 75
End: 2025-04-01

## 2025-04-01 RX ORDER — ALLOPURINOL 100 MG/1
100 TABLET ORAL 2 TIMES DAILY
Qty: 180 TABLET | Refills: 0 | OUTPATIENT
Start: 2025-04-01

## 2025-05-22 ENCOUNTER — TRANSFERRED RECORDS (OUTPATIENT)
Dept: HEALTH INFORMATION MANAGEMENT | Facility: CLINIC | Age: 75
End: 2025-05-22
Payer: MEDICARE

## 2025-07-17 ENCOUNTER — TELEPHONE (OUTPATIENT)
Dept: CARDIOLOGY | Facility: CLINIC | Age: 75
End: 2025-07-17
Payer: MEDICARE

## 2025-07-17 NOTE — TELEPHONE ENCOUNTER
Patient Contacted for the patient to call back and schedule the following:    Appointment type: return critical care  Provider: Abdulaziz  Return date: 12/1  Specialty phone number: [phone]  Additional appointment(s) needed: n/a  Additonal Notes: pt resched labs and appt with GENE Cintron

## 2025-08-28 ENCOUNTER — TRANSFERRED RECORDS (OUTPATIENT)
Dept: HEALTH INFORMATION MANAGEMENT | Facility: CLINIC | Age: 75
End: 2025-08-28

## 2025-08-28 ENCOUNTER — OFFICE VISIT (OUTPATIENT)
Dept: URGENT CARE | Facility: URGENT CARE | Age: 75
End: 2025-08-28
Payer: MEDICARE

## 2025-08-28 VITALS
HEIGHT: 72 IN | RESPIRATION RATE: 16 BRPM | DIASTOLIC BLOOD PRESSURE: 69 MMHG | TEMPERATURE: 97.5 F | HEART RATE: 58 BPM | BODY MASS INDEX: 31.79 KG/M2 | OXYGEN SATURATION: 98 % | SYSTOLIC BLOOD PRESSURE: 109 MMHG | WEIGHT: 234.7 LBS

## 2025-08-28 DIAGNOSIS — B35.3 TINEA PEDIS OF RIGHT FOOT: Primary | ICD-10-CM

## 2025-08-28 RX ORDER — TERBINAFINE HYDROCHLORIDE 250 MG/1
250 TABLET ORAL DAILY
Qty: 14 TABLET | Refills: 0 | Status: SHIPPED | OUTPATIENT
Start: 2025-08-28 | End: 2025-09-11

## (undated) DEVICE — SU VICRYL 2-0 CT-1 27" UND J259H

## (undated) DEVICE — GLOVE UNDER INDICATOR PI SZ 8.5 LF 41685

## (undated) DEVICE — PITCHER STERILE 1000ML  SSK9004A

## (undated) DEVICE — SOL NACL 0.9% IRRIG 1000ML BOTTLE 2F7124

## (undated) DEVICE — SU VICRYL 0 CTX 36" J370H

## (undated) DEVICE — PROTECTOR ARM ONE-STEP TRENDELENBURG 40418

## (undated) DEVICE — PREP CHLORAPREP W/ORANGE TINT 10.5ML 930715

## (undated) DEVICE — SUTURE VICRYL+ 2-0 27IN SH UND VCP417H

## (undated) DEVICE — INTRO GLIDESHEATH SLENDER 6FR 10X45CM 60-1060

## (undated) DEVICE — SOLUTION IRRIG 2B7127 .9NS 3000ML BAG

## (undated) DEVICE — SUTURE VICRYL+ 4-0 UNDYED PS-2 VCP496H

## (undated) DEVICE — ESU PENCIL SMOKE EVAC W/ROCKER SWITCH 0703-047-000

## (undated) DEVICE — SU SILK 0 TIE 6X30" A306H

## (undated) DEVICE — GLOVE BIOGEL PI ULTRATOUCH G SZ 8.5 42185

## (undated) DEVICE — Device

## (undated) DEVICE — CATH GUIDING BLUE YELLOW PTFE XB3.5 6FRX100CM 67005400

## (undated) DEVICE — STATLOCK PSI DEVICE

## (undated) DEVICE — 8FR X 24CM SUPER ARROW-FLEX PERCUTANEOUS SHEATH INTRODUCER SET WITH INTEGRAL HEMOSTASIS VALVE/SIDE PORT AND RADIOPAQUE TIP MARKER BAND

## (undated) DEVICE — SUTURE MONOCRYL 3-0 18 PS2 UND MCP497G

## (undated) DEVICE — SU ETHILON 3-0 PS-1 18" 1663H

## (undated) DEVICE — CANNULA VENOUS 25FR LONG

## (undated) DEVICE — SYR 30ML LL W/O NDL 302832

## (undated) DEVICE — DRSG KERLIX FLUFFS X5

## (undated) DEVICE — DRAPE STERI U 1015

## (undated) DEVICE — COVER CAMERA IN-LIGHT DISP LT-C02

## (undated) DEVICE — PLUG CATHETER AND CAP KENDALL 1600

## (undated) DEVICE — CUSTOM PACK GEN MAJOR SBA5BGMHEA

## (undated) DEVICE — SU TAPE 38IN X 1.7MM CONV NDL 48MM 1/2 CIRCLE WHT/BL AR-7511

## (undated) DEVICE — SOL ISOPROPYL RUBBING ALCOHOL USP 70% 4OZ HDX-20 I0020

## (undated) DEVICE — DRILL BIT PERIPHERAL SCREW 3.2MM MWJ126

## (undated) DEVICE — SUCTION MANIFOLD NEPTUNE 2 SYS 4 PORT 0702-020-000

## (undated) DEVICE — DRSG BIOPATCH GERMICIDAL SPLIT SPONGE 4MM MED 4150

## (undated) DEVICE — SUCTION TIP YANKAUER W/O VENT K86

## (undated) DEVICE — SUTURE VICRYL+ 2-0 CT-2 CR 8X18" VCP726D

## (undated) DEVICE — CATH BALLOON NC EMERGE 4.00X12MM H7493926712400

## (undated) DEVICE — PREP SKIN SCRUB TRAY 4461A

## (undated) DEVICE — SOL NACL 0.9% INJ 500ML BAG 2B1323Q

## (undated) DEVICE — GLOVE BIOGEL INDICATOR 7.5 LF 41675

## (undated) DEVICE — PACK HEART LEFT CUSTOM

## (undated) DEVICE — SUTURE BOOTS 051003PBX

## (undated) DEVICE — BLADE KNIFE SURG 15 371115

## (undated) DEVICE — DRSG TEGADERM 4X4 3/4" 1626W

## (undated) DEVICE — CATH BALLOON NC EMERGE 2.75X15MM H7493926715270

## (undated) DEVICE — DECANTER BAG 2002S

## (undated) DEVICE — DRAPE TIBURON CARDIOVASCULAR PERI-GROIN LF 9154

## (undated) DEVICE — SU PROLENE 6-0 C-1DA 30" 8706H

## (undated) DEVICE — ELECTRODE PATIENT RETURN ADULT L10 FT 2 PLATE CORD 0855C

## (undated) DEVICE — GLOVE BIOGEL PI ULTRATOUCH G SZ 8.0 42180

## (undated) DEVICE — SYR 10ML LL W/O NDL 302995

## (undated) DEVICE — PREP CHLORAPREP 26ML TINTED HI-LITE ORANGE 930815

## (undated) DEVICE — DRAPE IOBAN INCISE 23X17" 6650EZ

## (undated) DEVICE — GUIDEWIRE TORNIER AEQUALIS PERFORM +  2.5X220MM DWD017

## (undated) DEVICE — CUSTOM PACK OPEN SHOULDER SOP5BOSHEB

## (undated) DEVICE — KIT ACCESSORY INTRO INFLATION SYS 20/30 PRIORITY 1000186-115

## (undated) DEVICE — DRAPE OR LAPAROTOMY KC 89228*

## (undated) DEVICE — NEEDLE SUTURE ANCHOR 1824-4DC

## (undated) DEVICE — DRSG STERI STRIP 1/2X4" R1547

## (undated) DEVICE — SOL WATER IRRIG 1000ML BOTTLE 2F7114

## (undated) DEVICE — RX SURGIFLO HEMOSTATIC MATRIX W/THROMBIN 8ML 2994

## (undated) DEVICE — SURGICEL POWDER ABSORBABLE HEMOSTAT 3GM 3013SP

## (undated) DEVICE — PREP POVIDONE IODINE SCRUB 7.5% 4OZ APL82212

## (undated) DEVICE — DRAPE STERI TOWEL LG 1010

## (undated) DEVICE — BONE CLEANING TIP INTERPULSE  0210-010-000

## (undated) DEVICE — SU FIBERWIRE 2 38" T-8 NDL  AR-7206

## (undated) DEVICE — DRSG GAUZE 4X4" 3033

## (undated) DEVICE — CATH BALLOON EMERGE 2.5X12MM H7493918912250

## (undated) DEVICE — GUIDEWIRE VASC 0.014INX180CM RUNTHROUGH 25-1011

## (undated) DEVICE — CATH FOLEY 16FR 5ML LUBRICATH LATEX 0165L16

## (undated) DEVICE — DRAPE CONVERTORS U-DRAPE 60X72" 8476

## (undated) DEVICE — CATH BALLOON NC EMERGE 3.25X8MM H7493926708320

## (undated) DEVICE — SU PROLENE 5-0 RB-2DA 30" 8710H

## (undated) DEVICE — TUBING PRESSURE 30"

## (undated) DEVICE — VESSEL LOOPS YELLOW MAXI 31145694

## (undated) DEVICE — GLOVE SURG PI ULTRA TOUCH M SZ 8 LF

## (undated) DEVICE — SLEEVE TR BAND RADIAL COMPRESSION DEVICE 24CM TRB24-REG

## (undated) DEVICE — CLIP HORIZON SM RED WIDE SLOT 001201

## (undated) DEVICE — NDL BLUNT 18GA 1" W/O FILTER 305181

## (undated) DEVICE — HOLDER LIMB VELCRO OR 0814-1533

## (undated) DEVICE — LINEN TOWEL PACK X30 5481

## (undated) DEVICE — KIT ARTERIAL LINE 2GA 12CM INTRO NDL ASK-04510-HF

## (undated) DEVICE — ESU GROUND PAD ADULT W/CORD E7507

## (undated) DEVICE — BLADE CLIPPER DISP 4406

## (undated) DEVICE — SYR BULB IRRIG DOVER 60 ML LATEX FREE 67000

## (undated) DEVICE — INTRO SHEATH 9FRX10CM PINNACLE RSS902

## (undated) DEVICE — CLIP HORIZON MED BLUE 002200

## (undated) DEVICE — SUTURE VICRYL+ 0 27IN CT-1 UND VCP260H

## (undated) DEVICE — GOWN SURGICAL SMARTGOWN 2XL 89075

## (undated) DEVICE — CATH BALLOON NC EMERGE 3.00X15MM H7493926715300

## (undated) DEVICE — SYR 50ML LL W/O NDL 309653

## (undated) DEVICE — DRAPE SHEET REV FOLD 3/4 9349

## (undated) DEVICE — DRSG FOAM MEPILEX BORDER SILVER 8X4 POSTOP 498400

## (undated) DEVICE — BRIEF STRETCH XL MPS40

## (undated) DEVICE — INTRO SHEATH MICRO PLATINUM TIP 4FRX40CM 7274

## (undated) DEVICE — BLADE SAGITTAL WIDE (SO-618) 2108-118

## (undated) DEVICE — GUIDE PIN STERILE 3X75MM

## (undated) DEVICE — WIRE GUIDE 0.035"X145CM AMPLATZ XSTIFF J THSCF-35-145-3-AES

## (undated) DEVICE — SU MONOCRYL+ 4-0 18IN PS2 UND MCP496G

## (undated) DEVICE — 17FR 23CM ARTERIAL ECMO CANNULA WITH BIOLINE COATING

## (undated) DEVICE — CATH QUATTRO 9.3FR  FEM INSTRN

## (undated) DEVICE — MANIFOLD KIT ANGIO AUTOMATED 014613

## (undated) DEVICE — SURGICEL HEMOSTAT 4X8" 1952

## (undated) DEVICE — JELLY LUBRICATING SURGILUBE 2OZ TUBE

## (undated) DEVICE — TRAY PREP DRY SKIN SCRUB 067

## (undated) DEVICE — SUCTION IRR SYSTEM W/O TIP INTERPULSE HANDPIECE 0210-100-000

## (undated) DEVICE — LINEN TOWEL PACK X6 WHITE 5487

## (undated) DEVICE — KIT HAND CONTROL ACIST 014644 AR-P54

## (undated) DEVICE — MITT PRE-OP 7 L X 5 1/2 W 5177M1

## (undated) DEVICE — TOURNIQUET VASCULAR KIT 7 1/2" 79012

## (undated) DEVICE — SU PROLENE 4-0 RB-1DA 36" 8557H

## (undated) DEVICE — SUCTION MANIFOLD NEPTUNE 2 SYS 1 PORT 702-025-000

## (undated) DEVICE — GLOVE BIOGEL PI INDICATOR 8.0 LF 41680

## (undated) DEVICE — LINEN GOWN XLG 5407

## (undated) DEVICE — INTRO SHEATH 6FRX25CM PINNACLE RSS606

## (undated) DEVICE — VALVE HEMOSTASIS .096" COPILOT MECH 1003331

## (undated) DEVICE — CATH ANGIO INFINITI 3DRC 6FRX100CM 534676T

## (undated) DEVICE — SUTURE VICRYL+ 2-0 27IN CT-1 UND VCP259H

## (undated) DEVICE — WIPES FOLEY CARE SURESTEP PROVON DFC100

## (undated) DEVICE — HOOD SURG T7PLUS PEEL AWAY FACE SHIELD STRL LF 0416-801-100

## (undated) DEVICE — SU DERMABOND ADVANCED .7ML DNX12

## (undated) DEVICE — CATH BALLOON EMERGE 2.0X8MM H7493918908200

## (undated) DEVICE — SU PROLENE 4-0 SHDA 36" 8521H

## (undated) DEVICE — PREP DYNA-HEX 4% CHG SCRUB 4OZ BOTTLE MDS098710

## (undated) RX ORDER — LIDOCAINE HYDROCHLORIDE 10 MG/ML
INJECTION, SOLUTION EPIDURAL; INFILTRATION; INTRACAUDAL; PERINEURAL
Status: DISPENSED
Start: 2022-02-20

## (undated) RX ORDER — LIDOCAINE HYDROCHLORIDE 10 MG/ML
INJECTION, SOLUTION EPIDURAL; INFILTRATION; INTRACAUDAL; PERINEURAL
Status: DISPENSED
Start: 2025-02-19

## (undated) RX ORDER — ONDANSETRON 2 MG/ML
INJECTION INTRAMUSCULAR; INTRAVENOUS
Status: DISPENSED
Start: 2025-02-19

## (undated) RX ORDER — NITROGLYCERIN 20 MG/100ML
INJECTION INTRAVENOUS
Status: DISPENSED
Start: 2022-02-22

## (undated) RX ORDER — PROPOFOL 10 MG/ML
INJECTION, EMULSION INTRAVENOUS
Status: DISPENSED
Start: 2022-02-22

## (undated) RX ORDER — NITROGLYCERIN 5 MG/ML
VIAL (ML) INTRAVENOUS
Status: DISPENSED
Start: 2022-04-22

## (undated) RX ORDER — HEPARIN SODIUM 1000 [USP'U]/ML
INJECTION, SOLUTION INTRAVENOUS; SUBCUTANEOUS
Status: DISPENSED
Start: 2022-02-22

## (undated) RX ORDER — METOPROLOL TARTRATE 50 MG
TABLET ORAL
Status: DISPENSED
Start: 2024-02-05

## (undated) RX ORDER — METOPROLOL TARTRATE 25 MG/1
TABLET, FILM COATED ORAL
Status: DISPENSED
Start: 2023-02-20

## (undated) RX ORDER — CALCIUM CHLORIDE 100 MG/ML
INJECTION INTRAVENOUS; INTRAVENTRICULAR
Status: DISPENSED
Start: 2022-02-22

## (undated) RX ORDER — HEPARIN SODIUM 1000 [USP'U]/ML
INJECTION, SOLUTION INTRAVENOUS; SUBCUTANEOUS
Status: DISPENSED
Start: 2022-02-20

## (undated) RX ORDER — FENTANYL CITRATE-0.9 % NACL/PF 10 MCG/ML
PLASTIC BAG, INJECTION (ML) INTRAVENOUS
Status: DISPENSED
Start: 2025-02-19

## (undated) RX ORDER — GLYCOPYRROLATE 0.2 MG/ML
INJECTION, SOLUTION INTRAMUSCULAR; INTRAVENOUS
Status: DISPENSED
Start: 2025-02-19

## (undated) RX ORDER — IVABRADINE 5 MG/1
TABLET, FILM COATED ORAL
Status: DISPENSED
Start: 2024-02-05

## (undated) RX ORDER — ASPIRIN 81 MG/1
TABLET, CHEWABLE ORAL
Status: DISPENSED
Start: 2022-02-17

## (undated) RX ORDER — FENTANYL CITRATE 50 UG/ML
INJECTION, SOLUTION INTRAMUSCULAR; INTRAVENOUS
Status: DISPENSED
Start: 2022-02-17

## (undated) RX ORDER — FENTANYL CITRATE 50 UG/ML
INJECTION, SOLUTION INTRAMUSCULAR; INTRAVENOUS
Status: DISPENSED
Start: 2022-02-20

## (undated) RX ORDER — EPHEDRINE SULFATE 50 MG/ML
INJECTION, SOLUTION INTRAMUSCULAR; INTRAVENOUS; SUBCUTANEOUS
Status: DISPENSED
Start: 2025-02-19

## (undated) RX ORDER — KETOROLAC TROMETHAMINE 30 MG/ML
INJECTION, SOLUTION INTRAMUSCULAR; INTRAVENOUS
Status: DISPENSED
Start: 2024-01-25

## (undated) RX ORDER — VERAPAMIL HYDROCHLORIDE 2.5 MG/ML
INJECTION, SOLUTION INTRAVENOUS
Status: DISPENSED
Start: 2022-02-20

## (undated) RX ORDER — PROPOFOL 10 MG/ML
INJECTION, EMULSION INTRAVENOUS
Status: DISPENSED
Start: 2024-01-25

## (undated) RX ORDER — NITROGLYCERIN 0.4 MG/1
TABLET SUBLINGUAL
Status: DISPENSED
Start: 2024-02-05

## (undated) RX ORDER — ASPIRIN 81 MG/1
TABLET, CHEWABLE ORAL
Status: DISPENSED
Start: 2022-04-22

## (undated) RX ORDER — CEFAZOLIN SODIUM 1 G/3ML
INJECTION, POWDER, FOR SOLUTION INTRAMUSCULAR; INTRAVENOUS
Status: DISPENSED
Start: 2022-02-22

## (undated) RX ORDER — FENTANYL CITRATE 50 UG/ML
INJECTION, SOLUTION INTRAMUSCULAR; INTRAVENOUS
Status: DISPENSED
Start: 2022-02-22

## (undated) RX ORDER — LIDOCAINE HYDROCHLORIDE 10 MG/ML
INJECTION, SOLUTION EPIDURAL; INFILTRATION; INTRACAUDAL; PERINEURAL
Status: DISPENSED
Start: 2024-01-25

## (undated) RX ORDER — NITROGLYCERIN 0.4 MG/1
TABLET SUBLINGUAL
Status: DISPENSED
Start: 2023-02-20

## (undated) RX ORDER — DEXAMETHASONE SODIUM PHOSPHATE 4 MG/ML
INJECTION, SOLUTION INTRA-ARTICULAR; INTRALESIONAL; INTRAMUSCULAR; INTRAVENOUS; SOFT TISSUE
Status: DISPENSED
Start: 2025-02-19

## (undated) RX ORDER — FENTANYL CITRATE 50 UG/ML
INJECTION, SOLUTION INTRAMUSCULAR; INTRAVENOUS
Status: DISPENSED
Start: 2022-04-22

## (undated) RX ORDER — FENTANYL CITRATE 50 UG/ML
INJECTION, SOLUTION INTRAMUSCULAR; INTRAVENOUS
Status: DISPENSED
Start: 2024-01-25

## (undated) RX ORDER — DEXAMETHASONE SODIUM PHOSPHATE 10 MG/ML
INJECTION, SOLUTION INTRAMUSCULAR; INTRAVENOUS
Status: DISPENSED
Start: 2025-02-19

## (undated) RX ORDER — NICARDIPINE HCL-0.9% SOD CHLOR 1 MG/10 ML
SYRINGE (ML) INTRAVENOUS
Status: DISPENSED
Start: 2022-04-22

## (undated) RX ORDER — HEPARIN SODIUM 1000 [USP'U]/ML
INJECTION, SOLUTION INTRAVENOUS; SUBCUTANEOUS
Status: DISPENSED
Start: 2022-04-22

## (undated) RX ORDER — SODIUM CHLORIDE 9 MG/ML
INJECTION, SOLUTION INTRAVENOUS
Status: DISPENSED
Start: 2022-04-22

## (undated) RX ORDER — MIDAZOLAM HCL IN 0.9 % NACL/PF 1 MG/ML
PLASTIC BAG, INJECTION (ML) INTRAVENOUS
Status: DISPENSED
Start: 2022-02-17

## (undated) RX ORDER — NOREPINEPHRINE BITARTRATE 0.06 MG/ML
INJECTION, SOLUTION INTRAVENOUS
Status: DISPENSED
Start: 2022-02-17

## (undated) RX ORDER — ONDANSETRON 2 MG/ML
INJECTION INTRAMUSCULAR; INTRAVENOUS
Status: DISPENSED
Start: 2024-01-25

## (undated) RX ORDER — NITROGLYCERIN 5 MG/ML
VIAL (ML) INTRAVENOUS
Status: DISPENSED
Start: 2022-02-20

## (undated) RX ORDER — FENTANYL CITRATE 50 UG/ML
INJECTION, SOLUTION INTRAMUSCULAR; INTRAVENOUS
Status: DISPENSED
Start: 2025-02-19